# Patient Record
Sex: MALE | Race: WHITE | NOT HISPANIC OR LATINO | Employment: OTHER | RURAL
[De-identification: names, ages, dates, MRNs, and addresses within clinical notes are randomized per-mention and may not be internally consistent; named-entity substitution may affect disease eponyms.]

---

## 2017-02-03 RX ORDER — AMMONIUM LACTATE 12 G/100G
CREAM TOPICAL
Qty: 140 G | Refills: 0 | Status: SHIPPED | OUTPATIENT
Start: 2017-02-03 | End: 2017-03-26 | Stop reason: SDUPTHER

## 2017-02-23 ENCOUNTER — OFFICE VISIT (OUTPATIENT)
Dept: FAMILY MEDICINE CLINIC | Facility: CLINIC | Age: 62
End: 2017-02-23

## 2017-02-23 VITALS
OXYGEN SATURATION: 94 % | HEART RATE: 104 BPM | BODY MASS INDEX: 35.42 KG/M2 | TEMPERATURE: 97.9 F | SYSTOLIC BLOOD PRESSURE: 120 MMHG | DIASTOLIC BLOOD PRESSURE: 60 MMHG | WEIGHT: 276 LBS | HEIGHT: 74 IN

## 2017-02-23 DIAGNOSIS — E11.9 DIABETES MELLITUS WITHOUT COMPLICATION (HCC): ICD-10-CM

## 2017-02-23 DIAGNOSIS — H02.401 PTOSIS OF EYELID, RIGHT: Primary | ICD-10-CM

## 2017-02-23 DIAGNOSIS — K21.9 GASTROESOPHAGEAL REFLUX DISEASE WITHOUT ESOPHAGITIS: ICD-10-CM

## 2017-02-23 DIAGNOSIS — I69.398 GAIT DISTURBANCE, POST-STROKE: ICD-10-CM

## 2017-02-23 DIAGNOSIS — H52.203 ASTIGMATISM, BILATERAL: ICD-10-CM

## 2017-02-23 DIAGNOSIS — E66.09 NON MORBID OBESITY DUE TO EXCESS CALORIES: ICD-10-CM

## 2017-02-23 DIAGNOSIS — R44.9 SENSORY DEFICIT PRESENT: ICD-10-CM

## 2017-02-23 DIAGNOSIS — G90.2 HORNER'S SYNDROME: ICD-10-CM

## 2017-02-23 DIAGNOSIS — I72.0 ANEURYSM OF CAROTID ARTERY (HCC): ICD-10-CM

## 2017-02-23 DIAGNOSIS — I10 BENIGN ESSENTIAL HYPERTENSION: ICD-10-CM

## 2017-02-23 DIAGNOSIS — I69.319 COGNITIVE DEFICIT, POST-STROKE: ICD-10-CM

## 2017-02-23 DIAGNOSIS — H52.13 MYOPIA, BILATERAL: ICD-10-CM

## 2017-02-23 DIAGNOSIS — R26.9 GAIT DISTURBANCE, POST-STROKE: ICD-10-CM

## 2017-02-23 DIAGNOSIS — M79.2 NEUROPATHIC PAIN: ICD-10-CM

## 2017-02-23 PROCEDURE — 99214 OFFICE O/P EST MOD 30 MIN: CPT | Performed by: FAMILY MEDICINE

## 2017-02-23 NOTE — PROGRESS NOTES
Subjective   Arias Willson is a 61 y.o. obese white male smoker with HTN, Had TIA 2- , began with double vision, R facial numbness, L arm numbness, L leg weakness. 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 Pt was referred to Neuro in [place], told has aneurysm, but surgery not recommended for this area. 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 Had resolution of most motor weakness. Has residual difficulty with balance. Cognitive difficulties, Depression, DM, High cholesterol, Chronic pain, and other health problems. Pt reports returned to job, despite inability to perform much of past duties, President of company is good friend.  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 Disability became more apparent, opted to resign with severance package. Concerned at some point may not be able to afford current meds.  Doubtful will be able to find employment, takes hours to do what took 10 minutes before stroke, can only walk 100 ft before exhausted, has to watch feet to walk, falls to R if not using cane. Would like to have labs checked before Ins runs out, any possible reduction in med cost checked out'.          Diabetes   He presents for his follow-up diabetic visit. He has type 2 diabetes mellitus. No MedicAlert identification noted. His disease course has been worsening. Hypoglycemia symptoms include headaches and nervousness/anxiousness. Pertinent negatives for hypoglycemia include no confusion or dizziness. Associated symptoms include fatigue, visual change and weakness. Pertinent negatives for diabetes include no chest pain. Symptoms are stable. Diabetic complications include a CVA and peripheral neuropathy. Current diabetic treatment includes oral agent (monotherapy). He is compliant with  treatment all of the time. His weight is stable. He is following a generally healthy diet. He participates in exercise daily. An ACE inhibitor/angiotensin II receptor blocker is being taken. He does not see a podiatrist.Eye exam is current.   Hypertension   This is a chronic problem. The current episode started more than 1 year ago. The problem has been gradually improving since onset. The problem is controlled. Associated symptoms include headaches and neck pain. Pertinent negatives include no chest pain, palpitations or shortness of breath. Agents associated with hypertension include NSAIDs. Risk factors for coronary artery disease include obesity and smoking/tobacco exposure. Past treatments include ACE inhibitors. The current treatment provides moderate improvement. Hypertensive end-organ damage includes CVA.   Stroke   This is a chronic problem. The current episode started more than 1 year ago. The problem occurs daily. The problem has been gradually improving. Associated symptoms include fatigue, headaches, neck pain, numbness, a visual change and weakness. Pertinent negatives include no abdominal pain, arthralgias, chest pain, congestion, coughing, fever, nausea, rash or sore throat. The symptoms are aggravated by exertion and walking. He has tried acetaminophen and NSAIDs for the symptoms. The treatment provided no relief.   Pain   This is a chronic problem. The current episode started more than 1 year ago. The problem occurs daily. Associated symptoms include fatigue, headaches, neck pain, numbness, a visual change and weakness. Pertinent negatives include no abdominal pain, arthralgias, chest pain, congestion, coughing, fever, nausea, rash or sore throat. The symptoms are aggravated by walking and stress. He has tried NSAIDs and acetaminophen for the symptoms. The treatment provided no relief.   Neurologic Problem   The patient's primary symptoms include an altered mental status, clumsiness, focal sensory  loss, focal weakness, a loss of balance, a visual change and weakness. Primary symptoms comment: Delayed cognitive processing. . This is a chronic problem. The current episode started more than 1 year ago. The neurological problem developed suddenly. The problem has been gradually improving since onset. There was left-sided, right-sided, facial, upper extremity and lower extremity (Strokes of Basal ganglia. Horners syndrome,  sensory, motor deficits.) focality noted. Associated symptoms include fatigue, headaches and neck pain. Pertinent negatives include no abdominal pain, chest pain, confusion, dizziness, fever, nausea, palpitations or shortness of breath. Past treatments include walking and medication (Physical therapy). The treatment provided mild relief. His past medical history is significant for a CVA.   Depression   Visit Type: follow-up  Patient presents with the following symptoms: decreased concentration, depressed mood, excessive worry, feelings of hopelessness, feelings of worthlessness, irritability, memory impairment, nervousness/anxiety and restlessness.  Patient is not experiencing: confusion, palpitations, shortness of breath, suicidal ideas and suicidal planning.  Frequency of symptoms: occasionally   Severity: moderate   Sleep quality: fair  Nighttime awakenings: several           The following portions of the patient's history were reviewed and updated as appropriate: allergies, current medications, past family history, past medical history, past social history, past surgical history and problem list.    Review of Systems   Constitutional: Positive for fatigue and irritability. Negative for activity change, appetite change and fever.   HENT: Negative for congestion, ear pain and sore throat.    Eyes: Negative for pain and visual disturbance.   Respiratory: Negative for cough, chest tightness, shortness of breath and wheezing.    Cardiovascular: Negative for chest pain and palpitations.    Gastrointestinal: Negative for abdominal pain and nausea.   Endocrine: Negative for cold intolerance and heat intolerance.   Genitourinary: Negative for difficulty urinating and dysuria.   Musculoskeletal: Positive for neck pain. Negative for arthralgias and gait problem.   Skin: Negative for color change and rash.   Allergic/Immunologic: Negative for environmental allergies.   Neurological: Positive for focal weakness, weakness, numbness, headaches and loss of balance. Negative for dizziness.   Hematological: Negative for adenopathy. Does not bruise/bleed easily.   Psychiatric/Behavioral: Positive for decreased concentration and sleep disturbance. Negative for agitation, confusion and suicidal ideas. The patient is nervous/anxious.         Depressed mood       Objective   Physical Exam   Constitutional: He is oriented to person, place, and time. He appears well-developed and well-nourished. No distress.   HENT:   Head: Normocephalic and atraumatic.   Right Ear: External ear normal.   Left Ear: External ear normal.   Nose: Nose normal.   Mouth/Throat: Oropharynx is clear and moist.   Eyes: Conjunctivae and EOM are normal. Pupils are equal, round, and reactive to light. Right eye exhibits no discharge. Left eye exhibits no discharge. No scleral icterus.   Neck: Normal range of motion. Neck supple. No JVD present. No tracheal deviation present. No thyromegaly present.   Cardiovascular: Normal rate, regular rhythm, normal heart sounds and intact distal pulses.  Exam reveals no gallop and no friction rub.    No murmur heard.  Pulmonary/Chest: Effort normal and breath sounds normal. No respiratory distress. He has no wheezes. He has no rales. He exhibits no tenderness.   Abdominal: Soft. Bowel sounds are normal. He exhibits no distension and no mass. There is no tenderness. No hernia.   Musculoskeletal: He exhibits no edema, tenderness or deformity.   Walks with cane, L sided sensory deficit, R motor, drift towards R  without cane. R pupil NR, Has weakness closing R eye CN VII.   Has antalgic gait.   Lymphadenopathy:     He has no cervical adenopathy.   Neurological: He is alert and oriented to person, place, and time. He displays normal reflexes. A cranial nerve deficit is present. He exhibits normal muscle tone. Coordination normal.   Skin: Skin is warm and dry. No rash noted. He is not diaphoretic. No erythema. No pallor.   Psychiatric: His behavior is normal. Judgment and thought content normal.   Has some difficulty expressing thought at times, Pt often frustrated, tearful due to disability, post CVA.  Post stroke depression.    Nursing note and vitals reviewed.      Assessment/Plan      Arias was seen today for diabetes and hypertension.    Diagnoses and all orders for this visit:    Ptosis of eyelid, right    Astigmatism, bilateral    Myopia, bilateral    Gus's syndrome    Neuropathic pain    Diabetes mellitus without complication    Benign essential hypertension    Aneurysm of carotid artery    Gastroesophageal reflux disease without esophagitis    Non morbid obesity due to excess calories    Sensory deficit present    Cognitive deficit, post-stroke    Gait disturbance, post-stroke    Discussed chronic pain, depression, disability, associated with CVA. Discussed stroke risk, Stroke risk reduction. Discussed smoking cessation x 3 mins, diet, exercise benefits. Discussed meds, indications. Discussed coping skills, CBT, benefits of counseling. Discussed Cymbalta for pain and Depression. Discussed USPSTF recommendations.

## 2017-02-26 PROBLEM — M79.2 NEUROPATHIC PAIN: Status: ACTIVE | Noted: 2017-02-26

## 2017-02-26 PROBLEM — I63.9 CEREBROVASCULAR ACCIDENT (HCC): Status: ACTIVE | Noted: 2017-02-26

## 2017-02-26 PROBLEM — K21.9 GERD (GASTROESOPHAGEAL REFLUX DISEASE): Status: ACTIVE | Noted: 2017-02-26

## 2017-02-26 PROBLEM — R44.9 SENSORY DEFICIT PRESENT: Status: ACTIVE | Noted: 2017-02-26

## 2017-02-26 PROBLEM — I69.398 GAIT DISTURBANCE, POST-STROKE: Status: ACTIVE | Noted: 2017-02-26

## 2017-02-26 PROBLEM — I69.319 COGNITIVE DEFICIT, POST-STROKE: Status: ACTIVE | Noted: 2017-02-26

## 2017-02-26 PROBLEM — R26.9 GAIT DISTURBANCE, POST-STROKE: Status: ACTIVE | Noted: 2017-02-26

## 2017-02-26 PROBLEM — I72.0 ANEURYSM OF CAROTID ARTERY: Status: ACTIVE | Noted: 2017-02-26

## 2017-02-26 PROBLEM — G90.2 HORNER'S SYNDROME: Status: ACTIVE | Noted: 2017-02-26

## 2017-02-26 PROBLEM — E66.9 OBESITY: Status: ACTIVE | Noted: 2017-02-26

## 2017-02-27 NOTE — PATIENT INSTRUCTIONS
Diabetes Mellitus and Food  It is important for you to manage your blood sugar (glucose) level. Your blood glucose level can be greatly affected by what you eat. Eating healthier foods in the appropriate amounts throughout the day at about the same time each day will help you control your blood glucose level. It can also help slow or prevent worsening of your diabetes mellitus. Healthy eating may even help you improve the level of your blood pressure and reach or maintain a healthy weight.   General recommendations for healthful eating and cooking habits include:  · Eating meals and snacks regularly. Avoid going long periods of time without eating to lose weight.  · Eating a diet that consists mainly of plant-based foods, such as fruits, vegetables, nuts, legumes, and whole grains.  · Using low-heat cooking methods, such as baking, instead of high-heat cooking methods, such as deep frying.  Work with your dietitian to make sure you understand how to use the Nutrition Facts information on food labels.  HOW CAN FOOD AFFECT ME?  Carbohydrates  Carbohydrates affect your blood glucose level more than any other type of food. Your dietitian will help you determine how many carbohydrates to eat at each meal and teach you how to count carbohydrates. Counting carbohydrates is important to keep your blood glucose at a healthy level, especially if you are using insulin or taking certain medicines for diabetes mellitus.  Alcohol  Alcohol can cause sudden decreases in blood glucose (hypoglycemia), especially if you use insulin or take certain medicines for diabetes mellitus. Hypoglycemia can be a life-threatening condition. Symptoms of hypoglycemia (sleepiness, dizziness, and disorientation) are similar to symptoms of having too much alcohol.   If your health care provider has given you approval to drink alcohol, do so in moderation and use the following guidelines:  · Women should not have more than one drink per day, and men  should not have more than two drinks per day. One drink is equal to:    12 oz of beer.    5 oz of wine.    1½ oz of hard liquor.  · Do not drink on an empty stomach.  · Keep yourself hydrated. Have water, diet soda, or unsweetened iced tea.  · Regular soda, juice, and other mixers might contain a lot of carbohydrates and should be counted.  WHAT FOODS ARE NOT RECOMMENDED?  As you make food choices, it is important to remember that all foods are not the same. Some foods have fewer nutrients per serving than other foods, even though they might have the same number of calories or carbohydrates. It is difficult to get your body what it needs when you eat foods with fewer nutrients. Examples of foods that you should avoid that are high in calories and carbohydrates but low in nutrients include:  · Trans fats (most processed foods list trans fats on the Nutrition Facts label).  · Regular soda.  · Juice.  · Candy.  · Sweets, such as cake, pie, doughnuts, and cookies.  · Fried foods.  WHAT FOODS CAN I EAT?  Eat nutrient-rich foods, which will nourish your body and keep you healthy. The food you should eat also will depend on several factors, including:  · The calories you need.  · The medicines you take.  · Your weight.  · Your blood glucose level.  · Your blood pressure level.  · Your cholesterol level.  You should eat a variety of foods, including:  · Protein.    Lean cuts of meat.    Proteins low in saturated fats, such as fish, egg whites, and beans. Avoid processed meats.  · Fruits and vegetables.    Fruits and vegetables that may help control blood glucose levels, such as apples, mangoes, and yams.  · Dairy products.    Choose fat-free or low-fat dairy products, such as milk, yogurt, and cheese.  · Grains, bread, pasta, and rice.    Choose whole grain products, such as multigrain bread, whole oats, and brown rice. These foods may help control blood pressure.  · Fats.    Foods containing healthful fats, such as nuts,  avocado, olive oil, canola oil, and fish.  DOES EVERYONE WITH DIABETES MELLITUS HAVE THE SAME MEAL PLAN?  Because every person with diabetes mellitus is different, there is not one meal plan that works for everyone. It is very important that you meet with a dietitian who will help you create a meal plan that is just right for you.     This information is not intended to replace advice given to you by your health care provider. Make sure you discuss any questions you have with your health care provider.     Document Released: 09/14/2006 Document Revised: 01/08/2016 Document Reviewed: 11/14/2014  [email or web address] Interactive Patient Education ©2016 [employer]  Diabetes and Standards of Medical Care  Diabetes is complicated. You may find that your diabetes team includes a dietitian, nurse, diabetes educator, eye doctor, and more. To help everyone know what is going on and to help you get the care you deserve, the following schedule of care was developed to help keep you on track. Below are the tests, exams, vaccines, medicines, education, and plans you will need.  HbA1c test  This test shows how well you have controlled your glucose over the past 2-3 months. It is used to see if your diabetes management plan needs to be adjusted.   · It is performed at least 2 times a year if you are meeting treatment goals.  · It is performed 4 times a year if therapy has changed or if you are not meeting treatment goals.  Blood pressure test  · This test is performed at every routine medical visit. The goal is less than 140/90 mm Hg for most people, but 130/80 mm Hg in some cases. Ask your health care provider about your goal.  Dental exam  · Follow up with the dentist regularly.  Eye exam  · If you are diagnosed with type 1 diabetes as a child, get an exam upon reaching the age of 10 years or older and having had diabetes for 3-5 years. Yearly eye exams are recommended after that initial eye exam.  · If you are diagnosed with type 1  diabetes as an adult, get an exam within 5 years of diagnosis and then yearly.  · If you are diagnosed with type 2 diabetes, get an exam as soon as possible after the diagnosis and then yearly.  Foot care exam  · Visual foot exams are performed at every routine medical visit. The exams check for cuts, injuries, or other problems with the feet.  · You should have a complete foot exam performed every year. This exam includes an inspection of the structure and skin of your feet, a check of the pulses in your feet, and a check of the sensation in your feet.    Type 1 diabetes: The first exam is performed 5 years after diagnosis.    Type 2 diabetes: The first exam is performed at the time of diagnosis.  · Check your feet nightly for cuts, injuries, or other problems with your feet. Tell your health care provider if anything is not healing.  Kidney function test (urine microalbumin)  · This test is performed once a year.  ¨ Type 1 diabetes: The first test is performed 5 years after diagnosis.  ¨ Type 2 diabetes: The first test is performed at the time of diagnosis.  · A serum creatinine and estimated glomerular filtration rate (eGFR) test is done once a year to assess the level of chronic kidney disease (CKD), if present.  Lipid profile (cholesterol, HDL, LDL, triglycerides)  · Performed every 5 years for most people.    The goal for LDL is less than 100 mg/dL. If you are at high risk, the goal is less than 70 mg/dL.    The goal for HDL is 40 mg/dL-50 mg/dL for men and 50 mg/dL-60 mg/dL for women. An HDL cholesterol of 60 mg/dL or higher gives some protection against heart disease.    The goal for triglycerides is less than 150 mg/dL.  Immunizations  · The flu (influenza) vaccine is recommended yearly for every person 6 months of age or older who has diabetes.  · The pneumonia (pneumococcal) vaccine is recommended for every person 2 years of age or older who has diabetes. Adults 65 years of age or older may receive the  pneumonia vaccine as a series of two separate shots.  · The hepatitis B vaccine is recommended for adults shortly after they have been diagnosed with diabetes.  · The Tdap (tetanus, diphtheria, and pertussis) vaccine should be given:    According to normal childhood vaccination schedules, for children.    Every 10 years, for adults who have diabetes.  Diabetes self-management education  · Education is recommended at diagnosis and ongoing as needed.  Treatment plan  · Your treatment plan is reviewed at every medical visit.     This information is not intended to replace advice given to you by your health care provider. Make sure you discuss any questions you have with your health care provider.     Document Released: 10/15/2010 Document Revised: 01/08/2016 Document Reviewed: 05/20/2014  [employer] Interactive Patient Education ©2016 [employer]  Major Depressive Disorder  Major depressive disorder is a mood disorder. It is not the same as feeling sad when bad things happen. This disorder can last for days or weeks. This disorder can make it hard to work or do things with family or friends. It can make a person feel:  · Sad.  · Empty.  · Hopeless.  · Helpless.  · Irritable.  In addition to these feelings, people who have this disorder have at least 4 of these symptoms:  · Having trouble sleeping.  · Sleeping too much.  · A big (major) change in appetite.  · A big change in weight.  · A lack of energy.  · Feelings of guilt or worthlessness.  · Having a hard time with concentrating, remembering, or making decisions.  · Slow movements.  · Restlessness.  · Thinking or dreaming about suicide or about harming oneself.  · Suicide attempt.  HOME CARE  · Take over-the-counter and prescription medicines only as told by your doctor.  · Keep all follow-up visits as told by your doctor. This includes any therapy that your doctor recommends for you.  · Lessen your stress. Do something that you enjoy, such as biking, walking, or  reading a book.  · Exercise often.  · Eat a healthy diet.  · Do not drink alcohol.  · Do not take drugs.  · Get support from your family and friends.  GET HELP RIGHT AWAY IF:   · You start to hear voices.  · You see things that are not really there.  · You feel like people are out to get you (paranoid).  · You have serious thoughts about hurting yourself or others.  · You think about suicide.     This information is not intended to replace advice given to you by your health care provider. Make sure you discuss any questions you have with your health care provider.     Document Released: 11/28/2016 Document Reviewed: 01/13/2014  [employer] Interactive Patient Education ©2016 [employer]  Stroke Prevention  Some medical conditions and behaviors are associated with an increased chance of having a stroke. You may prevent a stroke by making healthy choices and managing medical conditions.  HOW CAN I REDUCE MY RISK OF HAVING A STROKE?   · Stay physically active. Get at least 30 minutes of activity on most or all days.  · Do not smoke. It may also be helpful to avoid exposure to secondhand smoke.  · Limit alcohol use. Moderate alcohol use is considered to be:  ¨ No more than 2 drinks per day for men.  ¨ No more than 1 drink per day for nonpregnant women.  · Eat healthy foods. This involves:  ¨ Eating 5 or more servings of fruits and vegetables a day.  ¨ Making dietary changes that address high blood pressure (hypertension), high cholesterol, diabetes, or obesity.  · Manage your cholesterol levels.  ¨ Making food choices that are high in fiber and low in saturated fat, trans fat, and cholesterol may control cholesterol levels.  ¨ Take any prescribed medicines to control cholesterol as directed by your health care provider.  · Manage your diabetes.  ¨ Controlling your carbohydrate and sugar intake is recommended to manage diabetes.  ¨ Take any prescribed medicines to control diabetes as directed by your health care  provider.  · Control your hypertension.  ¨ Making food choices that are low in salt (sodium), saturated fat, trans fat, and cholesterol is recommended to manage hypertension.  ¨ Ask your health care provider if you need treatment to lower your blood pressure. Take any prescribed medicines to control hypertension as directed by your health care provider.  ¨ If you are 18-39 years of age, have your blood pressure checked every 3-5 years. If you are 40 years of age or older, have your blood pressure checked every year.  · Maintain a healthy weight.  ¨ Reducing calorie intake and making food choices that are low in sodium, saturated fat, trans fat, and cholesterol are recommended to manage weight.  · Stop drug abuse.  · Avoid taking birth control pills.  ¨ Talk to your health care provider about the risks of taking birth control pills if you are over 35 years old, smoke, get migraines, or have ever had a blood clot.  · Get evaluated for sleep disorders (sleep apnea).  ¨ Talk to your health care provider about getting a sleep evaluation if you snore a lot or have excessive sleepiness.  · Take medicines only as directed by your health care provider.  ¨ For some people, aspirin or blood thinners (anticoagulants) are helpful in reducing the risk of forming abnormal blood clots that can lead to stroke. If you have the irregular heart rhythm of atrial fibrillation, you should be on a blood thinner unless there is a good reason you cannot take them.  ¨ Understand all your medicine instructions.  · Make sure that other conditions (such as anemia or atherosclerosis) are addressed.  SEEK IMMEDIATE MEDICAL CARE IF:   · You have sudden weakness or numbness of the face, arm, or leg, especially on one side of the body.  · Your face or eyelid droops to one side.  · You have sudden confusion.  · You have trouble speaking (aphasia) or understanding.  · You have sudden trouble seeing in one or both eyes.  · You have sudden trouble  walking.  · You have dizziness.  · You have a loss of balance or coordination.  · You have a sudden, severe headache with no known cause.  · You have new chest pain or an irregular heartbeat.  Any of these symptoms may represent a serious problem that is an emergency. Do not wait to see if the symptoms will go away. Get medical help at once. Call your local emergency services (911 in [place] Do not drive yourself to the hospital.     This information is not intended to replace advice given to you by your health care provider. Make sure you discuss any questions you have with your health care provider.     Document Released: 01/25/2006 Document Revised: 01/08/2016 Document Reviewed: 06/20/2014  [employer] Interactive Patient Education ©2016 [employer]  Rehabilitation After a Stroke, Adult  A stroke can cause many types of problems. The treatment of stroke involves three stages:  · Prevention.  · Treatment immediately following a stroke.  · Rehabilitation after a stroke.  HOW IS MY REHABILITATION PLAN DEVELOPED?  A detailed exam by your health care provider helps outline what problems were caused by the stroke. Your health care provider may consult specialists. The specialists may include doctors, occupational and physical therapists, and speech therapists. It is then possible to make a plan that best fits your needs.   Your evaluation might include the following:  · Evaluation of your ability to do daily activities that require using muscles, coordination, vision, reasoning, memory, and problem solving. Interviews with you and your health care provider will help determine what you could do and could not do before the stroke.  · Evaluation of your ability to do personal self-care tasks, such as dressing, grooming, and eating.  · Tests to see if there are sensory and motor changes due to the stroke, especially in the hands and legs.  WHAT ARE THE TYPES OF REHABILITATION?  Your health care provider may have you start  rehabilitation right away depending on the type and severity of your stroke. Rehabilitation after a stroke is focused on getting function back and preventing another stroke. Rehabilitation might include:   · Physical therapy. This can include help with walking, sitting, lying down, and balance. It may also be designed to help prevent shortening of the muscles (contractures) and swelling (edema).  · Occupational therapy. This therapy helps you to relearn skills needed for leading a normal life. These could include eating, using the restroom, dressing, and taking care of yourself. It helps to make you more independent.  · Vision therapy. This can help you to retrain, strengthen, and improve your vision after a stroke.  · Speech therapy. This can help to improve your speech and communication skills. It also teaches you and your family members to cope with problems of being unable to communicate.  · Cognitive therapy. This therapy can help with problems caused by lack of memory, attention, or concentration.  · Psychological or psychiatric therapy. This can help you cope with problems of frustration and emotional problems that may develop after a stroke.       This information is not intended to replace advice given to you by your health care provider. Make sure you discuss any questions you have with your health care provider.     Document Released: 01/06/2009 Document Revised: 05/03/2016 Document Reviewed: 05/22/2014  [employer] Interactive Patient Education ©2016 [employer]

## 2017-03-27 RX ORDER — AMMONIUM LACTATE 12 G/100G
CREAM TOPICAL
Qty: 140 G | Refills: 0 | Status: SHIPPED | OUTPATIENT
Start: 2017-03-27 | End: 2018-01-23 | Stop reason: HOSPADM

## 2017-03-29 ENCOUNTER — OFFICE VISIT (OUTPATIENT)
Dept: FAMILY MEDICINE CLINIC | Facility: CLINIC | Age: 62
End: 2017-03-29

## 2017-03-29 VITALS
TEMPERATURE: 97.3 F | DIASTOLIC BLOOD PRESSURE: 60 MMHG | SYSTOLIC BLOOD PRESSURE: 100 MMHG | OXYGEN SATURATION: 95 % | HEART RATE: 96 BPM | WEIGHT: 275.8 LBS | BODY MASS INDEX: 35.39 KG/M2 | HEIGHT: 74 IN

## 2017-03-29 DIAGNOSIS — J40 BRONCHITIS: Primary | ICD-10-CM

## 2017-03-29 PROCEDURE — 99213 OFFICE O/P EST LOW 20 MIN: CPT | Performed by: FAMILY MEDICINE

## 2017-03-29 RX ORDER — AZITHROMYCIN 250 MG/1
TABLET, FILM COATED ORAL
Qty: 6 TABLET | Refills: 3 | Status: SHIPPED | OUTPATIENT
Start: 2017-03-29 | End: 2018-01-17

## 2017-03-29 RX ORDER — ERYTHROMYCIN 5 MG/G
OINTMENT OPHTHALMIC NIGHTLY
Qty: 3.5 G | Refills: 5 | Status: SHIPPED | OUTPATIENT
Start: 2017-03-29 | End: 2018-01-23 | Stop reason: HOSPADM

## 2017-03-29 RX ORDER — GUAIFENESIN AND CODEINE PHOSPHATE 100; 10 MG/5ML; MG/5ML
5 SOLUTION ORAL 3 TIMES DAILY PRN
Qty: 473 ML | Refills: 0 | Status: SHIPPED | OUTPATIENT
Start: 2017-03-29 | End: 2018-01-17

## 2017-03-29 RX ORDER — DIAPER,BRIEF,INFANT-TODD,DISP
EACH MISCELLANEOUS 2 TIMES DAILY
Qty: 28 G | Refills: 6 | Status: SHIPPED | OUTPATIENT
Start: 2017-03-29 | End: 2018-01-23 | Stop reason: HOSPADM

## 2017-04-11 PROBLEM — J40 BRONCHITIS: Status: ACTIVE | Noted: 2017-04-11

## 2017-04-11 PROBLEM — J06.9 URI, ACUTE: Status: ACTIVE | Noted: 2017-04-11

## 2017-04-11 NOTE — PATIENT INSTRUCTIONS
Acute Bronchitis  Bronchitis is inflammation of the airways that extend from the windpipe into the lungs (bronchi). The inflammation often causes mucus to develop. This leads to a cough, which is the most common symptom of bronchitis.   In acute bronchitis, the condition usually develops suddenly and goes away over time, usually in a couple weeks. Smoking, allergies, and asthma can make bronchitis worse. Repeated episodes of bronchitis may cause further lung problems.   CAUSES  Acute bronchitis is most often caused by the same virus that causes a cold. The virus can spread from person to person (contagious) through coughing, sneezing, and touching contaminated objects.  SIGNS AND SYMPTOMS   · Cough.    · Fever.    · Coughing up mucus.    · Body aches.    · Chest congestion.    · Chills.    · Shortness of breath.    · Sore throat.    DIAGNOSIS   Acute bronchitis is usually diagnosed through a physical exam. Your health care provider will also ask you questions about your medical history. Tests, such as chest X-rays, are sometimes done to rule out other conditions.   TREATMENT   Acute bronchitis usually goes away in a couple weeks. Oftentimes, no medical treatment is necessary. Medicines are sometimes given for relief of fever or cough. Antibiotic medicines are usually not needed but may be prescribed in certain situations. In some cases, an inhaler may be recommended to help reduce shortness of breath and control the cough. A cool mist vaporizer may also be used to help thin bronchial secretions and make it easier to clear the chest.   HOME CARE INSTRUCTIONS  · Get plenty of rest.    · Drink enough fluids to keep your urine clear or pale yellow (unless you have a medical condition that requires fluid restriction). Increasing fluids may help thin your respiratory secretions (sputum) and reduce chest congestion, and it will prevent dehydration.    · Take medicines only as directed by your health care provider.  · If  you were prescribed an antibiotic medicine, finish it all even if you start to feel better.  · Avoid smoking and secondhand smoke. Exposure to cigarette smoke or irritating chemicals will make bronchitis worse. If you are a smoker, consider using nicotine gum or skin patches to help control withdrawal symptoms. Quitting smoking will help your lungs heal faster.    · Reduce the chances of another bout of acute bronchitis by washing your hands frequently, avoiding people with cold symptoms, and trying not to touch your hands to your mouth, nose, or eyes.    · Keep all follow-up visits as directed by your health care provider.    SEEK MEDICAL CARE IF:  Your symptoms do not improve after 1 week of treatment.   SEEK IMMEDIATE MEDICAL CARE IF:  · You develop an increased fever or chills.    · You have chest pain.    · You have severe shortness of breath.  · You have bloody sputum.    · You develop dehydration.  · You faint or repeatedly feel like you are going to pass out.  · You develop repeated vomiting.  · You develop a severe headache.  MAKE SURE YOU:   · Understand these instructions.  · Will watch your condition.  · Will get help right away if you are not doing well or get worse.     This information is not intended to replace advice given to you by your health care provider. Make sure you discuss any questions you have with your health care provider.     Document Released: 01/25/2006 Document Revised: 01/08/2016 Document Reviewed: 06/10/2014  [employer] Interactive Patient Education ©2016 [employer]

## 2017-04-14 ENCOUNTER — TELEPHONE (OUTPATIENT)
Dept: FAMILY MEDICINE CLINIC | Facility: CLINIC | Age: 62
End: 2017-04-14

## 2017-04-14 NOTE — TELEPHONE ENCOUNTER
Pt qualifies for the [facility] program for all of his medications.  States the two of you had talked about increasing his cymbalta, wants to know if could do that with the RX being sent to [facility]. Pt currently taking 30mg 1 every 4 days to make it last.  He is also currently waiting to see what his new insurance will be.   Please advise.

## 2017-04-18 RX ORDER — GABAPENTIN 600 MG/1
600 TABLET ORAL 3 TIMES DAILY PRN
Qty: 270 TABLET | Refills: 3 | Status: SHIPPED | OUTPATIENT
Start: 2017-04-18 | End: 2018-01-30 | Stop reason: SDUPTHER

## 2017-04-18 RX ORDER — PRAVASTATIN SODIUM 40 MG
40 TABLET ORAL DAILY
Qty: 90 TABLET | Refills: 3 | Status: SHIPPED | OUTPATIENT
Start: 2017-04-18 | End: 2018-01-17 | Stop reason: DRUGHIGH

## 2017-04-18 RX ORDER — DULOXETIN HYDROCHLORIDE 30 MG/1
30 CAPSULE, DELAYED RELEASE ORAL 2 TIMES DAILY
Qty: 180 CAPSULE | Refills: 3 | Status: SHIPPED | OUTPATIENT
Start: 2017-04-18 | End: 2018-01-30 | Stop reason: SDUPTHER

## 2017-04-18 RX ORDER — CLOPIDOGREL BISULFATE 75 MG/1
75 TABLET ORAL DAILY
Qty: 90 TABLET | Refills: 3 | Status: SHIPPED | OUTPATIENT
Start: 2017-04-18 | End: 2018-01-30 | Stop reason: SDUPTHER

## 2017-04-18 RX ORDER — LISINOPRIL AND HYDROCHLOROTHIAZIDE 25; 20 MG/1; MG/1
1 TABLET ORAL DAILY
Qty: 90 TABLET | Refills: 3 | Status: SHIPPED | OUTPATIENT
Start: 2017-04-18 | End: 2018-01-23 | Stop reason: HOSPADM

## 2017-04-18 RX ORDER — LANSOPRAZOLE 30 MG/1
30 CAPSULE, DELAYED RELEASE ORAL DAILY
Qty: 30 CAPSULE | Refills: 11 | Status: SHIPPED | OUTPATIENT
Start: 2017-04-18 | End: 2018-01-30 | Stop reason: SDUPTHER

## 2018-01-17 ENCOUNTER — APPOINTMENT (OUTPATIENT)
Dept: CT IMAGING | Facility: HOSPITAL | Age: 63
End: 2018-01-17

## 2018-01-17 ENCOUNTER — HOSPITAL ENCOUNTER (INPATIENT)
Facility: HOSPITAL | Age: 63
LOS: 6 days | Discharge: HOME | End: 2018-01-23
Attending: EMERGENCY MEDICINE | Admitting: HOSPITALIST

## 2018-01-17 ENCOUNTER — APPOINTMENT (OUTPATIENT)
Dept: GENERAL RADIOLOGY | Facility: HOSPITAL | Age: 63
End: 2018-01-17

## 2018-01-17 DIAGNOSIS — J42 CHRONIC BRONCHITIS WITH ACUTE EXACERBATION (HCC): Chronic | ICD-10-CM

## 2018-01-17 DIAGNOSIS — J20.9 CHRONIC BRONCHITIS WITH ACUTE EXACERBATION (HCC): Chronic | ICD-10-CM

## 2018-01-17 DIAGNOSIS — I50.20 SYSTOLIC CONGESTIVE HEART FAILURE, UNSPECIFIED CONGESTIVE HEART FAILURE CHRONICITY: ICD-10-CM

## 2018-01-17 DIAGNOSIS — J96.02 ACUTE RESPIRATORY FAILURE WITH HYPOXIA AND HYPERCAPNIA (HCC): Primary | ICD-10-CM

## 2018-01-17 DIAGNOSIS — J96.01 ACUTE RESPIRATORY FAILURE WITH HYPOXIA AND HYPERCAPNIA (HCC): Primary | ICD-10-CM

## 2018-01-17 DIAGNOSIS — I10 ESSENTIAL HYPERTENSION, BENIGN: ICD-10-CM

## 2018-01-17 DIAGNOSIS — J32.0 CHRONIC LEFT MAXILLARY SINUSITIS: ICD-10-CM

## 2018-01-17 DIAGNOSIS — Z74.09 IMPAIRED PHYSICAL MOBILITY: ICD-10-CM

## 2018-01-17 DIAGNOSIS — I11.9 BENIGN HYPERTENSIVE HEART DISEASE WITHOUT CONGESTIVE HEART FAILURE: ICD-10-CM

## 2018-01-17 DIAGNOSIS — G47.33 OBSTRUCTIVE APNEA: Chronic | ICD-10-CM

## 2018-01-17 DIAGNOSIS — I21.4 NSTEMI (NON-ST ELEVATED MYOCARDIAL INFARCTION) (HCC): ICD-10-CM

## 2018-01-17 DIAGNOSIS — I21.4 NSTEMI (NON-ST ELEVATION MYOCARDIAL INFARCTION) (HCC): ICD-10-CM

## 2018-01-17 PROBLEM — A41.9 SEPSIS: Status: ACTIVE | Noted: 2018-01-17

## 2018-01-17 PROBLEM — E66.812 CLASS 2 OBESITY DUE TO EXCESS CALORIES IN ADULT: Status: ACTIVE | Noted: 2017-02-26

## 2018-01-17 PROBLEM — R77.8 ELEVATED TROPONIN: Status: ACTIVE | Noted: 2018-01-17

## 2018-01-17 PROBLEM — E66.09 CLASS 2 OBESITY DUE TO EXCESS CALORIES IN ADULT: Status: ACTIVE | Noted: 2017-02-26

## 2018-01-17 PROBLEM — R79.89 ELEVATED TROPONIN: Status: ACTIVE | Noted: 2018-01-17

## 2018-01-17 PROBLEM — IMO0001 CLASS 3 OBESITY DUE TO EXCESS CALORIES IN ADULT: Status: ACTIVE | Noted: 2017-02-26

## 2018-01-17 LAB
ALBUMIN SERPL-MCNC: 3.7 G/DL (ref 3.4–4.8)
ALBUMIN/GLOB SERPL: 1.4 G/DL (ref 1.1–1.8)
ALP SERPL-CCNC: 92 U/L (ref 38–126)
ALT SERPL W P-5'-P-CCNC: 52 U/L (ref 21–72)
ANION GAP SERPL CALCULATED.3IONS-SCNC: 9 MMOL/L (ref 5–15)
APTT PPP: 27.2 SECONDS (ref 20–40.3)
ARTERIAL PATENCY WRIST A: ABNORMAL
ARTERIAL PATENCY WRIST A: ABNORMAL
AST SERPL-CCNC: 44 U/L (ref 17–59)
ATMOSPHERIC PRESS: ABNORMAL MMHG
ATMOSPHERIC PRESS: ABNORMAL MMHG
BASE EXCESS BLDA CALC-SCNC: 9 MMOL/L (ref -2.4–2.4)
BASE EXCESS BLDA CALC-SCNC: 9.8 MMOL/L (ref -2.4–2.4)
BASOPHILS # BLD AUTO: 0.03 10*3/MM3 (ref 0–0.2)
BASOPHILS NFR BLD AUTO: 0.4 % (ref 0–2)
BDY SITE: ABNORMAL
BDY SITE: ABNORMAL
BILIRUB SERPL-MCNC: 0.4 MG/DL (ref 0.2–1.3)
BILIRUB UR QL STRIP: NEGATIVE
BUN BLD-MCNC: 37 MG/DL (ref 7–21)
BUN/CREAT SERPL: 31.9 (ref 7–25)
CA-I BLD-MCNC: 4.5 MG/DL (ref 4.5–4.9)
CA-I BLD-MCNC: 4.6 MG/DL (ref 4.5–4.9)
CALCIUM SPEC-SCNC: 9.1 MG/DL (ref 8.4–10.2)
CHLORIDE SERPL-SCNC: 92 MMOL/L (ref 95–110)
CK MB SERPL-CCNC: 1.74 NG/ML (ref 0–5)
CK SERPL-CCNC: 70 U/L (ref 55–170)
CLARITY UR: CLEAR
CO2 BLDA-SCNC: 39.9 MMOL/L (ref 23–27)
CO2 BLDA-SCNC: 40.1 MMOL/L (ref 23–27)
CO2 SERPL-SCNC: 36 MMOL/L (ref 22–31)
COLOR UR: YELLOW
CREAT BLD-MCNC: 1.16 MG/DL (ref 0.7–1.3)
D-DIMER, QUANTITATIVE (MAD,POW, STR): 459 NG/ML (FEU) (ref 0–470)
D-LACTATE SERPL-SCNC: 2.1 MMOL/L (ref 0.5–2)
D-LACTATE SERPL-SCNC: 2.2 MMOL/L (ref 0.5–2)
DEPRECATED RDW RBC AUTO: 58.9 FL (ref 35.1–43.9)
EOSINOPHIL # BLD AUTO: 0.03 10*3/MM3 (ref 0–0.7)
EOSINOPHIL NFR BLD AUTO: 0.4 % (ref 0–7)
ERYTHROCYTE [DISTWIDTH] IN BLOOD BY AUTOMATED COUNT: 16 % (ref 11.5–14.5)
FLUAV AG NPH QL: NEGATIVE
FLUBV AG NPH QL IA: NEGATIVE
GFR SERPL CREATININE-BSD FRML MDRD: 64 ML/MIN/1.73 (ref 49–113)
GLOBULIN UR ELPH-MCNC: 2.7 GM/DL (ref 2.3–3.5)
GLUCOSE BLD-MCNC: 147 MG/DL (ref 60–100)
GLUCOSE BLDA-MCNC: 144 MMOL/L
GLUCOSE BLDA-MCNC: 147 MMOL/L
GLUCOSE UR STRIP-MCNC: NEGATIVE MG/DL
HCO3 BLDA-SCNC: 37.8 MMOL/L (ref 22–26)
HCO3 BLDA-SCNC: 37.8 MMOL/L (ref 22–26)
HCT VFR BLD AUTO: 47.3 % (ref 39–49)
HCT VFR BLD CALC: 41 % (ref 40–54)
HCT VFR BLD CALC: 44 % (ref 40–54)
HGB BLD-MCNC: 14.1 G/DL (ref 13.7–17.3)
HGB BLDA-MCNC: 14.1 G/DL (ref 14–18)
HGB BLDA-MCNC: 14.8 G/DL (ref 14–18)
HGB UR QL STRIP.AUTO: NEGATIVE
HOLD SPECIMEN: NORMAL
IMM GRANULOCYTES # BLD: 0.04 10*3/MM3 (ref 0–0.02)
IMM GRANULOCYTES NFR BLD: 0.5 % (ref 0–0.5)
INR PPP: 1.03 (ref 0.8–1.2)
KETONES UR QL STRIP: NEGATIVE
LEUKOCYTE ESTERASE UR QL STRIP.AUTO: NEGATIVE
LYMPHOCYTES # BLD AUTO: 1.15 10*3/MM3 (ref 0.6–4.2)
LYMPHOCYTES NFR BLD AUTO: 14.4 % (ref 10–50)
MCH RBC QN AUTO: 30 PG (ref 26.5–34)
MCHC RBC AUTO-ENTMCNC: 29.8 G/DL (ref 31.5–36.3)
MCV RBC AUTO: 100.6 FL (ref 80–98)
MODALITY: ABNORMAL
MODALITY: ABNORMAL
MONOCYTES # BLD AUTO: 0.68 10*3/MM3 (ref 0–0.9)
MONOCYTES NFR BLD AUTO: 8.5 % (ref 0–12)
NEUTROPHILS # BLD AUTO: 6.08 10*3/MM3 (ref 2–8.6)
NEUTROPHILS NFR BLD AUTO: 75.8 % (ref 37–80)
NITRITE UR QL STRIP: NEGATIVE
NT-PROBNP SERPL-MCNC: 4720 PG/ML (ref 0–900)
PCO2 BLDA: 66.2 MM HG (ref 35–45)
PCO2 BLDA: 72.4 MM HG (ref 35–45)
PH BLDA: 7.34 PH UNITS (ref 7.35–7.45)
PH BLDA: 7.38 PH UNITS (ref 7.35–7.45)
PH UR STRIP.AUTO: 6 [PH] (ref 5–9)
PLATELET # BLD AUTO: 294 10*3/MM3 (ref 150–450)
PMV BLD AUTO: 10.3 FL (ref 8–12)
PO2 BLDA: 104.9 MM HG (ref 80–105)
PO2 BLDA: 46.8 MM HG (ref 80–105)
POTASSIUM BLD-SCNC: 5.4 MMOL/L (ref 3.5–5.1)
POTASSIUM BLDA-SCNC: 4.58 MMOL/L (ref 3.6–4.9)
POTASSIUM BLDA-SCNC: 5.05 MMOL/L (ref 3.6–4.9)
PROT SERPL-MCNC: 6.4 G/DL (ref 6.3–8.6)
PROT UR QL STRIP: ABNORMAL
PROTHROMBIN TIME: 13.4 SECONDS (ref 11.1–15.3)
RBC # BLD AUTO: 4.7 10*6/MM3 (ref 4.37–5.74)
SAO2 % BLDCOA: 82.6 % (ref 94–100)
SAO2 % BLDCOA: 97.6 %
SODIUM BLD-SCNC: 137 MMOL/L (ref 137–145)
SODIUM BLDA-SCNC: 138.1 MMOL/L (ref 138–146)
SODIUM BLDA-SCNC: 138.2 MMOL/L (ref 138–146)
SP GR UR STRIP: 1.02 (ref 1–1.03)
TROPONIN I SERPL-MCNC: 0.26 NG/ML
TROPONIN I SERPL-MCNC: 0.41 NG/ML
TROPONIN I SERPL-MCNC: 0.41 NG/ML
UROBILINOGEN UR QL STRIP: ABNORMAL
WBC NRBC COR # BLD: 8.01 10*3/MM3 (ref 3.2–9.8)
WHOLE BLOOD HOLD SPECIMEN: NORMAL
WHOLE BLOOD HOLD SPECIMEN: NORMAL

## 2018-01-17 PROCEDURE — 71045 X-RAY EXAM CHEST 1 VIEW: CPT

## 2018-01-17 PROCEDURE — 96372 THER/PROPH/DIAG INJ SC/IM: CPT

## 2018-01-17 PROCEDURE — 82553 CREATINE MB FRACTION: CPT | Performed by: EMERGENCY MEDICINE

## 2018-01-17 PROCEDURE — 83880 ASSAY OF NATRIURETIC PEPTIDE: CPT | Performed by: EMERGENCY MEDICINE

## 2018-01-17 PROCEDURE — 87804 INFLUENZA ASSAY W/OPTIC: CPT | Performed by: EMERGENCY MEDICINE

## 2018-01-17 PROCEDURE — 70450 CT HEAD/BRAIN W/O DYE: CPT

## 2018-01-17 PROCEDURE — 94799 UNLISTED PULMONARY SVC/PX: CPT

## 2018-01-17 PROCEDURE — 94660 CPAP INITIATION&MGMT: CPT

## 2018-01-17 PROCEDURE — 85379 FIBRIN DEGRADATION QUANT: CPT | Performed by: EMERGENCY MEDICINE

## 2018-01-17 PROCEDURE — 85025 COMPLETE CBC W/AUTO DIFF WBC: CPT | Performed by: EMERGENCY MEDICINE

## 2018-01-17 PROCEDURE — 85610 PROTHROMBIN TIME: CPT | Performed by: EMERGENCY MEDICINE

## 2018-01-17 PROCEDURE — 25010000002 CEFTRIAXONE PER 250 MG: Performed by: FAMILY MEDICINE

## 2018-01-17 PROCEDURE — 93010 ELECTROCARDIOGRAM REPORT: CPT | Performed by: INTERNAL MEDICINE

## 2018-01-17 PROCEDURE — 25010000002 ENOXAPARIN PER 10 MG: Performed by: EMERGENCY MEDICINE

## 2018-01-17 PROCEDURE — 99285 EMERGENCY DEPT VISIT HI MDM: CPT

## 2018-01-17 PROCEDURE — 94760 N-INVAS EAR/PLS OXIMETRY 1: CPT

## 2018-01-17 PROCEDURE — 25010000002 FUROSEMIDE PER 20 MG: Performed by: EMERGENCY MEDICINE

## 2018-01-17 PROCEDURE — 25010000003 AMPICILLIN-SULBACTAM PER 1.5 G: Performed by: EMERGENCY MEDICINE

## 2018-01-17 PROCEDURE — 81003 URINALYSIS AUTO W/O SCOPE: CPT | Performed by: EMERGENCY MEDICINE

## 2018-01-17 PROCEDURE — 0 IOPAMIDOL PER 1 ML: Performed by: EMERGENCY MEDICINE

## 2018-01-17 PROCEDURE — 85730 THROMBOPLASTIN TIME PARTIAL: CPT | Performed by: EMERGENCY MEDICINE

## 2018-01-17 PROCEDURE — 63710000001 PREDNISONE PER 1 MG: Performed by: FAMILY MEDICINE

## 2018-01-17 PROCEDURE — 84484 ASSAY OF TROPONIN QUANT: CPT | Performed by: EMERGENCY MEDICINE

## 2018-01-17 PROCEDURE — 80053 COMPREHEN METABOLIC PANEL: CPT | Performed by: EMERGENCY MEDICINE

## 2018-01-17 PROCEDURE — 82803 BLOOD GASES ANY COMBINATION: CPT | Performed by: EMERGENCY MEDICINE

## 2018-01-17 PROCEDURE — 36600 WITHDRAWAL OF ARTERIAL BLOOD: CPT

## 2018-01-17 PROCEDURE — 71275 CT ANGIOGRAPHY CHEST: CPT

## 2018-01-17 PROCEDURE — 87040 BLOOD CULTURE FOR BACTERIA: CPT | Performed by: EMERGENCY MEDICINE

## 2018-01-17 PROCEDURE — 82550 ASSAY OF CK (CPK): CPT | Performed by: EMERGENCY MEDICINE

## 2018-01-17 PROCEDURE — 94640 AIRWAY INHALATION TREATMENT: CPT

## 2018-01-17 PROCEDURE — 83605 ASSAY OF LACTIC ACID: CPT | Performed by: EMERGENCY MEDICINE

## 2018-01-17 PROCEDURE — 93005 ELECTROCARDIOGRAM TRACING: CPT | Performed by: EMERGENCY MEDICINE

## 2018-01-17 RX ORDER — ASPIRIN 81 MG/1
TABLET, CHEWABLE ORAL
Status: DISCONTINUED
Start: 2018-01-17 | End: 2018-01-23 | Stop reason: HOSPADM

## 2018-01-17 RX ORDER — SODIUM CHLORIDE 0.9 % (FLUSH) 0.9 %
1-10 SYRINGE (ML) INJECTION AS NEEDED
Status: DISCONTINUED | OUTPATIENT
Start: 2018-01-17 | End: 2018-01-23 | Stop reason: HOSPADM

## 2018-01-17 RX ORDER — SODIUM CHLORIDE 0.9 % (FLUSH) 0.9 %
10 SYRINGE (ML) INJECTION AS NEEDED
Status: DISCONTINUED | OUTPATIENT
Start: 2018-01-17 | End: 2018-01-23 | Stop reason: HOSPADM

## 2018-01-17 RX ORDER — ASPIRIN 325 MG
325 TABLET ORAL DAILY
Status: DISCONTINUED | OUTPATIENT
Start: 2018-01-18 | End: 2018-01-23 | Stop reason: HOSPADM

## 2018-01-17 RX ORDER — PANTOPRAZOLE SODIUM 40 MG/1
40 TABLET, DELAYED RELEASE ORAL EVERY MORNING
Status: DISCONTINUED | OUTPATIENT
Start: 2018-01-18 | End: 2018-01-23 | Stop reason: HOSPADM

## 2018-01-17 RX ORDER — IPRATROPIUM BROMIDE AND ALBUTEROL SULFATE 2.5; .5 MG/3ML; MG/3ML
3 SOLUTION RESPIRATORY (INHALATION)
Status: DISCONTINUED | OUTPATIENT
Start: 2018-01-17 | End: 2018-01-17 | Stop reason: SDUPTHER

## 2018-01-17 RX ORDER — PREDNISONE 20 MG/1
40 TABLET ORAL
Status: DISCONTINUED | OUTPATIENT
Start: 2018-01-17 | End: 2018-01-18

## 2018-01-17 RX ORDER — DULOXETIN HYDROCHLORIDE 30 MG/1
30 CAPSULE, DELAYED RELEASE ORAL DAILY
Status: DISCONTINUED | OUTPATIENT
Start: 2018-01-18 | End: 2018-01-23 | Stop reason: HOSPADM

## 2018-01-17 RX ORDER — CLOPIDOGREL BISULFATE 75 MG/1
75 TABLET ORAL DAILY
Status: DISCONTINUED | OUTPATIENT
Start: 2018-01-17 | End: 2018-01-23 | Stop reason: HOSPADM

## 2018-01-17 RX ORDER — GABAPENTIN 300 MG/1
300 CAPSULE ORAL EVERY 8 HOURS SCHEDULED
Status: DISCONTINUED | OUTPATIENT
Start: 2018-01-17 | End: 2018-01-23 | Stop reason: HOSPADM

## 2018-01-17 RX ORDER — CHLORAL HYDRATE 500 MG
2000 CAPSULE ORAL
COMMUNITY
End: 2021-01-05 | Stop reason: SDUPTHER

## 2018-01-17 RX ORDER — SODIUM CHLORIDE 9 MG/ML
125 INJECTION, SOLUTION INTRAVENOUS CONTINUOUS
Status: DISCONTINUED | OUTPATIENT
Start: 2018-01-17 | End: 2018-01-17

## 2018-01-17 RX ORDER — ATORVASTATIN CALCIUM 10 MG/1
10 TABLET, FILM COATED ORAL DAILY
Status: DISCONTINUED | OUTPATIENT
Start: 2018-01-17 | End: 2018-01-23 | Stop reason: HOSPADM

## 2018-01-17 RX ORDER — ASPIRIN 81 MG/1
324 TABLET, CHEWABLE ORAL ONCE
Status: COMPLETED | OUTPATIENT
Start: 2018-01-17 | End: 2018-01-17

## 2018-01-17 RX ORDER — SODIUM CHLORIDE 9 MG/ML
75 INJECTION, SOLUTION INTRAVENOUS CONTINUOUS
Status: DISCONTINUED | OUTPATIENT
Start: 2018-01-17 | End: 2018-01-18

## 2018-01-17 RX ORDER — FUROSEMIDE 10 MG/ML
40 INJECTION INTRAMUSCULAR; INTRAVENOUS ONCE
Status: COMPLETED | OUTPATIENT
Start: 2018-01-17 | End: 2018-01-17

## 2018-01-17 RX ORDER — ACETAMINOPHEN 325 MG/1
650 TABLET ORAL EVERY 4 HOURS PRN
Status: DISCONTINUED | OUTPATIENT
Start: 2018-01-17 | End: 2018-01-23 | Stop reason: HOSPADM

## 2018-01-17 RX ORDER — PRAVASTATIN SODIUM 40 MG
40 TABLET ORAL NIGHTLY
COMMUNITY
End: 2018-01-30 | Stop reason: SDUPTHER

## 2018-01-17 RX ORDER — IPRATROPIUM BROMIDE AND ALBUTEROL SULFATE 2.5; .5 MG/3ML; MG/3ML
3 SOLUTION RESPIRATORY (INHALATION)
Status: DISCONTINUED | OUTPATIENT
Start: 2018-01-17 | End: 2018-01-23 | Stop reason: HOSPADM

## 2018-01-17 RX ADMIN — SODIUM CHLORIDE 125 ML/HR: 900 INJECTION, SOLUTION INTRAVENOUS at 14:25

## 2018-01-17 RX ADMIN — SODIUM CHLORIDE 3 G: 9 INJECTION, SOLUTION INTRAVENOUS at 16:56

## 2018-01-17 RX ADMIN — ASPIRIN 81 MG 324 MG: 81 TABLET ORAL at 16:37

## 2018-01-17 RX ADMIN — ATORVASTATIN CALCIUM 10 MG: 10 TABLET, FILM COATED ORAL at 22:50

## 2018-01-17 RX ADMIN — SODIUM CHLORIDE 75 ML/HR: 900 INJECTION, SOLUTION INTRAVENOUS at 16:58

## 2018-01-17 RX ADMIN — SODIUM CHLORIDE 2 G: 9 INJECTION INTRAMUSCULAR; INTRAVENOUS; SUBCUTANEOUS at 22:51

## 2018-01-17 RX ADMIN — SODIUM CHLORIDE 75 ML/HR: 900 INJECTION, SOLUTION INTRAVENOUS at 22:53

## 2018-01-17 RX ADMIN — GABAPENTIN 300 MG: 300 CAPSULE ORAL at 22:50

## 2018-01-17 RX ADMIN — IPRATROPIUM BROMIDE AND ALBUTEROL SULFATE 3 ML: 2.5; .5 SOLUTION RESPIRATORY (INHALATION) at 14:22

## 2018-01-17 RX ADMIN — IPRATROPIUM BROMIDE AND ALBUTEROL SULFATE 3 ML: 2.5; .5 SOLUTION RESPIRATORY (INHALATION) at 20:10

## 2018-01-17 RX ADMIN — DOXYCYCLINE 100 MG: 100 INJECTION, POWDER, LYOPHILIZED, FOR SOLUTION INTRAVENOUS at 22:51

## 2018-01-17 RX ADMIN — SODIUM CHLORIDE 500 ML: 9 INJECTION, SOLUTION INTRAVENOUS at 15:33

## 2018-01-17 RX ADMIN — IPRATROPIUM BROMIDE AND ALBUTEROL SULFATE 3 ML: .5; 3 SOLUTION RESPIRATORY (INHALATION) at 15:32

## 2018-01-17 RX ADMIN — FUROSEMIDE 40 MG: 10 INJECTION, SOLUTION INTRAMUSCULAR; INTRAVENOUS at 16:40

## 2018-01-17 RX ADMIN — PREDNISONE 40 MG: 20 TABLET ORAL at 22:48

## 2018-01-17 RX ADMIN — IOPAMIDOL 81 ML: 755 INJECTION, SOLUTION INTRAVENOUS at 17:34

## 2018-01-17 RX ADMIN — CLOPIDOGREL BISULFATE 75 MG: 75 TABLET ORAL at 22:49

## 2018-01-17 RX ADMIN — ENOXAPARIN SODIUM 130 MG: 150 INJECTION SUBCUTANEOUS at 18:49

## 2018-01-17 NOTE — ED NOTES
Telemetry on and coming in, verified by Telemetry tech.     Maryan Ledesma, OFELIA  01/17/18 [id number]

## 2018-01-17 NOTE — H&P
HISTORY AND PHYSICAL  NAME: Arias Willson  : 1955  MRN: [id number]    DATE OF ADMISSION: 18    DATE & TIME SEEN: 18 5:34 PM    PCP: Harpreet Bass MD    CODE STATUS: No Order    CHIEF COMPLAINT  Chief Complaint   Patient presents with   • Altered Mental Status     HPI:  Arias Willson is a 62 y.o. male who presents with ***.     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 Grade I diastolic dysfunction of the LV myocardium. No evidence of LV apical thrombus. No evidence of pericardial effusion.)   • Gus's syndrome     RIGHT EYE   • Hyperkeratosis    • Hyperlipidemia    • Mucopurulent conjunctivitis     ACUTE   • Myopia    • Neuropathic pain    • Obesity    • Onychomycosis    • Tobacco dependence syndrome        PAST SURGICAL HISTORY:  Past Surgical History:   Procedure Laterality Date   • COLONOSCOPY  2015    Colonoscopy, diagnostic (screening) 86998 (Screening for malignant neoplasm of colon)    • COLONOSCOPY  2015    Colonoscopy, diagnostic (screening) 77251 (Diverticulosis found in the sigmoid colon.Hemorrhoids found in the anus.)   • COLONOSCOPY  2009    Colonoscopy, diagnostic (screening) 88960 (Small internal and  external hemorrhoids were present, Colonoscopy otherwise normal.)       FAMILY HISTORY:  Family History   Problem Relation Age of Onset   • Glaucoma Other    • Heart disease Other    • Stroke Other    • Macular degeneration Other    • Glaucoma Father    • Macular degeneration Father    • Cataracts Father    • Macular degeneration Sister         SOCIAL HISTORY:  Social History     Social History   • Marital status:      Spouse name: N/A   • Number of children: N/A   • Years of education: N/A     Occupational History   • Not on file.     Social History Main Topics   • Smoking status: Current Every Day Smoker     Types: Cigarettes   • Smokeless tobacco: Not on file      Comment: SMOKING 3 CIGS A DAY   • Alcohol use Yes   • Drug use: No   • Sexual activity: Not on file     Other Topics Concern   • Not on file     Social History Narrative       HOME MEDICATIONS:  No current facility-administered medications on file prior to encounter.      Current Outpatient Prescriptions on File Prior to Encounter   Medication Sig Dispense Refill   • ammonium lactate (AMLACTIN) 12 % cream APPLY TO THE DRY SKIN BUILD UP 2 HOURS BEFORE BATHING 140 g 0   • [START ON 5/24/2106] aspirin 325 MG tablet Take 325 mg by mouth daily.     • betamethasone valerate (VALISONE) 0.1 % cream Apply  topically 2 (two) times a day.     • clopidogrel (PLAVIX) 75 MG tablet Take 1 tablet by mouth Daily. 90 tablet 3   • DULoxetine (CYMBALTA) 30 MG capsule Take 1 capsule by mouth 2 (Two) Times a Day. 180 capsule 3   • erythromycin (ROMYCIN) 5 MG/GM ophthalmic ointment Administer  to the right eye Every Night. 3.5 g 5   • gabapentin (NEURONTIN) 600 MG tablet Take 1 tablet by mouth 3 (Three) Times a Day As Needed (Take 600mg TID prn). 270 tablet 3   • hydrocortisone 0.5 % cream Apply  topically 2 (Two) Times a Day. 28 g 6   • lansoprazole (PREVACID) 30 MG capsule Take 1 capsule by mouth Daily. 30 capsule 11   • lisinopril-hydrochlorothiazide  (PRINZIDE,ZESTORETIC) 20-25 MG per tablet Take 1 tablet by mouth Daily. 90 tablet 3   • metFORMIN (GLUCOPHAGE) 500 MG tablet Take 1 tablet by mouth 2 (Two) Times a Day With Meals. 180 tablet 3   • naphazoline (NAPHCON) 0.1 % ophthalmic solution Apply 1 drop to eye 4 (Four) Times a Day As Needed for Irritation. 15 mL 5   • [DISCONTINUED] azithromycin (ZITHROMAX) 250 MG tablet Take 2 tablets the first day, then 1 tablet daily for 4 days. 6 tablet 3   • [DISCONTINUED] cyclobenzaprine (FLEXERIL) 5 MG tablet Take 1 tablet by mouth 3 (Three) Times a Day As Needed for muscle spasms. 90 tablet 1   • [DISCONTINUED] guaifenesin-codeine (GUAIFENESIN AC) 100-10 MG/5ML liquid Take 5 mL by mouth 3 (Three) Times a Day As Needed for Cough. 473 mL 0   • [DISCONTINUED] ketoconazole (NIZORAL) 2 % shampoo Apply  topically. Use this soap to wash body when bathing, especially area with rash     • [DISCONTINUED] pravastatin (PRAVACHOL) 40 MG tablet Take 1 tablet by mouth Daily. (Patient taking differently: Take 40 mg by mouth Every Night.) 90 tablet 3   • [DISCONTINUED] Unable to find 1 each daily. Med Name: om-3-dha-epa-fish oil-vit D3 900 mg-1,000 unit capsule 1 capsule 2 times per day Oral         ALLERGIES:  Review of patient's allergies indicates no known allergies.    REVIEW OF SYSTEMS  Review of Systems    PHYSICAL EXAM:  Temp:  [98 °F (36.7 °C)-98.3 °F (36.8 °C)] 98.3 °F (36.8 °C)  Heart Rate:  [] 91  Resp:  [20-24] 20  BP: (110-120)/(68-72) 120/68  FiO2 (%):  [40 %] 40 %  Body mass index is 38 kg/(m^2).  Physical Exam    DIAGNOSTIC DATA:   Lab Results (last 24 hours)     Procedure Component Value Units Date/Time    Blood Culture - Blood, [[id number]] Collected:  01/17/18 4770    Specimen:  Blood from Arm, Right Updated:  01/17/18 1351    CBC & Differential [[id number]] Collected:  01/17/18 1331    Specimen:  Blood Updated:  01/17/18 1496    Narrative:       The following orders were created for panel order CBC &  Differential.  Procedure                               Abnormality         Status                     ---------                               -----------         ------                     CBC Auto Differential[[id number]]        Abnormal            Final result                 Please view results for these tests on the individual orders.    CBC Auto Differential [[id number]]  (Abnormal) Collected:  01/17/18 1334    Specimen:  Blood Updated:  01/17/18 1355     WBC 8.01 10*3/mm3      RBC 4.70 10*6/mm3      Hemoglobin 14.1 g/dL      Hematocrit 47.3 %      .6 (H) fL      MCH 30.0 pg      MCHC 29.8 (L) g/dL      RDW 16.0 (H) %      RDW-SD 58.9 (H) fl      MPV 10.3 fL      Platelets 294 10*3/mm3      Neutrophil % 75.8 %      Lymphocyte % 14.4 %      Monocyte % 8.5 %      Eosinophil % 0.4 %      Basophil % 0.4 %      Immature Grans % 0.5 %      Neutrophils, Absolute 6.08 10*3/mm3      Lymphocytes, Absolute 1.15 10*3/mm3      Monocytes, Absolute 0.68 10*3/mm3      Eosinophils, Absolute 0.03 10*3/mm3      Basophils, Absolute 0.03 10*3/mm3      Immature Grans, Absolute 0.04 (H) 10*3/mm3     Lactic Acid, Plasma [[id number]]  (Abnormal) Collected:  01/17/18 1334    Specimen:  Blood Updated:  01/17/18 1411     Lactate 2.1 (C) mmol/L     Comprehensive Metabolic Panel [[id number]]  (Abnormal) Collected:  01/17/18 1334    Specimen:  Blood Updated:  01/17/18 1414     Glucose 147 (H) mg/dL      BUN 37 (H) mg/dL      Creatinine 1.16 mg/dL      Sodium 137 mmol/L      Potassium 5.4 (H) mmol/L      Chloride 92 (L) mmol/L      CO2 36.0 (H) mmol/L      Calcium 9.1 mg/dL      Total Protein 6.4 g/dL      Albumin 3.70 g/dL      ALT (SGPT) 52 U/L      AST (SGOT) 44 U/L      Alkaline Phosphatase 92 U/L      Total Bilirubin 0.4 mg/dL      eGFR Non African Amer 64 mL/min/1.73      Globulin 2.7 gm/dL      A/G Ratio 1.4 g/dL      BUN/Creatinine Ratio 31.9 (H)     Anion Gap 9.0 mmol/L     Protime-INR [[id number]]  (Normal) Collected:  01/17/18  1334    Specimen:  Blood Updated:  01/17/18 1415     Protime 13.4 Seconds      INR 1.03    Narrative:       Therapeutic range for most indications is 2.0-3.0 INR,  or 2.5-3.5 for mechanical heart valves.    CK [[id number]]  (Normal) Collected:  01/17/18 1334    Specimen:  Blood Updated:  01/17/18 1416     Creatine Kinase 70 U/L     aPTT [[id number]]  (Normal) Collected:  01/17/18 1334    Specimen:  Blood Updated:  01/17/18 1417     PTT 27.2 seconds     Narrative:       The recommended Heparin therapeutic range is 68-97 seconds.    D-dimer, Quantitative [[id number]]  (Normal) Collected:  01/17/18 1334    Specimen:  Blood Updated:  01/17/18 1418     D-Dimer, Quantitative 459 ng/mL (FEU)     Narrative:       Dimer values <500 ng/ml FEU are FDA approved as aid in diagnosis of deep venous thrombosis and pulmonary embolism.  This test should not be used in an exclusion strategy with pretest probability alone.    A recent guideline regarding diagnosis for pulmonary thomboembolism recommends an adjusted exclusion criterion of age x 10 ng/ml FEU for patients >50 years of age (Sherin Intern Med 2015; 163: 701-711).    CK-MB [[id number]]  (Normal) Collected:  01/17/18 1334    Specimen:  Blood Updated:  01/17/18 1425     CKMB 1.74 ng/mL     BNP [[id number]]  (Abnormal) Collected:  01/17/18 1334    Specimen:  Blood Updated:  01/17/18 1426     proBNP 4720.0 (H) pg/mL     Blood Gas, Arterial [[id number]]  (Abnormal) Collected:  01/17/18 1423    Specimen:  Arterial Blood Updated:  01/17/18 1432     Site --      Not performed at this site.        Jaydon's Test --      Not performed at this site.        pH, Arterial 7.375 pH units      pCO2, Arterial 66.2 (H) mm Hg      pO2, Arterial 46.8 (L) mm Hg      HCO3, Arterial 37.8 (H) mmol/L      Base Excess, Arterial 9.8 (H) mmol/L      O2 Saturation, Arterial 82.6 (L) %      Hemoglobin, Blood Gas 14.8 g/dL      Hematocrit, Blood Gas 44.0 %      CO2 Content 39.9 (H)     Sodium, Arterial 138.1 mmol/L       Potassium, Arterial 5.05 (H) mmol/L      Glucose, Arterial 144 mmol/L      Barometric Pressure for Blood Gas -- mmHg       Not performed at this site.        Modality --      Not performed at this site.        Ionized Calcium 4.60 mg/dL     [place] Draw [[id number]] Collected:  01/17/18 1334    Specimen:  Blood Updated:  01/17/18 1446    Narrative:       The following orders were created for panel order [place] Draw.  Procedure                               Abnormality         Status                     ---------                               -----------         ------                     Light Blue Top[[id number]]                                   Final result               Green Top (Gel)[[id number]]                                  Final result               Lavender Top[[id number]]                                     Final result               Gold Top - SST[[id number]]                                   Final result                 Please view results for these tests on the individual orders.    Light Blue Top [[id number]] Collected:  01/17/18 1334    Specimen:  Blood Updated:  01/17/18 1446     Extra Tube hold for add-on      Auto resulted       Green Top (Gel) [[id number]] Collected:  01/17/18 1334    Specimen:  Blood Updated:  01/17/18 1446     Extra Tube Hold for add-ons.      Auto resulted.       Lavender Top [[id number]] Collected:  01/17/18 1334    Specimen:  Blood Updated:  01/17/18 1446     Extra Tube hold for add-on      Auto resulted       Gold Top - SST [[id number]] Collected:  01/17/18 1334    Specimen:  Blood Updated:  01/17/18 1446     Extra Tube Hold for add-ons.      Auto resulted.       Influenza Antigen, Rapid - Swab, Nasopharynx [[id number]]  (Normal) Collected:  01/17/18 1535    Specimen:  Swab from Nasopharynx Updated:  01/17/18 1615     Influenza A Ag, EIA Negative     Influenza B Ag, EIA Negative    Blood Culture - Blood, [[id number]] Collected:  01/17/18 1611    Specimen:  Blood from Arm, Right  Updated:  01/17/18 1615    Urinalysis With / Culture If Indicated - Urine, Clean Catch [[id number]]  (Abnormal) Collected:  01/17/18 1556    Specimen:  Urine from Urine, Clean Catch Updated:  01/17/18 1616     Color, UA Yellow     Appearance, UA Clear     pH, UA 6.0     Specific Gravity, UA 1.024     Glucose, UA Negative     Ketones, UA Negative     Bilirubin, UA Negative     Blood, UA Negative     Protein, UA Trace (A)     Leuk Esterase, UA Negative     Nitrite, UA Negative     Urobilinogen, UA 1.0 E.U./dL    Narrative:       Urine microscopic not indicated.    Troponin [[id number]]  (Abnormal) Collected:  01/17/18 1334    Specimen:  Blood Updated:  01/17/18 1634     Troponin I 0.258 (C) ng/mL     Troponin [[id number]]  (Abnormal) Collected:  01/17/18 1611    Specimen:  Blood from Arm, Right Updated:  01/17/18 1649     Troponin I 0.407 (C) ng/mL     Blood Gas, Arterial [[id number]]  (Abnormal) Collected:  01/17/18 1638    Specimen:  Arterial Blood Updated:  01/17/18 1657     Site --      Not performed at this site.        Jaydon's Test --      Not performed at this site.        pH, Arterial 7.336 (L) pH units      pCO2, Arterial 72.4 (H) mm Hg      pO2, Arterial 104.9 mm Hg      HCO3, Arterial 37.8 (H) mmol/L      Base Excess, Arterial 9.0 (H) mmol/L      O2 Saturation, Arterial 97.6 %      Hemoglobin, Blood Gas 14.1 g/dL      Hematocrit, Blood Gas 41.0 %      CO2 Content 40.1 (H)     Sodium, Arterial 138.2 mmol/L      Potassium, Arterial 4.58 mmol/L      Glucose, Arterial 147 mmol/L      Barometric Pressure for Blood Gas -- mmHg       Not performed at this site.        Modality --      Not performed at this site.        Ionized Calcium 4.50 mg/dL     Lactic Acid, Reflex Timer [[id number]] Collected:  01/17/18 1334    Specimen:  Blood Updated:  01/17/18 1717     Extra Tube Hold for add-ons.      Auto resulted.              Imaging Results (last 24 hours)     Procedure Component Value Units Date/Time    XR Chest 1 View  [[id number]] Collected:  01/17/18 1330     Updated:  01/17/18 1354    Narrative:       Chest single view.       CLINICAL INDICATION: Shortness of breath. Simple sepsis protocol.    COMPARISON: April 4, 2013.    FINDINGS: Cardiomegaly. Slight prominence of the pulmonary  vasculature. Lungs otherwise clear.      Impression:       CONCLUSION: Cardiomegaly. Slight prominence of the pulmonary  vasculature. Otherwise unremarkable chest.    Electronically signed by:  Ruel Perry MD  1/17/2018 1:53 PM CST  Workstation: [employer]    CT Head Without Contrast [[id number]] Collected:  01/17/18 1515     Updated:  01/17/18 1538    Narrative:       Noncontrast CT examination of the brain.    INDICATION: Alteration of mental status. Decreased level of  consciousness. Evaluate for stroke    Technique: Axial 5 mm contiguous images with brain parenchymal  and bone windows    This exam was performed according to our departmental  dose-optimization program, which includes automated exposure  control, adjustment of the mA and/or kV according to patient size  and/or use of iterative reconstruction technique.    Prior relevant examination: MRI brain June 26, 2013.  Prominent cisterna magna, normal variant.  Brain parenchyma appears within normal limits. Ventricles are  within normal limits in size. No evidence of abnormal mass or  calcification is seen. No evidence of acute hemorrhage is noted.  Chronic opacification left maxillary sinus, unchanged since prior  exam.    Impression:       CONCLUSION:   1. Normal brain for age. No acute changes are present.        Electronically signed by:  Ruel Perry MD  1/17/2018 3:36 PM CST  Workstation: [employer]        I reviewed the patient's new clinical results.    ASSESSMENT AND PLAN: This is a 62 y.o. male with:    Active Hospital Problems (** Indicates Principal Problem)    Diagnosis Date Noted   • Acute respiratory failure with hypoxia and hypercapnia [J96.01, J96.02] 01/17/2018      [facility]  Problems    Diagnosis Date Noted Date Resolved   No resolved problems to display.       We will continue to monitor the patient's course and adjust our care accordingly.     DVT prophylaxis: SCDs/TEDs  Code status is No Order   ANNA # ***, reviewed and consistent with patient reported medications.    Anticipated length of stay: 1-2 days    I discussed the patients findings and my recommendations with patient, family and primary care team.     Dr. Rocky Marie is the attending on record at time of admission; he is aware of the patient's status and agrees with the above history and physical.    Signature  Sudha Mejias MD  [facility] Family Medicine Resident, PGYII        This document has been electronically signed by Sudha Mejias MD on January 17, 2018 5:34 PM

## 2018-01-17 NOTE — ED PROVIDER NOTES
"Subjective   HPI Comments: 63yo male pmh significant htn/hyperlipidemia/dm2/cva/chronic left hemisensory loss/obesity presents ED via EMS reported onset generalized weakness 1130hrs/associated with confusion, bilateral muscle tremors, difficulty speech.  Pt reportedly told wife \"help me go upstairs\"; there is no upstairs at their residence with subsequent mumbling speech.  Pt poor historian.  ROS (+) chronic nonproductive cough.  Pt notably hypoxic upon arrival.    Patient is a 62 y.o. male presenting with weakness.   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 Grade I diastolic dysfunction of the LV myocardium. No evidence of LV apical thrombus. No evidence of pericardial effusion.)   • Gus's syndrome     RIGHT EYE   • Hyperkeratosis    • Hyperlipidemia    • Mucopurulent conjunctivitis     ACUTE   • Myopia    • Neuropathic pain    • Obesity    • Onychomycosis    • Tobacco dependence syndrome        No Known " Allergies    Past Surgical History:   Procedure Laterality Date   • COLONOSCOPY  07/30/2015    Colonoscopy, diagnostic (screening) 11494 (Screening for malignant neoplasm of colon)    • COLONOSCOPY  09/09/2015    Colonoscopy, diagnostic (screening) 23924 (Diverticulosis found in the sigmoid colon.Hemorrhoids found in the anus.)   • COLONOSCOPY  05/01/2009    Colonoscopy, diagnostic (screening) 55485 (Small internal and external hemorrhoids were present, Colonoscopy otherwise normal.)       Family History   Problem Relation Age of Onset   • Glaucoma Other    • Heart disease Other    • Stroke Other    • Macular degeneration Other    • Glaucoma Father    • Macular degeneration Father    • Cataracts Father    • Macular degeneration Sister        Social History     Social History   • Marital status:      Spouse name: N/A   • Number of children: N/A   • Years of education: N/A     Social History Main Topics   • Smoking status: Current Every Day Smoker     Types: Cigarettes   • Smokeless tobacco: None      Comment: SMOKING 3 CIGS A DAY   • Alcohol use Yes   • Drug use: No   • Sexual activity: Not Asked     Other Topics Concern   • None     Social History Narrative           Objective   Physical Exam   Constitutional: He appears well-developed.   HENT:   Head: Normocephalic and atraumatic.   Right Ear: External ear normal.   Left Ear: External ear normal.   Nose: Nose normal.   Mouth/Throat: Oropharynx is clear and moist.   Eyes: EOM are normal. Pupils are equal, round, and reactive to light.   Neck: Normal range of motion. Neck supple. No JVD present. No tracheal deviation present.   Neg meningismus   Cardiovascular: Normal rate, regular rhythm, normal heart sounds and intact distal pulses.  Exam reveals no gallop and no friction rub.    No murmur heard.  Pulmonary/Chest: Effort normal. He has decreased breath sounds in the right lower field and the left lower field. He has no wheezes. He has no rhonchi. He has no  rales.   Abdominal: Soft. There is no tenderness. There is no rebound and no guarding.   Musculoskeletal: He exhibits no edema or tenderness.   Lymphadenopathy:     He has no cervical adenopathy.   Neurological: He is alert. He has normal strength. A sensory deficit is present. No cranial nerve deficit. Coordination normal. GCS eye subscore is 4. GCS verbal subscore is 5. GCS motor subscore is 6.   Chronic left hemisensory loss. No focal motor deficit noted.   Skin: Skin is warm and dry.   Nursing note and vitals reviewed.      ECG 12 Lead    Date/Time: 1/17/2018 3:17 PM  Performed by: CA MONZON  Authorized by: CA MONZON   Interpreted by physician  Rhythm: sinus rhythm  Rate: normal  BPM: 95  QRS axis: normal  Conduction: conduction normal  ST Segments: ST segments normal  T depression: III, aVF, V3, V4 and V5  Other findings: LAE  Clinical impression: abnormal ECG               ED Course  ED Course      Labs Reviewed   COMPREHENSIVE METABOLIC PANEL - Abnormal; Notable for the following:        Result Value    Glucose 147 (*)     BUN 37 (*)     Potassium 5.4 (*)     Chloride 92 (*)     CO2 36.0 (*)     BUN/Creatinine Ratio 31.9 (*)     All other components within normal limits   LACTIC ACID, PLASMA - Abnormal; Notable for the following:     Lactate 2.1 (*)     All other components within normal limits   URINALYSIS W/ CULTURE IF INDICATED - Abnormal; Notable for the following:     Protein, UA Trace (*)     All other components within normal limits    Narrative:     Urine microscopic not indicated.   CBC WITH AUTO DIFFERENTIAL - Abnormal; Notable for the following:     .6 (*)     MCHC 29.8 (*)     RDW 16.0 (*)     RDW-SD 58.9 (*)     Immature Grans, Absolute 0.04 (*)     All other components within normal limits   BLOOD GAS, ARTERIAL - Abnormal; Notable for the following:     pCO2, Arterial 66.2 (*)     pO2, Arterial 46.8 (*)     HCO3, Arterial 37.8 (*)     Base Excess, Arterial 9.8 (*)     O2  Saturation, Arterial 82.6 (*)     CO2 Content 39.9 (*)     Potassium, Arterial 5.05 (*)     All other components within normal limits   BNP (IN-HOUSE) - Abnormal; Notable for the following:     proBNP 4720.0 (*)     All other components within normal limits   TROPONIN (IN-HOUSE) - Abnormal; Notable for the following:     Troponin I 0.258 (*)     All other components within normal limits   INFLUENZA ANTIGEN, RAPID - Normal   APTT - Normal    Narrative:     The recommended Heparin therapeutic range is 68-97 seconds.   PROTIME-INR - Normal    Narrative:     Therapeutic range for most indications is 2.0-3.0 INR,  or 2.5-3.5 for mechanical heart valves.   CK - Normal   CK MB - Normal   D-DIMER, QUANTITATIVE - Normal    Narrative:     Dimer values <500 ng/ml FEU are FDA approved as aid in diagnosis of deep venous thrombosis and pulmonary embolism.  This test should not be used in an exclusion strategy with pretest probability alone.    A recent guideline regarding diagnosis for pulmonary thomboembolism recommends an adjusted exclusion criterion of age x 10 ng/ml FEU for patients >50 years of age (Sherin Intern Med 2015; 163: 701-711).   BLOOD CULTURE   BLOOD CULTURE   RAINBOW DRAW    Narrative:     The following orders were created for panel order [place] Draw.  Procedure                               Abnormality         Status                     ---------                               -----------         ------                     Light Blue Top[[id number]]                                   Final result               Green Top (Gel)[[id number]]                                  Final result               Lavender Top[[id number]]                                     Final result               Gold Top - SST[[id number]]                                   Final result                 Please view results for these tests on the individual orders.   TROPONIN (IN-HOUSE)   TROPONIN (IN-HOUSE)   LACTIC ACID REFLEX TIMER   BLOOD GAS,  ARTERIAL   POCT GLUCOSE FINGERSTICK   CBC AND DIFFERENTIAL    Narrative:     The following orders were created for panel order CBC & Differential.  Procedure                               Abnormality         Status                     ---------                               -----------         ------                     CBC Auto Differential[[id number]]        Abnormal            Final result                 Please view results for these tests on the individual orders.   LIGHT BLUE TOP   GREEN TOP   LAVENDER TOP   GOLD TOP - SST     Ct Head Without Contrast    Result Date: 1/17/2018  Narrative: Noncontrast CT examination of the brain. INDICATION: Alteration of mental status. Decreased level of consciousness. Evaluate for stroke Technique: Axial 5 mm contiguous images with brain parenchymal and bone windows This exam was performed according to our departmental dose-optimization program, which includes automated exposure control, adjustment of the mA and/or kV according to patient size and/or use of iterative reconstruction technique. Prior relevant examination: MRI brain June 26, 2013. Prominent cisterna magna, normal variant. Brain parenchyma appears within normal limits. Ventricles are within normal limits in size. No evidence of abnormal mass or calcification is seen. No evidence of acute hemorrhage is noted. Chronic opacification left maxillary sinus, unchanged since prior exam.    Impression: CONCLUSION: 1. Normal brain for age. No acute changes are present.  Electronically signed by:  Ruel Perry MD  1/17/2018 3:36 PM CST Workstation: [id number]    Xr Chest 1 View    Result Date: 1/17/2018  Narrative: Chest single view. CLINICAL INDICATION: Shortness of breath. Simple sepsis protocol. COMPARISON: April 4, 2013. FINDINGS: Cardiomegaly. Slight prominence of the pulmonary vasculature. Lungs otherwise clear.     Impression: CONCLUSION: Cardiomegaly. Slight prominence of the pulmonary vasculature. Otherwise unremarkable  chest. Electronically signed by:  Ruel Perry MD  1/17/2018 1:53 PM CST Workstation: [id number]                  [facility]    Final diagnoses:   Acute respiratory failure with hypoxia and hypercapnia   Systolic congestive heart failure, unspecified congestive heart failure chronicity   Chronic left maxillary sinusitis   NSTEMI (non-ST elevated myocardial infarction)            Dante Iverson MD  01/17/18 1629       Dante Iverson MD  01/17/18 1633

## 2018-01-18 ENCOUNTER — APPOINTMENT (OUTPATIENT)
Dept: CARDIOLOGY | Facility: HOSPITAL | Age: 63
End: 2018-01-18
Attending: FAMILY MEDICINE

## 2018-01-18 PROBLEM — G47.33 OBSTRUCTIVE APNEA: Chronic | Status: ACTIVE | Noted: 2018-01-18

## 2018-01-18 PROBLEM — I25.10 CORONARY ARTERY DISEASE: Chronic | Status: ACTIVE | Noted: 2018-01-18

## 2018-01-18 PROBLEM — J42 CHRONIC BRONCHITIS: Chronic | Status: ACTIVE | Noted: 2017-04-11

## 2018-01-18 PROBLEM — E11.9 DIABETES MELLITUS: Chronic | Status: ACTIVE | Noted: 2018-01-18

## 2018-01-18 PROBLEM — J42 CHRONIC BRONCHITIS (HCC): Status: ACTIVE | Noted: 2017-04-11

## 2018-01-18 PROBLEM — J20.9 CHRONIC BRONCHITIS WITH ACUTE EXACERBATION: Chronic | Status: ACTIVE | Noted: 2017-04-11

## 2018-01-18 PROBLEM — E66.01 MORBID OBESITY (HCC): Chronic | Status: ACTIVE | Noted: 2018-01-18

## 2018-01-18 PROBLEM — I10 HYPERTENSION: Chronic | Status: ACTIVE | Noted: 2018-01-18

## 2018-01-18 LAB
ANION GAP SERPL CALCULATED.3IONS-SCNC: 6 MMOL/L (ref 5–15)
ARTERIAL PATENCY WRIST A: ABNORMAL
ARTERIAL PATENCY WRIST A: ABNORMAL
ATMOSPHERIC PRESS: ABNORMAL MMHG
ATMOSPHERIC PRESS: ABNORMAL MMHG
B PERT DNA SPEC QL NAA+PROBE: NOT DETECTED
BASE EXCESS BLDA CALC-SCNC: 12.2 MMOL/L (ref -2.4–2.4)
BASE EXCESS BLDA CALC-SCNC: 8.3 MMOL/L (ref -2.4–2.4)
BASOPHILS # BLD AUTO: 0.01 10*3/MM3 (ref 0–0.2)
BASOPHILS NFR BLD AUTO: 0.1 % (ref 0–2)
BDY SITE: ABNORMAL
BDY SITE: ABNORMAL
BH CV ECHO MEAS - ACS: 2.4 CM
BH CV ECHO MEAS - AO MAX PG (FULL): 5.6 MMHG
BH CV ECHO MEAS - AO MAX PG: 14.4 MMHG
BH CV ECHO MEAS - AO MEAN PG (FULL): 2 MMHG
BH CV ECHO MEAS - AO MEAN PG: 8 MMHG
BH CV ECHO MEAS - AO ROOT AREA (BSA CORRECTED): 1.6
BH CV ECHO MEAS - AO ROOT AREA: 11.9 CM^2
BH CV ECHO MEAS - AO ROOT DIAM: 3.9 CM
BH CV ECHO MEAS - AO V2 MAX: 190 CM/SEC
BH CV ECHO MEAS - AO V2 MEAN: 130 CM/SEC
BH CV ECHO MEAS - AO V2 VTI: 35.8 CM
BH CV ECHO MEAS - AVA(I,A): 3.9 CM^2
BH CV ECHO MEAS - AVA(I,D): 3.9 CM^2
BH CV ECHO MEAS - AVA(V,A): 3.8 CM^2
BH CV ECHO MEAS - AVA(V,D): 3.8 CM^2
BH CV ECHO MEAS - BSA(HAYCOCK): 2.6 M^2
BH CV ECHO MEAS - BSA: 2.5 M^2
BH CV ECHO MEAS - BZI_BMI: 38 KILOGRAMS/M^2
BH CV ECHO MEAS - BZI_METRIC_HEIGHT: 185.4 CM
BH CV ECHO MEAS - BZI_METRIC_WEIGHT: 130.6 KG
BH CV ECHO MEAS - EDV(CUBED): 117.6 ML
BH CV ECHO MEAS - EDV(TEICH): 112.8 ML
BH CV ECHO MEAS - EF(CUBED): 66.6 %
BH CV ECHO MEAS - EF(TEICH): 58 %
BH CV ECHO MEAS - ESV(CUBED): 39.3 ML
BH CV ECHO MEAS - ESV(TEICH): 47.4 ML
BH CV ECHO MEAS - FS: 30.6 %
BH CV ECHO MEAS - IVS/LVPW: 1
BH CV ECHO MEAS - IVSD: 1.3 CM
BH CV ECHO MEAS - LA DIMENSION: 4.2 CM
BH CV ECHO MEAS - LA/AO: 1.1
BH CV ECHO MEAS - LV MASS(C)D: 253.7 GRAMS
BH CV ECHO MEAS - LV MASS(C)DI: 101 GRAMS/M^2
BH CV ECHO MEAS - LV MAX PG: 8.9 MMHG
BH CV ECHO MEAS - LV MEAN PG: 6 MMHG
BH CV ECHO MEAS - LV V1 MAX: 149 CM/SEC
BH CV ECHO MEAS - LV V1 MEAN: 112 CM/SEC
BH CV ECHO MEAS - LV V1 VTI: 28.3 CM
BH CV ECHO MEAS - LVIDD: 4.9 CM
BH CV ECHO MEAS - LVIDS: 3.4 CM
BH CV ECHO MEAS - LVOT AREA (M): 4.9 CM^2
BH CV ECHO MEAS - LVOT AREA: 4.9 CM^2
BH CV ECHO MEAS - LVOT DIAM: 2.5 CM
BH CV ECHO MEAS - LVPWD: 1.3 CM
BH CV ECHO MEAS - MV A MAX VEL: 83.9 CM/SEC
BH CV ECHO MEAS - MV DEC SLOPE: 1022 CM/SEC^2
BH CV ECHO MEAS - MV E MAX VEL: 103 CM/SEC
BH CV ECHO MEAS - MV E/A: 1.2
BH CV ECHO MEAS - MV MAX PG: 7.3 MMHG
BH CV ECHO MEAS - MV MEAN PG: 3 MMHG
BH CV ECHO MEAS - MV P1/2T MAX VEL: 135 CM/SEC
BH CV ECHO MEAS - MV P1/2T: 38.7 MSEC
BH CV ECHO MEAS - MV V2 MAX: 135 CM/SEC
BH CV ECHO MEAS - MV V2 MEAN: 80.6 CM/SEC
BH CV ECHO MEAS - MV V2 VTI: 29.5 CM
BH CV ECHO MEAS - MVA P1/2T LCG: 1.6 CM^2
BH CV ECHO MEAS - MVA(P1/2T): 5.7 CM^2
BH CV ECHO MEAS - MVA(VTI): 4.7 CM^2
BH CV ECHO MEAS - PA MAX PG: 7.5 MMHG
BH CV ECHO MEAS - PA V2 MAX: 137 CM/SEC
BH CV ECHO MEAS - RAP SYSTOLE: 20 MMHG
BH CV ECHO MEAS - RVDD: 3.5 CM
BH CV ECHO MEAS - RVSP: 55.3 MMHG
BH CV ECHO MEAS - SI(AO): 170.2 ML/M^2
BH CV ECHO MEAS - SI(CUBED): 31.2 ML/M^2
BH CV ECHO MEAS - SI(LVOT): 55.3 ML/M^2
BH CV ECHO MEAS - SI(TEICH): 26 ML/M^2
BH CV ECHO MEAS - SV(AO): 427.7 ML
BH CV ECHO MEAS - SV(CUBED): 78.3 ML
BH CV ECHO MEAS - SV(LVOT): 138.9 ML
BH CV ECHO MEAS - SV(TEICH): 65.4 ML
BH CV ECHO MEAS - TR MAX VEL: 297 CM/SEC
BUN BLD-MCNC: 28 MG/DL (ref 7–21)
BUN/CREAT SERPL: 28.9 (ref 7–25)
C PNEUM DNA NPH QL NAA+NON-PROBE: NOT DETECTED
CA-I BLD-MCNC: 4.6 MG/DL (ref 4.5–4.9)
CA-I BLD-MCNC: 4.7 MG/DL (ref 4.5–4.9)
CALCIUM SPEC-SCNC: 8.8 MG/DL (ref 8.4–10.2)
CHLORIDE SERPL-SCNC: 95 MMOL/L (ref 95–110)
CO2 BLDA-SCNC: 37.1 MMOL/L (ref 23–27)
CO2 BLDA-SCNC: 46.3 MMOL/L (ref 23–27)
CO2 SERPL-SCNC: 36 MMOL/L (ref 22–31)
CREAT BLD-MCNC: 0.97 MG/DL (ref 0.7–1.3)
DEPRECATED RDW RBC AUTO: 59.8 FL (ref 35.1–43.9)
EOSINOPHIL # BLD AUTO: 0.01 10*3/MM3 (ref 0–0.7)
EOSINOPHIL NFR BLD AUTO: 0.1 % (ref 0–7)
ERYTHROCYTE [DISTWIDTH] IN BLOOD BY AUTOMATED COUNT: 16 % (ref 11.5–14.5)
FLUAV H1 2009 PAND RNA NPH QL NAA+PROBE: NOT DETECTED
FLUAV H1 HA GENE NPH QL NAA+PROBE: NOT DETECTED
FLUAV H3 RNA NPH QL NAA+PROBE: NOT DETECTED
FLUAV SUBTYP SPEC NAA+PROBE: NOT DETECTED
FLUBV RNA ISLT QL NAA+PROBE: NOT DETECTED
GFR SERPL CREATININE-BSD FRML MDRD: 79 ML/MIN/1.73 (ref 49–113)
GLUCOSE BLD-MCNC: 172 MG/DL (ref 60–100)
GLUCOSE BLDA-MCNC: 173 MMOL/L
GLUCOSE BLDA-MCNC: 218 MMOL/L
GLUCOSE BLDC GLUCOMTR-MCNC: 230 MG/DL (ref 70–130)
HADV DNA SPEC NAA+PROBE: NOT DETECTED
HCO3 BLDA-SCNC: 35.3 MMOL/L (ref 22–26)
HCO3 BLDA-SCNC: 43.4 MMOL/L (ref 22–26)
HCOV 229E RNA SPEC QL NAA+PROBE: NOT DETECTED
HCOV HKU1 RNA SPEC QL NAA+PROBE: NOT DETECTED
HCOV NL63 RNA SPEC QL NAA+PROBE: NOT DETECTED
HCOV OC43 RNA SPEC QL NAA+PROBE: NOT DETECTED
HCT VFR BLD AUTO: 47.1 % (ref 39–49)
HCT VFR BLD CALC: 41 % (ref 40–54)
HCT VFR BLD CALC: 43 % (ref 40–54)
HGB BLD-MCNC: 13.9 G/DL (ref 13.7–17.3)
HGB BLDA-MCNC: 14.1 G/DL (ref 14–18)
HGB BLDA-MCNC: 14.6 G/DL (ref 14–18)
HMPV RNA NPH QL NAA+NON-PROBE: NOT DETECTED
HPIV1 RNA SPEC QL NAA+PROBE: NOT DETECTED
HPIV2 RNA SPEC QL NAA+PROBE: NOT DETECTED
HPIV3 RNA NPH QL NAA+PROBE: NOT DETECTED
HPIV4 P GENE NPH QL NAA+PROBE: NOT DETECTED
IMM GRANULOCYTES # BLD: 0.04 10*3/MM3 (ref 0–0.02)
IMM GRANULOCYTES NFR BLD: 0.5 % (ref 0–0.5)
LYMPHOCYTES # BLD AUTO: 0.77 10*3/MM3 (ref 0.6–4.2)
LYMPHOCYTES NFR BLD AUTO: 10 % (ref 10–50)
M PNEUMO IGG SER IA-ACNC: NOT DETECTED
MCH RBC QN AUTO: 30 PG (ref 26.5–34)
MCHC RBC AUTO-ENTMCNC: 29.5 G/DL (ref 31.5–36.3)
MCV RBC AUTO: 101.7 FL (ref 80–98)
MODALITY: ABNORMAL
MODALITY: ABNORMAL
MONOCYTES # BLD AUTO: 0.26 10*3/MM3 (ref 0–0.9)
MONOCYTES NFR BLD AUTO: 3.4 % (ref 0–12)
NEUTROPHILS # BLD AUTO: 6.62 10*3/MM3 (ref 2–8.6)
NEUTROPHILS NFR BLD AUTO: 85.9 % (ref 37–80)
PCO2 BLDA: 59.4 MM HG (ref 35–45)
PCO2 BLDA: 93.9 MM HG (ref 35–45)
PH BLDA: 7.28 PH UNITS (ref 7.35–7.45)
PH BLDA: 7.39 PH UNITS (ref 7.35–7.45)
PLATELET # BLD AUTO: 285 10*3/MM3 (ref 150–450)
PMV BLD AUTO: 10.7 FL (ref 8–12)
PO2 BLDA: 68.9 MM HG (ref 80–105)
PO2 BLDA: 81.6 MM HG (ref 80–105)
POTASSIUM BLD-SCNC: 5 MMOL/L (ref 3.5–5.1)
POTASSIUM BLDA-SCNC: 4.72 MMOL/L (ref 3.6–4.9)
POTASSIUM BLDA-SCNC: 4.87 MMOL/L (ref 3.6–4.9)
RBC # BLD AUTO: 4.63 10*6/MM3 (ref 4.37–5.74)
RHINOVIRUS RNA SPEC NAA+PROBE: NOT DETECTED
RSV RNA NPH QL NAA+NON-PROBE: NOT DETECTED
SAO2 % BLDCOA: 93.8 % (ref 94–100)
SAO2 % BLDCOA: 94.8 %
SODIUM BLD-SCNC: 137 MMOL/L (ref 137–145)
SODIUM BLDA-SCNC: 136.3 MMOL/L (ref 138–146)
SODIUM BLDA-SCNC: 140.6 MMOL/L (ref 138–146)
TROPONIN I SERPL-MCNC: 0.22 NG/ML
WBC NRBC COR # BLD: 7.71 10*3/MM3 (ref 3.2–9.8)

## 2018-01-18 PROCEDURE — 63710000001 INSULIN ASPART PER 5 UNITS: Performed by: INTERNAL MEDICINE

## 2018-01-18 PROCEDURE — 84484 ASSAY OF TROPONIN QUANT: CPT | Performed by: INTERNAL MEDICINE

## 2018-01-18 PROCEDURE — 94640 AIRWAY INHALATION TREATMENT: CPT

## 2018-01-18 PROCEDURE — 85025 COMPLETE CBC W/AUTO DIFF WBC: CPT | Performed by: FAMILY MEDICINE

## 2018-01-18 PROCEDURE — 25010000002 CEFTRIAXONE PER 250 MG: Performed by: FAMILY MEDICINE

## 2018-01-18 PROCEDURE — 93005 ELECTROCARDIOGRAM TRACING: CPT | Performed by: INTERNAL MEDICINE

## 2018-01-18 PROCEDURE — 82803 BLOOD GASES ANY COMBINATION: CPT | Performed by: HOSPITALIST

## 2018-01-18 PROCEDURE — 93306 TTE W/DOPPLER COMPLETE: CPT

## 2018-01-18 PROCEDURE — 87633 RESP VIRUS 12-25 TARGETS: CPT | Performed by: FAMILY MEDICINE

## 2018-01-18 PROCEDURE — 94660 CPAP INITIATION&MGMT: CPT

## 2018-01-18 PROCEDURE — 94799 UNLISTED PULMONARY SVC/PX: CPT

## 2018-01-18 PROCEDURE — 82962 GLUCOSE BLOOD TEST: CPT

## 2018-01-18 PROCEDURE — 87798 DETECT AGENT NOS DNA AMP: CPT | Performed by: FAMILY MEDICINE

## 2018-01-18 PROCEDURE — 87581 M.PNEUMON DNA AMP PROBE: CPT | Performed by: FAMILY MEDICINE

## 2018-01-18 PROCEDURE — 87486 CHLMYD PNEUM DNA AMP PROBE: CPT | Performed by: FAMILY MEDICINE

## 2018-01-18 PROCEDURE — 93010 ELECTROCARDIOGRAM REPORT: CPT | Performed by: INTERNAL MEDICINE

## 2018-01-18 PROCEDURE — 80048 BASIC METABOLIC PNL TOTAL CA: CPT | Performed by: FAMILY MEDICINE

## 2018-01-18 PROCEDURE — 36600 WITHDRAWAL OF ARTERIAL BLOOD: CPT

## 2018-01-18 PROCEDURE — 94760 N-INVAS EAR/PLS OXIMETRY 1: CPT

## 2018-01-18 PROCEDURE — 63710000001 PREDNISONE PER 1 MG: Performed by: FAMILY MEDICINE

## 2018-01-18 PROCEDURE — 25010000002 ENOXAPARIN PER 10 MG: Performed by: FAMILY MEDICINE

## 2018-01-18 PROCEDURE — 82803 BLOOD GASES ANY COMBINATION: CPT | Performed by: INTERNAL MEDICINE

## 2018-01-18 RX ORDER — ACETYLCYSTEINE 200 MG/ML
600 SOLUTION ORAL; RESPIRATORY (INHALATION) EVERY 12 HOURS SCHEDULED
Status: DISCONTINUED | OUTPATIENT
Start: 2018-01-18 | End: 2018-01-18

## 2018-01-18 RX ORDER — DEXTROSE MONOHYDRATE 25 G/50ML
25 INJECTION, SOLUTION INTRAVENOUS
Status: DISCONTINUED | OUTPATIENT
Start: 2018-01-18 | End: 2018-01-23 | Stop reason: HOSPADM

## 2018-01-18 RX ORDER — ACETYLCYSTEINE 100 MG/ML
6 SOLUTION ORAL; RESPIRATORY (INHALATION) EVERY 12 HOURS
Status: DISCONTINUED | OUTPATIENT
Start: 2018-01-18 | End: 2018-01-20

## 2018-01-18 RX ORDER — PREDNISONE 20 MG/1
20 TABLET ORAL
Status: COMPLETED | OUTPATIENT
Start: 2018-01-19 | End: 2018-01-21

## 2018-01-18 RX ORDER — NICOTINE POLACRILEX 4 MG
15 LOZENGE BUCCAL
Status: DISCONTINUED | OUTPATIENT
Start: 2018-01-18 | End: 2018-01-23 | Stop reason: HOSPADM

## 2018-01-18 RX ADMIN — PREDNISONE 40 MG: 20 TABLET ORAL at 08:47

## 2018-01-18 RX ADMIN — DOXYCYCLINE 100 MG: 100 INJECTION, POWDER, LYOPHILIZED, FOR SOLUTION INTRAVENOUS at 08:46

## 2018-01-18 RX ADMIN — DOXYCYCLINE 100 MG: 100 INJECTION, POWDER, LYOPHILIZED, FOR SOLUTION INTRAVENOUS at 22:13

## 2018-01-18 RX ADMIN — CLOPIDOGREL BISULFATE 75 MG: 75 TABLET ORAL at 08:47

## 2018-01-18 RX ADMIN — GABAPENTIN 300 MG: 300 CAPSULE ORAL at 06:49

## 2018-01-18 RX ADMIN — GABAPENTIN 300 MG: 300 CAPSULE ORAL at 13:18

## 2018-01-18 RX ADMIN — DULOXETINE HYDROCHLORIDE 30 MG: 30 CAPSULE, DELAYED RELEASE ORAL at 08:47

## 2018-01-18 RX ADMIN — INSULIN ASPART 8 UNITS: 100 INJECTION, SOLUTION INTRAVENOUS; SUBCUTANEOUS at 21:00

## 2018-01-18 RX ADMIN — ENOXAPARIN SODIUM 40 MG: 40 INJECTION SUBCUTANEOUS at 08:48

## 2018-01-18 RX ADMIN — HYDROCHLOROTHIAZIDE: 25 TABLET ORAL at 08:46

## 2018-01-18 RX ADMIN — IPRATROPIUM BROMIDE AND ALBUTEROL SULFATE 3 ML: 2.5; .5 SOLUTION RESPIRATORY (INHALATION) at 19:35

## 2018-01-18 RX ADMIN — IPRATROPIUM BROMIDE AND ALBUTEROL SULFATE 3 ML: 2.5; .5 SOLUTION RESPIRATORY (INHALATION) at 06:36

## 2018-01-18 RX ADMIN — GABAPENTIN 300 MG: 300 CAPSULE ORAL at 22:10

## 2018-01-18 RX ADMIN — SODIUM CHLORIDE 2 G: 9 INJECTION INTRAMUSCULAR; INTRAVENOUS; SUBCUTANEOUS at 22:10

## 2018-01-18 RX ADMIN — PANTOPRAZOLE SODIUM 40 MG: 40 TABLET, DELAYED RELEASE ORAL at 06:49

## 2018-01-18 RX ADMIN — INSULIN ASPART 8 UNITS: 100 INJECTION, SOLUTION INTRAVENOUS; SUBCUTANEOUS at 11:30

## 2018-01-18 RX ADMIN — IPRATROPIUM BROMIDE AND ALBUTEROL SULFATE 3 ML: 2.5; .5 SOLUTION RESPIRATORY (INHALATION) at 15:00

## 2018-01-18 RX ADMIN — INSULIN ASPART 16 UNITS: 100 INJECTION, SOLUTION INTRAVENOUS; SUBCUTANEOUS at 17:35

## 2018-01-18 RX ADMIN — ASPIRIN 325 MG: 325 TABLET, COATED ORAL at 08:47

## 2018-01-18 RX ADMIN — ATORVASTATIN CALCIUM 10 MG: 10 TABLET, FILM COATED ORAL at 08:47

## 2018-01-18 NOTE — PROGRESS NOTES
Progress Note  Sarbjit Nunes MD  Hospitalist     LOS: 1 day   Patient Care Team:  Harpreet Bass MD as PCP - General    Chief Complaint: dyspnea / confusion    Subjective     Interval History:     Patient Complaints: of dyspnea, cough, wheezing, confusion - better since admission    History taken from: patient    Medication Review:   Current Facility-Administered Medications   Medication Dose Route Frequency Provider Last Rate Last Dose   • acetaminophen (TYLENOL) tablet 650 mg  650 mg Oral Q4H PRN Jamison Medley MD       • aspirin tablet 325 mg  325 mg Oral Daily Jamison Medley MD   325 mg at 01/18/18 0847   • atorvastatin (LIPITOR) tablet 10 mg  10 mg Oral Daily Jamison Medley MD   10 mg at 01/18/18 0847   • cefTRIAXone (ROCEPHIN) in NS 2 GRAMS/20ml IV PUSH syringe  2 g Intravenous Q24H Jamison Medley MD   2 g at 01/17/18 2251    And   • doxycycline (VIBRAMYCIN) 100 mg/250 mL 0.9% NS VTB  100 mg Intravenous Q12H Jamison Medley MD   100 mg at 01/18/18 0846   • clopidogrel (PLAVIX) tablet 75 mg  75 mg Oral Daily Jamison Medley MD   75 mg at 01/18/18 0847   • dextrose (D50W) solution 25 g  25 g Intravenous Q15 Min PRN Sarbjit Nunes MD       • dextrose (GLUTOSE) oral gel 15 g  15 g Oral Q15 Min PRN Sarbjit Nunes MD       • DULoxetine (CYMBALTA) DR capsule 30 mg  30 mg Oral Daily Jamison Medley MD   30 mg at 01/18/18 0847   • enoxaparin (LOVENOX) syringe 40 mg  40 mg Subcutaneous Q24H Jamison Medley MD   40 mg at 01/18/18 0848   • gabapentin (NEURONTIN) capsule 300 mg  300 mg Oral Q8H Jamison Medley MD   300 mg at 01/18/18 1318   • glucagon (human recombinant) (GLUCAGEN DIAGNOSTIC) injection 1 mg  1 mg Subcutaneous Q15 Min PRN Sarbjit Nunes MD       • influenza vac split quad (FLUZONE QUADRIVALENT) IM suspension 0.5 mL  0.5 mL Intramuscular During Hospitalization Alirio Hager MD       • insulin aspart (novoLOG) injection 0-24 Units  0-24 Units Subcutaneous 4x Daily AC & at Bedtime Sarbjit Nunes MD   16 Units at  01/18/18 1735   • ipratropium-albuterol (DUO-NEB) nebulizer solution 3 mL  3 mL Nebulization 4x Daily - RT Dante Iverson MD   3 mL at 01/18/18 1935   • lisinopril (PRINIVIL,ZESTRIL) 20 mg, hydrochlorothiazide (HYDRODIURIL) 25 mg for ZESTORTIC 20-25   Oral Q24H Jamison Medley MD       • naphazoline-pheniramine (NAPHCON-A) 0.025-0.3 % ophthalmic solution 1 drop  1 drop Right Eye 4x Daily PRN Jamison Medley MD       • pantoprazole (PROTONIX) EC tablet 40 mg  40 mg Oral QAM Jamison Medley MD   40 mg at 01/18/18 0649   • pneumococcal polysaccharide 23-valent (PNEUMOVAX-23) vaccine 0.5 mL  0.5 mL Intramuscular During Hospitalization Alirio Hager MD       • predniSONE (DELTASONE) tablet 40 mg  40 mg Oral Daily With Breakfast Jamison Medley MD   40 mg at 01/18/18 0847   • sodium chloride 0.9 % flush 1-10 mL  1-10 mL Intravenous PRN Jamison Medley MD       • sodium chloride 0.9 % flush 10 mL  10 mL Intravenous PRN Dante Iverson MD           Review of Systems:   Review of Systems   Constitutional: Positive for fatigue. Negative for fever.   Respiratory: Positive for cough, shortness of breath and wheezing.    Cardiovascular: Positive for leg swelling. Negative for chest pain and palpitations.   Gastrointestinal: Negative for abdominal distention, abdominal pain, constipation, diarrhea, nausea and vomiting.   Genitourinary: Negative for dysuria, frequency and urgency.   Musculoskeletal: Positive for back pain and gait problem. Negative for arthralgias, joint swelling and myalgias.   Skin: Positive for pallor.   Neurological: Positive for weakness, light-headedness and headaches. Negative for seizures and syncope.   Psychiatric/Behavioral: Positive for confusion (improved). Negative for agitation and behavioral problems.   All other systems reviewed and are negative.      Objective     Vital Signs  Temp:  [97.5 °F (36.4 °C)-98.4 °F (36.9 °C)] 98.4 °F (36.9 °C)  Heart Rate:  [81-95] 87  Resp:  [20-24] 20  BP: ()/(52-82)  102/56    Physical Exam:  Physical Exam   Constitutional: He is oriented to person, place, and time.   Morbidly obese    HENT:   Head: Normocephalic and atraumatic.   Eyes: EOM are normal. Pupils are equal, round, and reactive to light. No scleral icterus.   Ptosis of his R upper eyelid   Cardiovascular: Normal rate and regular rhythm.    Pulmonary/Chest: He has wheezes. He has rales. He exhibits no tenderness.   Abdominal: Soft. Bowel sounds are normal. Distention: obese. There is no tenderness. There is rebound. There is no guarding.   Musculoskeletal: He exhibits edema (minimal bilateral ankle edema). He exhibits no tenderness.   Neurological: He is alert and oriented to person, place, and time. He has normal reflexes. No cranial nerve deficit. Coordination normal.   Skin: Skin is warm and dry.   Psychiatric: He has a normal mood and affect. His behavior is normal.   Vitals reviewed.       Results Review:    Lab Results (last 24 hours)     Procedure Component Value Units Date/Time    Troponin [[id number]]  (Abnormal) Collected:  01/17/18 1901    Specimen:  Blood Updated:  01/17/18 1941     Troponin I 0.406 (C) ng/mL     Lactic Acid, Reflex [[id number]]  (Abnormal) Collected:  01/17/18 1901    Specimen:  Blood Updated:  01/17/18 1941     Lactate 2.2 (C) mmol/L     Blood Gas, Arterial [[id number]]  (Abnormal) Collected:  01/18/18 0346    Specimen:  Arterial Blood Updated:  01/18/18 0353     Site --      Not performed at this site.        Jaydon's Test --      Not performed at this site.        pH, Arterial 7.283 (L) pH units      pCO2, Arterial 93.9 (H) mm Hg      pO2, Arterial 81.6 mm Hg      HCO3, Arterial 43.4 (H) mmol/L      Base Excess, Arterial 12.2 (H) mmol/L      O2 Saturation, Arterial 94.8 %      Hemoglobin, Blood Gas 14.6 g/dL      Hematocrit, Blood Gas 43.0 %      CO2 Content 46.3 (H)     Sodium, Arterial 140.6 mmol/L      Potassium, Arterial 4.72 mmol/L      Glucose, Arterial 218 mmol/L      Barometric  Pressure for Blood Gas -- mmHg       Not performed at this site.        Modality --      Not performed at this site.        Ionized Calcium 4.60 mg/dL     Blood Gas, Arterial [[id number]]  (Abnormal) Collected:  01/18/18 0640    Specimen:  Arterial Blood Updated:  01/18/18 0646     Site --      Not performed at this site.        Jaydon's Test --      Not performed at this site.        pH, Arterial 7.392 pH units      pCO2, Arterial 59.4 (H) mm Hg      pO2, Arterial 68.9 (L) mm Hg      HCO3, Arterial 35.3 (H) mmol/L      Base Excess, Arterial 8.3 (H) mmol/L      O2 Saturation, Arterial 93.8 (L) %      Hemoglobin, Blood Gas 14.1 g/dL      Hematocrit, Blood Gas 41.0 %      CO2 Content 37.1 (H)     Sodium, Arterial 136.3 (L) mmol/L      Potassium, Arterial 4.87 mmol/L      Glucose, Arterial 173 mmol/L      Barometric Pressure for Blood Gas -- mmHg       Not performed at this site.        Modality --      Not performed at this site.        Ionized Calcium 4.70 mg/dL     CBC & Differential [[id number]] Collected:  01/18/18 0658    Specimen:  Blood Updated:  01/18/18 0737    Narrative:       The following orders were created for panel order CBC & Differential.  Procedure                               Abnormality         Status                     ---------                               -----------         ------                     CBC Auto Differential[[id number]]        Abnormal            Final result                 Please view results for these tests on the individual orders.    CBC Auto Differential [[id number]]  (Abnormal) Collected:  01/18/18 0658    Specimen:  Blood Updated:  01/18/18 0737     WBC 7.71 10*3/mm3      RBC 4.63 10*6/mm3      Hemoglobin 13.9 g/dL      Hematocrit 47.1 %      .7 (H) fL      MCH 30.0 pg      MCHC 29.5 (L) g/dL      RDW 16.0 (H) %      RDW-SD 59.8 (H) fl      MPV 10.7 fL      Platelets 285 10*3/mm3      Neutrophil % 85.9 (H) %      Lymphocyte % 10.0 %      Monocyte % 3.4 %       Eosinophil % 0.1 %      Basophil % 0.1 %      Immature Grans % 0.5 %      Neutrophils, Absolute 6.62 10*3/mm3      Lymphocytes, Absolute 0.77 10*3/mm3      Monocytes, Absolute 0.26 10*3/mm3      Eosinophils, Absolute 0.01 10*3/mm3      Basophils, Absolute 0.01 10*3/mm3      Immature Grans, Absolute 0.04 (H) 10*3/mm3     Basic Metabolic Panel [[id number]]  (Abnormal) Collected:  01/18/18 0658    Specimen:  Blood Updated:  01/18/18 0746     Glucose 172 (H) mg/dL      BUN 28 (H) mg/dL      Creatinine 0.97 mg/dL      Sodium 137 mmol/L      Potassium 5.0 mmol/L      Chloride 95 mmol/L      CO2 36.0 (H) mmol/L      Calcium 8.8 mg/dL      eGFR Non African Amer 79 mL/min/1.73      BUN/Creatinine Ratio 28.9 (H)     Anion Gap 6.0 mmol/L     POC Glucose Once [[id number]]  (Abnormal) Collected:  01/18/18 1049    Specimen:  Blood Updated:  01/18/18 1104     Glucose 230 (H) mg/dL       RN NotifiedMeter: [id number]: [id number] LORI ALEKSANDER       Troponin [[id number]]  (Abnormal) Collected:  01/18/18 1038    Specimen:  Blood Updated:  01/18/18 1155     Troponin I 0.221 (C) ng/mL     Blood Culture - Blood, [[id number]]  (Normal) Collected:  01/17/18 1334    Specimen:  Blood from Arm, Right Updated:  01/18/18 1401     Blood Culture No growth at 24 hours    Blood Culture - Blood, [[id number]]  (Normal) Collected:  01/17/18 1611    Specimen:  Blood from Arm, Right Updated:  01/18/18 1616     Blood Culture No growth at 24 hours    Respiratory Panel, PCR - Swab, Nasopharynx [[id number]] Collected:  01/18/18 1636    Specimen:  Swab from Nasopharynx Updated:  01/18/18 1636          Imaging Results (last 24 hours)     ** No results found for the last 24 hours. **          Assessment/Plan     Principal Problem:    Acute respiratory failure with hypoxia and hypercapnia  Active Problems:    Chronic bronchitis with acute exacerbation    Hypertension    Diabetes mellitus    Obstructive apnea    Morbid obesity    Coronary artery  disease    Continue with nebulized treatments, Oxygen, BiPAP, steroids. He can be transferred off the StepDown unit. Cardiology consult appreciated.    Sarbjit Nunes MD  01/18/18  7:38 PM

## 2018-01-18 NOTE — PAYOR COMM NOTE
"Gifty Amador (62 y.o. Male)     Date of Birth Social Security Number Address Home Phone MRN    1955  [address]  [place] [address] [phone] [id number]    Orthodoxy Marital Status          Hoahaoism        Admission Date Admission Type Admitting Provider Attending Provider Department, Room/Bed    18 Emergency Alirio Hager MD Williams, Kevin L, MD [facility] STEPDOWN UNIT, 317/    Discharge Date Discharge Disposition Discharge Destination                      Attending Provider: Alirio Hager MD     Allergies:  No Known Allergies    Isolation:  None   Infection:  None   Code Status:  FULL    Ht:  185.4 cm (73\")   Wt:  131 kg (288 lb)    Admission Cmt:  None   Principal Problem:  Sepsis [A41.9]                 Active Insurance as of 2018     Primary Coverage     Payor Plan Insurance Group Employer/Plan Group    [place]      Payor Plan Address Payor Plan Phone Number Effective From Effective To      2018     Subscriber Name Subscriber Birth Date Member ID       GIFTY AMADOR 1955 PENDING                 Emergency Contacts      (Rel.) Home Phone Work Phone Mobile Phone    Mohini Carlos (Significant Other) [phone] -- [phone]               History & Physical      Jamison Medley MD at 2018  9:33 PM              HISTORY AND PHYSICAL  NAME: Gifty Amador  : 1955  MRN: [id number]    DATE OF ADMISSION: 18    DATE & TIME SEEN: 18 9:33 PM    PCP: Harpreet Bass MD    CODE STATUS: Full Code    CHIEF COMPLAINT AMS    HPI:  Gifty Amador is a 62 y.o. male who presents with alerted mental status.    Patient is a poor historian.  He remembers very little from today.  He remembers from the ambulance ride until now.  He is currently alert and oriented x3.  He is wearing a venturi mask.  He states that he has been fit and well \"for the " "most part\".  He does endorse some SOA over the last few weeks.  No association with exertion.  He does have a 3 pillow orthopnea and most likely has NEREYDA, but has no formal diagnosis. He does endorse a productive cough with green sputum, and he does smoke a ppd.  He denies any fever, chills, nausea, vomiting, or diaphoresis.  He endorses no chest pain.    CONCURRENT MEDICAL HISTORY:  Past Medical History:   Diagnosis Date   • Abnormal glucose     PRE DM   • Acute conjunctivitis     RESOLVED   • Aneurysm of carotid artery     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      • Benign essential hypertension    • Blood in feces    • Carotid artery stenosis    • Cerebrovascular accident      L sided sensory deficit      • Conjunctivitis    • Constipation     OCCASIONAL   • Contusion, eye, left    • Corneal ulcer     PROBABLE   • Diabetes mellitus    • Eczema    • Encounter for screening for malignant neoplasm of colon    • Essential hypertension    • GERD (gastroesophageal reflux disease)    • Hemorrhoids    • History of echocardiogram 04/05/2013    Echocadiogram W/ color flow 17205 (Normal LV systolic function with EF of 60-65%. Grade I diastolic dysfunction of the LV myocardium. No evidence of LV apical thrombus. No evidence of pericardial effusion.)   • Gus's syndrome     RIGHT EYE   • Hyperkeratosis    • Hyperlipidemia    • Mucopurulent conjunctivitis     ACUTE   • Myopia    • Neuropathic pain    • Obesity    • Onychomycosis    • Tobacco dependence syndrome        PAST SURGICAL HISTORY:  Past Surgical History:   Procedure Laterality Date   • COLONOSCOPY  07/30/2015    Colonoscopy, diagnostic (screening) 82078 (Screening for malignant neoplasm of colon)    • COLONOSCOPY  09/09/2015    Colonoscopy, diagnostic (screening) 84150 (Diverticulosis found in the sigmoid colon.Hemorrhoids found in the anus.)   • COLONOSCOPY  05/01/2009    Colonoscopy, diagnostic (screening) 61026 (Small internal and external " hemorrhoids were present, Colonoscopy otherwise normal.)       FAMILY HISTORY:  Family History   Problem Relation Age of Onset   • Glaucoma Other    • Heart disease Other    • Stroke Other    • Macular degeneration Other    • Glaucoma Father    • Macular degeneration Father    • Cataracts Father    • Macular degeneration Sister         SOCIAL HISTORY:  Social History     Social History   • Marital status:      Spouse name: N/A   • Number of children: N/A   • Years of education: N/A     Occupational History   • Not on file.     Social History Main Topics   • Smoking status: Current Every Day Smoker     Types: Cigarettes   • Smokeless tobacco: Not on file      Comment: SMOKING 3 CIGS A DAY   • Alcohol use Yes   • Drug use: No   • Sexual activity: Not on file     Other Topics Concern   • Not on file     Social History Narrative       HOME MEDICATIONS:  Prior to Admission medications    Medication Sig Start Date End Date Taking? Authorizing Provider   ammonium lactate (AMLACTIN) 12 % cream APPLY TO THE DRY SKIN BUILD UP 2 HOURS BEFORE BATHING 3/27/17  Yes Harpreet Bass MD   aspirin 325 MG tablet Take 325 mg by mouth daily. 5/24/06  Yes Historical Provider, MD   betamethasone valerate (VALISONE) 0.1 % cream Apply  topically 2 (two) times a day. 5/24/16  Yes Historical Provider, MD   clopidogrel (PLAVIX) 75 MG tablet Take 1 tablet by mouth Daily. 4/18/17  Yes Harpreet Bass MD   DULoxetine (CYMBALTA) 30 MG capsule Take 1 capsule by mouth 2 (Two) Times a Day. 4/18/17  Yes Harpreet Bass MD   erythromycin (ROMYCIN) 5 MG/GM ophthalmic ointment Administer  to the right eye Every Night. 3/29/17  Yes Harpreet Bass MD   gabapentin (NEURONTIN) 600 MG tablet Take 1 tablet by mouth 3 (Three) Times a Day As Needed (Take 600mg TID prn). 4/18/17  Yes Harpreet Bass MD   hydrocortisone 0.5 % cream Apply  topically 2 (Two) Times a Day. 3/29/17  Yes Harpreet Bass MD   lansoprazole (PREVACID)  30 MG capsule Take 1 capsule by mouth Daily. 4/18/17  Yes Harpreet Bass MD   lisinopril-hydrochlorothiazide (PRINZIDE,ZESTORETIC) 20-25 MG per tablet Take 1 tablet by mouth Daily. 4/18/17  Yes Harpreet Bass MD   metFORMIN (GLUCOPHAGE) 500 MG tablet Take 1 tablet by mouth 2 (Two) Times a Day With Meals. 4/18/17  Yes Harpreet Bass MD   naphazoline (NAPHCON) 0.1 % ophthalmic solution Apply 1 drop to eye 4 (Four) Times a Day As Needed for Irritation. 3/29/17  Yes Harpreet Bass MD   Omega-3 Fatty Acids (FISH OIL) 1000 MG capsule capsule Take 2,000 mg by mouth Daily With Breakfast.   Yes Historical Provider, MD   pravastatin (PRAVACHOL) 40 MG tablet Take 40 mg by mouth Every Night.   Yes Historical Provider, MD   azithromycin (ZITHROMAX) 250 MG tablet Take 2 tablets the first day, then 1 tablet daily for 4 days. 3/29/17 1/17/18  Harpreet Bass MD   cyclobenzaprine (FLEXERIL) 5 MG tablet Take 1 tablet by mouth 3 (Three) Times a Day As Needed for muscle spasms. 11/17/16 1/17/18  Harpreet Bass MD   guaifenesin-codeine (GUAIFENESIN AC) 100-10 MG/5ML liquid Take 5 mL by mouth 3 (Three) Times a Day As Needed for Cough. 3/29/17 1/17/18  Harpreet Bass MD   ketoconazole (NIZORAL) 2 % shampoo Apply  topically. Use this soap to wash body when bathing, especially area with rash 5/24/16 1/17/18  Historical Provider, MD   pravastatin (PRAVACHOL) 40 MG tablet Take 1 tablet by mouth Daily.  Patient taking differently: Take 40 mg by mouth Every Night. 4/18/17 1/17/18  Harpreet Bass MD   Unable to find 1 each daily. Med Name: om-3-dha-epa-fish oil-vit D3 900 mg-1,000 unit capsule 1 capsule 2 times per day Oral 5/24/16 1/17/18  Historical Provider, MD       ALLERGIES:  Review of patient's allergies indicates no known allergies.    REVIEW OF SYSTEMS  Review of Systems   Constitutional: Positive for fatigue. Negative for activity change, appetite change and fever.   HENT: Positive for  congestion. Negative for ear pain and sore throat.    Eyes: Negative for pain and visual disturbance.   Respiratory: Positive for cough and shortness of breath.    Cardiovascular: Negative for chest pain and palpitations.   Gastrointestinal: Negative for abdominal pain and nausea.   Endocrine: Negative for cold intolerance and heat intolerance.   Genitourinary: Negative for difficulty urinating and dysuria.   Musculoskeletal: Positive for arthralgias. Negative for gait problem.   Skin: Negative for color change and rash.   Neurological: Negative for dizziness, weakness and headaches.   Hematological: Negative for adenopathy. Does not bruise/bleed easily.   Psychiatric/Behavioral: Negative for agitation, confusion and sleep disturbance.       PHYSICAL EXAM:  Temp:  [98 °F (36.7 °C)-98.6 °F (37 °C)] 98.6 °F (37 °C)  Heart Rate:  [] 94  Resp:  [16-24] 20  BP: (110-136)/(68-85) 136/85  FiO2 (%):  [40 %] 40 %  Body mass index is 38 kg/(m^2).     Physical Exam   Constitutional: He is oriented to person, place, and time. He appears well-developed and well-nourished. No distress.   Obese abdomen   HENT:   Head: Normocephalic and atraumatic.   Right Ear: External ear normal.   Left Ear: External ear normal.   Nose: Nose normal.   Eyes: Conjunctivae are normal.   Right eyelid droop.  Chronic.   Neck: Normal range of motion. Neck supple.   Cardiovascular: Normal rate, regular rhythm, normal heart sounds and intact distal pulses.  Exam reveals no gallop and no friction rub.    No murmur heard.  Pulmonary/Chest: Effort normal. No respiratory distress. He has wheezes. He has no rales. He exhibits no tenderness.   Abdominal: Soft. Bowel sounds are normal. He exhibits no distension and no mass. There is no tenderness. There is no rebound and no guarding.   Musculoskeletal: Normal range of motion.   Neurological: He is alert and oriented to person, place, and time.   Skin: Skin is warm and dry. No rash noted. He is not  diaphoretic. No erythema. No pallor.   Psychiatric: He has a normal mood and affect. His behavior is normal.   Nursing note and vitals reviewed.      DIAGNOSTIC DATA:   Lab Results (last 24 hours)     Procedure Component Value Units Date/Time    Blood Culture - Blood, [[id number]] Collected:  01/17/18 1334    Specimen:  Blood from Arm, Right Updated:  01/17/18 1351    CBC & Differential [[id number]] Collected:  01/17/18 1334    Specimen:  Blood Updated:  01/17/18 1355    Narrative:       The following orders were created for panel order CBC & Differential.  Procedure                               Abnormality         Status                     ---------                               -----------         ------                     CBC Auto Differential[[id number]]        Abnormal            Final result                 Please view results for these tests on the individual orders.    CBC Auto Differential [[id number]]  (Abnormal) Collected:  01/17/18 1334    Specimen:  Blood Updated:  01/17/18 1355     WBC 8.01 10*3/mm3      RBC 4.70 10*6/mm3      Hemoglobin 14.1 g/dL      Hematocrit 47.3 %      .6 (H) fL      MCH 30.0 pg      MCHC 29.8 (L) g/dL      RDW 16.0 (H) %      RDW-SD 58.9 (H) fl      MPV 10.3 fL      Platelets 294 10*3/mm3      Neutrophil % 75.8 %      Lymphocyte % 14.4 %      Monocyte % 8.5 %      Eosinophil % 0.4 %      Basophil % 0.4 %      Immature Grans % 0.5 %      Neutrophils, Absolute 6.08 10*3/mm3      Lymphocytes, Absolute 1.15 10*3/mm3      Monocytes, Absolute 0.68 10*3/mm3      Eosinophils, Absolute 0.03 10*3/mm3      Basophils, Absolute 0.03 10*3/mm3      Immature Grans, Absolute 0.04 (H) 10*3/mm3     Lactic Acid, Plasma [[id number]]  (Abnormal) Collected:  01/17/18 1334    Specimen:  Blood Updated:  01/17/18 1411     Lactate 2.1 (C) mmol/L     Comprehensive Metabolic Panel [[id number]]  (Abnormal) Collected:  01/17/18 1334    Specimen:  Blood Updated:  01/17/18 1414     Glucose 147 (H) mg/dL       BUN 37 (H) mg/dL      Creatinine 1.16 mg/dL      Sodium 137 mmol/L      Potassium 5.4 (H) mmol/L      Chloride 92 (L) mmol/L      CO2 36.0 (H) mmol/L      Calcium 9.1 mg/dL      Total Protein 6.4 g/dL      Albumin 3.70 g/dL      ALT (SGPT) 52 U/L      AST (SGOT) 44 U/L      Alkaline Phosphatase 92 U/L      Total Bilirubin 0.4 mg/dL      eGFR Non African Amer 64 mL/min/1.73      Globulin 2.7 gm/dL      A/G Ratio 1.4 g/dL      BUN/Creatinine Ratio 31.9 (H)     Anion Gap 9.0 mmol/L     Protime-INR [[id number]]  (Normal) Collected:  01/17/18 1334    Specimen:  Blood Updated:  01/17/18 1415     Protime 13.4 Seconds      INR 1.03    Narrative:       Therapeutic range for most indications is 2.0-3.0 INR,  or 2.5-3.5 for mechanical heart valves.    CK [[id number]]  (Normal) Collected:  01/17/18 1334    Specimen:  Blood Updated:  01/17/18 1416     Creatine Kinase 70 U/L     aPTT [[id number]]  (Normal) Collected:  01/17/18 1334    Specimen:  Blood Updated:  01/17/18 1417     PTT 27.2 seconds     Narrative:       The recommended Heparin therapeutic range is 68-97 seconds.    D-dimer, Quantitative [[id number]]  (Normal) Collected:  01/17/18 1334    Specimen:  Blood Updated:  01/17/18 1418     D-Dimer, Quantitative 459 ng/mL (FEU)     Narrative:       Dimer values <500 ng/ml FEU are FDA approved as aid in diagnosis of deep venous thrombosis and pulmonary embolism.  This test should not be used in an exclusion strategy with pretest probability alone.    A recent guideline regarding diagnosis for pulmonary thomboembolism recommends an adjusted exclusion criterion of age x 10 ng/ml FEU for patients >50 years of age (Sherin Intern Med 2015; 163: 701-711).    CK-MB [[id number]]  (Normal) Collected:  01/17/18 1334    Specimen:  Blood Updated:  01/17/18 1425     CKMB 1.74 ng/mL     BNP [[id number]]  (Abnormal) Collected:  01/17/18 1334    Specimen:  Blood Updated:  01/17/18 1426     proBNP 4720.0 (H) pg/mL     Blood Gas, Arterial [[id number]]   (Abnormal) Collected:  01/17/18 1423    Specimen:  Arterial Blood Updated:  01/17/18 1432     Site --      Not performed at this site.        Jaydon's Test --      Not performed at this site.        pH, Arterial 7.375 pH units      pCO2, Arterial 66.2 (H) mm Hg      pO2, Arterial 46.8 (L) mm Hg      HCO3, Arterial 37.8 (H) mmol/L      Base Excess, Arterial 9.8 (H) mmol/L      O2 Saturation, Arterial 82.6 (L) %      Hemoglobin, Blood Gas 14.8 g/dL      Hematocrit, Blood Gas 44.0 %      CO2 Content 39.9 (H)     Sodium, Arterial 138.1 mmol/L      Potassium, Arterial 5.05 (H) mmol/L      Glucose, Arterial 144 mmol/L      Barometric Pressure for Blood Gas -- mmHg       Not performed at this site.        Modality --      Not performed at this site.        Ionized Calcium 4.60 mg/dL     [place] Draw [[id number]] Collected:  01/17/18 1334    Specimen:  Blood Updated:  01/17/18 1446    Narrative:       The following orders were created for panel order [place] Draw.  Procedure                               Abnormality         Status                     ---------                               -----------         ------                     Light Blue Top[[id number]]                                   Final result               Green Top (Gel)[[id number]]                                  Final result               Lavender Top[[id number]]                                     Final result               Gold Top - SST[[id number]]                                   Final result                 Please view results for these tests on the individual orders.    Light Blue Top [[id number]] Collected:  01/17/18 1334    Specimen:  Blood Updated:  01/17/18 1446     Extra Tube hold for add-on      Auto resulted       Green Top (Gel) [[id number]] Collected:  01/17/18 1334    Specimen:  Blood Updated:  01/17/18 1446     Extra Tube Hold for add-ons.      Auto resulted.       Lavender Top [[id number]] Collected:  01/17/18 1334    Specimen:  Blood Updated:   01/17/18 1446     Extra Tube hold for add-on      Auto resulted       Gold Top - SST [[id number]] Collected:  01/17/18 1334    Specimen:  Blood Updated:  01/17/18 1446     Extra Tube Hold for add-ons.      Auto resulted.       Influenza Antigen, Rapid - Swab, Nasopharynx [[id number]]  (Normal) Collected:  01/17/18 1535    Specimen:  Swab from Nasopharynx Updated:  01/17/18 1615     Influenza A Ag, EIA Negative     Influenza B Ag, EIA Negative    Blood Culture - Blood, [[id number]] Collected:  01/17/18 1611    Specimen:  Blood from Arm, Right Updated:  01/17/18 1615    Urinalysis With / Culture If Indicated - Urine, Clean Catch [[id number]]  (Abnormal) Collected:  01/17/18 1556    Specimen:  Urine from Urine, Clean Catch Updated:  01/17/18 1616     Color, UA Yellow     Appearance, UA Clear     pH, UA 6.0     Specific Gravity, UA 1.024     Glucose, UA Negative     Ketones, UA Negative     Bilirubin, UA Negative     Blood, UA Negative     Protein, UA Trace (A)     Leuk Esterase, UA Negative     Nitrite, UA Negative     Urobilinogen, UA 1.0 E.U./dL    Narrative:       Urine microscopic not indicated.    Troponin [[id number]]  (Abnormal) Collected:  01/17/18 1334    Specimen:  Blood Updated:  01/17/18 1634     Troponin I 0.258 (C) ng/mL     Troponin [[id number]]  (Abnormal) Collected:  01/17/18 1611    Specimen:  Blood from Arm, Right Updated:  01/17/18 1649     Troponin I 0.407 (C) ng/mL     Blood Gas, Arterial [[id number]]  (Abnormal) Collected:  01/17/18 1638    Specimen:  Arterial Blood Updated:  01/17/18 1657     Site --      Not performed at this site.        Jaydon's Test --      Not performed at this site.        pH, Arterial 7.336 (L) pH units      pCO2, Arterial 72.4 (H) mm Hg      pO2, Arterial 104.9 mm Hg      HCO3, Arterial 37.8 (H) mmol/L      Base Excess, Arterial 9.0 (H) mmol/L      O2 Saturation, Arterial 97.6 %      Hemoglobin, Blood Gas 14.1 g/dL      Hematocrit, Blood Gas 41.0 %      CO2 Content 40.1 (H)      Sodium, Arterial 138.2 mmol/L      Potassium, Arterial 4.58 mmol/L      Glucose, Arterial 147 mmol/L      Barometric Pressure for Blood Gas -- mmHg       Not performed at this site.        Modality --      Not performed at this site.        Ionized Calcium 4.50 mg/dL     Lactic Acid, Reflex Timer [[id number]] Collected:  01/17/18 1334    Specimen:  Blood Updated:  01/17/18 1717     Extra Tube Hold for add-ons.      Auto resulted.       Troponin [[id number]]  (Abnormal) Collected:  01/17/18 1901    Specimen:  Blood Updated:  01/17/18 1941     Troponin I 0.406 (C) ng/mL     Lactic Acid, Reflex [[id number]]  (Abnormal) Collected:  01/17/18 1901    Specimen:  Blood Updated:  01/17/18 1941     Lactate 2.2 (C) mmol/L            Imaging Results (last 24 hours)     Procedure Component Value Units Date/Time    XR Chest 1 View [[id number]] Collected:  01/17/18 1330     Updated:  01/17/18 1354    Narrative:       Chest single view.       CLINICAL INDICATION: Shortness of breath. Simple sepsis protocol.    COMPARISON: April 4, 2013.    FINDINGS: Cardiomegaly. Slight prominence of the pulmonary  vasculature. Lungs otherwise clear.      Impression:       CONCLUSION: Cardiomegaly. Slight prominence of the pulmonary  vasculature. Otherwise unremarkable chest.    Electronically signed by:  Ruel Perry MD  1/17/2018 1:53 PM CST  Workstation: [id number]    CT Head Without Contrast [[id number]] Collected:  01/17/18 1515     Updated:  01/17/18 1538    Narrative:       Noncontrast CT examination of the brain.    INDICATION: Alteration of mental status. Decreased level of  consciousness. Evaluate for stroke    Technique: Axial 5 mm contiguous images with brain parenchymal  and bone windows    This exam was performed according to our departmental  dose-optimization program, which includes automated exposure  control, adjustment of the mA and/or kV according to patient size  and/or use of iterative reconstruction technique.    Prior relevant  examination: MRI brain June 26, 2013.  Prominent cisterna magna, normal variant.  Brain parenchyma appears within normal limits. Ventricles are  within normal limits in size. No evidence of abnormal mass or  calcification is seen. No evidence of acute hemorrhage is noted.  Chronic opacification left maxillary sinus, unchanged since prior  exam.    Impression:       CONCLUSION:   1. Normal brain for age. No acute changes are present.        Electronically signed by:  Ruel Perry MD  1/17/2018 3:36 PM CST  Workstation: [id number]    CT Angiogram Chest With Contrast [[id number]] Collected:  01/17/18 1727     Updated:  01/17/18 1757    Narrative:       [facility] [place]    Patient Name: GIFTY AMADOR    ORDERING: CA MONZON    ATTENDING: VANESSA SAUL     REFERRING: CA MONZON    -----------------------    EXAM DESCRIPTION: CT ANGIOGRAM CHEST W CONTRAST    CLINICAL HISTORY:  soa, J96.01 Acute respiratory failure with  hypoxia J96.02 Acute respiratory failure with hypercapnia I50.20  Unspecified systolic (congestive) heart failure J32.0 Chronic  maxillary sinusitis I21.4 Non-ST elevation (NSTEMI) myocardial  infarction       COMPARISON: Chest x-ray 1/17/2018    TECHNIQUE:   Axial images were obtained and multiplanar reconstructions were  made.    This exam was performed according to our departmental dose  optimization program, which includes automated exposure control,  adjustment of the mA and/or KV according to patient size and/or  use of iterative reconstruction technique.  Dose Length Product 560.40  CONTRAST:   81 cc intravenous Isovue 370    FINDINGS:  Diagnostic quality: Adequate .  Pulmonary embolism: No CT evidence of pulmonary embolism.  Pulmonary arteries:  Normal in caliber.  Lung parenchyma: There is dependent atelectasis bilaterally there  is an approximate 1.5 cm rounded opacity within the superior  segment of the left lower lobe seen posteriorly abutting the  posterior peripheral margin  (series 4 image 78, series 6 image  129). It is uncertain if this represents a rounded area of  atelectasis, pneumonitis, or a noncalcified nodule.  Pleural effusion: No pleural effusion or pneumothorax..  Central airways: Patent.  Adenopathy: No suspicious mediastinal, hilar, or axillary lymph  nodes based on size or morphologic criteria.  Heart and great vessels: There is cardiac enlargement with a very  thin rim of pericardial fluid. No thoracic aortic aneurysm.  Upper abdomen: No acute finding.    Bones: No acute findings.    Additional findings: None.      Impression:       No CT evidence of pulmonary embolism.    There is dependent atelectasis without dense parenchymal  consolidation or pleural effusion.    There is a 1.5 cm nodular opacity superior segment of the left  lower lobe that favors a rounded area of atelectasis or  pneumonitis. However, recommend follow-up CT in three months to  confirm this suspicion and exclude the unlikely possibility this  is a noncalcified pulmonary nodule.    Electronically signed by:  Gio Rivers MD  1/17/2018 5:56 PM  CST Workstation: [id number]          I reviewed the patient's new clinical results.    ASSESSMENT AND PLAN: This is a 62 y.o. 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  "      1. Sepsis. Suspect lung source.  Pan culture pending. Rocephin/Azithromycin.  2. Acute hypercapnic respiratory failure.  Most likely a component of NEREYDA and COPD.  Patient likely has undiagnosed sleep apnea.  BiPAP tonight.  Outpatient sleep study.  3. Elevated trop.  No chest pain.  EKG reviewed.  Dr. Ferrell, cardiology to see.  4. DM.  Hold metformin.  SSI  5. HTN. Lisinopril/HCTZ  6. Obesity. Body mass index is 38 kg/(m^2).  7. COPD. Oxygen. Nebs. Steroids.  8. CAD. ASA/Plavix  9. HLD. Statin  10. History of CVA. PT/OT. Monitor.    Will monitor patient's hospital course and adjust treatment as hospital course dictates.    DVT prophylaxis: Lovenox  Code status is Full Code         I discussed the patients findings and my recommendations with patient, family, nursing staff and consulting provider.           This document has been electronically signed by Jamison Medley MD on January 17, 2018 9:33 PM             Electronically signed by Jamison Medley MD at 1/17/2018  9:44 PM           Emergency Department Notes      Yazmin Topete at 1/17/2018  1:21 PM          Code I and lab called at 1321     Yazmin Topete  01/17/18 1321       Electronically signed by Yazmin Topete at 1/17/2018  1:21 PM      Dante Iverson MD at 1/17/2018  3:13 PM      Procedure Orders:    1. ECG 12 Lead [[id number]] ordered by Dante Iverson MD at 01/17/18 1338                Subjective   HPI Comments: 61yo male pmh significant htn/hyperlipidemia/dm2/cva/chronic left hemisensory loss/obesity presents ED via EMS reported onset generalized weakness 1130hrs/associated with confusion, bilateral muscle tremors, difficulty speech.  Pt reportedly told wife \"help me go upstairs\"; there is no upstairs at their residence with subsequent mumbling speech.  Pt poor historian.  ROS (+) chronic nonproductive cough.  Pt notably hypoxic upon arrival.    Patient is a 62 y.o. male presenting with weakness.   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 Grade I diastolic dysfunction of the LV myocardium. No evidence of LV apical thrombus. No evidence of pericardial effusion.)   • Gus's syndrome     RIGHT EYE   • Hyperkeratosis    • Hyperlipidemia    • Mucopurulent conjunctivitis     ACUTE   • Myopia    • Neuropathic pain    • Obesity    • Onychomycosis    • Tobacco dependence syndrome        No Known Allergies    Past Surgical History:   Procedure Laterality Date   • COLONOSCOPY  07/30/2015    Colonoscopy, diagnostic (screening) 66413 (Screening for malignant neoplasm of colon)    • COLONOSCOPY  09/09/2015    Colonoscopy, diagnostic (screening) 92678 (Diverticulosis found in the sigmoid colon.Hemorrhoids found in the anus.)   • COLONOSCOPY  05/01/2009    Colonoscopy, diagnostic (screening) 99967 (Small internal and external hemorrhoids were present, Colonoscopy otherwise normal.)       Family History   Problem Relation Age of Onset   • Glaucoma Other    • Heart disease Other     • Stroke Other    • Macular degeneration Other    • Glaucoma Father    • Macular degeneration Father    • Cataracts Father    • Macular degeneration Sister        Social History     Social History   • Marital status:      Spouse name: N/A   • Number of children: N/A   • Years of education: N/A     Social History Main Topics   • Smoking status: Current Every Day Smoker     Types: Cigarettes   • Smokeless tobacco: None      Comment: SMOKING 3 CIGS A DAY   • Alcohol use Yes   • Drug use: No   • Sexual activity: Not Asked     Other Topics Concern   • None     Social History Narrative           Objective   Physical Exam   Constitutional: He appears well-developed.   HENT:   Head: Normocephalic and atraumatic.   Right Ear: External ear normal.   Left Ear: External ear normal.   Nose: Nose normal.   Mouth/Throat: Oropharynx is clear and moist.   Eyes: EOM are normal. Pupils are equal, round, and reactive to light.   Neck: Normal range of motion. Neck supple. No JVD present. No tracheal deviation present.   Neg meningismus   Cardiovascular: Normal rate, regular rhythm, normal heart sounds and intact distal pulses.  Exam reveals no gallop and no friction rub.    No murmur heard.  Pulmonary/Chest: Effort normal. He has decreased breath sounds in the right lower field and the left lower field. He has no wheezes. He has no rhonchi. He has no rales.   Abdominal: Soft. There is no tenderness. There is no rebound and no guarding.   Musculoskeletal: He exhibits no edema or tenderness.   Lymphadenopathy:     He has no cervical adenopathy.   Neurological: He is alert. He has normal strength. A sensory deficit is present. No cranial nerve deficit. Coordination normal. GCS eye subscore is 4. GCS verbal subscore is 5. GCS motor subscore is 6.   Chronic left hemisensory loss. No focal motor deficit noted.   Skin: Skin is warm and dry.   Nursing note and vitals reviewed.      ECG 12 Lead    Date/Time: 1/17/2018 3:17  PM  Performed by: CA MONZON  Authorized by: CA MONZON   Interpreted by physician  Rhythm: sinus rhythm  Rate: normal  BPM: 95  QRS axis: normal  Conduction: conduction normal  ST Segments: ST segments normal  T depression: III, aVF, V3, V4 and V5  Other findings: LAE  Clinical impression: abnormal ECG              ED Course  ED Course      Labs Reviewed   COMPREHENSIVE METABOLIC PANEL - Abnormal; Notable for the following:        Result Value    Glucose 147 (*)     BUN 37 (*)     Potassium 5.4 (*)     Chloride 92 (*)     CO2 36.0 (*)     BUN/Creatinine Ratio 31.9 (*)     All other components within normal limits   LACTIC ACID, PLASMA - Abnormal; Notable for the following:     Lactate 2.1 (*)     All other components within normal limits   URINALYSIS W/ CULTURE IF INDICATED - Abnormal; Notable for the following:     Protein, UA Trace (*)     All other components within normal limits    Narrative:     Urine microscopic not indicated.   CBC WITH AUTO DIFFERENTIAL - Abnormal; Notable for the following:     .6 (*)     MCHC 29.8 (*)     RDW 16.0 (*)     RDW-SD 58.9 (*)     Immature Grans, Absolute 0.04 (*)     All other components within normal limits   BLOOD GAS, ARTERIAL - Abnormal; Notable for the following:     pCO2, Arterial 66.2 (*)     pO2, Arterial 46.8 (*)     HCO3, Arterial 37.8 (*)     Base Excess, Arterial 9.8 (*)     O2 Saturation, Arterial 82.6 (*)     CO2 Content 39.9 (*)     Potassium, Arterial 5.05 (*)     All other components within normal limits   BNP (IN-HOUSE) - Abnormal; Notable for the following:     proBNP 4720.0 (*)     All other components within normal limits   TROPONIN (IN-HOUSE) - Abnormal; Notable for the following:     Troponin I 0.258 (*)     All other components within normal limits   INFLUENZA ANTIGEN, RAPID - Normal   APTT - Normal    Narrative:     The recommended Heparin therapeutic range is 68-97 seconds.   PROTIME-INR - Normal    Narrative:     Therapeutic range for  most indications is 2.0-3.0 INR,  or 2.5-3.5 for mechanical heart valves.   CK - Normal   CK MB - Normal   D-DIMER, QUANTITATIVE - Normal    Narrative:     Dimer values <500 ng/ml FEU are FDA approved as aid in diagnosis of deep venous thrombosis and pulmonary embolism.  This test should not be used in an exclusion strategy with pretest probability alone.    A recent guideline regarding diagnosis for pulmonary thomboembolism recommends an adjusted exclusion criterion of age x 10 ng/ml FEU for patients >50 years of age ([employer]herin Intern Med 2015; 163: 701-711).   BLOOD CULTURE   BLOOD CULTURE   RAINBOW DRAW    Narrative:     The following orders were created for panel order [facility] Draw.  Procedure                               Abnormality         Status                     ---------                               -----------         ------                     Light Blue Top[[id number]]                                   Final result               Green Top (Gel)[[id number]]                                  Final result               Lavender Top[[id number]]                                     Final result               Gold Top - SST[[id number]]                                   Final result                 Please view results for these tests on the individual orders.   TROPONIN (IN-HOUSE)   TROPONIN (IN-HOUSE)   LACTIC ACID REFLEX TIMER   BLOOD GAS, ARTERIAL   POCT GLUCOSE FINGERSTICK   CBC AND DIFFERENTIAL    Narrative:     The following orders were created for panel order CBC & Differential.  Procedure                               Abnormality         Status                     ---------                               -----------         ------                     CBC Auto Differential[[id number]]        Abnormal            Final result                 Please view results for these tests on the individual orders.   LIGHT BLUE TOP   GREEN TOP   LAVENDER TOP   GOLD TOP - SST     Ct Head Without Contrast    Result Date:  1/17/2018  Narrative: Noncontrast CT examination of the brain. INDICATION: Alteration of mental status. Decreased level of consciousness. Evaluate for stroke Technique: Axial 5 mm contiguous images with brain parenchymal and bone windows This exam was performed according to our departmental dose-optimization program, which includes automated exposure control, adjustment of the mA and/or kV according to patient size and/or use of iterative reconstruction technique. Prior relevant examination: MRI brain June 26, 2013. Prominent cisterna magna, normal variant. Brain parenchyma appears within normal limits. Ventricles are within normal limits in size. No evidence of abnormal mass or calcification is seen. No evidence of acute hemorrhage is noted. Chronic opacification left maxillary sinus, unchanged since prior exam.    Impression: CONCLUSION: 1. Normal brain for age. No acute changes are present.  Electronically signed by:  Ruel Perry MD  1/17/2018 3:36 PM CST Workstation: [email or web address] Chest 1 View    Result Date: 1/17/2018  Narrative: Chest single view. CLINICAL INDICATION: Shortness of breath. Simple sepsis protocol. COMPARISON: April 4, 2013. FINDINGS: Cardiomegaly. Slight prominence of the pulmonary vasculature. Lungs otherwise clear.     Impression: CONCLUSION: Cardiomegaly. Slight prominence of the pulmonary vasculature. Otherwise unremarkable chest. Electronically signed by:  Ruel Perry MD  1/17/2018 1:53 PM CST Workstation: [employer]                  [facility]    Final diagnoses:   Acute respiratory failure with hypoxia and hypercapnia   Systolic congestive heart failure, unspecified congestive heart failure chronicity   Chronic left maxillary sinusitis   NSTEMI (non-ST elevated myocardial infarction)            Dante Iverson MD  01/17/18 1629       Dante Iverson MD  01/17/18 1638       Electronically signed by Dante Iverson MD at 1/17/2018  4:38 PM      Maryan Ledesma RN at 1/17/2018  3:20 PM          Lab  notified of need for blood draw     Maryan Ledesma RN  01/17/18 1547       Electronically signed by Maryan Ledesma RN at 1/17/2018  3:47 PM      Maryan Ledesma RN at 1/17/2018  3:59 PM          MD stated he was not ordering antibiotics at this time.     Maryan Ledesma RN  01/17/18 1559       Electronically signed by Maryan Ledesma RN at 1/17/2018  3:59 PM      Layne Waters at 1/17/2018  5:04 PM           317 assigned @ 1655   Tele Box assigned @ 1656     Layne Waters  01/17/18 1705       Electronically signed by Layne Waters at 1/17/2018  5:05 PM      Sena Suarez RN at 1/17/2018  5:22 PM          Pt refuses to wear bipap.      Sena Suarez RN  01/17/18 1722       Electronically signed by Sena Suarez RN at 1/17/2018  5:22 PM      Maryan Ledesma RN at 1/17/2018  5:51 PM          Telemetry on and coming in, verified by Telemetry tech.     Maryan Ledesma RN  01/17/18 1751       Electronically signed by Maryan Ledesma RN at 1/17/2018  5:51 PM      Maryan Ledesma RN at 1/17/2018  6:32 PM          Patient accidentally pulled out IV. Will restart.     Maryan Ledesma RN  01/17/18 1832       Electronically signed by Maryan Ledesma RN at 1/17/2018  6:32 PM      Maryan Ledesma RN at 1/17/2018  6:57 PM          Patient requests that all sticks be done on his left side.     Maryan Ledesma RN  01/17/18 1007       Electronically signed by Maryan Ledesma RN at 1/17/2018  6:57 PM        Hospital Medications (active)       Dose Frequency Start End    acetaminophen (TYLENOL) tablet 650 mg 650 mg Every 4 Hours PRN 1/17/2018     Sig - Route: Take 2 tablets by mouth Every 4 (Four) Hours As Needed for Mild Pain . - Oral    ampicillin-sulbactam (UNASYN) 3 g in sodium chloride 0.9 % 100 mL IVPB 3 g Once 1/17/2018 1/17/2018    Sig - Route: Infuse 3 g into a venous catheter 1 (One) Time. - Intravenous    aspirin chewable tablet 324 mg 324 mg Once  "1/17/2018 1/17/2018    Sig - Route: Chew 4 tablets 1 (One) Time. - Oral    aspirin tablet 325 mg 325 mg Daily 1/18/2018     Sig - Route: Take 1 tablet by mouth Daily. - Oral    atorvastatin (LIPITOR) tablet 10 mg 10 mg Daily 1/17/2018     Sig - Route: Take 1 tablet by mouth Daily. - Oral    cefTRIAXone (ROCEPHIN) in NS 2 GRAMS/20ml IV PUSH syringe 2 g Every 24 Hours 1/17/2018 1/24/2018    Sig - Route: Infuse 20 mL into a venous catheter Daily. - Intravenous    Linked Group 1:  \"And\" Linked Group Details        clopidogrel (PLAVIX) tablet 75 mg 75 mg Daily 1/17/2018     Sig - Route: Take 1 tablet by mouth Daily. - Oral    doxycycline (VIBRAMYCIN) 100 mg/250 mL 0.9% NS  mg Every 12 Hours 1/17/2018 1/24/2018    Sig - Route: Infuse 250 mL into a venous catheter Every 12 (Twelve) Hours. - Intravenous    Linked Group 1:  \"And\" Linked Group Details        DULoxetine (CYMBALTA) DR capsule 30 mg 30 mg Daily 1/18/2018     Sig - Route: Take 1 capsule by mouth Daily. - Oral    enoxaparin (LOVENOX) injection 130 mg 1 mg/kg × 131 kg Once 1/17/2018 1/17/2018    Sig - Route: Inject 0.87 mL under the skin 1 (One) Time. - Subcutaneous    enoxaparin (LOVENOX) syringe 40 mg 40 mg Every 24 Hours 1/18/2018     Sig - Route: Inject 0.4 mL under the skin Daily. - Subcutaneous    furosemide (LASIX) injection 40 mg 40 mg Once 1/17/2018 1/17/2018    Sig - Route: Infuse 4 mL into a venous catheter 1 (One) Time. - Intravenous    gabapentin (NEURONTIN) capsule 300 mg 300 mg Every 8 Hours Scheduled 1/17/2018     Sig - Route: Take 1 capsule by mouth Every 8 (Eight) Hours. - Oral    influenza vac split quad (FLUZONE QUADRIVALENT) IM suspension 0.5 mL 0.5 mL During Hospitalization 1/17/2018     Sig - Route: Inject 0.5 mL into the shoulder, thigh, or buttocks During Hospitalization for Immunization. - Intramuscular    Cosign for Ordering: Accepted by Alirio Hager MD on 1/17/2018  8:52 PM    iopamidol (ISOVUE-370) 76 % injection 100 mL 100 " mL Once in Imaging 1/17/2018 1/17/2018    Sig - Route: Infuse 100 mL into a venous catheter Once. - Intravenous    ipratropium-albuterol (DUO-NEB) nebulizer solution 3 mL 3 mL 4 Times Daily - RT 1/17/2018     Sig - Route: Take 3 mL by nebulization 4 (Four) Times a Day. - Nebulization    lisinopril (PRINIVIL,ZESTRIL) 20 mg, hydrochlorothiazide (HYDRODIURIL) 25 mg for ZESTORTIC 20-25  Every 24 Hours Scheduled 1/18/2018     Sig - Route: Take  by mouth Daily. - Oral    naphazoline-pheniramine (NAPHCON-A) 0.025-0.3 % ophthalmic solution 1 drop 1 drop 4 Times Daily PRN 1/17/2018     Sig - Route: Administer 1 drop to the right eye 4 (Four) Times a Day As Needed for Irritation. - Right Eye    pantoprazole (PROTONIX) EC tablet 40 mg 40 mg Every Morning 1/18/2018     Sig - Route: Take 1 tablet by mouth Every Morning. - Oral    pneumococcal polysaccharide 23-valent (PNEUMOVAX-23) vaccine 0.5 mL 0.5 mL During Hospitalization 1/17/2018     Sig - Route: Inject 0.5 mL into the shoulder, thigh, or buttocks During Hospitalization for Immunization. - Intramuscular    Cosign for Ordering: Accepted by Alirio Hager MD on 1/17/2018  8:52 PM    predniSONE (DELTASONE) tablet 40 mg 40 mg Daily With Breakfast 1/17/2018 1/22/2018    Sig - Route: Take 2 tablets by mouth Daily With Breakfast. - Oral    sodium chloride 0.9 % bolus 500 mL 500 mL Once 1/17/2018 1/17/2018    Sig - Route: Infuse 500 mL into a venous catheter 1 (One) Time. - Intravenous    sodium chloride 0.9 % flush 1-10 mL 1-10 mL As Needed 1/17/2018     Sig - Route: Infuse 1-10 mL into a venous catheter As Needed for Line Care. - Intravenous    sodium chloride 0.9 % flush 10 mL 10 mL As Needed 1/17/2018     Sig - Route: Infuse 10 mL into a venous catheter As Needed for Line Care. - Intravenous    Cosign for Ordering: Accepted by Dante Iverson MD on 1/17/2018  1:37 PM    sodium chloride 0.9 % infusion 75 mL/hr Continuous 1/17/2018     Sig - Route: Infuse 75 mL/hr into a  venous catheter Continuous. - Intravenous    enoxaparin (LOVENOX) syringe 40 mg (Discontinued) 40 mg Every 24 Hours 1/18/2018 1/17/2018    Sig - Route: Inject 0.4 mL under the skin Daily. - Subcutaneous    ipratropium-albuterol (DUO-NEB) nebulizer solution 3 mL (Discontinued) 3 mL 4 Times Daily - RT 1/17/2018 1/17/2018    Sig - Route: Take 3 mL by nebulization 4 (Four) Times a Day. - Nebulization    Reason for Discontinue: Duplicate order    sodium chloride 0.9 % infusion (Discontinued) 125 mL/hr Continuous 1/17/2018 1/17/2018    Sig - Route: Infuse 125 mL/hr into a venous catheter Continuous. - Intravenous          Orders (last 24 hrs)     Start     Ordered    01/18/18 0912  Transfer Patient  Once      01/18/18 0911    01/18/18 0900  aspirin tablet 325 mg  Daily      01/17/18 1954 01/18/18 0900  DULoxetine (CYMBALTA) DR capsule 30 mg  Daily      01/17/18 1954 01/18/18 0900  lisinopril (PRINIVIL,ZESTRIL) 20 mg, hydrochlorothiazide (HYDRODIURIL) 25 mg for ZESTORTIC 20-25  Every 24 Hours Scheduled      01/17/18 1954 01/18/18 0900  enoxaparin (LOVENOX) syringe 40 mg  Every 24 Hours      01/17/18 2124 01/18/18 0700  pantoprazole (PROTONIX) EC tablet 40 mg  Every Morning      01/17/18 1954 01/18/18 0622  Blood Gas, Arterial  Once      01/18/18 0621    01/18/18 0600  Basic Metabolic Panel  Daily      01/17/18 1954 01/18/18 0600  CBC & Differential  Daily      01/17/18 1954 01/18/18 0600  CBC Auto Differential  PROCEDURE ONCE      01/18/18 0001    01/18/18 0334  Blood Gas, Arterial  Once      01/18/18 0334    01/18/18 0000  enoxaparin (LOVENOX) syringe 40 mg  Every 24 Hours,   Status:  Discontinued      01/17/18 1954 01/17/18 2215  predniSONE (DELTASONE) tablet 40 mg  Daily With Breakfast      01/17/18 2142 01/17/18 2200  gabapentin (NEURONTIN) capsule 300 mg  Every 8 Hours Scheduled      01/17/18 1954 01/17/18 2200  Incentive Spirometry  Every 4 Hours While Awake      01/17/18 1954     01/17/18 2200  cefTRIAXone (ROCEPHIN) in NS 2 GRAMS/20ml IV PUSH syringe  Every 24 Hours      01/17/18 2123 01/17/18 2200  doxycycline (VIBRAMYCIN) 100 mg/250 mL 0.9% NS VTB  Every 12 Hours      01/17/18 2123 01/17/18 2144  Respiratory Culture - Sputum, Cough  Once      01/17/18 2143 01/17/18 2144  Respiratory Panel, PCR - Swab, Nasopharynx  Once      01/17/18 2143 01/17/18 2133  PT Consult: Eval & Treat  Once      01/17/18 2132 01/17/18 2133  OT Consult: Eval & Treat AMS  Once      01/17/18 2132 01/17/18 2126  Inpatient Consult to Cardiology  Once     Specialty:  Cardiology  Provider:  Arpan Ferrell MD    01/17/18 2126 01/17/18 2124  Adult Transthoracic Echo Complete W/ Cont if Necessary Per Protocol  Once      01/17/18 2123 01/17/18 2030  clopidogrel (PLAVIX) tablet 75 mg  Daily      01/17/18 1954 01/17/18 2030  atorvastatin (LIPITOR) tablet 10 mg  Daily      01/17/18 1954 01/17/18 2000  Vital Signs  Every 4 Hours      01/17/18 1954 01/17/18 2000  Strict Intake and Output  Every Hour      01/17/18 1954 01/17/18 1955  Remove & Replace Compression Stockings (TEDS) Daily  Daily      01/17/18 1954 01/17/18 1955  NIPPV CPAP OR BIPAP  Until Discontinued      01/17/18 1954 01/17/18 1954  acetaminophen (TYLENOL) tablet 650 mg  Every 4 Hours PRN      01/17/18 1954 01/17/18 1954  Weigh Patient  Once      01/17/18 1954 01/17/18 1954  Oxygen Therapy-  Continuous      01/17/18 1954 01/17/18 1954  Insert Peripheral IV  Once      01/17/18 1954 01/17/18 1954  Saline Lock & Maintain IV Access  Continuous      01/17/18 1954 01/17/18 1954  VTE Risk Assessment - Moderate Risk  Once      01/17/18 1954 01/17/18 1954  Place Compression Stockings (TEDs)  Once      01/17/18 1954 01/17/18 1954  Diet Regular; Cardiac, Consistent Carbohydrate  Diet Effective Now      01/17/18 1954 01/17/18 1953  sodium chloride 0.9 % flush 1-10 mL  As Needed      01/17/18 1954 01/17/18  1953  naphazoline-pheniramine (NAPHCON-A) 0.025-0.3 % ophthalmic solution 1 drop  4 Times Daily PRN      01/17/18 1954 01/17/18 1951  Full Code  Continuous      01/17/18 1950    01/17/18 1937  influenza vac split quad (FLUZONE QUADRIVALENT) IM suspension 0.5 mL  During Hospitalization      01/17/18 1937 01/17/18 1937  pneumococcal polysaccharide 23-valent (PNEUMOVAX-23) vaccine 0.5 mL  During Hospitalization      01/17/18 1937    01/17/18 1842  Tele Bed Request  Once      01/17/18 1841    01/17/18 1842  Hospitalist (on-call MD unless specified)  Once     Specialty:  Hospitalist  Provider:  (Not yet assigned)    01/17/18 1841    01/17/18 1841  Inpatient Admission  Once      01/17/18 1841    01/17/18 1824  enoxaparin (LOVENOX) injection 130 mg  Once      01/17/18 1822    01/17/18 1734  iopamidol (ISOVUE-370) 76 % injection 100 mL  Once in Imaging      01/17/18 1732    01/17/18 1718  Lactic Acid, Reflex  STAT      01/17/18 1717    01/17/18 1659  sodium chloride 0.9 % infusion  Continuous      01/17/18 1657    01/17/18 1653  Inpatient Admission  Once      01/17/18 1653    01/17/18 1637  aspirin chewable tablet 324 mg  Once      01/17/18 1635    01/17/18 1636  CT Angiogram Chest With Contrast  1 Time Imaging      01/17/18 1635    01/17/18 1634  Blood Gas, Arterial  Once      01/17/18 1633    01/17/18 1630  ipratropium-albuterol (DUO-NEB) nebulizer solution 3 mL  4 Times Daily - RT      01/17/18 1357    01/17/18 1630  ipratropium-albuterol (DUO-NEB) nebulizer solution 3 mL  4 Times Daily - RT,   Status:  Discontinued      01/17/18 1511    01/17/18 1629  ampicillin-sulbactam (UNASYN) 3 g in sodium chloride 0.9 % 100 mL IVPB  Once      01/17/18 1627    01/17/18 1629  Inpatient Admission  Once      01/17/18 1629    01/17/18 1629  Tele Bed Request  Once      01/17/18 1629    01/17/18 1629  Family Practice - Resident (on-call MD unless specified)  Once,   Status:  Canceled     Specialty:  Family Medicine  Provider:  (Not  yet assigned)    01/17/18 1629    01/17/18 1612  furosemide (LASIX) injection 40 mg  Once      01/17/18 1610    01/17/18 1523  Blood Gas, Arterial  STAT,   Status:  Canceled      01/17/18 1523    01/17/18 1514  sodium chloride 0.9 % bolus 500 mL  Once      01/17/18 1512    01/17/18 1514  Bladder Catheterization  Once      01/17/18 1513    01/17/18 1512  . BIPAP; IPAP: 12; EPAP: 6; Titrate for SPO2: 92%  Until Discontinued      01/17/18 1511    01/17/18 1512  XR Chest 2 View  1 Time Imaging,   Status:  Canceled      01/17/18 1511    01/17/18 1412  Lactic Acid, Reflex Timer  Once      01/17/18 1411    01/17/18 1359  sodium chloride 0.9 % infusion  Continuous,   Status:  Discontinued      01/17/18 1357    01/17/18 1358  BNP  Once      01/17/18 1357    01/17/18 1358  D-dimer, Quantitative  Once      01/17/18 1357    01/17/18 1358  CT Head Without Contrast  1 Time Imaging      01/17/18 1357    01/17/18 1358  POC Glucose Once  Once      01/17/18 1357    01/17/18 1357  aPTT  Once      01/17/18 1357    01/17/18 1357  Protime-INR  Once      01/17/18 1357    01/17/18 1357  Troponin  Now Then Every 6 Hours      01/17/18 1357    01/17/18 1357  CK  STAT      01/17/18 1357    01/17/18 1357  CK-MB  STAT      01/17/18 1357    01/17/18 1340  Blood Gas, Arterial  STAT      01/17/18 1339    01/17/18 1338  Cardiac Monitoring  Once      01/17/18 1338    01/17/18 1338  ECG 12 Lead  STAT      01/17/18 1338    01/17/18 1320  Undress and Gown  Once      01/17/18 1320    01/17/18 1320  Continuous Pulse Oximetry  Per Hospital Policy      01/17/18 1320    01/17/18 1320  Vital Signs  Every 30 Minutes      01/17/18 1320    01/17/18 1320  Insert peripheral IV  Once      01/17/18 1320    01/17/18 1320  CBC & Differential  Once      01/17/18 1320    01/17/18 1320  Comprehensive Metabolic Panel  Once      01/17/18 1320    01/17/18 1320  Lactic Acid, Plasma  Once      01/17/18 1320    01/17/18 1320  Eldena Draw  Once      01/17/18 1320    01/17/18  1320  Blood Culture - Blood,  Once      01/17/18 1320    01/17/18 1320  Blood Culture - Blood,  Once      01/17/18 1320    01/17/18 1320  NPO Diet  Diet Effective Now,   Status:  Canceled      01/17/18 1320    01/17/18 1320  XR Chest 1 View  1 Time Imaging      01/17/18 1320    01/17/18 1320  Urinalysis With / Culture If Indicated - Urine, Clean Catch  Once      01/17/18 1320    01/17/18 1320  Influenza Antigen, Rapid - Swab, Nasopharynx  Once      01/17/18 1320    01/17/18 1320  CBC Auto Differential  PROCEDURE ONCE      01/17/18 1320    01/17/18 1320  Light Blue Top  PROCEDURE ONCE      01/17/18 1320    01/17/18 1320  Green Top (Gel)  PROCEDURE ONCE      01/17/18 1320    01/17/18 1320  Lavender Top  PROCEDURE ONCE      01/17/18 1320    01/17/18 1320  Gold Top - SST  PROCEDURE ONCE      01/17/18 1320    01/17/18 1319  sodium chloride 0.9 % flush 10 mL  As Needed      01/17/18 1320    Unscheduled  Oxygen Therapy- Nasal Cannula; 2 LPM; Titrate for SPO2: equal to or greater than, 92%  As Needed      01/17/18 1320    --  SCANNED EKG      01/17/18 0000    --  pravastatin (PRAVACHOL) 40 MG tablet  Nightly      01/17/18 1659    --  Omega-3 Fatty Acids (FISH OIL) 1000 MG capsule capsule  Daily With Breakfast      01/17/18 1659          Physician Progress Notes (last 24 hours) (Notes from 1/17/2018 10:03 AM through 1/18/2018 10:03 AM)     No notes of this type exist for this encounter.        Consult Notes (last 24 hours) (Notes from 1/17/2018 10:03 AM through 1/18/2018 10:03 AM)     No notes of this type exist for this encounter.      wellcare pending  Daphney Sahu RN  [facility]   [phone]  Fax [phone]

## 2018-01-18 NOTE — PLAN OF CARE
Problem: Patient Care Overview (Adult)  Goal: Plan of Care Review  Outcome: Ongoing (interventions implemented as appropriate)   01/18/18 1428   Coping/Psychosocial Response Interventions   Plan Of Care Reviewed With patient   Patient Care Overview   Progress no change   Outcome Evaluation   Outcome Summary/Follow up Plan pt arrived to floor midday. Tolerated transfer well. Vital signs stable. No c/o at this time.       Problem: Respiratory Insufficiency (Adult)  Goal: Acid/Base Balance  Outcome: Ongoing (interventions implemented as appropriate)   01/18/18 1428   Respiratory Insufficiency (Adult)   Acid/Base Balance making progress toward outcome     Goal: Effective Ventilation  Outcome: Ongoing (interventions implemented as appropriate)   01/18/18 1428   Respiratory Insufficiency (Adult)   Effective Ventilation making progress toward outcome       Problem: Sepsis (Adult)  Goal: Signs and Symptoms of Listed Potential Problems Will be Absent or Manageable (Sepsis)  Outcome: Ongoing (interventions implemented as appropriate)   01/18/18 1428   Sepsis   Problems Assessed (Sepsis) all   Problems Present (Sepsis) glycemic control, impaired

## 2018-01-18 NOTE — CONSULTS
Cardiology Consultation Note.        Patient Name: Arias Willson  Age/Sex: 62 y.o. male  : 1955  MRN: [id number]    Date of consultation: 2018  Consulting Physician: Arpan Ferrell MD  Primary care Physician: Harpreet Bass MD  Requesting Physician:   Jamison Medley MD   Reason for consultation:  Altered mental status positive troponin with risk factor for atherosclerotic coronary artery disease  Subjective:       Chief Complaint: Altered mental status    History of Present Illness:  Arias Willson is a 62 y.o. male     Body mass index is 38 kg/(m^2).  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    Patient had presented to the hospital with altered mental status.  Patient underwent a resting electrocardiogram on admission which reveals sinus rhythm with anterolateral ST-T wave changes.  Patient had mild elevation in the troponin.  Patient is a poor historian.  Patient on specific questioning denied any symptoms of chest pain, patient on further questioning denies any symptoms of palpitation.  Patient does complain of back pain shortness of breath headache.  Patient is a poor historian.  Unable to assess the review of system.      Past Medical History:  Past Medical History:   Diagnosis Date   • Abnormal glucose     PRE DM   • Acute conjunctivitis     RESOLVED   • Aneurysm of carotid artery     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      • Benign essential hypertension    • Blood in feces    • Carotid artery stenosis     • Cerebrovascular accident      L sided sensory deficit      • Conjunctivitis    • Constipation     OCCASIONAL   • Contusion, eye, left    • Corneal ulcer     PROBABLE   • Diabetes mellitus    • Eczema    • Encounter for screening for malignant neoplasm of colon    • Essential hypertension    • GERD (gastroesophageal reflux disease)    • Hemorrhoids    • History of echocardiogram 04/05/2013    Echocadiogram W/ color flow 84576 (Normal LV systolic function with EF of 60-65%. Grade I diastolic dysfunction of the LV myocardium. No evidence of LV apical thrombus. No evidence of pericardial effusion.)   • Gus's syndrome     RIGHT EYE   • Hyperkeratosis    • Hyperlipidemia    • Mucopurulent conjunctivitis     ACUTE   • Myopia    • Neuropathic pain    • Obesity    • Onychomycosis    • Tobacco dependence syndrome        Past Surgical History:  Past Surgical History:   Procedure Laterality Date   • COLONOSCOPY  07/30/2015    Colonoscopy, diagnostic (screening) 13842 (Screening for malignant neoplasm of colon)    • COLONOSCOPY  09/09/2015    Colonoscopy, diagnostic (screening) 74190 (Diverticulosis found in the sigmoid colon.Hemorrhoids found in the anus.)   • COLONOSCOPY  05/01/2009    Colonoscopy, diagnostic (screening) 02388 (Small internal and external hemorrhoids were present, Colonoscopy otherwise normal.)       Family History:  Family History   Problem Relation Age of Onset   • Glaucoma Other    • Heart disease Other    • Stroke Other    • Macular degeneration Other    • Glaucoma Father    • Macular degeneration Father    • Cataracts Father    • Macular degeneration Sister        Social History:  Social History     Social History   • Marital status:      Spouse name: N/A   • Number of children: N/A   • Years of education: N/A     Occupational History   • Not on file.     Social History Main Topics   • Smoking status: Current Every Day Smoker     Types: Cigarettes   • Smokeless tobacco: Not on file       Comment: SMOKING 3 CIGS A DAY   • Alcohol use Yes   • Drug use: No   • Sexual activity: Not on file     Other Topics Concern   • Not on file     Social History Narrative        Cardiac Risk Factors: Male, Hypertension, Diabetes, Hyperlipidemia and Obesity      Allergies:  No Known Allergies    Medication::  Prescriptions Prior to Admission   Medication Sig Dispense Refill Last Dose   • ammonium lactate (AMLACTIN) 12 % cream APPLY TO THE DRY SKIN BUILD UP 2 HOURS BEFORE BATHING 140 g 0 Past Week at Unknown time   • [START ON 5/24/2106] aspirin 325 MG tablet Take 325 mg by mouth daily.   1/17/2018 at 0900   • betamethasone valerate (VALISONE) 0.1 % cream Apply  topically 2 (two) times a day.   1/17/2018 at Unknown time   • clopidogrel (PLAVIX) 75 MG tablet Take 1 tablet by mouth Daily. 90 tablet 3 1/17/2018 at 0900   • DULoxetine (CYMBALTA) 30 MG capsule Take 1 capsule by mouth 2 (Two) Times a Day. 180 capsule 3 1/17/2018 at 0900   • erythromycin (ROMYCIN) 5 MG/GM ophthalmic ointment Administer  to the right eye Every Night. 3.5 g 5 Past Week at Unknown time   • gabapentin (NEURONTIN) 600 MG tablet Take 1 tablet by mouth 3 (Three) Times a Day As Needed (Take 600mg TID prn). 270 tablet 3 1/17/2018 at 1200   • hydrocortisone 0.5 % cream Apply  topically 2 (Two) Times a Day. 28 g 6 Past Week at Unknown time   • lansoprazole (PREVACID) 30 MG capsule Take 1 capsule by mouth Daily. 30 capsule 11 1/17/2018 at 0900   • lisinopril-hydrochlorothiazide (PRINZIDE,ZESTORETIC) 20-25 MG per tablet Take 1 tablet by mouth Daily. 90 tablet 3 1/17/2018 at 0900   • metFORMIN (GLUCOPHAGE) 500 MG tablet Take 1 tablet by mouth 2 (Two) Times a Day With Meals. 180 tablet 3 1/17/2018 at 0900   • naphazoline (NAPHCON) 0.1 % ophthalmic solution Apply 1 drop to eye 4 (Four) Times a Day As Needed for Irritation. 15 mL 5 1/17/2018 at 0900   • Omega-3 Fatty Acids (FISH OIL) 1000 MG capsule capsule Take 2,000 mg by mouth Daily With Breakfast.      •  pravastatin (PRAVACHOL) 40 MG tablet Take 40 mg by mouth Every Night.   1/16/2018 at 2100           Review of Systems:       Constitutional:  Denies recent weight loss, weight gain, fever or chills, no change in exercise tolerance     HENT:  Denies any hearing loss, epistaxis, hoarseness, or difficulty speaking.     Eyes: Wears eyeglasses or contact lenses     Respiratory:  Denies dyspnea with exertion,no cough, wheezing, or hemoptysis.     Cardiovascular: Negative for palpitations, chest pain, orthopnea, PND, peripheral edema, syncope, or claudication.     Gastrointestinal:  Denies change in bowel habits, dyspepsia, ulcer disease, hematochezia, or melena.  No nausea, no vomiting, no hematemesis, no diarrhea or constipation, no melena      Endocrine: Negative for cold intolerance, heat intolerance, polydipsia, polyphagia and polyuria. Denies any history of weight change, heat/cold intolerance, polydipsia, polyuria     Genitourinary: Negative for hematuria.      Musculoskeletal: Denies any history of arthritic symptoms or back problems .  No joint pain, joint stiffness, joint swelling, muscle pain, muscle weakness and neck pain    Skin:  Denies any change in hair or nails, rashes, or skin lesions.     Allergic/Immunologic: Negative.  Negative for environmental allergies, food allergies and immunocompromised state.     Neurological:  Denies any history of recurrent headaches, strokes, TIA, or seizure disorder.     Hematological: Denies excessive bleeding, easy bruising, fatigue, lymphadenopathy and petechiae or any bleeding disorders, or lymphadenopathy.     Psychiatric/Behavioral: Denies any history of depression, substance abuse, or change in cognitive function. Denies any psychomotor reaction or tangential thought.  No depression, homicidal ideations and suicidal ideations    Endocrine: No frequent urination and nocturia, temperature intolerance, weight gain, unintended and weight loss,  unintended            Objective:     Objective:  Vitals:    01/18/18 0900   BP:    Pulse:    Resp:    Temp:    SpO2: 91%     .    Body mass index is 38 kg/(m^2).           Physical Exam:   General Appearance:    Alert, oriented, cooperative, in no acute distress   Head:    Normocephalic, atraumatic, without obvious abnormality   Eyes:           MARCELO  Lids and lashes normal, conjunctivae and sclerae normal, no icterus, no pallor   Ears:    Ears appear intact with no abnormalities noted   Throat:   Mucous membranes pink and moist   Neck:   Supple, trachea midline, no carotid bruit, no organomegaly or JVD   Lungs:     Clear to auscultation and percussion, respirations regular, even and Unlabored. No wheezes, rales, rhonchi    Heart:    Regular rhythm and normal rate, normal S1 and S2, no            murmur, no gallop, no rub, no click   Abdomen:     Soft, non-tender, non-distended, no guarding, no rebound tenderness, Normal bowel sounds in all four quadrants, no masses, liver and spleen nonpalpable,    Genitalia:    Deferred   Extremities:   Moves all extremities well, no edema, no cyanosis, no              Redness, no clubbing   Pulses:   Pulses palpable and equal bilaterally   Skin:   Moist and warm. No bleeding, bruising or rash   Neurologic/Psychiatric:   Alert and oriented to person, place, and time.  Motor, power and tone in upper and lower extremities are grossly intact. No focal neurological deficits. Normal cognitive function. No psychomotor reaction or tangential thought. No depression, homicidal ideations and suicidal ideations           Lab Review:       Results from last 7 days  Lab Units 01/18/18  0658  01/17/18  1334   SODIUM mmol/L 137  --  137   SODIUM, ARTERIAL   --   < >  --    POTASSIUM mmol/L 5.0  --  5.4*   CHLORIDE mmol/L 95  --  92*   CO2 mmol/L 36.0*  --  36.0*   BUN mg/dL 28*  --  37*   CREATININE mg/dL 0.97  --  1.16   CALCIUM mg/dL 8.8  --  9.1   BILIRUBIN mg/dL  --   --  0.4   ALK PHOS U/L   --   --  92   ALT (SGPT) U/L  --   --  52   AST (SGOT) U/L  --   --  44   GLUCOSE mg/dL 172*  --  147*   GLUCOSE, ARTERIAL   --   < >  --    < > = values in this interval not displayed.    Results from last 7 days  Lab Units 01/17/18  1901 01/17/18  1611 01/17/18  1334   CK TOTAL U/L  --   --  70   TROPONIN I ng/mL 0.406* 0.407* 0.258*           Results from last 7 days  Lab Units 01/18/18  0658   WBC 10*3/mm3 7.71   HEMOGLOBIN g/dL 13.9   HEMATOCRIT % 47.1   PLATELETS 10*3/mm3 285       Results from last 7 days  Lab Units 01/17/18  1334   INR  1.03   APTT seconds 27.2                   Invalid input(s):  T4,  FREET4    EKG:   ECG/EMG Results (last 24 hours)     Procedure Component Value Units Date/Time    SCANNED EKG [[id number]] Resulted:  01/17/18      Updated:  01/17/18 1421    ECG 12 Lead [[id number]] Collected:  01/17/18 1357     Updated:  01/17/18 1638          Imaging:  Imaging Results (last 24 hours)     Procedure Component Value Units Date/Time    XR Chest 1 View [[id number]] Collected:  01/17/18 1330     Updated:  01/17/18 1354    Narrative:       Chest single view.       CLINICAL INDICATION: Shortness of breath. Simple sepsis protocol.    COMPARISON: April 4, 2013.    FINDINGS: Cardiomegaly. Slight prominence of the pulmonary  vasculature. Lungs otherwise clear.      Impression:       CONCLUSION: Cardiomegaly. Slight prominence of the pulmonary  vasculature. Otherwise unremarkable chest.    Electronically signed by:  Ruel Perry MD  1/17/2018 1:53 PM CST  Workstation: [id number]    CT Head Without Contrast [[id number]] Collected:  01/17/18 1515     Updated:  01/17/18 1538    Narrative:       Noncontrast CT examination of the brain.    INDICATION: Alteration of mental status. Decreased level of  consciousness. Evaluate for stroke    Technique: Axial 5 mm contiguous images with brain parenchymal  and bone windows    This exam was performed according to our departmental  dose-optimization program, which includes  automated exposure  control, adjustment of the mA and/or kV according to patient size  and/or use of iterative reconstruction technique.    Prior relevant examination: MRI brain June 26, 2013.  Prominent cisterna magna, normal variant.  Brain parenchyma appears within normal limits. Ventricles are  within normal limits in size. No evidence of abnormal mass or  calcification is seen. No evidence of acute hemorrhage is noted.  Chronic opacification left maxillary sinus, unchanged since prior  exam.    Impression:       CONCLUSION:   1. Normal brain for age. No acute changes are present.        Electronically signed by:  Ruel Perry MD  1/17/2018 3:36 PM CST  Workstation: [id number]    CT Angiogram Chest With Contrast [[id number]] Collected:  01/17/18 1727     Updated:  01/17/18 1757    Narrative:       [facility] [place]    Patient Name: GIFTY AMADOR    ORDERING: CA MONZON    ATTENDING: VANESSA SAUL     REFERRING: CA MONZON    -----------------------    EXAM DESCRIPTION: CT ANGIOGRAM CHEST W CONTRAST    CLINICAL HISTORY:  soa, J96.01 Acute respiratory failure with  hypoxia J96.02 Acute respiratory failure with hypercapnia I50.20  Unspecified systolic (congestive) heart failure J32.0 Chronic  maxillary sinusitis I21.4 Non-ST elevation (NSTEMI) myocardial  infarction       COMPARISON: Chest x-ray 1/17/2018    TECHNIQUE:   Axial images were obtained and multiplanar reconstructions were  made.    This exam was performed according to our departmental dose  optimization program, which includes automated exposure control,  adjustment of the mA and/or KV according to patient size and/or  use of iterative reconstruction technique.  Dose Length Product 560.40  CONTRAST:   81 cc intravenous Isovue 370    FINDINGS:  Diagnostic quality: Adequate .  Pulmonary embolism: No CT evidence of pulmonary embolism.  Pulmonary arteries:  Normal in caliber.  Lung parenchyma: There is dependent atelectasis bilaterally there  is  an approximate 1.5 cm rounded opacity within the superior  segment of the left lower lobe seen posteriorly abutting the  posterior peripheral margin (series 4 image 78, series 6 image  129). It is uncertain if this represents a rounded area of  atelectasis, pneumonitis, or a noncalcified nodule.  Pleural effusion: No pleural effusion or pneumothorax..  Central airways: Patent.  Adenopathy: No suspicious mediastinal, hilar, or axillary lymph  nodes based on size or morphologic criteria.  Heart and great vessels: There is cardiac enlargement with a very  thin rim of pericardial fluid. No thoracic aortic aneurysm.  Upper abdomen: No acute finding.    Bones: No acute findings.    Additional findings: None.      Impression:       No CT evidence of pulmonary embolism.    There is dependent atelectasis without dense parenchymal  consolidation or pleural effusion.    There is a 1.5 cm nodular opacity superior segment of the left  lower lobe that favors a rounded area of atelectasis or  pneumonitis. However, recommend follow-up CT in three months to  confirm this suspicion and exclude the unlikely possibility this  is a noncalcified pulmonary nodule.    Electronically signed by:  Gio Rivers MD  1/17/2018 5:56 PM  CST Workstation: [id number]          I personally viewed and interpreted the patient's EKG/Telemetry data.    Assessment:   1.  Indeterminate troponin with abnormal resting electrocardiogram suggestive of ischemia.  2.  Hypotension.  3.  History of arterial hypertension.  4.  Hyperlipidemia.  5.  Diabetes.  6.  Obesity with obstructive sleep apnea.  7.  Hypercapnic altered mental status.   8.  Prerenal azotemia          Plan:   1.  Abnormal resting echocardiogram suggestive of ischemia with positive troponin with risk factors for coronary artery disease.  Patient appears to be confused.  Patient has been informed about the electrocardiographic changes and positive troponin with risk factors for coronary artery  disease.  Patient also has history of peripheral vascular disease.  Patient has been recommended a coronary angiogram.  Patient would receive gentle hydration and would repeat the renal function.  If the renal function is normal would discuss with the patient the need for coronary angiogram to rule out underlying coronary artery disease.  Risk-benefit treatment option for the coronary angiogram were discussed with the patient.  Patient Mallampati score #2 patient ASA classification was #2.  2.  Hypotension with history of arterial hypertension.  Patient lisinopril would be held and if the patient blood pressure started rising would consider adding carvedilol 3.125 mg twice a day for heart rate and blood pressure control.  Patient is currently on lisinopril with HCTZ which would be stopped.  3.  Hyperlipidemia.  Patient has been counseled on low-fat low-cholesterol diet and to continue with the present dose of the Lipitor.  4.  Diabetes insulin requiring.  Patient would be administered Mucomyst.  Patient would be continued on the present dose of the insulin.  Sliding scale  5.  Obesity.  Patient has been counseled on weight reduction lifestyle modification and dietary restriction.  6.  Altered mental status.  Patient had hypercapnic respiratory distress leading to altered mental status with elevated PCO2.  Patient also has history of carotid and tenderness aneurysm on the right side with a right-sided Gus's syndrome.  7.  Prerenal azotemia.  Patient lisinopril would be stopped and continued on gentle hydration.  Would repeat the renal function.    Thank you for the consultation      Time: time spent in face-to-face evaluation of greater than 55  minutes and interacting and formulating examining and discussing the plan with the patient with 50% of greater time spent in face-to-face interaction.    Arpan Ferrell MD  01/18/18  10:06 AM  Dictated utilizing Dragon dictation.

## 2018-01-18 NOTE — SIGNIFICANT NOTE
01/18/18 0957   Rehab Treatment   Discipline occupational therapist   Rehab Evaluation   Evaluation Not Performed other (see comments)  (Pt waiting cardio consult due to elevated troponis. Pt off floor for Echo. OT will await cardio recommendations before evaluations. OT will check back at a later time. )

## 2018-01-18 NOTE — PROGRESS NOTES
Discharge Planning Assessment  [facility]     Patient Name: Arias Willson  MRN: [id number]  Today's Date: 1/18/2018    Admit Date: 1/17/2018          Discharge Needs Assessment       01/18/18 1605    Living Environment    Lives With significant other    Living Arrangements house    Home Accessibility no concerns    Type of Financial/Environmental Concern no insurance    Transportation Available car;family or friend will provide    Living Environment    Quality Of Family Relationships supportive    Able to Return to Prior Living Arrangements yes    Living Arrangement Comments lives with s.o.  has  been signed up for [facility]    Discharge Needs Assessment    Concerns To Be Addressed denies needs/concerns at this time    Readmission Within The Last 30 Days no previous admission in last 30 days    Equipment Currently Used at Home walker, rolling;cane, quad;wheelchair   scooter    Equipment Needed After Discharge oxygen    Discharge Disposition home healthcare service    Discharge Planning Comments lives with so at home,  has some equipment such as wheelahcir,  walker,  sccoter, quad cane   will prob need home o2,  choices given  would like o2 from [employer] if needed.    has transportation and will use [facility].  discussed cpap and how to qualify            Discharge Plan     None        Discharge Placement     No information found        Expected Discharge Date and Time     Expected Discharge Date Expected Discharge Time    Jan 20, 2018               Demographic Summary       01/18/18 1604    Referral Information    Admission Type inpatient    Arrived From home or self-care    Referral Source high risk screening    Reason For Consult discharge planning    Record Reviewed patient profile    Referral Information Comments confirmed with pt and so    Primary Care Physician Information    Name fralish            Functional Status     None            Psychosocial     None            Abuse/Neglect     None             Legal     None            Substance Abuse     None            Patient Forms     None          Etta John

## 2018-01-18 NOTE — SIGNIFICANT NOTE
01/18/18 0947   Rehab Treatment   Discipline physical therapist   Rehab Evaluation   Evaluation Not Performed other (see comments)  (Pt is pending cardio consult 2* elevated troponins. Will check back once cardio makes recommendations)   Recommendation   PT - Next Appointment 01/19/18

## 2018-01-18 NOTE — PLAN OF CARE
Problem: Patient Care Overview (Adult)  Goal: Plan of Care Review  Outcome: Ongoing (interventions implemented as appropriate)    Goal: Adult Individualization and Mutuality  Outcome: Ongoing (interventions implemented as appropriate)    Goal: Discharge Needs Assessment  Outcome: Ongoing (interventions implemented as appropriate)      Problem: Respiratory Insufficiency (Adult)  Goal: Identify Related Risk Factors and Signs and Symptoms  Outcome: Ongoing (interventions implemented as appropriate)    Goal: Acid/Base Balance  Outcome: Ongoing (interventions implemented as appropriate)    Goal: Effective Ventilation  Outcome: Ongoing (interventions implemented as appropriate)

## 2018-01-18 NOTE — H&P
"    HISTORY AND PHYSICAL  NAME: Arias Willson  : 1955  MRN: [id number]    DATE OF ADMISSION: 18    DATE & TIME SEEN: 18 9:33 PM    PCP: Harpreet Bass MD    CODE STATUS: Full Code    CHIEF COMPLAINT AMS    HPI:  Arias Willson is a 62 y.o. male who presents with alerted mental status.    Patient is a poor historian.  He remembers very little from today.  He remembers from the ambulance ride until now.  He is currently alert and oriented x3.  He is wearing a venturi mask.  He states that he has been fit and well \"for the most part\".  He does endorse some SOA over the last few weeks.  No association with exertion.  He does have a 3 pillow orthopnea and most likely has NEREYDA, but has no formal diagnosis. He does endorse a productive cough with green sputum, and he does smoke a ppd.  He denies any fever, chills, nausea, vomiting, or diaphoresis.  He endorses no chest pain.    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 Grade I diastolic dysfunction of the LV myocardium. No evidence of LV apical thrombus. No evidence of pericardial effusion.)   • Gus's syndrome     RIGHT EYE   • Hyperkeratosis    • Hyperlipidemia    • Mucopurulent " conjunctivitis     ACUTE   • Myopia    • Neuropathic pain    • Obesity    • Onychomycosis    • Tobacco dependence syndrome        PAST SURGICAL HISTORY:  Past Surgical History:   Procedure Laterality Date   • COLONOSCOPY  07/30/2015    Colonoscopy, diagnostic (screening) 33788 (Screening for malignant neoplasm of colon)    • COLONOSCOPY  09/09/2015    Colonoscopy, diagnostic (screening) 63343 (Diverticulosis found in the sigmoid colon.Hemorrhoids found in the anus.)   • COLONOSCOPY  05/01/2009    Colonoscopy, diagnostic (screening) 47285 (Small internal and external hemorrhoids were present, Colonoscopy otherwise normal.)       FAMILY HISTORY:  Family History   Problem Relation Age of Onset   • Glaucoma Other    • Heart disease Other    • Stroke Other    • Macular degeneration Other    • Glaucoma Father    • Macular degeneration Father    • Cataracts Father    • Macular degeneration Sister         SOCIAL HISTORY:  Social History     Social History   • Marital status:      Spouse name: N/A   • Number of children: N/A   • Years of education: N/A     Occupational History   • Not on file.     Social History Main Topics   • Smoking status: Current Every Day Smoker     Types: Cigarettes   • Smokeless tobacco: Not on file      Comment: SMOKING 3 CIGS A DAY   • Alcohol use Yes   • Drug use: No   • Sexual activity: Not on file     Other Topics Concern   • Not on file     Social History Narrative       HOME MEDICATIONS:  Prior to Admission medications    Medication Sig Start Date End Date Taking? Authorizing Provider   ammonium lactate (AMLACTIN) 12 % cream APPLY TO THE DRY SKIN BUILD UP 2 HOURS BEFORE BATHING 3/27/17  Yes Harpreet Bass MD   aspirin 325 MG tablet Take 325 mg by mouth daily. 5/24/06  Yes Historical Provider, MD   betamethasone valerate (VALISONE) 0.1 % cream Apply  topically 2 (two) times a day. 5/24/16  Yes Historical Provider, MD   clopidogrel (PLAVIX) 75 MG tablet Take 1 tablet by mouth  Daily. 4/18/17  Yes Harpreet Bass MD   DULoxetine (CYMBALTA) 30 MG capsule Take 1 capsule by mouth 2 (Two) Times a Day. 4/18/17  Yes Harpreet Bass MD   erythromycin (ROMYCIN) 5 MG/GM ophthalmic ointment Administer  to the right eye Every Night. 3/29/17  Yes Harpreet Bass MD   gabapentin (NEURONTIN) 600 MG tablet Take 1 tablet by mouth 3 (Three) Times a Day As Needed (Take 600mg TID prn). 4/18/17  Yes Harpreet Bass MD   hydrocortisone 0.5 % cream Apply  topically 2 (Two) Times a Day. 3/29/17  Yes Harpreet Bass MD   lansoprazole (PREVACID) 30 MG capsule Take 1 capsule by mouth Daily. 4/18/17  Yes Harpreet Bass MD   lisinopril-hydrochlorothiazide (PRINZIDE,ZESTORETIC) 20-25 MG per tablet Take 1 tablet by mouth Daily. 4/18/17  Yes Harpreet Bass MD   metFORMIN (GLUCOPHAGE) 500 MG tablet Take 1 tablet by mouth 2 (Two) Times a Day With Meals. 4/18/17  Yes Harpreet Bass MD   naphazoline (NAPHCON) 0.1 % ophthalmic solution Apply 1 drop to eye 4 (Four) Times a Day As Needed for Irritation. 3/29/17  Yes Harpreet Bass MD   Omega-3 Fatty Acids (FISH OIL) 1000 MG capsule capsule Take 2,000 mg by mouth Daily With Breakfast.   Yes Historical Provider, MD   pravastatin (PRAVACHOL) 40 MG tablet Take 40 mg by mouth Every Night.   Yes Historical Provider, MD   azithromycin (ZITHROMAX) 250 MG tablet Take 2 tablets the first day, then 1 tablet daily for 4 days. 3/29/17 1/17/18  Harpreet Bass MD   cyclobenzaprine (FLEXERIL) 5 MG tablet Take 1 tablet by mouth 3 (Three) Times a Day As Needed for muscle spasms. 11/17/16 1/17/18  Harpreet Bass MD   guaifenesin-codeine (GUAIFENESIN AC) 100-10 MG/5ML liquid Take 5 mL by mouth 3 (Three) Times a Day As Needed for Cough. 3/29/17 1/17/18  Harpreet Bass MD   ketoconazole (NIZORAL) 2 % shampoo Apply  topically. Use this soap to wash body when bathing, especially area with rash 5/24/16 1/17/18  Historical Provider, MD    pravastatin (PRAVACHOL) 40 MG tablet Take 1 tablet by mouth Daily.  Patient taking differently: Take 40 mg by mouth Every Night. 4/18/17 1/17/18  Harpreet Bass MD   Unable to find 1 each daily. Med Name: om-3-dha-epa-fish oil-vit D3 900 mg-1,000 unit capsule 1 capsule 2 times per day Oral 5/24/16 1/17/18  Historical Provider, MD       ALLERGIES:  Review of patient's allergies indicates no known allergies.    REVIEW OF SYSTEMS  Review of Systems   Constitutional: Positive for fatigue. Negative for activity change, appetite change and fever.   HENT: Positive for congestion. Negative for ear pain and sore throat.    Eyes: Negative for pain and visual disturbance.   Respiratory: Positive for cough and shortness of breath.    Cardiovascular: Negative for chest pain and palpitations.   Gastrointestinal: Negative for abdominal pain and nausea.   Endocrine: Negative for cold intolerance and heat intolerance.   Genitourinary: Negative for difficulty urinating and dysuria.   Musculoskeletal: Positive for arthralgias. Negative for gait problem.   Skin: Negative for color change and rash.   Neurological: Negative for dizziness, weakness and headaches.   Hematological: Negative for adenopathy. Does not bruise/bleed easily.   Psychiatric/Behavioral: Negative for agitation, confusion and sleep disturbance.       PHYSICAL EXAM:  Temp:  [98 °F (36.7 °C)-98.6 °F (37 °C)] 98.6 °F (37 °C)  Heart Rate:  [] 94  Resp:  [16-24] 20  BP: (110-136)/(68-85) 136/85  FiO2 (%):  [40 %] 40 %  Body mass index is 38 kg/(m^2).     Physical Exam   Constitutional: He is oriented to person, place, and time. He appears well-developed and well-nourished. No distress.   Obese abdomen   HENT:   Head: Normocephalic and atraumatic.   Right Ear: External ear normal.   Left Ear: External ear normal.   Nose: Nose normal.   Eyes: Conjunctivae are normal.   Right eyelid droop.  Chronic.   Neck: Normal range of motion. Neck supple.   Cardiovascular:  Normal rate, regular rhythm, normal heart sounds and intact distal pulses.  Exam reveals no gallop and no friction rub.    No murmur heard.  Pulmonary/Chest: Effort normal. No respiratory distress. He has wheezes. He has no rales. He exhibits no tenderness.   Abdominal: Soft. Bowel sounds are normal. He exhibits no distension and no mass. There is no tenderness. There is no rebound and no guarding.   Musculoskeletal: Normal range of motion.   Neurological: He is alert and oriented to person, place, and time.   Skin: Skin is warm and dry. No rash noted. He is not diaphoretic. No erythema. No pallor.   Psychiatric: He has a normal mood and affect. His behavior is normal.   Nursing note and vitals reviewed.      DIAGNOSTIC DATA:   Lab Results (last 24 hours)     Procedure Component Value Units Date/Time    Blood Culture - Blood, [[id number]] Collected:  01/17/18 1334    Specimen:  Blood from Arm, Right Updated:  01/17/18 1351    CBC & Differential [[id number]] Collected:  01/17/18 1334    Specimen:  Blood Updated:  01/17/18 1355    Narrative:       The following orders were created for panel order CBC & Differential.  Procedure                               Abnormality         Status                     ---------                               -----------         ------                     CBC Auto Differential[[id number]]        Abnormal            Final result                 Please view results for these tests on the individual orders.    CBC Auto Differential [[id number]]  (Abnormal) Collected:  01/17/18 1334    Specimen:  Blood Updated:  01/17/18 1355     WBC 8.01 10*3/mm3      RBC 4.70 10*6/mm3      Hemoglobin 14.1 g/dL      Hematocrit 47.3 %      .6 (H) fL      MCH 30.0 pg      MCHC 29.8 (L) g/dL      RDW 16.0 (H) %      RDW-SD 58.9 (H) fl      MPV 10.3 fL      Platelets 294 10*3/mm3      Neutrophil % 75.8 %      Lymphocyte % 14.4 %      Monocyte % 8.5 %      Eosinophil % 0.4 %      Basophil % 0.4 %       Immature Grans % 0.5 %      Neutrophils, Absolute 6.08 10*3/mm3      Lymphocytes, Absolute 1.15 10*3/mm3      Monocytes, Absolute 0.68 10*3/mm3      Eosinophils, Absolute 0.03 10*3/mm3      Basophils, Absolute 0.03 10*3/mm3      Immature Grans, Absolute 0.04 (H) 10*3/mm3     Lactic Acid, Plasma [[id number]]  (Abnormal) Collected:  01/17/18 1334    Specimen:  Blood Updated:  01/17/18 1411     Lactate 2.1 (C) mmol/L     Comprehensive Metabolic Panel [[id number]]  (Abnormal) Collected:  01/17/18 1334    Specimen:  Blood Updated:  01/17/18 1414     Glucose 147 (H) mg/dL      BUN 37 (H) mg/dL      Creatinine 1.16 mg/dL      Sodium 137 mmol/L      Potassium 5.4 (H) mmol/L      Chloride 92 (L) mmol/L      CO2 36.0 (H) mmol/L      Calcium 9.1 mg/dL      Total Protein 6.4 g/dL      Albumin 3.70 g/dL      ALT (SGPT) 52 U/L      AST (SGOT) 44 U/L      Alkaline Phosphatase 92 U/L      Total Bilirubin 0.4 mg/dL      eGFR Non African Amer 64 mL/min/1.73      Globulin 2.7 gm/dL      A/G Ratio 1.4 g/dL      BUN/Creatinine Ratio 31.9 (H)     Anion Gap 9.0 mmol/L     Protime-INR [[id number]]  (Normal) Collected:  01/17/18 1334    Specimen:  Blood Updated:  01/17/18 1415     Protime 13.4 Seconds      INR 1.03    Narrative:       Therapeutic range for most indications is 2.0-3.0 INR,  or 2.5-3.5 for mechanical heart valves.    CK [[id number]]  (Normal) Collected:  01/17/18 1334    Specimen:  Blood Updated:  01/17/18 1416     Creatine Kinase 70 U/L     aPTT [[id number]]  (Normal) Collected:  01/17/18 1334    Specimen:  Blood Updated:  01/17/18 1417     PTT 27.2 seconds     Narrative:       The recommended Heparin therapeutic range is 68-97 seconds.    D-dimer, Quantitative [[id number]]  (Normal) Collected:  01/17/18 1334    Specimen:  Blood Updated:  01/17/18 1418     D-Dimer, Quantitative 459 ng/mL (FEU)     Narrative:       Dimer values <500 ng/ml FEU are FDA approved as aid in diagnosis of deep venous thrombosis and pulmonary embolism.   This test should not be used in an exclusion strategy with pretest probability alone.    A recent guideline regarding diagnosis for pulmonary thomboembolism recommends an adjusted exclusion criterion of age x 10 ng/ml FEU for patients >50 years of age (Sherin Intern Med 2015; 163: 701-711).    CK-MB [[id number]]  (Normal) Collected:  01/17/18 1334    Specimen:  Blood Updated:  01/17/18 1425     CKMB 1.74 ng/mL     BNP [[id number]]  (Abnormal) Collected:  01/17/18 1334    Specimen:  Blood Updated:  01/17/18 1426     proBNP 4720.0 (H) pg/mL     Blood Gas, Arterial [[id number]]  (Abnormal) Collected:  01/17/18 1423    Specimen:  Arterial Blood Updated:  01/17/18 1432     Site --      Not performed at this site.        Jaydon's Test --      Not performed at this site.        pH, Arterial 7.375 pH units      pCO2, Arterial 66.2 (H) mm Hg      pO2, Arterial 46.8 (L) mm Hg      HCO3, Arterial 37.8 (H) mmol/L      Base Excess, Arterial 9.8 (H) mmol/L      O2 Saturation, Arterial 82.6 (L) %      Hemoglobin, Blood Gas 14.8 g/dL      Hematocrit, Blood Gas 44.0 %      CO2 Content 39.9 (H)     Sodium, Arterial 138.1 mmol/L      Potassium, Arterial 5.05 (H) mmol/L      Glucose, Arterial 144 mmol/L      Barometric Pressure for Blood Gas -- mmHg       Not performed at this site.        Modality --      Not performed at this site.        Ionized Calcium 4.60 mg/dL     [place] Draw [[id number]] Collected:  01/17/18 1334    Specimen:  Blood Updated:  01/17/18 1446    Narrative:       The following orders were created for panel order [place] Draw.  Procedure                               Abnormality         Status                     ---------                               -----------         ------                     Light Blue Top[[id number]]                                   Final result               Green Top (Gel)[[id number]]                                  Final result               Lavender Top[[id number]]                                      Final result               Gold Top - SST[[id number]]                                   Final result                 Please view results for these tests on the individual orders.    Light Blue Top [[id number]] Collected:  01/17/18 1334    Specimen:  Blood Updated:  01/17/18 1446     Extra Tube hold for add-on      Auto resulted       Green Top (Gel) [[id number]] Collected:  01/17/18 1334    Specimen:  Blood Updated:  01/17/18 1446     Extra Tube Hold for add-ons.      Auto resulted.       Lavender Top [[id number]] Collected:  01/17/18 1334    Specimen:  Blood Updated:  01/17/18 1446     Extra Tube hold for add-on      Auto resulted       Gold Top - SST [[id number]] Collected:  01/17/18 1334    Specimen:  Blood Updated:  01/17/18 1446     Extra Tube Hold for add-ons.      Auto resulted.       Influenza Antigen, Rapid - Swab, Nasopharynx [[id number]]  (Normal) Collected:  01/17/18 1535    Specimen:  Swab from Nasopharynx Updated:  01/17/18 1615     Influenza A Ag, EIA Negative     Influenza B Ag, EIA Negative    Blood Culture - Blood, [[id number]] Collected:  01/17/18 1611    Specimen:  Blood from Arm, Right Updated:  01/17/18 1615    Urinalysis With / Culture If Indicated - Urine, Clean Catch [[id number]]  (Abnormal) Collected:  01/17/18 1556    Specimen:  Urine from Urine, Clean Catch Updated:  01/17/18 1616     Color, UA Yellow     Appearance, UA Clear     pH, UA 6.0     Specific Gravity, UA 1.024     Glucose, UA Negative     Ketones, UA Negative     Bilirubin, UA Negative     Blood, UA Negative     Protein, UA Trace (A)     Leuk Esterase, UA Negative     Nitrite, UA Negative     Urobilinogen, UA 1.0 E.U./dL    Narrative:       Urine microscopic not indicated.    Troponin [[id number]]  (Abnormal) Collected:  01/17/18 1334    Specimen:  Blood Updated:  01/17/18 1634     Troponin I 0.258 (C) ng/mL     Troponin [[id number]]  (Abnormal) Collected:  01/17/18 1611    Specimen:  Blood from Arm, Right Updated:  01/17/18 1649      Troponin I 0.407 (C) ng/mL     Blood Gas, Arterial [[id number]]  (Abnormal) Collected:  01/17/18 1638    Specimen:  Arterial Blood Updated:  01/17/18 1657     Site --      Not performed at this site.        Jaydon's Test --      Not performed at this site.        pH, Arterial 7.336 (L) pH units      pCO2, Arterial 72.4 (H) mm Hg      pO2, Arterial 104.9 mm Hg      HCO3, Arterial 37.8 (H) mmol/L      Base Excess, Arterial 9.0 (H) mmol/L      O2 Saturation, Arterial 97.6 %      Hemoglobin, Blood Gas 14.1 g/dL      Hematocrit, Blood Gas 41.0 %      CO2 Content 40.1 (H)     Sodium, Arterial 138.2 mmol/L      Potassium, Arterial 4.58 mmol/L      Glucose, Arterial 147 mmol/L      Barometric Pressure for Blood Gas -- mmHg       Not performed at this site.        Modality --      Not performed at this site.        Ionized Calcium 4.50 mg/dL     Lactic Acid, Reflex Timer [[id number]] Collected:  01/17/18 1334    Specimen:  Blood Updated:  01/17/18 1717     Extra Tube Hold for add-ons.      Auto resulted.       Troponin [[id number]]  (Abnormal) Collected:  01/17/18 1901    Specimen:  Blood Updated:  01/17/18 1941     Troponin I 0.406 (C) ng/mL     Lactic Acid, Reflex [[id number]]  (Abnormal) Collected:  01/17/18 1901    Specimen:  Blood Updated:  01/17/18 1941     Lactate 2.2 (C) mmol/L            Imaging Results (last 24 hours)     Procedure Component Value Units Date/Time    XR Chest 1 View [[id number]] Collected:  01/17/18 1330     Updated:  01/17/18 1354    Narrative:       Chest single view.       CLINICAL INDICATION: Shortness of breath. Simple sepsis protocol.    COMPARISON: April 4, 2013.    FINDINGS: Cardiomegaly. Slight prominence of the pulmonary  vasculature. Lungs otherwise clear.      Impression:       CONCLUSION: Cardiomegaly. Slight prominence of the pulmonary  vasculature. Otherwise unremarkable chest.    Electronically signed by:  Ruel Perry MD  1/17/2018 1:53 PM CST  Workstation: [id number]    CT Head Without  Contrast [[id number]] Collected:  01/17/18 1515     Updated:  01/17/18 1538    Narrative:       Noncontrast CT examination of the brain.    INDICATION: Alteration of mental status. Decreased level of  consciousness. Evaluate for stroke    Technique: Axial 5 mm contiguous images with brain parenchymal  and bone windows    This exam was performed according to our departmental  dose-optimization program, which includes automated exposure  control, adjustment of the mA and/or kV according to patient size  and/or use of iterative reconstruction technique.    Prior relevant examination: MRI brain June 26, 2013.  Prominent cisterna magna, normal variant.  Brain parenchyma appears within normal limits. Ventricles are  within normal limits in size. No evidence of abnormal mass or  calcification is seen. No evidence of acute hemorrhage is noted.  Chronic opacification left maxillary sinus, unchanged since prior  exam.    Impression:       CONCLUSION:   1. Normal brain for age. No acute changes are present.        Electronically signed by:  Ruel Perry MD  1/17/2018 3:36 PM CST  Workstation: [id number]    CT Angiogram Chest With Contrast [[id number]] Collected:  01/17/18 1727     Updated:  01/17/18 1757    Narrative:       [facility] [place]    Patient Name: GIFTY AMADOR    ORDERING: CA MONZON    ATTENDING: VANESSA SAUL     REFERRING: CA MONZON    -----------------------    EXAM DESCRIPTION: CT ANGIOGRAM CHEST W CONTRAST    CLINICAL HISTORY:  soa, J96.01 Acute respiratory failure with  hypoxia J96.02 Acute respiratory failure with hypercapnia I50.20  Unspecified systolic (congestive) heart failure J32.0 Chronic  maxillary sinusitis I21.4 Non-ST elevation (NSTEMI) myocardial  infarction       COMPARISON: Chest x-ray 1/17/2018    TECHNIQUE:   Axial images were obtained and multiplanar reconstructions were  made.    This exam was performed according to our departmental dose  optimization program, which includes  automated exposure control,  adjustment of the mA and/or KV according to patient size and/or  use of iterative reconstruction technique.  Dose Length Product 560.40  CONTRAST:   81 cc intravenous Isovue 370    FINDINGS:  Diagnostic quality: Adequate .  Pulmonary embolism: No CT evidence of pulmonary embolism.  Pulmonary arteries:  Normal in caliber.  Lung parenchyma: There is dependent atelectasis bilaterally there  is an approximate 1.5 cm rounded opacity within the superior  segment of the left lower lobe seen posteriorly abutting the  posterior peripheral margin (series 4 image 78, series 6 image  129). It is uncertain if this represents a rounded area of  atelectasis, pneumonitis, or a noncalcified nodule.  Pleural effusion: No pleural effusion or pneumothorax..  Central airways: Patent.  Adenopathy: No suspicious mediastinal, hilar, or axillary lymph  nodes based on size or morphologic criteria.  Heart and great vessels: There is cardiac enlargement with a very  thin rim of pericardial fluid. No thoracic aortic aneurysm.  Upper abdomen: No acute finding.    Bones: No acute findings.    Additional findings: None.      Impression:       No CT evidence of pulmonary embolism.    There is dependent atelectasis without dense parenchymal  consolidation or pleural effusion.    There is a 1.5 cm nodular opacity superior segment of the left  lower lobe that favors a rounded area of atelectasis or  pneumonitis. However, recommend follow-up CT in three months to  confirm this suspicion and exclude the unlikely possibility this  is a noncalcified pulmonary nodule.    Electronically signed by:  Gio Rivers MD  1/17/2018 5:56 PM  CST Workstation: [id number]          I reviewed the patient's new clinical results.    ASSESSMENT AND PLAN: This is a 62 y.o. 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       1. Sepsis. Suspect lung source.  Pan culture pending. Rocephin/Azithromycin.  2. Acute hypercapnic respiratory failure.  Most likely a component of NEREYDA and COPD.  Patient likely has undiagnosed sleep apnea.  BiPAP tonight.  Outpatient sleep study.  3. Elevated trop.  No chest pain.  EKG reviewed.  Dr. Ferrell, cardiology to see.  4. DM.  Hold metformin.  SSI  5. HTN. Lisinopril/HCTZ  6. Obesity. Body mass index is 38 kg/(m^2).  7. COPD. Oxygen. Nebs. Steroids.  8. CAD. ASA/Plavix  9. HLD. Statin  10. History of CVA. PT/OT. Monitor.    Will monitor patient's hospital course and adjust treatment as hospital course dictates.    DVT prophylaxis: Lovenox  Code status is Full Code         I discussed the patients findings and my recommendations with patient, family, nursing staff and consulting provider.           This document has been electronically signed by Jamison Medley MD on January 17, 2018 9:33 PM

## 2018-01-19 LAB
ANION GAP SERPL CALCULATED.3IONS-SCNC: 9 MMOL/L (ref 5–15)
BASOPHILS # BLD AUTO: 0.01 10*3/MM3 (ref 0–0.2)
BASOPHILS NFR BLD AUTO: 0.1 % (ref 0–2)
BUN BLD-MCNC: 41 MG/DL (ref 7–21)
BUN/CREAT SERPL: 30.8 (ref 7–25)
CALCIUM SPEC-SCNC: 8.8 MG/DL (ref 8.4–10.2)
CHLORIDE SERPL-SCNC: 96 MMOL/L (ref 95–110)
CO2 SERPL-SCNC: 32 MMOL/L (ref 22–31)
CREAT BLD-MCNC: 1.33 MG/DL (ref 0.7–1.3)
DEPRECATED RDW RBC AUTO: 62 FL (ref 35.1–43.9)
EOSINOPHIL # BLD AUTO: 0.02 10*3/MM3 (ref 0–0.7)
EOSINOPHIL NFR BLD AUTO: 0.3 % (ref 0–7)
ERYTHROCYTE [DISTWIDTH] IN BLOOD BY AUTOMATED COUNT: 16.3 % (ref 11.5–14.5)
GFR SERPL CREATININE-BSD FRML MDRD: 55 ML/MIN/1.73 (ref 49–113)
GLUCOSE BLD-MCNC: 192 MG/DL (ref 60–100)
GLUCOSE BLDC GLUCOMTR-MCNC: 155 MG/DL (ref 70–130)
GLUCOSE BLDC GLUCOMTR-MCNC: 195 MG/DL (ref 70–130)
GLUCOSE BLDC GLUCOMTR-MCNC: 228 MG/DL (ref 70–130)
GLUCOSE BLDC GLUCOMTR-MCNC: 246 MG/DL (ref 70–130)
GLUCOSE BLDC GLUCOMTR-MCNC: 272 MG/DL (ref 70–130)
GLUCOSE BLDC GLUCOMTR-MCNC: 300 MG/DL (ref 70–130)
HCT VFR BLD AUTO: 46.2 % (ref 39–49)
HGB BLD-MCNC: 13.7 G/DL (ref 13.7–17.3)
IMM GRANULOCYTES # BLD: 0.01 10*3/MM3 (ref 0–0.02)
IMM GRANULOCYTES NFR BLD: 0.1 % (ref 0–0.5)
LYMPHOCYTES # BLD AUTO: 1.23 10*3/MM3 (ref 0.6–4.2)
LYMPHOCYTES NFR BLD AUTO: 15.7 % (ref 10–50)
MCH RBC QN AUTO: 30.6 PG (ref 26.5–34)
MCHC RBC AUTO-ENTMCNC: 29.7 G/DL (ref 31.5–36.3)
MCV RBC AUTO: 103.1 FL (ref 80–98)
MONOCYTES # BLD AUTO: 0.84 10*3/MM3 (ref 0–0.9)
MONOCYTES NFR BLD AUTO: 10.8 % (ref 0–12)
NEUTROPHILS # BLD AUTO: 5.7 10*3/MM3 (ref 2–8.6)
NEUTROPHILS NFR BLD AUTO: 73 % (ref 37–80)
NRBC BLD MANUAL-RTO: 0.3 /100 WBC (ref 0–0)
PLATELET # BLD AUTO: 314 10*3/MM3 (ref 150–450)
PMV BLD AUTO: 10 FL (ref 8–12)
POTASSIUM BLD-SCNC: 4.8 MMOL/L (ref 3.5–5.1)
RBC # BLD AUTO: 4.48 10*6/MM3 (ref 4.37–5.74)
SODIUM BLD-SCNC: 137 MMOL/L (ref 137–145)
WBC NRBC COR # BLD: 7.81 10*3/MM3 (ref 3.2–9.8)

## 2018-01-19 PROCEDURE — G8980 MOBILITY D/C STATUS: HCPCS

## 2018-01-19 PROCEDURE — 94760 N-INVAS EAR/PLS OXIMETRY 1: CPT

## 2018-01-19 PROCEDURE — 97530 THERAPEUTIC ACTIVITIES: CPT

## 2018-01-19 PROCEDURE — G8978 MOBILITY CURRENT STATUS: HCPCS

## 2018-01-19 PROCEDURE — G8989 SELF CARE D/C STATUS: HCPCS

## 2018-01-19 PROCEDURE — 63710000001 INSULIN ASPART PER 5 UNITS: Performed by: INTERNAL MEDICINE

## 2018-01-19 PROCEDURE — 94799 UNLISTED PULMONARY SVC/PX: CPT

## 2018-01-19 PROCEDURE — 25010000002 CEFTRIAXONE PER 250 MG: Performed by: FAMILY MEDICINE

## 2018-01-19 PROCEDURE — 80048 BASIC METABOLIC PNL TOTAL CA: CPT | Performed by: FAMILY MEDICINE

## 2018-01-19 PROCEDURE — 97162 PT EVAL MOD COMPLEX 30 MIN: CPT

## 2018-01-19 PROCEDURE — G8987 SELF CARE CURRENT STATUS: HCPCS

## 2018-01-19 PROCEDURE — 63710000001 PREDNISONE PER 1 MG: Performed by: INTERNAL MEDICINE

## 2018-01-19 PROCEDURE — 25010000002 ENOXAPARIN PER 10 MG: Performed by: FAMILY MEDICINE

## 2018-01-19 PROCEDURE — 85025 COMPLETE CBC W/AUTO DIFF WBC: CPT | Performed by: FAMILY MEDICINE

## 2018-01-19 PROCEDURE — 97165 OT EVAL LOW COMPLEX 30 MIN: CPT

## 2018-01-19 PROCEDURE — G8988 SELF CARE GOAL STATUS: HCPCS

## 2018-01-19 PROCEDURE — G8979 MOBILITY GOAL STATUS: HCPCS

## 2018-01-19 PROCEDURE — 82962 GLUCOSE BLOOD TEST: CPT

## 2018-01-19 PROCEDURE — 97116 GAIT TRAINING THERAPY: CPT

## 2018-01-19 RX ORDER — BUDESONIDE AND FORMOTEROL FUMARATE DIHYDRATE 160; 4.5 UG/1; UG/1
2 AEROSOL RESPIRATORY (INHALATION)
Status: DISCONTINUED | OUTPATIENT
Start: 2018-01-19 | End: 2018-01-23 | Stop reason: HOSPADM

## 2018-01-19 RX ORDER — SODIUM CHLORIDE 9 MG/ML
100 INJECTION, SOLUTION INTRAVENOUS CONTINUOUS
Status: DISCONTINUED | OUTPATIENT
Start: 2018-01-19 | End: 2018-01-20

## 2018-01-19 RX ADMIN — DOXYCYCLINE 100 MG: 100 INJECTION, POWDER, LYOPHILIZED, FOR SOLUTION INTRAVENOUS at 11:39

## 2018-01-19 RX ADMIN — GABAPENTIN 300 MG: 300 CAPSULE ORAL at 13:14

## 2018-01-19 RX ADMIN — IPRATROPIUM BROMIDE AND ALBUTEROL SULFATE 3 ML: 2.5; .5 SOLUTION RESPIRATORY (INHALATION) at 10:40

## 2018-01-19 RX ADMIN — ACETYLCYSTEINE 6 ML: 100 SOLUTION ORAL; RESPIRATORY (INHALATION) at 00:00

## 2018-01-19 RX ADMIN — IPRATROPIUM BROMIDE AND ALBUTEROL SULFATE 3 ML: 2.5; .5 SOLUTION RESPIRATORY (INHALATION) at 14:16

## 2018-01-19 RX ADMIN — INSULIN ASPART 4 UNITS: 100 INJECTION, SOLUTION INTRAVENOUS; SUBCUTANEOUS at 11:39

## 2018-01-19 RX ADMIN — ACETYLCYSTEINE 6 ML: 100 SOLUTION ORAL; RESPIRATORY (INHALATION) at 22:54

## 2018-01-19 RX ADMIN — GABAPENTIN 300 MG: 300 CAPSULE ORAL at 06:20

## 2018-01-19 RX ADMIN — ATORVASTATIN CALCIUM 10 MG: 10 TABLET, FILM COATED ORAL at 08:47

## 2018-01-19 RX ADMIN — INSULIN ASPART 8 UNITS: 100 INJECTION, SOLUTION INTRAVENOUS; SUBCUTANEOUS at 21:23

## 2018-01-19 RX ADMIN — CLOPIDOGREL BISULFATE 75 MG: 75 TABLET ORAL at 08:47

## 2018-01-19 RX ADMIN — DOXYCYCLINE 100 MG: 100 INJECTION, POWDER, LYOPHILIZED, FOR SOLUTION INTRAVENOUS at 22:53

## 2018-01-19 RX ADMIN — PREDNISONE 20 MG: 20 TABLET ORAL at 08:46

## 2018-01-19 RX ADMIN — INSULIN ASPART 4 UNITS: 100 INJECTION, SOLUTION INTRAVENOUS; SUBCUTANEOUS at 08:45

## 2018-01-19 RX ADMIN — PANTOPRAZOLE SODIUM 40 MG: 40 TABLET, DELAYED RELEASE ORAL at 06:20

## 2018-01-19 RX ADMIN — ENOXAPARIN SODIUM 40 MG: 40 INJECTION SUBCUTANEOUS at 08:47

## 2018-01-19 RX ADMIN — ACETYLCYSTEINE 6 ML: 100 SOLUTION ORAL; RESPIRATORY (INHALATION) at 11:40

## 2018-01-19 RX ADMIN — GABAPENTIN 300 MG: 300 CAPSULE ORAL at 21:25

## 2018-01-19 RX ADMIN — IPRATROPIUM BROMIDE AND ALBUTEROL SULFATE 3 ML: 2.5; .5 SOLUTION RESPIRATORY (INHALATION) at 20:22

## 2018-01-19 RX ADMIN — ASPIRIN 325 MG: 325 TABLET, COATED ORAL at 08:47

## 2018-01-19 RX ADMIN — SODIUM CHLORIDE 100 ML/HR: 9 INJECTION, SOLUTION INTRAVENOUS at 13:14

## 2018-01-19 RX ADMIN — SODIUM CHLORIDE 100 ML/HR: 9 INJECTION, SOLUTION INTRAVENOUS at 23:03

## 2018-01-19 RX ADMIN — DULOXETINE HYDROCHLORIDE 30 MG: 30 CAPSULE, DELAYED RELEASE ORAL at 08:47

## 2018-01-19 RX ADMIN — SODIUM CHLORIDE 2 G: 9 INJECTION INTRAMUSCULAR; INTRAVENOUS; SUBCUTANEOUS at 21:27

## 2018-01-19 RX ADMIN — INSULIN ASPART 12 UNITS: 100 INJECTION, SOLUTION INTRAVENOUS; SUBCUTANEOUS at 17:46

## 2018-01-19 NOTE — PLAN OF CARE
Problem: Patient Care Overview (Adult)  Goal: Plan of Care Review  Outcome: Outcome(s) achieved Date Met: 01/19/18 01/19/18 0900   Coping/Psychosocial Response Interventions   Plan Of Care Reviewed With patient   Outcome Evaluation   Outcome Summary/Follow up Plan Initial PT evaluation complete. Patient is alert and cooperative but resistent to wearing supplemental O2. Patient demonstrates (I) with bed mobility, transfers and gait, ambulates 200'x1 without an AD. Patient deviates L and R, has fast nicolas, no LOB. SpO2 76% on RA with gait, returns to 96% in 2 min at rest with O2; encouraged patient to utilize O2. Tinetti assessed: 24/28 or low fall risk. No further PT indicated at this time. Please discharge skilled PT.

## 2018-01-19 NOTE — PROGRESS NOTES
[facility] Medicine Services  INPATIENT PROGRESS NOTE     LOS: 2 days   Patient Care Team:  Harpreet Bass MD as PCP - General    Chief Complaint:  Acute respiratory failure with hypoxia and hypercapnia      Subjective     Interval History:   Patient is seen for follow-up today.  He was admitted 2 days ago for acute hypoxic and hypercapnic respiratory failure as well as non-STEMI.  He is doing better, remains on high flow supplemental oxygen and does have of air and desaturation with minimal activity.  He voices no new complaints.      Patient Complaints: As above    History taken from: Patient    Review of Systems:    Review of Systems   Constitutional: Positive for fatigue. Negative for activity change, appetite change, chills, diaphoresis and fever.   HENT: Negative for trouble swallowing and voice change.    Eyes: Negative for photophobia and visual disturbance.   Respiratory: Positive for cough and shortness of breath. Negative for choking, chest tightness, wheezing and stridor.    Cardiovascular: Negative for chest pain, palpitations and leg swelling.   Gastrointestinal: Negative for abdominal distention, abdominal pain, blood in stool, constipation, diarrhea, nausea and vomiting.   Endocrine: Negative for cold intolerance, heat intolerance, polydipsia, polyphagia and polyuria.   Genitourinary: Negative for decreased urine volume, difficulty urinating, dysuria, enuresis, flank pain, frequency, hematuria and urgency.   Musculoskeletal: Negative for arthralgias, gait problem, myalgias, neck pain and neck stiffness.   Skin: Negative for pallor, rash and wound.   Neurological: Negative for dizziness, tremors, seizures, syncope, facial asymmetry, speech difficulty, weakness, light-headedness, numbness and headaches.   Hematological: Does not bruise/bleed easily.   Psychiatric/Behavioral: Negative for agitation, behavioral problems and confusion.         Objective      Vital Signs  Temp:  [97.3 °F (36.3 °C)-98.4 °F (36.9 °C)] 97.4 °F (36.3 °C)  Heart Rate:  [72-88] 87  Resp:  [16-26] 18  BP: ()/(50-64) 109/52    Physical Exam:   Physical Exam   Constitutional: He is oriented to person, place, and time. He appears well-developed and well-nourished. No distress.   Patient is morbidly obese.   HENT:   Head: Normocephalic and atraumatic.   Eyes: EOM are normal. Pupils are equal, round, and reactive to light. No scleral icterus.   Neck: Normal range of motion. Neck supple. No JVD present. No thyromegaly present.   Cardiovascular: Normal rate, regular rhythm and normal heart sounds.  Exam reveals no gallop and no friction rub.    No murmur heard.  Pulmonary/Chest: Effort normal. He has decreased breath sounds in the right lower field and the left lower field. He has no wheezes. He has rhonchi. He has no rales. He exhibits no tenderness.   Abdominal: Soft. Bowel sounds are normal. He exhibits no distension and no mass. There is no tenderness. There is no rebound and no guarding.   Musculoskeletal: He exhibits no edema, tenderness or deformity.   Neurological: He is alert and oriented to person, place, and time. No cranial nerve deficit. He exhibits normal muscle tone. Coordination normal.   Skin: Skin is warm and dry. No rash noted. He is not diaphoretic. No erythema. No pallor.   Psychiatric: He has a normal mood and affect. His behavior is normal. Judgment and thought content normal.   Nursing note and vitals reviewed.         Results Review:       Results from last 7 days  Lab Units 01/19/18  0825 01/18/18  0658 01/18/18  0640 01/18/18  0346 01/17/18  1638 01/17/18  1423 01/17/18  1334   SODIUM mmol/L 137 137  --   --   --   --  137   SODIUM, ARTERIAL mmol/L  --   --  136.3* 140.6 138.2 138.1  --    POTASSIUM mmol/L 4.8 5.0  --   --   --   --  5.4*   CHLORIDE mmol/L 96 95  --   --   --   --  92*   CO2 mmol/L 32.0* 36.0*  --   --   --   --  36.0*   BUN mg/dL 41* 28*  --   --    --   --  37*   CREATININE mg/dL 1.33* 0.97  --   --   --   --  1.16   GLUCOSE mg/dL 192* 172*  --   --   --   --  147*   GLUCOSE, ARTERIAL mmol/L  --   --  173 218 147 144  --    CALCIUM mg/dL 8.8 8.8  --   --   --   --  9.1   BILIRUBIN mg/dL  --   --   --   --   --   --  0.4   ALK PHOS U/L  --   --   --   --   --   --  92   ALT (SGPT) U/L  --   --   --   --   --   --  52   AST (SGOT) U/L  --   --   --   --   --   --  44               Results from last 7 days  Lab Units 01/19/18  0825 01/18/18  0658 01/17/18  1334   WBC 10*3/mm3 7.81 7.71 8.01   HEMOGLOBIN g/dL 13.7 13.9 14.1   HEMATOCRIT % 46.2 47.1 47.3   PLATELETS 10*3/mm3 314 285 294       Lab Results   Component Value Date    CKTOTAL 70 01/17/2018    CKMB 1.74 01/17/2018    TROPONINI 0.221 (C) 01/18/2018       pH, Arterial   Date Value Ref Range Status   01/18/2018 7.392 7.350 - 7.450 pH units Final     CO2   Date Value Ref Range Status   01/19/2018 32.0 (H) 22.0 - 31.0 mmol/L Final         Results from last 7 days  Lab Units 01/17/18  1334   INR  1.03        Imaging Results (last 7 days)     Procedure Component Value Units Date/Time    XR Chest 1 View [[id number]] Collected:  01/17/18 1330     Updated:  01/17/18 1354    Narrative:       Chest single view.       CLINICAL INDICATION: Shortness of breath. Simple sepsis protocol.    COMPARISON: April 4, 2013.    FINDINGS: Cardiomegaly. Slight prominence of the pulmonary  vasculature. Lungs otherwise clear.      Impression:       CONCLUSION: Cardiomegaly. Slight prominence of the pulmonary  vasculature. Otherwise unremarkable chest.    Electronically signed by:  Ruel Perry MD  1/17/2018 1:53 PM CST  Workstation: [id number]    CT Head Without Contrast [[id number]] Collected:  01/17/18 1515     Updated:  01/17/18 1538    Narrative:       Noncontrast CT examination of the brain.    INDICATION: Alteration of mental status. Decreased level of  consciousness. Evaluate for stroke    Technique: Axial 5 mm contiguous images  with brain parenchymal  and bone windows    This exam was performed according to our departmental  dose-optimization program, which includes automated exposure  control, adjustment of the mA and/or kV according to patient size  and/or use of iterative reconstruction technique.    Prior relevant examination: MRI brain June 26, 2013.  Prominent cisterna magna, normal variant.  Brain parenchyma appears within normal limits. Ventricles are  within normal limits in size. No evidence of abnormal mass or  calcification is seen. No evidence of acute hemorrhage is noted.  Chronic opacification left maxillary sinus, unchanged since prior  exam.    Impression:       CONCLUSION:   1. Normal brain for age. No acute changes are present.        Electronically signed by:  Ruel Perry MD  1/17/2018 3:36 PM CST  Workstation: [id number]    CT Angiogram Chest With Contrast [[id number]] Collected:  01/17/18 1727     Updated:  01/17/18 1757    Narrative:       [facility] [place]    Patient Name: GIFTY AMADOR    ORDERING: CA MONZON    ATTENDING: VANESSA SAUL     REFERRING: CA MONZON    -----------------------    EXAM DESCRIPTION: CT ANGIOGRAM CHEST W CONTRAST    CLINICAL HISTORY:  soa, J96.01 Acute respiratory failure with  hypoxia J96.02 Acute respiratory failure with hypercapnia I50.20  Unspecified systolic (congestive) heart failure J32.0 Chronic  maxillary sinusitis I21.4 Non-ST elevation (NSTEMI) myocardial  infarction       COMPARISON: Chest x-ray 1/17/2018    TECHNIQUE:   Axial images were obtained and multiplanar reconstructions were  made.    This exam was performed according to our departmental dose  optimization program, which includes automated exposure control,  adjustment of the mA and/or KV according to patient size and/or  use of iterative reconstruction technique.  Dose Length Product 560.40  CONTRAST:   81 cc intravenous Isovue 370    FINDINGS:  Diagnostic quality: Adequate .  Pulmonary embolism: No CT  evidence of pulmonary embolism.  Pulmonary arteries:  Normal in caliber.  Lung parenchyma: There is dependent atelectasis bilaterally there  is an approximate 1.5 cm rounded opacity within the superior  segment of the left lower lobe seen posteriorly abutting the  posterior peripheral margin (series 4 image 78, series 6 image  129). It is uncertain if this represents a rounded area of  atelectasis, pneumonitis, or a noncalcified nodule.  Pleural effusion: No pleural effusion or pneumothorax..  Central airways: Patent.  Adenopathy: No suspicious mediastinal, hilar, or axillary lymph  nodes based on size or morphologic criteria.  Heart and great vessels: There is cardiac enlargement with a very  thin rim of pericardial fluid. No thoracic aortic aneurysm.  Upper abdomen: No acute finding.    Bones: No acute findings.    Additional findings: None.      Impression:       No CT evidence of pulmonary embolism.    There is dependent atelectasis without dense parenchymal  consolidation or pleural effusion.    There is a 1.5 cm nodular opacity superior segment of the left  lower lobe that favors a rounded area of atelectasis or  pneumonitis. However, recommend follow-up CT in three months to  confirm this suspicion and exclude the unlikely possibility this  is a noncalcified pulmonary nodule.    Electronically signed by:  Gio Rivers MD  1/17/2018 5:56 PM  CST Workstation: [id number]           Blood Culture   Date Value Ref Range Status   01/17/2018 No growth at 24 hours  Preliminary                             Medication Review:   Current Facility-Administered Medications   Medication Dose Route Frequency Provider Last Rate Last Dose   • acetaminophen (TYLENOL) tablet 650 mg  650 mg Oral Q4H PRN Jamison Medley MD       • acetylcysteine (MUCOMYST) 10 % solution 6 mL  6 mL Oral Q12H Arpan Ferrell MD   6 mL at 01/19/18 1140   • aspirin tablet 325 mg  325 mg Oral Daily Jamison Medley MD   325 mg at 01/19/18 0847   • atorvastatin  (LIPITOR) tablet 10 mg  10 mg Oral Daily Jamison Medley MD   10 mg at 01/19/18 0847   • cefTRIAXone (ROCEPHIN) in NS 2 GRAMS/20ml IV PUSH syringe  2 g Intravenous Q24H Jamison Medley MD   2 g at 01/18/18 2210    And   • doxycycline (VIBRAMYCIN) 100 mg/250 mL 0.9% NS VTB  100 mg Intravenous Q12H Jamison Medley MD   100 mg at 01/19/18 1139   • clopidogrel (PLAVIX) tablet 75 mg  75 mg Oral Daily Jamison Medley MD   75 mg at 01/19/18 0847   • dextrose (D50W) solution 25 g  25 g Intravenous Q15 Min PRN Sarbjit Nunes MD       • dextrose (GLUTOSE) oral gel 15 g  15 g Oral Q15 Min PRN Sarbjit Nunes MD       • DULoxetine (CYMBALTA) DR capsule 30 mg  30 mg Oral Daily Jamison Medley MD   30 mg at 01/19/18 0847   • enoxaparin (LOVENOX) syringe 40 mg  40 mg Subcutaneous Q24H Jamison Medley MD   40 mg at 01/19/18 0847   • gabapentin (NEURONTIN) capsule 300 mg  300 mg Oral Q8H Jamison Medley MD   300 mg at 01/19/18 0620   • glucagon (human recombinant) (GLUCAGEN DIAGNOSTIC) injection 1 mg  1 mg Subcutaneous Q15 Min PRN Sarbjit Nunes MD       • influenza vac split quad (FLUZONE QUADRIVALENT) IM suspension 0.5 mL  0.5 mL Intramuscular During Hospitalization Alirio Hager MD       • insulin aspart (novoLOG) injection 0-24 Units  0-24 Units Subcutaneous 4x Daily AC & at Bedtime Sarbjit Nunes MD   4 Units at 01/19/18 1139   • ipratropium-albuterol (DUO-NEB) nebulizer solution 3 mL  3 mL Nebulization 4x Daily - RT Dante Iverson MD   3 mL at 01/19/18 1040   • naphazoline-pheniramine (NAPHCON-A) 0.025-0.3 % ophthalmic solution 1 drop  1 drop Right Eye 4x Daily PRN Jamison Medley MD       • pantoprazole (PROTONIX) EC tablet 40 mg  40 mg Oral QAM Jamison Medley MD   40 mg at 01/19/18 0620   • pneumococcal polysaccharide 23-valent (PNEUMOVAX-23) vaccine 0.5 mL  0.5 mL Intramuscular During Hospitalization Alirio Hager MD       • predniSONE (DELTASONE) tablet 20 mg  20 mg Oral Daily With Breakfast Sarbjit Nunes MD   20 mg at 01/19/18  0846   • sodium chloride 0.9 % flush 1-10 mL  1-10 mL Intravenous PRN Jamison Medley MD       • sodium chloride 0.9 % flush 10 mL  10 mL Intravenous PRN Dante Iverson MD             Assessment/Plan     Principal Problem:    Acute respiratory failure with hypoxia and hypercapnia  Active Problems:    Chronic bronchitis with acute exacerbation    Hypertension    Diabetes mellitus    Morbid obesity    Coronary artery disease    Obstructive apnea    1.  Acute hypoxic/ hypercapnic respiratory failure: Improving.  Continue supplemental oxygen, bronchodilators, empiric antimicrobial therapy and steroids.  Patient may need chronic supplemental oxygen if attempts at weaning are unsuccessful.  2.  History of nicotine dependence: Begin nicotine patch.  Patient has been advised on the need to quit smoking.  3.  Non-STEMI: Continue current guidelines directed medical therapy.  Result of echocardiogram has been reviewed and the cardiologist is following.  Patient is tentatively scheduled for cardiac catheterization.  4.  Diabetes mellitus: Continue anti-glycemic, Accu-Chek and sliding scale insulin.  5.  Acute kidney injury: Likely contrast induced.  Continue IV hydration with routine monitoring of renal function.  6.  Possible obstructive sleep apnea: Patient will need sleep study as outpatient.  7.  Continue GI and DVT prophylaxis.               Marcos Brice MD  01/19/18      EMR Dragon/Transcription disclaimer:   Much of this encounter note is an electronic transcription/translation of spoken language to printed text. The electronic translation of spoken language may permit erroneous, or at times, nonsensical words or phrases to be inadvertently transcribed; Although I have reviewed the note for such errors, some may still exist.

## 2018-01-19 NOTE — PLAN OF CARE
Problem: Patient Care Overview (Adult)  Goal: Plan of Care Review  Outcome: Ongoing (interventions implemented as appropriate)   01/19/18 0828   Coping/Psychosocial Response Interventions   Plan Of Care Reviewed With patient   Patient Care Overview   Progress progress toward functional goals as expected   Outcome Evaluation   Outcome Summary/Follow up Plan OT eval completed. Pt. states he has no need for skilled OT he has had previous CVA and his house is built to fit his needs. Pt. was ind with ADLs and mobility this am. All GM/FM coordination intact. OT will sign off. 1/19 9630

## 2018-01-19 NOTE — THERAPY DISCHARGE NOTE
Acute Care - Physical Therapy Initial Eval/Discharge  [facility]     Patient Name: Arias Willson  : 1955  MRN: [id number]  Today's Date: 2018   Onset of Illness/Injury or Date of Surgery Date: 18  Date of Referral to PT: 18  Referring Physician: MOIRA Medley MD.      Admit Date: 2018    Visit Dx:    ICD-10-CM ICD-9-CM   1. Acute respiratory failure with hypoxia and hypercapnia J96.01 518.81    J96.02    2. Systolic congestive heart failure, unspecified congestive heart failure chronicity I50.20 428.20     428.0   3. Chronic left maxillary sinusitis J32.0 473.0   4. NSTEMI (non-ST elevated myocardial infarction) I21.4 410.70   5. Essential hypertension, benign I10 401.1   6. Benign hypertensive heart disease without congestive heart failure I11.9 402.10   7. NSTEMI (non-ST elevation myocardial infarction) I21.4 410.70   8. Impaired physical mobility Z74.09 781.99     Patient Active Problem List   Diagnosis   • Ptosis of eyelid   • Astigmatism   • Myopia   • Gus's syndrome   • Neuropathic pain   • GERD (gastroesophageal reflux disease)   • Aneurysm of carotid artery   • Cerebrovascular accident   • Sensory deficit present   • Gait disturbance, post-stroke   • Cognitive deficit, post-stroke   • Chronic bronchitis with acute exacerbation   • Acute respiratory failure with hypoxia and hypercapnia   • Sepsis   • Hypertension   • Diabetes mellitus   • Morbid obesity   • Coronary artery disease   • Obstructive apnea     Past Medical History:   Diagnosis Date   • Abnormal glucose     PRE DM   • Acute conjunctivitis     RESOLVED   • Aneurysm of carotid artery     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 Grade I diastolic dysfunction of the LV myocardium. No evidence of LV apical thrombus. No evidence of pericardial effusion.)   • Gus's syndrome     RIGHT EYE   • Hyperkeratosis    • Hyperlipidemia    • Mucopurulent conjunctivitis     ACUTE   • Myopia    • Neuropathic pain    • Obesity    • Onychomycosis    • Tobacco dependence syndrome      Past Surgical History:   Procedure Laterality Date   • COLONOSCOPY  07/30/2015    Colonoscopy, diagnostic (screening) 25124 (Screening for malignant neoplasm of colon)    • COLONOSCOPY  09/09/2015    Colonoscopy, diagnostic (screening) 49863 (Diverticulosis found in the sigmoid colon.Hemorrhoids found in the anus.)   • COLONOSCOPY  05/01/2009    Colonoscopy, diagnostic (screening) 60172 (Small internal and external hemorrhoids were present, Colonoscopy otherwise normal.)          PT ASSESSMENT (last 72 hours)      PT Evaluation       01/19/18 0900 01/19/18 0800    Rehab Evaluation    Document Type evaluation  -CZ evaluation  -NN    Subjective Information agree to therapy  -CZ agree to therapy;complains of;weakness;fatigue;numbness   numb head to toe L side, Numbness RUE  -NN    Patient Effort, Rehab Treatment good  -CZ     Symptoms Noted During/After Treatment significant change in vital signs  -CZ     Symptoms Noted Comment SpO2 76% on RA after gait, returns to 96% in 2 min (patient refused O2 during gait).  -CZ     General Information    Patient Profile Review yes  -CZ yes  -NN    Onset of Illness/Injury or Date of Surgery Date 01/17/18  -CZ 01/17/18  -NN    Referring Physician MOIRA Medley MD.  -CZ Dr. Nunes  -NN    General Observations Supine in bed, venturi mask,  telemetry, alert, cooperative, no apparent distress.  -CZ fowlers, O2, IV, alert  -NN    Pertinent History Of Current Problem To ED with AMS: sepsis, NEREYDA and COPD.  -CZ Admit with AMS, Electrocardigram revealed sinus rhythm with anterolateral ST-T wave changes, mild increase in troponin (suggestive of ischemia)   -NN    Precautions/Limitations oxygen therapy device and L/min  -CZ fall precautions;oxygen therapy device and L/min  -NN    Prior Level of Function independent:;all household mobility;community mobility  -CZ independent:;all household mobility;community mobility;ADL's;home management;cooking;cleaning  -NN    Equipment Currently Used at Home cane, straight;wheelchair, motorized  -CZ walker, rolling;cane, quad;wheelchair   scooter  -NN    Plans/Goals Discussed With patient  -CZ patient;agreed upon  -NN    Risks Reviewed  patient:;LOB;nausea/vomiting;dizziness;increased discomfort;change in vital signs;lines disloged;increased drainage  -NN    Benefits Reviewed  patient:;improve function;increase independence;increase strength;increase balance;decrease pain;decrease risk of DVT;increase knowledge;improve skin integrity  -NN    Barriers to Rehab medically complex  -CZ medically complex  -NN    Living Environment    Lives With spouse  -CZ significant other  -NN    Living Arrangements house  -CZ house  -NN    Home Accessibility stairs to enter home  -CZ stairs to enter home;bed and bath on same level   walk in shower  -NN    Number of Stairs to Enter Home 3  -CZ 3  -NN    Stair Railings at Home  outside, present at both sides  -NN    Living Environment Comment Entrance from garage has no steps.  -CZ     Clinical Impression    Date of Referral to PT 01/17/18  -CZ     PT Diagnosis impaired physical mobility  -CZ     Functional Level At Time Of Evaluation (I) with bed mobility, transfers and gait.   -CZ     Criteria for Skilled Therapeutic Interventions Met no;no problems identified which require skilled  intervention;current level of function same as previous level of function  -CZ     Impairments Found (describe specific impairments) ventilation and respiration/gas exchange  -CZ     Vital Signs    Pre Systolic BP Rehab 108  -  -NN    Pre Treatment Diastolic BP 62  -CZ 61  -NN    Pretreatment Heart Rate (beats/min) 53  -CZ 77  -NN    Intratreatment Heart Rate (beats/min) 100  -CZ     Posttreatment Heart Rate (beats/min) 70  -CZ     Pre SpO2 (%) 99  -CZ     O2 Delivery Pre Treatment room air  -CZ     Intra SpO2 (%) 76  -CZ     O2 Delivery Intra Treatment room air  -CZ     Post SpO2 (%) 96  -CZ     O2 Delivery Post Treatment supplemental O2  -CZ     Pre Patient Position Supine  -CZ Sitting  -NN    Intra Patient Position Sitting  -CZ     Post Patient Position Sitting  -CZ     Pain Assessment    Pain Assessment No/denies pain  -CZ No/denies pain  -NN    Vision Assessment/Intervention    Visual Impairment WFL with corrective lenses  -CZ WFL with corrective lenses  -NN    Cognitive Assessment/Intervention    Current Cognitive/Communication Assessment functional  -CZ functional  -NN    Orientation Status oriented x 4  -CZ oriented x 4  -NN    Follows Commands/Answers Questions 100% of the time  -% of the time;able to follow multi-step instructions  -NN    Personal Safety WNL/WFL  -CZ WNL/WFL  -NN    Personal Safety Interventions gait belt;nonskid shoes/slippers when out of bed  -CZ gait belt;fall prevention program maintained;muscle strengthening facilitated;nonskid shoes/slippers when out of bed;supervised activity  -NN    ROM (Range of Motion)    General ROM no range of motion deficits identified  -CZ no range of motion deficits identified  -NN    General ROM Detail BLEs WFL  -CZ     MMT (Manual Muscle Testing)    General MMT Assessment no strength deficits identified  -CZ no strength deficits identified  -NN    General MMT Assessment Detail BLEs: 5/5  -CZ     Bed Mobility, Assessment/Treatment    Bed  Mobility, Scoot/Bridge, [place]  independent  -NN    Bed Mob, Supine to Sit, [place] independent  -CZ independent  -NN    Bed Mob, Sit to Supine, [place] independent  -CZ independent  -NN    Transfer Assessment/Treatment    Transfers, Sit-Stand [place] independent  -CZ independent  -NN    Transfers, Stand-Sit [place] independent  -CZ independent  -NN    Gait Assessment/Treatment    Gait, [place] Level independent  -CZ     Gait, Assistive Device --   No AD.  -CZ     Gait, Distance (Feet) 150  -CZ     Gait, Comment Deviates L and R, fast nicolas, no LOB.   -CZ     Motor Skills/Interventions    Additional Documentation  Balance Skills Training (Group);Fine Motor Coordination Training (Group);Gross Motor Coordination Training (Group)  -NN    Balance Skills Training    Sitting-Level of Assistance  Independent  -NN    Sitting-Balance Support  Right upper extremity supported;Left upper extremity supported;Feet supported  -NN    Standing-Level of Assistance  Independent  -NN    Static Standing Balance Support  No upper extremity supported  -NN    Gait Balance-Level of Assistance  Independent  -NN    Gait Balance Support  No upper extremity supported  -NN    Fine Motor Coordination Training    Opposition  Right:;Left:;intact  -NN    Gross Motor Coordination Training    Gross Motor Skill, Impairments Detail  All GMC intact  -NN    Sensory Assessment/Intervention    Sensory Impairment  --   Entire L side numbness, RUE numbness  -NN    Light Touch LLE;RLE  -CZ     LLE Light Touch mild impairment  -CZ     RLE Light Touch WNL  -CZ     Positioning and Restraints    Pre-Treatment Position in bed  -CZ in bed  -NN    Post Treatment Position bed  -CZ bed  -NN    In Bed side lying left;call light within reach  -CZ sitting EOB;call light within reach;encouraged to call for assist  -NN      01/18/18 1605 01/18/18 0957    Rehab Evaluation    Evaluation Not Performed  other (see comments)   Pt waiting cardio  consult due to elevated troponis. Pt off floor for Echo. OT will await cardio recommendations before evaluations. OT will check back at a later time.   -    General Information    Equipment Currently Used at Home walker, rolling;cane, quad;wheelchair   scooter  -TC     Living Environment    Lives With significant other  -TC     Living Arrangements house  -TC     Home Accessibility no concerns  -TC     Type of Financial/Environmental Concern no insurance  -TC     Transportation Available car;family or friend will provide  -TC       01/18/18 0947 01/17/18 1933    Rehab Evaluation    Evaluation Not Performed other (see comments)   Pt is pending cardio consult 2* elevated troponins. Will check back once cardio makes recommendations  -MN     General Information    Equipment Currently Used at Home  cane, straight;cane, quad;wheelchair, motorized  -BG    Living Environment    Lives With  significant other  -BG    Living Arrangements  house  -BG    Home Accessibility  stairs to enter home;grab bars present (bathtub)  -BG    Number of Stairs to Enter Home  1  -BG    Stair Railings at Home  none  -BG    Type of Financial/Environmental Concern  none  -BG    Transportation Available  family or friend will provide  -BG      User Key  (r) = Recorded By, (t) = Taken By, (c) = Cosigned By    Initials Name Provider Type    TC Etta John      Lainey Francis, OTR/L Occupational Therapist    LARS Russell, PT Physical Therapist    BG Mike Mas, RN Registered Nurse    LAURY Varghese, OTR/L Occupational Therapist    SARAH Huggins, PT Physical Therapist              PT Recommendation and Plan  Anticipated Discharge Disposition: home  PT Frequency: evaluation only  Plan of Care Review  Plan Of Care Reviewed With: patient  Outcome Summary/Follow up Plan: Initial PT evaluation complete.  Patient is alert and cooperative but resistent to wearing supplemental O2.  Patient demonstrates (I) with bed  "mobility, transfers and gait, ambulates 200'x1 without an AD.  Patient deviates L and R, has fast nicolas, no LOB. SpO2 76% on RA with gait, returns to 96% in 2 min at rest with O2; encouraged patient to utilize O2.  Tinetti assessed: 24/28 or low fall risk.  No further PT indicated at this time. Please discharge skilled PT.               Outcome Measures       01/19/18 0900 01/19/18 0800       How much help from another person do you currently need...    Turning from your back to your side while in flat bed without using bedrails? 4  -CZ      Moving from lying on back to sitting on the side of a flat bed without bedrails? 4  -CZ      Moving to and from a bed to a chair (including a wheelchair)? 4  -CZ      Standing up from a chair using your arms (e.g., wheelchair, bedside chair)? 4  -CZ      Climbing 3-5 steps with a railing? 4  -CZ      To walk in hospital room? 4  -CZ      AM-PAC 6 Clicks Score 24  -CZ      How much help from another is currently needed...    Putting on and taking off regular lower body clothing?  4  -NN     Bathing (including washing, rinsing, and drying)  3  -NN     Toileting (which includes using toilet bed pan or urinal)  4  -NN     Putting on and taking off regular upper body clothing  4  -NN     Taking care of personal grooming (such as brushing teeth)  4  -NN     Eating meals  4  -NN     Score  23  -NN     Tinetti Assessment    Tinetti Assessment yes  -CZ      Sitting Balance 1  -CZ      Arises 2  -CZ      Attempts to Rise 2  -CZ      Immediate Standing Balance (first 5 sec) 2  -CZ      Standing Balance 1  -CZ      Sternal Nudge (feet close together) 2  -CZ      Eyes Closed (feet close together) 1  -CZ      Turning 360 Degrees- Steps 1  -CZ      Turning 360 Degrees- Steadiness 1  -CZ      Sitting Down 2  -CZ      Tinetti Balance Score 15  -CZ      Gait Initiation (immediate after told \"go\") 1  -CZ      Step Length- Right Swing 1  -CZ      Step Length- Left Swing 1  -CZ      Foot " Clearance- Right Foot 1  -CZ      Foot Clearance- Left Foot 1  -CZ      Step Symmetry 1  -CZ      Step Continuity 1  -CZ      Path (excursion) 1  -CZ      Trunk 1  -CZ      Base of Support 0  -CZ      Gait Score 9  -CZ      Tinetti Total Score 24  -CZ      Tinetti Assistive Device --   no AD  -CZ      Functional Assessment    Outcome Measure Options  AM-PAC 6 Clicks Daily Activity (OT)  -NN       User Key  (r) = Recorded By, (t) = Taken By, (c) = Cosigned By    Initials Name Provider Type    NN Jennie Varghese, OTR/L Occupational Therapist    CZ Octaviano Huggins, PT Physical Therapist           Time Calculation:         PT Charges       01/19/18 0953          Time Calculation    Start Time 0900  -CZ      Stop Time 0938  -CZ      Time Calculation (min) 38 min  -CZ      PT Received On 01/19/18  -CZ      PT Goal Re-Cert Due Date 01/19/18  -CZ      Time Calculation- PT    Total Timed Code Minutes- PT 24 minute(s)  -CZ        User Key  (r) = Recorded By, (t) = Taken By, (c) = Cosigned By    Initials Name Provider Type    CZ Octaviano Huggins, PT Physical Therapist          Therapy Charges for Today     Code Description Service Date Service Provider Modifiers Qty    97750579187 HC PT MOBILITY CURRENT 1/19/2018 Octaviano Huggins, PT GP, CI 1    13502287510 HC PT MOBILITY PROJECTED 1/19/2018 Octaviano Huggins, PT GP, CI 1    10988369680 HC PT MOBILITY DISCHARGE 1/19/2018 Octaviano Huggins PT GP, CI 1    89300538232 HC GAIT TRAINING EA 15 MIN 1/19/2018 Octaviano Huggins, PT GP 1    20177751243 HC PT THERAPEUTIC ACT EA 15 MIN 1/19/2018 Octaviano Huggins, PT GP 1    03596909441 HC PT EVAL MOD COMPLEXITY 1 1/19/2018 Octaviano Huggins, PT GP 1          PT G-Codes  PT Professional Judgement Used?: Yes  Outcome Measure Options: Tinetti  Score: 24  Functional Limitation: Mobility: Walking and moving around  Mobility: Walking and Moving Around Current Status (): At least 1 percent but less than 20 percent impaired, limited or  restricted  Mobility: Walking and Moving Around Goal Status (): At least 1 percent but less than 20 percent impaired, limited or restricted  Mobility: Walking and Moving Around Discharge Status (): At least 1 percent but less than 20 percent impaired, limited or restricted    PT Discharge Summary  Anticipated Discharge Disposition: home  Reason for Discharge: Independent, At baseline function  Discharge Destination:  (Discharge from PT, not from facility.)    Octaviano Huggins, PT  1/19/2018

## 2018-01-19 NOTE — THERAPY DISCHARGE NOTE
Acute Care - Occupational Therapy Initial Eval/Discharge  [facility]     Patient Name: Arias Willson  : 1955  MRN: [id number]  Today's Date: 2018  Onset of Illness/Injury or Date of Surgery Date: 18  Date of Referral to OT: 18  Referring Physician: Dr. Nunes      Admit Date: 2018       ICD-10-CM ICD-9-CM   1. Acute respiratory failure with hypoxia and hypercapnia J96.01 518.81    J96.02    2. Systolic congestive heart failure, unspecified congestive heart failure chronicity I50.20 428.20     428.0   3. Chronic left maxillary sinusitis J32.0 473.0   4. NSTEMI (non-ST elevated myocardial infarction) I21.4 410.70   5. Essential hypertension, benign I10 401.1   6. Benign hypertensive heart disease without congestive heart failure I11.9 402.10   7. NSTEMI (non-ST elevation myocardial infarction) I21.4 410.70     Patient Active Problem List   Diagnosis   • Ptosis of eyelid   • Astigmatism   • Myopia   • Gus's syndrome   • Neuropathic pain   • GERD (gastroesophageal reflux disease)   • Aneurysm of carotid artery   • Cerebrovascular accident   • Sensory deficit present   • Gait disturbance, post-stroke   • Cognitive deficit, post-stroke   • Chronic bronchitis with acute exacerbation   • Acute respiratory failure with hypoxia and hypercapnia   • Sepsis   • Hypertension   • Diabetes mellitus   • Morbid obesity   • Coronary artery disease   • Obstructive apnea     Past Medical History:   Diagnosis Date   • Abnormal glucose     PRE DM   • Acute conjunctivitis     RESOLVED   • Aneurysm of carotid artery     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 Grade I diastolic dysfunction of the LV myocardium. No evidence of LV apical thrombus. No evidence of pericardial effusion.)   • Gus's syndrome     RIGHT EYE   • Hyperkeratosis    • Hyperlipidemia    • Mucopurulent conjunctivitis     ACUTE   • Myopia    • Neuropathic pain    • Obesity    • Onychomycosis    • Tobacco dependence syndrome      Past Surgical History:   Procedure Laterality Date   • COLONOSCOPY  07/30/2015    Colonoscopy, diagnostic (screening) 21025 (Screening for malignant neoplasm of colon)    • COLONOSCOPY  09/09/2015    Colonoscopy, diagnostic (screening) 53466 (Diverticulosis found in the sigmoid colon.Hemorrhoids found in the anus.)   • COLONOSCOPY  05/01/2009    Colonoscopy, diagnostic (screening) 64557 (Small internal and external hemorrhoids were present, Colonoscopy otherwise normal.)          OT ASSESSMENT FLOWSHEET (last 72 hours)      OT Evaluation       01/19/18 0830 01/19/18 0800 01/18/18 1605 01/18/18 0957 01/18/18 0947    Rehab Evaluation    Document Type  evaluation  -NN       Subjective Information  agree to therapy;complains of;weakness;fatigue;numbness   numb head to toe L side, Numbness RUE  -NN       Evaluation Not Performed    other (see comments)   Pt waiting cardio consult due to elevated troponis. Pt off floor for Echo. OT will await cardio recommendations before evaluations. OT will check back at a later time.   - other (see comments)   Pt is pending cardio consult 2* elevated troponins. Will check back once cardio makes recommendations  -MN    General Information    Patient Profile Review  yes  -NN       Onset of Illness/Injury or Date of Surgery Date  01/17/18  -NN       Referring Physician  Dr. Nunes  -NN       General  Observations  fowlers, O2, IV, alert  -NN       Pertinent History Of Current Problem  Admit with AMS, Electrocardigram revealed sinus rhythm with anterolateral ST-T wave changes, mild increase in troponin (suggestive of ischemia)   -NN       Precautions/Limitations  fall precautions;oxygen therapy device and L/min  -NN       Prior Level of Function  independent:;all household mobility;community mobility;ADL's;home management;cooking;cleaning  -NN       Equipment Currently Used at Home  walker, rolling;cane, quad;wheelchair   scooter  -NN walker, rolling;cane, quad;wheelchair   scooter  -TC      Plans/Goals Discussed With  patient;agreed upon  -NN       Risks Reviewed  patient:;LOB;nausea/vomiting;dizziness;increased discomfort;change in vital signs;lines disloged;increased drainage  -NN       Benefits Reviewed  patient:;improve function;increase independence;increase strength;increase balance;decrease pain;decrease risk of DVT;increase knowledge;improve skin integrity  -NN       Barriers to Rehab  medically complex  -NN       Living Environment    Lives With  significant other  -NN significant other  -TC      Living Arrangements  house  -NN house  -TC      Home Accessibility  stairs to enter home;bed and bath on same level   walk in shower  -NN no concerns  -TC      Number of Stairs to Enter Home  3  -NN       Stair Railings at Home  outside, present at both sides  -NN       Type of Financial/Environmental Concern   no insurance  -TC      Transportation Available   car;family or friend will provide  -TC      Clinical Impression    Date of Referral to OT  01/17/18  -NN       OT Diagnosis  decreased adl  -NN       Prognosis  good  -NN       Impairments Found (describe specific impairments)  aerobic capacity/endurance;sensory Integrity  -NN       Patient/Family Goals Statement  go home  -NN       Criteria for Skilled Therapeutic Interventions Met  yes;treatment indicated  -NN       Therapy Frequency  evaluation only   -NN       Anticipated Discharge Disposition home with assist  -NN home with assist  -NN       Vital Signs    Pre Systolic BP Rehab  101  -NN       Pre Treatment Diastolic BP  61  -NN       Pretreatment Heart Rate (beats/min)  77  -NN       Pre Patient Position  Sitting  -NN       Pain Assessment    Pain Assessment  No/denies pain  -NN       Vision Assessment/Intervention    Visual Impairment  WFL with corrective lenses  -NN       Cognitive Assessment/Intervention    Current Cognitive/Communication Assessment  functional  -NN       Orientation Status  oriented x 4  -NN       Follows Commands/Answers Questions  100% of the time;able to follow multi-step instructions  -NN       Personal Safety  WNL/WFL  -NN       Personal Safety Interventions  gait belt;fall prevention program maintained;muscle strengthening facilitated;nonskid shoes/slippers when out of bed;supervised activity  -NN       ROM (Range of Motion)    General ROM  no range of motion deficits identified  -NN       MMT (Manual Muscle Testing)    General MMT Assessment  no strength deficits identified  -NN       Bed Mobility, Assessment/Treatment    Bed Mobility, Scoot/Bridge, [place]  independent  -NN       Bed Mob, Supine to Sit, [place]  independent  -NN       Bed Mob, Sit to Supine, [place]  independent  -NN       Transfer Assessment/Treatment    Transfers, Sit-Stand [place]  independent  -NN       Transfers, Stand-Sit [place]  independent  -NN       Functional Mobility    Functional Mobility- Ind. Level  independent  -NN       Functional Mobility-Distance (Feet)  --   IN ROOM IN [place]  -NN       Functional Mobility- Comment  Pt. ind with functional mobility but distance is limited d/t generalized weakness and fatigue but pt. states this is typical for him  -NN       Lower Body Dressing Assessment/Training    LB Dressing Assess/Train, Clothing Type  donning:;doffing:;slipper socks  -NN       LB Dressing Assess/Train, Position   sitting  -NN       LB Dressing Assess/Train, [place]  independent  -NN       Motor Skills/Interventions    Additional Documentation  Balance Skills Training (Group);Fine Motor Coordination Training (Group);Gross Motor Coordination Training (Group)  -NN       Balance Skills Training    Sitting-Level of Assistance  Independent  -NN       Sitting-Balance Support  Right upper extremity supported;Left upper extremity supported;Feet supported  -NN       Standing-Level of Assistance  Independent  -NN       Static Standing Balance Support  No upper extremity supported  -NN       Gait Balance-Level of Assistance  Independent  -NN       Gait Balance Support  No upper extremity supported  -NN       Gross Motor Coordination Training    Gross Motor Skill, Impairments Detail  All GMC intact  -NN       Fine Motor Coordination Training    Opposition  Right:;Left:;intact  -NN       Sensory Assessment/Intervention    Sensory Impairment  --   Entire L side numbness, RUE numbness  -NN       Positioning and Restraints    Pre-Treatment Position  in bed  -NN       Post Treatment Position  bed  -NN       In Bed  sitting EOB;call light within reach;encouraged to call for assist  -NN         01/17/18 7569                General Information    Equipment Currently Used at Home cane, straight;cane, quad;wheelchair, motorized  -BG        Living Environment    Lives With significant other  -BG        Living Arrangements house  -BG        Home Accessibility stairs to enter home;grab bars present (bathtub)  -BG        Number of Stairs to Enter Home 1  -BG        Stair Railings at Home none  -BG        Type of Financial/Environmental Concern none  -BG        Transportation Available family or friend will provide  -BG        Functional Level Prior    Ambulation 1-->assistive equipment  -BG        Transferring 1-->assistive equipment  -BG        Toileting 1-->assistive equipment  -BG        Bathing 0-->independent  -BG        Dressing  0-->independent  -BG        Eating 0-->independent  -BG        Communication 0-->understands/communicates without difficulty  -BG        Swallowing 0-->swallows foods/liquids without difficulty  -BG        Prior Functional Level Comment Assistive equipment  -BG          User Key  (r) = Recorded By, (t) = Taken By, (c) = Cosigned By    Initials Name Effective Dates    TC Etta KARINA oJhn 10/17/16 -      Lainey Francis, OTR/L 10/17/16 -     MN Rin Russell, PT 10/17/16 -     BG Mike Mas, RN 10/17/16 -     NN Jennie Varghese, OTR/L 11/08/17 -           Occupational Therapy Education     Title: PT OT SLP Therapies (Resolved)     Topic: Occupational Therapy (Resolved)     Point: ADL training (Resolved)    Description: Instruct learner(s) on proper safety adaptation and remediation techniques during self care or transfers.   Instruct in proper use of assistive devices.    Learning Progress Summary    Learner Readiness Method Response Comment Documented by Status   Patient Acceptance E VU,NR Pt. educated on role of OT, safe t/f, benefit of activity, home safety,d/c d/t being at baseline NN 01/19/18 0828 Done               Point: Home exercise program (Resolved)    Description: Instruct learner(s) on appropriate technique for monitoring, assisting and/or progressing therapeutic exercises/activities.    Learning Progress Summary    Learner Readiness Method Response Comment Documented by Status   Patient Acceptance E VU,NR Pt. educated on role of OT, safe t/f, benefit of activity, home safety,d/c d/t being at baseline NN 01/19/18 0828 Done               Point: Body mechanics (Resolved)    Description: Instruct learner(s) on proper positioning and spine alignment during self-care, functional mobility activities and/or exercises.    Learning Progress Summary    Learner Readiness Method Response Comment Documented by Status   Patient Acceptance E VU,NR Pt. educated on role of OT, safe t/f, benefit of activity, home  safety,d/c d/t being at baseline NN 01/19/18 0828 Done                      User Key     Initials Effective Dates Name Provider Type Discipline     11/08/17 -  Jennie Varghese OTR/L Occupational Therapist OT                OT Recommendation and Plan  Anticipated Discharge Disposition: home with assist  Therapy Frequency: evaluation only  Plan of Care Review  Plan Of Care Reviewed With: patient  Progress: progress toward functional goals as expected  Outcome Summary/Follow up Plan: OT eval completed. Pt. states he has no need for skilled OT he has had previous CVA and his house is built to fit his needs. Pt. was ind with ADLs and mobility this am. All GM/FM coordination intact. OT will sign off. 1/19 0830               Outcome Measures       01/19/18 0800          How much help from another is currently needed...    Putting on and taking off regular lower body clothing? 4  -NN      Bathing (including washing, rinsing, and drying) 3  -NN      Toileting (which includes using toilet bed pan or urinal) 4  -NN      Putting on and taking off regular upper body clothing 4  -NN      Taking care of personal grooming (such as brushing teeth) 4  -NN      Eating meals 4  -NN      Score 23  -NN      Functional Assessment    Outcome Measure Options AM-PAC 6 Clicks Daily Activity (OT)  -NN        User Key  (r) = Recorded By, (t) = Taken By, (c) = Cosigned By    Initials Name Provider Type    NN Jennie Varghese OTR/L Occupational Therapist          Time Calculation:         Time Calculation- OT       01/19/18 0829          Time Calculation- OT    OT Start Time 0800  -NN      OT Stop Time 0823  -NN      OT Time Calculation (min) 23 min  -NN      Total Timed Code Minutes- OT 10 minute(s)  -NN      OT Received On 01/19/18  -NN        User Key  (r) = Recorded By, (t) = Taken By, (c) = Cosigned By    Initials Name Provider Type    LAURY Varghese OTR/L Occupational Therapist          Therapy Charges for Today     Code  Description Service Date Service Provider Modifiers Qty    67500439610 HC OT EVAL LOW COMPLEXITY 1 1/19/2018 Jennie Varghese OTR/L GO 1    39639449531 HC OT THERAPEUTIC ACT EA 15 MIN 1/19/2018 Jennie Varghese OTR/L GO 1    35008565718 HC OT SELFCARE CURRENT 1/19/2018 Jennie Varghese OTR/L GO, CI 1    47225182275 HC OT SELFCARE PROJECTED 1/19/2018 Jennie Varghese OTR/L GO, CI 1    32500415493 HC OT SELFCARE DISCHARGE 1/19/2018 Jennie Varghese OTR/L GO, CI 1          OT G-codes  OT Functional Scales Options: AM-PAC 6 Clicks Daily Activity (OT)  Functional Limitation: Self care  Self Care Current Status (): At least 1 percent but less than 20 percent impaired, limited or restricted  Self Care Goal Status (): At least 1 percent but less than 20 percent impaired, limited or restricted  Self Care Discharge Status (): At least 1 percent but less than 20 percent impaired, limited or restricted    OT Discharge Summary  Anticipated Discharge Disposition: home with assist  Reason for Discharge: At baseline function, Patient/Caregiver request  Outcomes Achieved: Refer to plan of care for updates on goals achieved  Discharge Destination: Home with assist    Jennie Varghese OTGUS/L  1/19/2018

## 2018-01-19 NOTE — PLAN OF CARE
Problem: Patient Care Overview (Adult)  Goal: Plan of Care Review  Outcome: Ongoing (interventions implemented as appropriate)   01/19/18 0356   Coping/Psychosocial Response Interventions   Plan Of Care Reviewed With patient   Patient Care Overview   Progress no change   Outcome Evaluation   Outcome Summary/Follow up Plan pt has rested well throughout the shift; no complaints at this time; vital signs stable; will continue to monitor

## 2018-01-19 NOTE — PAYOR COMM NOTE
"Gifty Amador (62 y.o. Male)     Date of Birth Social Security Number Address Home Phone MRN    1955  [id number]  [facility] [phone] [id number]    Cheondoism Marital Status          Yazidi        Admission Date Admission Type Admitting Provider Attending Provider Department, Room/Bed    18 Emergency Alirio Hager MD Williams, Kevin L, MD [address], 427/1    Discharge Date Discharge Disposition Discharge Destination                      Attending Provider: Alirio Hager MD     Allergies:  No Known Allergies    Isolation:  None   Infection:  None   Code Status:  FULL    Ht:  185.4 cm (72.99\")   Wt:  131 kg (288 lb 9.6 oz)    Admission Cmt:  None   Principal Problem:  Acute respiratory failure with hypoxia and hypercapnia [J96.01,J96.02]                 Active Insurance as of 2018     Primary Coverage     Payor Plan Insurance Group Employer/Plan Group    [employer] [place] [employer] [place]ICA[place]      Payor Plan Address Payor Plan Phone Number Effective From Effective To    [address] [address] [phone] 2018     [place] [address]       Subscriber Name Subscriber Birth Date Member ID       GIFTY AMADOR 1955 [id number]                 Emergency Contacts      (Rel.) Home Phone Work Phone Mobile Phone    TerranceMohini (Significant Other) [phone] -- [phone]               History & Physical      Jamison Medley MD at 2018  9:33 PM              HISTORY AND PHYSICAL  NAME: Gifty Amador  : 1955  MRN: [id number]    DATE OF ADMISSION: 18    DATE & TIME SEEN: 18 9:33 PM    PCP: Harpreet Bass MD    CODE STATUS: Full Code    CHIEF COMPLAINT AMS    HPI:  Gifty Amador is a 62 y.o. male who presents with alerted mental status.    Patient is a poor historian.  He remembers very little from today.  He remembers from the ambulance ride until now.  He is currently alert and " "oriented x3.  He is wearing a venturi mask.  He states that he has been fit and well \"for the most part\".  He does endorse some SOA over the last few weeks.  No association with exertion.  He does have a 3 pillow orthopnea and most likely has NEREYDA, but has no formal diagnosis. He does endorse a productive cough with green sputum, and he does smoke a ppd.  He denies any fever, chills, nausea, vomiting, or diaphoresis.  He endorses no chest pain.    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 Grade I diastolic dysfunction of the LV myocardium. No evidence of LV apical thrombus. No evidence of pericardial effusion.)   • Gus's syndrome     RIGHT EYE   • Hyperkeratosis    • Hyperlipidemia    • Mucopurulent conjunctivitis     ACUTE   • Myopia    • Neuropathic pain    • Obesity    • Onychomycosis    • Tobacco dependence syndrome        PAST SURGICAL HISTORY:  Past Surgical History:   Procedure Laterality Date   • COLONOSCOPY  07/30/2015    Colonoscopy, diagnostic (screening) 25189 (Screening for malignant neoplasm of colon)    • COLONOSCOPY  09/09/2015    Colonoscopy, diagnostic (screening) 88535 (Diverticulosis found in the sigmoid colon.Hemorrhoids found in the anus.)   • " COLONOSCOPY  05/01/2009    Colonoscopy, diagnostic (screening) 88518 (Small internal and external hemorrhoids were present, Colonoscopy otherwise normal.)       FAMILY HISTORY:  Family History   Problem Relation Age of Onset   • Glaucoma Other    • Heart disease Other    • Stroke Other    • Macular degeneration Other    • Glaucoma Father    • Macular degeneration Father    • Cataracts Father    • Macular degeneration Sister         SOCIAL HISTORY:  Social History     Social History   • Marital status:      Spouse name: N/A   • Number of children: N/A   • Years of education: N/A     Occupational History   • Not on file.     Social History Main Topics   • Smoking status: Current Every Day Smoker     Types: Cigarettes   • Smokeless tobacco: Not on file      Comment: SMOKING 3 CIGS A DAY   • Alcohol use Yes   • Drug use: No   • Sexual activity: Not on file     Other Topics Concern   • Not on file     Social History Narrative       HOME MEDICATIONS:  Prior to Admission medications    Medication Sig Start Date End Date Taking? Authorizing Provider   ammonium lactate (AMLACTIN) 12 % cream APPLY TO THE DRY SKIN BUILD UP 2 HOURS BEFORE BATHING 3/27/17  Yes Harpreet Bass MD   aspirin 325 MG tablet Take 325 mg by mouth daily. 5/24/06  Yes Historical Provider, MD   betamethasone valerate (VALISONE) 0.1 % cream Apply  topically 2 (two) times a day. 5/24/16  Yes Historical Provider, MD   clopidogrel (PLAVIX) 75 MG tablet Take 1 tablet by mouth Daily. 4/18/17  Yes Harpreet Bass MD   DULoxetine (CYMBALTA) 30 MG capsule Take 1 capsule by mouth 2 (Two) Times a Day. 4/18/17  Yes Harpreet Bass MD   erythromycin (ROMYCIN) 5 MG/GM ophthalmic ointment Administer  to the right eye Every Night. 3/29/17  Yes Harpreet Bass MD   gabapentin (NEURONTIN) 600 MG tablet Take 1 tablet by mouth 3 (Three) Times a Day As Needed (Take 600mg TID prn). 4/18/17  Yes Harpreet Bass MD   hydrocortisone 0.5 % cream  Apply  topically 2 (Two) Times a Day. 3/29/17  Yes Harpreet Bass MD   lansoprazole (PREVACID) 30 MG capsule Take 1 capsule by mouth Daily. 4/18/17  Yes Harpreet Bass MD   lisinopril-hydrochlorothiazide (PRINZIDE,ZESTORETIC) 20-25 MG per tablet Take 1 tablet by mouth Daily. 4/18/17  Yes Harpreet Bass MD   metFORMIN (GLUCOPHAGE) 500 MG tablet Take 1 tablet by mouth 2 (Two) Times a Day With Meals. 4/18/17  Yes Harpreet Bass MD   naphazoline (NAPHCON) 0.1 % ophthalmic solution Apply 1 drop to eye 4 (Four) Times a Day As Needed for Irritation. 3/29/17  Yes Harpreet Bass MD   Omega-3 Fatty Acids (FISH OIL) 1000 MG capsule capsule Take 2,000 mg by mouth Daily With Breakfast.   Yes Historical Provider, MD   pravastatin (PRAVACHOL) 40 MG tablet Take 40 mg by mouth Every Night.   Yes Historical Provider, MD   azithromycin (ZITHROMAX) 250 MG tablet Take 2 tablets the first day, then 1 tablet daily for 4 days. 3/29/17 1/17/18  Harpreet Bass MD   cyclobenzaprine (FLEXERIL) 5 MG tablet Take 1 tablet by mouth 3 (Three) Times a Day As Needed for muscle spasms. 11/17/16 1/17/18  Harpreet Bass MD   guaifenesin-codeine (GUAIFENESIN AC) 100-10 MG/5ML liquid Take 5 mL by mouth 3 (Three) Times a Day As Needed for Cough. 3/29/17 1/17/18  Harpreet Bass MD   ketoconazole (NIZORAL) 2 % shampoo Apply  topically. Use this soap to wash body when bathing, especially area with rash 5/24/16 1/17/18  Historical Provider, MD   pravastatin (PRAVACHOL) 40 MG tablet Take 1 tablet by mouth Daily.  Patient taking differently: Take 40 mg by mouth Every Night. 4/18/17 1/17/18  Harpreet Bass MD   Unable to find 1 each daily. Med Name: om-3-dha-epa-fish oil-vit D3 900 mg-1,000 unit capsule 1 capsule 2 times per day Oral 5/24/16 1/17/18  Historical Provider, MD       ALLERGIES:  Review of patient's allergies indicates no known allergies.    REVIEW OF SYSTEMS  Review of Systems   Constitutional:  Positive for fatigue. Negative for activity change, appetite change and fever.   HENT: Positive for congestion. Negative for ear pain and sore throat.    Eyes: Negative for pain and visual disturbance.   Respiratory: Positive for cough and shortness of breath.    Cardiovascular: Negative for chest pain and palpitations.   Gastrointestinal: Negative for abdominal pain and nausea.   Endocrine: Negative for cold intolerance and heat intolerance.   Genitourinary: Negative for difficulty urinating and dysuria.   Musculoskeletal: Positive for arthralgias. Negative for gait problem.   Skin: Negative for color change and rash.   Neurological: Negative for dizziness, weakness and headaches.   Hematological: Negative for adenopathy. Does not bruise/bleed easily.   Psychiatric/Behavioral: Negative for agitation, confusion and sleep disturbance.       PHYSICAL EXAM:  Temp:  [98 °F (36.7 °C)-98.6 °F (37 °C)] 98.6 °F (37 °C)  Heart Rate:  [] 94  Resp:  [16-24] 20  BP: (110-136)/(68-85) 136/85  FiO2 (%):  [40 %] 40 %  Body mass index is 38 kg/(m^2).     Physical Exam   Constitutional: He is oriented to person, place, and time. He appears well-developed and well-nourished. No distress.   Obese abdomen   HENT:   Head: Normocephalic and atraumatic.   Right Ear: External ear normal.   Left Ear: External ear normal.   Nose: Nose normal.   Eyes: Conjunctivae are normal.   Right eyelid droop.  Chronic.   Neck: Normal range of motion. Neck supple.   Cardiovascular: Normal rate, regular rhythm, normal heart sounds and intact distal pulses.  Exam reveals no gallop and no friction rub.    No murmur heard.  Pulmonary/Chest: Effort normal. No respiratory distress. He has wheezes. He has no rales. He exhibits no tenderness.   Abdominal: Soft. Bowel sounds are normal. He exhibits no distension and no mass. There is no tenderness. There is no rebound and no guarding.   Musculoskeletal: Normal range of motion.   Neurological: He is alert and  oriented to person, place, and time.   Skin: Skin is warm and dry. No rash noted. He is not diaphoretic. No erythema. No pallor.   Psychiatric: He has a normal mood and affect. His behavior is normal.   Nursing note and vitals reviewed.      DIAGNOSTIC DATA:   Lab Results (last 24 hours)     Procedure Component Value Units Date/Time    Blood Culture - Blood, [[id number]] Collected:  01/17/18 1334    Specimen:  Blood from Arm, Right Updated:  01/17/18 1351    CBC & Differential [[id number]] Collected:  01/17/18 1334    Specimen:  Blood Updated:  01/17/18 1355    Narrative:       The following orders were created for panel order CBC & Differential.  Procedure                               Abnormality         Status                     ---------                               -----------         ------                     CBC Auto Differential[[id number]]        Abnormal            Final result                 Please view results for these tests on the individual orders.    CBC Auto Differential [[id number]]  (Abnormal) Collected:  01/17/18 1334    Specimen:  Blood Updated:  01/17/18 1355     WBC 8.01 10*3/mm3      RBC 4.70 10*6/mm3      Hemoglobin 14.1 g/dL      Hematocrit 47.3 %      .6 (H) fL      MCH 30.0 pg      MCHC 29.8 (L) g/dL      RDW 16.0 (H) %      RDW-SD 58.9 (H) fl      MPV 10.3 fL      Platelets 294 10*3/mm3      Neutrophil % 75.8 %      Lymphocyte % 14.4 %      Monocyte % 8.5 %      Eosinophil % 0.4 %      Basophil % 0.4 %      Immature Grans % 0.5 %      Neutrophils, Absolute 6.08 10*3/mm3      Lymphocytes, Absolute 1.15 10*3/mm3      Monocytes, Absolute 0.68 10*3/mm3      Eosinophils, Absolute 0.03 10*3/mm3      Basophils, Absolute 0.03 10*3/mm3      Immature Grans, Absolute 0.04 (H) 10*3/mm3     Lactic Acid, Plasma [[id number]]  (Abnormal) Collected:  01/17/18 1334    Specimen:  Blood Updated:  01/17/18 1411     Lactate 2.1 (C) mmol/L     Comprehensive Metabolic Panel [[id number]]  (Abnormal)  Collected:  01/17/18 1334    Specimen:  Blood Updated:  01/17/18 1414     Glucose 147 (H) mg/dL      BUN 37 (H) mg/dL      Creatinine 1.16 mg/dL      Sodium 137 mmol/L      Potassium 5.4 (H) mmol/L      Chloride 92 (L) mmol/L      CO2 36.0 (H) mmol/L      Calcium 9.1 mg/dL      Total Protein 6.4 g/dL      Albumin 3.70 g/dL      ALT (SGPT) 52 U/L      AST (SGOT) 44 U/L      Alkaline Phosphatase 92 U/L      Total Bilirubin 0.4 mg/dL      eGFR Non African Amer 64 mL/min/1.73      Globulin 2.7 gm/dL      A/G Ratio 1.4 g/dL      BUN/Creatinine Ratio 31.9 (H)     Anion Gap 9.0 mmol/L     Protime-INR [[id number]]  (Normal) Collected:  01/17/18 1334    Specimen:  Blood Updated:  01/17/18 1415     Protime 13.4 Seconds      INR 1.03    Narrative:       Therapeutic range for most indications is 2.0-3.0 INR,  or 2.5-3.5 for mechanical heart valves.    CK [[id number]]  (Normal) Collected:  01/17/18 1334    Specimen:  Blood Updated:  01/17/18 1416     Creatine Kinase 70 U/L     aPTT [[id number]]  (Normal) Collected:  01/17/18 1334    Specimen:  Blood Updated:  01/17/18 1417     PTT 27.2 seconds     Narrative:       The recommended Heparin therapeutic range is 68-97 seconds.    D-dimer, Quantitative [[id number]]  (Normal) Collected:  01/17/18 1334    Specimen:  Blood Updated:  01/17/18 1418     D-Dimer, Quantitative 459 ng/mL (FEU)     Narrative:       Dimer values <500 ng/ml FEU are FDA approved as aid in diagnosis of deep venous thrombosis and pulmonary embolism.  This test should not be used in an exclusion strategy with pretest probability alone.    A recent guideline regarding diagnosis for pulmonary thomboembolism recommends an adjusted exclusion criterion of age x 10 ng/ml FEU for patients >50 years of age (Sherin Intern Med 2015; 163: 701-711).    CK-MB [[id number]]  (Normal) Collected:  01/17/18 1334    Specimen:  Blood Updated:  01/17/18 1425     CKMB 1.74 ng/mL     BNP [[id number]]  (Abnormal) Collected:  01/17/18 1334     Specimen:  Blood Updated:  01/17/18 1426     proBNP 4720.0 (H) pg/mL     Blood Gas, Arterial [[id number]]  (Abnormal) Collected:  01/17/18 1423    Specimen:  Arterial Blood Updated:  01/17/18 1432     Site --      Not performed at this site.        Jaydon's Test --      Not performed at this site.        pH, Arterial 7.375 pH units      pCO2, Arterial 66.2 (H) mm Hg      pO2, Arterial 46.8 (L) mm Hg      HCO3, Arterial 37.8 (H) mmol/L      Base Excess, Arterial 9.8 (H) mmol/L      O2 Saturation, Arterial 82.6 (L) %      Hemoglobin, Blood Gas 14.8 g/dL      Hematocrit, Blood Gas 44.0 %      CO2 Content 39.9 (H)     Sodium, Arterial 138.1 mmol/L      Potassium, Arterial 5.05 (H) mmol/L      Glucose, Arterial 144 mmol/L      Barometric Pressure for Blood Gas -- mmHg       Not performed at this site.        Modality --      Not performed at this site.        Ionized Calcium 4.60 mg/dL     [place] Draw [[id number]] Collected:  01/17/18 1334    Specimen:  Blood Updated:  01/17/18 1446    Narrative:       The following orders were created for panel order [facility] [place] Draw.  Procedure                               Abnormality         Status                     ---------                               -----------         ------                     Light Blue Top[[id number]]                                   Final result               Green Top (Gel)[[id number]]                                  Final result               Lavender Top[[id number]]                                     Final result               Gold Top - SST[[id number]]                                   Final result                 Please view results for these tests on the individual orders.    Light Blue Top [[id number]] Collected:  01/17/18 1334    Specimen:  Blood Updated:  01/17/18 1446     Extra Tube hold for add-on      Auto resulted       Green Top (Gel) [[id number]] Collected:  01/17/18 1334    Specimen:  Blood Updated:  01/17/18 1446     Extra Tube Hold for add-ons.       Auto resulted.       Lavender Top [[id number]] Collected:  01/17/18 1334    Specimen:  Blood Updated:  01/17/18 1446     Extra Tube hold for add-on      Auto resulted       Gold Top - SST [[id number]] Collected:  01/17/18 1334    Specimen:  Blood Updated:  01/17/18 1446     Extra Tube Hold for add-ons.      Auto resulted.       Influenza Antigen, Rapid - Swab, Nasopharynx [[id number]]  (Normal) Collected:  01/17/18 1535    Specimen:  Swab from Nasopharynx Updated:  01/17/18 1615     Influenza A Ag, EIA Negative     Influenza B Ag, EIA Negative    Blood Culture - Blood, [[id number]] Collected:  01/17/18 1611    Specimen:  Blood from Arm, Right Updated:  01/17/18 1615    Urinalysis With / Culture If Indicated - Urine, Clean Catch [[id number]]  (Abnormal) Collected:  01/17/18 1556    Specimen:  Urine from Urine, Clean Catch Updated:  01/17/18 1616     Color, UA Yellow     Appearance, UA Clear     pH, UA 6.0     Specific Gravity, UA 1.024     Glucose, UA Negative     Ketones, UA Negative     Bilirubin, UA Negative     Blood, UA Negative     Protein, UA Trace (A)     Leuk Esterase, UA Negative     Nitrite, UA Negative     Urobilinogen, UA 1.0 E.U./dL    Narrative:       Urine microscopic not indicated.    Troponin [[id number]]  (Abnormal) Collected:  01/17/18 1334    Specimen:  Blood Updated:  01/17/18 1634     Troponin I 0.258 (C) ng/mL     Troponin [[id number]]  (Abnormal) Collected:  01/17/18 1611    Specimen:  Blood from Arm, Right Updated:  01/17/18 1649     Troponin I 0.407 (C) ng/mL     Blood Gas, Arterial [[id number]]  (Abnormal) Collected:  01/17/18 1638    Specimen:  Arterial Blood Updated:  01/17/18 1657     Site --      Not performed at this site.        Jaydon's Test --      Not performed at this site.        pH, Arterial 7.336 (L) pH units      pCO2, Arterial 72.4 (H) mm Hg      pO2, Arterial 104.9 mm Hg      HCO3, Arterial 37.8 (H) mmol/L      Base Excess, Arterial 9.0 (H) mmol/L      O2 Saturation, Arterial  97.6 %      Hemoglobin, Blood Gas 14.1 g/dL      Hematocrit, Blood Gas 41.0 %      CO2 Content 40.1 (H)     Sodium, Arterial 138.2 mmol/L      Potassium, Arterial 4.58 mmol/L      Glucose, Arterial 147 mmol/L      Barometric Pressure for Blood Gas -- mmHg       Not performed at this site.        Modality --      Not performed at this site.        Ionized Calcium 4.50 mg/dL     Lactic Acid, Reflex Timer [[id number]] Collected:  01/17/18 1334    Specimen:  Blood Updated:  01/17/18 1717     Extra Tube Hold for add-ons.      Auto resulted.       Troponin [[id number]]  (Abnormal) Collected:  01/17/18 1901    Specimen:  Blood Updated:  01/17/18 1941     Troponin I 0.406 (C) ng/mL     Lactic Acid, Reflex [[id number]]  (Abnormal) Collected:  01/17/18 1901    Specimen:  Blood Updated:  01/17/18 1941     Lactate 2.2 (C) mmol/L            Imaging Results (last 24 hours)     Procedure Component Value Units Date/Time    XR Chest 1 View [[id number]] Collected:  01/17/18 1330     Updated:  01/17/18 1354    Narrative:       Chest single view.       CLINICAL INDICATION: Shortness of breath. Simple sepsis protocol.    COMPARISON: April 4, 2013.    FINDINGS: Cardiomegaly. Slight prominence of the pulmonary  vasculature. Lungs otherwise clear.      Impression:       CONCLUSION: Cardiomegaly. Slight prominence of the pulmonary  vasculature. Otherwise unremarkable chest.    Electronically signed by:  Ruel Perry MD  1/17/2018 1:53 PM CST  Workstation: [id number]    CT Head Without Contrast [[id number]] Collected:  01/17/18 1515     Updated:  01/17/18 1538    Narrative:       Noncontrast CT examination of the brain.    INDICATION: Alteration of mental status. Decreased level of  consciousness. Evaluate for stroke    Technique: Axial 5 mm contiguous images with brain parenchymal  and bone windows    This exam was performed according to our departmental  dose-optimization program, which includes automated exposure  control, adjustment of the mA  and/or kV according to patient size  and/or use of iterative reconstruction technique.    Prior relevant examination: MRI brain June 26, 2013.  Prominent cisterna magna, normal variant.  Brain parenchyma appears within normal limits. Ventricles are  within normal limits in size. No evidence of abnormal mass or  calcification is seen. No evidence of acute hemorrhage is noted.  Chronic opacification left maxillary sinus, unchanged since prior  exam.    Impression:       CONCLUSION:   1. Normal brain for age. No acute changes are present.        Electronically signed by:  Ruel Perry MD  1/17/2018 3:36 PM CST  Workstation: [id number]    CT Angiogram Chest With Contrast [[id number]] Collected:  01/17/18 1727     Updated:  01/17/18 1757    Narrative:       [facility] [place]    Patient Name: GIFTY AMADOR    ORDERING: CA MONZON    ATTENDING: VANESSA SAUL     REFERRING: CA MONZON    -----------------------    EXAM DESCRIPTION: CT ANGIOGRAM CHEST W CONTRAST    CLINICAL HISTORY:  soa, J96.01 Acute respiratory failure with  hypoxia J96.02 Acute respiratory failure with hypercapnia I50.20  Unspecified systolic (congestive) heart failure J32.0 Chronic  maxillary sinusitis I21.4 Non-ST elevation (NSTEMI) myocardial  infarction       COMPARISON: Chest x-ray 1/17/2018    TECHNIQUE:   Axial images were obtained and multiplanar reconstructions were  made.    This exam was performed according to our departmental dose  optimization program, which includes automated exposure control,  adjustment of the mA and/or KV according to patient size and/or  use of iterative reconstruction technique.  Dose Length Product 560.40  CONTRAST:   81 cc intravenous Isovue 370    FINDINGS:  Diagnostic quality: Adequate .  Pulmonary embolism: No CT evidence of pulmonary embolism.  Pulmonary arteries:  Normal in caliber.  Lung parenchyma: There is dependent atelectasis bilaterally there  is an approximate 1.5 cm rounded opacity within the  superior  segment of the left lower lobe seen posteriorly abutting the  posterior peripheral margin (series 4 image 78, series 6 image  129). It is uncertain if this represents a rounded area of  atelectasis, pneumonitis, or a noncalcified nodule.  Pleural effusion: No pleural effusion or pneumothorax..  Central airways: Patent.  Adenopathy: No suspicious mediastinal, hilar, or axillary lymph  nodes based on size or morphologic criteria.  Heart and great vessels: There is cardiac enlargement with a very  thin rim of pericardial fluid. No thoracic aortic aneurysm.  Upper abdomen: No acute finding.    Bones: No acute findings.    Additional findings: None.      Impression:       No CT evidence of pulmonary embolism.    There is dependent atelectasis without dense parenchymal  consolidation or pleural effusion.    There is a 1.5 cm nodular opacity superior segment of the left  lower lobe that favors a rounded area of atelectasis or  pneumonitis. However, recommend follow-up CT in three months to  confirm this suspicion and exclude the unlikely possibility this  is a noncalcified pulmonary nodule.    Electronically signed by:  Gio Rivers MD  1/17/2018 5:56 PM  CST Workstation: [id number]          I reviewed the patient's new clinical results.    ASSESSMENT AND PLAN: This is a 62 y.o. 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       1. Sepsis. Suspect lung source.  Pan culture pending. Rocephin/Azithromycin.  2. Acute hypercapnic respiratory failure.  Most likely a component of NEREYDA and COPD.  Patient likely has undiagnosed sleep apnea.  BiPAP tonight.  Outpatient sleep study.  3. Elevated trop.  No chest pain.  EKG reviewed.  Dr. Ferrell, cardiology to see.  4. DM.  Hold metformin.  SSI  5. HTN. Lisinopril/HCTZ  6. Obesity. Body mass index is 38 kg/(m^2).  7. COPD. Oxygen. Nebs. Steroids.  8. CAD. ASA/Plavix  9. HLD. Statin  10. History of CVA. PT/OT. Monitor.    Will monitor patient's hospital course and adjust treatment as hospital course dictates.    DVT prophylaxis: Lovenox  Code status is Full Code         I discussed the patients findings and my recommendations with patient, family, nursing staff and consulting provider.           This document has been electronically signed by Jamison Medley MD on January 17, 2018 9:33 PM             Electronically signed by Jamison Medley MD at 1/17/2018  9:44 PM           Emergency Department Notes      Yazmin Topete at 1/17/2018  1:21 PM          Code I and lab called at 1321     Yazmin Topete  01/17/18 1321       Electronically signed by Yazmin Topete at 1/17/2018  1:21 PM      Dante Iverson MD at 1/17/2018  3:13 PM      Procedure Orders:    1. ECG 12 Lead [[id number]] ordered by Dante Iverson MD at 01/17/18 1338                Subjective   HPI Comments: 61yo male pmh significant htn/hyperlipidemia/dm2/cva/chronic left hemisensory loss/obesity presents ED via EMS reported onset generalized weakness 1130hrs/associated with confusion, bilateral muscle tremors, difficulty speech.  Pt reportedly told wife \"help me go upstairs\"; there is no upstairs at their residence with subsequent mumbling speech.  Pt poor historian.  ROS (+) chronic nonproductive cough.  Pt notably hypoxic upon " arrival.    Patient is a 62 y.o. male presenting with weakness.   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 Grade I diastolic dysfunction of the LV myocardium. No evidence of LV apical thrombus. No evidence of pericardial effusion.)   • Gus's syndrome     RIGHT EYE   • Hyperkeratosis    • Hyperlipidemia    • Mucopurulent conjunctivitis     ACUTE   • Myopia    • Neuropathic pain    • Obesity    • Onychomycosis    • Tobacco dependence syndrome        No Known Allergies    Past Surgical History:   Procedure Laterality Date   • COLONOSCOPY  07/30/2015    Colonoscopy, diagnostic (screening) 40668 (Screening for malignant neoplasm of colon)    • COLONOSCOPY  09/09/2015    Colonoscopy, diagnostic (screening) 12406 (Diverticulosis found in the sigmoid colon.Hemorrhoids found in the anus.)   • COLONOSCOPY  05/01/2009    Colonoscopy, diagnostic (screening) 41670 (Small internal and external hemorrhoids were present, Colonoscopy  otherwise normal.)       Family History   Problem Relation Age of Onset   • Glaucoma Other    • Heart disease Other    • Stroke Other    • Macular degeneration Other    • Glaucoma Father    • Macular degeneration Father    • Cataracts Father    • Macular degeneration Sister        Social History     Social History   • Marital status:      Spouse name: N/A   • Number of children: N/A   • Years of education: N/A     Social History Main Topics   • Smoking status: Current Every Day Smoker     Types: Cigarettes   • Smokeless tobacco: None      Comment: SMOKING 3 CIGS A DAY   • Alcohol use Yes   • Drug use: No   • Sexual activity: Not Asked     Other Topics Concern   • None     Social History Narrative           Objective   Physical Exam   Constitutional: He appears well-developed.   HENT:   Head: Normocephalic and atraumatic.   Right Ear: External ear normal.   Left Ear: External ear normal.   Nose: Nose normal.   Mouth/Throat: Oropharynx is clear and moist.   Eyes: EOM are normal. Pupils are equal, round, and reactive to light.   Neck: Normal range of motion. Neck supple. No JVD present. No tracheal deviation present.   Neg meningismus   Cardiovascular: Normal rate, regular rhythm, normal heart sounds and intact distal pulses.  Exam reveals no gallop and no friction rub.    No murmur heard.  Pulmonary/Chest: Effort normal. He has decreased breath sounds in the right lower field and the left lower field. He has no wheezes. He has no rhonchi. He has no rales.   Abdominal: Soft. There is no tenderness. There is no rebound and no guarding.   Musculoskeletal: He exhibits no edema or tenderness.   Lymphadenopathy:     He has no cervical adenopathy.   Neurological: He is alert. He has normal strength. A sensory deficit is present. No cranial nerve deficit. Coordination normal. GCS eye subscore is 4. GCS verbal subscore is 5. GCS motor subscore is 6.   Chronic left hemisensory loss. No focal motor deficit noted.   Skin:  Skin is warm and dry.   Nursing note and vitals reviewed.      ECG 12 Lead    Date/Time: 1/17/2018 3:17 PM  Performed by: CA MONZON  Authorized by: CA MONZON   Interpreted by physician  Rhythm: sinus rhythm  Rate: normal  BPM: 95  QRS axis: normal  Conduction: conduction normal  ST Segments: ST segments normal  T depression: III, aVF, V3, V4 and V5  Other findings: LAE  Clinical impression: abnormal ECG              ED Course  ED Course      Labs Reviewed   COMPREHENSIVE METABOLIC PANEL - Abnormal; Notable for the following:        Result Value    Glucose 147 (*)     BUN 37 (*)     Potassium 5.4 (*)     Chloride 92 (*)     CO2 36.0 (*)     BUN/Creatinine Ratio 31.9 (*)     All other components within normal limits   LACTIC ACID, PLASMA - Abnormal; Notable for the following:     Lactate 2.1 (*)     All other components within normal limits   URINALYSIS W/ CULTURE IF INDICATED - Abnormal; Notable for the following:     Protein, UA Trace (*)     All other components within normal limits    Narrative:     Urine microscopic not indicated.   CBC WITH AUTO DIFFERENTIAL - Abnormal; Notable for the following:     .6 (*)     MCHC 29.8 (*)     RDW 16.0 (*)     RDW-SD 58.9 (*)     Immature Grans, Absolute 0.04 (*)     All other components within normal limits   BLOOD GAS, ARTERIAL - Abnormal; Notable for the following:     pCO2, Arterial 66.2 (*)     pO2, Arterial 46.8 (*)     HCO3, Arterial 37.8 (*)     Base Excess, Arterial 9.8 (*)     O2 Saturation, Arterial 82.6 (*)     CO2 Content 39.9 (*)     Potassium, Arterial 5.05 (*)     All other components within normal limits   BNP (IN-HOUSE) - Abnormal; Notable for the following:     proBNP 4720.0 (*)     All other components within normal limits   TROPONIN (IN-HOUSE) - Abnormal; Notable for the following:     Troponin I 0.258 (*)     All other components within normal limits   INFLUENZA ANTIGEN, RAPID - Normal   APTT - Normal    Narrative:     The recommended  Heparin therapeutic range is 68-97 seconds.   PROTIME-INR - Normal    Narrative:     Therapeutic range for most indications is 2.0-3.0 INR,  or 2.5-3.5 for mechanical heart valves.   CK - Normal   CK MB - Normal   D-DIMER, QUANTITATIVE - Normal    Narrative:     Dimer values <500 ng/ml FEU are FDA approved as aid in diagnosis of deep venous thrombosis and pulmonary embolism.  This test should not be used in an exclusion strategy with pretest probability alone.    A recent guideline regarding diagnosis for pulmonary thomboembolism recommends an adjusted exclusion criterion of age x 10 ng/ml FEU for patients >50 years of age (Sherin Intern Med 2015; 163: 701-711).   BLOOD CULTURE   BLOOD CULTURE   RAINBOW DRAW    Narrative:     The following orders were created for panel order [place] Draw.  Procedure                               Abnormality         Status                     ---------                               -----------         ------                     Light Blue Top[[id number]]                                   Final result               Green Top (Gel)[[id number]]                                  Final result               Lavender Top[[id number]]                                     Final result               Gold Top - SST[[id number]]                                   Final result                 Please view results for these tests on the individual orders.   TROPONIN (IN-HOUSE)   TROPONIN (IN-HOUSE)   LACTIC ACID REFLEX TIMER   BLOOD GAS, ARTERIAL   POCT GLUCOSE FINGERSTICK   CBC AND DIFFERENTIAL    Narrative:     The following orders were created for panel order CBC & Differential.  Procedure                               Abnormality         Status                     ---------                               -----------         ------                     CBC Auto Differential[[id number]]        Abnormal            Final result                 Please view results for these tests on the individual orders.   LIGHT  BLUE TOP   GREEN TOP   LAVENDER TOP   GOLD TOP - SST     Ct Head Without Contrast    Result Date: 1/17/2018  Narrative: Noncontrast CT examination of the brain. INDICATION: Alteration of mental status. Decreased level of consciousness. Evaluate for stroke Technique: Axial 5 mm contiguous images with brain parenchymal and bone windows This exam was performed according to our departmental dose-optimization program, which includes automated exposure control, adjustment of the mA and/or kV according to patient size and/or use of iterative reconstruction technique. Prior relevant examination: MRI brain June 26, 2013. Prominent cisterna magna, normal variant. Brain parenchyma appears within normal limits. Ventricles are within normal limits in size. No evidence of abnormal mass or calcification is seen. No evidence of acute hemorrhage is noted. Chronic opacification left maxillary sinus, unchanged since prior exam.    Impression: CONCLUSION: 1. Normal brain for age. No acute changes are present.  Electronically signed by:  Ruel Perry MD  1/17/2018 3:36 PM CST Workstation: "[employer]    Xr Chest 1 View    Result Date: 1/17/2018  Narrative: Chest single view. CLINICAL INDICATION: Shortness of breath. Simple sepsis protocol. COMPARISON: April 4, 2013. FINDINGS: Cardiomegaly. Slight prominence of the pulmonary vasculature. Lungs otherwise clear.     Impression: CONCLUSION: Cardiomegaly. Slight prominence of the pulmonary vasculature. Otherwise unremarkable chest. Electronically signed by:  Ruel Perry MD  1/17/2018 1:53 PM CST Workstation: "[employer]                  [facility]    Final diagnoses:   Acute respiratory failure with hypoxia and hypercapnia   Systolic congestive heart failure, unspecified congestive heart failure chronicity   Chronic left maxillary sinusitis   NSTEMI (non-ST elevated myocardial infarction)            Dante Iverson MD  01/17/18 1629       Dante Iverson MD  01/17/18 1638       Electronically signed by Dante CRYSTAL  MD Kishor at 1/17/2018  4:38 PM      Maryan Ledesma RN at 1/17/2018  3:20 PM          Lab notified of need for blood draw     Maryan Ledesma RN  01/17/18 1547       Electronically signed by Maryan Ledesma RN at 1/17/2018  3:47 PM      Maryan Ledesma RN at 1/17/2018  3:59 PM          MD stated he was not ordering antibiotics at this time.     Maryan Ledesma RN  01/17/18 1559       Electronically signed by Maryan Ledesma RN at 1/17/2018  3:59 PM      Layne Waters at 1/17/2018  5:04 PM          Rm 317 assigned @ 1655   Tele Box assigned @ 1656     Layne Waters  01/17/18 1705       Electronically signed by Layne Waters at 1/17/2018  5:05 PM      Sena Suarez RN at 1/17/2018  5:22 PM          Pt refuses to wear bipap.      Sena Suarez RN  01/17/18 1722       Electronically signed by Sena Suarez RN at 1/17/2018  5:22 PM      Maryan Ledesma RN at 1/17/2018  5:51 PM          Telemetry on and coming in, verified by Telemetry tech.     Maryan Ledesma RN  01/17/18 1751       Electronically signed by Maryan Ledesma RN at 1/17/2018  5:51 PM      Maryan Ledesma RN at 1/17/2018  6:32 PM          Patient accidentally pulled out IV. Will restart.     Maryan Ledesma RN  01/17/18 1832       Electronically signed by Maryan Ledesma RN at 1/17/2018  6:32 PM      Maryan Ledesma RN at 1/17/2018  6:57 PM          Patient requests that all sticks be done on his left side.     Maryan Ledesma RN  01/17/18 2059       Electronically signed by Maryan Ledesma RN at 1/17/2018  6:57 PM        Hospital Medications (active)       Dose Frequency Start End    acetaminophen (TYLENOL) tablet 650 mg 650 mg Every 4 Hours PRN 1/17/2018     Sig - Route: Take 2 tablets by mouth Every 4 (Four) Hours As Needed for Mild Pain . - Oral    acetylcysteine (MUCOMYST) 10 % solution 6 mL 6 mL Every 12 Hours 1/18/2018     Sig - Route: Take 6 mL by mouth Every 12 (Twelve)  "Hours. - Oral    aspirin tablet 325 mg 325 mg Daily 1/18/2018     Sig - Route: Take 1 tablet by mouth Daily. - Oral    atorvastatin (LIPITOR) tablet 10 mg 10 mg Daily 1/17/2018     Sig - Route: Take 1 tablet by mouth Daily. - Oral    budesonide-formoterol (SYMBICORT) 160-4.5 MCG/ACT inhaler 2 puff 2 puff 2 Times Daily - RT 1/19/2018     Sig - Route: Inhale 2 puffs 2 (Two) Times a Day. - Inhalation    cefTRIAXone (ROCEPHIN) in NS 2 GRAMS/20ml IV PUSH syringe 2 g Every 24 Hours 1/17/2018 1/24/2018    Sig - Route: Infuse 20 mL into a venous catheter Daily. - Intravenous    Linked Group 1:  \"And\" Linked Group Details        clopidogrel (PLAVIX) tablet 75 mg 75 mg Daily 1/17/2018     Sig - Route: Take 1 tablet by mouth Daily. - Oral    dextrose (D50W) solution 25 g 25 g Every 15 Minutes PRN 1/18/2018     Sig - Route: Infuse 50 mL into a venous catheter Every 15 (Fifteen) Minutes As Needed for Low Blood Sugar (Blood Sugar Less Than 70, Patient Has IV Access - Unresponsive, NPO or Unable To Safely Swallow). - Intravenous    dextrose (GLUTOSE) oral gel 15 g 15 g Every 15 Minutes PRN 1/18/2018     Sig - Route: Take 15 g by mouth Every 15 (Fifteen) Minutes As Needed for Low Blood Sugar (Blood Sugar Less Than 70, Patient Alert, Is Not NPO & Can Safely Swallow). - Oral    doxycycline (VIBRAMYCIN) 100 mg/250 mL 0.9% NS  mg Every 12 Hours 1/17/2018 1/24/2018    Sig - Route: Infuse 250 mL into a venous catheter Every 12 (Twelve) Hours. - Intravenous    Linked Group 1:  \"And\" Linked Group Details        DULoxetine (CYMBALTA) DR capsule 30 mg 30 mg Daily 1/18/2018     Sig - Route: Take 1 capsule by mouth Daily. - Oral    enoxaparin (LOVENOX) syringe 40 mg 40 mg Every 24 Hours 1/18/2018     Sig - Route: Inject 0.4 mL under the skin Daily. - Subcutaneous    gabapentin (NEURONTIN) capsule 300 mg 300 mg Every 8 Hours Scheduled 1/17/2018     Sig - Route: Take 1 capsule by mouth Every 8 (Eight) Hours. - Oral    glucagon (human " recombinant) (GLUCAGEN DIAGNOSTIC) injection 1 mg 1 mg Every 15 Minutes PRN 1/18/2018     Sig - Route: Inject 1 mg under the skin Every 15 (Fifteen) Minutes As Needed (Blood Glucose Less Than 70 - Patient Without IV Access - Unresponsive, NPO or Unable To Safely Swallow). - Subcutaneous    influenza vac split quad (FLUZONE QUADRIVALENT) IM suspension 0.5 mL 0.5 mL During Hospitalization 1/17/2018     Sig - Route: Inject 0.5 mL into the shoulder, thigh, or buttocks During Hospitalization for Immunization. - Intramuscular    Cosign for Ordering: Accepted by Alirio Hager MD on 1/17/2018  8:52 PM    insulin aspart (novoLOG) injection 0-24 Units 0-24 Units 4 Times Daily Before Meals & Nightly 1/18/2018     Sig - Route: Inject 0-24 Units under the skin 4 (Four) Times a Day Before Meals & at Bedtime. - Subcutaneous    ipratropium-albuterol (DUO-NEB) nebulizer solution 3 mL 3 mL 4 Times Daily - RT 1/17/2018     Sig - Route: Take 3 mL by nebulization 4 (Four) Times a Day. - Nebulization    naphazoline-pheniramine (NAPHCON-A) 0.025-0.3 % ophthalmic solution 1 drop 1 drop 4 Times Daily PRN 1/17/2018     Sig - Route: Administer 1 drop to the right eye 4 (Four) Times a Day As Needed for Irritation. - Right Eye    pantoprazole (PROTONIX) EC tablet 40 mg 40 mg Every Morning 1/18/2018     Sig - Route: Take 1 tablet by mouth Every Morning. - Oral    pneumococcal polysaccharide 23-valent (PNEUMOVAX-23) vaccine 0.5 mL 0.5 mL During Hospitalization 1/17/2018     Sig - Route: Inject 0.5 mL into the shoulder, thigh, or buttocks During Hospitalization for Immunization. - Intramuscular    Cosign for Ordering: Accepted by Alirio Hager MD on 1/17/2018  8:52 PM    predniSONE (DELTASONE) tablet 20 mg 20 mg Daily With Breakfast 1/19/2018 1/22/2018    Sig - Route: Take 1 tablet by mouth Daily With Breakfast. - Oral    sodium chloride 0.9 % flush 1-10 mL 1-10 mL As Needed 1/17/2018     Sig - Route: Infuse 1-10 mL into a venous catheter  As Needed for Line Care. - Intravenous    sodium chloride 0.9 % flush 10 mL 10 mL As Needed 1/17/2018     Sig - Route: Infuse 10 mL into a venous catheter As Needed for Line Care. - Intravenous    Cosign for Ordering: Accepted by Dante Iverson MD on 1/17/2018  1:37 PM    sodium chloride 0.9 % infusion 100 mL/hr Continuous 1/19/2018     Sig - Route: Infuse 100 mL/hr into a venous catheter Continuous. - Intravenous    acetylcysteine (MUCOMYST) 20 % solution 600 mg (Discontinued) 600 mg Every 12 Hours Scheduled 1/18/2018 1/18/2018    Sig - Route: Take 3 mL by mouth Every 12 (Twelve) Hours. - Oral    lisinopril (PRINIVIL,ZESTRIL) 20 mg, hydrochlorothiazide (HYDRODIURIL) 25 mg for ZESTORTIC 20-25 (Discontinued)  Every 24 Hours Scheduled 1/18/2018 1/18/2018    Sig - Route: Take  by mouth Daily. - Oral    predniSONE (DELTASONE) tablet 40 mg (Discontinued) 40 mg Daily With Breakfast 1/17/2018 1/18/2018    Sig - Route: Take 2 tablets by mouth Daily With Breakfast. - Oral    sodium chloride 0.9 % infusion (Discontinued) 75 mL/hr Continuous 1/17/2018 1/18/2018    Sig - Route: Infuse 75 mL/hr into a venous catheter Continuous. - Intravenous             Physician Progress Notes (most recent note)      Marcos Brice MD at 1/19/2018 12:26 PM  Version 1 of 1                [facility] Medicine Services  INPATIENT PROGRESS NOTE     LOS: 2 days   Patient Care Team:  Harpreet Bass MD as PCP - General    Chief Complaint:  Acute respiratory failure with hypoxia and hypercapnia      Subjective     Interval History:   Patient is seen for follow-up today.  He was admitted 2 days ago for acute hypoxic and hypercapnic respiratory failure as well as non-STEMI.  He is doing better, remains on high flow supplemental oxygen and does have of air and desaturation with minimal activity.  He voices no new complaints.      Patient Complaints: As above    History taken from: Patient    Review of  Systems:    Review of Systems   Constitutional: Positive for fatigue. Negative for activity change, appetite change, chills, diaphoresis and fever.   HENT: Negative for trouble swallowing and voice change.    Eyes: Negative for photophobia and visual disturbance.   Respiratory: Positive for cough and shortness of breath. Negative for choking, chest tightness, wheezing and stridor.    Cardiovascular: Negative for chest pain, palpitations and leg swelling.   Gastrointestinal: Negative for abdominal distention, abdominal pain, blood in stool, constipation, diarrhea, nausea and vomiting.   Endocrine: Negative for cold intolerance, heat intolerance, polydipsia, polyphagia and polyuria.   Genitourinary: Negative for decreased urine volume, difficulty urinating, dysuria, enuresis, flank pain, frequency, hematuria and urgency.   Musculoskeletal: Negative for arthralgias, gait problem, myalgias, neck pain and neck stiffness.   Skin: Negative for pallor, rash and wound.   Neurological: Negative for dizziness, tremors, seizures, syncope, facial asymmetry, speech difficulty, weakness, light-headedness, numbness and headaches.   Hematological: Does not bruise/bleed easily.   Psychiatric/Behavioral: Negative for agitation, behavioral problems and confusion.         Objective     Vital Signs  Temp:  [97.3 °F (36.3 °C)-98.4 °F (36.9 °C)] 97.4 °F (36.3 °C)  Heart Rate:  [72-88] 87  Resp:  [16-26] 18  BP: ()/(50-64) 109/52    Physical Exam:   Physical Exam   Constitutional: He is oriented to person, place, and time. He appears well-developed and well-nourished. No distress.   Patient is morbidly obese.   HENT:   Head: Normocephalic and atraumatic.   Eyes: EOM are normal. Pupils are equal, round, and reactive to light. No scleral icterus.   Neck: Normal range of motion. Neck supple. No JVD present. No thyromegaly present.   Cardiovascular: Normal rate, regular rhythm and normal heart sounds.  Exam reveals no gallop and no  friction rub.    No murmur heard.  Pulmonary/Chest: Effort normal. He has decreased breath sounds in the right lower field and the left lower field. He has no wheezes. He has rhonchi. He has no rales. He exhibits no tenderness.   Abdominal: Soft. Bowel sounds are normal. He exhibits no distension and no mass. There is no tenderness. There is no rebound and no guarding.   Musculoskeletal: He exhibits no edema, tenderness or deformity.   Neurological: He is alert and oriented to person, place, and time. No cranial nerve deficit. He exhibits normal muscle tone. Coordination normal.   Skin: Skin is warm and dry. No rash noted. He is not diaphoretic. No erythema. No pallor.   Psychiatric: He has a normal mood and affect. His behavior is normal. Judgment and thought content normal.   Nursing note and vitals reviewed.         Results Review:       Results from last 7 days  Lab Units 01/19/18  0825 01/18/18  0658 01/18/18  0640 01/18/18  0346 01/17/18  1638 01/17/18  1423 01/17/18  1334   SODIUM mmol/L 137 137  --   --   --   --  137   SODIUM, ARTERIAL mmol/L  --   --  136.3* 140.6 138.2 138.1  --    POTASSIUM mmol/L 4.8 5.0  --   --   --   --  5.4*   CHLORIDE mmol/L 96 95  --   --   --   --  92*   CO2 mmol/L 32.0* 36.0*  --   --   --   --  36.0*   BUN mg/dL 41* 28*  --   --   --   --  37*   CREATININE mg/dL 1.33* 0.97  --   --   --   --  1.16   GLUCOSE mg/dL 192* 172*  --   --   --   --  147*   GLUCOSE, ARTERIAL mmol/L  --   --  173 218 147 144  --    CALCIUM mg/dL 8.8 8.8  --   --   --   --  9.1   BILIRUBIN mg/dL  --   --   --   --   --   --  0.4   ALK PHOS U/L  --   --   --   --   --   --  92   ALT (SGPT) U/L  --   --   --   --   --   --  52   AST (SGOT) U/L  --   --   --   --   --   --  44               Results from last 7 days  Lab Units 01/19/18  0825 01/18/18  0658 01/17/18  1334   WBC 10*3/mm3 7.81 7.71 8.01   HEMOGLOBIN g/dL 13.7 13.9 14.1   HEMATOCRIT % 46.2 47.1 47.3   PLATELETS 10*3/mm3 314 085 399       Lab  Results   Component Value Date    CKTOTAL 70 01/17/2018    CKMB 1.74 01/17/2018    TROPONINI 0.221 (C) 01/18/2018       pH, Arterial   Date Value Ref Range Status   01/18/2018 7.392 7.350 - 7.450 pH units Final     CO2   Date Value Ref Range Status   01/19/2018 32.0 (H) 22.0 - 31.0 mmol/L Final         Results from last 7 days  Lab Units 01/17/18  1334   INR  1.03        Imaging Results (last 7 days)     Procedure Component Value Units Date/Time    XR Chest 1 View [[id number]] Collected:  01/17/18 1330     Updated:  01/17/18 1354    Narrative:       Chest single view.       CLINICAL INDICATION: Shortness of breath. Simple sepsis protocol.    COMPARISON: April 4, 2013.    FINDINGS: Cardiomegaly. Slight prominence of the pulmonary  vasculature. Lungs otherwise clear.      Impression:       CONCLUSION: Cardiomegaly. Slight prominence of the pulmonary  vasculature. Otherwise unremarkable chest.    Electronically signed by:  Ruel Perry MD  1/17/2018 1:53 PM CST  Workstation: [id number]    CT Head Without Contrast [[id number]] Collected:  01/17/18 1515     Updated:  01/17/18 1538    Narrative:       Noncontrast CT examination of the brain.    INDICATION: Alteration of mental status. Decreased level of  consciousness. Evaluate for stroke    Technique: Axial 5 mm contiguous images with brain parenchymal  and bone windows    This exam was performed according to our departmental  dose-optimization program, which includes automated exposure  control, adjustment of the mA and/or kV according to patient size  and/or use of iterative reconstruction technique.    Prior relevant examination: MRI brain June 26, 2013.  Prominent cisterna magna, normal variant.  Brain parenchyma appears within normal limits. Ventricles are  within normal limits in size. No evidence of abnormal mass or  calcification is seen. No evidence of acute hemorrhage is noted.  Chronic opacification left maxillary sinus, unchanged since prior  exam.    Impression:        CONCLUSION:   1. Normal brain for age. No acute changes are present.        Electronically signed by:  Ruel Perry MD  1/17/2018 3:36 PM CST  Workstation: [id number]    CT Angiogram Chest With Contrast [[id number]] Collected:  01/17/18 1727     Updated:  01/17/18 1757    Narrative:       [facility] [place]    Patient Name: GIFTY AMADOR    ORDERING: CA MONZON    ATTENDING: VANESSA SAUL     REFERRING: CA MONZON    -----------------------    EXAM DESCRIPTION: CT ANGIOGRAM CHEST W CONTRAST    CLINICAL HISTORY:  soa, J96.01 Acute respiratory failure with  hypoxia J96.02 Acute respiratory failure with hypercapnia I50.20  Unspecified systolic (congestive) heart failure J32.0 Chronic  maxillary sinusitis I21.4 Non-ST elevation (NSTEMI) myocardial  infarction       COMPARISON: Chest x-ray 1/17/2018    TECHNIQUE:   Axial images were obtained and multiplanar reconstructions were  made.    This exam was performed according to our departmental dose  optimization program, which includes automated exposure control,  adjustment of the mA and/or KV according to patient size and/or  use of iterative reconstruction technique.  Dose Length Product 560.40  CONTRAST:   81 cc intravenous Isovue 370    FINDINGS:  Diagnostic quality: Adequate .  Pulmonary embolism: No CT evidence of pulmonary embolism.  Pulmonary arteries:  Normal in caliber.  Lung parenchyma: There is dependent atelectasis bilaterally there  is an approximate 1.5 cm rounded opacity within the superior  segment of the left lower lobe seen posteriorly abutting the  posterior peripheral margin (series 4 image 78, series 6 image  129). It is uncertain if this represents a rounded area of  atelectasis, pneumonitis, or a noncalcified nodule.  Pleural effusion: No pleural effusion or pneumothorax..  Central airways: Patent.  Adenopathy: No suspicious mediastinal, hilar, or axillary lymph  nodes based on size or morphologic criteria.  Heart and great vessels:  There is cardiac enlargement with a very  thin rim of pericardial fluid. No thoracic aortic aneurysm.  Upper abdomen: No acute finding.    Bones: No acute findings.    Additional findings: None.      Impression:       No CT evidence of pulmonary embolism.    There is dependent atelectasis without dense parenchymal  consolidation or pleural effusion.    There is a 1.5 cm nodular opacity superior segment of the left  lower lobe that favors a rounded area of atelectasis or  pneumonitis. However, recommend follow-up CT in three months to  confirm this suspicion and exclude the unlikely possibility this  is a noncalcified pulmonary nodule.    Electronically signed by:  Gio Rivers MD  1/17/2018 5:56 PM  CST Workstation: [id number]           Blood Culture   Date Value Ref Range Status   01/17/2018 No growth at 24 hours  Preliminary                             Medication Review:   Current Facility-Administered Medications   Medication Dose Route Frequency Provider Last Rate Last Dose   • acetaminophen (TYLENOL) tablet 650 mg  650 mg Oral Q4H PRN Jamison Medley MD       • acetylcysteine (MUCOMYST) 10 % solution 6 mL  6 mL Oral Q12H Arpan Ferrell MD   6 mL at 01/19/18 1140   • aspirin tablet 325 mg  325 mg Oral Daily Jamison Medley MD   325 mg at 01/19/18 0847   • atorvastatin (LIPITOR) tablet 10 mg  10 mg Oral Daily Jamison Medley MD   10 mg at 01/19/18 0847   • cefTRIAXone (ROCEPHIN) in NS 2 GRAMS/20ml IV PUSH syringe  2 g Intravenous Q24H Jamison Medley MD   2 g at 01/18/18 2210    And   • doxycycline (VIBRAMYCIN) 100 mg/250 mL 0.9% NS VTB  100 mg Intravenous Q12H Jamison Medley MD   100 mg at 01/19/18 1139   • clopidogrel (PLAVIX) tablet 75 mg  75 mg Oral Daily Jamison Medley MD   75 mg at 01/19/18 0847   • dextrose (D50W) solution 25 g  25 g Intravenous Q15 Min PRN Sarbjit Nunes MD       • dextrose (GLUTOSE) oral gel 15 g  15 g Oral Q15 Min PRN Sarbjit Nunes MD       • DULoxetine (CYMBALTA) DR capsule 30 mg  30 mg Oral  Daily Jamison Medley MD   30 mg at 01/19/18 0847   • enoxaparin (LOVENOX) syringe 40 mg  40 mg Subcutaneous Q24H Jamison Medley MD   40 mg at 01/19/18 0847   • gabapentin (NEURONTIN) capsule 300 mg  300 mg Oral Q8H Jamison Medley MD   300 mg at 01/19/18 0620   • glucagon (human recombinant) (GLUCAGEN DIAGNOSTIC) injection 1 mg  1 mg Subcutaneous Q15 Min PRN Sarbjit Nunes MD       • influenza vac split quad (FLUZONE QUADRIVALENT) IM suspension 0.5 mL  0.5 mL Intramuscular During Hospitalization Alirio Hager MD       • insulin aspart (novoLOG) injection 0-24 Units  0-24 Units Subcutaneous 4x Daily AC & at Bedtime Sarbjit Nunes MD   4 Units at 01/19/18 1139   • ipratropium-albuterol (DUO-NEB) nebulizer solution 3 mL  3 mL Nebulization 4x Daily - RT Dante Iverson MD   3 mL at 01/19/18 1040   • naphazoline-pheniramine (NAPHCON-A) 0.025-0.3 % ophthalmic solution 1 drop  1 drop Right Eye 4x Daily PRN Jamison Medley MD       • pantoprazole (PROTONIX) EC tablet 40 mg  40 mg Oral QAM Jamison Medley MD   40 mg at 01/19/18 0620   • pneumococcal polysaccharide 23-valent (PNEUMOVAX-23) vaccine 0.5 mL  0.5 mL Intramuscular During Hospitalization Alirio Hager MD       • predniSONE (DELTASONE) tablet 20 mg  20 mg Oral Daily With Breakfast Sarbjit Nunes MD   20 mg at 01/19/18 0846   • sodium chloride 0.9 % flush 1-10 mL  1-10 mL Intravenous PRN Jamison Medley MD       • sodium chloride 0.9 % flush 10 mL  10 mL Intravenous PRN Dante Iverson MD             Assessment/Plan     Principal Problem:    Acute respiratory failure with hypoxia and hypercapnia  Active Problems:    Chronic bronchitis with acute exacerbation    Hypertension    Diabetes mellitus    Morbid obesity    Coronary artery disease    Obstructive apnea    1.  Acute hypoxic/ hypercapnic respiratory failure: Improving.  Continue supplemental oxygen, bronchodilators, empiric antimicrobial therapy and steroids.  Patient may need chronic supplemental oxygen if attempts  at weaning are unsuccessful.  2.  History of nicotine dependence: Begin nicotine patch.  Patient has been advised on the need to quit smoking.  3.  Non-STEMI: Continue current guidelines directed medical therapy.  Result of echocardiogram has been reviewed and the cardiologist is following.  Patient is tentatively scheduled for cardiac catheterization.  4.  Diabetes mellitus: Continue anti-glycemic, Accu-Chek and sliding scale insulin.  5.  Acute kidney injury: Likely contrast induced.  Continue IV hydration with routine monitoring of renal function.  6.  Possible obstructive sleep apnea: Patient will need sleep study as outpatient.  7.  Continue GI and DVT prophylaxis.               Marcos Brice MD  18      EMR Dragon/Transcription disclaimer:   Much of this encounter note is an electronic transcription/translation of spoken language to printed text. The electronic translation of spoken language may permit erroneous, or at times, nonsensical words or phrases to be inadvertently transcribed; Although I have reviewed the note for such errors, some may still exist.                 Electronically signed by Marcos Brice MD at 2018 12:38 PM           Consult Notes (most recent note)      Arpan Ferrell MD at 2018 10:06 AM  Version 1 of 1     Consult Orders:    1. Inpatient Consult to Cardiology [[id number]] ordered by Jamison Medley MD at 18                Cardiology Consultation Note.        Patient Name: Arias Willson  Age/Sex: 62 y.o. male  : 1955  MRN: [id number]    Date of consultation: 2018  Consulting Physician: Arpan Ferrell MD  Primary care Physician: Harpreet Bass MD  Requesting Physician:   Jamison Medley MD   Reason for consultation:  Altered mental status positive troponin with risk factor for atherosclerotic coronary artery disease  Subjective:       Chief Complaint: Altered mental status    History of Present Illness:  Arias Willson is a  62 y.o. male     Body mass index is 38 kg/(m^2).  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    Patient had presented to the hospital with altered mental status.  Patient underwent a resting electrocardiogram on admission which reveals sinus rhythm with anterolateral ST-T wave changes.  Patient had mild elevation in the troponin.  Patient is a poor historian.  Patient on specific questioning denied any symptoms of chest pain, patient on further questioning denies any symptoms of palpitation.  Patient does complain of back pain shortness of breath headache.  Patient is a poor historian.  Unable to assess the review of system.      Past Medical History:  Past Medical History:   Diagnosis Date   • Abnormal glucose     PRE DM   • Acute conjunctivitis     RESOLVED   • Aneurysm of carotid artery     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      • Benign essential hypertension    • Blood in feces    • Carotid artery stenosis    • Cerebrovascular accident      L sided sensory deficit      • Conjunctivitis    • Constipation     OCCASIONAL   • Contusion, eye, left    • Corneal ulcer     PROBABLE   • Diabetes mellitus    • Eczema    • Encounter for screening for malignant neoplasm of colon    • Essential hypertension    • GERD (gastroesophageal reflux disease)    • Hemorrhoids    • History of echocardiogram 04/05/2013    Echocadiogram W/ color flow 91389 (Normal LV systolic function with EF of 60-65%. Grade I diastolic dysfunction of the LV myocardium. No  evidence of LV apical thrombus. No evidence of pericardial effusion.)   • Gus's syndrome     RIGHT EYE   • Hyperkeratosis    • Hyperlipidemia    • Mucopurulent conjunctivitis     ACUTE   • Myopia    • Neuropathic pain    • Obesity    • Onychomycosis    • Tobacco dependence syndrome        Past Surgical History:  Past Surgical History:   Procedure Laterality Date   • COLONOSCOPY  07/30/2015    Colonoscopy, diagnostic (screening) 99951 (Screening for malignant neoplasm of colon)    • COLONOSCOPY  09/09/2015    Colonoscopy, diagnostic (screening) 20348 (Diverticulosis found in the sigmoid colon.Hemorrhoids found in the anus.)   • COLONOSCOPY  05/01/2009    Colonoscopy, diagnostic (screening) 21658 (Small internal and external hemorrhoids were present, Colonoscopy otherwise normal.)       Family History:  Family History   Problem Relation Age of Onset   • Glaucoma Other    • Heart disease Other    • Stroke Other    • Macular degeneration Other    • Glaucoma Father    • Macular degeneration Father    • Cataracts Father    • Macular degeneration Sister        Social History:  Social History     Social History   • Marital status:      Spouse name: N/A   • Number of children: N/A   • Years of education: N/A     Occupational History   • Not on file.     Social History Main Topics   • Smoking status: Current Every Day Smoker     Types: Cigarettes   • Smokeless tobacco: Not on file      Comment: SMOKING 3 CIGS A DAY   • Alcohol use Yes   • Drug use: No   • Sexual activity: Not on file     Other Topics Concern   • Not on file     Social History Narrative        Cardiac Risk Factors: Male, Hypertension, Diabetes, Hyperlipidemia and Obesity      Allergies:  No Known Allergies    Medication::  Prescriptions Prior to Admission   Medication Sig Dispense Refill Last Dose   • ammonium lactate (AMLACTIN) 12 % cream APPLY TO THE DRY SKIN BUILD UP 2 HOURS BEFORE BATHING 140 g 0 Past Week at Unknown time   • [START ON  5/24/2106] aspirin 325 MG tablet Take 325 mg by mouth daily.   1/17/2018 at 0900   • betamethasone valerate (VALISONE) 0.1 % cream Apply  topically 2 (two) times a day.   1/17/2018 at Unknown time   • clopidogrel (PLAVIX) 75 MG tablet Take 1 tablet by mouth Daily. 90 tablet 3 1/17/2018 at 0900   • DULoxetine (CYMBALTA) 30 MG capsule Take 1 capsule by mouth 2 (Two) Times a Day. 180 capsule 3 1/17/2018 at 0900   • erythromycin (ROMYCIN) 5 MG/GM ophthalmic ointment Administer  to the right eye Every Night. 3.5 g 5 Past Week at Unknown time   • gabapentin (NEURONTIN) 600 MG tablet Take 1 tablet by mouth 3 (Three) Times a Day As Needed (Take 600mg TID prn). 270 tablet 3 1/17/2018 at 1200   • hydrocortisone 0.5 % cream Apply  topically 2 (Two) Times a Day. 28 g 6 Past Week at Unknown time   • lansoprazole (PREVACID) 30 MG capsule Take 1 capsule by mouth Daily. 30 capsule 11 1/17/2018 at 0900   • lisinopril-hydrochlorothiazide (PRINZIDE,ZESTORETIC) 20-25 MG per tablet Take 1 tablet by mouth Daily. 90 tablet 3 1/17/2018 at 0900   • metFORMIN (GLUCOPHAGE) 500 MG tablet Take 1 tablet by mouth 2 (Two) Times a Day With Meals. 180 tablet 3 1/17/2018 at 0900   • naphazoline (NAPHCON) 0.1 % ophthalmic solution Apply 1 drop to eye 4 (Four) Times a Day As Needed for Irritation. 15 mL 5 1/17/2018 at 0900   • Omega-3 Fatty Acids (FISH OIL) 1000 MG capsule capsule Take 2,000 mg by mouth Daily With Breakfast.      • pravastatin (PRAVACHOL) 40 MG tablet Take 40 mg by mouth Every Night.   1/16/2018 at 2100           Review of Systems:       Constitutional:  Denies recent weight loss, weight gain, fever or chills, no change in exercise tolerance     HENT:  Denies any hearing loss, epistaxis, hoarseness, or difficulty speaking.     Eyes: Wears eyeglasses or contact lenses     Respiratory:  Denies dyspnea with exertion,no cough, wheezing, or hemoptysis.     Cardiovascular: Negative for palpitations, chest pain, orthopnea, PND, peripheral  edema, syncope, or claudication.     Gastrointestinal:  Denies change in bowel habits, dyspepsia, ulcer disease, hematochezia, or melena.  No nausea, no vomiting, no hematemesis, no diarrhea or constipation, no melena      Endocrine: Negative for cold intolerance, heat intolerance, polydipsia, polyphagia and polyuria. Denies any history of weight change, heat/cold intolerance, polydipsia, polyuria     Genitourinary: Negative for hematuria.      Musculoskeletal: Denies any history of arthritic symptoms or back problems .  No joint pain, joint stiffness, joint swelling, muscle pain, muscle weakness and neck pain    Skin:  Denies any change in hair or nails, rashes, or skin lesions.     Allergic/Immunologic: Negative.  Negative for environmental allergies, food allergies and immunocompromised state.     Neurological:  Denies any history of recurrent headaches, strokes, TIA, or seizure disorder.     Hematological: Denies excessive bleeding, easy bruising, fatigue, lymphadenopathy and petechiae or any bleeding disorders, or lymphadenopathy.     Psychiatric/Behavioral: Denies any history of depression, substance abuse, or change in cognitive function. Denies any psychomotor reaction or tangential thought.  No depression, homicidal ideations and suicidal ideations    Endocrine: No frequent urination and nocturia, temperature intolerance, weight gain, unintended and weight loss, unintended            Objective:     Objective:  Vitals:    01/18/18 0900   BP:    Pulse:    Resp:    Temp:    SpO2: 91%     .    Body mass index is 38 kg/(m^2).           Physical Exam:   General Appearance:    Alert, oriented, cooperative, in no acute distress   Head:    Normocephalic, atraumatic, without obvious abnormality   Eyes:           MARCELO  Lids and lashes normal, conjunctivae and sclerae normal, no icterus, no pallor   Ears:    Ears appear intact with no abnormalities noted   Throat:   Mucous membranes pink and moist   Neck:   Supple,  trachea midline, no carotid bruit, no organomegaly or JVD   Lungs:     Clear to auscultation and percussion, respirations regular, even and Unlabored. No wheezes, rales, rhonchi    Heart:    Regular rhythm and normal rate, normal S1 and S2, no            murmur, no gallop, no rub, no click   Abdomen:     Soft, non-tender, non-distended, no guarding, no rebound tenderness, Normal bowel sounds in all four quadrants, no masses, liver and spleen nonpalpable,    Genitalia:    Deferred   Extremities:   Moves all extremities well, no edema, no cyanosis, no              Redness, no clubbing   Pulses:   Pulses palpable and equal bilaterally   Skin:   Moist and warm. No bleeding, bruising or rash   Neurologic/Psychiatric:   Alert and oriented to person, place, and time.  Motor, power and tone in upper and lower extremities are grossly intact. No focal neurological deficits. Normal cognitive function. No psychomotor reaction or tangential thought. No depression, homicidal ideations and suicidal ideations           Lab Review:       Results from last 7 days  Lab Units 01/18/18  0658  01/17/18  1334   SODIUM mmol/L 137  --  137   SODIUM, ARTERIAL   --   < >  --    POTASSIUM mmol/L 5.0  --  5.4*   CHLORIDE mmol/L 95  --  92*   CO2 mmol/L 36.0*  --  36.0*   BUN mg/dL 28*  --  37*   CREATININE mg/dL 0.97  --  1.16   CALCIUM mg/dL 8.8  --  9.1   BILIRUBIN mg/dL  --   --  0.4   ALK PHOS U/L  --   --  92   ALT (SGPT) U/L  --   --  52   AST (SGOT) U/L  --   --  44   GLUCOSE mg/dL 172*  --  147*   GLUCOSE, ARTERIAL   --   < >  --    < > = values in this interval not displayed.    Results from last 7 days  Lab Units 01/17/18  1901 01/17/18  1611 01/17/18  1334   CK TOTAL U/L  --   --  70   TROPONIN I ng/mL 0.406* 0.407* 0.258*           Results from last 7 days  Lab Units 01/18/18  0658   WBC 10*3/mm3 7.71   HEMOGLOBIN g/dL 13.9   HEMATOCRIT % 47.1   PLATELETS 10*3/mm3 285       Results from last 7 days  Lab Units 01/17/18  1334   INR   1.03   APTT seconds 27.2                   Invalid input(s):  T4,  FREET4    EKG:   ECG/EMG Results (last 24 hours)     Procedure Component Value Units Date/Time    SCANNED EKG [[id number]] Resulted:  01/17/18      Updated:  01/17/18 1421    ECG 12 Lead [[id number]] Collected:  01/17/18 1357     Updated:  01/17/18 1638          Imaging:  Imaging Results (last 24 hours)     Procedure Component Value Units Date/Time    XR Chest 1 View [[id number]] Collected:  01/17/18 1330     Updated:  01/17/18 1354    Narrative:       Chest single view.       CLINICAL INDICATION: Shortness of breath. Simple sepsis protocol.    COMPARISON: April 4, 2013.    FINDINGS: Cardiomegaly. Slight prominence of the pulmonary  vasculature. Lungs otherwise clear.      Impression:       CONCLUSION: Cardiomegaly. Slight prominence of the pulmonary  vasculature. Otherwise unremarkable chest.    Electronically signed by:  Ruel Perry MD  1/17/2018 1:53 PM CST  Workstation: [id number]    CT Head Without Contrast [[id number]] Collected:  01/17/18 1515     Updated:  01/17/18 1538    Narrative:       Noncontrast CT examination of the brain.    INDICATION: Alteration of mental status. Decreased level of  consciousness. Evaluate for stroke    Technique: Axial 5 mm contiguous images with brain parenchymal  and bone windows    This exam was performed according to our departmental  dose-optimization program, which includes automated exposure  control, adjustment of the mA and/or kV according to patient size  and/or use of iterative reconstruction technique.    Prior relevant examination: MRI brain June 26, 2013.  Prominent cisterna magna, normal variant.  Brain parenchyma appears within normal limits. Ventricles are  within normal limits in size. No evidence of abnormal mass or  calcification is seen. No evidence of acute hemorrhage is noted.  Chronic opacification left maxillary sinus, unchanged since prior  exam.    Impression:       CONCLUSION:   1. Normal brain  for age. No acute changes are present.        Electronically signed by:  Ruel Perry MD  1/17/2018 3:36 PM CST  Workstation: [id number]    CT Angiogram Chest With Contrast [[id number]] Collected:  01/17/18 1727     Updated:  01/17/18 1757    Narrative:       [facility] [place]    Patient Name: GIFTY AMADOR    ORDERING: CA MONZON    ATTENDING: VANESSA SAUL     REFERRING: CA MONZON    -----------------------    EXAM DESCRIPTION: CT ANGIOGRAM CHEST W CONTRAST    CLINICAL HISTORY:  soa, J96.01 Acute respiratory failure with  hypoxia J96.02 Acute respiratory failure with hypercapnia I50.20  Unspecified systolic (congestive) heart failure J32.0 Chronic  maxillary sinusitis I21.4 Non-ST elevation (NSTEMI) myocardial  infarction       COMPARISON: Chest x-ray 1/17/2018    TECHNIQUE:   Axial images were obtained and multiplanar reconstructions were  made.    This exam was performed according to our departmental dose  optimization program, which includes automated exposure control,  adjustment of the mA and/or KV according to patient size and/or  use of iterative reconstruction technique.  Dose Length Product 560.40  CONTRAST:   81 cc intravenous Isovue 370    FINDINGS:  Diagnostic quality: Adequate .  Pulmonary embolism: No CT evidence of pulmonary embolism.  Pulmonary arteries:  Normal in caliber.  Lung parenchyma: There is dependent atelectasis bilaterally there  is an approximate 1.5 cm rounded opacity within the superior  segment of the left lower lobe seen posteriorly abutting the  posterior peripheral margin (series 4 image 78, series 6 image  129). It is uncertain if this represents a rounded area of  atelectasis, pneumonitis, or a noncalcified nodule.  Pleural effusion: No pleural effusion or pneumothorax..  Central airways: Patent.  Adenopathy: No suspicious mediastinal, hilar, or axillary lymph  nodes based on size or morphologic criteria.  Heart and great vessels: There is cardiac enlargement with a  very  thin rim of pericardial fluid. No thoracic aortic aneurysm.  Upper abdomen: No acute finding.    Bones: No acute findings.    Additional findings: None.      Impression:       No CT evidence of pulmonary embolism.    There is dependent atelectasis without dense parenchymal  consolidation or pleural effusion.    There is a 1.5 cm nodular opacity superior segment of the left  lower lobe that favors a rounded area of atelectasis or  pneumonitis. However, recommend follow-up CT in three months to  confirm this suspicion and exclude the unlikely possibility this  is a noncalcified pulmonary nodule.    Electronically signed by:  Gio Rivers MD  1/17/2018 5:56 PM  CST Workstation: [id number]          I personally viewed and interpreted the patient's EKG/Telemetry data.    Assessment:   1.  Indeterminate troponin with abnormal resting electrocardiogram suggestive of ischemia.  2.  Hypotension.  3.  History of arterial hypertension.  4.  Hyperlipidemia.  5.  Diabetes.  6.  Obesity with obstructive sleep apnea.  7.  Hypercapnic altered mental status.   8.  Prerenal azotemia          Plan:   1.  Abnormal resting echocardiogram suggestive of ischemia with positive troponin with risk factors for coronary artery disease.  Patient appears to be confused.  Patient has been informed about the electrocardiographic changes and positive troponin with risk factors for coronary artery disease.  Patient also has history of peripheral vascular disease.  Patient has been recommended a coronary angiogram.  Patient would receive gentle hydration and would repeat the renal function.  If the renal function is normal would discuss with the patient the need for coronary angiogram to rule out underlying coronary artery disease.  Risk-benefit treatment option for the coronary angiogram were discussed with the patient.  Patient Mallampati score #2 patient ASA classification was #2.  2.  Hypotension with history of arterial hypertension.   Patient lisinopril would be held and if the patient blood pressure started rising would consider adding carvedilol 3.125 mg twice a day for heart rate and blood pressure control.  Patient is currently on lisinopril with HCTZ which would be stopped.  3.  Hyperlipidemia.  Patient has been counseled on low-fat low-cholesterol diet and to continue with the present dose of the Lipitor.  4.  Diabetes insulin requiring.  Patient would be administered Mucomyst.  Patient would be continued on the present dose of the insulin.  Sliding scale  5.  Obesity.  Patient has been counseled on weight reduction lifestyle modification and dietary restriction.  6.  Altered mental status.  Patient had hypercapnic respiratory distress leading to altered mental status with elevated PCO2.  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  7.  Prerenal azotemia.  Patient lisinopril would be stopped and continued on gentle hydration.  Would repeat the renal function.    Thank you for the consultation      Time: time spent in face-to-face evaluation of greater than 55  minutes and interacting and formulating examining and discussing the plan with the patient with 50% of greater time spent in face-to-face interaction.    Arpan Ferrell MD  01/18/18  10:06 AM  Dictated utilizing Dragon dictation.        Electronically signed by Arpan Ferrell MD at 1/18/2018 10:19 PM          Initial request for inpatient admission  Citlali Boo RN,   [phone] phone  [phone] fax

## 2018-01-20 LAB
ANION GAP SERPL CALCULATED.3IONS-SCNC: 8 MMOL/L (ref 5–15)
BASOPHILS # BLD AUTO: 0.01 10*3/MM3 (ref 0–0.2)
BASOPHILS NFR BLD AUTO: 0.1 % (ref 0–2)
BUN BLD-MCNC: 27 MG/DL (ref 7–21)
BUN/CREAT SERPL: 27 (ref 7–25)
CALCIUM SPEC-SCNC: 8.2 MG/DL (ref 8.4–10.2)
CHLORIDE SERPL-SCNC: 97 MMOL/L (ref 95–110)
CO2 SERPL-SCNC: 35 MMOL/L (ref 22–31)
CREAT BLD-MCNC: 1 MG/DL (ref 0.7–1.3)
DEPRECATED RDW RBC AUTO: 59.4 FL (ref 35.1–43.9)
EOSINOPHIL # BLD AUTO: 0.07 10*3/MM3 (ref 0–0.7)
EOSINOPHIL NFR BLD AUTO: 1 % (ref 0–7)
ERYTHROCYTE [DISTWIDTH] IN BLOOD BY AUTOMATED COUNT: 15.8 % (ref 11.5–14.5)
GFR SERPL CREATININE-BSD FRML MDRD: 76 ML/MIN/1.73 (ref 60–113)
GLUCOSE BLD-MCNC: 143 MG/DL (ref 60–100)
GLUCOSE BLDC GLUCOMTR-MCNC: 167 MG/DL (ref 70–130)
GLUCOSE BLDC GLUCOMTR-MCNC: 177 MG/DL (ref 70–130)
GLUCOSE BLDC GLUCOMTR-MCNC: 209 MG/DL (ref 70–130)
GLUCOSE BLDC GLUCOMTR-MCNC: 283 MG/DL (ref 70–130)
HCT VFR BLD AUTO: 45 % (ref 39–49)
HGB BLD-MCNC: 12.8 G/DL (ref 13.7–17.3)
IMM GRANULOCYTES # BLD: 0.03 10*3/MM3 (ref 0–0.02)
IMM GRANULOCYTES NFR BLD: 0.4 % (ref 0–0.5)
LYMPHOCYTES # BLD AUTO: 1.46 10*3/MM3 (ref 0.6–4.2)
LYMPHOCYTES NFR BLD AUTO: 20.5 % (ref 10–50)
MCH RBC QN AUTO: 29.1 PG (ref 26.5–34)
MCHC RBC AUTO-ENTMCNC: 28.4 G/DL (ref 31.5–36.3)
MCV RBC AUTO: 102.3 FL (ref 80–98)
MONOCYTES # BLD AUTO: 0.63 10*3/MM3 (ref 0–0.9)
MONOCYTES NFR BLD AUTO: 8.8 % (ref 0–12)
NEUTROPHILS # BLD AUTO: 4.92 10*3/MM3 (ref 2–8.6)
NEUTROPHILS NFR BLD AUTO: 69.2 % (ref 37–80)
PLATELET # BLD AUTO: 245 10*3/MM3 (ref 150–450)
PMV BLD AUTO: 10.5 FL (ref 8–12)
POTASSIUM BLD-SCNC: 4.7 MMOL/L (ref 3.5–5.1)
RBC # BLD AUTO: 4.4 10*6/MM3 (ref 4.37–5.74)
SODIUM BLD-SCNC: 140 MMOL/L (ref 137–145)
WBC NRBC COR # BLD: 7.12 10*3/MM3 (ref 3.2–9.8)

## 2018-01-20 PROCEDURE — 80048 BASIC METABOLIC PNL TOTAL CA: CPT | Performed by: FAMILY MEDICINE

## 2018-01-20 PROCEDURE — 63710000001 PREDNISONE PER 1 MG: Performed by: INTERNAL MEDICINE

## 2018-01-20 PROCEDURE — 63710000001 INSULIN ASPART PER 5 UNITS: Performed by: INTERNAL MEDICINE

## 2018-01-20 PROCEDURE — 94799 UNLISTED PULMONARY SVC/PX: CPT

## 2018-01-20 PROCEDURE — 94760 N-INVAS EAR/PLS OXIMETRY 1: CPT

## 2018-01-20 PROCEDURE — 94660 CPAP INITIATION&MGMT: CPT

## 2018-01-20 PROCEDURE — 93010 ELECTROCARDIOGRAM REPORT: CPT | Performed by: INTERNAL MEDICINE

## 2018-01-20 PROCEDURE — 25010000002 CEFTRIAXONE PER 250 MG: Performed by: FAMILY MEDICINE

## 2018-01-20 PROCEDURE — 93005 ELECTROCARDIOGRAM TRACING: CPT | Performed by: INTERNAL MEDICINE

## 2018-01-20 PROCEDURE — 82962 GLUCOSE BLOOD TEST: CPT

## 2018-01-20 PROCEDURE — 85025 COMPLETE CBC W/AUTO DIFF WBC: CPT | Performed by: FAMILY MEDICINE

## 2018-01-20 PROCEDURE — 25010000002 ENOXAPARIN PER 10 MG: Performed by: FAMILY MEDICINE

## 2018-01-20 RX ORDER — LOSARTAN POTASSIUM 25 MG/1
12.5 TABLET ORAL
Status: DISCONTINUED | OUTPATIENT
Start: 2018-01-21 | End: 2018-01-23

## 2018-01-20 RX ORDER — ACETYLCYSTEINE 100 MG/ML
600 SOLUTION ORAL; RESPIRATORY (INHALATION) EVERY 12 HOURS SCHEDULED
Status: DISCONTINUED | OUTPATIENT
Start: 2018-01-20 | End: 2018-01-22

## 2018-01-20 RX ORDER — NICOTINE 21 MG/24HR
1 PATCH, TRANSDERMAL 24 HOURS TRANSDERMAL EVERY 24 HOURS
Status: DISCONTINUED | OUTPATIENT
Start: 2018-01-20 | End: 2018-01-23 | Stop reason: HOSPADM

## 2018-01-20 RX ADMIN — DOXYCYCLINE 100 MG: 100 INJECTION, POWDER, LYOPHILIZED, FOR SOLUTION INTRAVENOUS at 09:53

## 2018-01-20 RX ADMIN — INSULIN ASPART 4 UNITS: 100 INJECTION, SOLUTION INTRAVENOUS; SUBCUTANEOUS at 08:36

## 2018-01-20 RX ADMIN — ACETYLCYSTEINE 600 MG: 100 SOLUTION ORAL; RESPIRATORY (INHALATION) at 22:46

## 2018-01-20 RX ADMIN — SODIUM CHLORIDE 100 ML/HR: 9 INJECTION, SOLUTION INTRAVENOUS at 08:37

## 2018-01-20 RX ADMIN — DOXYCYCLINE 100 MG: 100 INJECTION, POWDER, LYOPHILIZED, FOR SOLUTION INTRAVENOUS at 22:44

## 2018-01-20 RX ADMIN — INSULIN ASPART 12 UNITS: 100 INJECTION, SOLUTION INTRAVENOUS; SUBCUTANEOUS at 20:55

## 2018-01-20 RX ADMIN — INSULIN ASPART 4 UNITS: 100 INJECTION, SOLUTION INTRAVENOUS; SUBCUTANEOUS at 10:49

## 2018-01-20 RX ADMIN — INSULIN ASPART 8 UNITS: 100 INJECTION, SOLUTION INTRAVENOUS; SUBCUTANEOUS at 17:05

## 2018-01-20 RX ADMIN — GABAPENTIN 300 MG: 300 CAPSULE ORAL at 13:28

## 2018-01-20 RX ADMIN — IPRATROPIUM BROMIDE AND ALBUTEROL SULFATE 3 ML: 2.5; .5 SOLUTION RESPIRATORY (INHALATION) at 07:16

## 2018-01-20 RX ADMIN — IPRATROPIUM BROMIDE AND ALBUTEROL SULFATE 3 ML: 2.5; .5 SOLUTION RESPIRATORY (INHALATION) at 15:50

## 2018-01-20 RX ADMIN — ACETYLCYSTEINE 6 ML: 100 SOLUTION ORAL; RESPIRATORY (INHALATION) at 10:49

## 2018-01-20 RX ADMIN — DULOXETINE HYDROCHLORIDE 30 MG: 30 CAPSULE, DELAYED RELEASE ORAL at 08:36

## 2018-01-20 RX ADMIN — GABAPENTIN 300 MG: 300 CAPSULE ORAL at 06:33

## 2018-01-20 RX ADMIN — IPRATROPIUM BROMIDE AND ALBUTEROL SULFATE 3 ML: 2.5; .5 SOLUTION RESPIRATORY (INHALATION) at 19:42

## 2018-01-20 RX ADMIN — PANTOPRAZOLE SODIUM 40 MG: 40 TABLET, DELAYED RELEASE ORAL at 06:33

## 2018-01-20 RX ADMIN — CLOPIDOGREL BISULFATE 75 MG: 75 TABLET ORAL at 08:36

## 2018-01-20 RX ADMIN — SODIUM CHLORIDE 2 G: 9 INJECTION INTRAMUSCULAR; INTRAVENOUS; SUBCUTANEOUS at 21:17

## 2018-01-20 RX ADMIN — NICOTINE 1 PATCH: 21 PATCH TRANSDERMAL at 15:43

## 2018-01-20 RX ADMIN — ASPIRIN 325 MG: 325 TABLET, COATED ORAL at 08:36

## 2018-01-20 RX ADMIN — NAPHAZOLINE HYDROCHLORIDE AND PHENIRAMINE MALEATE 1 DROP: .25; 3 SOLUTION/ DROPS OPHTHALMIC at 13:28

## 2018-01-20 RX ADMIN — ENOXAPARIN SODIUM 40 MG: 40 INJECTION SUBCUTANEOUS at 08:36

## 2018-01-20 RX ADMIN — GABAPENTIN 300 MG: 300 CAPSULE ORAL at 21:17

## 2018-01-20 RX ADMIN — BUDESONIDE AND FORMOTEROL FUMARATE DIHYDRATE 2 PUFF: 160; 4.5 AEROSOL RESPIRATORY (INHALATION) at 19:44

## 2018-01-20 RX ADMIN — ATORVASTATIN CALCIUM 10 MG: 10 TABLET, FILM COATED ORAL at 08:36

## 2018-01-20 RX ADMIN — METOPROLOL TARTRATE 25 MG: 25 TABLET ORAL at 22:44

## 2018-01-20 RX ADMIN — PREDNISONE 20 MG: 20 TABLET ORAL at 08:36

## 2018-01-20 NOTE — PLAN OF CARE
Problem: Patient Care Overview (Adult)  Goal: Adult Individualization and Mutuality  Outcome: Ongoing (interventions implemented as appropriate)    Goal: Discharge Needs Assessment  Outcome: Ongoing (interventions implemented as appropriate)      Problem: Respiratory Insufficiency (Adult)  Goal: Identify Related Risk Factors and Signs and Symptoms  Outcome: Ongoing (interventions implemented as appropriate)

## 2018-01-20 NOTE — PROGRESS NOTES
[facility] Medicine Services  INPATIENT PROGRESS NOTE     LOS: 3 days   Patient Care Team:  Harpreet Bass MD as PCP - General    Chief Complaint:  Acute respiratory failure with hypoxia and hypercapnia      Subjective     Interval History:   Patient is seen for follow-up today.  He is doing better, less short of air, less deconditioned and his oxygen requirement is down to 3 L nasal prong and maintaining saturation of 93-95%.  He remains on nightly BiPAP and  voices no new complaints.      Patient Complaints: As above    History taken from: Patient    Review of Systems:    Review of Systems   Constitutional: Negative for activity change, appetite change, chills, diaphoresis, fatigue and fever.   HENT: Negative for trouble swallowing and voice change.    Eyes: Negative for photophobia and visual disturbance.   Respiratory: Positive for cough and shortness of breath. Negative for choking, chest tightness, wheezing and stridor.    Cardiovascular: Negative for chest pain, palpitations and leg swelling.   Gastrointestinal: Negative for abdominal distention, abdominal pain, blood in stool, constipation, diarrhea, nausea and vomiting.   Endocrine: Negative for cold intolerance, heat intolerance, polydipsia, polyphagia and polyuria.   Genitourinary: Negative for decreased urine volume, difficulty urinating, dysuria, enuresis, flank pain, frequency, hematuria and urgency.   Musculoskeletal: Negative for arthralgias, gait problem, myalgias, neck pain and neck stiffness.   Skin: Negative for pallor, rash and wound.   Neurological: Negative for dizziness, tremors, seizures, syncope, facial asymmetry, speech difficulty, weakness, light-headedness, numbness and headaches.   Hematological: Does not bruise/bleed easily.   Psychiatric/Behavioral: Negative for agitation, behavioral problems and confusion.         Objective     Vital Signs  Temp:  [97 °F (36.1 °C)-98.4 °F (36.9 °C)]  97.1 °F (36.2 °C)  Heart Rate:  [] 87  Resp:  [16-25] 18  BP: (106-132)/(57-79) 125/79    Physical Exam:   Physical Exam   Constitutional: He is oriented to person, place, and time. He appears well-developed and well-nourished. No distress.   Patient is morbidly obese.   HENT:   Head: Normocephalic and atraumatic.   Eyes: EOM are normal. Pupils are equal, round, and reactive to light. No scleral icterus.   Neck: Normal range of motion. Neck supple. No JVD present. No thyromegaly present.   Cardiovascular: Normal rate, regular rhythm and normal heart sounds.  Exam reveals no gallop and no friction rub.    No murmur heard.  Pulmonary/Chest: Effort normal. He has decreased breath sounds. He has no wheezes. He has rhonchi. He has no rales. He exhibits no tenderness.   He has distant breath sounds in the bases.   Abdominal: Soft. Bowel sounds are normal. He exhibits no distension and no mass. There is no tenderness. There is no rebound and no guarding.   Musculoskeletal: He exhibits no edema, tenderness or deformity.   Neurological: He is alert and oriented to person, place, and time. No cranial nerve deficit. He exhibits normal muscle tone. Coordination normal.   Skin: Skin is warm and dry. No rash noted. He is not diaphoretic. No erythema. No pallor.   Psychiatric: He has a normal mood and affect. His behavior is normal. Judgment and thought content normal.   Nursing note and vitals reviewed.         Results Review:         Results from last 7 days  Lab Units 01/20/18  0628 01/19/18  0825 01/18/18  0658 01/18/18  0640 01/18/18  0346 01/17/18  1638 01/17/18  1423 01/17/18  1334   SODIUM mmol/L 140 137 137  --   --   --   --  137   SODIUM, ARTERIAL mmol/L  --   --   --  136.3* 140.6 138.2 138.1  --    POTASSIUM mmol/L 4.7 4.8 5.0  --   --   --   --  5.4*   CHLORIDE mmol/L 97 96 95  --   --   --   --  92*   CO2 mmol/L 35.0* 32.0* 36.0*  --   --   --   --  36.0*   BUN mg/dL 27* 41* 28*  --   --   --   --  37*    CREATININE mg/dL 1.00 1.33* 0.97  --   --   --   --  1.16   GLUCOSE mg/dL 143* 192* 172*  --   --   --   --  147*   GLUCOSE, ARTERIAL mmol/L  --   --   --  173 218 147 144  --    CALCIUM mg/dL 8.2* 8.8 8.8  --   --   --   --  9.1   BILIRUBIN mg/dL  --   --   --   --   --   --   --  0.4   ALK PHOS U/L  --   --   --   --   --   --   --  92   ALT (SGPT) U/L  --   --   --   --   --   --   --  52   AST (SGOT) U/L  --   --   --   --   --   --   --  44               Results from last 7 days  Lab Units 01/20/18  0628 01/19/18  0825 01/18/18  0658 01/17/18  1334   WBC 10*3/mm3 7.12 7.81 7.71 8.01   HEMOGLOBIN g/dL 12.8* 13.7 13.9 14.1   HEMATOCRIT % 45.0 46.2 47.1 47.3   PLATELETS 10*3/mm3 245 314 285 294       Lab Results   Component Value Date    CKTOTAL 70 01/17/2018    CKMB 1.74 01/17/2018    TROPONINI 0.221 (C) 01/18/2018       pH, Arterial   Date Value Ref Range Status   01/18/2018 7.392 7.350 - 7.450 pH units Final     CO2   Date Value Ref Range Status   01/20/2018 35.0 (H) 22.0 - 31.0 mmol/L Final         Results from last 7 days  Lab Units 01/17/18  1334   INR  1.03        Imaging Results (last 7 days)     Procedure Component Value Units Date/Time    XR Chest 1 View [[id number]] Collected:  01/17/18 1330     Updated:  01/17/18 1354    Narrative:       Chest single view.       CLINICAL INDICATION: Shortness of breath. Simple sepsis protocol.    COMPARISON: April 4, 2013.    FINDINGS: Cardiomegaly. Slight prominence of the pulmonary  vasculature. Lungs otherwise clear.      Impression:       CONCLUSION: Cardiomegaly. Slight prominence of the pulmonary  vasculature. Otherwise unremarkable chest.    Electronically signed by:  Ruel Perry MD  1/17/2018 1:53 PM CST  Workstation: [id number]    CT Head Without Contrast [[id number]] Collected:  01/17/18 1515     Updated:  01/17/18 1538    Narrative:       Noncontrast CT examination of the brain.    INDICATION: Alteration of mental status. Decreased level of  consciousness.  Evaluate for stroke    Technique: Axial 5 mm contiguous images with brain parenchymal  and bone windows    This exam was performed according to our departmental  dose-optimization program, which includes automated exposure  control, adjustment of the mA and/or kV according to patient size  and/or use of iterative reconstruction technique.    Prior relevant examination: MRI brain June 26, 2013.  Prominent cisterna magna, normal variant.  Brain parenchyma appears within normal limits. Ventricles are  within normal limits in size. No evidence of abnormal mass or  calcification is seen. No evidence of acute hemorrhage is noted.  Chronic opacification left maxillary sinus, unchanged since prior  exam.    Impression:       CONCLUSION:   1. Normal brain for age. No acute changes are present.        Electronically signed by:  Ruel Perry MD  1/17/2018 3:36 PM CST  Workstation: [id number]    CT Angiogram Chest With Contrast [[id number]] Collected:  01/17/18 1727     Updated:  01/17/18 1757    Narrative:       [facility] [place]    Patient Name: GIFTY AMADOR    ORDERING: CA MONZON    ATTENDING: VANESSA SAUL     REFERRING: CA MONZON    -----------------------    EXAM DESCRIPTION: CT ANGIOGRAM CHEST W CONTRAST    CLINICAL HISTORY:  soa, J96.01 Acute respiratory failure with  hypoxia J96.02 Acute respiratory failure with hypercapnia I50.20  Unspecified systolic (congestive) heart failure J32.0 Chronic  maxillary sinusitis I21.4 Non-ST elevation (NSTEMI) myocardial  infarction       COMPARISON: Chest x-ray 1/17/2018    TECHNIQUE:   Axial images were obtained and multiplanar reconstructions were  made.    This exam was performed according to our departmental dose  optimization program, which includes automated exposure control,  adjustment of the mA and/or KV according to patient size and/or  use of iterative reconstruction technique.  Dose Length Product 560.40  CONTRAST:   81 cc intravenous Isovue  370    FINDINGS:  Diagnostic quality: Adequate .  Pulmonary embolism: No CT evidence of pulmonary embolism.  Pulmonary arteries:  Normal in caliber.  Lung parenchyma: There is dependent atelectasis bilaterally there  is an approximate 1.5 cm rounded opacity within the superior  segment of the left lower lobe seen posteriorly abutting the  posterior peripheral margin (series 4 image 78, series 6 image  129). It is uncertain if this represents a rounded area of  atelectasis, pneumonitis, or a noncalcified nodule.  Pleural effusion: No pleural effusion or pneumothorax..  Central airways: Patent.  Adenopathy: No suspicious mediastinal, hilar, or axillary lymph  nodes based on size or morphologic criteria.  Heart and great vessels: There is cardiac enlargement with a very  thin rim of pericardial fluid. No thoracic aortic aneurysm.  Upper abdomen: No acute finding.    Bones: No acute findings.    Additional findings: None.      Impression:       No CT evidence of pulmonary embolism.    There is dependent atelectasis without dense parenchymal  consolidation or pleural effusion.    There is a 1.5 cm nodular opacity superior segment of the left  lower lobe that favors a rounded area of atelectasis or  pneumonitis. However, recommend follow-up CT in three months to  confirm this suspicion and exclude the unlikely possibility this  is a noncalcified pulmonary nodule.    Electronically signed by:  Gio Rivers MD  1/17/2018 5:56 PM  CST Workstation: [id number]           Blood Culture   Date Value Ref Range Status   01/17/2018 No growth at 24 hours  Preliminary                             Medication Review:   Current Facility-Administered Medications   Medication Dose Route Frequency Provider Last Rate Last Dose   • acetaminophen (TYLENOL) tablet 650 mg  650 mg Oral Q4H PRN Jamison Medley MD       • acetylcysteine (MUCOMYST) 10 % solution 6 mL  6 mL Oral Q12H Arpan Ferrell MD   6 mL at 01/20/18 1049   • aspirin tablet 325 mg   325 mg Oral Daily Jamison Medley MD   325 mg at 01/20/18 0836   • atorvastatin (LIPITOR) tablet 10 mg  10 mg Oral Daily Jamison Medley MD   10 mg at 01/20/18 0836   • budesonide-formoterol (SYMBICORT) 160-4.5 MCG/ACT inhaler 2 puff  2 puff Inhalation BID - RT Marcos Brice MD       • cefTRIAXone (ROCEPHIN) in NS 2 GRAMS/20ml IV PUSH syringe  2 g Intravenous Q24H Jamison Medley MD   2 g at 01/19/18 2127    And   • doxycycline (VIBRAMYCIN) 100 mg/250 mL 0.9% NS VTB  100 mg Intravenous Q12H Jamison Mdeley MD   100 mg at 01/20/18 0953   • clopidogrel (PLAVIX) tablet 75 mg  75 mg Oral Daily Jamison Medley MD   75 mg at 01/20/18 0836   • dextrose (D50W) solution 25 g  25 g Intravenous Q15 Min PRN Sarbjit Nunes MD       • dextrose (GLUTOSE) oral gel 15 g  15 g Oral Q15 Min PRN Sarbjit Nunes MD       • DULoxetine (CYMBALTA) DR capsule 30 mg  30 mg Oral Daily Jamison Medley MD   30 mg at 01/20/18 0836   • enoxaparin (LOVENOX) syringe 40 mg  40 mg Subcutaneous Q24H Jamison Medley MD   40 mg at 01/20/18 0836   • gabapentin (NEURONTIN) capsule 300 mg  300 mg Oral Q8H Jamison Medley MD   300 mg at 01/20/18 0633   • glucagon (human recombinant) (GLUCAGEN DIAGNOSTIC) injection 1 mg  1 mg Subcutaneous Q15 Min PRN Sarbjit Nunes MD       • influenza vac split quad (FLUZONE QUADRIVALENT) IM suspension 0.5 mL  0.5 mL Intramuscular During Hospitalization Alirio Hager MD       • insulin aspart (novoLOG) injection 0-24 Units  0-24 Units Subcutaneous 4x Daily AC & at Bedtime Sarbjit Nunes MD   4 Units at 01/20/18 1049   • ipratropium-albuterol (DUO-NEB) nebulizer solution 3 mL  3 mL Nebulization 4x Daily - RT Dante Iverson MD   3 mL at 01/20/18 0716   • naphazoline-pheniramine (NAPHCON-A) 0.025-0.3 % ophthalmic solution 1 drop  1 drop Right Eye 4x Daily PRN Jamison Medley MD       • pantoprazole (PROTONIX) EC tablet 40 mg  40 mg Oral QAM Jamison Medley MD   40 mg at 01/20/18 0633   • pneumococcal polysaccharide 23-valent  (PNEUMOVAX-23) vaccine 0.5 mL  0.5 mL Intramuscular During Hospitalization Alirio Hager MD       • predniSONE (DELTASONE) tablet 20 mg  20 mg Oral Daily With Breakfast Sarbjit Nunes MD   20 mg at 01/20/18 0836   • sodium chloride 0.9 % flush 1-10 mL  1-10 mL Intravenous PRN Jamison Medley MD       • sodium chloride 0.9 % flush 10 mL  10 mL Intravenous PRN Dante Iverson MD             Assessment/Plan     Principal Problem:    Acute respiratory failure with hypoxia and hypercapnia  Active Problems:    Chronic bronchitis with acute exacerbation    Hypertension    Diabetes mellitus    Morbid obesity    Coronary artery disease    Obstructive apnea    1.  Acute hypoxic/ hypercapnic respiratory failure: Improving.  Continue supplemental oxygen, bronchodilators, empiric antimicrobial therapy and steroids.  Continue oxygen weaning but he may need chronic supplemental oxygen if attempts at weaning are unsuccessful.  2.  History of nicotine dependence: Continue nicotine patch.  Patient has been advised on the need to quit smoking.  3.  Non-STEMI: Continue current guidelines directed medical therapy.  Result of echocardiogram has been reviewed and the cardiologist is following.  Patient is tentatively scheduled for cardiac catheterization.  4.  Diabetes mellitus: Continue anti-glycemic, Accu-Chek and sliding scale insulin.  5.  Acute kidney injury: Likely contrast induced.  Resolved.   6.  Possible obstructive sleep apnea: Patient will need sleep study as outpatient.  7.  Continue GI and DVT prophylaxis.               Marcos Brice MD  01/20/18      EMR Dragon/Transcription disclaimer:   Much of this encounter note is an electronic transcription/translation of spoken language to printed text. The electronic translation of spoken language may permit erroneous, or at times, nonsensical words or phrases to be inadvertently transcribed; Although I have reviewed the note for such errors, some may still exist.

## 2018-01-21 ENCOUNTER — APPOINTMENT (OUTPATIENT)
Dept: CT IMAGING | Facility: HOSPITAL | Age: 63
End: 2018-01-21

## 2018-01-21 LAB
ANION GAP SERPL CALCULATED.3IONS-SCNC: 7 MMOL/L (ref 5–15)
BASOPHILS # BLD AUTO: 0.01 10*3/MM3 (ref 0–0.2)
BASOPHILS NFR BLD AUTO: 0.1 % (ref 0–2)
BUN BLD-MCNC: 22 MG/DL (ref 7–21)
BUN/CREAT SERPL: 22.7 (ref 7–25)
CALCIUM SPEC-SCNC: 8.5 MG/DL (ref 8.4–10.2)
CHLORIDE SERPL-SCNC: 97 MMOL/L (ref 95–110)
CO2 SERPL-SCNC: 35 MMOL/L (ref 22–31)
CREAT BLD-MCNC: 0.97 MG/DL (ref 0.7–1.3)
DEPRECATED RDW RBC AUTO: 59.4 FL (ref 35.1–43.9)
EOSINOPHIL # BLD AUTO: 0.1 10*3/MM3 (ref 0–0.7)
EOSINOPHIL NFR BLD AUTO: 1.5 % (ref 0–7)
ERYTHROCYTE [DISTWIDTH] IN BLOOD BY AUTOMATED COUNT: 15.7 % (ref 11.5–14.5)
GFR SERPL CREATININE-BSD FRML MDRD: 79 ML/MIN/1.73 (ref 49–113)
GLUCOSE BLD-MCNC: 148 MG/DL (ref 60–100)
GLUCOSE BLDC GLUCOMTR-MCNC: 122 MG/DL (ref 70–130)
GLUCOSE BLDC GLUCOMTR-MCNC: 191 MG/DL (ref 70–130)
GLUCOSE BLDC GLUCOMTR-MCNC: 250 MG/DL (ref 70–130)
GLUCOSE BLDC GLUCOMTR-MCNC: 264 MG/DL (ref 70–130)
HCT VFR BLD AUTO: 44.2 % (ref 39–49)
HGB BLD-MCNC: 13 G/DL (ref 13.7–17.3)
IMM GRANULOCYTES # BLD: 0.02 10*3/MM3 (ref 0–0.02)
IMM GRANULOCYTES NFR BLD: 0.3 % (ref 0–0.5)
LYMPHOCYTES # BLD AUTO: 1.02 10*3/MM3 (ref 0.6–4.2)
LYMPHOCYTES NFR BLD AUTO: 15.3 % (ref 10–50)
MCH RBC QN AUTO: 30.4 PG (ref 26.5–34)
MCHC RBC AUTO-ENTMCNC: 29.4 G/DL (ref 31.5–36.3)
MCV RBC AUTO: 103.3 FL (ref 80–98)
MONOCYTES # BLD AUTO: 0.61 10*3/MM3 (ref 0–0.9)
MONOCYTES NFR BLD AUTO: 9.1 % (ref 0–12)
NEUTROPHILS # BLD AUTO: 4.92 10*3/MM3 (ref 2–8.6)
NEUTROPHILS NFR BLD AUTO: 73.7 % (ref 37–80)
PLATELET # BLD AUTO: 279 10*3/MM3 (ref 150–450)
PMV BLD AUTO: 9.6 FL (ref 8–12)
POTASSIUM BLD-SCNC: 4.4 MMOL/L (ref 3.5–5.1)
RBC # BLD AUTO: 4.28 10*6/MM3 (ref 4.37–5.74)
SODIUM BLD-SCNC: 139 MMOL/L (ref 137–145)
WBC NRBC COR # BLD: 6.68 10*3/MM3 (ref 3.2–9.8)

## 2018-01-21 PROCEDURE — 94760 N-INVAS EAR/PLS OXIMETRY 1: CPT

## 2018-01-21 PROCEDURE — 93005 ELECTROCARDIOGRAM TRACING: CPT | Performed by: INTERNAL MEDICINE

## 2018-01-21 PROCEDURE — 85025 COMPLETE CBC W/AUTO DIFF WBC: CPT | Performed by: FAMILY MEDICINE

## 2018-01-21 PROCEDURE — 63710000001 INSULIN ASPART PER 5 UNITS: Performed by: INTERNAL MEDICINE

## 2018-01-21 PROCEDURE — 75574 CT ANGIO HRT W/3D IMAGE: CPT

## 2018-01-21 PROCEDURE — 94660 CPAP INITIATION&MGMT: CPT

## 2018-01-21 PROCEDURE — 80048 BASIC METABOLIC PNL TOTAL CA: CPT | Performed by: FAMILY MEDICINE

## 2018-01-21 PROCEDURE — 93010 ELECTROCARDIOGRAM REPORT: CPT | Performed by: INTERNAL MEDICINE

## 2018-01-21 PROCEDURE — 94799 UNLISTED PULMONARY SVC/PX: CPT

## 2018-01-21 PROCEDURE — 63710000001 PREDNISONE PER 1 MG: Performed by: INTERNAL MEDICINE

## 2018-01-21 PROCEDURE — 25010000002 ENOXAPARIN PER 10 MG: Performed by: FAMILY MEDICINE

## 2018-01-21 PROCEDURE — 25010000002 CEFTRIAXONE PER 250 MG: Performed by: FAMILY MEDICINE

## 2018-01-21 PROCEDURE — 82962 GLUCOSE BLOOD TEST: CPT

## 2018-01-21 PROCEDURE — 0 IOPAMIDOL PER 1 ML: Performed by: HOSPITALIST

## 2018-01-21 RX ORDER — METOPROLOL TARTRATE 5 MG/5ML
INJECTION INTRAVENOUS
Status: COMPLETED | OUTPATIENT
Start: 2018-01-21 | End: 2018-01-21

## 2018-01-21 RX ADMIN — PANTOPRAZOLE SODIUM 40 MG: 40 TABLET, DELAYED RELEASE ORAL at 06:04

## 2018-01-21 RX ADMIN — IOPAMIDOL 90 ML: 755 INJECTION, SOLUTION INTRAVENOUS at 12:30

## 2018-01-21 RX ADMIN — IPRATROPIUM BROMIDE AND ALBUTEROL SULFATE 3 ML: 2.5; .5 SOLUTION RESPIRATORY (INHALATION) at 19:54

## 2018-01-21 RX ADMIN — INSULIN ASPART 4 UNITS: 100 INJECTION, SOLUTION INTRAVENOUS; SUBCUTANEOUS at 09:18

## 2018-01-21 RX ADMIN — METOPROLOL TARTRATE 25 MG: 25 TABLET ORAL at 09:18

## 2018-01-21 RX ADMIN — METOPROLOL TARTRATE 5 MG: 5 INJECTION INTRAVENOUS at 11:05

## 2018-01-21 RX ADMIN — Medication 12.5 MG: at 09:18

## 2018-01-21 RX ADMIN — GABAPENTIN 300 MG: 300 CAPSULE ORAL at 06:05

## 2018-01-21 RX ADMIN — BUDESONIDE AND FORMOTEROL FUMARATE DIHYDRATE 2 PUFF: 160; 4.5 AEROSOL RESPIRATORY (INHALATION) at 19:54

## 2018-01-21 RX ADMIN — ATORVASTATIN CALCIUM 10 MG: 10 TABLET, FILM COATED ORAL at 09:17

## 2018-01-21 RX ADMIN — CLOPIDOGREL BISULFATE 75 MG: 75 TABLET ORAL at 09:18

## 2018-01-21 RX ADMIN — ASPIRIN 325 MG: 325 TABLET, COATED ORAL at 09:21

## 2018-01-21 RX ADMIN — PREDNISONE 20 MG: 20 TABLET ORAL at 09:18

## 2018-01-21 RX ADMIN — METOPROLOL TARTRATE 25 MG: 25 TABLET ORAL at 20:32

## 2018-01-21 RX ADMIN — GABAPENTIN 300 MG: 300 CAPSULE ORAL at 21:56

## 2018-01-21 RX ADMIN — DULOXETINE HYDROCHLORIDE 30 MG: 30 CAPSULE, DELAYED RELEASE ORAL at 09:17

## 2018-01-21 RX ADMIN — NICOTINE 1 PATCH: 21 PATCH TRANSDERMAL at 12:56

## 2018-01-21 RX ADMIN — ACETYLCYSTEINE 600 MG: 100 SOLUTION ORAL; RESPIRATORY (INHALATION) at 20:31

## 2018-01-21 RX ADMIN — GABAPENTIN 300 MG: 300 CAPSULE ORAL at 13:12

## 2018-01-21 RX ADMIN — METOPROLOL TARTRATE 5 MG: 5 INJECTION INTRAVENOUS at 11:10

## 2018-01-21 RX ADMIN — DOXYCYCLINE 100 MG: 100 INJECTION, POWDER, LYOPHILIZED, FOR SOLUTION INTRAVENOUS at 23:37

## 2018-01-21 RX ADMIN — DOXYCYCLINE 100 MG: 100 INJECTION, POWDER, LYOPHILIZED, FOR SOLUTION INTRAVENOUS at 11:34

## 2018-01-21 RX ADMIN — INSULIN ASPART 12 UNITS: 100 INJECTION, SOLUTION INTRAVENOUS; SUBCUTANEOUS at 17:40

## 2018-01-21 RX ADMIN — IPRATROPIUM BROMIDE AND ALBUTEROL SULFATE 3 ML: 2.5; .5 SOLUTION RESPIRATORY (INHALATION) at 11:43

## 2018-01-21 RX ADMIN — INSULIN ASPART 12 UNITS: 100 INJECTION, SOLUTION INTRAVENOUS; SUBCUTANEOUS at 20:34

## 2018-01-21 RX ADMIN — SODIUM CHLORIDE 2 G: 9 INJECTION INTRAMUSCULAR; INTRAVENOUS; SUBCUTANEOUS at 21:55

## 2018-01-21 RX ADMIN — ENOXAPARIN SODIUM 40 MG: 40 INJECTION SUBCUTANEOUS at 09:17

## 2018-01-21 RX ADMIN — ACETYLCYSTEINE 600 MG: 100 SOLUTION ORAL; RESPIRATORY (INHALATION) at 09:17

## 2018-01-21 RX ADMIN — METOPROLOL TARTRATE 5 MG: 5 INJECTION INTRAVENOUS at 11:00

## 2018-01-21 NOTE — PROGRESS NOTES
Cardiology Progress Note:     LOS: 3 days   Patient Care Team:  Harpreet Bass MD as PCP - General      Subjective:    Chart reviewed , patient seen and examined. Patient denies any chest pain, shortness of breath palpitation.  Patient on admission had altered mental status with multiple risk factors for coronary artery disease.  Patient has no previous history of documented coronary artery disease but does have history of peripheral vascular disease with cerebrovascular accident history of arterial hypertension, hypertensive heart disease, diabetes, hyperlipidemia, obesity,.  Patient had positive troponin and electrocardiographic changes suggestive of ischemia.  Patient had chronic kidney disease.  Patient electrocardiographic changes were suggestive of ischemia and patient was initially recommended to consider a coronary angiogram.  As the patient is concerned about cerebrovascular accident and currently not having any symptoms of substernal chest pain with abnormal resting electrocardiogram and indeterminate troponin patient was recommended to undergo a CTA of the coronaries to further evaluate the coronary anatomy.  Risk-benefit treatment option for the CTA of the coronaries were discussed with the patient and the family and an informed consent was obtained.              Objective:     Objective:  Vitals:    01/20/18 2016   BP: 147/77   Pulse: 86   Resp: 20   Temp: 97.9 °F (36.6 °C)   SpO2: 96%       Intake/Output Summary (Last 24 hours) at 01/20/18 2031  Last data filed at 01/20/18 1700   Gross per 24 hour   Intake          1938.33 ml   Output                0 ml   Net          1938.33 ml             Physical Exam:   General Appearance:    Alert, oriented, cooperative, in no acute distress   Head:    Normocephalic, atraumatic, without obvious abnormality   Eyes:           MARCELO  Lids and lashes normal, conjunctivae and sclerae normal, no icterus, no pallor   Ears:    Ears appear intact with no  abnormalities noted   Throat:   Mucous membranes pink and moist   Neck:   Supple, trachea midline, no carotid bruit, no organomegaly or JVD   Lungs:     Clear to auscultation and percussion, respirations regular, even and Unlabored. No wheezes, rales, rhonchi    Heart:    Regular rhythm and normal rate, normal S1 and S2, no            murmur, no gallop, no rub, no click   Abdomen:     Soft, non-tender, non-distended, no guarding, no rebound tenderness, Normal bowel sounds in all four quadrant, no masses, liver and spleen nonpalpable,    Genitalia:    Deferred   Extremities:   Moves all extremities well, no edema, no cyanosis, no              Redness, no clubbing   Pulses:   Pulses palpable and equal bilaterally   Skin:   Moist and warm. No bleeding, bruising or rash   Neurologic/Psychiatric:   Alert and oriented to person, place, and time.  Motor, power and tone in upper and lower extremity is grossly intact.  No focal neurological deficits. Normal cognitive function. No psychomotor reaction or tangential thought. No depression, homicidal ideations and suicidal ideations            Results Review:    Lab Results (last 24 hours)     Procedure Component Value Units Date/Time    POC Glucose Once [[id number]]  (Abnormal) Collected:  01/19/18 1951    Specimen:  Blood Updated:  01/19/18 2100     Glucose 246 (H) mg/dL       RN NotifiedMeter: [id number]: [id number] ANSHUL JACOBS       CBC & Differential [[id number]] Collected:  01/20/18 0628    Specimen:  Blood Updated:  01/20/18 0712    Narrative:       The following orders were created for panel order CBC & Differential.  Procedure                               Abnormality         Status                     ---------                               -----------         ------                     CBC Auto Differential[[id number]]        Abnormal            Final result                 Please view results for these tests on the individual orders.    CBC Auto  Differential [[id number]]  (Abnormal) Collected:  01/20/18 0628    Specimen:  Blood Updated:  01/20/18 0712     WBC 7.12 10*3/mm3      RBC 4.40 10*6/mm3      Hemoglobin 12.8 (L) g/dL      Hematocrit 45.0 %      .3 (H) fL      MCH 29.1 pg      MCHC 28.4 (L) g/dL      RDW 15.8 (H) %      RDW-SD 59.4 (H) fl      MPV 10.5 fL      Platelets 245 10*3/mm3      Neutrophil % 69.2 %      Lymphocyte % 20.5 %      Monocyte % 8.8 %      Eosinophil % 1.0 %      Basophil % 0.1 %      Immature Grans % 0.4 %      Neutrophils, Absolute 4.92 10*3/mm3      Lymphocytes, Absolute 1.46 10*3/mm3      Monocytes, Absolute 0.63 10*3/mm3      Eosinophils, Absolute 0.07 10*3/mm3      Basophils, Absolute 0.01 10*3/mm3      Immature Grans, Absolute 0.03 (H) 10*3/mm3     Basic Metabolic Panel [[id number]]  (Abnormal) Collected:  01/20/18 0628    Specimen:  Blood Updated:  01/20/18 0718     Glucose 143 (H) mg/dL      BUN 27 (H) mg/dL      Creatinine 1.00 mg/dL      Sodium 140 mmol/L      Potassium 4.7 mmol/L      Chloride 97 mmol/L      CO2 35.0 (H) mmol/L      Calcium 8.2 (L) mg/dL      eGFR Non African Amer 76 mL/min/1.73      BUN/Creatinine Ratio 27.0 (H)     Anion Gap 8.0 mmol/L     POC Glucose Once [[id number]]  (Abnormal) Collected:  01/20/18 0750    Specimen:  Blood Updated:  01/20/18 0822     Glucose 167 (H) mg/dL       RN NotifiedMeter: [id number]: [id number] REYNOLD DRIVER       POC Glucose Once [[id number]]  (Abnormal) Collected:  01/20/18 1035    Specimen:  Blood Updated:  01/20/18 1051     Glucose 177 (H) mg/dL       RN NotifiedMeter: [id number]: [id number] REYNOLD DRIVER       Blood Culture - Blood, [[id number]]  (Normal) Collected:  01/17/18 1334    Specimen:  Blood from Arm, Right Updated:  01/20/18 1401     Blood Culture No growth at 3 days    Blood Culture - Blood, [[id number]]  (Normal) Collected:  01/17/18 1611    Specimen:  Blood from Arm, Right Updated:  01/20/18 1616     Blood Culture No growth at 3 days     POC Glucose Once [[id number]]  (Abnormal) Collected:  01/20/18 1633    Specimen:  Blood Updated:  01/20/18 1648     Glucose 209 (H) mg/dL       RN NotifiedMeter: [id number]: [id number] REYNOLD DRIVER       POC Glucose Once [[id number]]  (Abnormal) Collected:  01/20/18 1947    Specimen:  Blood Updated:  01/20/18 2024     Glucose 283 (H) mg/dL       RN NotifiedMeter: [id number]: [id number] ANSHUL JACOBS              Medication Review:   Current Facility-Administered Medications   Medication Dose Route Frequency Provider Last Rate Last Dose   • acetaminophen (TYLENOL) tablet 650 mg  650 mg Oral Q4H PRN Jamison Medley MD       • acetylcysteine (MUCOMYST) 10 % solution 6 mL  6 mL Oral Q12H Arpan Ferrell MD   6 mL at 01/20/18 1049   • aspirin tablet 325 mg  325 mg Oral Daily Jamison Medley MD   325 mg at 01/20/18 0836   • atorvastatin (LIPITOR) tablet 10 mg  10 mg Oral Daily Jamiosn Medley MD   10 mg at 01/20/18 0836   • budesonide-formoterol (SYMBICORT) 160-4.5 MCG/ACT inhaler 2 puff  2 puff Inhalation BID - RT Marcos Brice MD   2 puff at 01/20/18 1944   • cefTRIAXone (ROCEPHIN) in NS 2 GRAMS/20ml IV PUSH syringe  2 g Intravenous Q24H Jamison Medley MD   2 g at 01/19/18 2127    And   • doxycycline (VIBRAMYCIN) 100 mg/250 mL 0.9% NS VTB  100 mg Intravenous Q12H Jamison Medley MD   100 mg at 01/20/18 0953   • clopidogrel (PLAVIX) tablet 75 mg  75 mg Oral Daily Jamison Medley MD   75 mg at 01/20/18 0836   • dextrose (D50W) solution 25 g  25 g Intravenous Q15 Min PRN Sarbjit Nunes MD       • dextrose (GLUTOSE) oral gel 15 g  15 g Oral Q15 Min PRN Sarbjit Nunes MD       • DULoxetine (CYMBALTA) DR capsule 30 mg  30 mg Oral Daily Jamison Medley MD   30 mg at 01/20/18 0836   • enoxaparin (LOVENOX) syringe 40 mg  40 mg Subcutaneous Q24H Jamison Medley MD   40 mg at 01/20/18 0836   • gabapentin (NEURONTIN) capsule 300 mg  300 mg Oral Q8H Jamison Medley MD   300 mg at 01/20/18 1328   • glucagon (human  recombinant) (GLUCAGEN DIAGNOSTIC) injection 1 mg  1 mg Subcutaneous Q15 Min PRN Sarbjit Nunes MD       • influenza vac split quad (FLUZONE QUADRIVALENT) IM suspension 0.5 mL  0.5 mL Intramuscular During Hospitalization Alirio Hager MD       • insulin aspart (novoLOG) injection 0-24 Units  0-24 Units Subcutaneous 4x Daily AC & at Bedtime Sarbjit Nunes MD   8 Units at 01/20/18 1705   • ipratropium-albuterol (DUO-NEB) nebulizer solution 3 mL  3 mL Nebulization 4x Daily - RT Dante Iverson MD   3 mL at 01/20/18 1942   • naphazoline-pheniramine (NAPHCON-A) 0.025-0.3 % ophthalmic solution 1 drop  1 drop Right Eye 4x Daily PRN Jamison Medley MD   1 drop at 01/20/18 1328   • nicotine (NICODERM CQ) 21 MG/24HR patch 1 patch  1 patch Transdermal Q24H Marcos Brice MD   1 patch at 01/20/18 1543   • pantoprazole (PROTONIX) EC tablet 40 mg  40 mg Oral QAM Jamison Medley MD   40 mg at 01/20/18 0633   • pneumococcal polysaccharide 23-valent (PNEUMOVAX-23) vaccine 0.5 mL  0.5 mL Intramuscular During Hospitalization Alirio Hager MD       • predniSONE (DELTASONE) tablet 20 mg  20 mg Oral Daily With Breakfast Sarbjit Nunes MD   20 mg at 01/20/18 0836   • sodium chloride 0.9 % flush 1-10 mL  1-10 mL Intravenous PRN Jamison Medley MD       • sodium chloride 0.9 % flush 10 mL  10 mL Intravenous PRN Dante Iverson MD           Assessment and Plan:    Principal Problem:    Acute respiratory failure with hypoxia and hypercapnia  Active Problems:    Chronic bronchitis with acute exacerbation    Hypertension    Diabetes mellitus    Morbid obesity    Coronary artery disease    Obstructive apnea  1.  Indeterminate troponin with abnormal resting electrocardiogram suggestive of anterolateral wall ischemia.  Patient has not complained of having any symptoms of chest pain.  Patient at the present time has been recommended to undergo a CTA of the coronaries to further evaluate the coronaries.  Patient would receive gentle hydration  along with Mucomyst and would administer metoprolol 25 mg every 12 hours.  Patient does not have any allergy to IVP dye.  2.  Labile arterial blood pressure patient would be started on metoprolol and would benefit from angiotensin receptor blocker in view of the patient history of diabetes.  Patient would be started on losartan 12.5 mg daily.  3.  Hyperlipidemia.  Patient has been counseled on low-fat low-cholesterol diet. An would be continued on the present dose of the Lipitor.  4.  Obesity.  Patient had been counseled on weight reduction lifestyle modification and dietary restriction.  5.  History of cerebrovascular accident.  Patient did not have any residual deficit and has been followed and is on antiplatelet therapy aspirin and Plavix.  6.  Altered mental status which was most likely secondary to hypercapnia has resolved.    The above plan of management were discussed with the patient and the family at length.            rApan Ferrell MD  01/20/18  8:31 PM      Time: Time spent on face-to-face interaction 20 minutes    Dictated utilizing Dragon dictation.

## 2018-01-21 NOTE — PLAN OF CARE
Problem: Patient Care Overview (Adult)  Goal: Plan of Care Review  Outcome: Ongoing (interventions implemented as appropriate)   01/21/18 0240   Coping/Psychosocial Response Interventions   Plan Of Care Reviewed With patient   Patient Care Overview   Progress no change   Outcome Evaluation   Outcome Summary/Follow up Plan Pt rested through the night, no complaints, will continue to monitor pt     Goal: Adult Individualization and Mutuality  Outcome: Ongoing (interventions implemented as appropriate)    Goal: Discharge Needs Assessment  Outcome: Ongoing (interventions implemented as appropriate)      Problem: Respiratory Insufficiency (Adult)  Goal: Identify Related Risk Factors and Signs and Symptoms  Outcome: Ongoing (interventions implemented as appropriate)

## 2018-01-21 NOTE — PROGRESS NOTES
Cardiology Progress Note:     LOS: 4 days   Patient Care Team:  Harpreet Bass MD as PCP - General      Subjective:     Chart reviewed , patient seen and examined. Patient denies any chest pain, shortness of breath palpitation.  Patient underwent a CTA of the coronaries which did not reveal of any evidence of any obstructive coronary artery disease.  Patient CTA revealed  left lower lobe nodule measuring 1.3 x 1.1 x 1.8 cm. Differential possibilities include  lung neoplasm, atelectasis, pneumonitis.  Patient was informed of the finding.  Patient has not complained of having any symptoms of chest pain.              Objective:     Objective:  Vitals:    01/21/18 1535   BP: 130/62   Pulse: 83   Resp: 18   Temp: 97.2 °F (36.2 °C)   SpO2: 92%       Intake/Output Summary (Last 24 hours) at 01/21/18 1632  Last data filed at 01/21/18 1300   Gross per 24 hour   Intake             1200 ml   Output                0 ml   Net             1200 ml             Physical Exam:   General Appearance:    Alert, oriented, cooperative, in no acute distress   Head:    Normocephalic, atraumatic, without obvious abnormality   Eyes:           MARCELO  Lids and lashes normal, conjunctivae and sclerae normal, no icterus, no pallor   Ears:    Ears appear intact with no abnormalities noted   Throat:   Mucous membranes pink and moist   Neck:   Supple, trachea midline, no carotid bruit, no organomegaly or JVD   Lungs:     Clear to auscultation and percussion, respirations regular, even and Unlabored. No wheezes, rales, rhonchi    Heart:    Regular rhythm and normal rate, normal S1 and S2, no            murmur, no gallop, no rub, no click   Abdomen:     Soft, non-tender, non-distended, no guarding, no rebound tenderness, Normal bowel sounds in all four quadrant, no masses, liver and spleen nonpalpable,    Genitalia:    Deferred   Extremities:   Moves all extremities well, no edema, no cyanosis, no              Redness, no clubbing   Pulses:    Pulses palpable and equal bilaterally   Skin:   Moist and warm. No bleeding, bruising or rash   Neurologic/Psychiatric:   Alert and oriented to person, place, and time.  Motor, power and tone in upper and lower extremity is grossly intact.  No focal neurological deficits. Normal cognitive function. No psychomotor reaction or tangential thought. No depression, homicidal ideations and suicidal ideations            Results Review:    Lab Results (last 24 hours)     Procedure Component Value Units Date/Time    POC Glucose Once [[id number]]  (Abnormal) Collected:  01/20/18 1633    Specimen:  Blood Updated:  01/20/18 1648     Glucose 209 (H) mg/dL       RN NotifiedMeter: [id number]: [id number] REYNOLD DRIVER       POC Glucose Once [[id number]]  (Abnormal) Collected:  01/20/18 1947    Specimen:  Blood Updated:  01/20/18 2024     Glucose 283 (H) mg/dL       RN NotifiedMeter: [id number]: [id number] ANSHUL JACOBS       CBC & Differential [[id number]] Collected:  01/21/18 0556    Specimen:  Blood Updated:  01/21/18 0617    Narrative:       The following orders were created for panel order CBC & Differential.  Procedure                               Abnormality         Status                     ---------                               -----------         ------                     CBC Auto Differential[[id number]]        Abnormal            Final result                 Please view results for these tests on the individual orders.    CBC Auto Differential [[id number]]  (Abnormal) Collected:  01/21/18 0556    Specimen:  Blood Updated:  01/21/18 0617     WBC 6.68 10*3/mm3      RBC 4.28 (L) 10*6/mm3      Hemoglobin 13.0 (L) g/dL      Hematocrit 44.2 %      .3 (H) fL      MCH 30.4 pg      MCHC 29.4 (L) g/dL      RDW 15.7 (H) %      RDW-SD 59.4 (H) fl      MPV 9.6 fL      Platelets 279 10*3/mm3      Neutrophil % 73.7 %      Lymphocyte % 15.3 %      Monocyte % 9.1 %      Eosinophil % 1.5 %      Basophil % 0.1 %       Immature Grans % 0.3 %      Neutrophils, Absolute 4.92 10*3/mm3      Lymphocytes, Absolute 1.02 10*3/mm3      Monocytes, Absolute 0.61 10*3/mm3      Eosinophils, Absolute 0.10 10*3/mm3      Basophils, Absolute 0.01 10*3/mm3      Immature Grans, Absolute 0.02 10*3/mm3     Basic Metabolic Panel [[id number]]  (Abnormal) Collected:  01/21/18 0556    Specimen:  Blood Updated:  01/21/18 0637     Glucose 148 (H) mg/dL      BUN 22 (H) mg/dL      Creatinine 0.97 mg/dL      Sodium 139 mmol/L      Potassium 4.4 mmol/L      Chloride 97 mmol/L      CO2 35.0 (H) mmol/L      Calcium 8.5 mg/dL      eGFR Non African Amer 79 mL/min/1.73      BUN/Creatinine Ratio 22.7     Anion Gap 7.0 mmol/L     POC Glucose Once [[id number]]  (Abnormal) Collected:  01/21/18 0730    Specimen:  Blood Updated:  01/21/18 0751     Glucose 191 (H) mg/dL       RN NotifiedMeter: [id number]: [id number] REYNOLD DRIVER       POC Glucose Once [[id number]]  (Normal) Collected:  01/21/18 1136    Specimen:  Blood Updated:  01/21/18 1206     Glucose 122 mg/dL       RN NotifiedMeter: [id number]: [id number] REYNOLD DRIVER       Blood Culture - Blood, [[id number]]  (Normal) Collected:  01/17/18 1334    Specimen:  Blood from Arm, Right Updated:  01/21/18 1401     Blood Culture No growth at 4 days    Blood Culture - Blood, [[id number]]  (Normal) Collected:  01/17/18 1611    Specimen:  Blood from Arm, Right Updated:  01/21/18 1616     Blood Culture No growth at 4 days           Medication Review:   Current Facility-Administered Medications   Medication Dose Route Frequency Provider Last Rate Last Dose   • acetaminophen (TYLENOL) tablet 650 mg  650 mg Oral Q4H PRN Jamison Medley MD       • acetylcysteine (MUCOMYST) 10 % solution 600 mg  600 mg Oral Q12H Arpan Ferrell MD   600 mg at 01/21/18 0917   • aspirin tablet 325 mg  325 mg Oral Daily Jamison Medley MD   325 mg at 01/21/18 0921   • atorvastatin (LIPITOR) tablet 10 mg  10 mg Oral Daily Jamison Medley MD    10 mg at 01/21/18 0917   • budesonide-formoterol (SYMBICORT) 160-4.5 MCG/ACT inhaler 2 puff  2 puff Inhalation BID - RT Marcos Brice MD   2 puff at 01/20/18 1944   • cefTRIAXone (ROCEPHIN) in NS 2 GRAMS/20ml IV PUSH syringe  2 g Intravenous Q24H Jamison Medley MD   2 g at 01/20/18 2117    And   • doxycycline (VIBRAMYCIN) 100 mg/250 mL 0.9% NS VTB  100 mg Intravenous Q12H Jamison Medley MD   100 mg at 01/21/18 1134   • clopidogrel (PLAVIX) tablet 75 mg  75 mg Oral Daily Jamison Medley MD   75 mg at 01/21/18 0918   • dextrose (D50W) solution 25 g  25 g Intravenous Q15 Min PRN Sarbjit Nunes MD       • dextrose (GLUTOSE) oral gel 15 g  15 g Oral Q15 Min PRN Sarbjit Nunes MD       • DULoxetine (CYMBALTA) DR capsule 30 mg  30 mg Oral Daily Jamison Medley MD   30 mg at 01/21/18 0917   • enoxaparin (LOVENOX) syringe 40 mg  40 mg Subcutaneous Q24H Jamison Medley MD   40 mg at 01/21/18 0917   • gabapentin (NEURONTIN) capsule 300 mg  300 mg Oral Q8H Jamison Medley MD   300 mg at 01/21/18 1312   • glucagon (human recombinant) (GLUCAGEN DIAGNOSTIC) injection 1 mg  1 mg Subcutaneous Q15 Min PRN Sarbjit Nunes MD       • influenza vac split quad (FLUZONE QUADRIVALENT) IM suspension 0.5 mL  0.5 mL Intramuscular During Hospitalization Alirio Hager MD       • insulin aspart (novoLOG) injection 0-24 Units  0-24 Units Subcutaneous 4x Daily AC & at Bedtime Sarbjit Nunes MD   4 Units at 01/21/18 0918   • ipratropium-albuterol (DUO-NEB) nebulizer solution 3 mL  3 mL Nebulization 4x Daily - RT Dante Iverson MD   3 mL at 01/21/18 1143   • losartan (COZAAR) half tablet 12.5 mg  12.5 mg Oral Q24H Arpan Ferrell MD   12.5 mg at 01/21/18 0918   • metoprolol tartrate (LOPRESSOR) tablet 25 mg  25 mg Oral Q12H Arpan Ferrell MD   25 mg at 01/21/18 0918   • naphazoline-pheniramine (NAPHCON-A) 0.025-0.3 % ophthalmic solution 1 drop  1 drop Right Eye 4x Daily PRN Jamison Medley MD   1 drop at 01/20/18 1328   • nicotine (NICODERM CQ) 21  MG/24HR patch 1 patch  1 patch Transdermal Q24H Marcos Brice MD   1 patch at 01/21/18 1256   • pantoprazole (PROTONIX) EC tablet 40 mg  40 mg Oral QAM Jamison Medley MD   40 mg at 01/21/18 0604   • pneumococcal polysaccharide 23-valent (PNEUMOVAX-23) vaccine 0.5 mL  0.5 mL Intramuscular During Hospitalization Alirio Hager MD       • sodium chloride 0.9 % flush 1-10 mL  1-10 mL Intravenous PRN Jamison Medley MD       • sodium chloride 0.9 % flush 10 mL  10 mL Intravenous PRN Dante Iverson MD           Assessment and Plan:    Principal Problem:    Acute respiratory failure with hypoxia and hypercapnia  Active Problems:    Chronic bronchitis with acute exacerbation    Hypertension    Diabetes mellitus    Morbid obesity    Coronary artery disease    Obstructive apnea  1.  Abnormal resting electrocardiogram with indeterminate troponin.  Patient has multiple risk factor for coronary artery disease but the CTA of the coronaries did not reveal off any evidence of any obstructive epicardial coronary artery disease with a calcium score of 30.  Patient at the present time is not having any symptoms of substernal chest pain suggestive of angina.  Patient would not be subjected to any invasive evaluation from the cardiac standpoint.  2.  Arterial hypertension.  Patient blood pressure is currently well controlled and patient would be continued on the present dose of the antihypertensive medication.  Patient is to continue with the present dose of the losartan and the metoprolol.  Patient has been counseled to decrease his salt intake.  3.  Hyperlipidemia.  Patient is currently on Lipitor 10 mg at bedtime.  Patient is to undergo a lipid profile including liver function tests evaluation.  4.  Risk factor modification.  Patient has been counseled to quit smoking pharmacological agents to facilitate was offered to the patient.  5.  Pulmonary nodule noted on the CTA.  Patient would need follow-up with the primary care  physician in 3 months and sooner should the patient have any evidence of weight loss and hemoptysis or worsening shortness of breath.  Patient has been informed of the finding on the CTA.  6.  Obesity.  Patient has been recommended to undergo a sleep study and to be compliant with the CPAP machine and weight reduction.    The above plan of management were discussed with the patient.            Arpan Ferrell MD  01/21/18  4:32 PM      Time: Face-to-face interaction 20 minutes      Dictated utilizing Dragon dictation.

## 2018-01-21 NOTE — PROGRESS NOTES
[facility] Medicine Services  INPATIENT PROGRESS NOTE     LOS: 4 days   Patient Care Team:  Harpreet Bass MD as PCP - General    Chief Complaint:  Acute respiratory failure with hypoxia and hypercapnia      Subjective     Interval History:   Patient is seen for follow-up today.  He continues to improve, less short of air, less deconditioned and his oxygen requirement is down to 3 L nasal prong and maintaining saturation of 94-96%.  He remains on nightly BiPAP and  voices no new complaints.      Patient Complaints: As above    History taken from: Patient    Review of Systems:    Review of Systems   Constitutional: Negative for activity change, appetite change, chills, diaphoresis, fatigue and fever.   HENT: Negative for trouble swallowing and voice change.    Eyes: Negative for photophobia and visual disturbance.   Respiratory: Positive for shortness of breath. Negative for cough, choking, chest tightness, wheezing and stridor.    Cardiovascular: Negative for chest pain, palpitations and leg swelling.   Gastrointestinal: Negative for abdominal distention, abdominal pain, blood in stool, constipation, diarrhea, nausea and vomiting.   Endocrine: Negative for cold intolerance, heat intolerance, polydipsia, polyphagia and polyuria.   Genitourinary: Negative for decreased urine volume, difficulty urinating, dysuria, enuresis, flank pain, frequency, hematuria and urgency.   Musculoskeletal: Negative for arthralgias, gait problem, myalgias, neck pain and neck stiffness.   Skin: Negative for pallor, rash and wound.   Neurological: Negative for dizziness, tremors, seizures, syncope, facial asymmetry, speech difficulty, weakness, light-headedness, numbness and headaches.   Hematological: Does not bruise/bleed easily.   Psychiatric/Behavioral: Negative for agitation, behavioral problems and confusion.         Objective     Vital Signs  Temp:  [97.6 °F (36.4 °C)-98.7 °F (37.1  °C)] 98.7 °F (37.1 °C)  Heart Rate:  [70-93] 73  Resp:  [18-24] 18  BP: (122-157)/(68-87) 138/74    Physical Exam:   Physical Exam   Constitutional: He is oriented to person, place, and time. He appears well-developed and well-nourished. No distress.   Patient is morbidly obese.   HENT:   Head: Normocephalic and atraumatic.   Eyes: EOM are normal. Pupils are equal, round, and reactive to light. No scleral icterus.   Neck: Normal range of motion. Neck supple. No JVD present. No thyromegaly present.   Cardiovascular: Normal rate, regular rhythm and normal heart sounds.  Exam reveals no gallop and no friction rub.    No murmur heard.  Pulmonary/Chest: Effort normal. He has decreased breath sounds. He has no wheezes. He has rhonchi. He has no rales. He exhibits no tenderness.   He has distant breath sounds at the bases.   Abdominal: Soft. Bowel sounds are normal. He exhibits no distension and no mass. There is no tenderness. There is no rebound and no guarding.   Musculoskeletal: He exhibits no edema, tenderness or deformity.   Neurological: He is alert and oriented to person, place, and time. No cranial nerve deficit. He exhibits normal muscle tone. Coordination normal.   Skin: Skin is warm and dry. No rash noted. He is not diaphoretic. No erythema. No pallor.   Psychiatric: He has a normal mood and affect. His behavior is normal. Judgment and thought content normal.   Nursing note and vitals reviewed.         Results Review:         Results from last 7 days  Lab Units 01/21/18  0556 01/20/18  0628 01/19/18  0825 01/18/18  0658 01/18/18  0640 01/18/18  0346 01/17/18  1638  01/17/18  1334   SODIUM mmol/L 139 140 137 137  --   --   --   --  137   SODIUM, ARTERIAL mmol/L  --   --   --   --  136.3* 140.6 138.2  < >  --    POTASSIUM mmol/L 4.4 4.7 4.8 5.0  --   --   --   --  5.4*   CHLORIDE mmol/L 97 97 96 95  --   --   --   --  92*   CO2 mmol/L 35.0* 35.0* 32.0* 36.0*  --   --   --   --  36.0*   BUN mg/dL 22* 27* 41* 28*   --   --   --   --  37*   CREATININE mg/dL 0.97 1.00 1.33* 0.97  --   --   --   --  1.16   GLUCOSE mg/dL 148* 143* 192* 172*  --   --   --   --  147*   GLUCOSE, ARTERIAL mmol/L  --   --   --   --  173 218 147  < >  --    CALCIUM mg/dL 8.5 8.2* 8.8 8.8  --   --   --   --  9.1   BILIRUBIN mg/dL  --   --   --   --   --   --   --   --  0.4   ALK PHOS U/L  --   --   --   --   --   --   --   --  92   ALT (SGPT) U/L  --   --   --   --   --   --   --   --  52   AST (SGOT) U/L  --   --   --   --   --   --   --   --  44   < > = values in this interval not displayed.            Results from last 7 days  Lab Units 01/21/18  0556 01/20/18  0628 01/19/18  0825 01/18/18  0658 01/17/18  1334   WBC 10*3/mm3 6.68 7.12 7.81 7.71 8.01   HEMOGLOBIN g/dL 13.0* 12.8* 13.7 13.9 14.1   HEMATOCRIT % 44.2 45.0 46.2 47.1 47.3   PLATELETS 10*3/mm3 279 245 314 285 294       Lab Results   Component Value Date    CKTOTAL 70 01/17/2018    CKMB 1.74 01/17/2018    TROPONINI 0.221 (C) 01/18/2018       CO2   Date Value Ref Range Status   01/21/2018 35.0 (H) 22.0 - 31.0 mmol/L Final         Results from last 7 days  Lab Units 01/17/18  1334   INR  1.03        Imaging Results (last 7 days)     Procedure Component Value Units Date/Time    XR Chest 1 View [[id number]] Collected:  01/17/18 1330     Updated:  01/17/18 1354    Narrative:       Chest single view.       CLINICAL INDICATION: Shortness of breath. Simple sepsis protocol.    COMPARISON: April 4, 2013.    FINDINGS: Cardiomegaly. Slight prominence of the pulmonary  vasculature. Lungs otherwise clear.      Impression:       CONCLUSION: Cardiomegaly. Slight prominence of the pulmonary  vasculature. Otherwise unremarkable chest.    Electronically signed by:  Ruel Perry MD  1/17/2018 1:53 PM CST  Workstation: [id number]    CT Head Without Contrast [[id number]] Collected:  01/17/18 1515     Updated:  01/17/18 1538    Narrative:       Noncontrast CT examination of the brain.    INDICATION: Alteration of mental  status. Decreased level of  consciousness. Evaluate for stroke    Technique: Axial 5 mm contiguous images with brain parenchymal  and bone windows    This exam was performed according to our departmental  dose-optimization program, which includes automated exposure  control, adjustment of the mA and/or kV according to patient size  and/or use of iterative reconstruction technique.    Prior relevant examination: MRI brain June 26, 2013.  Prominent cisterna magna, normal variant.  Brain parenchyma appears within normal limits. Ventricles are  within normal limits in size. No evidence of abnormal mass or  calcification is seen. No evidence of acute hemorrhage is noted.  Chronic opacification left maxillary sinus, unchanged since prior  exam.    Impression:       CONCLUSION:   1. Normal brain for age. No acute changes are present.        Electronically signed by:  Ruel Perry MD  1/17/2018 3:36 PM CST  Workstation: [id number]    CT Angiogram Chest With Contrast [[id number]] Collected:  01/17/18 1727     Updated:  01/17/18 1757    Narrative:       [facility] [place]    Patient Name: GIFTY AMADOR    ORDERING: CA MONZON    ATTENDING: VANESSA SAUL     REFERRING: CA MONZON    -----------------------    EXAM DESCRIPTION: CT ANGIOGRAM CHEST W CONTRAST    CLINICAL HISTORY:  soa, J96.01 Acute respiratory failure with  hypoxia J96.02 Acute respiratory failure with hypercapnia I50.20  Unspecified systolic (congestive) heart failure J32.0 Chronic  maxillary sinusitis I21.4 Non-ST elevation (NSTEMI) myocardial  infarction       COMPARISON: Chest x-ray 1/17/2018    TECHNIQUE:   Axial images were obtained and multiplanar reconstructions were  made.    This exam was performed according to our departmental dose  optimization program, which includes automated exposure control,  adjustment of the mA and/or KV according to patient size and/or  use of iterative reconstruction technique.  Dose Length Product 560.40  CONTRAST:    81 cc intravenous Isovue 370    FINDINGS:  Diagnostic quality: Adequate .  Pulmonary embolism: No CT evidence of pulmonary embolism.  Pulmonary arteries:  Normal in caliber.  Lung parenchyma: There is dependent atelectasis bilaterally there  is an approximate 1.5 cm rounded opacity within the superior  segment of the left lower lobe seen posteriorly abutting the  posterior peripheral margin (series 4 image 78, series 6 image  129). It is uncertain if this represents a rounded area of  atelectasis, pneumonitis, or a noncalcified nodule.  Pleural effusion: No pleural effusion or pneumothorax..  Central airways: Patent.  Adenopathy: No suspicious mediastinal, hilar, or axillary lymph  nodes based on size or morphologic criteria.  Heart and great vessels: There is cardiac enlargement with a very  thin rim of pericardial fluid. No thoracic aortic aneurysm.  Upper abdomen: No acute finding.    Bones: No acute findings.    Additional findings: None.      Impression:       No CT evidence of pulmonary embolism.    There is dependent atelectasis without dense parenchymal  consolidation or pleural effusion.    There is a 1.5 cm nodular opacity superior segment of the left  lower lobe that favors a rounded area of atelectasis or  pneumonitis. However, recommend follow-up CT in three months to  confirm this suspicion and exclude the unlikely possibility this  is a noncalcified pulmonary nodule.    Electronically signed by:  Gio Rivers MD  1/17/2018 5:56 PM  CST Workstation: [id number]           Blood Culture   Date Value Ref Range Status   01/17/2018 No growth at 24 hours  Preliminary                             Medication Review:   Current Facility-Administered Medications   Medication Dose Route Frequency Provider Last Rate Last Dose   • acetaminophen (TYLENOL) tablet 650 mg  650 mg Oral Q4H PRN Jamison Medley MD       • acetylcysteine (MUCOMYST) 10 % solution 600 mg  600 mg Oral Q12H Arpan Ferrell MD   600 mg at 01/21/18  0917   • aspirin tablet 325 mg  325 mg Oral Daily Jamison Medley MD   325 mg at 01/21/18 0921   • atorvastatin (LIPITOR) tablet 10 mg  10 mg Oral Daily Jamison Medley MD   10 mg at 01/21/18 0917   • budesonide-formoterol (SYMBICORT) 160-4.5 MCG/ACT inhaler 2 puff  2 puff Inhalation BID - RT Marcos Brice MD   2 puff at 01/20/18 1944   • cefTRIAXone (ROCEPHIN) in NS 2 GRAMS/20ml IV PUSH syringe  2 g Intravenous Q24H Jamison Medley MD   2 g at 01/20/18 2117    And   • doxycycline (VIBRAMYCIN) 100 mg/250 mL 0.9% NS VTB  100 mg Intravenous Q12H Jamison Medley MD   100 mg at 01/21/18 1134   • clopidogrel (PLAVIX) tablet 75 mg  75 mg Oral Daily Jamison Medley MD   75 mg at 01/21/18 0918   • dextrose (D50W) solution 25 g  25 g Intravenous Q15 Min PRN Sarbjit Nunes MD       • dextrose (GLUTOSE) oral gel 15 g  15 g Oral Q15 Min PRN Sarbjit Nunes MD       • DULoxetine (CYMBALTA) DR capsule 30 mg  30 mg Oral Daily Jamison Medley MD   30 mg at 01/21/18 0917   • enoxaparin (LOVENOX) syringe 40 mg  40 mg Subcutaneous Q24H Jamison Medley MD   40 mg at 01/21/18 0917   • gabapentin (NEURONTIN) capsule 300 mg  300 mg Oral Q8H Jamison Medley MD   300 mg at 01/21/18 0605   • glucagon (human recombinant) (GLUCAGEN DIAGNOSTIC) injection 1 mg  1 mg Subcutaneous Q15 Min PRN Sarbjit Nunes MD       • influenza vac split quad (FLUZONE QUADRIVALENT) IM suspension 0.5 mL  0.5 mL Intramuscular During Hospitalization Alirio Hager MD       • insulin aspart (novoLOG) injection 0-24 Units  0-24 Units Subcutaneous 4x Daily AC & at Bedtime Sarbjit Nunes MD   4 Units at 01/21/18 0918   • [COMPLETED] iopamidol (ISOVUE-370) 76 % injection 100 mL  100 mL Intravenous Once in imaging Alirio Hager MD   90 mL at 01/21/18 1230   • ipratropium-albuterol (DUO-NEB) nebulizer solution 3 mL  3 mL Nebulization 4x Daily - RT Dante Iverson MD   3 mL at 01/21/18 1143   • losartan (COZAAR) half tablet 12.5 mg  12.5 mg Oral Q24H Arpan Ferrell MD   12.5 mg  at 01/21/18 0918   • metoprolol tartrate (LOPRESSOR) tablet 25 mg  25 mg Oral Q12H Arpan Ferrell MD   25 mg at 01/21/18 0918   • naphazoline-pheniramine (NAPHCON-A) 0.025-0.3 % ophthalmic solution 1 drop  1 drop Right Eye 4x Daily PRN Jamison Medley MD   1 drop at 01/20/18 1328   • nicotine (NICODERM CQ) 21 MG/24HR patch 1 patch  1 patch Transdermal Q24H Marcos Brice MD   1 patch at 01/20/18 1543   • pantoprazole (PROTONIX) EC tablet 40 mg  40 mg Oral QAM Jamison Medley MD   40 mg at 01/21/18 0604   • pneumococcal polysaccharide 23-valent (PNEUMOVAX-23) vaccine 0.5 mL  0.5 mL Intramuscular During Hospitalization Alirio Hager MD       • sodium chloride 0.9 % flush 1-10 mL  1-10 mL Intravenous PRN Jamison Medley MD       • sodium chloride 0.9 % flush 10 mL  10 mL Intravenous PRN Dante Iverson MD             Assessment/Plan     Principal Problem:    Acute respiratory failure with hypoxia and hypercapnia  Active Problems:    Chronic bronchitis with acute exacerbation    Hypertension    Diabetes mellitus    Morbid obesity    Coronary artery disease    Obstructive apnea    1.  Acute hypoxic/ hypercapnic respiratory failure: Improving.  Continue supplemental oxygen, bronchodilators, empiric antimicrobial therapy and steroids.  Continue oxygen weaning. He may need chronic supplemental oxygen if attempts at weaning are unsuccessful.  2.  History of nicotine dependence: Continue nicotine patch.  Patient has been advised on the need to quit smoking.  3.  Non-STEMI: Continue current guidelines directed medical therapy.  Result of echocardiogram has been reviewed and the cardiologist is following.  Patient is  scheduled for CT coronary angio today.  4.  Diabetes mellitus: Continue anti-glycemic, Accu-Chek and sliding scale insulin.  5.  Acute kidney injury: Likely contrast induced.  Resolved.   6.  Possible obstructive sleep apnea: Patient will need sleep study as outpatient.  7.  Continue GI and DVT prophylaxis.   8.   Discharge planning is ongoing.              Marcos Brice MD  01/21/18      EMR Dragon/Transcription disclaimer:   Much of this encounter note is an electronic transcription/translation of spoken language to printed text. The electronic translation of spoken language may permit erroneous, or at times, nonsensical words or phrases to be inadvertently transcribed; Although I have reviewed the note for such errors, some may still exist.

## 2018-01-22 LAB
ANION GAP SERPL CALCULATED.3IONS-SCNC: 7 MMOL/L (ref 5–15)
BACTERIA SPEC AEROBE CULT: NORMAL
BACTERIA SPEC AEROBE CULT: NORMAL
BASOPHILS # BLD AUTO: 0.01 10*3/MM3 (ref 0–0.2)
BASOPHILS NFR BLD AUTO: 0.1 % (ref 0–2)
BUN BLD-MCNC: 22 MG/DL (ref 7–21)
BUN/CREAT SERPL: 27.2 (ref 7–25)
CALCIUM SPEC-SCNC: 8.2 MG/DL (ref 8.4–10.2)
CHLORIDE SERPL-SCNC: 97 MMOL/L (ref 95–110)
CO2 SERPL-SCNC: 37 MMOL/L (ref 22–31)
CREAT BLD-MCNC: 0.81 MG/DL (ref 0.7–1.3)
DEPRECATED RDW RBC AUTO: 58.2 FL (ref 35.1–43.9)
EOSINOPHIL # BLD AUTO: 0.14 10*3/MM3 (ref 0–0.7)
EOSINOPHIL NFR BLD AUTO: 1.8 % (ref 0–7)
ERYTHROCYTE [DISTWIDTH] IN BLOOD BY AUTOMATED COUNT: 15.5 % (ref 11.5–14.5)
GFR SERPL CREATININE-BSD FRML MDRD: 97 ML/MIN/1.73 (ref 60–113)
GLUCOSE BLD-MCNC: 151 MG/DL (ref 60–100)
GLUCOSE BLDC GLUCOMTR-MCNC: 174 MG/DL (ref 70–130)
GLUCOSE BLDC GLUCOMTR-MCNC: 178 MG/DL (ref 70–130)
GLUCOSE BLDC GLUCOMTR-MCNC: 197 MG/DL (ref 70–130)
HCT VFR BLD AUTO: 45.5 % (ref 39–49)
HGB BLD-MCNC: 13.1 G/DL (ref 13.7–17.3)
IMM GRANULOCYTES # BLD: 0.04 10*3/MM3 (ref 0–0.02)
IMM GRANULOCYTES NFR BLD: 0.5 % (ref 0–0.5)
LYMPHOCYTES # BLD AUTO: 1.15 10*3/MM3 (ref 0.6–4.2)
LYMPHOCYTES NFR BLD AUTO: 15.1 % (ref 10–50)
MCH RBC QN AUTO: 29.4 PG (ref 26.5–34)
MCHC RBC AUTO-ENTMCNC: 28.8 G/DL (ref 31.5–36.3)
MCV RBC AUTO: 102.2 FL (ref 80–98)
MONOCYTES # BLD AUTO: 0.55 10*3/MM3 (ref 0–0.9)
MONOCYTES NFR BLD AUTO: 7.2 % (ref 0–12)
NEUTROPHILS # BLD AUTO: 5.75 10*3/MM3 (ref 2–8.6)
NEUTROPHILS NFR BLD AUTO: 75.3 % (ref 37–80)
PLATELET # BLD AUTO: 271 10*3/MM3 (ref 150–450)
PMV BLD AUTO: 10.3 FL (ref 8–12)
POTASSIUM BLD-SCNC: 4.6 MMOL/L (ref 3.5–5.1)
RBC # BLD AUTO: 4.45 10*6/MM3 (ref 4.37–5.74)
SODIUM BLD-SCNC: 141 MMOL/L (ref 137–145)
WBC NRBC COR # BLD: 7.64 10*3/MM3 (ref 3.2–9.8)

## 2018-01-22 PROCEDURE — 94799 UNLISTED PULMONARY SVC/PX: CPT

## 2018-01-22 PROCEDURE — 25010000002 CEFTRIAXONE PER 250 MG: Performed by: FAMILY MEDICINE

## 2018-01-22 PROCEDURE — 85025 COMPLETE CBC W/AUTO DIFF WBC: CPT | Performed by: FAMILY MEDICINE

## 2018-01-22 PROCEDURE — 94660 CPAP INITIATION&MGMT: CPT

## 2018-01-22 PROCEDURE — 82962 GLUCOSE BLOOD TEST: CPT

## 2018-01-22 PROCEDURE — 63710000001 INSULIN ASPART PER 5 UNITS: Performed by: INTERNAL MEDICINE

## 2018-01-22 PROCEDURE — 94760 N-INVAS EAR/PLS OXIMETRY 1: CPT

## 2018-01-22 PROCEDURE — 25010000002 ENOXAPARIN PER 10 MG: Performed by: FAMILY MEDICINE

## 2018-01-22 PROCEDURE — 80048 BASIC METABOLIC PNL TOTAL CA: CPT | Performed by: FAMILY MEDICINE

## 2018-01-22 RX ORDER — SPIRONOLACTONE 25 MG/1
25 TABLET ORAL DAILY
Status: DISCONTINUED | OUTPATIENT
Start: 2018-01-22 | End: 2018-01-23 | Stop reason: HOSPADM

## 2018-01-22 RX ADMIN — INSULIN ASPART 4 UNITS: 100 INJECTION, SOLUTION INTRAVENOUS; SUBCUTANEOUS at 17:24

## 2018-01-22 RX ADMIN — IPRATROPIUM BROMIDE AND ALBUTEROL SULFATE 3 ML: 2.5; .5 SOLUTION RESPIRATORY (INHALATION) at 10:16

## 2018-01-22 RX ADMIN — DULOXETINE HYDROCHLORIDE 30 MG: 30 CAPSULE, DELAYED RELEASE ORAL at 08:13

## 2018-01-22 RX ADMIN — NICOTINE 1 PATCH: 21 PATCH TRANSDERMAL at 13:26

## 2018-01-22 RX ADMIN — METOPROLOL TARTRATE 25 MG: 25 TABLET ORAL at 08:14

## 2018-01-22 RX ADMIN — INSULIN ASPART 4 UNITS: 100 INJECTION, SOLUTION INTRAVENOUS; SUBCUTANEOUS at 08:14

## 2018-01-22 RX ADMIN — GABAPENTIN 300 MG: 300 CAPSULE ORAL at 06:00

## 2018-01-22 RX ADMIN — BUDESONIDE AND FORMOTEROL FUMARATE DIHYDRATE 2 PUFF: 160; 4.5 AEROSOL RESPIRATORY (INHALATION) at 10:17

## 2018-01-22 RX ADMIN — IPRATROPIUM BROMIDE AND ALBUTEROL SULFATE 3 ML: 2.5; .5 SOLUTION RESPIRATORY (INHALATION) at 20:31

## 2018-01-22 RX ADMIN — INSULIN ASPART 4 UNITS: 100 INJECTION, SOLUTION INTRAVENOUS; SUBCUTANEOUS at 11:35

## 2018-01-22 RX ADMIN — DOXYCYCLINE 100 MG: 100 INJECTION, POWDER, LYOPHILIZED, FOR SOLUTION INTRAVENOUS at 11:35

## 2018-01-22 RX ADMIN — DOXYCYCLINE 100 MG: 100 INJECTION, POWDER, LYOPHILIZED, FOR SOLUTION INTRAVENOUS at 21:26

## 2018-01-22 RX ADMIN — ASPIRIN 325 MG: 325 TABLET, COATED ORAL at 08:14

## 2018-01-22 RX ADMIN — BUDESONIDE AND FORMOTEROL FUMARATE DIHYDRATE 2 PUFF: 160; 4.5 AEROSOL RESPIRATORY (INHALATION) at 20:31

## 2018-01-22 RX ADMIN — METOPROLOL TARTRATE 25 MG: 25 TABLET ORAL at 21:26

## 2018-01-22 RX ADMIN — ATORVASTATIN CALCIUM 10 MG: 10 TABLET, FILM COATED ORAL at 08:13

## 2018-01-22 RX ADMIN — CLOPIDOGREL BISULFATE 75 MG: 75 TABLET ORAL at 08:14

## 2018-01-22 RX ADMIN — IPRATROPIUM BROMIDE AND ALBUTEROL SULFATE 3 ML: 2.5; .5 SOLUTION RESPIRATORY (INHALATION) at 14:46

## 2018-01-22 RX ADMIN — GABAPENTIN 300 MG: 300 CAPSULE ORAL at 21:26

## 2018-01-22 RX ADMIN — GABAPENTIN 300 MG: 300 CAPSULE ORAL at 13:26

## 2018-01-22 RX ADMIN — ENOXAPARIN SODIUM 40 MG: 40 INJECTION SUBCUTANEOUS at 08:14

## 2018-01-22 RX ADMIN — INSULIN ASPART 8 UNITS: 100 INJECTION, SOLUTION INTRAVENOUS; SUBCUTANEOUS at 21:35

## 2018-01-22 RX ADMIN — SODIUM CHLORIDE 2 G: 9 INJECTION INTRAMUSCULAR; INTRAVENOUS; SUBCUTANEOUS at 21:26

## 2018-01-22 RX ADMIN — PANTOPRAZOLE SODIUM 40 MG: 40 TABLET, DELAYED RELEASE ORAL at 06:00

## 2018-01-22 RX ADMIN — SPIRONOLACTONE 25 MG: 25 TABLET ORAL at 21:26

## 2018-01-22 RX ADMIN — ACETYLCYSTEINE 600 MG: 100 SOLUTION ORAL; RESPIRATORY (INHALATION) at 09:29

## 2018-01-22 RX ADMIN — Medication 12.5 MG: at 08:14

## 2018-01-22 NOTE — PLAN OF CARE
Problem: Patient Care Overview (Adult)  Goal: Plan of Care Review  Outcome: Ongoing (interventions implemented as appropriate)   01/22/18 1502   Coping/Psychosocial Response Interventions   Plan Of Care Reviewed With patient   Patient Care Overview   Progress no change   Outcome Evaluation   Outcome Summary/Follow up Plan Pt reports no pain at this time. Pt oxygen 2L NC; will continue to monitor.     Goal: Adult Individualization and Mutuality  Outcome: Ongoing (interventions implemented as appropriate)      Problem: Respiratory Insufficiency (Adult)  Goal: Identify Related Risk Factors and Signs and Symptoms  Outcome: Outcome(s) achieved Date Met: 01/22/18    Goal: Acid/Base Balance  Outcome: Ongoing (interventions implemented as appropriate)      Problem: Sepsis (Adult)  Goal: Signs and Symptoms of Listed Potential Problems Will be Absent or Manageable (Sepsis)  Outcome: Ongoing (interventions implemented as appropriate)

## 2018-01-22 NOTE — PLAN OF CARE
Problem: Patient Care Overview (Adult)  Goal: Plan of Care Review  Outcome: Ongoing (interventions implemented as appropriate)   01/22/18 0436   Coping/Psychosocial Response Interventions   Plan Of Care Reviewed With patient   Patient Care Overview   Progress no change   Outcome Evaluation   Outcome Summary/Follow up Plan Pt slept through the night, VS stable, will continue to monitor pt     Goal: Adult Individualization and Mutuality  Outcome: Ongoing (interventions implemented as appropriate)    Goal: Discharge Needs Assessment  Outcome: Ongoing (interventions implemented as appropriate)      Problem: Respiratory Insufficiency (Adult)  Goal: Identify Related Risk Factors and Signs and Symptoms  Outcome: Ongoing (interventions implemented as appropriate)

## 2018-01-22 NOTE — PAYOR COMM NOTE
"Gfity Amador (62 y.o. Male)     Date of Birth Social Security Number Address Home Phone MRN    1955  [id number]  [facility] [address] [phone] [id number]    Mandaeism Marital Status          Yazdanism        Admission Date Admission Type Admitting Provider Attending Provider Department, Room/Bed    1/17/18 Emergency Alirio Hager MD Williams, Kevin L, MD [address], 427/1    Discharge Date Discharge Disposition Discharge Destination                      Attending Provider: Alirio Hager MD     Allergies:  No Known Allergies    Isolation:  None   Infection:  None   Code Status:  FULL    Ht:  185.4 cm (72.99\")   Wt:  135 kg (296 lb 12.8 oz)    Admission Cmt:  None   Principal Problem:  Acute respiratory failure with hypoxia and hypercapnia [J96.01,J96.02]                 Active Insurance as of 1/17/2018     Primary Coverage     Payor Plan Insurance Group Employer/Plan Group    [employer] [place] [employer] [place]ICA[place]      Payor Plan Address Payor Plan Phone Number Effective From Effective To    [address] [address] [phone] 1/17/2018     [place] [address]       Subscriber Name Subscriber Birth Date Member ID       GIFTY AMADOR 1955 [id number]                 Emergency Contacts      (Rel.) Home Phone Work Phone Mobile Phone    Mohini Carlos (Significant Other) [phone] -- [phone]            Insurance Information                [facility] MEDICAID Phone: [phone]    Subscriber: Anamika Gifty Epifanio Subscriber#: [id number]    Group#:  Precert#:           Vital Signs (last 72 hrs)       01/19 0700  -  01/20 0659 01/20 0700  -  01/21 0659 01/21 0700  -  01/22 0659 01/22 0700  -  01/22 1312   Most Recent    Temp (°F) 97 -  98.4    97.1 -  98    97.2 -  98.7      98.4     98.4 (36.9)    Heart Rate 80 -  101    71 -  93    70 -  89    83 -  90     83    Resp 16 -  25    18 -  24    18 -  20    16 -  20     16    " "/57 -  132/62    125/79 -  156/85    122/68 -  157/87      137/79     137/79    SpO2 (%) 90 -  96    90 -  99    91 -  97    90 -  91     90          Hospital Medications (active)       Dose Frequency Start End    acetaminophen (TYLENOL) tablet 650 mg 650 mg Every 4 Hours PRN 1/17/2018     Sig - Route: Take 2 tablets by mouth Every 4 (Four) Hours As Needed for Mild Pain . - Oral    acetylcysteine (MUCOMYST) 10 % solution 600 mg 600 mg Every 12 Hours Scheduled 1/20/2018     Sig - Route: Take 600 mg by mouth Every 12 (Twelve) Hours. - Oral    aspirin tablet 325 mg 325 mg Daily 1/18/2018     Sig - Route: Take 1 tablet by mouth Daily. - Oral    atorvastatin (LIPITOR) tablet 10 mg 10 mg Daily 1/17/2018     Sig - Route: Take 1 tablet by mouth Daily. - Oral    budesonide-formoterol (SYMBICORT) 160-4.5 MCG/ACT inhaler 2 puff 2 puff 2 Times Daily - RT 1/19/2018     Sig - Route: Inhale 2 puffs 2 (Two) Times a Day. - Inhalation    cefTRIAXone (ROCEPHIN) in NS 2 GRAMS/20ml IV PUSH syringe 2 g Every 24 Hours 1/17/2018 1/24/2018    Sig - Route: Infuse 20 mL into a venous catheter Daily. - Intravenous    Linked Group 1:  \"And\" Linked Group Details        clopidogrel (PLAVIX) tablet 75 mg 75 mg Daily 1/17/2018     Sig - Route: Take 1 tablet by mouth Daily. - Oral    dextrose (D50W) solution 25 g 25 g Every 15 Minutes PRN 1/18/2018     Sig - Route: Infuse 50 mL into a venous catheter Every 15 (Fifteen) Minutes As Needed for Low Blood Sugar (Blood Sugar Less Than 70, Patient Has IV Access - Unresponsive, NPO or Unable To Safely Swallow). - Intravenous    dextrose (GLUTOSE) oral gel 15 g 15 g Every 15 Minutes PRN 1/18/2018     Sig - Route: Take 15 g by mouth Every 15 (Fifteen) Minutes As Needed for Low Blood Sugar (Blood Sugar Less Than 70, Patient Alert, Is Not NPO & Can Safely Swallow). - Oral    doxycycline (VIBRAMYCIN) 100 mg/250 mL 0.9% NS  mg Every 12 Hours 1/17/2018 1/24/2018    Sig - Route: Infuse 250 mL into a " "venous catheter Every 12 (Twelve) Hours. - Intravenous    Linked Group 1:  \"And\" Linked Group Details        DULoxetine (CYMBALTA) DR capsule 30 mg 30 mg Daily 1/18/2018     Sig - Route: Take 1 capsule by mouth Daily. - Oral    enoxaparin (LOVENOX) syringe 40 mg 40 mg Every 24 Hours 1/18/2018     Sig - Route: Inject 0.4 mL under the skin Daily. - Subcutaneous    gabapentin (NEURONTIN) capsule 300 mg 300 mg Every 8 Hours Scheduled 1/17/2018     Sig - Route: Take 1 capsule by mouth Every 8 (Eight) Hours. - Oral    glucagon (human recombinant) (GLUCAGEN DIAGNOSTIC) injection 1 mg 1 mg Every 15 Minutes PRN 1/18/2018     Sig - Route: Inject 1 mg under the skin Every 15 (Fifteen) Minutes As Needed (Blood Glucose Less Than 70 - Patient Without IV Access - Unresponsive, NPO or Unable To Safely Swallow). - Subcutaneous    influenza vac split quad (FLUZONE QUADRIVALENT) IM suspension 0.5 mL 0.5 mL During Hospitalization 1/17/2018     Sig - Route: Inject 0.5 mL into the shoulder, thigh, or buttocks During Hospitalization for Immunization. - Intramuscular    Cosign for Ordering: Accepted by Alirio Hager MD on 1/17/2018  8:52 PM    insulin aspart (novoLOG) injection 0-24 Units 0-24 Units 4 Times Daily Before Meals & Nightly 1/18/2018     Sig - Route: Inject 0-24 Units under the skin 4 (Four) Times a Day Before Meals & at Bedtime. - Subcutaneous    ipratropium-albuterol (DUO-NEB) nebulizer solution 3 mL 3 mL 4 Times Daily - RT 1/17/2018     Sig - Route: Take 3 mL by nebulization 4 (Four) Times a Day. - Nebulization    losartan (COZAAR) half tablet 12.5 mg 12.5 mg Every 24 Hours Scheduled 1/21/2018     Sig - Route: Take 1 half tablet by mouth Daily. - Oral    metoprolol tartrate (LOPRESSOR) tablet 25 mg 25 mg Every 12 Hours Scheduled 1/20/2018     Sig - Route: Take 1 tablet by mouth Every 12 (Twelve) Hours. - Oral    naphazoline-pheniramine (NAPHCON-A) 0.025-0.3 % ophthalmic solution 1 drop 1 drop 4 Times Daily PRN 1/17/2018  "    Sig - Route: Administer 1 drop to the right eye 4 (Four) Times a Day As Needed for Irritation. - Right Eye    nicotine (NICODERM CQ) 21 MG/24HR patch 1 patch 1 patch Every 24 Hours 1/20/2018     Sig - Route: Place 1 patch on the skin Daily. - Transdermal    pantoprazole (PROTONIX) EC tablet 40 mg 40 mg Every Morning 1/18/2018     Sig - Route: Take 1 tablet by mouth Every Morning. - Oral    pneumococcal polysaccharide 23-valent (PNEUMOVAX-23) vaccine 0.5 mL 0.5 mL During Hospitalization 1/17/2018     Sig - Route: Inject 0.5 mL into the shoulder, thigh, or buttocks During Hospitalization for Immunization. - Intramuscular    Cosign for Ordering: Accepted by Alirio Hager MD on 1/17/2018  8:52 PM    sodium chloride 0.9 % flush 1-10 mL 1-10 mL As Needed 1/17/2018     Sig - Route: Infuse 1-10 mL into a venous catheter As Needed for Line Care. - Intravenous    sodium chloride 0.9 % flush 10 mL 10 mL As Needed 1/17/2018     Sig - Route: Infuse 10 mL into a venous catheter As Needed for Line Care. - Intravenous    Cosign for Ordering: Accepted by Dante Iverson MD on 1/17/2018  1:37 PM             Physician Progress Notes (last 72 hours) (Notes from 1/19/2018  1:12 PM through 1/22/2018  1:12 PM)      Marcos Brice MD at 1/20/2018 12:30 PM  Version 1 of 1                [facility] Medicine Services  INPATIENT PROGRESS NOTE     LOS: 3 days   Patient Care Team:  Harpreet Bass MD as PCP - General    Chief Complaint:  Acute respiratory failure with hypoxia and hypercapnia      Subjective     Interval History:   Patient is seen for follow-up today.  He is doing better, less short of air, less deconditioned and his oxygen requirement is down to 3 L nasal prong and maintaining saturation of 93-95%.  He remains on nightly BiPAP and  voices no new complaints.      Patient Complaints: As above    History taken from: Patient    Review of Systems:    Review of Systems    Constitutional: Negative for activity change, appetite change, chills, diaphoresis, fatigue and fever.   HENT: Negative for trouble swallowing and voice change.    Eyes: Negative for photophobia and visual disturbance.   Respiratory: Positive for cough and shortness of breath. Negative for choking, chest tightness, wheezing and stridor.    Cardiovascular: Negative for chest pain, palpitations and leg swelling.   Gastrointestinal: Negative for abdominal distention, abdominal pain, blood in stool, constipation, diarrhea, nausea and vomiting.   Endocrine: Negative for cold intolerance, heat intolerance, polydipsia, polyphagia and polyuria.   Genitourinary: Negative for decreased urine volume, difficulty urinating, dysuria, enuresis, flank pain, frequency, hematuria and urgency.   Musculoskeletal: Negative for arthralgias, gait problem, myalgias, neck pain and neck stiffness.   Skin: Negative for pallor, rash and wound.   Neurological: Negative for dizziness, tremors, seizures, syncope, facial asymmetry, speech difficulty, weakness, light-headedness, numbness and headaches.   Hematological: Does not bruise/bleed easily.   Psychiatric/Behavioral: Negative for agitation, behavioral problems and confusion.         Objective     Vital Signs  Temp:  [97 °F (36.1 °C)-98.4 °F (36.9 °C)] 97.1 °F (36.2 °C)  Heart Rate:  [] 87  Resp:  [16-25] 18  BP: (106-132)/(57-79) 125/79    Physical Exam:   Physical Exam   Constitutional: He is oriented to person, place, and time. He appears well-developed and well-nourished. No distress.   Patient is morbidly obese.   HENT:   Head: Normocephalic and atraumatic.   Eyes: EOM are normal. Pupils are equal, round, and reactive to light. No scleral icterus.   Neck: Normal range of motion. Neck supple. No JVD present. No thyromegaly present.   Cardiovascular: Normal rate, regular rhythm and normal heart sounds.  Exam reveals no gallop and no friction rub.    No murmur  heard.  Pulmonary/Chest: Effort normal. He has decreased breath sounds. He has no wheezes. He has rhonchi. He has no rales. He exhibits no tenderness.   He has distant breath sounds in the bases.   Abdominal: Soft. Bowel sounds are normal. He exhibits no distension and no mass. There is no tenderness. There is no rebound and no guarding.   Musculoskeletal: He exhibits no edema, tenderness or deformity.   Neurological: He is alert and oriented to person, place, and time. No cranial nerve deficit. He exhibits normal muscle tone. Coordination normal.   Skin: Skin is warm and dry. No rash noted. He is not diaphoretic. No erythema. No pallor.   Psychiatric: He has a normal mood and affect. His behavior is normal. Judgment and thought content normal.   Nursing note and vitals reviewed.         Results Review:         Results from last 7 days  Lab Units 01/20/18  0628 01/19/18  0825 01/18/18  0658 01/18/18  0640 01/18/18  0346 01/17/18  1638 01/17/18  1423 01/17/18  1334   SODIUM mmol/L 140 137 137  --   --   --   --  137   SODIUM, ARTERIAL mmol/L  --   --   --  136.3* 140.6 138.2 138.1  --    POTASSIUM mmol/L 4.7 4.8 5.0  --   --   --   --  5.4*   CHLORIDE mmol/L 97 96 95  --   --   --   --  92*   CO2 mmol/L 35.0* 32.0* 36.0*  --   --   --   --  36.0*   BUN mg/dL 27* 41* 28*  --   --   --   --  37*   CREATININE mg/dL 1.00 1.33* 0.97  --   --   --   --  1.16   GLUCOSE mg/dL 143* 192* 172*  --   --   --   --  147*   GLUCOSE, ARTERIAL mmol/L  --   --   --  173 218 147 144  --    CALCIUM mg/dL 8.2* 8.8 8.8  --   --   --   --  9.1   BILIRUBIN mg/dL  --   --   --   --   --   --   --  0.4   ALK PHOS U/L  --   --   --   --   --   --   --  92   ALT (SGPT) U/L  --   --   --   --   --   --   --  52   AST (SGOT) U/L  --   --   --   --   --   --   --  44               Results from last 7 days  Lab Units 01/20/18  0628 01/19/18  0825 01/18/18  0658 01/17/18  1334   WBC 10*3/mm3 7.12 7.81 7.71 8.01   HEMOGLOBIN g/dL 12.8* 13.7 13.9 14.1    HEMATOCRIT % 45.0 46.2 47.1 47.3   PLATELETS 10*3/mm3 245 314 285 294       Lab Results   Component Value Date    CKTOTAL 70 01/17/2018    CKMB 1.74 01/17/2018    TROPONINI 0.221 (C) 01/18/2018       pH, Arterial   Date Value Ref Range Status   01/18/2018 7.392 7.350 - 7.450 pH units Final     CO2   Date Value Ref Range Status   01/20/2018 35.0 (H) 22.0 - 31.0 mmol/L Final         Results from last 7 days  Lab Units 01/17/18  1334   INR  1.03        Imaging Results (last 7 days)     Procedure Component Value Units Date/Time    XR Chest 1 View [[id number]] Collected:  01/17/18 1330     Updated:  01/17/18 1354    Narrative:       Chest single view.       CLINICAL INDICATION: Shortness of breath. Simple sepsis protocol.    COMPARISON: April 4, 2013.    FINDINGS: Cardiomegaly. Slight prominence of the pulmonary  vasculature. Lungs otherwise clear.      Impression:       CONCLUSION: Cardiomegaly. Slight prominence of the pulmonary  vasculature. Otherwise unremarkable chest.    Electronically signed by:  Ruel Perry MD  1/17/2018 1:53 PM CST  Workstation: [email or web address]    CT Head Without Contrast [[id number]] Collected:  01/17/18 1515     Updated:  01/17/18 1538    Narrative:       Noncontrast CT examination of the brain.    INDICATION: Alteration of mental status. Decreased level of  consciousness. Evaluate for stroke    Technique: Axial 5 mm contiguous images with brain parenchymal  and bone windows    This exam was performed according to our departmental  dose-optimization program, which includes automated exposure  control, adjustment of the mA and/or kV according to patient size  and/or use of iterative reconstruction technique.    Prior relevant examination: MRI brain June 26, 2013.  Prominent cisterna magna, normal variant.  Brain parenchyma appears within normal limits. Ventricles are  within normal limits in size. No evidence of abnormal mass or  calcification is seen. No evidence of acute hemorrhage is noted.  Chronic  opacification left maxillary sinus, unchanged since prior  exam.    Impression:       CONCLUSION:   1. Normal brain for age. No acute changes are present.        Electronically signed by:  Ruel Perry MD  1/17/2018 3:36 PM CST  Workstation: [id number]    CT Angiogram Chest With Contrast [[id number]] Collected:  01/17/18 1727     Updated:  01/17/18 1757    Narrative:       [facility] [place]    Patient Name: GIFTY AMADOR    ORDERING: CA MONZON    ATTENDING: VANESSA SAUL     REFERRING: CA MONZON    -----------------------    EXAM DESCRIPTION: CT ANGIOGRAM CHEST W CONTRAST    CLINICAL HISTORY:  soa, J96.01 Acute respiratory failure with  hypoxia J96.02 Acute respiratory failure with hypercapnia I50.20  Unspecified systolic (congestive) heart failure J32.0 Chronic  maxillary sinusitis I21.4 Non-ST elevation (NSTEMI) myocardial  infarction       COMPARISON: Chest x-ray 1/17/2018    TECHNIQUE:   Axial images were obtained and multiplanar reconstructions were  made.    This exam was performed according to our departmental dose  optimization program, which includes automated exposure control,  adjustment of the mA and/or KV according to patient size and/or  use of iterative reconstruction technique.  Dose Length Product 560.40  CONTRAST:   81 cc intravenous Isovue 370    FINDINGS:  Diagnostic quality: Adequate .  Pulmonary embolism: No CT evidence of pulmonary embolism.  Pulmonary arteries:  Normal in caliber.  Lung parenchyma: There is dependent atelectasis bilaterally there  is an approximate 1.5 cm rounded opacity within the superior  segment of the left lower lobe seen posteriorly abutting the  posterior peripheral margin (series 4 image 78, series 6 image  129). It is uncertain if this represents a rounded area of  atelectasis, pneumonitis, or a noncalcified nodule.  Pleural effusion: No pleural effusion or pneumothorax..  Central airways: Patent.  Adenopathy: No suspicious mediastinal, hilar, or  axillary lymph  nodes based on size or morphologic criteria.  Heart and great vessels: There is cardiac enlargement with a very  thin rim of pericardial fluid. No thoracic aortic aneurysm.  Upper abdomen: No acute finding.    Bones: No acute findings.    Additional findings: None.      Impression:       No CT evidence of pulmonary embolism.    There is dependent atelectasis without dense parenchymal  consolidation or pleural effusion.    There is a 1.5 cm nodular opacity superior segment of the left  lower lobe that favors a rounded area of atelectasis or  pneumonitis. However, recommend follow-up CT in three months to  confirm this suspicion and exclude the unlikely possibility this  is a noncalcified pulmonary nodule.    Electronically signed by:  Gio Rivers MD  1/17/2018 5:56 PM  CST Workstation: [id number]           Blood Culture   Date Value Ref Range Status   01/17/2018 No growth at 24 hours  Preliminary                             Medication Review:   Current Facility-Administered Medications   Medication Dose Route Frequency Provider Last Rate Last Dose   • acetaminophen (TYLENOL) tablet 650 mg  650 mg Oral Q4H PRN Jamison Medley MD       • acetylcysteine (MUCOMYST) 10 % solution 6 mL  6 mL Oral Q12H Arpan Ferrell MD   6 mL at 01/20/18 1049   • aspirin tablet 325 mg  325 mg Oral Daily Jamison Medley MD   325 mg at 01/20/18 0836   • atorvastatin (LIPITOR) tablet 10 mg  10 mg Oral Daily Jamison Medley MD   10 mg at 01/20/18 0836   • budesonide-formoterol (SYMBICORT) 160-4.5 MCG/ACT inhaler 2 puff  2 puff Inhalation BID - RT Marcos Brice MD       • cefTRIAXone (ROCEPHIN) in NS 2 GRAMS/20ml IV PUSH syringe  2 g Intravenous Q24H Jamison Medley MD   2 g at 01/19/18 2127    And   • doxycycline (VIBRAMYCIN) 100 mg/250 mL 0.9% NS VTB  100 mg Intravenous Q12H Jamison Medley MD   100 mg at 01/20/18 0953   • clopidogrel (PLAVIX) tablet 75 mg  75 mg Oral Daily Jamison Medley MD   75 mg at 01/20/18 0836   • dextrose  (D50W) solution 25 g  25 g Intravenous Q15 Min PRN Sarbjit Nunes MD       • dextrose (GLUTOSE) oral gel 15 g  15 g Oral Q15 Min PRN Sarbjit Nunse MD       • DULoxetine (CYMBALTA) DR capsule 30 mg  30 mg Oral Daily Jamison Medley MD   30 mg at 01/20/18 0836   • enoxaparin (LOVENOX) syringe 40 mg  40 mg Subcutaneous Q24H Jamison Medley MD   40 mg at 01/20/18 0836   • gabapentin (NEURONTIN) capsule 300 mg  300 mg Oral Q8H Jamison Medley MD   300 mg at 01/20/18 0633   • glucagon (human recombinant) (GLUCAGEN DIAGNOSTIC) injection 1 mg  1 mg Subcutaneous Q15 Min PRN Sarbjit Nunes MD       • influenza vac split quad (FLUZONE QUADRIVALENT) IM suspension 0.5 mL  0.5 mL Intramuscular During Hospitalization Alirio Hager MD       • insulin aspart (novoLOG) injection 0-24 Units  0-24 Units Subcutaneous 4x Daily AC & at Bedtime Sarbjit Nunes MD   4 Units at 01/20/18 1049   • ipratropium-albuterol (DUO-NEB) nebulizer solution 3 mL  3 mL Nebulization 4x Daily - RT Dante Iverson MD   3 mL at 01/20/18 0716   • naphazoline-pheniramine (NAPHCON-A) 0.025-0.3 % ophthalmic solution 1 drop  1 drop Right Eye 4x Daily PRN Jamison Medley MD       • pantoprazole (PROTONIX) EC tablet 40 mg  40 mg Oral QAM Jamison Medley MD   40 mg at 01/20/18 0633   • pneumococcal polysaccharide 23-valent (PNEUMOVAX-23) vaccine 0.5 mL  0.5 mL Intramuscular During Hospitalization Alirio Hager MD       • predniSONE (DELTASONE) tablet 20 mg  20 mg Oral Daily With Breakfast Sarbjit Nunes MD   20 mg at 01/20/18 0836   • sodium chloride 0.9 % flush 1-10 mL  1-10 mL Intravenous PRN Jamison Medley MD       • sodium chloride 0.9 % flush 10 mL  10 mL Intravenous PRN Dante Iverson MD             Assessment/Plan     Principal Problem:    Acute respiratory failure with hypoxia and hypercapnia  Active Problems:    Chronic bronchitis with acute exacerbation    Hypertension    Diabetes mellitus    Morbid obesity    Coronary artery disease    Obstructive apnea    1.   Acute hypoxic/ hypercapnic respiratory failure: Improving.  Continue supplemental oxygen, bronchodilators, empiric antimicrobial therapy and steroids.  Continue oxygen weaning but he may need chronic supplemental oxygen if attempts at weaning are unsuccessful.  2.  History of nicotine dependence: Continue nicotine patch.  Patient has been advised on the need to quit smoking.  3.  Non-STEMI: Continue current guidelines directed medical therapy.  Result of echocardiogram has been reviewed and the cardiologist is following.  Patient is tentatively scheduled for cardiac catheterization.  4.  Diabetes mellitus: Continue anti-glycemic, Accu-Chek and sliding scale insulin.  5.  Acute kidney injury: Likely contrast induced.  Resolved.   6.  Possible obstructive sleep apnea: Patient will need sleep study as outpatient.  7.  Continue GI and DVT prophylaxis.               Marcos Brice MD  01/20/18      EMR Dragon/Transcription disclaimer:   Much of this encounter note is an electronic transcription/translation of spoken language to printed text. The electronic translation of spoken language may permit erroneous, or at times, nonsensical words or phrases to be inadvertently transcribed; Although I have reviewed the note for such errors, some may still exist.                 Electronically signed by Marcos Brice MD at 1/20/2018 12:36 PM      Arpan Ferrell MD at 1/20/2018  8:31 PM  Version 1 of 1         Cardiology Progress Note:     LOS: 3 days   Patient Care Team:  Harpreet Bass MD as PCP - General      Subjective:    Chart reviewed , patient seen and examined. Patient denies any chest pain, shortness of breath palpitation.  Patient on admission had altered mental status with multiple risk factors for coronary artery disease.  Patient has no previous history of documented coronary artery disease but does have history of peripheral vascular disease with cerebrovascular accident history of arterial  hypertension, hypertensive heart disease, diabetes, hyperlipidemia, obesity,.  Patient had positive troponin and electrocardiographic changes suggestive of ischemia.  Patient had chronic kidney disease.  Patient electrocardiographic changes were suggestive of ischemia and patient was initially recommended to consider a coronary angiogram.  As the patient is concerned about cerebrovascular accident and currently not having any symptoms of substernal chest pain with abnormal resting electrocardiogram and indeterminate troponin patient was recommended to undergo a CTA of the coronaries to further evaluate the coronary anatomy.  Risk-benefit treatment option for the CTA of the coronaries were discussed with the patient and the family and an informed consent was obtained.              Objective:     Objective:  Vitals:    01/20/18 2016   BP: 147/77   Pulse: 86   Resp: 20   Temp: 97.9 °F (36.6 °C)   SpO2: 96%       Intake/Output Summary (Last 24 hours) at 01/20/18 2031  Last data filed at 01/20/18 1700   Gross per 24 hour   Intake          1938.33 ml   Output                0 ml   Net          1938.33 ml             Physical Exam:   General Appearance:    Alert, oriented, cooperative, in no acute distress   Head:    Normocephalic, atraumatic, without obvious abnormality   Eyes:           MARCELO  Lids and lashes normal, conjunctivae and sclerae normal, no icterus, no pallor   Ears:    Ears appear intact with no abnormalities noted   Throat:   Mucous membranes pink and moist   Neck:   Supple, trachea midline, no carotid bruit, no organomegaly or JVD   Lungs:     Clear to auscultation and percussion, respirations regular, even and Unlabored. No wheezes, rales, rhonchi    Heart:    Regular rhythm and normal rate, normal S1 and S2, no            murmur, no gallop, no rub, no click   Abdomen:     Soft, non-tender, non-distended, no guarding, no rebound tenderness, Normal bowel sounds in all four quadrant, no masses, liver and  spleen nonpalpable,    Genitalia:    Deferred   Extremities:   Moves all extremities well, no edema, no cyanosis, no              Redness, no clubbing   Pulses:   Pulses palpable and equal bilaterally   Skin:   Moist and warm. No bleeding, bruising or rash   Neurologic/Psychiatric:   Alert and oriented to person, place, and time.  Motor, power and tone in upper and lower extremity is grossly intact.  No focal neurological deficits. Normal cognitive function. No psychomotor reaction or tangential thought. No depression, homicidal ideations and suicidal ideations            Results Review:    Lab Results (last 24 hours)     Procedure Component Value Units Date/Time    POC Glucose Once [[id number]]  (Abnormal) Collected:  01/19/18 1951    Specimen:  Blood Updated:  01/19/18 2100     Glucose 246 (H) mg/dL       RN NotifiedMeter: [id number]: [id number] ANSHUL ARLENE       CBC & Differential [[id number]] Collected:  01/20/18 0628    Specimen:  Blood Updated:  01/20/18 0712    Narrative:       The following orders were created for panel order CBC & Differential.  Procedure                               Abnormality         Status                     ---------                               -----------         ------                     CBC Auto Differential[[id number]]        Abnormal            Final result                 Please view results for these tests on the individual orders.    CBC Auto Differential [[id number]]  (Abnormal) Collected:  01/20/18 0628    Specimen:  Blood Updated:  01/20/18 0712     WBC 7.12 10*3/mm3      RBC 4.40 10*6/mm3      Hemoglobin 12.8 (L) g/dL      Hematocrit 45.0 %      .3 (H) fL      MCH 29.1 pg      MCHC 28.4 (L) g/dL      RDW 15.8 (H) %      RDW-SD 59.4 (H) fl      MPV 10.5 fL      Platelets 245 10*3/mm3      Neutrophil % 69.2 %      Lymphocyte % 20.5 %      Monocyte % 8.8 %      Eosinophil % 1.0 %      Basophil % 0.1 %      Immature Grans % 0.4 %      Neutrophils, Absolute  4.92 10*3/mm3      Lymphocytes, Absolute 1.46 10*3/mm3      Monocytes, Absolute 0.63 10*3/mm3      Eosinophils, Absolute 0.07 10*3/mm3      Basophils, Absolute 0.01 10*3/mm3      Immature Grans, Absolute 0.03 (H) 10*3/mm3     Basic Metabolic Panel [[id number]]  (Abnormal) Collected:  01/20/18 0628    Specimen:  Blood Updated:  01/20/18 0718     Glucose 143 (H) mg/dL      BUN 27 (H) mg/dL      Creatinine 1.00 mg/dL      Sodium 140 mmol/L      Potassium 4.7 mmol/L      Chloride 97 mmol/L      CO2 35.0 (H) mmol/L      Calcium 8.2 (L) mg/dL      eGFR Non African Amer 76 mL/min/1.73      BUN/Creatinine Ratio 27.0 (H)     Anion Gap 8.0 mmol/L     POC Glucose Once [[id number]]  (Abnormal) Collected:  01/20/18 0750    Specimen:  Blood Updated:  01/20/18 0822     Glucose 167 (H) mg/dL       RN NotifiedMeter: [id number]: [id number] REYNOLD NEISHA       POC Glucose Once [[id number]]  (Abnormal) Collected:  01/20/18 1035    Specimen:  Blood Updated:  01/20/18 1051     Glucose 177 (H) mg/dL       RN NotifiedMeter: [id number]: [id number] REYNOLD NEISHA       Blood Culture - Blood, [[id number]]  (Normal) Collected:  01/17/18 1334    Specimen:  Blood from Arm, Right Updated:  01/20/18 1401     Blood Culture No growth at 3 days    Blood Culture - Blood, [[id number]]  (Normal) Collected:  01/17/18 1611    Specimen:  Blood from Arm, Right Updated:  01/20/18 1616     Blood Culture No growth at 3 days    POC Glucose Once [[id number]]  (Abnormal) Collected:  01/20/18 1633    Specimen:  Blood Updated:  01/20/18 1648     Glucose 209 (H) mg/dL       RN NotifiedMeter: [id number]: [id number] REYNOLD NEISHA       POC Glucose Once [[id number]]  (Abnormal) Collected:  01/20/18 1947    Specimen:  Blood Updated:  01/20/18 2024     Glucose 283 (H) mg/dL       RN NotifiedMeter: [id number]: [id number] ANSHUL JACOBS              Medication Review:   Current Facility-Administered Medications   Medication Dose Route  Frequency Provider Last Rate Last Dose   • acetaminophen (TYLENOL) tablet 650 mg  650 mg Oral Q4H PRN Jamison Medley MD       • acetylcysteine (MUCOMYST) 10 % solution 6 mL  6 mL Oral Q12H Arpan Ferrell MD   6 mL at 01/20/18 1049   • aspirin tablet 325 mg  325 mg Oral Daily Jamison Medley MD   325 mg at 01/20/18 0836   • atorvastatin (LIPITOR) tablet 10 mg  10 mg Oral Daily Jamison Medley MD   10 mg at 01/20/18 0836   • budesonide-formoterol (SYMBICORT) 160-4.5 MCG/ACT inhaler 2 puff  2 puff Inhalation BID - RT Marcos Brice MD   2 puff at 01/20/18 1944   • cefTRIAXone (ROCEPHIN) in NS 2 GRAMS/20ml IV PUSH syringe  2 g Intravenous Q24H Jamison Medley MD   2 g at 01/19/18 2127    And   • doxycycline (VIBRAMYCIN) 100 mg/250 mL 0.9% NS VTB  100 mg Intravenous Q12H Jamison Medley MD   100 mg at 01/20/18 0953   • clopidogrel (PLAVIX) tablet 75 mg  75 mg Oral Daily Jamison Medley MD   75 mg at 01/20/18 0836   • dextrose (D50W) solution 25 g  25 g Intravenous Q15 Min PRN Sarbjit Nunes MD       • dextrose (GLUTOSE) oral gel 15 g  15 g Oral Q15 Min PRN Sarbjit Nunes MD       • DULoxetine (CYMBALTA) DR capsule 30 mg  30 mg Oral Daily Jamison Medley MD   30 mg at 01/20/18 0836   • enoxaparin (LOVENOX) syringe 40 mg  40 mg Subcutaneous Q24H Jamison Medley MD   40 mg at 01/20/18 0836   • gabapentin (NEURONTIN) capsule 300 mg  300 mg Oral Q8H Jamison Medley MD   300 mg at 01/20/18 1328   • glucagon (human recombinant) (GLUCAGEN DIAGNOSTIC) injection 1 mg  1 mg Subcutaneous Q15 Min PRN Sarbjit Nunes MD       • influenza vac split quad (FLUZONE QUADRIVALENT) IM suspension 0.5 mL  0.5 mL Intramuscular During Hospitalization Alirio Hager MD       • insulin aspart (novoLOG) injection 0-24 Units  0-24 Units Subcutaneous 4x Daily AC & at Bedtime Sarbjit Nunes MD   8 Units at 01/20/18 1705   • ipratropium-albuterol (DUO-NEB) nebulizer solution 3 mL  3 mL Nebulization 4x Daily - RT Dante Iverson MD   3 mL at 01/20/18 1942   •  naphazoline-pheniramine (NAPHCON-A) 0.025-0.3 % ophthalmic solution 1 drop  1 drop Right Eye 4x Daily PRN Jamison Medley MD   1 drop at 01/20/18 1328   • nicotine (NICODERM CQ) 21 MG/24HR patch 1 patch  1 patch Transdermal Q24H Marcos Brice MD   1 patch at 01/20/18 1543   • pantoprazole (PROTONIX) EC tablet 40 mg  40 mg Oral QAM Jamison Medley MD   40 mg at 01/20/18 0633   • pneumococcal polysaccharide 23-valent (PNEUMOVAX-23) vaccine 0.5 mL  0.5 mL Intramuscular During Hospitalization Alirio Hager MD       • predniSONE (DELTASONE) tablet 20 mg  20 mg Oral Daily With Breakfast Sarbjit Nunes MD   20 mg at 01/20/18 0836   • sodium chloride 0.9 % flush 1-10 mL  1-10 mL Intravenous PRN Jamison Medley MD       • sodium chloride 0.9 % flush 10 mL  10 mL Intravenous PRN Dante Iverson MD           Assessment and Plan:    Principal Problem:    Acute respiratory failure with hypoxia and hypercapnia  Active Problems:    Chronic bronchitis with acute exacerbation    Hypertension    Diabetes mellitus    Morbid obesity    Coronary artery disease    Obstructive apnea  1.  Indeterminate troponin with abnormal resting electrocardiogram suggestive of anterolateral wall ischemia.  Patient has not complained of having any symptoms of chest pain.  Patient at the present time has been recommended to undergo a CTA of the coronaries to further evaluate the coronaries.  Patient would receive gentle hydration along with Mucomyst and would administer metoprolol 25 mg every 12 hours.  Patient does not have any allergy to IVP dye.  2.  Labile arterial blood pressure patient would be started on metoprolol and would benefit from angiotensin receptor blocker in view of the patient history of diabetes.  Patient would be started on losartan 12.5 mg daily.  3.  Hyperlipidemia.  Patient has been counseled on low-fat low-cholesterol diet. An would be continued on the present dose of the Lipitor.  4.  Obesity.  Patient had been counseled on  weight reduction lifestyle modification and dietary restriction.  5.  History of cerebrovascular accident.  Patient did not have any residual deficit and has been followed and is on antiplatelet therapy aspirin and Plavix.  6.  Altered mental status which was most likely secondary to hypercapnia has resolved.    The above plan of management were discussed with the patient and the family at length.            Arpan Ferrell MD  01/20/18  8:31 PM      Time: Time spent on face-to-face interaction 20 minutes    Dictated utilizing Dragon dictation.        Electronically signed by Arpan Ferrell MD at 1/20/2018  9:45 PM      Marcos Brice MD at 1/21/2018 11:50 AM  Version 1 of 1                [facility]  INPATIENT PROGRESS NOTE     LOS: 4 days   Patient Care Team:  Harpreet Bass MD as PCP - General    Chief Complaint:  Acute respiratory failure with hypoxia and hypercapnia      Subjective     Interval History:   Patient is seen for follow-up today.  He continues to improve, less short of air, less deconditioned and his oxygen requirement is down to 3 L nasal prong and maintaining saturation of 94-96%.  He remains on nightly BiPAP and  voices no new complaints.      Patient Complaints: As above    History taken from: Patient    Review of Systems:    Review of Systems   Constitutional: Negative for activity change, appetite change, chills, diaphoresis, fatigue and fever.   HENT: Negative for trouble swallowing and voice change.    Eyes: Negative for photophobia and visual disturbance.   Respiratory: Positive for shortness of breath. Negative for cough, choking, chest tightness, wheezing and stridor.    Cardiovascular: Negative for chest pain, palpitations and leg swelling.   Gastrointestinal: Negative for abdominal distention, abdominal pain, blood in stool, constipation, diarrhea, nausea and vomiting.   Endocrine: Negative for cold intolerance, heat  intolerance, polydipsia, polyphagia and polyuria.   Genitourinary: Negative for decreased urine volume, difficulty urinating, dysuria, enuresis, flank pain, frequency, hematuria and urgency.   Musculoskeletal: Negative for arthralgias, gait problem, myalgias, neck pain and neck stiffness.   Skin: Negative for pallor, rash and wound.   Neurological: Negative for dizziness, tremors, seizures, syncope, facial asymmetry, speech difficulty, weakness, light-headedness, numbness and headaches.   Hematological: Does not bruise/bleed easily.   Psychiatric/Behavioral: Negative for agitation, behavioral problems and confusion.         Objective     Vital Signs  Temp:  [97.6 °F (36.4 °C)-98.7 °F (37.1 °C)] 98.7 °F (37.1 °C)  Heart Rate:  [70-93] 73  Resp:  [18-24] 18  BP: (122-157)/(68-87) 138/74    Physical Exam:   Physical Exam   Constitutional: He is oriented to person, place, and time. He appears well-developed and well-nourished. No distress.   Patient is morbidly obese.   HENT:   Head: Normocephalic and atraumatic.   Eyes: EOM are normal. Pupils are equal, round, and reactive to light. No scleral icterus.   Neck: Normal range of motion. Neck supple. No JVD present. No thyromegaly present.   Cardiovascular: Normal rate, regular rhythm and normal heart sounds.  Exam reveals no gallop and no friction rub.    No murmur heard.  Pulmonary/Chest: Effort normal. He has decreased breath sounds. He has no wheezes. He has rhonchi. He has no rales. He exhibits no tenderness.   He has distant breath sounds at the bases.   Abdominal: Soft. Bowel sounds are normal. He exhibits no distension and no mass. There is no tenderness. There is no rebound and no guarding.   Musculoskeletal: He exhibits no edema, tenderness or deformity.   Neurological: He is alert and oriented to person, place, and time. No cranial nerve deficit. He exhibits normal muscle tone. Coordination normal.   Skin: Skin is warm and dry. No rash noted. He is not  diaphoretic. No erythema. No pallor.   Psychiatric: He has a normal mood and affect. His behavior is normal. Judgment and thought content normal.   Nursing note and vitals reviewed.         Results Review:         Results from last 7 days  Lab Units 01/21/18  0556 01/20/18  0628 01/19/18  0825 01/18/18  0658 01/18/18  0640 01/18/18  0346 01/17/18  1638  01/17/18  1334   SODIUM mmol/L 139 140 137 137  --   --   --   --  137   SODIUM, ARTERIAL mmol/L  --   --   --   --  136.3* 140.6 138.2  < >  --    POTASSIUM mmol/L 4.4 4.7 4.8 5.0  --   --   --   --  5.4*   CHLORIDE mmol/L 97 97 96 95  --   --   --   --  92*   CO2 mmol/L 35.0* 35.0* 32.0* 36.0*  --   --   --   --  36.0*   BUN mg/dL 22* 27* 41* 28*  --   --   --   --  37*   CREATININE mg/dL 0.97 1.00 1.33* 0.97  --   --   --   --  1.16   GLUCOSE mg/dL 148* 143* 192* 172*  --   --   --   --  147*   GLUCOSE, ARTERIAL mmol/L  --   --   --   --  173 218 147  < >  --    CALCIUM mg/dL 8.5 8.2* 8.8 8.8  --   --   --   --  9.1   BILIRUBIN mg/dL  --   --   --   --   --   --   --   --  0.4   ALK PHOS U/L  --   --   --   --   --   --   --   --  92   ALT (SGPT) U/L  --   --   --   --   --   --   --   --  52   AST (SGOT) U/L  --   --   --   --   --   --   --   --  44   < > = values in this interval not displayed.            Results from last 7 days  Lab Units 01/21/18  0556 01/20/18  0628 01/19/18  0825 01/18/18  0658 01/17/18  1334   WBC 10*3/mm3 6.68 7.12 7.81 7.71 8.01   HEMOGLOBIN g/dL 13.0* 12.8* 13.7 13.9 14.1   HEMATOCRIT % 44.2 45.0 46.2 47.1 47.3   PLATELETS 10*3/mm3 279 245 314 285 294       Lab Results   Component Value Date    CKTOTAL 70 01/17/2018    CKMB 1.74 01/17/2018    TROPONINI 0.221 (C) 01/18/2018       CO2   Date Value Ref Range Status   01/21/2018 35.0 (H) 22.0 - 31.0 mmol/L Final         Results from last 7 days  Lab Units 01/17/18  1334   INR  1.03        Imaging Results (last 7 days)     Procedure Component Value Units Date/Time    XR Chest 1 View  [[id number]] Collected:  01/17/18 1330     Updated:  01/17/18 1354    Narrative:       Chest single view.       CLINICAL INDICATION: Shortness of breath. Simple sepsis protocol.    COMPARISON: April 4, 2013.    FINDINGS: Cardiomegaly. Slight prominence of the pulmonary  vasculature. Lungs otherwise clear.      Impression:       CONCLUSION: Cardiomegaly. Slight prominence of the pulmonary  vasculature. Otherwise unremarkable chest.    Electronically signed by:  Ruel Perry MD  1/17/2018 1:53 PM CST  Workstation: [employer]    CT Head Without Contrast [[id number]] Collected:  01/17/18 1515     Updated:  01/17/18 1538    Narrative:       Noncontrast CT examination of the brain.    INDICATION: Alteration of mental status. Decreased level of  consciousness. Evaluate for stroke    Technique: Axial 5 mm contiguous images with brain parenchymal  and bone windows    This exam was performed according to our departmental  dose-optimization program, which includes automated exposure  control, adjustment of the mA and/or kV according to patient size  and/or use of iterative reconstruction technique.    Prior relevant examination: MRI brain June 26, 2013.  Prominent cisterna magna, normal variant.  Brain parenchyma appears within normal limits. Ventricles are  within normal limits in size. No evidence of abnormal mass or  calcification is seen. No evidence of acute hemorrhage is noted.  Chronic opacification left maxillary sinus, unchanged since prior  exam.    Impression:       CONCLUSION:   1. Normal brain for age. No acute changes are present.        Electronically signed by:  Ruel Perry MD  1/17/2018 3:36 PM CST  Workstation: [employer]    CT Angiogram Chest With Contrast [[id number]] Collected:  01/17/18 1727     Updated:  01/17/18 1757    Narrative:       [facility] [place]    Patient Name: GIFTY AMADOR    ORDERING: CA MONZON    ATTENDING: VANESSA SAUL     REFERRING: CA MONZON    -----------------------    EXAM  DESCRIPTION: CT ANGIOGRAM CHEST W CONTRAST    CLINICAL HISTORY:  myesha, J96.01 Acute respiratory failure with  hypoxia J96.02 Acute respiratory failure with hypercapnia I50.20  Unspecified systolic (congestive) heart failure J32.0 Chronic  maxillary sinusitis I21.4 Non-ST elevation (NSTEMI) myocardial  infarction       COMPARISON: Chest x-ray 1/17/2018    TECHNIQUE:   Axial images were obtained and multiplanar reconstructions were  made.    This exam was performed according to our departmental dose  optimization program, which includes automated exposure control,  adjustment of the mA and/or KV according to patient size and/or  use of iterative reconstruction technique.  Dose Length Product 560.40  CONTRAST:   81 cc intravenous Isovue 370    FINDINGS:  Diagnostic quality: Adequate .  Pulmonary embolism: No CT evidence of pulmonary embolism.  Pulmonary arteries:  Normal in caliber.  Lung parenchyma: There is dependent atelectasis bilaterally there  is an approximate 1.5 cm rounded opacity within the superior  segment of the left lower lobe seen posteriorly abutting the  posterior peripheral margin (series 4 image 78, series 6 image  129). It is uncertain if this represents a rounded area of  atelectasis, pneumonitis, or a noncalcified nodule.  Pleural effusion: No pleural effusion or pneumothorax..  Central airways: Patent.  Adenopathy: No suspicious mediastinal, hilar, or axillary lymph  nodes based on size or morphologic criteria.  Heart and great vessels: There is cardiac enlargement with a very  thin rim of pericardial fluid. No thoracic aortic aneurysm.  Upper abdomen: No acute finding.    Bones: No acute findings.    Additional findings: None.      Impression:       No CT evidence of pulmonary embolism.    There is dependent atelectasis without dense parenchymal  consolidation or pleural effusion.    There is a 1.5 cm nodular opacity superior segment of the left  lower lobe that favors a rounded area of  atelectasis or  pneumonitis. However, recommend follow-up CT in three months to  confirm this suspicion and exclude the unlikely possibility this  is a noncalcified pulmonary nodule.    Electronically signed by:  Gio Rivers MD  1/17/2018 5:56 PM  CST Workstation: [id number]           Blood Culture   Date Value Ref Range Status   01/17/2018 No growth at 24 hours  Preliminary                             Medication Review:   Current Facility-Administered Medications   Medication Dose Route Frequency Provider Last Rate Last Dose   • acetaminophen (TYLENOL) tablet 650 mg  650 mg Oral Q4H PRN Jamison Medley MD       • acetylcysteine (MUCOMYST) 10 % solution 600 mg  600 mg Oral Q12H Arpan Ferrell MD   600 mg at 01/21/18 0917   • aspirin tablet 325 mg  325 mg Oral Daily Jamison Medley MD   325 mg at 01/21/18 0921   • atorvastatin (LIPITOR) tablet 10 mg  10 mg Oral Daily Jamison Medley MD   10 mg at 01/21/18 0917   • budesonide-formoterol (SYMBICORT) 160-4.5 MCG/ACT inhaler 2 puff  2 puff Inhalation BID - RT Marcos Brice MD   2 puff at 01/20/18 1944   • cefTRIAXone (ROCEPHIN) in NS 2 GRAMS/20ml IV PUSH syringe  2 g Intravenous Q24H Jamison Medley MD   2 g at 01/20/18 2117    And   • doxycycline (VIBRAMYCIN) 100 mg/250 mL 0.9% NS VTB  100 mg Intravenous Q12H Jamison Medley MD   100 mg at 01/21/18 1134   • clopidogrel (PLAVIX) tablet 75 mg  75 mg Oral Daily Jamison Medley MD   75 mg at 01/21/18 0918   • dextrose (D50W) solution 25 g  25 g Intravenous Q15 Min PRN Sarbjit Nunes MD       • dextrose (GLUTOSE) oral gel 15 g  15 g Oral Q15 Min PRN Sarbjit Nunes MD       • DULoxetine (CYMBALTA) DR capsule 30 mg  30 mg Oral Daily Jamison Medley MD   30 mg at 01/21/18 0917   • enoxaparin (LOVENOX) syringe 40 mg  40 mg Subcutaneous Q24H Jamison Medley MD   40 mg at 01/21/18 0917   • gabapentin (NEURONTIN) capsule 300 mg  300 mg Oral Q8H Jamison Medley MD   300 mg at 01/21/18 0605   • glucagon (human recombinant) (GLUCAGEN DIAGNOSTIC)  injection 1 mg  1 mg Subcutaneous Q15 Min PRN Sarbjit Nunes MD       • influenza vac split quad (FLUZONE QUADRIVALENT) IM suspension 0.5 mL  0.5 mL Intramuscular During Hospitalization Alirio Hager MD       • insulin aspart (novoLOG) injection 0-24 Units  0-24 Units Subcutaneous 4x Daily AC & at Bedtime Sarbjit Nunes MD   4 Units at 01/21/18 0918   • [COMPLETED] iopamidol (ISOVUE-370) 76 % injection 100 mL  100 mL Intravenous Once in imaging Alirio Hager MD   90 mL at 01/21/18 1230   • ipratropium-albuterol (DUO-NEB) nebulizer solution 3 mL  3 mL Nebulization 4x Daily - RT Dante Iverson MD   3 mL at 01/21/18 1143   • losartan (COZAAR) half tablet 12.5 mg  12.5 mg Oral Q24H Arpan Ferrell MD   12.5 mg at 01/21/18 0918   • metoprolol tartrate (LOPRESSOR) tablet 25 mg  25 mg Oral Q12H Arpan Ferrell MD   25 mg at 01/21/18 0918   • naphazoline-pheniramine (NAPHCON-A) 0.025-0.3 % ophthalmic solution 1 drop  1 drop Right Eye 4x Daily PRN Jamison Medley MD   1 drop at 01/20/18 1328   • nicotine (NICODERM CQ) 21 MG/24HR patch 1 patch  1 patch Transdermal Q24H Marcos Brice MD   1 patch at 01/20/18 1543   • pantoprazole (PROTONIX) EC tablet 40 mg  40 mg Oral QABONILLA Medley MD   40 mg at 01/21/18 0604   • pneumococcal polysaccharide 23-valent (PNEUMOVAX-23) vaccine 0.5 mL  0.5 mL Intramuscular During Hospitalization Alirio Hager MD       • sodium chloride 0.9 % flush 1-10 mL  1-10 mL Intravenous PRN Jamison Medley MD       • sodium chloride 0.9 % flush 10 mL  10 mL Intravenous PRN Dante Iverson MD             Assessment/Plan     Principal Problem:    Acute respiratory failure with hypoxia and hypercapnia  Active Problems:    Chronic bronchitis with acute exacerbation    Hypertension    Diabetes mellitus    Morbid obesity    Coronary artery disease    Obstructive apnea    1.  Acute hypoxic/ hypercapnic respiratory failure: Improving.  Continue supplemental oxygen, bronchodilators, empiric  antimicrobial therapy and steroids.  Continue oxygen weaning. He may need chronic supplemental oxygen if attempts at weaning are unsuccessful.  2.  History of nicotine dependence: Continue nicotine patch.  Patient has been advised on the need to quit smoking.  3.  Non-STEMI: Continue current guidelines directed medical therapy.  Result of echocardiogram has been reviewed and the cardiologist is following.  Patient is  scheduled for CT coronary angio today.  4.  Diabetes mellitus: Continue anti-glycemic, Accu-Chek and sliding scale insulin.  5.  Acute kidney injury: Likely contrast induced.  Resolved.   6.  Possible obstructive sleep apnea: Patient will need sleep study as outpatient.  7.  Continue GI and DVT prophylaxis.   8.  Discharge planning is ongoing.              Marcos Brice MD  01/21/18      EMR Dragon/Transcription disclaimer:   Much of this encounter note is an electronic transcription/translation of spoken language to printed text. The electronic translation of spoken language may permit erroneous, or at times, nonsensical words or phrases to be inadvertently transcribed; Although I have reviewed the note for such errors, some may still exist.                 Electronically signed by Marcos Brice MD at 1/21/2018 11:54 AM      Arpan Ferrell MD at 1/21/2018  4:32 PM  Version 1 of 1         Cardiology Progress Note:     LOS: 4 days   Patient Care Team:  Harpreet Bass MD as PCP - General      Subjective:     Chart reviewed , patient seen and examined. Patient denies any chest pain, shortness of breath palpitation.  Patient underwent a CTA of the coronaries which did not reveal of any evidence of any obstructive coronary artery disease.  Patient CTA revealed  left lower lobe nodule measuring 1.3 x 1.1 x 1.8 cm. Differential possibilities include  lung neoplasm, atelectasis, pneumonitis.  Patient was informed of the finding.  Patient has not complained of having any symptoms of chest  pain.              Objective:     Objective:  Vitals:    01/21/18 1535   BP: 130/62   Pulse: 83   Resp: 18   Temp: 97.2 °F (36.2 °C)   SpO2: 92%       Intake/Output Summary (Last 24 hours) at 01/21/18 1632  Last data filed at 01/21/18 1300   Gross per 24 hour   Intake             1200 ml   Output                0 ml   Net             1200 ml             Physical Exam:   General Appearance:    Alert, oriented, cooperative, in no acute distress   Head:    Normocephalic, atraumatic, without obvious abnormality   Eyes:           MARCELO  Lids and lashes normal, conjunctivae and sclerae normal, no icterus, no pallor   Ears:    Ears appear intact with no abnormalities noted   Throat:   Mucous membranes pink and moist   Neck:   Supple, trachea midline, no carotid bruit, no organomegaly or JVD   Lungs:     Clear to auscultation and percussion, respirations regular, even and Unlabored. No wheezes, rales, rhonchi    Heart:    Regular rhythm and normal rate, normal S1 and S2, no            murmur, no gallop, no rub, no click   Abdomen:     Soft, non-tender, non-distended, no guarding, no rebound tenderness, Normal bowel sounds in all four quadrant, no masses, liver and spleen nonpalpable,    Genitalia:    Deferred   Extremities:   Moves all extremities well, no edema, no cyanosis, no              Redness, no clubbing   Pulses:   Pulses palpable and equal bilaterally   Skin:   Moist and warm. No bleeding, bruising or rash   Neurologic/Psychiatric:   Alert and oriented to person, place, and time.  Motor, power and tone in upper and lower extremity is grossly intact.  No focal neurological deficits. Normal cognitive function. No psychomotor reaction or tangential thought. No depression, homicidal ideations and suicidal ideations            Results Review:    Lab Results (last 24 hours)     Procedure Component Value Units Date/Time    POC Glucose Once [[id number]]  (Abnormal) Collected:  01/20/18 1633    Specimen:  Blood Updated:   01/20/18 1648     Glucose 209 (H) mg/dL       RN NotifiedMeter: [id number]: [id number] REYNOLD DRIVER       POC Glucose Once [[id number]]  (Abnormal) Collected:  01/20/18 1947    Specimen:  Blood Updated:  01/20/18 2024     Glucose 283 (H) mg/dL       RN NotifiedMeter: [id number]: [id number] ANSHUL JACOBS       CBC & Differential [[id number]] Collected:  01/21/18 0556    Specimen:  Blood Updated:  01/21/18 0617    Narrative:       The following orders were created for panel order CBC & Differential.  Procedure                               Abnormality         Status                     ---------                               -----------         ------                     CBC Auto Differential[[id number]]        Abnormal            Final result                 Please view results for these tests on the individual orders.    CBC Auto Differential [[id number]]  (Abnormal) Collected:  01/21/18 0556    Specimen:  Blood Updated:  01/21/18 0617     WBC 6.68 10*3/mm3      RBC 4.28 (L) 10*6/mm3      Hemoglobin 13.0 (L) g/dL      Hematocrit 44.2 %      .3 (H) fL      MCH 30.4 pg      MCHC 29.4 (L) g/dL      RDW 15.7 (H) %      RDW-SD 59.4 (H) fl      MPV 9.6 fL      Platelets 279 10*3/mm3      Neutrophil % 73.7 %      Lymphocyte % 15.3 %      Monocyte % 9.1 %      Eosinophil % 1.5 %      Basophil % 0.1 %      Immature Grans % 0.3 %      Neutrophils, Absolute 4.92 10*3/mm3      Lymphocytes, Absolute 1.02 10*3/mm3      Monocytes, Absolute 0.61 10*3/mm3      Eosinophils, Absolute 0.10 10*3/mm3      Basophils, Absolute 0.01 10*3/mm3      Immature Grans, Absolute 0.02 10*3/mm3     Basic Metabolic Panel [[id number]]  (Abnormal) Collected:  01/21/18 0556    Specimen:  Blood Updated:  01/21/18 0637     Glucose 148 (H) mg/dL      BUN 22 (H) mg/dL      Creatinine 0.97 mg/dL      Sodium 139 mmol/L      Potassium 4.4 mmol/L      Chloride 97 mmol/L      CO2 35.0 (H) mmol/L      Calcium 8.5 mg/dL      eGFR Non African  Amer 79 mL/min/1.73      BUN/Creatinine Ratio 22.7     Anion Gap 7.0 mmol/L     POC Glucose Once [[id number]]  (Abnormal) Collected:  01/21/18 0730    Specimen:  Blood Updated:  01/21/18 0751     Glucose 191 (H) mg/dL       RN NotifiedMeter: [id number]: [id number] REYNOLD DRIVER       POC Glucose Once [[id number]]  (Normal) Collected:  01/21/18 1136    Specimen:  Blood Updated:  01/21/18 1206     Glucose 122 mg/dL       RN NotifiedMeter: [id number]: [id number] REYNOLD DRIVER       Blood Culture - Blood, [[id number]]  (Normal) Collected:  01/17/18 1334    Specimen:  Blood from Arm, Right Updated:  01/21/18 1401     Blood Culture No growth at 4 days    Blood Culture - Blood, [[id number]]  (Normal) Collected:  01/17/18 1611    Specimen:  Blood from Arm, Right Updated:  01/21/18 1616     Blood Culture No growth at 4 days           Medication Review:   Current Facility-Administered Medications   Medication Dose Route Frequency Provider Last Rate Last Dose   • acetaminophen (TYLENOL) tablet 650 mg  650 mg Oral Q4H PRN Jamison Medley MD       • acetylcysteine (MUCOMYST) 10 % solution 600 mg  600 mg Oral Q12H Arpan Ferrell MD   600 mg at 01/21/18 0917   • aspirin tablet 325 mg  325 mg Oral Daily Jamison Medley MD   325 mg at 01/21/18 0921   • atorvastatin (LIPITOR) tablet 10 mg  10 mg Oral Daily Jamison Medley MD   10 mg at 01/21/18 0917   • budesonide-formoterol (SYMBICORT) 160-4.5 MCG/ACT inhaler 2 puff  2 puff Inhalation BID - RT Marcos Brice MD   2 puff at 01/20/18 1944   • cefTRIAXone (ROCEPHIN) in NS 2 GRAMS/20ml IV PUSH syringe  2 g Intravenous Q24H Jamison Medley MD   2 g at 01/20/18 2117    And   • doxycycline (VIBRAMYCIN) 100 mg/250 mL 0.9% NS VTB  100 mg Intravenous Q12H Jamison Medley MD   100 mg at 01/21/18 1134   • clopidogrel (PLAVIX) tablet 75 mg  75 mg Oral Daily Jamison Medley MD   75 mg at 01/21/18 0918   • dextrose (D50W) solution 25 g  25 g Intravenous Q15 Min PRN Sarbjit Nunes MD       •  dextrose (GLUTOSE) oral gel 15 g  15 g Oral Q15 Min PRN Sarbjit Nunes MD       • DULoxetine (CYMBALTA) DR capsule 30 mg  30 mg Oral Daily Jamison Medley MD   30 mg at 01/21/18 0917   • enoxaparin (LOVENOX) syringe 40 mg  40 mg Subcutaneous Q24H Jamison Medley MD   40 mg at 01/21/18 0917   • gabapentin (NEURONTIN) capsule 300 mg  300 mg Oral Q8H Jamison Medley MD   300 mg at 01/21/18 1312   • glucagon (human recombinant) (GLUCAGEN DIAGNOSTIC) injection 1 mg  1 mg Subcutaneous Q15 Min PRN Sarbjit Nunes MD       • influenza vac split quad (FLUZONE QUADRIVALENT) IM suspension 0.5 mL  0.5 mL Intramuscular During Hospitalization Alirio Hager MD       • insulin aspart (novoLOG) injection 0-24 Units  0-24 Units Subcutaneous 4x Daily AC & at Bedtime Sarbjit Nunes MD   4 Units at 01/21/18 0918   • ipratropium-albuterol (DUO-NEB) nebulizer solution 3 mL  3 mL Nebulization 4x Daily - RT Dante Iverson MD   3 mL at 01/21/18 1143   • losartan (COZAAR) half tablet 12.5 mg  12.5 mg Oral Q24H Arpan Ferrell MD   12.5 mg at 01/21/18 0918   • metoprolol tartrate (LOPRESSOR) tablet 25 mg  25 mg Oral Q12H Arpan Ferrell MD   25 mg at 01/21/18 0918   • naphazoline-pheniramine (NAPHCON-A) 0.025-0.3 % ophthalmic solution 1 drop  1 drop Right Eye 4x Daily PRN Jamison Medley MD   1 drop at 01/20/18 1328   • nicotine (NICODERM CQ) 21 MG/24HR patch 1 patch  1 patch Transdermal Q24H Marcos Brice MD   1 patch at 01/21/18 1256   • pantoprazole (PROTONIX) EC tablet 40 mg  40 mg Oral QAM Jamison Medley MD   40 mg at 01/21/18 0604   • pneumococcal polysaccharide 23-valent (PNEUMOVAX-23) vaccine 0.5 mL  0.5 mL Intramuscular During Hospitalization Alirio Hager MD       • sodium chloride 0.9 % flush 1-10 mL  1-10 mL Intravenous PRN Jamison Medley MD       • sodium chloride 0.9 % flush 10 mL  10 mL Intravenous PRN Dante Iverson MD           Assessment and Plan:    Principal Problem:    Acute respiratory failure with hypoxia and  hypercapnia  Active Problems:    Chronic bronchitis with acute exacerbation    Hypertension    Diabetes mellitus    Morbid obesity    Coronary artery disease    Obstructive apnea  1.  Abnormal resting electrocardiogram with indeterminate troponin.  Patient has multiple risk factor for coronary artery disease but the CTA of the coronaries did not reveal off any evidence of any obstructive epicardial coronary artery disease with a calcium score of 30.  Patient at the present time is not having any symptoms of substernal chest pain suggestive of angina.  Patient would not be subjected to any invasive evaluation from the cardiac standpoint.  2.  Arterial hypertension.  Patient blood pressure is currently well controlled and patient would be continued on the present dose of the antihypertensive medication.  Patient is to continue with the present dose of the losartan and the metoprolol.  Patient has been counseled to decrease his salt intake.  3.  Hyperlipidemia.  Patient is currently on Lipitor 10 mg at bedtime.  Patient is to undergo a lipid profile including liver function tests evaluation.  4.  Risk factor modification.  Patient has been counseled to quit smoking pharmacological agents to facilitate was offered to the patient.  5.  Pulmonary nodule noted on the CTA.  Patient would need follow-up with the primary care physician in 3 months and sooner should the patient have any evidence of weight loss and hemoptysis or worsening shortness of breath.  Patient has been informed of the finding on the CTA.  6.  Obesity.  Patient has been recommended to undergo a sleep study and to be compliant with the CPAP machine and weight reduction.    The above plan of management were discussed with the patient.            Arpan Ferrell MD  01/21/18  4:32 PM      Time: Face-to-face interaction 20 minutes      Dictated utilizing Dragon dictation.        Electronically signed by Arpan Ferrell MD at 1/21/2018  4:38 PM      Marcos RAND  MD Yuniel at 1/22/2018 12:40 PM  Version 1 of 1                [facility] Medicine Services  INPATIENT PROGRESS NOTE     LOS: 5 days   Patient Care Team:  Harpreet Bass MD as PCP - General    Chief Complaint:  Acute respiratory failure with hypoxia and hypercapnia      Subjective     Interval History:   Patient is seen for follow-up today.  He continues to improve, less short of air, less deconditioned and his oxygen requirement is down to 2 L nasal prong and maintaining saturation of 94-96%.  He was off nightly BiPAP last pm and voices no new complaints.      Patient Complaints: As above    History taken from: Patient    Review of Systems:    Review of Systems   Constitutional: Negative for activity change, appetite change, chills, diaphoresis, fatigue and fever.   HENT: Negative for trouble swallowing and voice change.    Eyes: Negative for photophobia and visual disturbance.   Respiratory: Positive for shortness of breath. Negative for cough, choking, chest tightness, wheezing and stridor.    Cardiovascular: Negative for chest pain, palpitations and leg swelling.   Gastrointestinal: Negative for abdominal distention, abdominal pain, blood in stool, constipation, diarrhea, nausea and vomiting.   Endocrine: Negative for cold intolerance, heat intolerance, polydipsia, polyphagia and polyuria.   Genitourinary: Negative for decreased urine volume, difficulty urinating, dysuria, enuresis, flank pain, frequency, hematuria and urgency.   Musculoskeletal: Negative for arthralgias, gait problem, myalgias, neck pain and neck stiffness.   Skin: Negative for pallor, rash and wound.   Neurological: Negative for dizziness, tremors, seizures, syncope, facial asymmetry, speech difficulty, weakness, light-headedness, numbness and headaches.   Hematological: Does not bruise/bleed easily.   Psychiatric/Behavioral: Negative for agitation, behavioral problems and confusion.          Objective     Vital Signs  Temp:  [97.2 °F (36.2 °C)-98.4 °F (36.9 °C)] 98.4 °F (36.9 °C)  Heart Rate:  [80-90] 83  Resp:  [16-20] 16  BP: (130-137)/(62-79) 137/79    Physical Exam:   Physical Exam   Constitutional: He is oriented to person, place, and time. He appears well-developed and well-nourished. No distress.   Patient is morbidly obese.   HENT:   Head: Normocephalic and atraumatic.   Eyes: EOM are normal. Pupils are equal, round, and reactive to light. No scleral icterus.   Neck: Normal range of motion. Neck supple. No JVD present. No thyromegaly present.   Cardiovascular: Normal rate, regular rhythm and normal heart sounds.  Exam reveals no gallop and no friction rub.    No murmur heard.  Pulmonary/Chest: Effort normal. He has decreased breath sounds. He has no wheezes. He has rhonchi. He has no rales. He exhibits no tenderness.   He has distant breath sounds at the bases.   Abdominal: Soft. Bowel sounds are normal. He exhibits no distension and no mass. There is no tenderness. There is no rebound and no guarding.   Musculoskeletal: He exhibits no edema, tenderness or deformity.   Neurological: He is alert and oriented to person, place, and time. No cranial nerve deficit. He exhibits normal muscle tone. Coordination normal.   Skin: Skin is warm and dry. No rash noted. He is not diaphoretic. No erythema. No pallor.   Psychiatric: He has a normal mood and affect. His behavior is normal. Judgment and thought content normal.   Nursing note and vitals reviewed.         Results Review:         Results from last 7 days  Lab Units 01/22/18  0600 01/21/18  0556 01/20/18  0628 01/19/18  0825 01/18/18  0658 01/18/18  0640 01/18/18  0346  01/17/18  1334   SODIUM mmol/L 141 139 140 137 137  --   --   --  137   SODIUM, ARTERIAL mmol/L  --   --   --   --   --  136.3* 140.6  < >  --    POTASSIUM mmol/L 4.6 4.4 4.7 4.8 5.0  --   --   --  5.4*   CHLORIDE mmol/L 97 97 97 96 95  --   --   --  92*   CO2 mmol/L 37.0*  35.0* 35.0* 32.0* 36.0*  --   --   --  36.0*   BUN mg/dL 22* 22* 27* 41* 28*  --   --   --  37*   CREATININE mg/dL 0.81 0.97 1.00 1.33* 0.97  --   --   --  1.16   GLUCOSE mg/dL 151* 148* 143* 192* 172*  --   --   --  147*   GLUCOSE, ARTERIAL mmol/L  --   --   --   --   --  173 218  < >  --    CALCIUM mg/dL 8.2* 8.5 8.2* 8.8 8.8  --   --   --  9.1   BILIRUBIN mg/dL  --   --   --   --   --   --   --   --  0.4   ALK PHOS U/L  --   --   --   --   --   --   --   --  92   ALT (SGPT) U/L  --   --   --   --   --   --   --   --  52   AST (SGOT) U/L  --   --   --   --   --   --   --   --  44   < > = values in this interval not displayed.            Results from last 7 days  Lab Units 01/22/18  0600 01/21/18  0556 01/20/18  0628 01/19/18  0825 01/18/18  0658 01/17/18  1334   WBC 10*3/mm3 7.64 6.68 7.12 7.81 7.71 8.01   HEMOGLOBIN g/dL 13.1* 13.0* 12.8* 13.7 13.9 14.1   HEMATOCRIT % 45.5 44.2 45.0 46.2 47.1 47.3   PLATELETS 10*3/mm3 271 279 245 314 285 294       Lab Results   Component Value Date    CKTOTAL 70 01/17/2018    CKMB 1.74 01/17/2018    TROPONINI 0.221 (C) 01/18/2018       CO2   Date Value Ref Range Status   01/22/2018 37.0 (H) 22.0 - 31.0 mmol/L Final         Results from last 7 days  Lab Units 01/17/18  1334   INR  1.03        Imaging Results (last 7 days)     Procedure Component Value Units Date/Time    XR Chest 1 View [[id number]] Collected:  01/17/18 1330     Updated:  01/17/18 1354    Narrative:       Chest single view.       CLINICAL INDICATION: Shortness of breath. Simple sepsis protocol.    COMPARISON: April 4, 2013.    FINDINGS: Cardiomegaly. Slight prominence of the pulmonary  vasculature. Lungs otherwise clear.      Impression:       CONCLUSION: Cardiomegaly. Slight prominence of the pulmonary  vasculature. Otherwise unremarkable chest.    Electronically signed by:  Ruel Perry MD  1/17/2018 1:53 PM CST  Workstation: [id number]    CT Head Without Contrast [[id number]] Collected:  01/17/18 1516     Updated:   01/17/18 1538    Narrative:       Noncontrast CT examination of the brain.    INDICATION: Alteration of mental status. Decreased level of  consciousness. Evaluate for stroke    Technique: Axial 5 mm contiguous images with brain parenchymal  and bone windows    This exam was performed according to our departmental  dose-optimization program, which includes automated exposure  control, adjustment of the mA and/or kV according to patient size  and/or use of iterative reconstruction technique.    Prior relevant examination: MRI brain June 26, 2013.  Prominent cisterna magna, normal variant.  Brain parenchyma appears within normal limits. Ventricles are  within normal limits in size. No evidence of abnormal mass or  calcification is seen. No evidence of acute hemorrhage is noted.  Chronic opacification left maxillary sinus, unchanged since prior  exam.    Impression:       CONCLUSION:   1. Normal brain for age. No acute changes are present.        Electronically signed by:  Ruel Perry MD  1/17/2018 3:36 PM CST  Workstation: [id number]    CT Angiogram Chest With Contrast [[id number]] Collected:  01/17/18 1727     Updated:  01/17/18 1757    Narrative:       [facility] [place]    Patient Name: GIFTY AMADOR    ORDERING: CA MONZON    ATTENDING: VANESSA SAUL     REFERRING: CA MONZON    -----------------------    EXAM DESCRIPTION: CT ANGIOGRAM CHEST W CONTRAST    CLINICAL HISTORY:  soa, J96.01 Acute respiratory failure with  hypoxia J96.02 Acute respiratory failure with hypercapnia I50.20  Unspecified systolic (congestive) heart failure J32.0 Chronic  maxillary sinusitis I21.4 Non-ST elevation (NSTEMI) myocardial  infarction       COMPARISON: Chest x-ray 1/17/2018    TECHNIQUE:   Axial images were obtained and multiplanar reconstructions were  made.    This exam was performed according to our departmental dose  optimization program, which includes automated exposure control,  adjustment of the mA and/or KV  according to patient size and/or  use of iterative reconstruction technique.  Dose Length Product 560.40  CONTRAST:   81 cc intravenous Isovue 370    FINDINGS:  Diagnostic quality: Adequate .  Pulmonary embolism: No CT evidence of pulmonary embolism.  Pulmonary arteries:  Normal in caliber.  Lung parenchyma: There is dependent atelectasis bilaterally there  is an approximate 1.5 cm rounded opacity within the superior  segment of the left lower lobe seen posteriorly abutting the  posterior peripheral margin (series 4 image 78, series 6 image  129). It is uncertain if this represents a rounded area of  atelectasis, pneumonitis, or a noncalcified nodule.  Pleural effusion: No pleural effusion or pneumothorax..  Central airways: Patent.  Adenopathy: No suspicious mediastinal, hilar, or axillary lymph  nodes based on size or morphologic criteria.  Heart and great vessels: There is cardiac enlargement with a very  thin rim of pericardial fluid. No thoracic aortic aneurysm.  Upper abdomen: No acute finding.    Bones: No acute findings.    Additional findings: None.      Impression:       No CT evidence of pulmonary embolism.    There is dependent atelectasis without dense parenchymal  consolidation or pleural effusion.    There is a 1.5 cm nodular opacity superior segment of the left  lower lobe that favors a rounded area of atelectasis or  pneumonitis. However, recommend follow-up CT in three months to  confirm this suspicion and exclude the unlikely possibility this  is a noncalcified pulmonary nodule.    Electronically signed by:  Gio Rivers MD  1/17/2018 5:56 PM  CST Workstation: [id number]           Blood Culture   Date Value Ref Range Status   01/17/2018 No growth at 24 hours  Preliminary                             Medication Review:   Current Facility-Administered Medications   Medication Dose Route Frequency Provider Last Rate Last Dose   • acetaminophen (TYLENOL) tablet 650 mg  650 mg Oral Q4H PRN Jamison Medley  MD       • acetylcysteine (MUCOMYST) 10 % solution 600 mg  600 mg Oral Q12H Arpan Ferrell MD   600 mg at 01/22/18 0929   • aspirin tablet 325 mg  325 mg Oral Daily Jamison Medley MD   325 mg at 01/22/18 0814   • atorvastatin (LIPITOR) tablet 10 mg  10 mg Oral Daily Jamison Medley MD   10 mg at 01/22/18 0813   • budesonide-formoterol (SYMBICORT) 160-4.5 MCG/ACT inhaler 2 puff  2 puff Inhalation BID - RT Marcos Brice MD   2 puff at 01/22/18 1017   • cefTRIAXone (ROCEPHIN) in NS 2 GRAMS/20ml IV PUSH syringe  2 g Intravenous Q24H Jamison Medley MD   2 g at 01/21/18 2155    And   • doxycycline (VIBRAMYCIN) 100 mg/250 mL 0.9% NS VTB  100 mg Intravenous Q12H Jamison Medley MD   100 mg at 01/22/18 1135   • clopidogrel (PLAVIX) tablet 75 mg  75 mg Oral Daily aJmison Medley MD   75 mg at 01/22/18 0814   • dextrose (D50W) solution 25 g  25 g Intravenous Q15 Min PRN Sarbjit Nunes MD       • dextrose (GLUTOSE) oral gel 15 g  15 g Oral Q15 Min PRN Sarbjit Nunes MD       • DULoxetine (CYMBALTA) DR capsule 30 mg  30 mg Oral Daily Jamison Medley MD   30 mg at 01/22/18 0813   • enoxaparin (LOVENOX) syringe 40 mg  40 mg Subcutaneous Q24H Jamison Medley MD   40 mg at 01/22/18 0814   • gabapentin (NEURONTIN) capsule 300 mg  300 mg Oral Q8H Jamison Medley MD   300 mg at 01/22/18 0600   • glucagon (human recombinant) (GLUCAGEN DIAGNOSTIC) injection 1 mg  1 mg Subcutaneous Q15 Min PRN Sarbjit Nunes MD       • influenza vac split quad (FLUZONE QUADRIVALENT) IM suspension 0.5 mL  0.5 mL Intramuscular During Hospitalization Alirio Hager MD       • insulin aspart (novoLOG) injection 0-24 Units  0-24 Units Subcutaneous 4x Daily AC & at Bedtime Sarbjit Nunes MD   4 Units at 01/22/18 1135   • ipratropium-albuterol (DUO-NEB) nebulizer solution 3 mL  3 mL Nebulization 4x Daily - RT Dante Iverson MD   3 mL at 01/22/18 1016   • losartan (COZAAR) half tablet 12.5 mg  12.5 mg Oral Q24H Arpan Ferrell MD   12.5 mg at 01/22/18 0814   •  metoprolol tartrate (LOPRESSOR) tablet 25 mg  25 mg Oral Q12H Arpan Ferrell MD   25 mg at 01/22/18 0814   • naphazoline-pheniramine (NAPHCON-A) 0.025-0.3 % ophthalmic solution 1 drop  1 drop Right Eye 4x Daily PRN Jamison Medley MD   1 drop at 01/20/18 1328   • nicotine (NICODERM CQ) 21 MG/24HR patch 1 patch  1 patch Transdermal Q24H Marcos Brice MD   1 patch at 01/21/18 1256   • pantoprazole (PROTONIX) EC tablet 40 mg  40 mg Oral QAM Jamison Medley MD   40 mg at 01/22/18 0600   • pneumococcal polysaccharide 23-valent (PNEUMOVAX-23) vaccine 0.5 mL  0.5 mL Intramuscular During Hospitalization Alirio Hager MD       • sodium chloride 0.9 % flush 1-10 mL  1-10 mL Intravenous PRN Jamison Medley MD       • sodium chloride 0.9 % flush 10 mL  10 mL Intravenous PRN Dante Iverson MD             Assessment/Plan     Principal Problem:    Acute respiratory failure with hypoxia and hypercapnia  Active Problems:    Chronic bronchitis with acute exacerbation    Hypertension    Diabetes mellitus    Morbid obesity    Coronary artery disease    Obstructive apnea    1.  Acute hypoxic/ hypercapnic respiratory failure: Much Improved.  Continue supplemental oxygen, bronchodilators, empiric antimicrobial therapy and steroids.  Continue oxygen weaning. He may need chronic supplemental oxygen if attempts at weaning are unsuccessful.  2.  History of nicotine dependence: Continue nicotine patch.  Patient has been advised on the need to quit smoking.  3.  Non-STEMI: Continue current guidelines directed medical therapy.  Result of echocardiogram has been reviewed and the cardiologist is following. CT coronary angio did not show any atherosclerosis.    4.  Diabetes mellitus: Continue anti-glycemic, Accu-Chek and sliding scale insulin.  5.  Acute kidney injury: Resolved.    6.  Left lower lobe nodule: Patient will be scheduled for follow-up CT scan of the chest in 3 months.  7.  Possible obstructive sleep apnea: Patient will need sleep  study as outpatient.  8.  Continue GI and DVT prophylaxis.   9.  Discharge planning is ongoing and patient will likely be discharged in 1-2 days.              Marcos Brice MD  01/22/18      EMR Dragon/Transcription disclaimer:   Much of this encounter note is an electronic transcription/translation of spoken language to printed text. The electronic translation of spoken language may permit erroneous, or at times, nonsensical words or phrases to be inadvertently transcribed; Although I have reviewed the note for such errors, some may still exist.                 Electronically signed by Marcos Brice MD at 1/22/2018 12:44 PM       add info

## 2018-01-22 NOTE — PROGRESS NOTES
[facility]  INPATIENT PROGRESS NOTE     LOS: 5 days   Patient Care Team:  Harpreet Bass MD as PCP - General    Chief Complaint:  Acute respiratory failure with hypoxia and hypercapnia      Subjective     Interval History:   Patient is seen for follow-up today.  He continues to improve, less short of air, less deconditioned and his oxygen requirement is down to 2 L nasal prong and maintaining saturation of 94-96%.  He was off nightly BiPAP last pm and voices no new complaints.      Patient Complaints: As above    History taken from: Patient    Review of Systems:    Review of Systems   Constitutional: Negative for activity change, appetite change, chills, diaphoresis, fatigue and fever.   HENT: Negative for trouble swallowing and voice change.    Eyes: Negative for photophobia and visual disturbance.   Respiratory: Positive for shortness of breath. Negative for cough, choking, chest tightness, wheezing and stridor.    Cardiovascular: Negative for chest pain, palpitations and leg swelling.   Gastrointestinal: Negative for abdominal distention, abdominal pain, blood in stool, constipation, diarrhea, nausea and vomiting.   Endocrine: Negative for cold intolerance, heat intolerance, polydipsia, polyphagia and polyuria.   Genitourinary: Negative for decreased urine volume, difficulty urinating, dysuria, enuresis, flank pain, frequency, hematuria and urgency.   Musculoskeletal: Negative for arthralgias, gait problem, myalgias, neck pain and neck stiffness.   Skin: Negative for pallor, rash and wound.   Neurological: Negative for dizziness, tremors, seizures, syncope, facial asymmetry, speech difficulty, weakness, light-headedness, numbness and headaches.   Hematological: Does not bruise/bleed easily.   Psychiatric/Behavioral: Negative for agitation, behavioral problems and confusion.         Objective     Vital Signs  Temp:  [97.2 °F (36.2 °C)-98.4 °F  (36.9 °C)] 98.4 °F (36.9 °C)  Heart Rate:  [80-90] 83  Resp:  [16-20] 16  BP: (130-137)/(62-79) 137/79    Physical Exam:   Physical Exam   Constitutional: He is oriented to person, place, and time. He appears well-developed and well-nourished. No distress.   Patient is morbidly obese.   HENT:   Head: Normocephalic and atraumatic.   Eyes: EOM are normal. Pupils are equal, round, and reactive to light. No scleral icterus.   Neck: Normal range of motion. Neck supple. No JVD present. No thyromegaly present.   Cardiovascular: Normal rate, regular rhythm and normal heart sounds.  Exam reveals no gallop and no friction rub.    No murmur heard.  Pulmonary/Chest: Effort normal. He has decreased breath sounds. He has no wheezes. He has rhonchi. He has no rales. He exhibits no tenderness.   He has distant breath sounds at the bases.   Abdominal: Soft. Bowel sounds are normal. He exhibits no distension and no mass. There is no tenderness. There is no rebound and no guarding.   Musculoskeletal: He exhibits no edema, tenderness or deformity.   Neurological: He is alert and oriented to person, place, and time. No cranial nerve deficit. He exhibits normal muscle tone. Coordination normal.   Skin: Skin is warm and dry. No rash noted. He is not diaphoretic. No erythema. No pallor.   Psychiatric: He has a normal mood and affect. His behavior is normal. Judgment and thought content normal.   Nursing note and vitals reviewed.         Results Review:         Results from last 7 days  Lab Units 01/22/18  0600 01/21/18  0556 01/20/18  0628 01/19/18  0825 01/18/18  0658 01/18/18  0640 01/18/18  0346  01/17/18  1334   SODIUM mmol/L 141 139 140 137 137  --   --   --  137   SODIUM, ARTERIAL mmol/L  --   --   --   --   --  136.3* 140.6  < >  --    POTASSIUM mmol/L 4.6 4.4 4.7 4.8 5.0  --   --   --  5.4*   CHLORIDE mmol/L 97 97 97 96 95  --   --   --  92*   CO2 mmol/L 37.0* 35.0* 35.0* 32.0* 36.0*  --   --   --  36.0*   BUN mg/dL 22* 22* 27* 41*  28*  --   --   --  37*   CREATININE mg/dL 0.81 0.97 1.00 1.33* 0.97  --   --   --  1.16   GLUCOSE mg/dL 151* 148* 143* 192* 172*  --   --   --  147*   GLUCOSE, ARTERIAL mmol/L  --   --   --   --   --  173 218  < >  --    CALCIUM mg/dL 8.2* 8.5 8.2* 8.8 8.8  --   --   --  9.1   BILIRUBIN mg/dL  --   --   --   --   --   --   --   --  0.4   ALK PHOS U/L  --   --   --   --   --   --   --   --  92   ALT (SGPT) U/L  --   --   --   --   --   --   --   --  52   AST (SGOT) U/L  --   --   --   --   --   --   --   --  44   < > = values in this interval not displayed.            Results from last 7 days  Lab Units 01/22/18  0600 01/21/18  0556 01/20/18  0628 01/19/18  0825 01/18/18  0658 01/17/18  1334   WBC 10*3/mm3 7.64 6.68 7.12 7.81 7.71 8.01   HEMOGLOBIN g/dL 13.1* 13.0* 12.8* 13.7 13.9 14.1   HEMATOCRIT % 45.5 44.2 45.0 46.2 47.1 47.3   PLATELETS 10*3/mm3 271 279 245 314 285 294       Lab Results   Component Value Date    CKTOTAL 70 01/17/2018    CKMB 1.74 01/17/2018    TROPONINI 0.221 (C) 01/18/2018       CO2   Date Value Ref Range Status   01/22/2018 37.0 (H) 22.0 - 31.0 mmol/L Final         Results from last 7 days  Lab Units 01/17/18  1334   INR  1.03        Imaging Results (last 7 days)     Procedure Component Value Units Date/Time    XR Chest 1 View [[id number]] Collected:  01/17/18 1330     Updated:  01/17/18 1354    Narrative:       Chest single view.       CLINICAL INDICATION: Shortness of breath. Simple sepsis protocol.    COMPARISON: April 4, 2013.    FINDINGS: Cardiomegaly. Slight prominence of the pulmonary  vasculature. Lungs otherwise clear.      Impression:       CONCLUSION: Cardiomegaly. Slight prominence of the pulmonary  vasculature. Otherwise unremarkable chest.    Electronically signed by:  Ruel Perry MD  1/17/2018 1:53 PM CST  Workstation: [id number]    CT Head Without Contrast [[id number]] Collected:  01/17/18 1515     Updated:  01/17/18 1538    Narrative:       Noncontrast CT examination of the  brain.    INDICATION: Alteration of mental status. Decreased level of  consciousness. Evaluate for stroke    Technique: Axial 5 mm contiguous images with brain parenchymal  and bone windows    This exam was performed according to our departmental  dose-optimization program, which includes automated exposure  control, adjustment of the mA and/or kV according to patient size  and/or use of iterative reconstruction technique.    Prior relevant examination: MRI brain June 26, 2013.  Prominent cisterna magna, normal variant.  Brain parenchyma appears within normal limits. Ventricles are  within normal limits in size. No evidence of abnormal mass or  calcification is seen. No evidence of acute hemorrhage is noted.  Chronic opacification left maxillary sinus, unchanged since prior  exam.    Impression:       CONCLUSION:   1. Normal brain for age. No acute changes are present.        Electronically signed by:  Ruel Perry MD  1/17/2018 3:36 PM CST  Workstation: [id number]    CT Angiogram Chest With Contrast [[id number]] Collected:  01/17/18 1727     Updated:  01/17/18 1757    Narrative:       [facility] [place]    Patient Name: GIFTY AMADOR    ORDERING: CA MONZON    ATTENDING: VANESSA SAUL     REFERRING: CA MONZON    -----------------------    EXAM DESCRIPTION: CT ANGIOGRAM CHEST W CONTRAST    CLINICAL HISTORY:  soa, J96.01 Acute respiratory failure with  hypoxia J96.02 Acute respiratory failure with hypercapnia I50.20  Unspecified systolic (congestive) heart failure J32.0 Chronic  maxillary sinusitis I21.4 Non-ST elevation (NSTEMI) myocardial  infarction       COMPARISON: Chest x-ray 1/17/2018    TECHNIQUE:   Axial images were obtained and multiplanar reconstructions were  made.    This exam was performed according to our departmental dose  optimization program, which includes automated exposure control,  adjustment of the mA and/or KV according to patient size and/or  use of iterative reconstruction  technique.  Dose Length Product 560.40  CONTRAST:   81 cc intravenous Isovue 370    FINDINGS:  Diagnostic quality: Adequate .  Pulmonary embolism: No CT evidence of pulmonary embolism.  Pulmonary arteries:  Normal in caliber.  Lung parenchyma: There is dependent atelectasis bilaterally there  is an approximate 1.5 cm rounded opacity within the superior  segment of the left lower lobe seen posteriorly abutting the  posterior peripheral margin (series 4 image 78, series 6 image  129). It is uncertain if this represents a rounded area of  atelectasis, pneumonitis, or a noncalcified nodule.  Pleural effusion: No pleural effusion or pneumothorax..  Central airways: Patent.  Adenopathy: No suspicious mediastinal, hilar, or axillary lymph  nodes based on size or morphologic criteria.  Heart and great vessels: There is cardiac enlargement with a very  thin rim of pericardial fluid. No thoracic aortic aneurysm.  Upper abdomen: No acute finding.    Bones: No acute findings.    Additional findings: None.      Impression:       No CT evidence of pulmonary embolism.    There is dependent atelectasis without dense parenchymal  consolidation or pleural effusion.    There is a 1.5 cm nodular opacity superior segment of the left  lower lobe that favors a rounded area of atelectasis or  pneumonitis. However, recommend follow-up CT in three months to  confirm this suspicion and exclude the unlikely possibility this  is a noncalcified pulmonary nodule.    Electronically signed by:  Gio Rivers MD  1/17/2018 5:56 PM  CST Workstation: [id number]           Blood Culture   Date Value Ref Range Status   01/17/2018 No growth at 24 hours  Preliminary                             Medication Review:   Current Facility-Administered Medications   Medication Dose Route Frequency Provider Last Rate Last Dose   • acetaminophen (TYLENOL) tablet 650 mg  650 mg Oral Q4H PRN Jamison Medley MD       • acetylcysteine (MUCOMYST) 10 % solution 600 mg  600 mg  Oral Q12H Arpan Ferrell MD   600 mg at 01/22/18 0929   • aspirin tablet 325 mg  325 mg Oral Daily Jamison Medley MD   325 mg at 01/22/18 0814   • atorvastatin (LIPITOR) tablet 10 mg  10 mg Oral Daily Jamison Medley MD   10 mg at 01/22/18 0813   • budesonide-formoterol (SYMBICORT) 160-4.5 MCG/ACT inhaler 2 puff  2 puff Inhalation BID - RT Marcos Brice MD   2 puff at 01/22/18 1017   • cefTRIAXone (ROCEPHIN) in NS 2 GRAMS/20ml IV PUSH syringe  2 g Intravenous Q24H Jamison Medley MD   2 g at 01/21/18 2155    And   • doxycycline (VIBRAMYCIN) 100 mg/250 mL 0.9% NS VTB  100 mg Intravenous Q12H Jamison Medley MD   100 mg at 01/22/18 1135   • clopidogrel (PLAVIX) tablet 75 mg  75 mg Oral Daily Jamison Medley MD   75 mg at 01/22/18 0814   • dextrose (D50W) solution 25 g  25 g Intravenous Q15 Min PRN Sarbjit Nunes MD       • dextrose (GLUTOSE) oral gel 15 g  15 g Oral Q15 Min PRN Sarbjit Nunes MD       • DULoxetine (CYMBALTA) DR capsule 30 mg  30 mg Oral Daily Jamison Medley MD   30 mg at 01/22/18 0813   • enoxaparin (LOVENOX) syringe 40 mg  40 mg Subcutaneous Q24H Jamison Medley MD   40 mg at 01/22/18 0814   • gabapentin (NEURONTIN) capsule 300 mg  300 mg Oral Q8H Jamison Medley MD   300 mg at 01/22/18 0600   • glucagon (human recombinant) (GLUCAGEN DIAGNOSTIC) injection 1 mg  1 mg Subcutaneous Q15 Min PRN Sarbjit Nunes MD       • influenza vac split quad (FLUZONE QUADRIVALENT) IM suspension 0.5 mL  0.5 mL Intramuscular During Hospitalization Alirio Hager MD       • insulin aspart (novoLOG) injection 0-24 Units  0-24 Units Subcutaneous 4x Daily AC & at Bedtime Sarbjit Nunes MD   4 Units at 01/22/18 1135   • ipratropium-albuterol (DUO-NEB) nebulizer solution 3 mL  3 mL Nebulization 4x Daily - RT Dante Iverson MD   3 mL at 01/22/18 1016   • losartan (COZAAR) half tablet 12.5 mg  12.5 mg Oral Q24H Arpan Ferrell MD   12.5 mg at 01/22/18 0814   • metoprolol tartrate (LOPRESSOR) tablet 25 mg  25 mg Oral Q12H Arpan  MD Mariaelena   25 mg at 01/22/18 0814   • naphazoline-pheniramine (NAPHCON-A) 0.025-0.3 % ophthalmic solution 1 drop  1 drop Right Eye 4x Daily PRN Jamison Medley MD   1 drop at 01/20/18 1328   • nicotine (NICODERM CQ) 21 MG/24HR patch 1 patch  1 patch Transdermal Q24H Marcos Brice MD   1 patch at 01/21/18 1256   • pantoprazole (PROTONIX) EC tablet 40 mg  40 mg Oral QAM Jamison Medley MD   40 mg at 01/22/18 0600   • pneumococcal polysaccharide 23-valent (PNEUMOVAX-23) vaccine 0.5 mL  0.5 mL Intramuscular During Hospitalization Alirio Hager MD       • sodium chloride 0.9 % flush 1-10 mL  1-10 mL Intravenous PRN Jamison Medley MD       • sodium chloride 0.9 % flush 10 mL  10 mL Intravenous PRN Dante Iverson MD             Assessment/Plan     Principal Problem:    Acute respiratory failure with hypoxia and hypercapnia  Active Problems:    Chronic bronchitis with acute exacerbation    Hypertension    Diabetes mellitus    Morbid obesity    Coronary artery disease    Obstructive apnea    1.  Acute hypoxic/ hypercapnic respiratory failure: Much Improved.  Continue supplemental oxygen, bronchodilators, empiric antimicrobial therapy and steroids.  Continue oxygen weaning. He may need chronic supplemental oxygen if attempts at weaning are unsuccessful.  2.  History of nicotine dependence: Continue nicotine patch.  Patient has been advised on the need to quit smoking.  3.  Non-STEMI: Continue current guidelines directed medical therapy.  Result of echocardiogram has been reviewed and the cardiologist is following. CT coronary angio did not show any atherosclerosis.    4.  Diabetes mellitus: Continue anti-glycemic, Accu-Chek and sliding scale insulin.  5.  Acute kidney injury: Resolved.    6.  Left lower lobe nodule: Patient will be scheduled for follow-up CT scan of the chest in 3 months.  7.  Possible obstructive sleep apnea: Patient will need sleep study as outpatient.  8.  Continue GI and DVT prophylaxis.   9.   Discharge planning is ongoing and patient will likely be discharged in 1-2 days.              Marcos Brice MD  01/22/18      EMR Dragon/Transcription disclaimer:   Much of this encounter note is an electronic transcription/translation of spoken language to printed text. The electronic translation of spoken language may permit erroneous, or at times, nonsensical words or phrases to be inadvertently transcribed; Although I have reviewed the note for such errors, some may still exist.

## 2018-01-23 VITALS
TEMPERATURE: 97.2 F | HEART RATE: 90 BPM | OXYGEN SATURATION: 98 % | SYSTOLIC BLOOD PRESSURE: 148 MMHG | HEIGHT: 73 IN | BODY MASS INDEX: 39.34 KG/M2 | RESPIRATION RATE: 18 BRPM | DIASTOLIC BLOOD PRESSURE: 84 MMHG | WEIGHT: 296.8 LBS

## 2018-01-23 LAB
ANION GAP SERPL CALCULATED.3IONS-SCNC: 6 MMOL/L (ref 5–15)
BASOPHILS # BLD AUTO: 0.02 10*3/MM3 (ref 0–0.2)
BASOPHILS NFR BLD AUTO: 0.2 % (ref 0–2)
BUN BLD-MCNC: 20 MG/DL (ref 7–21)
BUN/CREAT SERPL: 24.4 (ref 7–25)
CALCIUM SPEC-SCNC: 8.7 MG/DL (ref 8.4–10.2)
CHLORIDE SERPL-SCNC: 94 MMOL/L (ref 95–110)
CO2 SERPL-SCNC: 38 MMOL/L (ref 22–31)
CREAT BLD-MCNC: 0.82 MG/DL (ref 0.7–1.3)
DEPRECATED RDW RBC AUTO: 58.1 FL (ref 35.1–43.9)
EOSINOPHIL # BLD AUTO: 0.2 10*3/MM3 (ref 0–0.7)
EOSINOPHIL NFR BLD AUTO: 2.5 % (ref 0–7)
ERYTHROCYTE [DISTWIDTH] IN BLOOD BY AUTOMATED COUNT: 15.5 % (ref 11.5–14.5)
GFR SERPL CREATININE-BSD FRML MDRD: 96 ML/MIN/1.73 (ref 49–113)
GLUCOSE BLD-MCNC: 137 MG/DL (ref 60–100)
GLUCOSE BLDC GLUCOMTR-MCNC: 141 MG/DL (ref 70–130)
GLUCOSE BLDC GLUCOMTR-MCNC: 170 MG/DL (ref 70–130)
GLUCOSE BLDC GLUCOMTR-MCNC: 211 MG/DL (ref 70–130)
HCT VFR BLD AUTO: 46.5 % (ref 39–49)
HGB BLD-MCNC: 13.3 G/DL (ref 13.7–17.3)
IMM GRANULOCYTES # BLD: 0.04 10*3/MM3 (ref 0–0.02)
IMM GRANULOCYTES NFR BLD: 0.5 % (ref 0–0.5)
LYMPHOCYTES # BLD AUTO: 1.02 10*3/MM3 (ref 0.6–4.2)
LYMPHOCYTES NFR BLD AUTO: 12.7 % (ref 10–50)
MCH RBC QN AUTO: 29.2 PG (ref 26.5–34)
MCHC RBC AUTO-ENTMCNC: 28.6 G/DL (ref 31.5–36.3)
MCV RBC AUTO: 102 FL (ref 80–98)
MONOCYTES # BLD AUTO: 0.58 10*3/MM3 (ref 0–0.9)
MONOCYTES NFR BLD AUTO: 7.2 % (ref 0–12)
NEUTROPHILS # BLD AUTO: 6.2 10*3/MM3 (ref 2–8.6)
NEUTROPHILS NFR BLD AUTO: 76.9 % (ref 37–80)
PLATELET # BLD AUTO: 274 10*3/MM3 (ref 150–450)
PMV BLD AUTO: 10.2 FL (ref 8–12)
POTASSIUM BLD-SCNC: 5.1 MMOL/L (ref 3.5–5.1)
RBC # BLD AUTO: 4.56 10*6/MM3 (ref 4.37–5.74)
SODIUM BLD-SCNC: 138 MMOL/L (ref 137–145)
WBC NRBC COR # BLD: 8.06 10*3/MM3 (ref 3.2–9.8)

## 2018-01-23 PROCEDURE — 25010000002 ENOXAPARIN PER 10 MG: Performed by: FAMILY MEDICINE

## 2018-01-23 PROCEDURE — 94660 CPAP INITIATION&MGMT: CPT

## 2018-01-23 PROCEDURE — 63710000001 INSULIN ASPART PER 5 UNITS: Performed by: INTERNAL MEDICINE

## 2018-01-23 PROCEDURE — 94799 UNLISTED PULMONARY SVC/PX: CPT

## 2018-01-23 PROCEDURE — 85025 COMPLETE CBC W/AUTO DIFF WBC: CPT | Performed by: FAMILY MEDICINE

## 2018-01-23 PROCEDURE — 80048 BASIC METABOLIC PNL TOTAL CA: CPT | Performed by: FAMILY MEDICINE

## 2018-01-23 PROCEDURE — 82962 GLUCOSE BLOOD TEST: CPT

## 2018-01-23 RX ORDER — ALBUTEROL SULFATE 90 UG/1
2 AEROSOL, METERED RESPIRATORY (INHALATION) EVERY 4 HOURS PRN
Qty: 3 INHALER | Refills: 1 | Status: SHIPPED | OUTPATIENT
Start: 2018-01-23 | End: 2019-02-01

## 2018-01-23 RX ORDER — BUDESONIDE AND FORMOTEROL FUMARATE DIHYDRATE 160; 4.5 UG/1; UG/1
2 AEROSOL RESPIRATORY (INHALATION)
Qty: 3 INHALER | Refills: 3 | Status: SHIPPED | OUTPATIENT
Start: 2018-01-23 | End: 2018-03-05 | Stop reason: SDUPTHER

## 2018-01-23 RX ORDER — SPIRONOLACTONE 25 MG/1
25 TABLET ORAL DAILY
Qty: 30 TABLET | Refills: 1 | Status: SHIPPED | OUTPATIENT
Start: 2018-01-24 | End: 2018-01-30 | Stop reason: SDUPTHER

## 2018-01-23 RX ORDER — LOSARTAN POTASSIUM 25 MG/1
25 TABLET ORAL 2 TIMES DAILY
Qty: 60 TABLET | Refills: 1 | Status: SHIPPED | OUTPATIENT
Start: 2018-01-23 | End: 2018-01-30 | Stop reason: DRUGHIGH

## 2018-01-23 RX ORDER — LOSARTAN POTASSIUM 25 MG/1
25 TABLET ORAL 2 TIMES DAILY
Status: DISCONTINUED | OUTPATIENT
Start: 2018-01-23 | End: 2018-01-23 | Stop reason: HOSPADM

## 2018-01-23 RX ADMIN — ATORVASTATIN CALCIUM 10 MG: 10 TABLET, FILM COATED ORAL at 08:33

## 2018-01-23 RX ADMIN — INSULIN ASPART 4 UNITS: 100 INJECTION, SOLUTION INTRAVENOUS; SUBCUTANEOUS at 12:19

## 2018-01-23 RX ADMIN — Medication 12.5 MG: at 08:33

## 2018-01-23 RX ADMIN — NICOTINE 1 PATCH: 21 PATCH TRANSDERMAL at 08:44

## 2018-01-23 RX ADMIN — GABAPENTIN 300 MG: 300 CAPSULE ORAL at 14:42

## 2018-01-23 RX ADMIN — ENOXAPARIN SODIUM 40 MG: 40 INJECTION SUBCUTANEOUS at 08:33

## 2018-01-23 RX ADMIN — DULOXETINE HYDROCHLORIDE 30 MG: 30 CAPSULE, DELAYED RELEASE ORAL at 08:33

## 2018-01-23 RX ADMIN — METOPROLOL TARTRATE 25 MG: 25 TABLET ORAL at 08:33

## 2018-01-23 RX ADMIN — PANTOPRAZOLE SODIUM 40 MG: 40 TABLET, DELAYED RELEASE ORAL at 06:15

## 2018-01-23 RX ADMIN — SPIRONOLACTONE 25 MG: 25 TABLET ORAL at 08:33

## 2018-01-23 RX ADMIN — IPRATROPIUM BROMIDE 0.5 MG: 0.5 SOLUTION RESPIRATORY (INHALATION) at 11:23

## 2018-01-23 RX ADMIN — GABAPENTIN 300 MG: 300 CAPSULE ORAL at 06:15

## 2018-01-23 RX ADMIN — ASPIRIN 325 MG: 325 TABLET, COATED ORAL at 08:33

## 2018-01-23 RX ADMIN — IPRATROPIUM BROMIDE AND ALBUTEROL SULFATE 3 ML: 2.5; .5 SOLUTION RESPIRATORY (INHALATION) at 09:09

## 2018-01-23 RX ADMIN — CLOPIDOGREL BISULFATE 75 MG: 75 TABLET ORAL at 08:33

## 2018-01-23 RX ADMIN — BUDESONIDE AND FORMOTEROL FUMARATE DIHYDRATE 2 PUFF: 160; 4.5 AEROSOL RESPIRATORY (INHALATION) at 09:12

## 2018-01-23 RX ADMIN — ALBUTEROL SULFATE 2.5 MG: 2.5 SOLUTION RESPIRATORY (INHALATION) at 11:23

## 2018-01-23 NOTE — PLAN OF CARE
Problem: Patient Care Overview (Adult)  Goal: Plan of Care Review  Outcome: Ongoing (interventions implemented as appropriate)   01/23/18 0401   Coping/Psychosocial Response Interventions   Plan Of Care Reviewed With patient   Patient Care Overview   Progress improving   Outcome Evaluation   Outcome Summary/Follow up Plan pt reports feeling less SOA and states he is ready to go home;resting comfortably at this time; vital signs stable; will continue to monitor     Goal: Adult Individualization and Mutuality  Outcome: Ongoing (interventions implemented as appropriate)    Goal: Discharge Needs Assessment  Outcome: Ongoing (interventions implemented as appropriate)      Problem: Respiratory Insufficiency (Adult)  Goal: Acid/Base Balance  Outcome: Ongoing (interventions implemented as appropriate)    Goal: Effective Ventilation  Outcome: Ongoing (interventions implemented as appropriate)      Problem: Sepsis (Adult)  Goal: Signs and Symptoms of Listed Potential Problems Will be Absent or Manageable (Sepsis)  Outcome: Ongoing (interventions implemented as appropriate)

## 2018-01-23 NOTE — PLAN OF CARE
Problem: Patient Care Overview (Adult)  Goal: Plan of Care Review  Outcome: Ongoing (interventions implemented as appropriate)   01/23/18 1340   Coping/Psychosocial Response Interventions   Plan Of Care Reviewed With patient   Patient Care Overview   Progress improving   Outcome Evaluation   Outcome Summary/Follow up Plan Patient stated feeling better and ready to go home with oxygen. Education provided on oxygen therapy. Family at bedside. No needs or complaints verbilzed.

## 2018-01-23 NOTE — DISCHARGE PLACEMENT REQUEST
"Gifty Amador (62 y.o. Male)     Date of Birth Social Security Number Address Home Phone MRN    1955  [address]  [facility] [address] [phone] [id number]    Worship Marital Status          Baptism        Admission Date Admission Type Admitting Provider Attending Provider Department, Room/Bed    18 Emergency Alirio Hager MD Williams, Kevin L, MD [address], [facility]/1    Discharge Date Discharge Disposition Discharge Destination         Home or Self Care             Attending Provider: Alirio Hager MD     Allergies:  No Known Allergies    Isolation:  None   Infection:  None   Code Status:  FULL    Ht:  185.4 cm (72.99\")   Wt:  135 kg (296 lb 12.8 oz)    Admission Cmt:  None   Principal Problem:  Acute respiratory failure with hypoxia and hypercapnia [J96.01,J96.02]                 Active Insurance as of 2018     Primary Coverage     Payor Plan Insurance Group Employer/Plan Group    [employer] [place] [employer]ICA[employer]      Payor Plan Address Payor Plan Phone Number Effective From Effective To    [address] [address] [phone] 2018     [place] [address]       Subscriber Name Subscriber Birth Date Member ID       GIFTY AMADOR 1955 [id number]                 Emergency Contacts      (Rel.) Home Phone Work Phone Mobile Phone    Mohini Carlos (Significant Other) [phone] -- [phone]            [address] [address]  Phone:  [phone]  Fax:   Date Ordered: 2018         Patient:  Gifty Amador MRN:  [id number]   [address] [place] [address] :  1955  SSN:    Phone: [phone] Sex:  M     Weight: 135 kg (296 lb 12.8 oz)         Ht Readings from Last 1 Encounters:   18 185.4 cm (72.99\")         Oxygen Therapy                   (Order ID: [id number])    Diagnosis:  Chronic bronchitis with acute " exacerbation (J20.9,J42 [ICD-10-CM] 466.0,491.9 [ICD-9-CM])   Quantity:  1     Delivery Modality: Nasal Cannula  Liters Per Minute: 3  Duration: Continuous  Equipment:  Oxygen Concentrator &  &  Portable Gaseous Oxygen System & Portable Oxygen Contents Gaseous &  Conserving Regulator  Length of Need (99 Months = Lifetime): 99 Months = Lifetime            Authorizing Provider:Marcos Brice MD  Authorizing Provider's NPI: [id number]  Order Entered By: Marcos Brice MD 1/23/2018  1:30 PM     Electronically signed by: Marcos Brice MD 1/23/2018  1:30 PM          01/23/18 1101   Oxygen Therapy   SpO2 (!) 76 %   O2 Device room air  (at rest)

## 2018-01-23 NOTE — DISCHARGE SUMMARY
[facility] Medicine Services  DISCHARGE SUMMARY       Date of Admission: 1/17/2018  Date of Discharge:  1/23/2018  Primary Care Physician: Harpreet Bass MD    Presenting Problem/History of Present Illness:  NSTEMI (non-ST elevated myocardial infarction) [I21.4]  Chronic left maxillary sinusitis [J32.0]  Acute respiratory failure with hypoxia and hypercapnia [J96.01, J96.02]  Systolic congestive heart failure, unspecified congestive heart failure chronicity [I50.20]   62-year-old male with an history of hypertension, nicotine dependence, COPD, diabetes mellitus, coronary artery disease, obstructive sleep apnea, morbid obesity who was seen at emergency department secondary to one week history of progressively worsening shortness of air, cough productive of greenish sputum and orthopnea complicated by altered level of consciousness.    Final Discharge Diagnoses:  Hospital Problem List     * (Principal)Acute respiratory failure with hypoxia and hypercapnia    Chronic bronchitis with acute exacerbation (Chronic)    Hypertension (Chronic)    Diabetes mellitus (Chronic)    Morbid obesity (Chronic)    Coronary artery disease (Chronic)    Obstructive apnea (Chronic)          Consults:   Consults     Date and Time Order Name Status Description    1/17/2018 2126 Inpatient Consult to Cardiology Completed     1/17/2018 1841 Hospitalist (on-call MD unless specified)            Procedures Performed: None                Pertinent Test Results:   Lab Results (last 7 days)     Procedure Component Value Units Date/Time    CBC & Differential [[id number]] Collected:  01/17/18 1334    Specimen:  Blood Updated:  01/17/18 1355    Narrative:       The following orders were created for panel order CBC & Differential.  Procedure                               Abnormality         Status                     ---------                               -----------         ------                     CBC  Auto Differential[[id number]]        Abnormal            Final result                 Please view results for these tests on the individual orders.    CBC Auto Differential [[id number]]  (Abnormal) Collected:  01/17/18 1334    Specimen:  Blood Updated:  01/17/18 1355     WBC 8.01 10*3/mm3      RBC 4.70 10*6/mm3      Hemoglobin 14.1 g/dL      Hematocrit 47.3 %      .6 (H) fL      MCH 30.0 pg      MCHC 29.8 (L) g/dL      RDW 16.0 (H) %      RDW-SD 58.9 (H) fl      MPV 10.3 fL      Platelets 294 10*3/mm3      Neutrophil % 75.8 %      Lymphocyte % 14.4 %      Monocyte % 8.5 %      Eosinophil % 0.4 %      Basophil % 0.4 %      Immature Grans % 0.5 %      Neutrophils, Absolute 6.08 10*3/mm3      Lymphocytes, Absolute 1.15 10*3/mm3      Monocytes, Absolute 0.68 10*3/mm3      Eosinophils, Absolute 0.03 10*3/mm3      Basophils, Absolute 0.03 10*3/mm3      Immature Grans, Absolute 0.04 (H) 10*3/mm3     Lactic Acid, Plasma [[id number]]  (Abnormal) Collected:  01/17/18 1334    Specimen:  Blood Updated:  01/17/18 1411     Lactate 2.1 (C) mmol/L     Comprehensive Metabolic Panel [[id number]]  (Abnormal) Collected:  01/17/18 1334    Specimen:  Blood Updated:  01/17/18 1414     Glucose 147 (H) mg/dL      BUN 37 (H) mg/dL      Creatinine 1.16 mg/dL      Sodium 137 mmol/L      Potassium 5.4 (H) mmol/L      Chloride 92 (L) mmol/L      CO2 36.0 (H) mmol/L      Calcium 9.1 mg/dL      Total Protein 6.4 g/dL      Albumin 3.70 g/dL      ALT (SGPT) 52 U/L      AST (SGOT) 44 U/L      Alkaline Phosphatase 92 U/L      Total Bilirubin 0.4 mg/dL      eGFR Non African Amer 64 mL/min/1.73      Globulin 2.7 gm/dL      A/G Ratio 1.4 g/dL      BUN/Creatinine Ratio 31.9 (H)     Anion Gap 9.0 mmol/L     Protime-INR [[id number]]  (Normal) Collected:  01/17/18 1334    Specimen:  Blood Updated:  01/17/18 1415     Protime 13.4 Seconds      INR 1.03    Narrative:       Therapeutic range for most indications is 2.0-3.0 INR,  or 2.5-3.5 for mechanical heart  valves.    CK [[id number]]  (Normal) Collected:  01/17/18 1334    Specimen:  Blood Updated:  01/17/18 1416     Creatine Kinase 70 U/L     aPTT [[id number]]  (Normal) Collected:  01/17/18 1334    Specimen:  Blood Updated:  01/17/18 1417     PTT 27.2 seconds     Narrative:       The recommended Heparin therapeutic range is 68-97 seconds.    D-dimer, Quantitative [[id number]]  (Normal) Collected:  01/17/18 1334    Specimen:  Blood Updated:  01/17/18 1418     D-Dimer, Quantitative 459 ng/mL (FEU)     Narrative:       Dimer values <500 ng/ml FEU are FDA approved as aid in diagnosis of deep venous thrombosis and pulmonary embolism.  This test should not be used in an exclusion strategy with pretest probability alone.    A recent guideline regarding diagnosis for pulmonary thomboembolism recommends an adjusted exclusion criterion of age x 10 ng/ml FEU for patients >50 years of age (Sherin Intern Med 2015; 163: 701-711).    CK-MB [[id number]]  (Normal) Collected:  01/17/18 1334    Specimen:  Blood Updated:  01/17/18 1425     CKMB 1.74 ng/mL     BNP [[id number]]  (Abnormal) Collected:  01/17/18 1334    Specimen:  Blood Updated:  01/17/18 1426     proBNP 4720.0 (H) pg/mL     Blood Gas, Arterial [[id number]]  (Abnormal) Collected:  01/17/18 1423    Specimen:  Arterial Blood Updated:  01/17/18 1432     Site --      Not performed at this site.        Jaydon's Test --      Not performed at this site.        pH, Arterial 7.375 pH units      pCO2, Arterial 66.2 (H) mm Hg      pO2, Arterial 46.8 (L) mm Hg      HCO3, Arterial 37.8 (H) mmol/L      Base Excess, Arterial 9.8 (H) mmol/L      O2 Saturation, Arterial 82.6 (L) %      Hemoglobin, Blood Gas 14.8 g/dL      Hematocrit, Blood Gas 44.0 %      CO2 Content 39.9 (H)     Sodium, Arterial 138.1 mmol/L      Potassium, Arterial 5.05 (H) mmol/L      Glucose, Arterial 144 mmol/L      Barometric Pressure for Blood Gas -- mmHg       Not performed at this site.        Modality --      Not performed at  this site.        Ionized Calcium 4.60 mg/dL     [employer] Draw [[id number]] Collected:  01/17/18 1334    Specimen:  Blood Updated:  01/17/18 1446    Narrative:       The following orders were created for panel order [employer] Draw.  Procedure                               Abnormality         Status                     ---------                               -----------         ------                     Light Blue Top[[id number]]                                   Final result               Green Top (Gel)[[id number]]                                  Final result               Lavender Top[[id number]]                                     Final result               Gold Top - SST[[id number]]                                   Final result                 Please view results for these tests on the individual orders.    Light Blue Top [[id number]] Collected:  01/17/18 1334    Specimen:  Blood Updated:  01/17/18 1446     Extra Tube hold for add-on      Auto resulted       Green Top (Gel) [[id number]] Collected:  01/17/18 1334    Specimen:  Blood Updated:  01/17/18 1446     Extra Tube Hold for add-ons.      Auto resulted.       Lavender Top [[id number]] Collected:  01/17/18 1334    Specimen:  Blood Updated:  01/17/18 1446     Extra Tube hold for add-on      Auto resulted       Gold Top - SST [[id number]] Collected:  01/17/18 1334    Specimen:  Blood Updated:  01/17/18 1446     Extra Tube Hold for add-ons.      Auto resulted.       Influenza Antigen, Rapid - Swab, Nasopharynx [[id number]]  (Normal) Collected:  01/17/18 1535    Specimen:  Swab from Nasopharynx Updated:  01/17/18 1615     Influenza A Ag, EIA Negative     Influenza B Ag, EIA Negative    Urinalysis With / Culture If Indicated - Urine, Clean Catch [[id number]]  (Abnormal) Collected:  01/17/18 1556    Specimen:  Urine from Urine, Clean Catch Updated:  01/17/18 1616     Color, UA Yellow     Appearance, UA Clear     pH, UA 6.0     Specific Gravity, UA 1.024     Glucose, UA  Negative     Ketones, UA Negative     Bilirubin, UA Negative     Blood, UA Negative     Protein, UA Trace (A)     Leuk Esterase, UA Negative     Nitrite, UA Negative     Urobilinogen, UA 1.0 E.U./dL    Narrative:       Urine microscopic not indicated.    Troponin [[id number]]  (Abnormal) Collected:  01/17/18 1334    Specimen:  Blood Updated:  01/17/18 1634     Troponin I 0.258 (C) ng/mL     Troponin [[id number]]  (Abnormal) Collected:  01/17/18 1611    Specimen:  Blood from Arm, Right Updated:  01/17/18 1649     Troponin I 0.407 (C) ng/mL     Blood Gas, Arterial [[id number]]  (Abnormal) Collected:  01/17/18 1638    Specimen:  Arterial Blood Updated:  01/17/18 1657     Site --      Not performed at this site.        Jaydon's Test --      Not performed at this site.        pH, Arterial 7.336 (L) pH units      pCO2, Arterial 72.4 (H) mm Hg      pO2, Arterial 104.9 mm Hg      HCO3, Arterial 37.8 (H) mmol/L      Base Excess, Arterial 9.0 (H) mmol/L      O2 Saturation, Arterial 97.6 %      Hemoglobin, Blood Gas 14.1 g/dL      Hematocrit, Blood Gas 41.0 %      CO2 Content 40.1 (H)     Sodium, Arterial 138.2 mmol/L      Potassium, Arterial 4.58 mmol/L      Glucose, Arterial 147 mmol/L      Barometric Pressure for Blood Gas -- mmHg       Not performed at this site.        Modality --      Not performed at this site.        Ionized Calcium 4.50 mg/dL     Lactic Acid, Reflex Timer [[id number]] Collected:  01/17/18 1334    Specimen:  Blood Updated:  01/17/18 1717     Extra Tube Hold for add-ons.      Auto resulted.       Troponin [[id number]]  (Abnormal) Collected:  01/17/18 1901    Specimen:  Blood Updated:  01/17/18 1941     Troponin I 0.406 (C) ng/mL     Lactic Acid, Reflex [[id number]]  (Abnormal) Collected:  01/17/18 1901    Specimen:  Blood Updated:  01/17/18 1941     Lactate 2.2 (C) mmol/L     Blood Gas, Arterial [[id number]]  (Abnormal) Collected:  01/18/18 0346    Specimen:  Arterial Blood Updated:  01/18/18 0353     Site --       Not performed at this site.        Jaydon's Test --      Not performed at this site.        pH, Arterial 7.283 (L) pH units      pCO2, Arterial 93.9 (H) mm Hg      pO2, Arterial 81.6 mm Hg      HCO3, Arterial 43.4 (H) mmol/L      Base Excess, Arterial 12.2 (H) mmol/L      O2 Saturation, Arterial 94.8 %      Hemoglobin, Blood Gas 14.6 g/dL      Hematocrit, Blood Gas 43.0 %      CO2 Content 46.3 (H)     Sodium, Arterial 140.6 mmol/L      Potassium, Arterial 4.72 mmol/L      Glucose, Arterial 218 mmol/L      Barometric Pressure for Blood Gas -- mmHg       Not performed at this site.        Modality --      Not performed at this site.        Ionized Calcium 4.60 mg/dL     Blood Gas, Arterial [[id number]]  (Abnormal) Collected:  01/18/18 0640    Specimen:  Arterial Blood Updated:  01/18/18 0646     Site --      Not performed at this site.        Jaydon's Test --      Not performed at this site.        pH, Arterial 7.392 pH units      pCO2, Arterial 59.4 (H) mm Hg      pO2, Arterial 68.9 (L) mm Hg      HCO3, Arterial 35.3 (H) mmol/L      Base Excess, Arterial 8.3 (H) mmol/L      O2 Saturation, Arterial 93.8 (L) %      Hemoglobin, Blood Gas 14.1 g/dL      Hematocrit, Blood Gas 41.0 %      CO2 Content 37.1 (H)     Sodium, Arterial 136.3 (L) mmol/L      Potassium, Arterial 4.87 mmol/L      Glucose, Arterial 173 mmol/L      Barometric Pressure for Blood Gas -- mmHg       Not performed at this site.        Modality --      Not performed at this site.        Ionized Calcium 4.70 mg/dL     CBC & Differential [[id number]] Collected:  01/18/18 0658    Specimen:  Blood Updated:  01/18/18 0737    Narrative:       The following orders were created for panel order CBC & Differential.  Procedure                               Abnormality         Status                     ---------                               -----------         ------                     CBC Auto Differential[[id number]]        Abnormal            Final result                  Please view results for these tests on the individual orders.    CBC Auto Differential [[id number]]  (Abnormal) Collected:  01/18/18 0658    Specimen:  Blood Updated:  01/18/18 0737     WBC 7.71 10*3/mm3      RBC 4.63 10*6/mm3      Hemoglobin 13.9 g/dL      Hematocrit 47.1 %      .7 (H) fL      MCH 30.0 pg      MCHC 29.5 (L) g/dL      RDW 16.0 (H) %      RDW-SD 59.8 (H) fl      MPV 10.7 fL      Platelets 285 10*3/mm3      Neutrophil % 85.9 (H) %      Lymphocyte % 10.0 %      Monocyte % 3.4 %      Eosinophil % 0.1 %      Basophil % 0.1 %      Immature Grans % 0.5 %      Neutrophils, Absolute 6.62 10*3/mm3      Lymphocytes, Absolute 0.77 10*3/mm3      Monocytes, Absolute 0.26 10*3/mm3      Eosinophils, Absolute 0.01 10*3/mm3      Basophils, Absolute 0.01 10*3/mm3      Immature Grans, Absolute 0.04 (H) 10*3/mm3     Basic Metabolic Panel [[id number]]  (Abnormal) Collected:  01/18/18 0658    Specimen:  Blood Updated:  01/18/18 0746     Glucose 172 (H) mg/dL      BUN 28 (H) mg/dL      Creatinine 0.97 mg/dL      Sodium 137 mmol/L      Potassium 5.0 mmol/L      Chloride 95 mmol/L      CO2 36.0 (H) mmol/L      Calcium 8.8 mg/dL      eGFR Non African Amer 79 mL/min/1.73      BUN/Creatinine Ratio 28.9 (H)     Anion Gap 6.0 mmol/L     POC Glucose Once [[id number]]  (Abnormal) Collected:  01/18/18 1049    Specimen:  Blood Updated:  01/18/18 1104     Glucose 230 (H) mg/dL       RN NotifiedMeter: [id number]: [id number] LORI ALEKSANDER       Troponin [[id number]]  (Abnormal) Collected:  01/18/18 1038    Specimen:  Blood Updated:  01/18/18 1155     Troponin I 0.221 (C) ng/mL     Respiratory Panel, PCR - Swab, Nasopharynx [[id number]]  (Normal) Collected:  01/18/18 1636    Specimen:  Swab from Nasopharynx Updated:  01/18/18 2010     ADENOVIRUS, PCR Not Detected     Coronavirus 229E Not Detected     Coronavirus HKU1 Not Detected     Coronavirus NL63 Not Detected     Coronavirus OC43 Not Detected     Human  Metapneumovirus Not Detected     Human Rhinovirus/Enterovirus Not Detected     Influenza B PCR Not Detected     Parainfluenza Virus 1 Not Detected     Parainfluenza Virus 2 Not Detected     Parainfluenza Virus 3 Not Detected     Parainfluenza Virus 4 Not Detected     Bordetella pertussis pcr Not Detected     Influenza A H1N1 2009 PCR Not Detected     Chlamydophila pneumoniae PCR Not Detected     Mycoplasma pneumo by PCR Not Detected     Influenza A PCR Not Detected     Influenza A H3 Not Detected     Influenza A H1 Not Detected     RSV, PCR Not Detected    POC Glucose Once [[id number]]  (Abnormal) Collected:  01/18/18 1722    Specimen:  Blood Updated:  01/19/18 0537     Glucose 300 (H) mg/dL       RN NotifiedMeter: [id number]: [id number] ESCOTO SUSAN       POC Glucose Once [[id number]]  (Abnormal) Collected:  01/1957    Specimen:  Blood Updated:  01/19/18 0537     Glucose 228 (H) mg/dL       RN NotifiedMeter: [id number]: [id number] RODRIGUE MAHONEY       CBC Auto Differential [[id number]]  (Abnormal) Collected:  01/19/18 0825    Specimen:  Blood Updated:  01/19/18 0837     WBC 7.81 10*3/mm3      RBC 4.48 10*6/mm3      Hemoglobin 13.7 g/dL      Hematocrit 46.2 %      .1 (H) fL      MCH 30.6 pg      MCHC 29.7 (L) g/dL      RDW 16.3 (H) %      RDW-SD 62.0 (H) fl      MPV 10.0 fL      Platelets 314 10*3/mm3      Neutrophil % 73.0 %      Lymphocyte % 15.7 %      Monocyte % 10.8 %      Eosinophil % 0.3 %      Basophil % 0.1 %      Immature Grans % 0.1 %      Neutrophils, Absolute 5.70 10*3/mm3      Lymphocytes, Absolute 1.23 10*3/mm3      Monocytes, Absolute 0.84 10*3/mm3      Eosinophils, Absolute 0.02 10*3/mm3      Basophils, Absolute 0.01 10*3/mm3      Immature Grans, Absolute 0.01 10*3/mm3      nRBC 0.3 (H) /100 WBC     CBC & Differential [[id number]] Collected:  01/19/18 0825    Specimen:  Blood Updated:  01/19/18 0837    Narrative:       The following orders were created for panel order CBC &  Differential.  Procedure                               Abnormality         Status                     ---------                               -----------         ------                     CBC Auto Differential[[id number]]        Abnormal            Final result                 Please view results for these tests on the individual orders.    POC Glucose Once [[id number]]  (Abnormal) Collected:  01/19/18 0735    Specimen:  Blood Updated:  01/19/18 0852     Glucose 195 (H) mg/dL       RN NotifiedMeter: [id number]: [id number] ANEL TRE       Basic Metabolic Panel [[id number]]  (Abnormal) Collected:  01/19/18 0825    Specimen:  Blood Updated:  01/19/18 0856     Glucose 192 (H) mg/dL      BUN 41 (H) mg/dL      Creatinine 1.33 (H) mg/dL      Sodium 137 mmol/L      Potassium 4.8 mmol/L      Chloride 96 mmol/L      CO2 32.0 (H) mmol/L      Calcium 8.8 mg/dL      eGFR Non African Amer 55 mL/min/1.73      BUN/Creatinine Ratio 30.8 (H)     Anion Gap 9.0 mmol/L     POC Glucose Once [[id number]]  (Abnormal) Collected:  01/19/18 1057    Specimen:  Blood Updated:  01/19/18 1130     Glucose 155 (H) mg/dL       RN NotifiedMeter: [id number]: [id number] COLLINS ALIJUAN DAVID       POC Glucose Once [[id number]]  (Abnormal) Collected:  01/19/18 1653    Specimen:  Blood Updated:  01/19/18 1706     Glucose 272 (H) mg/dL       RN NotifiedMeter: [id number]: [id number] [facility]       POC Glucose Once [[id number]]  (Abnormal) Collected:  01/19/18 1951    Specimen:  Blood Updated:  01/19/18 2100     Glucose 246 (H) mg/dL       RN NotifiedMeter: [id number]: [id number] ANSHUL JACOBS       CBC & Differential [[id number]] Collected:  01/20/18 0628    Specimen:  Blood Updated:  01/20/18 0712    Narrative:       The following orders were created for panel order CBC & Differential.  Procedure                               Abnormality         Status                     ---------                                -----------         ------                     CBC Auto Differential[[id number]]        Abnormal            Final result                 Please view results for these tests on the individual orders.    CBC Auto Differential [[id number]]  (Abnormal) Collected:  01/20/18 0628    Specimen:  Blood Updated:  01/20/18 0712     WBC 7.12 10*3/mm3      RBC 4.40 10*6/mm3      Hemoglobin 12.8 (L) g/dL      Hematocrit 45.0 %      .3 (H) fL      MCH 29.1 pg      MCHC 28.4 (L) g/dL      RDW 15.8 (H) %      RDW-SD 59.4 (H) fl      MPV 10.5 fL      Platelets 245 10*3/mm3      Neutrophil % 69.2 %      Lymphocyte % 20.5 %      Monocyte % 8.8 %      Eosinophil % 1.0 %      Basophil % 0.1 %      Immature Grans % 0.4 %      Neutrophils, Absolute 4.92 10*3/mm3      Lymphocytes, Absolute 1.46 10*3/mm3      Monocytes, Absolute 0.63 10*3/mm3      Eosinophils, Absolute 0.07 10*3/mm3      Basophils, Absolute 0.01 10*3/mm3      Immature Grans, Absolute 0.03 (H) 10*3/mm3     Basic Metabolic Panel [[id number]]  (Abnormal) Collected:  01/20/18 0628    Specimen:  Blood Updated:  01/20/18 0718     Glucose 143 (H) mg/dL      BUN 27 (H) mg/dL      Creatinine 1.00 mg/dL      Sodium 140 mmol/L      Potassium 4.7 mmol/L      Chloride 97 mmol/L      CO2 35.0 (H) mmol/L      Calcium 8.2 (L) mg/dL      eGFR Non African Amer 76 mL/min/1.73      BUN/Creatinine Ratio 27.0 (H)     Anion Gap 8.0 mmol/L     POC Glucose Once [[id number]]  (Abnormal) Collected:  01/20/18 0750    Specimen:  Blood Updated:  01/20/18 0822     Glucose 167 (H) mg/dL       RN NotifiedMeter: [id number]: [id number] REYNOLD DRIVER       POC Glucose Once [[id number]]  (Abnormal) Collected:  01/20/18 1035    Specimen:  Blood Updated:  01/20/18 1051     Glucose 177 (H) mg/dL       RN NotifiedMeter: [id number]: [id number] REYNOLD DRIVER       POC Glucose Once [[id number]]  (Abnormal) Collected:  01/20/18 1633    Specimen:  Blood Updated:  01/20/18 1648     Glucose 209 (H)  mg/dL       RN NotifiedMeter: [id number]: [id number] REYNOLD DRIVER       POC Glucose Once [[id number]]  (Abnormal) Collected:  01/20/18 1947    Specimen:  Blood Updated:  01/20/18 2024     Glucose 283 (H) mg/dL       RN NotifiedMeter: [id number]: [id number] ANSHUL JACOBS       CBC & Differential [[id number]] Collected:  01/21/18 0556    Specimen:  Blood Updated:  01/21/18 0617    Narrative:       The following orders were created for panel order CBC & Differential.  Procedure                               Abnormality         Status                     ---------                               -----------         ------                     CBC Auto Differential[[id number]]        Abnormal            Final result                 Please view results for these tests on the individual orders.    CBC Auto Differential [[id number]]  (Abnormal) Collected:  01/21/18 0556    Specimen:  Blood Updated:  01/21/18 0617     WBC 6.68 10*3/mm3      RBC 4.28 (L) 10*6/mm3      Hemoglobin 13.0 (L) g/dL      Hematocrit 44.2 %      .3 (H) fL      MCH 30.4 pg      MCHC 29.4 (L) g/dL      RDW 15.7 (H) %      RDW-SD 59.4 (H) fl      MPV 9.6 fL      Platelets 279 10*3/mm3      Neutrophil % 73.7 %      Lymphocyte % 15.3 %      Monocyte % 9.1 %      Eosinophil % 1.5 %      Basophil % 0.1 %      Immature Grans % 0.3 %      Neutrophils, Absolute 4.92 10*3/mm3      Lymphocytes, Absolute 1.02 10*3/mm3      Monocytes, Absolute 0.61 10*3/mm3      Eosinophils, Absolute 0.10 10*3/mm3      Basophils, Absolute 0.01 10*3/mm3      Immature Grans, Absolute 0.02 10*3/mm3     Basic Metabolic Panel [[id number]]  (Abnormal) Collected:  01/21/18 0556    Specimen:  Blood Updated:  01/21/18 0637     Glucose 148 (H) mg/dL      BUN 22 (H) mg/dL      Creatinine 0.97 mg/dL      Sodium 139 mmol/L      Potassium 4.4 mmol/L      Chloride 97 mmol/L      CO2 35.0 (H) mmol/L      Calcium 8.5 mg/dL      eGFR Non African Amer 79 mL/min/1.73       BUN/Creatinine Ratio 22.7     Anion Gap 7.0 mmol/L     POC Glucose Once [[id number]]  (Abnormal) Collected:  01/21/18 0730    Specimen:  Blood Updated:  01/21/18 0751     Glucose 191 (H) mg/dL       RN NotifiedMeter: [id number]: [id number] REYNOLD DRIVER       POC Glucose Once [[id number]]  (Normal) Collected:  01/21/18 1136    Specimen:  Blood Updated:  01/21/18 1206     Glucose 122 mg/dL       RN NotifiedMeter: [id number]: [id number] REYNOLD NEISHA       POC Glucose Once [[id number]]  (Abnormal) Collected:  01/21/18 1648    Specimen:  Blood Updated:  01/21/18 1701     Glucose 250 (H) mg/dL       RN NotifiedMeter: [id number]: [id number] REYNOLD NEISHA       POC Glucose Once [[id number]]  (Abnormal) Collected:  01/21/18 2033    Specimen:  Blood Updated:  01/21/18 2112     Glucose 264 (H) mg/dL       RN NotifiedMeter: [id number]: [id number] ANSHUL JACOBS       CBC & Differential [[id number]] Collected:  01/22/18 0600    Specimen:  Blood Updated:  01/22/18 0618    Narrative:       The following orders were created for panel order CBC & Differential.  Procedure                               Abnormality         Status                     ---------                               -----------         ------                     CBC Auto Differential[[id number]]        Abnormal            Final result                 Please view results for these tests on the individual orders.    CBC Auto Differential [[id number]]  (Abnormal) Collected:  01/22/18 0600    Specimen:  Blood Updated:  01/22/18 0618     WBC 7.64 10*3/mm3      RBC 4.45 10*6/mm3      Hemoglobin 13.1 (L) g/dL      Hematocrit 45.5 %      .2 (H) fL      MCH 29.4 pg      MCHC 28.8 (L) g/dL      RDW 15.5 (H) %      RDW-SD 58.2 (H) fl      MPV 10.3 fL      Platelets 271 10*3/mm3      Neutrophil % 75.3 %      Lymphocyte % 15.1 %      Monocyte % 7.2 %      Eosinophil % 1.8 %      Basophil % 0.1 %      Immature Grans % 0.5 %       Neutrophils, Absolute 5.75 10*3/mm3      Lymphocytes, Absolute 1.15 10*3/mm3      Monocytes, Absolute 0.55 10*3/mm3      Eosinophils, Absolute 0.14 10*3/mm3      Basophils, Absolute 0.01 10*3/mm3      Immature Grans, Absolute 0.04 (H) 10*3/mm3     Basic Metabolic Panel [[id number]]  (Abnormal) Collected:  01/22/18 0600    Specimen:  Blood Updated:  01/22/18 0631     Glucose 151 (H) mg/dL      BUN 22 (H) mg/dL      Creatinine 0.81 mg/dL      Sodium 141 mmol/L      Potassium 4.6 mmol/L      Chloride 97 mmol/L      CO2 37.0 (H) mmol/L      Calcium 8.2 (L) mg/dL      eGFR Non African Amer 97 mL/min/1.73      BUN/Creatinine Ratio 27.2 (H)     Anion Gap 7.0 mmol/L     POC Glucose Once [[id number]]  (Abnormal) Collected:  01/22/18 0735    Specimen:  Blood Updated:  01/22/18 0854     Glucose 178 (H) mg/dL       RN NotifiedMeter: [id number]: [id number] ANEL TRE       POC Glucose Once [[id number]]  (Abnormal) Collected:  01/22/18 1035    Specimen:  Blood Updated:  01/22/18 1047     Glucose 197 (H) mg/dL       RN NotifiedMeter: [id number]: [id number] ANEL TRE       Blood Culture - Blood, [[id number]]  (Normal) Collected:  01/17/18 1334    Specimen:  Blood from Arm, Right Updated:  01/22/18 1401     Blood Culture No growth at 5 days    Blood Culture - Blood, [[id number]]  (Normal) Collected:  01/17/18 1611    Specimen:  Blood from Arm, Right Updated:  01/22/18 1616     Blood Culture No growth at 5 days    POC Glucose Once [[id number]]  (Abnormal) Collected:  01/22/18 1645    Specimen:  Blood Updated:  01/22/18 1706     Glucose 174 (H) mg/dL       RN NotifiedMeter: [id number]: [id number] ANEL TRE       POC Glucose Once [[id number]]  (Abnormal) Collected:  01/22/18 2132    Specimen:  Blood Updated:  01/23/18 0154     Glucose 211 (H) mg/dL       RN NotifiedMeter: [id number]: [id number] JESSE DE LEON       CBC & Differential [[id number]] Collected:  01/23/18 0554    Specimen:  Blood  Updated:  01/23/18 0657    Narrative:       The following orders were created for panel order CBC & Differential.  Procedure                               Abnormality         Status                     ---------                               -----------         ------                     CBC Auto Differential[[id number]]        Abnormal            Final result                 Please view results for these tests on the individual orders.    CBC Auto Differential [[id number]]  (Abnormal) Collected:  01/23/18 0554    Specimen:  Blood Updated:  01/23/18 0657     WBC 8.06 10*3/mm3      RBC 4.56 10*6/mm3      Hemoglobin 13.3 (L) g/dL      Hematocrit 46.5 %      .0 (H) fL      MCH 29.2 pg      MCHC 28.6 (L) g/dL      RDW 15.5 (H) %      RDW-SD 58.1 (H) fl      MPV 10.2 fL      Platelets 274 10*3/mm3      Neutrophil % 76.9 %      Lymphocyte % 12.7 %      Monocyte % 7.2 %      Eosinophil % 2.5 %      Basophil % 0.2 %      Immature Grans % 0.5 %      Neutrophils, Absolute 6.20 10*3/mm3      Lymphocytes, Absolute 1.02 10*3/mm3      Monocytes, Absolute 0.58 10*3/mm3      Eosinophils, Absolute 0.20 10*3/mm3      Basophils, Absolute 0.02 10*3/mm3      Immature Grans, Absolute 0.04 (H) 10*3/mm3     Basic Metabolic Panel [[id number]]  (Abnormal) Collected:  01/23/18 0554    Specimen:  Blood Updated:  01/23/18 0706     Glucose 137 (H) mg/dL      BUN 20 mg/dL      Creatinine 0.82 mg/dL      Sodium 138 mmol/L      Potassium 5.1 mmol/L      Chloride 94 (L) mmol/L      CO2 38.0 (H) mmol/L      Calcium 8.7 mg/dL      eGFR Non African Amer 96 mL/min/1.73      BUN/Creatinine Ratio 24.4     Anion Gap 6.0 mmol/L     POC Glucose Once [[id number]]  (Abnormal) Collected:  01/23/18 0722    Specimen:  Blood Updated:  01/23/18 0754     Glucose 141 (H) mg/dL       RN NotifiedMeter: [id number]: [id number] SANTOS ROSENTHAL       POC Glucose Once [[id number]]  (Abnormal) Collected:  01/23/18 1039    Specimen:  Blood Updated:  01/23/18  1314     Glucose 170 (H) mg/dL       RN NotifiedMeter: [id number]: [id number] SANTOS ROSENTHAL           Imaging Results (last 7 days)     Procedure Component Value Units Date/Time    XR Chest 1 View [[id number]] Collected:  01/17/18 1330     Updated:  01/17/18 1354    Narrative:       Chest single view.       CLINICAL INDICATION: Shortness of breath. Simple sepsis protocol.    COMPARISON: April 4, 2013.    FINDINGS: Cardiomegaly. Slight prominence of the pulmonary  vasculature. Lungs otherwise clear.      Impression:       CONCLUSION: Cardiomegaly. Slight prominence of the pulmonary  vasculature. Otherwise unremarkable chest.    Electronically signed by:  Ruel Perry MD  1/17/2018 1:53 PM CST  Workstation: [employer]    CT Head Without Contrast [[id number]] Collected:  01/17/18 1515     Updated:  01/17/18 1538    Narrative:       Noncontrast CT examination of the brain.    INDICATION: Alteration of mental status. Decreased level of  consciousness. Evaluate for stroke    Technique: Axial 5 mm contiguous images with brain parenchymal  and bone windows    This exam was performed according to our departmental  dose-optimization program, which includes automated exposure  control, adjustment of the mA and/or kV according to patient size  and/or use of iterative reconstruction technique.    Prior relevant examination: MRI brain June 26, 2013.  Prominent cisterna magna, normal variant.  Brain parenchyma appears within normal limits. Ventricles are  within normal limits in size. No evidence of abnormal mass or  calcification is seen. No evidence of acute hemorrhage is noted.  Chronic opacification left maxillary sinus, unchanged since prior  exam.    Impression:       CONCLUSION:   1. Normal brain for age. No acute changes are present.        Electronically signed by:  Ruel Perry MD  1/17/2018 3:36 PM CST  Workstation: [id number]    CT Angiogram Chest With Contrast [[id number]] Collected:  01/17/18 1727     Updated:   01/17/18 6603    Narrative:       [facility] [place]    Patient Name: GIFTY AMADOR    ORDERING: CA MONZON    ATTENDING: VANESSA SAUL     REFERRING: CA MONZON    -----------------------    EXAM DESCRIPTION: CT ANGIOGRAM CHEST W CONTRAST    CLINICAL HISTORY:  soa, J96.01 Acute respiratory failure with  hypoxia J96.02 Acute respiratory failure with hypercapnia I50.20  Unspecified systolic (congestive) heart failure J32.0 Chronic  maxillary sinusitis I21.4 Non-ST elevation (NSTEMI) myocardial  infarction       COMPARISON: Chest x-ray 1/17/2018    TECHNIQUE:   Axial images were obtained and multiplanar reconstructions were  made.    This exam was performed according to our departmental dose  optimization program, which includes automated exposure control,  adjustment of the mA and/or KV according to patient size and/or  use of iterative reconstruction technique.  Dose Length Product 560.40  CONTRAST:   81 cc intravenous Isovue 370    FINDINGS:  Diagnostic quality: Adequate .  Pulmonary embolism: No CT evidence of pulmonary embolism.  Pulmonary arteries:  Normal in caliber.  Lung parenchyma: There is dependent atelectasis bilaterally there  is an approximate 1.5 cm rounded opacity within the superior  segment of the left lower lobe seen posteriorly abutting the  posterior peripheral margin (series 4 image 78, series 6 image  129). It is uncertain if this represents a rounded area of  atelectasis, pneumonitis, or a noncalcified nodule.  Pleural effusion: No pleural effusion or pneumothorax..  Central airways: Patent.  Adenopathy: No suspicious mediastinal, hilar, or axillary lymph  nodes based on size or morphologic criteria.  Heart and great vessels: There is cardiac enlargement with a very  thin rim of pericardial fluid. No thoracic aortic aneurysm.  Upper abdomen: No acute finding.    Bones: No acute findings.    Additional findings: None.      Impression:       No CT evidence of pulmonary  embolism.    There is dependent atelectasis without dense parenchymal  consolidation or pleural effusion.    There is a 1.5 cm nodular opacity superior segment of the left  lower lobe that favors a rounded area of atelectasis or  pneumonitis. However, recommend follow-up CT in three months to  confirm this suspicion and exclude the unlikely possibility this  is a noncalcified pulmonary nodule.    Electronically signed by:  Gio Rivers MD  1/17/2018 5:56 PM  CST Workstation: [id number]    CT Angiogram Coronary [[id number]] Collected:  01/21/18 1118     Updated:  01/21/18 1326    Narrative:       PROCEDURE: CT HEART CORONARY ANGIOGRAM WITH IV CONTRAST    CLINICAL HISTORY: CAD risk, high, asymptomatic, J96.01 Acute  respiratory failure with hypoxia J96.02 Acute respiratory failure  with hypercapnia I50.20 Unspecified systolic (congestive) heart  failure J32.0 Chronic maxillary sinusitis I21.4 Non-ST elevation  (NSTEMI) myocardial infarction I10 Essential (primary)  hypertension I11.9 Hypertensive heart disease without heart  failure I21.4 Non-ST elevation (NSTEMI) myocard    COMPARISON: Chest CT dated 1/17/2018    TECHNIQUE:  Serial axial CT images were obtained through the heart at 3 mm  thickness without contrast for calcium scoring.   Subsequently, following the intravenous administration of 81 ml  of Isovue-370, serial axial CT images were obtained through the  heart at 0.6 mm thickness utilizing retrospective  gating.   Images were obtained after premedication with a total of 15 mg IV  metoprolol   Post processing was performed by the radiologist at the [employer]  workstation.   3D images including vessel probing technique were also obtained.   Full field of view reconstructed images were used for evaluation  of the extracardiac tissues.  This exam was performed using radiation doses that are as low as  reasonably achievable (ALARA).  This exam was performed according to our departmental dose  optimization program,  which includes automated exposure control,  adjustment of the mA and/or KV according to patient size and/or  use of iterative reconstruction technique.  Dose length product: 1384.5    FINDINGS:  CALCIUM PLAQUE BURDEN:    REGION                                         CALCIUM SCORE  ([place])  Left Main                                                      0   Right Coronary Artery                                 0   Left Anterior Descending                           25   Circumflex                                                   13     Total calcium score is 38    Implication: Definite, at least mild atherosclerotic plaque  Risk of coronary artery disease: Mild or minimal coronary  narrowings likely    CTA OF THE CORONARY ARTERIES: There is adequate visualization of  the left main, LAD, diagonals, circumflex, and RCA. There is a  type II LAD. The patient is right dominant.    Left Main: No calcified or soft itssue density plaque and no  stenosis.  LAD: No calcified or soft tissue density plaque and no stenosis.  Circumflex: No calcified or soft tissue density plaque and no  stenosis.  RCA: No calcified or soft tisue density plaque and no stenosis.    The aortic valve is tricuspid. There is no myocardial bridging.  On short axis views, the myocardium is homogeneous in thickness.    EXTRACARDIAC SOFT TISSUES:  There is a nodule in the segment of the left lower lobe measuring  1.3 x 1.1 x 1.8 cm.    CT FUNCTIONAL ANALYSIS:  Ejection Fraction     59 %  Diastolic Volume     157 ml  Systolic Volume      65 ml  Stroke Volume        92 ml  Cardiac Output        6.0 L/minute      Impression:       CONCLUSION:   1.  No suspicious coronary artery stenosis.  2.  There is a nodule in the segment of the left lower lobe  measuring 1.3 x 1.1 x 1.8 cm. Differential possibilities include  lung neoplasm, atelectasis, pneumonitis.   3.  PET-CT scan, follow-up CT at 3 months, and/or consultation  for biopsy recommended.   "    Electronically signed by:  Victorino Simms MD  1/21/2018 1:25 PM CST  Workstation: [id number]            Chief Complaint on Day of Discharge: None    Hospital Course:  The patient was admitted and managed as follows:    1.  Acute hypoxic/ hypercapnic respiratory failure: Patient was admitted to CCU and commenced on noninvasive respiratory therapy, steroids, bronchodilators and antimicrobial therapy.  He did improve, was transitioned to high flow supplemental oxygen and was weaned down to 2-3 L nasal prongs maintaining saturation of 92-94%.  Patient has to be recommended home oxygen due to desaturation in the upper 70s to low 80s with activity.   2.  History of nicotine dependence: Patient was placed on nicotine patch and advised on the need to quit smoking.  3. Possible Non-STEMI with elevated troponin: He was seen by the cardiologist and had CT coronary angiogram that did not show any atherosclerosis.  Echocardiogram showed mildly dilated right ventricular cavity, dilated left atrial cavity and mild mitral valve regurgitation.     4.  Diabetes mellitus: He was continued on anti-glycemic and started on Accu-Chek and sliding scale insulin.  Blood sugar control remains optimal.  5.  Acute kidney injury: This managed with IV hydration and the creatinine came back to normal before discharge.      6.  Left lower lobe nodule: Patient will be scheduled for follow-up CT scan of the chest in 3 months.  7.  Possible obstructive sleep apnea: Patient will need sleep study as outpatient.  Consult sleep medicine as outpatient.  8. He also benefited from PT and OT secondary to deconditioning.     Condition on Discharge:  Improved    Physical Exam on Discharge:  /84  Pulse (P) 90  Temp 97.2 °F (36.2 °C)  Resp (P) 18  Ht 185.4 cm (72.99\")  Wt 135 kg (296 lb 12.8 oz)  SpO2 98%  BMI 39.17 kg/m2  Physical Exam   Constitutional: He is oriented to person, place, and time. He appears well-developed and well-nourished. No " distress.   Patient is obese   HENT:   Head: Normocephalic and atraumatic.   Eyes: EOM are normal. Pupils are equal, round, and reactive to light. No scleral icterus.   Neck: Normal range of motion. Neck supple. No JVD present. No thyromegaly present.   Cardiovascular: Normal rate, regular rhythm and normal heart sounds.  Exam reveals no gallop and no friction rub.    No murmur heard.  Pulmonary/Chest: Effort normal. He has decreased breath sounds. He has no wheezes. He has no rales. He exhibits no tenderness.   He has distant breath sounds at the bases.   Abdominal: Soft. Bowel sounds are normal. He exhibits no distension and no mass. There is no tenderness. There is no rebound and no guarding.   Musculoskeletal: He exhibits no edema, tenderness or deformity.   Neurological: He is alert and oriented to person, place, and time. No cranial nerve deficit. He exhibits normal muscle tone. Coordination normal.   Skin: Skin is warm and dry. No rash noted. He is not diaphoretic. No erythema. No pallor.   Psychiatric: He has a normal mood and affect. His behavior is normal. Judgment and thought content normal.   Nursing note and vitals reviewed.        Discharge Disposition:  Home or Self Care    Discharge Medications:   Arias Willson   Home Medication Instructions TONY:[id number]    Printed on:01/23/18 1330   Medication Information                      albuterol (PROVENTIL HFA;VENTOLIN HFA) 108 (90 Base) MCG/ACT inhaler  Inhale 2 puffs Every 4 (Four) Hours As Needed for Wheezing.             aspirin 325 MG tablet  Take 325 mg by mouth daily.             budesonide-formoterol (SYMBICORT) 160-4.5 MCG/ACT inhaler  Inhale 2 puffs 2 (Two) Times a Day.             clopidogrel (PLAVIX) 75 MG tablet  Take 1 tablet by mouth Daily.             DULoxetine (CYMBALTA) 30 MG capsule  Take 1 capsule by mouth 2 (Two) Times a Day.             gabapentin (NEURONTIN) 600 MG tablet  Take 1 tablet by mouth 3 (Three) Times a Day As Needed  (Take 600mg TID prn).             lansoprazole (PREVACID) 30 MG capsule  Take 1 capsule by mouth Daily.             losartan (COZAAR) 25 MG tablet  Take 1 tablet by mouth 2 (Two) Times a Day.             metFORMIN (GLUCOPHAGE) 500 MG tablet  Take 1 tablet by mouth 2 (Two) Times a Day With Meals.             metoprolol tartrate (LOPRESSOR) 25 MG tablet  Take 1 tablet by mouth Every 12 (Twelve) Hours.             naphazoline (NAPHCON) 0.1 % ophthalmic solution  Apply 1 drop to eye 4 (Four) Times a Day As Needed for Irritation.             Omega-3 Fatty Acids (FISH OIL) 1000 MG capsule capsule  Take 2,000 mg by mouth Daily With Breakfast.             pravastatin (PRAVACHOL) 40 MG tablet  Take 40 mg by mouth Every Night.             spironolactone (ALDACTONE) 25 MG tablet  Take 1 tablet by mouth Daily.                 Discharge Diet:  cardiac and diabetic    Activity at Discharge:  as tolerated    Discharge Care Plan/Instructions: Provided    Follow-up Appointments:   Future Appointments  Date Time Provider Department Center   1/30/2018 2:30 PM Harpreet Bass MD [facility] None       Test Results Pending at Discharge:     Marcos Brice MD  01/23/18  1:30 PM    Time: 50 minutes

## 2018-01-23 NOTE — PROGRESS NOTES
Cardiology Progress Note:     LOS: 6 days   Patient Care Team:  Harpreet Bass MD as PCP - General      Subjective:     Chart reviewed , patient seen and examined. Patient denies any chest pain, shortness of breath palpitation.  Patient has a drop in oxygen saturation with ambulation and would require home O2.  Patient denies any symptoms of chest pain.            Objective:     Objective:  Vitals:    01/23/18 1102   BP:    Pulse:    Resp:    Temp:    SpO2: 98%       Intake/Output Summary (Last 24 hours) at 01/23/18 1245  Last data filed at 01/23/18 0900   Gross per 24 hour   Intake              840 ml   Output                0 ml   Net              840 ml             Physical Exam:   General Appearance:    Alert, oriented, cooperative, in no acute distress   Head:    Normocephalic, atraumatic, without obvious abnormality   Eyes:           MARCELO  Lids and lashes normal, conjunctivae and sclerae normal, no icterus, no pallor   Ears:    Ears appear intact with no abnormalities noted   Throat:   Mucous membranes pink and moist   Neck:   Supple, trachea midline, no carotid bruit, no organomegaly or JVD   Lungs:     Clear to auscultation and percussion, respirations regular, even and Unlabored. No wheezes, rales, rhonchi    Heart:    Regular rhythm and normal rate, normal S1 and S2, no            murmur, no gallop, no rub, no click   Abdomen:     Soft, non-tender, non-distended, no guarding, no rebound tenderness, Normal bowel sounds in all four quadrant, no masses, liver and spleen nonpalpable,    Genitalia:    Deferred   Extremities:   Moves all extremities well, no edema, no cyanosis, no              Redness, no clubbing   Pulses:   Pulses palpable and equal bilaterally   Skin:   Moist and warm. No bleeding, bruising or rash   Neurologic/Psychiatric:   Alert and oriented to person, place, and time.  Motor, power and tone in upper and lower extremity is grossly intact.  No focal neurological deficits. Normal  cognitive function. No psychomotor reaction or tangential thought. No depression, homicidal ideations and suicidal ideations            Results Review:    Lab Results (last 24 hours)     Procedure Component Value Units Date/Time    Blood Culture - Blood, [[id number]]  (Normal) Collected:  01/17/18 1334    Specimen:  Blood from Arm, Right Updated:  01/22/18 1401     Blood Culture No growth at 5 days    Blood Culture - Blood, [[id number]]  (Normal) Collected:  01/17/18 1611    Specimen:  Blood from Arm, Right Updated:  01/22/18 1616     Blood Culture No growth at 5 days    POC Glucose Once [[id number]]  (Abnormal) Collected:  01/22/18 1645    Specimen:  Blood Updated:  01/22/18 1706     Glucose 174 (H) mg/dL       RN NotifiedMeter: [id number]: [id number] ANEL GIBSONN       POC Glucose Once [[id number]]  (Abnormal) Collected:  01/22/18 2132    Specimen:  Blood Updated:  01/23/18 0154     Glucose 211 (H) mg/dL       RN NotifiedMeter: [id number]: [id number] JESSE DE LEON       CBC & Differential [[id number]] Collected:  01/23/18 0554    Specimen:  Blood Updated:  01/23/18 0657    Narrative:       The following orders were created for panel order CBC & Differential.  Procedure                               Abnormality         Status                     ---------                               -----------         ------                     CBC Auto Differential[[id number]]        Abnormal            Final result                 Please view results for these tests on the individual orders.    CBC Auto Differential [[id number]]  (Abnormal) Collected:  01/23/18 0554    Specimen:  Blood Updated:  01/23/18 0657     WBC 8.06 10*3/mm3      RBC 4.56 10*6/mm3      Hemoglobin 13.3 (L) g/dL      Hematocrit 46.5 %      .0 (H) fL      MCH 29.2 pg      MCHC 28.6 (L) g/dL      RDW 15.5 (H) %      RDW-SD 58.1 (H) fl      MPV 10.2 fL      Platelets 274 10*3/mm3      Neutrophil % 76.9 %      Lymphocyte % 12.7 %       Monocyte % 7.2 %      Eosinophil % 2.5 %      Basophil % 0.2 %      Immature Grans % 0.5 %      Neutrophils, Absolute 6.20 10*3/mm3      Lymphocytes, Absolute 1.02 10*3/mm3      Monocytes, Absolute 0.58 10*3/mm3      Eosinophils, Absolute 0.20 10*3/mm3      Basophils, Absolute 0.02 10*3/mm3      Immature Grans, Absolute 0.04 (H) 10*3/mm3     Basic Metabolic Panel [[id number]]  (Abnormal) Collected:  01/23/18 0554    Specimen:  Blood Updated:  01/23/18 0706     Glucose 137 (H) mg/dL      BUN 20 mg/dL      Creatinine 0.82 mg/dL      Sodium 138 mmol/L      Potassium 5.1 mmol/L      Chloride 94 (L) mmol/L      CO2 38.0 (H) mmol/L      Calcium 8.7 mg/dL      eGFR Non African Amer 96 mL/min/1.73      BUN/Creatinine Ratio 24.4     Anion Gap 6.0 mmol/L     POC Glucose Once [[id number]]  (Abnormal) Collected:  01/23/18 0722    Specimen:  Blood Updated:  01/23/18 0754     Glucose 141 (H) mg/dL       RN NotifiedMeter: [id number]: [id number] SANTOS ROSENTHAL              Medication Review:   Current Facility-Administered Medications   Medication Dose Route Frequency Provider Last Rate Last Dose   • acetaminophen (TYLENOL) tablet 650 mg  650 mg Oral Q4H PRN Jamison Medley MD       • aspirin tablet 325 mg  325 mg Oral Daily Jamison Medley MD   325 mg at 01/23/18 0833   • atorvastatin (LIPITOR) tablet 10 mg  10 mg Oral Daily Jamison Medley MD   10 mg at 01/23/18 0833   • budesonide-formoterol (SYMBICORT) 160-4.5 MCG/ACT inhaler 2 puff  2 puff Inhalation BID - RT Marcos Brice MD   2 puff at 01/23/18 0912   • cefTRIAXone (ROCEPHIN) in NS 2 GRAMS/20ml IV PUSH syringe  2 g Intravenous Q24H Jamison Medley MD   2 g at 01/22/18 2126    And   • doxycycline (VIBRAMYCIN) 100 mg/250 mL 0.9% NS VTB  100 mg Intravenous Q12H Jamison Medley MD   100 mg at 01/22/18 2126   • clopidogrel (PLAVIX) tablet 75 mg  75 mg Oral Daily Jamison Medley MD   75 mg at 01/23/18 0857   • dextrose (D50W) solution 25 g  25 g Intravenous Q15 Min PRN Sarbjit  MD Manju       • dextrose (GLUTOSE) oral gel 15 g  15 g Oral Q15 Min PRN Sarbjit Nunes MD       • DULoxetine (CYMBALTA) DR capsule 30 mg  30 mg Oral Daily Jamison Medley MD   30 mg at 01/23/18 0833   • enoxaparin (LOVENOX) syringe 40 mg  40 mg Subcutaneous Q24H Jamison Medley MD   40 mg at 01/23/18 0833   • gabapentin (NEURONTIN) capsule 300 mg  300 mg Oral Q8H Jamison Medley MD   300 mg at 01/23/18 0615   • glucagon (human recombinant) (GLUCAGEN DIAGNOSTIC) injection 1 mg  1 mg Subcutaneous Q15 Min PRN Sarbjit Nunes MD       • influenza vac split quad (FLUZONE QUADRIVALENT) IM suspension 0.5 mL  0.5 mL Intramuscular During Hospitalization Alirio Hager MD       • insulin aspart (novoLOG) injection 0-24 Units  0-24 Units Subcutaneous 4x Daily AC & at Bedtime Sarbjit Nunes MD   4 Units at 01/23/18 1219   • ipratropium-albuterol (DUO-NEB) nebulizer solution 3 mL  3 mL Nebulization 4x Daily - RT Dante Iverson MD   3 mL at 01/23/18 0909   • losartan (COZAAR) half tablet 12.5 mg  12.5 mg Oral Q24H Arpan Ferrell MD   12.5 mg at 01/23/18 0833   • metoprolol tartrate (LOPRESSOR) tablet 25 mg  25 mg Oral Q12H Arpan Ferrell MD   25 mg at 01/23/18 0833   • naphazoline-pheniramine (NAPHCON-A) 0.025-0.3 % ophthalmic solution 1 drop  1 drop Right Eye 4x Daily PRN Jamison Medley MD   1 drop at 01/20/18 1328   • nicotine (NICODERM CQ) 21 MG/24HR patch 1 patch  1 patch Transdermal Q24H Marcos Brice MD   1 patch at 01/23/18 0844   • pantoprazole (PROTONIX) EC tablet 40 mg  40 mg Oral QAM Jamison Medley MD   40 mg at 01/23/18 0615   • pneumococcal polysaccharide 23-valent (PNEUMOVAX-23) vaccine 0.5 mL  0.5 mL Intramuscular During Hospitalization Alirio Hager MD       • sodium chloride 0.9 % flush 1-10 mL  1-10 mL Intravenous PRN Jamison Medley MD       • sodium chloride 0.9 % flush 10 mL  10 mL Intravenous PRN Dante Iverson MD       • spironolactone (ALDACTONE) tablet 25 mg  25 mg Oral Daily Arpan Ferrell MD    25 mg at 01/23/18 0833       Assessment and Plan:    Principal Problem:    Acute respiratory failure with hypoxia and hypercapnia  Active Problems:    Chronic bronchitis with acute exacerbation    Hypertension    Diabetes mellitus    Morbid obesity    Coronary artery disease    Obstructive apnea  1.  Indeterminate troponin suggestive of non-Q myocardial infarction with abnormal resting electrocardiogram.  Patient's CTA of the coronaries did not reveal off any evidence of any obstructive epicardial coronary artery disease.  Patient has the present time has been recommended medical management and aggressive risk factor modification.  2.  Shortness of breath.  Patient had preserved left ventricular systolic function.  Patient would be continued on the Aldactone and the Lasix.  Patient has been counseled to decrease his salt intake.  Patient would require oxygen at home.  3.  Arterial hypertension.  Patient blood pressure currently is 140/84.  Anemia the blood pressure was 125/68.  Losartan would be increased to 25 mg twice a day.  Patient would be continued on the metoprolol 25 mg twice a day.  4.  Hyperlipidemia.  Patient has been counseled on low-fat low-cholesterol diet and to continue with the present dose of the Lipitor.  5.  Obesity.  Patient has been counseled on weight reduction lifestyle modification and dietary restriction.  Patient has also been recommended to undergo a sleep study on outpatient basis.  6.  Risk factor modification.  Patient has been counseled to quit smoking        Arpan Ferrell MD  01/23/18  12:45 PM      Time: Time spent on face-to-face 20 minutes    Dictated utilizing Dragon dictation.

## 2018-01-23 NOTE — PROGRESS NOTES
Cardiology Progress Note:     LOS: 5 days   Patient Care Team:  Harpreet Bass MD as PCP - General      Subjective:      Chart reviewed , patient seen and examined.  Patient does complain of having symptoms of shortness of breath.  Patient denied any symptoms of chest pain.  Patient CTA of the coronaries did not reveal off any evidence of any obstructive epicardial coronary artery disease.  Patient baseline resting electrocardiogram didn't reveal evidence of ST-T wave changes with indeterminate troponin on admission.  Patient is currently on 1 L nasal cannula oxygen with some symptoms of shortness of breath.        Objective:     Objective:  Vitals:    01/22/18 1500   BP: 131/69   Pulse: 82   Resp: 18   Temp: 97.4 °F (36.3 °C)   SpO2: 92%     No intake or output data in the 24 hours ending 01/22/18 1856          Physical Exam:   General Appearance:    Alert, oriented, cooperative, in no acute distress   Head:    Normocephalic, atraumatic, without obvious abnormality   Eyes:           MARCELO  Lids and lashes normal, conjunctivae and sclerae normal, no icterus, no pallor   Ears:    Ears appear intact with no abnormalities noted   Throat:   Mucous membranes pink and moist   Neck:   Supple, trachea midline, no carotid bruit, no organomegaly or JVD   Lungs:     Clear to auscultation and percussion, respirations regular, even and Unlabored. No wheezes, rales, rhonchi    Heart:    Regular rhythm and normal rate, normal S1 and S2, no            murmur, no gallop, no rub, no click   Abdomen:     Soft, non-tender, non-distended, no guarding, no rebound tenderness, Normal bowel sounds in all four quadrant, no masses, liver and spleen nonpalpable,    Genitalia:    Deferred   Extremities:   Moves all extremities well, no edema, no cyanosis, no              Redness, no clubbing   Pulses:   Pulses palpable and equal bilaterally   Skin:   Moist and warm. No bleeding, bruising or rash   Neurologic/Psychiatric:   Alert and  oriented to person, place, and time.  Motor, power and tone in upper and lower extremity is grossly intact.  No focal neurological deficits. Normal cognitive function. No psychomotor reaction or tangential thought. No depression, homicidal ideations and suicidal ideations            Results Review:    Lab Results (last 24 hours)     Procedure Component Value Units Date/Time    POC Glucose Once [[id number]]  (Abnormal) Collected:  01/21/18 2033    Specimen:  Blood Updated:  01/21/18 2112     Glucose 264 (H) mg/dL       RN NotifiedMeter: [id number]: [id number] ANSHUL ARLENE       CBC & Differential [[id number]] Collected:  01/22/18 0600    Specimen:  Blood Updated:  01/22/18 0618    Narrative:       The following orders were created for panel order CBC & Differential.  Procedure                               Abnormality         Status                     ---------                               -----------         ------                     CBC Auto Differential[[id number]]        Abnormal            Final result                 Please view results for these tests on the individual orders.    CBC Auto Differential [[id number]]  (Abnormal) Collected:  01/22/18 0600    Specimen:  Blood Updated:  01/22/18 0618     WBC 7.64 10*3/mm3      RBC 4.45 10*6/mm3      Hemoglobin 13.1 (L) g/dL      Hematocrit 45.5 %      .2 (H) fL      MCH 29.4 pg      MCHC 28.8 (L) g/dL      RDW 15.5 (H) %      RDW-SD 58.2 (H) fl      MPV 10.3 fL      Platelets 271 10*3/mm3      Neutrophil % 75.3 %      Lymphocyte % 15.1 %      Monocyte % 7.2 %      Eosinophil % 1.8 %      Basophil % 0.1 %      Immature Grans % 0.5 %      Neutrophils, Absolute 5.75 10*3/mm3      Lymphocytes, Absolute 1.15 10*3/mm3      Monocytes, Absolute 0.55 10*3/mm3      Eosinophils, Absolute 0.14 10*3/mm3      Basophils, Absolute 0.01 10*3/mm3      Immature Grans, Absolute 0.04 (H) 10*3/mm3     Basic Metabolic Panel [[id number]]  (Abnormal) Collected:  01/22/18  0600    Specimen:  Blood Updated:  01/22/18 0631     Glucose 151 (H) mg/dL      BUN 22 (H) mg/dL      Creatinine 0.81 mg/dL      Sodium 141 mmol/L      Potassium 4.6 mmol/L      Chloride 97 mmol/L      CO2 37.0 (H) mmol/L      Calcium 8.2 (L) mg/dL      eGFR Non African Amer 97 mL/min/1.73      BUN/Creatinine Ratio 27.2 (H)     Anion Gap 7.0 mmol/L     POC Glucose Once [[id number]]  (Abnormal) Collected:  01/22/18 0735    Specimen:  Blood Updated:  01/22/18 0854     Glucose 178 (H) mg/dL       RN NotifiedMeter: [id number]: [id number] ANEL TOLEDO       POC Glucose Once [[id number]]  (Abnormal) Collected:  01/22/18 1035    Specimen:  Blood Updated:  01/22/18 1047     Glucose 197 (H) mg/dL       RN NotifiedMeter: [id number]: [id number] ANEL TOLEDO       Blood Culture - Blood, [[id number]]  (Normal) Collected:  01/17/18 1334    Specimen:  Blood from Arm, Right Updated:  01/22/18 1401     Blood Culture No growth at 5 days    Blood Culture - Blood, [[id number]]  (Normal) Collected:  01/17/18 1611    Specimen:  Blood from Arm, Right Updated:  01/22/18 1616     Blood Culture No growth at 5 days    POC Glucose Once [[id number]]  (Abnormal) Collected:  01/22/18 1645    Specimen:  Blood Updated:  01/22/18 1706     Glucose 174 (H) mg/dL       RN NotifiedMeter: [id number]: [id number] ANEL TOLEDO              Medication Review:   Current Facility-Administered Medications   Medication Dose Route Frequency Provider Last Rate Last Dose   • acetaminophen (TYLENOL) tablet 650 mg  650 mg Oral Q4H PRN Jamison Medley MD       • acetylcysteine (MUCOMYST) 10 % solution 600 mg  600 mg Oral Q12H Arpan Ferrell MD   600 mg at 01/22/18 0929   • albuterol (PROVENTIL) (5 MG/ML) 0.5% nebulizer solution  - ADS Override Pull            • aspirin tablet 325 mg  325 mg Oral Daily Jamison Medley MD   325 mg at 01/22/18 0814   • atorvastatin (LIPITOR) tablet 10 mg  10 mg Oral Daily Jamison Medley MD   10 mg at  01/22/18 0813   • budesonide-formoterol (SYMBICORT) 160-4.5 MCG/ACT inhaler 2 puff  2 puff Inhalation BID - RT Marcos Brice MD   2 puff at 01/22/18 1017   • cefTRIAXone (ROCEPHIN) in NS 2 GRAMS/20ml IV PUSH syringe  2 g Intravenous Q24H Jamison Medley MD   2 g at 01/21/18 2155    And   • doxycycline (VIBRAMYCIN) 100 mg/250 mL 0.9% NS VTB  100 mg Intravenous Q12H Jamison Medley MD   100 mg at 01/22/18 1135   • clopidogrel (PLAVIX) tablet 75 mg  75 mg Oral Daily Jamison Medley MD   75 mg at 01/22/18 0814   • dextrose (D50W) solution 25 g  25 g Intravenous Q15 Min PRN Sarbjit Nunes MD       • dextrose (GLUTOSE) oral gel 15 g  15 g Oral Q15 Min PRN Sarbjit Nunes MD       • DULoxetine (CYMBALTA) DR capsule 30 mg  30 mg Oral Daily Jamison Medley MD   30 mg at 01/22/18 0813   • enoxaparin (LOVENOX) syringe 40 mg  40 mg Subcutaneous Q24H Jamison Medley MD   40 mg at 01/22/18 0814   • gabapentin (NEURONTIN) capsule 300 mg  300 mg Oral Q8H Jamison Medley MD   300 mg at 01/22/18 1326   • glucagon (human recombinant) (GLUCAGEN DIAGNOSTIC) injection 1 mg  1 mg Subcutaneous Q15 Min PRN Sarbjit Nunes MD       • influenza vac split quad (FLUZONE QUADRIVALENT) IM suspension 0.5 mL  0.5 mL Intramuscular During Hospitalization Alirio Hager MD       • insulin aspart (novoLOG) injection 0-24 Units  0-24 Units Subcutaneous 4x Daily AC & at Bedtime Sarbjit Nunes MD   4 Units at 01/22/18 1724   • ipratropium (ATROVENT) 0.02 % nebulizer solution  - ADS Override Pull            • ipratropium-albuterol (DUO-NEB) nebulizer solution 3 mL  3 mL Nebulization 4x Daily - RT Dante Iverson MD   3 mL at 01/22/18 1446   • losartan (COZAAR) half tablet 12.5 mg  12.5 mg Oral Q24H Arpan Ferrell MD   12.5 mg at 01/22/18 0814   • metoprolol tartrate (LOPRESSOR) tablet 25 mg  25 mg Oral Q12H Arpan Ferrell MD   25 mg at 01/22/18 0814   • naphazoline-pheniramine (NAPHCON-A) 0.025-0.3 % ophthalmic solution 1 drop  1 drop Right Eye 4x Daily PRN  Jamison Medley MD   1 drop at 01/20/18 1328   • nicotine (NICODERM CQ) 21 MG/24HR patch 1 patch  1 patch Transdermal Q24H Marcos Brice MD   1 patch at 01/22/18 1326   • pantoprazole (PROTONIX) EC tablet 40 mg  40 mg Oral QAM Jamison Medley MD   40 mg at 01/22/18 0600   • pneumococcal polysaccharide 23-valent (PNEUMOVAX-23) vaccine 0.5 mL  0.5 mL Intramuscular During Hospitalization Alirio Hager MD       • sodium chloride 0.9 % flush 1-10 mL  1-10 mL Intravenous PRN Jamison Medley MD       • sodium chloride 0.9 % flush 10 mL  10 mL Intravenous PRN Dante Iverson MD           Assessment and Plan:    Principal Problem:    Acute respiratory failure with hypoxia and hypercapnia  Active Problems:    Chronic bronchitis with acute exacerbation    Hypertension    Diabetes mellitus    Morbid obesity    Coronary artery disease    Obstructive apnea  1.  Indeterminate troponin with abnormal resting electrocardiogram suggestive of ischemia.  Patient does have risk factors for coronary artery disease.  Patient underwent a CTA of the coronaries which did not reveal off any evidence of any obstructive coronary artery disease.  Patient at the present time would not be subjected to any invasive evaluation.  2.  Arterial hypertension.  Patient blood pressure is currently 131/69.  Patient would be continued on the present dose of the losartan 12.5 mg daily along with the Lopressor 25 mg twice a day.  3.  Hyperlipidemia.  Patient is currently on Lipitor 10 mg at bedtime.  Patient has been counseled on low-fat low-cholesterol diet.  4.  Status post CTA of the coronaries.  Patient does have history of diabetes patient renal function is normal.  Would stop the Mucomyst.  5.  Obesity.  Patient has been counseled on weight reduction lifestyle modification and dietary restriction.  6.  History of cerebrovascular accident.  Patient is currently on aspirin and Plavix.  7.  Shortness of breath chronic obstructive lung disease with hypoxia  and hypercapnia.  Patient has been recommended to be compliant with the CPAP and to use incentive spirometry.    The above plan of management were discussed with the patient and the family.            Arpan Ferrell MD  01/22/18  6:56 PM      Time: Time spent on face-to-face interaction 20 minutes      Dictated utilizing Dragon dictation.

## 2018-01-30 ENCOUNTER — OFFICE VISIT (OUTPATIENT)
Dept: FAMILY MEDICINE CLINIC | Facility: CLINIC | Age: 63
End: 2018-01-30

## 2018-01-30 VITALS
SYSTOLIC BLOOD PRESSURE: 120 MMHG | DIASTOLIC BLOOD PRESSURE: 72 MMHG | HEART RATE: 92 BPM | WEIGHT: 288.2 LBS | TEMPERATURE: 97.9 F | OXYGEN SATURATION: 97 % | BODY MASS INDEX: 38.03 KG/M2

## 2018-01-30 DIAGNOSIS — D75.89 MACROCYTOSIS WITHOUT ANEMIA: Primary | ICD-10-CM

## 2018-01-30 DIAGNOSIS — K21.9 GASTROESOPHAGEAL REFLUX DISEASE WITHOUT ESOPHAGITIS: ICD-10-CM

## 2018-01-30 DIAGNOSIS — I69.319 COGNITIVE DEFICIT, POST-STROKE: ICD-10-CM

## 2018-01-30 DIAGNOSIS — Z23 NEED FOR VACCINATION: ICD-10-CM

## 2018-01-30 DIAGNOSIS — J96.01 ACUTE RESPIRATORY FAILURE WITH HYPOXIA AND HYPERCAPNIA (HCC): ICD-10-CM

## 2018-01-30 DIAGNOSIS — I72.0 ANEURYSM OF CAROTID ARTERY (HCC): ICD-10-CM

## 2018-01-30 DIAGNOSIS — I63.9 CEREBROVASCULAR ACCIDENT (CVA), UNSPECIFIED MECHANISM (HCC): ICD-10-CM

## 2018-01-30 DIAGNOSIS — I69.398 GAIT DISTURBANCE, POST-STROKE: ICD-10-CM

## 2018-01-30 DIAGNOSIS — I10 ESSENTIAL HYPERTENSION: Chronic | ICD-10-CM

## 2018-01-30 DIAGNOSIS — Z23 NEED FOR IMMUNIZATION AGAINST INFLUENZA: ICD-10-CM

## 2018-01-30 DIAGNOSIS — M79.2 NEUROPATHIC PAIN: ICD-10-CM

## 2018-01-30 DIAGNOSIS — G90.2 HORNER'S SYNDROME: ICD-10-CM

## 2018-01-30 DIAGNOSIS — R79.89 ELEVATED BRAIN NATRIURETIC PEPTIDE (BNP) LEVEL: ICD-10-CM

## 2018-01-30 DIAGNOSIS — A41.9 SEPSIS, DUE TO UNSPECIFIED ORGANISM: ICD-10-CM

## 2018-01-30 DIAGNOSIS — J96.02 ACUTE RESPIRATORY FAILURE WITH HYPOXIA AND HYPERCAPNIA (HCC): ICD-10-CM

## 2018-01-30 DIAGNOSIS — E11.69 TYPE 2 DIABETES MELLITUS WITH OTHER SPECIFIED COMPLICATION, WITHOUT LONG-TERM CURRENT USE OF INSULIN (HCC): Chronic | ICD-10-CM

## 2018-01-30 DIAGNOSIS — R26.9 GAIT DISTURBANCE, POST-STROKE: ICD-10-CM

## 2018-01-30 DIAGNOSIS — H02.401 PTOSIS OF RIGHT EYELID: ICD-10-CM

## 2018-01-30 DIAGNOSIS — R44.9 SENSORY DEFICIT PRESENT: ICD-10-CM

## 2018-01-30 PROCEDURE — 90732 PPSV23 VACC 2 YRS+ SUBQ/IM: CPT | Performed by: FAMILY MEDICINE

## 2018-01-30 PROCEDURE — 90686 IIV4 VACC NO PRSV 0.5 ML IM: CPT | Performed by: FAMILY MEDICINE

## 2018-01-30 PROCEDURE — 90472 IMMUNIZATION ADMIN EACH ADD: CPT | Performed by: FAMILY MEDICINE

## 2018-01-30 PROCEDURE — 99214 OFFICE O/P EST MOD 30 MIN: CPT | Performed by: FAMILY MEDICINE

## 2018-01-30 PROCEDURE — 90471 IMMUNIZATION ADMIN: CPT | Performed by: FAMILY MEDICINE

## 2018-01-30 RX ORDER — GABAPENTIN 600 MG/1
600 TABLET ORAL 3 TIMES DAILY PRN
Qty: 90 TABLET | Refills: 4 | Status: SHIPPED | OUTPATIENT
Start: 2018-01-30 | End: 2018-03-27 | Stop reason: SDUPTHER

## 2018-01-30 RX ORDER — AMMONIUM LACTATE 12 G/100G
LOTION TOPICAL AS NEEDED
Qty: 500 G | Refills: 5 | Status: SHIPPED | OUTPATIENT
Start: 2018-01-30 | End: 2018-11-14 | Stop reason: SDUPTHER

## 2018-01-30 RX ORDER — SPIRONOLACTONE 25 MG/1
25 TABLET ORAL DAILY
Qty: 90 TABLET | Refills: 3 | Status: SHIPPED | OUTPATIENT
Start: 2018-01-30 | End: 2019-02-04 | Stop reason: SDUPTHER

## 2018-01-30 RX ORDER — LANSOPRAZOLE 30 MG/1
30 CAPSULE, DELAYED RELEASE ORAL DAILY
Qty: 90 CAPSULE | Refills: 3 | Status: SHIPPED | OUTPATIENT
Start: 2018-01-30 | End: 2019-02-07 | Stop reason: SDUPTHER

## 2018-01-30 RX ORDER — CLOPIDOGREL BISULFATE 75 MG/1
75 TABLET ORAL DAILY
Qty: 90 TABLET | Refills: 3 | Status: SHIPPED | OUTPATIENT
Start: 2018-01-30 | End: 2019-02-04 | Stop reason: SDUPTHER

## 2018-01-30 RX ORDER — DULOXETIN HYDROCHLORIDE 30 MG/1
30 CAPSULE, DELAYED RELEASE ORAL 2 TIMES DAILY
Qty: 180 CAPSULE | Refills: 3 | Status: SHIPPED | OUTPATIENT
Start: 2018-01-30 | End: 2018-02-12 | Stop reason: CLARIF

## 2018-01-30 RX ORDER — LOSARTAN POTASSIUM 50 MG/1
50 TABLET ORAL 2 TIMES DAILY
COMMUNITY
Start: 2018-01-23 | End: 2018-01-30 | Stop reason: SDUPTHER

## 2018-01-30 RX ORDER — LOSARTAN POTASSIUM 50 MG/1
50 TABLET ORAL DAILY
Qty: 90 TABLET | Refills: 3 | Status: SHIPPED | OUTPATIENT
Start: 2018-01-30 | End: 2019-02-07 | Stop reason: SDUPTHER

## 2018-01-30 RX ORDER — PRAVASTATIN SODIUM 40 MG
40 TABLET ORAL NIGHTLY
Qty: 90 TABLET | Refills: 3 | Status: SHIPPED | OUTPATIENT
Start: 2018-01-30 | End: 2019-02-04 | Stop reason: SDUPTHER

## 2018-02-02 PROBLEM — Z23 NEED FOR IMMUNIZATION AGAINST INFLUENZA: Status: ACTIVE | Noted: 2018-02-02

## 2018-02-02 PROBLEM — D75.89 MACROCYTOSIS WITHOUT ANEMIA: Status: ACTIVE | Noted: 2018-02-02

## 2018-02-02 PROBLEM — R79.89 ELEVATED BRAIN NATRIURETIC PEPTIDE (BNP) LEVEL: Status: ACTIVE | Noted: 2018-02-02

## 2018-02-02 PROBLEM — Z23 NEED FOR VACCINATION: Status: ACTIVE | Noted: 2018-02-02

## 2018-02-02 NOTE — PATIENT INSTRUCTIONS
Preventive Care 40-64 Years, Male  Preventive care refers to lifestyle choices and visits with your health care provider that can promote health and wellness.  What does preventive care include?  · A yearly physical exam. This is also called an annual well check.  · Dental exams once or twice a year.  · Routine eye exams. Ask your health care provider how often you should have your eyes checked.  · Personal lifestyle choices, including:  ¨ Daily care of your teeth and gums.  ¨ Regular physical activity.  ¨ Eating a healthy diet.  ¨ Avoiding tobacco and drug use.  ¨ Limiting alcohol use.  ¨ Practicing safe sex.  ¨ Taking low-dose aspirin every day starting at age 50.  What happens during an annual well check?  The services and screenings done by your health care provider during your annual well check will depend on your age, overall health, lifestyle risk factors, and family history of disease.  Counseling   Your health care provider may ask you questions about your:  · Alcohol use.  · Tobacco use.  · Drug use.  · Emotional well-being.  · Home and relationship well-being.  · Sexual activity.  · Eating habits.  · Work and work environment.  Screening   You may have the following tests or measurements:  · Height, weight, and BMI.  · Blood pressure.  · Lipid and cholesterol levels. These may be checked every 5 years, or more frequently if you are over 50 years old.  · Skin check.  · Lung cancer screening. You may have this screening every year starting at age 55 if you have a 30-pack-year history of smoking and currently smoke or have quit within the past 15 years.  · Fecal occult blood test (FOBT) of the stool. You may have this test every year starting at age 50.  · Flexible sigmoidoscopy or colonoscopy. You may have a sigmoidoscopy every 5 years or a colonoscopy every 10 years starting at age 50.  · Prostate cancer screening. Recommendations will vary depending on your family history and other risks.  · Hepatitis C  blood test.  · Hepatitis B blood test.  · Sexually transmitted disease (STD) testing.  · Diabetes screening. This is done by checking your blood sugar (glucose) after you have not eaten for a while (fasting). You may have this done every 1-3 years.  Discuss your test results, treatment options, and if necessary, the need for more tests with your health care provider.  Vaccines   Your health care provider may recommend certain vaccines, such as:  · Influenza vaccine. This is recommended every year.  · Tetanus, diphtheria, and acellular pertussis (Tdap, Td) vaccine. You may need a Td booster every 10 years.  · Varicella vaccine. You may need this if you have not been vaccinated.  · Zoster vaccine. You may need this after age 60.  · Measles, mumps, and rubella (MMR) vaccine. You may need at least one dose of MMR if you were born in 1957 or later. You may also need a second dose.  · Pneumococcal 13-valent conjugate (PCV13) vaccine. You may need this if you have certain conditions and have not been vaccinated.  · Pneumococcal polysaccharide (PPSV23) vaccine. You may need one or two doses if you smoke cigarettes or if you have certain conditions.  · Meningococcal vaccine. You may need this if you have certain conditions.  · Hepatitis A vaccine. You may need this if you have certain conditions or if you travel or work in places where you may be exposed to hepatitis A.  · Hepatitis B vaccine. You may need this if you have certain conditions or if you travel or work in places where you may be exposed to hepatitis B.  · Haemophilus influenzae type b (Hib) vaccine. You may need this if you have certain risk factors.  Talk to your health care provider about which screenings and vaccines you need and how often you need them.  This information is not intended to replace advice given to you by your health care provider. Make sure you discuss any questions you have with your health care provider.  Document Released: 01/13/2017  Document Revised: 09/06/2017 Document Reviewed: 10/18/2016  [employer] Patient Education © 2017 [employer]

## 2018-02-12 ENCOUNTER — PRIOR AUTHORIZATION (OUTPATIENT)
Dept: FAMILY MEDICINE CLINIC | Facility: CLINIC | Age: 63
End: 2018-02-12

## 2018-02-12 RX ORDER — DULOXETIN HYDROCHLORIDE 60 MG/1
60 CAPSULE, DELAYED RELEASE ORAL DAILY
Qty: 31 CAPSULE | Refills: 5 | Status: SHIPPED | OUTPATIENT
Start: 2018-02-12 | End: 2018-07-24 | Stop reason: SDUPTHER

## 2018-02-12 NOTE — TELEPHONE ENCOUNTER
P/a denied.  [employer] will only cover 31 capsules per 31 days.  New rx sent for duloxetine 60 mg once daily.  Pt notiifed.

## 2018-02-23 ENCOUNTER — TELEPHONE (OUTPATIENT)
Dept: FAMILY MEDICINE CLINIC | Facility: CLINIC | Age: 63
End: 2018-02-23

## 2018-02-23 NOTE — TELEPHONE ENCOUNTER
Needs to have lab I ordered last visit ,to check B vitamins to see if that may be causing his problem. Can F/u 1 wk after labs, may need to see Neurologist. Will discuss at F/U. If gets worse don't avoid being seen at ED.

## 2018-02-23 NOTE — TELEPHONE ENCOUNTER
"Pt has been having tremors, possible seizures.  States was having some episodes in left hand and arm prior to recent hospitalization.  Has had \"massive\" ones more recently where involved his entire body.  Would like to know if he should be seen before his next scheduled appointment next month.    "

## 2018-02-26 ENCOUNTER — LAB (OUTPATIENT)
Dept: LAB | Facility: HOSPITAL | Age: 63
End: 2018-02-26

## 2018-02-26 DIAGNOSIS — D75.89 MACROCYTOSIS WITHOUT ANEMIA: ICD-10-CM

## 2018-02-26 LAB
FOLATE SERPL-MCNC: 6.63 NG/ML (ref 2.76–21)
VIT B12 BLD-MCNC: 291 PG/ML (ref 239–931)

## 2018-02-26 PROCEDURE — 82607 VITAMIN B-12: CPT

## 2018-02-26 PROCEDURE — 36415 COLL VENOUS BLD VENIPUNCTURE: CPT

## 2018-02-26 PROCEDURE — 82746 ASSAY OF FOLIC ACID SERUM: CPT

## 2018-02-27 ENCOUNTER — TELEPHONE (OUTPATIENT)
Dept: FAMILY MEDICINE CLINIC | Facility: CLINIC | Age: 63
End: 2018-02-27

## 2018-02-27 NOTE — TELEPHONE ENCOUNTER
----- Message from Harpreet Bass MD sent at 2/26/2018  5:43 PM CST -----  Should start sublingual B12 3475-2638 mcg daily. F/U sooner than scheduled if not improving.

## 2018-03-05 ENCOUNTER — OFFICE VISIT (OUTPATIENT)
Dept: FAMILY MEDICINE CLINIC | Facility: CLINIC | Age: 63
End: 2018-03-05

## 2018-03-05 VITALS
BODY MASS INDEX: 38.3 KG/M2 | WEIGHT: 289 LBS | HEIGHT: 73 IN | HEART RATE: 87 BPM | OXYGEN SATURATION: 93 % | TEMPERATURE: 97.7 F | SYSTOLIC BLOOD PRESSURE: 128 MMHG | DIASTOLIC BLOOD PRESSURE: 80 MMHG

## 2018-03-05 DIAGNOSIS — R26.9 GAIT DISTURBANCE, POST-STROKE: ICD-10-CM

## 2018-03-05 DIAGNOSIS — M79.2 NEUROPATHIC PAIN: ICD-10-CM

## 2018-03-05 DIAGNOSIS — R56.9 SEIZURES (HCC): ICD-10-CM
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
## 2018-03-05 DIAGNOSIS — I10 ESSENTIAL HYPERTENSION: Chronic | ICD-10-CM

## 2018-03-05 DIAGNOSIS — R25.1 TREMOR OF BOTH HANDS: Primary | ICD-10-CM

## 2018-03-05 DIAGNOSIS — K21.9 GASTROESOPHAGEAL REFLUX DISEASE WITHOUT ESOPHAGITIS: ICD-10-CM

## 2018-03-05 DIAGNOSIS — IMO0001 CLASS 2 OBESITY DUE TO EXCESS CALORIES WITH SERIOUS COMORBIDITY AND BODY MASS INDEX (BMI) OF 38.0 TO 38.9 IN ADULT: ICD-10-CM

## 2018-03-05 DIAGNOSIS — G89.29 CHRONIC PAIN OF LEFT KNEE: ICD-10-CM

## 2018-03-05 DIAGNOSIS — I72.0 ANEURYSM OF CAROTID ARTERY (HCC): ICD-10-CM

## 2018-03-05 DIAGNOSIS — I69.398 GAIT DISTURBANCE, POST-STROKE: ICD-10-CM

## 2018-03-05 DIAGNOSIS — M25.562 CHRONIC PAIN OF LEFT KNEE: ICD-10-CM

## 2018-03-05 DIAGNOSIS — R44.9 SENSORY DEFICIT PRESENT: ICD-10-CM

## 2018-03-05 DIAGNOSIS — H02.401 PTOSIS OF RIGHT EYELID: ICD-10-CM

## 2018-03-05 DIAGNOSIS — E66.01 MORBID OBESITY (HCC): Chronic | ICD-10-CM

## 2018-03-05 DIAGNOSIS — D75.89 MACROCYTOSIS WITHOUT ANEMIA: ICD-10-CM

## 2018-03-05 DIAGNOSIS — I69.319 COGNITIVE DEFICIT, POST-STROKE: ICD-10-CM

## 2018-03-05 DIAGNOSIS — G90.2 HORNER'S SYNDROME: ICD-10-CM

## 2018-03-05 DIAGNOSIS — I63.9 CEREBROVASCULAR ACCIDENT (CVA), UNSPECIFIED MECHANISM (HCC): ICD-10-CM

## 2018-03-05 PROCEDURE — 99214 OFFICE O/P EST MOD 30 MIN: CPT | Performed by: FAMILY MEDICINE

## 2018-03-05 RX ORDER — NICOTINE POLACRILEX 2 MG
1 LOZENGE BUCCAL DAILY
Qty: 30 TABLET | Refills: 6 | Status: SHIPPED | OUTPATIENT
Start: 2018-03-05

## 2018-03-05 NOTE — PROGRESS NOTES
Subjective    Arias Willson is a 62 y.o. obese white male smoker with HTN, Had TIA 2- , began with double vision, R facial numbness, L arm numbness, L leg weakness. 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 Pt was referred to Neuro in [place], told has aneurysm, but surgery not recommended for this area. 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 Had resolution of most motor weakness. Has residual difficulty with balance. Cognitive difficulties, Depression, DM, High cholesterol, Chronic pain, and other health problems.  Pt presents for exam, recent hospitalization at [facility] ( Prob Pneumonia, Hypoxia, confusion) to discuss health problems Tx and F/U plans.     'Feels like having major seizures, aleast 5 times over last month. Has not presented to ED for eval,  no incontinence, no tongue biting, several episodes witnessed by wife, with Loss of conscinous, and body jerking. No previou history of Seizures. Fell twice,  Hit left knee, which has chronic problems, needs TKA, would like referral to Ortho. Believes weight gain is problem, Son said Ins would cover Liposuction, would like to have if possible. Have not started B12 yet.  Still having tremors in hands and face that come and go'.    Diabetes   He presents for his follow-up diabetic visit. He has type 2 diabetes mellitus. No MedicAlert identification noted. His disease course has been worsening. Hypoglycemia symptoms include headaches and nervousness/anxiousness. Pertinent negatives for hypoglycemia include no confusion or dizziness. Associated symptoms include fatigue, visual change and weakness. Pertinent negatives for diabetes include no chest pain. Symptoms are stable. Diabetic complications include a CVA and peripheral neuropathy. Current diabetic  treatment includes oral agent (monotherapy). He is compliant with treatment all of the time. His weight is stable. He is following a generally healthy diet. He participates in exercise daily. An ACE inhibitor/angiotensin II receptor blocker is being taken. He does not see a podiatrist.Eye exam is current.   Hypertension   This is a chronic problem. The current episode started more than 1 year ago. The problem has been gradually improving since onset. The problem is controlled. Associated symptoms include headaches and neck pain. Pertinent negatives include no chest pain, palpitations or shortness of breath. Agents associated with hypertension include NSAIDs. Risk factors for coronary artery disease include obesity and smoking/tobacco exposure. Past treatments include ACE inhibitors. The current treatment provides moderate improvement. Hypertensive end-organ damage includes CVA.   Stroke   This is a chronic problem. The current episode started more than 1 year ago. The problem occurs daily. The problem has been gradually improving. Associated symptoms include fatigue, headaches, neck pain, numbness, a visual change and weakness. Pertinent negatives include no abdominal pain, arthralgias, chest pain, chills, congestion, coughing, fever, nausea, rash or sore throat. The symptoms are aggravated by exertion and walking. He has tried acetaminophen and NSAIDs for the symptoms. The treatment provided no relief.   Pain   This is a chronic problem. The current episode started more than 1 year ago. The problem occurs daily. Associated symptoms include fatigue, headaches, neck pain, numbness, a visual change and weakness. Pertinent negatives include no abdominal pain, arthralgias, chest pain, chills, congestion, coughing, fever, nausea, rash or sore throat. The symptoms are aggravated by walking and stress. He has tried NSAIDs and acetaminophen for the symptoms. The treatment provided no relief.   Neurologic Problem   The  patient's primary symptoms include an altered mental status, clumsiness, focal sensory loss, focal weakness, a loss of balance, a visual change and weakness. Primary symptoms comment: Delayed cognitive processing. . This is a chronic problem. The current episode started more than 1 year ago. The neurological problem developed suddenly. The problem has been gradually improving since onset. There was left-sided, right-sided, facial, upper extremity and lower extremity (Strokes of Basal ganglia. Horners syndrome,  sensory, motor deficits.) focality noted. Associated symptoms include fatigue, headaches and neck pain. Pertinent negatives include no abdominal pain, chest pain, confusion, dizziness, fever, nausea, palpitations or shortness of breath. Past treatments include walking and medication (Physical therapy). The treatment provided mild relief. His past medical history is significant for a CVA.   Depression   Visit Type: follow-up  Patient presents with the following symptoms: decreased concentration, depressed mood, excessive worry, feelings of hopelessness, feelings of worthlessness, irritability, memory impairment, nervousness/anxiety and restlessness.  Patient is not experiencing: confusion, palpitations, shortness of breath, suicidal ideas and suicidal planning.  Frequency of symptoms: occasionally   Severity: moderate   Sleep quality: fair  Nighttime awakenings: several    Cough   This is a chronic (Elevated BNP, Hypoxia, Hypercapnia, improving) problem. The current episode started more than 1 month ago. The problem has been gradually improving. The cough is productive of sputum. Associated symptoms include headaches. Pertinent negatives include no chest pain, chills, ear congestion, ear pain, fever, rash, sore throat, shortness of breath or wheezing. The symptoms are aggravated by exercise. Risk factors for lung disease include smoking/tobacco exposure. Treatments tried: Abx, Resp Tx, Home O2. The treatment  provided significant relief. There is no history of environmental allergies.        The following portions of the patient's history were reviewed and updated as appropriate: allergies, current medications, past family history, past medical history, past social history, past surgical history and problem list.    Review of Systems   Constitutional: Positive for fatigue and irritability. Negative for activity change, appetite change, chills and fever.   HENT: Negative for congestion, ear pain and sore throat.    Eyes: Negative for pain and visual disturbance.   Respiratory: Negative for cough, chest tightness, shortness of breath and wheezing.    Cardiovascular: Negative for chest pain and palpitations.   Gastrointestinal: Negative for abdominal pain and nausea.   Endocrine: Negative for cold intolerance and heat intolerance.   Genitourinary: Negative for difficulty urinating and dysuria.   Musculoskeletal: Positive for neck pain. Negative for arthralgias and gait problem.   Skin: Negative for color change and rash.   Allergic/Immunologic: Negative for environmental allergies.   Neurological: Positive for focal weakness, weakness, numbness, headaches and loss of balance. Negative for dizziness.   Hematological: Negative for adenopathy. Does not bruise/bleed easily.   Psychiatric/Behavioral: Positive for decreased concentration and sleep disturbance. Negative for agitation, confusion and suicidal ideas. The patient is nervous/anxious.         Depressed mood       Objective   Physical Exam   Constitutional: He is oriented to person, place, and time. He appears well-developed and well-nourished. No distress.   Resp status improved from last visit.   HENT:   Head: Normocephalic and atraumatic.   Right Ear: External ear normal.   Left Ear: External ear normal.   Nose: Nose normal.   Mouth/Throat: Oropharynx is clear and moist.   Eyes: Conjunctivae and EOM are normal. Pupils are equal, round, and reactive to light. Right eye  exhibits no discharge. Left eye exhibits no discharge. No scleral icterus.   Neck: Normal range of motion. Neck supple. No JVD present. No tracheal deviation present. No thyromegaly present.   Cardiovascular: Normal rate, regular rhythm, normal heart sounds and intact distal pulses.  Exam reveals no gallop and no friction rub.    No murmur heard.  Pulmonary/Chest: Effort normal and breath sounds normal. No respiratory distress. He has no wheezes. He has no rales. He exhibits no tenderness.   Abdominal: Soft. Bowel sounds are normal. He exhibits no distension and no mass. There is no tenderness. No hernia.   Musculoskeletal: He exhibits no edema, tenderness or deformity.   Requires WC, Walks short distance with cane, L sided sensory deficit, R motor, drift towards R without cane. R pupil NR, Has weakness closing R eye CN VII.   Has unsteady antalgic gait.   Lymphadenopathy:     He has no cervical adenopathy.   Neurological: He is alert and oriented to person, place, and time. He displays normal reflexes. A cranial nerve deficit is present. He exhibits normal muscle tone. Coordination normal.   Skin: Skin is warm and dry. No rash noted. He is not diaphoretic. No erythema. No pallor.   Psychiatric: His behavior is normal. Judgment and thought content normal.   Has some difficulty expressing thought at times, Pt often frustrated, tearful due to disability, post CVA.   Chronic Post stroke depression, Situational depression, lost job last year, had been kept on job,even with loss of previous capacity, until company failed.   Nursing note and vitals reviewed.      Assessment/Plan   Arias was seen today for tremors.    Diagnoses and all orders for this visit:    Tremor of both hands  -     Ambulatory Referral to Neurology    Seizures  Comments:  Refer to Neurology for evaluation.  Orders:  -     Ambulatory Referral to Neurology    Chronic pain of left knee  -     Ambulatory Referral to Orthopedic Surgery  -     Cancel: XR  Knee 1 or 2 View Left (In Office)  -     XR knee 4+ vw left    Class 2 obesity due to excess calories with serious comorbidity and body mass index (BMI) of 38.0 to 38.9 in adult    Fall, initial encounter  -     Cancel: XR Knee 1 or 2 View Left (In Office)  -     XR knee 4+ vw left    Essential hypertension    Aneurysm of carotid artery    Cerebrovascular accident (CVA), unspecified mechanism    Gastroesophageal reflux disease without esophagitis    Morbid obesity    Gus's syndrome    Neuropathic pain    Macrocytosis without anemia    Ptosis of right eyelid    Sensory deficit present    Gait disturbance, post-stroke    Cognitive deficit, post-stroke    Other orders  -     Cyanocobalamin (B-12) 5000 MCG sublingual tablet; Place 1 each under the tongue Daily.  -     budesonide-formoterol (SYMBICORT) 160-4.5 MCG/ACT inhaler; Inhale 2 puffs 2 (Two) Times a Day.    Discussed current health problems, meds, indications, tx plan, Discussed stroke prevention. Discussed referral to Neurology. Discussed B12 supplement, discussed ETH cessation, Pt reports no reg ETOH use , but occasional over use. Discussed CBT. Benefits of counseling. Discussed BMI, BEE, diet, exercise.  Discussed Fall prevention, discussed safety , No driving until cleared by Neurology. Discussed Neurontin as controlled med. Discussed Chronic L knee problem, Fall, injury, checking x-ray. Discussed referral to Orthopedics. Discussed F/U here.          This document has been electronically signed by Harpreet Bass MD

## 2018-03-06 RX ORDER — BUDESONIDE AND FORMOTEROL FUMARATE DIHYDRATE 160; 4.5 UG/1; UG/1
2 AEROSOL RESPIRATORY (INHALATION)
Qty: 10.2 G | Refills: 5 | Status: SHIPPED | OUTPATIENT
Start: 2018-03-06 | End: 2018-07-16 | Stop reason: ALTCHOICE

## 2018-03-07 ENCOUNTER — TELEPHONE (OUTPATIENT)
Dept: FAMILY MEDICINE CLINIC | Facility: CLINIC | Age: 63
End: 2018-03-07

## 2018-03-07 NOTE — TELEPHONE ENCOUNTER
----- Message from Harpreet Bass MD sent at 3/6/2018  9:33 AM CST -----  L knee x-ray doesn't show any acute changes, but chronic degenerative changes. Has referral to Ortho.

## 2018-03-07 NOTE — PATIENT INSTRUCTIONS
Epilepsy  Epilepsy is when a person keeps having seizures. A seizure is unusual activity in the brain. A seizure can change how you think or behave, and it can make it hard to be aware of what is happening.  This condition can cause problems, such as:  · Falls, accidents, and injury.  · Depression.  · Poor memory.  · Sudden unexplained death in epilepsy (SUDEP). This is rare. Its cause is not known.  Most people with epilepsy lead normal lives.  Follow these instructions at home:  Medicines     · Take medicines only as told by your doctor.  · Avoid anything that may keep your medicine from working, such as alcohol.  Activity   · Get enough rest. Lack of sleep can make seizures more likely to occur.  · Follow your doctor’s advice about driving, swimming, and doing anything else that would be dangerous if you had a seizure.  Teaching others   Teach friends and family what to do if you have a seizure. They should:  · Lay you on the ground to prevent a fall.  · Cushion your head and body.  · Loosen any tight clothing around your neck.  · Turn you on your side.  · Stay with you until you are better.  · Not hold you down.  · Not put anything in your mouth.  · Know whether or not you need emergency care.  General instructions   · Avoid anything that causes you to have seizures.  · Keep a seizure diary. Write down what you remember about each seizure, and especially what might have caused it.  · Keep all follow-up visits as told by your doctor. This is important.  Contact a doctor if:  · You have a change in your seizure pattern.  · You get an infection or start to feel sick. You may have more seizures when you are sick.  Get help right away if:  · A seizure does not stop after 5 minutes.  · You have more than one seizure in a row, and you do not have enough time between the seizures to feel better.  · A seizure makes it harder to breathe.  · A seizure is different from other seizures you have had.  · A seizure makes you  unable to speak or use a part of your body.  · You did not wake up right after a seizure.  This information is not intended to replace advice given to you by your health care provider. Make sure you discuss any questions you have with your health care provider.  Document Released: 10/15/2010 Document Revised: 07/24/2017 Document Reviewed: 06/27/2017  [employer] Interactive Patient Education © 2017 [employer]

## 2018-03-09 DIAGNOSIS — M25.562 LEFT KNEE PAIN, UNSPECIFIED CHRONICITY: Primary | ICD-10-CM

## 2018-03-12 ENCOUNTER — OFFICE VISIT (OUTPATIENT)
Dept: ORTHOPEDIC SURGERY | Facility: CLINIC | Age: 63
End: 2018-03-12

## 2018-03-12 VITALS — WEIGHT: 290 LBS | HEIGHT: 73 IN | BODY MASS INDEX: 38.43 KG/M2

## 2018-03-12 DIAGNOSIS — M25.562 LEFT KNEE PAIN, UNSPECIFIED CHRONICITY: ICD-10-CM

## 2018-03-12 DIAGNOSIS — M17.12 PRIMARY OSTEOARTHRITIS OF LEFT KNEE: Primary | ICD-10-CM

## 2018-03-12 DIAGNOSIS — M25.462 EFFUSION, LEFT KNEE: ICD-10-CM

## 2018-03-12 PROCEDURE — 20610 DRAIN/INJ JOINT/BURSA W/O US: CPT | Performed by: NURSE PRACTITIONER

## 2018-03-12 PROCEDURE — 99214 OFFICE O/P EST MOD 30 MIN: CPT | Performed by: NURSE PRACTITIONER

## 2018-03-12 RX ADMIN — LIDOCAINE HYDROCHLORIDE 2 ML: 10 INJECTION, SOLUTION INFILTRATION; PERINEURAL at 09:15

## 2018-03-12 RX ADMIN — TRIAMCINOLONE ACETONIDE 40 MG: 40 INJECTION, SUSPENSION INTRA-ARTICULAR; INTRAMUSCULAR at 09:15

## 2018-03-12 NOTE — PROGRESS NOTES
Arias Willson is a 62 y.o. male   Primary provider:  Harpreet Bass MD       Chief Complaint   Patient presents with   • Left Knee - Establish Care     Xray 3/5/18 with standing view done today.        HISTORY OF PRESENT ILLNESS:    Knee Pain    Incident onset: 02/01/18. The injury mechanism was a fall. The pain is present in the left knee. The quality of the pain is described as aching. The pain is at a severity of 6/10. The pain is moderate. The pain has been intermittent since onset. Associated symptoms include a loss of motion, numbness and tingling. He reports no foreign bodies present. The symptoms are aggravated by weight bearing. He has tried rest for the symptoms.        CONCURRENT MEDICAL HISTORY:    Past Medical History:   Diagnosis Date   • Abnormal glucose     PRE DM   • Acute conjunctivitis     RESOLVED   • Aneurysm of carotid artery     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      • Benign essential hypertension    • Blood in feces    • Carotid artery stenosis    • Cerebrovascular accident      L sided sensory deficit      • Conjunctivitis    • Constipation     OCCASIONAL   • Contusion, eye, left    • Corneal ulcer     PROBABLE   • Diabetes mellitus    • Eczema    • Encounter for screening for malignant neoplasm of colon    • Essential hypertension    • GERD (gastroesophageal reflux disease)    • Hemorrhoids    • History of echocardiogram 04/05/2013    Echocadiogram W/ color flow 94912 (Normal LV systolic function with EF of 60-65%. Grade I diastolic dysfunction of the LV myocardium. No evidence of LV apical thrombus. No evidence of pericardial effusion.)   • Gus's syndrome     RIGHT EYE   • Hyperkeratosis    • Hyperlipidemia    • Mucopurulent conjunctivitis     ACUTE   • Myopia    • Neuropathic pain    • Obesity    • Onychomycosis    • Tobacco dependence syndrome        No Known Allergies      Current Outpatient Prescriptions:   •  albuterol (PROVENTIL HFA;VENTOLIN  HFA) 108 (90 Base) MCG/ACT inhaler, Inhale 2 puffs Every 4 (Four) Hours As Needed for Wheezing., Disp: 3 inhaler, Rfl: 1  •  ammonium lactate (AMLACTIN) 12 % lotion, Apply  topically As Needed for Dry Skin., Disp: 500 g, Rfl: 5  •  [START ON 5/24/2106] aspirin 325 MG tablet, Take 325 mg by mouth daily., Disp: , Rfl:   •  budesonide-formoterol (SYMBICORT) 160-4.5 MCG/ACT inhaler, Inhale 2 puffs 2 (Two) Times a Day., Disp: 10.2 g, Rfl: 5  •  clopidogrel (PLAVIX) 75 MG tablet, Take 1 tablet by mouth Daily., Disp: 90 tablet, Rfl: 3  •  Cyanocobalamin (B-12) 5000 MCG sublingual tablet, Place 1 each under the tongue Daily., Disp: 30 tablet, Rfl: 6  •  DULoxetine (CYMBALTA) 60 MG capsule, Take 1 capsule by mouth Daily., Disp: 31 capsule, Rfl: 5  •  gabapentin (NEURONTIN) 600 MG tablet, Take 1 tablet by mouth 3 (Three) Times a Day As Needed (Take 600mg TID prn)., Disp: 90 tablet, Rfl: 4  •  lansoprazole (PREVACID) 30 MG capsule, Take 1 capsule by mouth Daily., Disp: 90 capsule, Rfl: 3  •  losartan (COZAAR) 50 MG tablet, Take 1 tablet by mouth Daily., Disp: 90 tablet, Rfl: 3  •  metFORMIN (GLUCOPHAGE) 500 MG tablet, TAKE ONE TABLET BY MOUTH TWICE DAILY, Disp: 60 tablet, Rfl: 5  •  metoprolol tartrate (LOPRESSOR) 25 MG tablet, Take 1 tablet by mouth Every 12 (Twelve) Hours., Disp: 180 tablet, Rfl: 3  •  naphazoline (NAPHCON) 0.1 % ophthalmic solution, Apply 1 drop to eye 4 (Four) Times a Day As Needed for Irritation., Disp: 15 mL, Rfl: 5  •  Omega-3 Fatty Acids (FISH OIL) 1000 MG capsule capsule, Take 2,000 mg by mouth Daily With Breakfast., Disp: , Rfl:   •  pravastatin (PRAVACHOL) 40 MG tablet, Take 1 tablet by mouth Every Night., Disp: 90 tablet, Rfl: 3  •  spironolactone (ALDACTONE) 25 MG tablet, Take 1 tablet by mouth Daily., Disp: 90 tablet, Rfl: 3    Past Surgical History:   Procedure Laterality Date   • COLONOSCOPY  07/30/2015    Colonoscopy, diagnostic (screening) 50501 (Screening for malignant neoplasm of colon)   "  • COLONOSCOPY  09/09/2015    Colonoscopy, diagnostic (screening) 37172 (Diverticulosis found in the sigmoid colon.Hemorrhoids found in the anus.)   • COLONOSCOPY  05/01/2009    Colonoscopy, diagnostic (screening) 45935 (Small internal and external hemorrhoids were present, Colonoscopy otherwise normal.)       Family History   Problem Relation Age of Onset   • Glaucoma Other    • Heart disease Other    • Stroke Other    • Macular degeneration Other    • Glaucoma Father    • Macular degeneration Father    • Cataracts Father    • Macular degeneration Sister        Social History     Social History   • Marital status:      Spouse name: N/A   • Number of children: N/A   • Years of education: N/A     Occupational History   • Not on file.     Social History Main Topics   • Smoking status: Current Every Day Smoker     Packs/day: 1.00     Years: 33.00     Types: Cigarettes   • Smokeless tobacco: Never Used      Comment: SMOKING 3 CIGS A DAY   • Alcohol use Yes   • Drug use: No   • Sexual activity: Not on file     Other Topics Concern   • Not on file     Social History Narrative   • No narrative on file        Review of Systems   Respiratory: Positive for shortness of breath.    Musculoskeletal: Positive for arthralgias, joint swelling and myalgias.   Neurological: Positive for tingling, seizures, numbness and headaches.   Psychiatric/Behavioral: The patient is nervous/anxious.    All other systems reviewed and are negative.      PHYSICAL EXAMINATION:       Ht 185.4 cm (73\")   Wt 132 kg (290 lb)   BMI 38.26 kg/m²     Physical Exam   Constitutional: He is oriented to person, place, and time. Vital signs are normal. He appears well-developed and well-nourished. He is cooperative.   HENT:   Head: Normocephalic and atraumatic.   Neck: Trachea normal and phonation normal.   Pulmonary/Chest: Effort normal. No respiratory distress.   Abdominal: Soft. Normal appearance. He exhibits no distension.   Musculoskeletal:        " Left knee: He exhibits effusion.   Neurological: He is alert and oriented to person, place, and time. GCS eye subscore is 4. GCS verbal subscore is 5. GCS motor subscore is 6.   Skin: Skin is warm, dry and intact.   Psychiatric: He has a normal mood and affect. His speech is normal and behavior is normal. Judgment and thought content normal. Cognition and memory are normal.   Vitals reviewed.      GAIT:     []  Normal  [x]  Antalgic    Assistive device: []  None  []  Walker     []  Crutches  []  Cane     [x]  Wheelchair  []  Stretcher    Right Knee Exam     Tenderness   The patient is experiencing no tenderness.           Left Knee Exam     Tenderness   The patient is experiencing no tenderness.         Range of Motion   Left knee extension: 3.   Left knee flexion: 90.     Other   Erythema: absent  Scars: present  Sensation: normal  Pulse: present  Swelling: mild  Effusion: effusion present        Large Joint Arthrocentesis  Date/Time: 3/12/2018 9:15 AM  Consent given by: patient  Site marked: site marked  Timeout: Immediately prior to procedure a time out was called to verify the correct patient, procedure, equipment, support staff and site/side marked as required   Supporting Documentation  Indications: pain   Procedure Details  Location: knee - L knee  Preparation: Patient was prepped and draped in the usual sterile fashion  Needle size: 18 G  Approach: anteromedial  Medications administered: 2 mL lidocaine 1 %; 40 mg triamcinolone acetonide 40 MG/ML  Aspirate amount: 40 mL  Aspirate: yellow and clear  Patient tolerance: patient tolerated the procedure well with no immediate complications                Xr Knee 4+ Vw Left    Result Date: 3/5/2018  Narrative: EXAM:  Radiograph(s), Osseous       REGION:   Knee  SIDE:     Left   VIEWS:   4         INDICATION:    Fall, M25.562 Pain in left knee G89.29 Other chronic pain W19.XXXA Unspecified fall, initial encounter   COMPARISON:    none          FINDINGS:       No  evidence of a fracture or bony malalignment.   There are tricompartmental degenerative changes with marked irregularity of the lateral tibial plateau, presumably a chronic etiology. .        Impression: CONCLUSION:       1. Tricompartment degenerative changes.          Note:  if pain or symptoms persist beyond reasonable expectations and follow-up imaging is anticipated,  cross sectional imaging (MRI) is suggested, as is deemed clinically appropriate.            Electronically signed by:  KEYONA Lund MD  3/5/2018 7:17 PM CST Workstation: [id number]          ASSESSMENT:    Diagnoses and all orders for this visit:    Primary osteoarthritis of left knee  -     Large Joint Arthrocentesis    Left knee pain, unspecified chronicity  -     Large Joint Arthrocentesis    Effusion, left knee  -     Large Joint Arthrocentesis          PLAN   End-stage osteoarthritis noted of the left knee with effusion.  Patient reports multiple falls over the past several months and has a history of CVA with seizure disorder since the incident.    Aspirated the knee today and recommended progression a range of motion and activity as tolerated with the assistance of a walker/cane as needed.  Follow-up in 2 weeks for recheck.  No Follow-up on file.    Donn Dennis, APRN

## 2018-03-13 RX ORDER — TRIAMCINOLONE ACETONIDE 40 MG/ML
40 INJECTION, SUSPENSION INTRA-ARTICULAR; INTRAMUSCULAR
Status: COMPLETED | OUTPATIENT
Start: 2018-03-12 | End: 2018-03-12

## 2018-03-13 RX ORDER — LIDOCAINE HYDROCHLORIDE 10 MG/ML
2 INJECTION, SOLUTION INFILTRATION; PERINEURAL
Status: COMPLETED | OUTPATIENT
Start: 2018-03-12 | End: 2018-03-12

## 2018-03-20 ENCOUNTER — HOSPITAL ENCOUNTER (EMERGENCY)
Facility: HOSPITAL | Age: 63
Discharge: HOME | End: 2018-03-20
Attending: EMERGENCY MEDICINE | Admitting: EMERGENCY MEDICINE

## 2018-03-20 VITALS
BODY MASS INDEX: 37.09 KG/M2 | TEMPERATURE: 97.6 F | HEART RATE: 81 BPM | HEIGHT: 74 IN | SYSTOLIC BLOOD PRESSURE: 151 MMHG | DIASTOLIC BLOOD PRESSURE: 67 MMHG | WEIGHT: 289 LBS | RESPIRATION RATE: 20 BRPM | OXYGEN SATURATION: 89 %

## 2018-03-20 DIAGNOSIS — R04.0 ANTERIOR EPISTAXIS: Primary | ICD-10-CM

## 2018-03-20 PROCEDURE — 99283 EMERGENCY DEPT VISIT LOW MDM: CPT

## 2018-03-20 RX ORDER — HYDROCODONE BITARTRATE AND ACETAMINOPHEN 5; 325 MG/1; MG/1
1 TABLET ORAL ONCE
Status: COMPLETED | OUTPATIENT
Start: 2018-03-20 | End: 2018-03-20

## 2018-03-20 RX ORDER — CEPHALEXIN 500 MG/1
500 CAPSULE ORAL 3 TIMES DAILY
Qty: 21 CAPSULE | Refills: 0 | Status: SHIPPED | OUTPATIENT
Start: 2018-03-20 | End: 2018-03-27

## 2018-03-20 RX ORDER — OXYCODONE HYDROCHLORIDE AND ACETAMINOPHEN 5; 325 MG/1; MG/1
1 TABLET ORAL EVERY 8 HOURS PRN
Qty: 13 TABLET | Refills: 0 | Status: SHIPPED | OUTPATIENT
Start: 2018-03-20 | End: 2018-06-26

## 2018-03-20 RX ADMIN — PHENYLEPHRINE HYDROCHLORIDE 2 SPRAY: 0.5 SPRAY NASAL at 05:36

## 2018-03-20 RX ADMIN — HYDROCODONE BITARTRATE AND ACETAMINOPHEN 1 TABLET: 5; 325 TABLET ORAL at 06:20

## 2018-03-20 NOTE — ED PROVIDER NOTES
Subjective   History of Present Illness  62-year-old male presents to the emergency department because of a nosebleed.  They have been occurring intermittently for last few days and he presents today with persistentbleeding since about 9 PM last night.  He takes aspirin and Plavix as he's had previous strokes and appears history of vascular disease.  Does not take Coumadin or other blood thinner.  Has tried pressure at home without relief.  Most of the bleeding is coming from the left Siegel although he has some from the right.  Review of Systems   Constitutional: Negative for chills, fatigue and fever.   HENT: Positive for nosebleeds. Negative for congestion, postnasal drip, sinus pressure and sore throat.    Eyes: Negative for pain.   Respiratory: Negative for cough, chest tightness, shortness of breath, wheezing and stridor.    Cardiovascular: Negative for chest pain, palpitations and leg swelling.   Gastrointestinal: Negative for abdominal pain, constipation, diarrhea, nausea and vomiting.   Genitourinary: Negative for dysuria, flank pain, frequency, hematuria, testicular pain and urgency.   Musculoskeletal: Negative for arthralgias and myalgias.   Skin: Negative for rash.   Neurological: Negative for syncope, light-headedness and headaches.   Psychiatric/Behavioral: Negative.        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 Grade I diastolic dysfunction of the LV myocardium. No evidence of LV apical thrombus. No evidence of pericardial effusion.)   • Gus's syndrome     RIGHT EYE   • Hyperkeratosis    • Hyperlipidemia    • Mucopurulent conjunctivitis     ACUTE   • Myopia    • Neuropathic pain    • Obesity    • Onychomycosis    • Tobacco dependence syndrome        No Known Allergies    Past Surgical History:   Procedure Laterality Date   • COLONOSCOPY  07/30/2015    Colonoscopy, diagnostic (screening) 68705 (Screening for malignant neoplasm of colon)    • COLONOSCOPY  09/09/2015    Colonoscopy, diagnostic (screening) 87510 (Diverticulosis found in the sigmoid colon.Hemorrhoids found in the anus.)   • COLONOSCOPY  05/01/2009    Colonoscopy, diagnostic (screening) 93793 (Small internal and external hemorrhoids were present, Colonoscopy otherwise normal.)       Family History   Problem Relation Age of Onset   • Glaucoma Other    • Heart disease Other    • Stroke Other    • Macular degeneration Other    • Glaucoma Father    • Macular degeneration Father    • Cataracts Father    • Macular degeneration Sister        Social History     Social History   • Marital status:      Social History Main Topics   • Smoking status: Current Every Day Smoker     Packs/day: 1.00     Years: 33.00     Types: Cigarettes   • Smokeless tobacco: Never Used      Comment: SMOKING 3 CIGS A DAY   • Alcohol use Yes   • Drug use: No     Other Topics Concern   • Not on file           Objective   Physical Exam   Constitutional: He is oriented to person, place, and time. He appears well-developed and well-nourished.   HENT:   Head: Normocephalic.   Mouth/Throat: Oropharynx is clear and moist.   Bleeding mostly from the left nose and nostril.  Some signs of previous bleeding in the right nostril mostly from the anterior  portion.  The bleeding appears to be coming from the anterior portion of the left nostril.   Eyes: EOM are normal. Pupils are equal, round, and reactive to light.   Neck: Normal range of motion. Neck supple.   Cardiovascular: Normal rate and regular rhythm.    Pulmonary/Chest: Effort normal.   Abdominal: Soft.   Musculoskeletal: Normal range of motion.   Neurological: He is alert and oriented to person, place, and time.   Skin: Skin is warm. Capillary refill takes less than 2 seconds.   Nursing note and vitals reviewed.      Epistaxis Management  Date/Time: 3/20/2018 6:29 AM  Performed by: EPIFANIO ROD  Authorized by: EPIFANIO ROD     Consent:     Consent obtained:  Verbal    Consent given by:  Patient    Risks discussed:  Bleeding, infection, nasal injury and pain    Alternatives discussed:  No treatment  Anesthesia (see MAR for exact dosages):     Anesthesia method:  None  Procedure details:     Treatment site:  L anterior    Treatment method:  Anterior pack    Treatment complexity:  Limited    Treatment episode: initial    Post-procedure details:     Assessment:  Bleeding stopped    Patient tolerance of procedure:  Tolerated well, no immediate complications             ED Course  ED Course                  MDM  Number of Diagnoses or Management Options  Anterior epistaxis:   Diagnosis management comments: Patient with anterior epistaxis.  Initial attempt to control bleeding with phenylephrine and pressure unsuccessful.  We then placed a anterior packing rhino with airway with cessation of bleeding.  Observed in the ED with no recurrence of the bleeding.  We will have him follow with herand throat for further management.    Bleeding stopped after packing.  We'll place on Keflex and give him some pain medications.  We'll have him follow-up with Dr. Branch on Thursday      Final diagnoses:   Anterior epistaxis            Epifanio Rod MD  03/20/18 0725

## 2018-03-22 ENCOUNTER — OFFICE VISIT (OUTPATIENT)
Dept: OTOLARYNGOLOGY | Facility: CLINIC | Age: 63
End: 2018-03-22

## 2018-03-22 VITALS
SYSTOLIC BLOOD PRESSURE: 118 MMHG | DIASTOLIC BLOOD PRESSURE: 82 MMHG | OXYGEN SATURATION: 88 % | HEART RATE: 87 BPM | WEIGHT: 290.4 LBS | BODY MASS INDEX: 37.27 KG/M2 | HEIGHT: 74 IN

## 2018-03-22 DIAGNOSIS — J34.2 NASAL SEPTAL DEFORMITY: ICD-10-CM

## 2018-03-22 DIAGNOSIS — R04.0 NASAL BLEEDING: Primary | ICD-10-CM

## 2018-03-22 PROCEDURE — 30901 CONTROL OF NOSEBLEED: CPT | Performed by: OTOLARYNGOLOGY

## 2018-03-22 PROCEDURE — 99203 OFFICE O/P NEW LOW 30 MIN: CPT | Performed by: OTOLARYNGOLOGY

## 2018-03-23 NOTE — PROGRESS NOTES
Subjective   Arias Willson is a 62 y.o. male.     History of Present Illness   Patient is chronically anticoagulated with Plavix and aspirin.  Experienced a substantial nosebleed on the left 2 days ago and was seen in the emergency department and had packing placed in the left naris.  Reports she is not had any bleeding since then.  Has a history of multiple nasal injuries with no previous nasal surgery.  Denies any fevers or chills or vision change.  Is a smoker.  Has been holding his anticoagulants for the time being.      The following portions of the patient's history were reviewed and updated as appropriate: allergies, current medications, past family history, past medical history, past social history, past surgical history and problem list.      Arias Willson reports that he has been smoking Cigarettes.  He has a 33.00 pack-year smoking history. He has never used smokeless tobacco. He reports that he drinks alcohol. He reports that he does not use drugs.  Patient is a tobacco user and has been counseled for use of tobacco products    Family History   Problem Relation Age of Onset   • Glaucoma Other    • Heart disease Other    • Stroke Other    • Macular degeneration Other    • Glaucoma Father    • Macular degeneration Father    • Cataracts Father    • Macular degeneration Sister        No Known Allergies      Current Outpatient Prescriptions:   •  albuterol (PROVENTIL HFA;VENTOLIN HFA) 108 (90 Base) MCG/ACT inhaler, Inhale 2 puffs Every 4 (Four) Hours As Needed for Wheezing., Disp: 3 inhaler, Rfl: 1  •  ammonium lactate (AMLACTIN) 12 % lotion, Apply  topically As Needed for Dry Skin., Disp: 500 g, Rfl: 5  •  [START ON 5/24/2106] aspirin 325 MG tablet, Take 325 mg by mouth daily., Disp: , Rfl:   •  budesonide-formoterol (SYMBICORT) 160-4.5 MCG/ACT inhaler, Inhale 2 puffs 2 (Two) Times a Day., Disp: 10.2 g, Rfl: 5  •  cephalexin (KEFLEX) 500 MG capsule, Take 1 capsule by mouth 3 (Three) Times a Day for 7  days., Disp: 21 capsule, Rfl: 0  •  clopidogrel (PLAVIX) 75 MG tablet, Take 1 tablet by mouth Daily., Disp: 90 tablet, Rfl: 3  •  Cyanocobalamin (B-12) 5000 MCG sublingual tablet, Place 1 each under the tongue Daily., Disp: 30 tablet, Rfl: 6  •  DULoxetine (CYMBALTA) 60 MG capsule, Take 1 capsule by mouth Daily., Disp: 31 capsule, Rfl: 5  •  gabapentin (NEURONTIN) 600 MG tablet, Take 1 tablet by mouth 3 (Three) Times a Day As Needed (Take 600mg TID prn)., Disp: 90 tablet, Rfl: 4  •  lansoprazole (PREVACID) 30 MG capsule, Take 1 capsule by mouth Daily., Disp: 90 capsule, Rfl: 3  •  losartan (COZAAR) 50 MG tablet, Take 1 tablet by mouth Daily., Disp: 90 tablet, Rfl: 3  •  metFORMIN (GLUCOPHAGE) 500 MG tablet, TAKE ONE TABLET BY MOUTH TWICE DAILY, Disp: 60 tablet, Rfl: 5  •  metoprolol tartrate (LOPRESSOR) 25 MG tablet, Take 1 tablet by mouth Every 12 (Twelve) Hours., Disp: 180 tablet, Rfl: 3  •  naphazoline (NAPHCON) 0.1 % ophthalmic solution, Apply 1 drop to eye 4 (Four) Times a Day As Needed for Irritation., Disp: 15 mL, Rfl: 5  •  Omega-3 Fatty Acids (FISH OIL) 1000 MG capsule capsule, Take 2,000 mg by mouth Daily With Breakfast., Disp: , Rfl:   •  oxyCODONE-acetaminophen (PERCOCET) 5-325 MG per tablet, Take 1 tablet by mouth Every 8 (Eight) Hours As Needed for Moderate Pain ., Disp: 13 tablet, Rfl: 0  •  pravastatin (PRAVACHOL) 40 MG tablet, Take 1 tablet by mouth Every Night., Disp: 90 tablet, Rfl: 3  •  spironolactone (ALDACTONE) 25 MG tablet, Take 1 tablet by mouth Daily., Disp: 90 tablet, Rfl: 3    Past Medical History:   Diagnosis Date   • Abnormal glucose     PRE DM   • Acute conjunctivitis     RESOLVED   • Aneurysm of carotid artery     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 Grade I diastolic dysfunction of the LV myocardium. No evidence of LV apical thrombus. No evidence of pericardial effusion.)   • Gus's syndrome     RIGHT EYE   • Hyperkeratosis    • Hyperlipidemia    • Mucopurulent conjunctivitis     ACUTE   • Myopia    • Neuropathic pain    • Obesity    • Onychomycosis    • Tobacco dependence syndrome          Review of Systems   HENT: Positive for nosebleeds and trouble swallowing.    Respiratory: Positive for shortness of breath.    Gastrointestinal: Positive for blood in stool.   Musculoskeletal:        Leg Pain   Neurological: Positive for seizures, weakness and numbness.   All other systems reviewed and are negative.          Objective   Physical Exam  General: Well-developed well-nourished male in no acute distress.  Alert and oriented ×3. Head: Normocephalic. Face: Symmetrical strength and appearance. PERRL. EOMI. Voice:Strong. Speech:Fluent  Ears: External ears no deformity, canals no discharge, tympanic membranes intact clear and mobile bilaterally.  Nose: Nares show inflated nasal tampon in the left nostril with dried blood no active bleeding.  Oral cavity: Lips and gums without lesions.  Tongue and floor of mouth without lesions.  Parotid and submandibular ducts unobstructed.  No mucosal lesions on the buccal mucosa or vestibule of the mouth.  Pharynx: No erythema exudate mass or ulcer no fresh blood  Neck: No lymphadenopathy.  No thyromegaly.  Trachea and larynx midline.  No masses in the parotid or submandibular glands.    Nasal tampon is deflated and removed.  Patient begins to immediately have some oozing but not  brisk bleeding from the left.  There is a septal deformity to the left inferiorly.  Kostas-Synephrine and Xylocaine are placed in the naris on cotton and allowed to remain for approximate 5 minutes.  Upon removal and active bleeding site is identified anteriorly and superiorly on the septum and this is cauterized with silver nitrate.  A second site on the septal spur inferiorly was also oozing and cauterized with silver nitrate.  Patient was observed an additional 10 minutes with no further bleeding noted.    Assessment/Plan   Arias was seen today for nose bleed.    Diagnoses and all orders for this visit:    Nasal bleeding    Nasal septal deformity        Plan: Packing removed and bleeding controlled with silver nitrate as described above.  Advised frequent use of nasal saline spray and use of a cool mist vaporizer at bedtime.  Avoid digital trauma to the nose.  Avoid nose blowing.  If no further bleeding may follow up with me as needed.  Advised to follow up with his family doctor/prescribing physician regarding resumption of his anticoagulants.  Patient is advised to call or return to the emergency department for recurrent nasal bleeding.

## 2018-03-26 ENCOUNTER — OFFICE VISIT (OUTPATIENT)
Dept: ORTHOPEDIC SURGERY | Facility: CLINIC | Age: 63
End: 2018-03-26

## 2018-03-26 VITALS — WEIGHT: 290.3 LBS | BODY MASS INDEX: 37.26 KG/M2 | HEIGHT: 74 IN

## 2018-03-26 DIAGNOSIS — M17.12 PRIMARY OSTEOARTHRITIS OF LEFT KNEE: ICD-10-CM

## 2018-03-26 DIAGNOSIS — M25.462 EFFUSION, LEFT KNEE: ICD-10-CM

## 2018-03-26 DIAGNOSIS — R56.9 SEIZURES (HCC): ICD-10-CM

## 2018-03-26 DIAGNOSIS — M25.562 LEFT KNEE PAIN, UNSPECIFIED CHRONICITY: Primary | ICD-10-CM

## 2018-03-26 PROCEDURE — 99214 OFFICE O/P EST MOD 30 MIN: CPT | Performed by: NURSE PRACTITIONER

## 2018-03-26 NOTE — PROGRESS NOTES
Arias Willson is a 62 y.o. male returns for     Chief Complaint   Patient presents with   • Left Knee - Follow-up       HISTORY OF PRESENT ILLNESS:     62-year-old  male in the office today for follow-up of his left knee pain.  He reports some worsening of his symptoms after recent seizure which she twisted the knee.  He continues to modify his weightbearing with canes/crutches and he continues to take his pain medication as directed for pain control.  He uses ice and heat as well however he reports minimal improvement in his symptoms.       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 Grade I diastolic dysfunction of the LV myocardium. No evidence of LV apical thrombus. No evidence of pericardial effusion.)   • Gus's syndrome     RIGHT EYE   • Hyperkeratosis    • Hyperlipidemia    • Mucopurulent conjunctivitis     ACUTE   • Myopia    • Neuropathic pain    • Obesity    • Onychomycosis    • Tobacco dependence syndrome        No Known Allergies      Current Outpatient Prescriptions:   •  albuterol (PROVENTIL HFA;VENTOLIN HFA) 108 (90 Base) MCG/ACT inhaler, Inhale 2 puffs Every 4 (Four) Hours As Needed for Wheezing., Disp: 3 inhaler, Rfl: 1  •  ammonium  lactate (AMLACTIN) 12 % lotion, Apply  topically As Needed for Dry Skin., Disp: 500 g, Rfl: 5  •  [START ON 5/24/2106] aspirin 325 MG tablet, Take 325 mg by mouth daily., Disp: , Rfl:   •  budesonide-formoterol (SYMBICORT) 160-4.5 MCG/ACT inhaler, Inhale 2 puffs 2 (Two) Times a Day., Disp: 10.2 g, Rfl: 5  •  cephalexin (KEFLEX) 500 MG capsule, Take 1 capsule by mouth 3 (Three) Times a Day for 7 days., Disp: 21 capsule, Rfl: 0  •  clopidogrel (PLAVIX) 75 MG tablet, Take 1 tablet by mouth Daily., Disp: 90 tablet, Rfl: 3  •  Cyanocobalamin (B-12) 5000 MCG sublingual tablet, Place 1 each under the tongue Daily., Disp: 30 tablet, Rfl: 6  •  DULoxetine (CYMBALTA) 60 MG capsule, Take 1 capsule by mouth Daily., Disp: 31 capsule, Rfl: 5  •  gabapentin (NEURONTIN) 600 MG tablet, Take 1 tablet by mouth 3 (Three) Times a Day As Needed (Take 600mg TID prn)., Disp: 90 tablet, Rfl: 4  •  lansoprazole (PREVACID) 30 MG capsule, Take 1 capsule by mouth Daily., Disp: 90 capsule, Rfl: 3  •  losartan (COZAAR) 50 MG tablet, Take 1 tablet by mouth Daily., Disp: 90 tablet, Rfl: 3  •  metFORMIN (GLUCOPHAGE) 500 MG tablet, TAKE ONE TABLET BY MOUTH TWICE DAILY, Disp: 60 tablet, Rfl: 5  •  metoprolol tartrate (LOPRESSOR) 25 MG tablet, Take 1 tablet by mouth Every 12 (Twelve) Hours., Disp: 180 tablet, Rfl: 3  •  naphazoline (NAPHCON) 0.1 % ophthalmic solution, Apply 1 drop to eye 4 (Four) Times a Day As Needed for Irritation., Disp: 15 mL, Rfl: 5  •  Omega-3 Fatty Acids (FISH OIL) 1000 MG capsule capsule, Take 2,000 mg by mouth Daily With Breakfast., Disp: , Rfl:   •  oxyCODONE-acetaminophen (PERCOCET) 5-325 MG per tablet, Take 1 tablet by mouth Every 8 (Eight) Hours As Needed for Moderate Pain ., Disp: 13 tablet, Rfl: 0  •  pravastatin (PRAVACHOL) 40 MG tablet, Take 1 tablet by mouth Every Night., Disp: 90 tablet, Rfl: 3  •  spironolactone (ALDACTONE) 25 MG tablet, Take 1 tablet by mouth Daily., Disp: 90 tablet, Rfl: 3    Past Surgical  "History:   Procedure Laterality Date   • COLONOSCOPY  07/30/2015    Colonoscopy, diagnostic (screening) 40348 (Screening for malignant neoplasm of colon)    • COLONOSCOPY  09/09/2015    Colonoscopy, diagnostic (screening) 92402 (Diverticulosis found in the sigmoid colon.Hemorrhoids found in the anus.)   • COLONOSCOPY  05/01/2009    Colonoscopy, diagnostic (screening) 88318 (Small internal and external hemorrhoids were present, Colonoscopy otherwise normal.)   • LEG SURGERY         ROS  No fevers or chills.  No chest pain or shortness of air.  No GI or  disturbances.    PHYSICAL EXAMINATION:       Ht 188 cm (74\")   Wt 132 kg (290 lb 4.8 oz)   BMI 37.27 kg/m²     Physical Exam   Constitutional: He is oriented to person, place, and time. Vital signs are normal. He appears well-developed and well-nourished. He is cooperative.   HENT:   Head: Normocephalic and atraumatic.   Neck: Trachea normal and phonation normal.   Pulmonary/Chest: Effort normal. No respiratory distress.   Abdominal: Soft. Normal appearance. He exhibits no distension.   Musculoskeletal:        Left knee: He exhibits effusion.   Neurological: He is alert and oriented to person, place, and time. GCS eye subscore is 4. GCS verbal subscore is 5. GCS motor subscore is 6.   Skin: Skin is warm, dry and intact.   Psychiatric: He has a normal mood and affect. His speech is normal and behavior is normal. Judgment and thought content normal. Cognition and memory are normal.   Vitals reviewed.      GAIT:     []  Normal  [x]  Antalgic    Assistive device: []  None  []  Walker     []  Crutches  []  Cane     [x]  Wheelchair  []  Stretcher    Right Knee Exam   Right knee exam is normal.    Tenderness   The patient is experiencing no tenderness.         Muscle Strength     The patient has normal right knee strength.      Left Knee Exam     Tenderness   The patient is experiencing tenderness in the medial joint line and lateral joint line.    Range of Motion   Left " knee extension: 3.   Left knee flexion: 115.     Other   Erythema: absent  Scars: present  Sensation: normal  Pulse: present  Swelling: mild  Effusion: effusion present              Xr Knee 4+ Vw Left    Result Date: 3/5/2018  Narrative: EXAM:  Radiograph(s), Osseous       REGION:   Knee  SIDE:     Left   VIEWS:   4         INDICATION:    Fall, M25.562 Pain in left knee G89.29 Other chronic pain W19.XXXA Unspecified fall, initial encounter   COMPARISON:    none          FINDINGS:       No evidence of a fracture or bony malalignment.   There are tricompartmental degenerative changes with marked irregularity of the lateral tibial plateau, presumably a chronic etiology. .        Impression: CONCLUSION:       1. Tricompartment degenerative changes.          Note:  if pain or symptoms persist beyond reasonable expectations and follow-up imaging is anticipated,  cross sectional imaging (MRI) is suggested, as is deemed clinically appropriate.            Electronically signed by:  KEYONA Lund MD  3/5/2018 7:17 PM CST Workstation: [id number]    Xr Knee Bilateral Ap Standing    Result Date: 3/14/2018  Narrative: Standing AP of both knees reveals severe tricompartmental degenerative changes with joint space narrowing of the left knee.  There is a retained metallic screw from previous trauma of the right femur however the right knee joint has minimal degenerative changes without any acute radiological abnormality noted.  There are no comparison views on file. 03/14/18 at 9:09 AM by TANESHA Haney             ASSESSMENT:    Diagnoses and all orders for this visit:    Left knee pain, unspecified chronicity  -     Miscellaneous DME    Primary osteoarthritis of left knee  -     Miscellaneous DME    Effusion, left knee  -     Miscellaneous DME    Seizures          PLAN  We discussed the continued treatment options in detail with the patient and I recommended Visco supplementation injections and a hinged knee brace to  help with stability.  Continue modified weightbearing with cane/crutches as needed and continued low impact home exercises as tolerated.  No Follow-up on file.    Donn Dennis, APRN

## 2018-03-27 ENCOUNTER — OFFICE VISIT (OUTPATIENT)
Dept: FAMILY MEDICINE CLINIC | Facility: CLINIC | Age: 63
End: 2018-03-27

## 2018-03-27 VITALS
WEIGHT: 286.2 LBS | SYSTOLIC BLOOD PRESSURE: 126 MMHG | OXYGEN SATURATION: 99 % | HEIGHT: 74 IN | TEMPERATURE: 97.5 F | BODY MASS INDEX: 36.73 KG/M2 | DIASTOLIC BLOOD PRESSURE: 72 MMHG

## 2018-03-27 DIAGNOSIS — K21.9 GASTROESOPHAGEAL REFLUX DISEASE WITHOUT ESOPHAGITIS: ICD-10-CM

## 2018-03-27 DIAGNOSIS — R56.9 SEIZURES (HCC): ICD-10-CM

## 2018-03-27 DIAGNOSIS — R26.9 GAIT DISTURBANCE, POST-STROKE: ICD-10-CM

## 2018-03-27 DIAGNOSIS — E11.69 TYPE 2 DIABETES MELLITUS WITH OTHER SPECIFIED COMPLICATION, WITHOUT LONG-TERM CURRENT USE OF INSULIN (HCC): Chronic | ICD-10-CM

## 2018-03-27 DIAGNOSIS — M25.562 CHRONIC PAIN OF LEFT KNEE: ICD-10-CM

## 2018-03-27 DIAGNOSIS — R44.9 SENSORY DEFICIT PRESENT: ICD-10-CM

## 2018-03-27 DIAGNOSIS — I10 ESSENTIAL HYPERTENSION: Primary | Chronic | ICD-10-CM

## 2018-03-27 DIAGNOSIS — I69.319 COGNITIVE DEFICIT, POST-STROKE: ICD-10-CM

## 2018-03-27 DIAGNOSIS — I63.9 CEREBROVASCULAR ACCIDENT (CVA), UNSPECIFIED MECHANISM (HCC): ICD-10-CM

## 2018-03-27 DIAGNOSIS — I72.0 ANEURYSM OF CAROTID ARTERY (HCC): ICD-10-CM

## 2018-03-27 DIAGNOSIS — M79.2 NEUROPATHIC PAIN: ICD-10-CM

## 2018-03-27 DIAGNOSIS — J96.01 ACUTE RESPIRATORY FAILURE WITH HYPOXIA AND HYPERCAPNIA (HCC): ICD-10-CM

## 2018-03-27 DIAGNOSIS — G89.29 CHRONIC PAIN OF LEFT KNEE: ICD-10-CM

## 2018-03-27 DIAGNOSIS — Z79.899 HIGH RISK MEDICATION USE: ICD-10-CM

## 2018-03-27 DIAGNOSIS — IMO0001 CLASS 2 OBESITY DUE TO EXCESS CALORIES WITH SERIOUS COMORBIDITY AND BODY MASS INDEX (BMI) OF 38.0 TO 38.9 IN ADULT: ICD-10-CM

## 2018-03-27 DIAGNOSIS — I69.398 GAIT DISTURBANCE, POST-STROKE: ICD-10-CM

## 2018-03-27 DIAGNOSIS — R25.1 TREMOR OF BOTH HANDS: ICD-10-CM

## 2018-03-27 DIAGNOSIS — G90.2 HORNER'S SYNDROME: ICD-10-CM

## 2018-03-27 DIAGNOSIS — J96.02 ACUTE RESPIRATORY FAILURE WITH HYPOXIA AND HYPERCAPNIA (HCC): ICD-10-CM

## 2018-03-27 PROCEDURE — 99214 OFFICE O/P EST MOD 30 MIN: CPT | Performed by: FAMILY MEDICINE

## 2018-03-27 RX ORDER — GABAPENTIN 600 MG/1
600 TABLET ORAL 3 TIMES DAILY PRN
Qty: 90 TABLET | Refills: 4 | Status: SHIPPED | OUTPATIENT
Start: 2018-03-27 | End: 2018-06-13 | Stop reason: SDUPTHER

## 2018-03-30 ENCOUNTER — TELEPHONE (OUTPATIENT)
Dept: ORTHOPEDIC SURGERY | Facility: CLINIC | Age: 63
End: 2018-03-30

## 2018-03-30 NOTE — TELEPHONE ENCOUNTER
----- Message from TANESHA Machado sent at 3/30/2018  2:09 PM CDT -----  Can you just let him know that and I'll see him in one month for a recheck.   Offer him PT as well if he is not already going .   Thanks Cristiano.   ----- Message -----  From: Sandra Ma MA  Sent: 3/30/2018   1:47 PM  To: TANESHA Machado        ----- Message -----  From: Taylor Lopez  Sent: 3/30/2018   1:19 PM  To: [facility] Clinical Pool    CRISTIANO    PATIENT'S SYNVISC ONE INJ WAS DENIED / PATIENT MUST COMPLETE TWO OF THE FOLLOWING FOR AT LEAST 3 CONSECUTIVE MONTHS - 1. CONSERVATIVE NONPHARMACOLOGIC THERAPY / 2. SIMPLE ANALGESICS / 3. INTRA-ARTICULAR CORTICOSTERIOD INJ THERAPY.    -TAYLOR

## 2018-04-01 NOTE — PATIENT INSTRUCTIONS
Diabetes Mellitus and Standards of Medical Care  Managing diabetes (diabetes mellitus) can be complicated. Your diabetes treatment may be managed by a team of health care providers, including:  · A diet and nutrition specialist (registered dietitian).  · A nurse.  · A certified diabetes educator (CDE).  · A diabetes specialist (endocrinologist).  · An eye doctor.  · A primary care provider.  · A dentist.  Your health care providers follow a schedule in order to help you get the best quality of care. The following schedule is a general guideline for your diabetes management plan. Your health care providers may also give you more specific instructions.  HbA1c (  hemoglobin A1c) test  This test provides information about blood sugar (glucose) control over the previous 2-3 months. It is used to check whether your diabetes management plan needs to be adjusted.  · If you are meeting your treatment goals, this test is done at least 2 times a year.  · If you are not meeting treatment goals or if your treatment goals have changed, this test is done 4 times a year.  Blood pressure test  · This test is done at every routine medical visit. For most people, the goal is less than 130/80. Ask your health care provider what your goal blood pressure should be.  Dental and eye exams  · Visit your dentist two times a year.  · If you have type 1 diabetes, get an eye exam 3-5 years after you are diagnosed, and then once a year after your first exam.  ¨ If you were diagnosed with type 1 diabetes as a child, get an eye exam when you are age 10 or older and have had diabetes for 3-5 years. After the first exam, you should get an eye exam once a year.  · If you have type 2 diabetes, have an eye exam as soon as you are diagnosed, and then once a year after your first exam.  Foot care exam  · Visual foot exams are done at every routine medical visit. The exams check for cuts, bruises, redness, blisters, sores, or other problems with the  feet.  · A complete foot exam is done by your health care provider once a year. This exam includes an inspection of the structure and skin of your feet, and a check of the pulses and sensation in your feet.  ¨ Type 1 diabetes: Get your first exam 3-5 years after diagnosis.  ¨ Type 2 diabetes: Get your first exam as soon as you are diagnosed.  · Check your feet every day for cuts, bruises, redness, blisters, or sores. If you have any of these or other problems that are not healing, contact your health care provider.  Kidney function test (  urine microalbumin)  · This test is done once a year.  ¨ Type 1 diabetes: Get your first test 5 years after diagnosis.  ¨ Type 2 diabetes: Get your first test as soon as you are diagnosed.  · If you have chronic kidney disease (CKD), get a serum creatinine and estimated glomerular filtration rate (eGFR) test once a year.  Lipid profile (cholesterol, HDL, LDL, triglycerides)  · This test should be done when you are diagnosed with diabetes, and every 5 years after the first test. If you are on medicines to lower your cholesterol, you may need to get this test done every year.  ¨ The goal for LDL is less than 100 mg/dL (5.5 mmol/L). If you are at high risk, the goal is less than 70 mg/dL (3.9 mmol/L).  ¨   ¨ The goal for triglycerides is less than 150 mg/dL (8.3 mmol/L).  Immunizations  · The yearly flu (influenza) vaccine is recommended for everyone 6 months or older who has diabetes.  · The pneumonia (pneumococcal) vaccine is recommended for everyone 2 years or older who has diabetes. If you are 65 or older, you may get the pneumonia vaccine as a series of two separate shots.  · The hepatitis B vaccine is recommended for adults shortly after they have been diagnosed with diabetes.  ·   ·   Mental and emotional health  · Screening for symptoms of eating disorders, anxiety, and depression is recommended at the time of diagnosis and afterward as needed. If your screening shows that  you have symptoms (you have a positive screening result), you may need further evaluation and be referred to a mental health care provider.  Diabetes self-management education  · Education about how to manage your diabetes is recommended at diagnosis and ongoing as needed.  Treatment plan  · Your treatment plan will be reviewed at every medical visit.  Summary  · Managing diabetes (diabetes mellitus) can be complicated. Your diabetes treatment may be managed by a team of health care providers.  · Your health care providers follow a schedule in order to help you get the best quality of care.  · Standards of care including having regular physical exams, blood tests, blood pressure monitoring, immunizations, screening tests, and education about how to manage your diabetes.  · Your health care providers may also give you more specific instructions based on your individual health.  This information is not intended to replace advice given to you by your health care provider. Make sure you discuss any questions you have with your health care provider.  Document Released: 10/15/2010 Document Revised: 09/15/2017 Document Reviewed: 09/15/2017  [employer] Interactive Patient Education © 2017 [employer]  Calorie Counting for Weight Loss  Calories are units of energy. Your body needs a certain amount of calories from food to keep you going throughout the day. When you eat more calories than your body needs, your body stores the extra calories as fat. When you eat fewer calories than your body needs, your body burns fat to get the energy it needs.  Calorie counting means keeping track of how many calories you eat and drink each day. Calorie counting can be helpful if you need to lose weight. If you make sure to eat fewer calories than your body needs, you should lose weight. Ask your health care provider what a healthy weight is for you.  For calorie counting to work, you will need to eat the right number of calories in a day in  order to lose a healthy amount of weight per week. A dietitian can help you determine how many calories you need in a day and will give you suggestions on how to reach your calorie goal.  · A healthy amount of weight to lose per week is usually 1-2 lb (0.5-0.9 kg). This usually means that your daily calorie intake should be reduced by 500-750 calories.  · Eating 1,200 - 1,500 calories per day can help most women lose weight.  · Eating 1,500 - 1,800 calories per day can help most men lose weight.  What is my plan?  My goal is to have __________ calories per day.  If I have this many calories per day, I should lose around __________ pounds per week.  What do I need to know about calorie counting?  In order to meet your daily calorie goal, you will need to:  · Find out how many calories are in each food you would like to eat. Try to do this before you eat.  · Decide how much of the food you plan to eat.  · Write down what you ate and how many calories it had. Doing this is called keeping a food log.  To successfully lose weight, it is important to balance calorie counting with a healthy lifestyle that includes regular activity. Aim for 150 minutes of moderate exercise (such as walking) or 75 minutes of vigorous exercise (such as running) each week.  Where do I find calorie information?     The number of calories in a food can be found on a Nutrition Facts label. If a food does not have a Nutrition Facts label, try to look up the calories online or ask your dietitian for help.  Remember that calories are listed per serving. If you choose to have more than one serving of a food, you will have to multiply the calories per serving by the amount of servings you plan to eat. For example, the label on a package of bread might say that a serving size is 1 slice and that there are 90 calories in a serving. If you eat 1 slice, you will have eaten 90 calories. If you eat 2 slices, you will have eaten 180 calories.  How do I keep  "a food log?  Immediately after each meal, record the following information in your food log:  · What you ate. Don't forget to include toppings, sauces, and other extras on the food.  · How much you ate. This can be measured in cups, ounces, or number of items.  · How many calories each food and drink had.  · The total number of calories in the meal.  Keep your food log near you, such as in a small notebook in your pocket, or use a mobile jayce or website. Some programs will calculate calories for you and show you how many calories you have left for the day to meet your goal.  What are some calorie counting tips?  · Use your calories on foods and drinks that will fill you up and not leave you hungry:  ¨ Some examples of foods that fill you up are nuts and nut butters, vegetables, lean proteins, and high-fiber foods like whole grains. High-fiber foods are foods with more than 5 g fiber per serving.  ¨ Drinks such as sodas, specialty coffee drinks, alcohol, and juices have a lot of calories, yet do not fill you up.  · Eat nutritious foods and avoid empty calories. Empty calories are calories you get from foods or beverages that do not have many vitamins or protein, such as candy, sweets, and soda. It is better to have a nutritious high-calorie food (such as an avocado) than a food with few nutrients (such as a bag of chips).  · Know how many calories are in the foods you eat most often. This will help you calculate calorie counts faster.  · Pay attention to calories in drinks. Low-calorie drinks include water and unsweetened drinks.  · Pay attention to nutrition labels for \"low fat\" or \"fat free\" foods. These foods sometimes have the same amount of calories or more calories than the full fat versions. They also often have added sugar, starch, or salt, to make up for flavor that was removed with the fat.  · Find a way of tracking calories that works for you. Get creative. Try different apps or programs if writing down " calories does not work for you.  What are some portion control tips?  · Know how many calories are in a serving. This will help you know how many servings of a certain food you can have.  · Use a measuring cup to measure serving sizes. You could also try weighing out portions on a kitchen scale. With time, you will be able to estimate serving sizes for some foods.  · Take some time to put servings of different foods on your favorite plates, bowls, and cups so you know what a serving looks like.  · Try not to eat straight from a bag or box. Doing this can lead to overeating. Put the amount you would like to eat in a cup or on a plate to make sure you are eating the right portion.  · Use smaller plates, glasses, and bowls to prevent overeating.  · Try not to multitask (for example, watch TV or use your computer) while eating. If it is time to eat, sit down at a table and enjoy your food. This will help you to know when you are full. It will also help you to be aware of what you are eating and how much you are eating.  What are tips for following this plan?  Reading food labels   · Check the calorie count compared to the serving size. The serving size may be smaller than what you are used to eating.  · Check the source of the calories. Make sure the food you are eating is high in vitamins and protein and low in saturated and trans fats.  Shopping   · Read nutrition labels while you shop. This will help you make healthy decisions before you decide to purchase your food.  · Make a grocery list and stick to it.  Cooking   · Try to cook your favorite foods in a healthier way. For example, try baking instead of frying.  · Use low-fat dairy products.  Meal planning   · Use more fruits and vegetables. Half of your plate should be fruits and vegetables.  · Include lean proteins like poultry and fish.  How do I count calories when eating out?  · Ask for smaller portion sizes.  · Consider sharing an entree and sides instead of  getting your own entree.  · If you get your own entree, eat only half. Ask for a box at the beginning of your meal and put the rest of your entree in it so you are not tempted to eat it.  · If calories are listed on the menu, choose the lower calorie options.  · Choose dishes that include vegetables, fruits, whole grains, low-fat dairy products, and lean protein.  · Choose items that are boiled, broiled, grilled, or steamed. Stay away from items that are buttered, battered, fried, or served with cream sauce. Items labeled “crispy” are usually fried, unless stated otherwise.  · Choose water, low-fat milk, unsweetened iced tea, or other drinks without added sugar. If you want an alcoholic beverage, choose a lower calorie option such as a glass of wine or light beer.  · Ask for dressings, sauces, and syrups on the side. These are usually high in calories, so you should limit the amount you eat.  · If you want a salad, choose a garden salad and ask for grilled meats. Avoid extra toppings like mae, cheese, or fried items. Ask for the dressing on the side, or ask for olive oil and vinegar or lemon to use as dressing.  · Estimate how many servings of a food you are given. For example, a serving of cooked rice is ½ cup or about the size of half a baseball. Knowing serving sizes will help you be aware of how much food you are eating at restaurants. The list below tells you how big or small some common portion sizes are based on everyday objects:  ¨ 1 oz--4 stacked dice.  ¨ 3 oz--1 deck of cards.  ¨ 1 tsp--1 die.  ¨ 1 Tbsp--½ a ping-pong ball.  ¨ 2 Tbsp--1 ping-pong ball.  ¨ ½ cup--½ baseball.  ¨ 1 cup--1 baseball.  Summary  · Calorie counting means keeping track of how many calories you eat and drink each day. If you eat fewer calories than your body needs, you should lose weight.  · A healthy amount of weight to lose per week is usually 1-2 lb (0.5-0.9 kg). This usually means reducing your daily calorie intake by 500-750  calories.  · The number of calories in a food can be found on a Nutrition Facts label. If a food does not have a Nutrition Facts label, try to look up the calories online or ask your dietitian for help.  · Use your calories on foods and drinks that will fill you up, and not on foods and drinks that will leave you hungry.  · Use smaller plates, glasses, and bowls to prevent overeating.  This information is not intended to replace advice given to you by your health care provider. Make sure you discuss any questions you have with your health care provider.  Document Released: 12/18/2006 Document Revised: 11/17/2017 Document Reviewed: 11/17/2017  [employer] Interactive Patient Education © 2017 [employer]  Steps to Quit Smoking  Smoking tobacco can be harmful to your health and can affect almost every organ in your body. Smoking puts you, and those around you, at risk for developing many serious chronic diseases. Quitting smoking is difficult, but it is one of the best things that you can do for your health. It is never too late to quit.  What are the benefits of quitting smoking?  When you quit smoking, you lower your risk of developing serious diseases and conditions, such as:  · Lung cancer or lung disease, such as COPD.  · Heart disease.  · Stroke.  · Heart attack.  · Infertility.  · Osteoporosis and bone fractures.  Additionally, symptoms such as coughing, wheezing, and shortness of breath may get better when you quit. You may also find that you get sick less often because your body is stronger at fighting off colds and infections. If you are pregnant, quitting smoking can help to reduce your chances of having a baby of low birth weight.  How do I get ready to quit?  When you decide to quit smoking, create a plan to make sure that you are successful. Before you quit:  · Pick a date to quit. Set a date within the next two weeks to give you time to prepare.  · Write down the reasons why you are quitting. Keep this  list in places where you will see it often, such as on your bathroom mirror or in your car or wallet.  · Identify the people, places, things, and activities that make you want to smoke (triggers) and avoid them. Make sure to take these actions:  ¨ Throw away all cigarettes at home, at work, and in your car.  ¨ Throw away smoking accessories, such as ashtrays and lighters.  ¨ Clean your car and make sure to empty the ashtray.  ¨ Clean your home, including curtains and carpets.  · Tell your family, friends, and coworkers that you are quitting. Support from your loved ones can make quitting easier.  · Talk with your health care provider about your options for quitting smoking.  · Find out what treatment options are covered by your health insurance.  What strategies can I use to quit smoking?  Talk with your healthcare provider about different strategies to quit smoking. Some strategies include:  · Quitting smoking altogether instead of gradually lessening how much you smoke over a period of time. Research shows that quitting “cold turkey” is more successful than gradually quitting.  · Attending in-person counseling to help you build problem-solving skills. You are more likely to have success in quitting if you attend several counseling sessions. Even short sessions of 10 minutes can be effective.  · Finding resources and support systems that can help you to quit smoking and remain smoke-free after you quit. These resources are most helpful when you use them often. They can include:  ¨ Online chats with a counselor.  ¨ Telephone quitlines.  ¨ Printed self-help materials.  ¨ Support groups or group counseling.  ¨ Text messaging programs.  ¨ Mobile phone applications.  · Taking medicines to help you quit smoking. (If you are pregnant or breastfeeding, talk with your health care provider first.) Some medicines contain nicotine and some do not. Both types of medicines help with cravings, but the medicines that include  nicotine help to relieve withdrawal symptoms. Your health care provider may recommend:  ¨ Nicotine patches, gum, or lozenges.  ¨ Nicotine inhalers or sprays.  ¨ Non-nicotine medicine that is taken by mouth.  Talk with your health care provider about combining strategies, such as taking medicines while you are also receiving in-person counseling. Using these two strategies together makes you more likely to succeed in quitting than if you used either strategy on its own.  If you are pregnant or breastfeeding, talk with your health care provider about finding counseling or other support strategies to quit smoking. Do not take medicine to help you quit smoking unless told to do so by your health care provider.  What things can I do to make it easier to quit?  Quitting smoking might feel overwhelming at first, but there is a lot that you can do to make it easier. Take these important actions:  · Reach out to your family and friends and ask that they support and encourage you during this time. Call telephone quitlines, reach out to support groups, or work with a counselor for support.  · Ask people who smoke to avoid smoking around you.  · Avoid places that trigger you to smoke, such as bars, parties, or smoke-break areas at work.  · Spend time around people who do not smoke.  · Lessen stress in your life, because stress can be a smoking trigger for some people. To lessen stress, try:  ¨ Exercising regularly.  ¨ Deep-breathing exercises.  ¨ Yoga.  ¨ Meditating.  ¨ Performing a body scan. This involves closing your eyes, scanning your body from head to toe, and noticing which parts of your body are particularly tense. Purposefully relax the muscles in those areas.  · Download or purchase mobile phone or tablet apps (applications) that can help you stick to your quit plan by providing reminders, tips, and encouragement. There are many free apps, such as QuitGuide from the CDC (Centers for Disease Control and Prevention).  You can find other support for quitting smoking (smoking cessation) through smokefree.gov and other websites.  How will I feel when I quit smoking?  Within the first 24 hours of quitting smoking, you may start to feel some withdrawal symptoms. These symptoms are usually most noticeable 2-3 days after quitting, but they usually do not last beyond 2-3 weeks. Changes or symptoms that you might experience include:  · Mood swings.  · Restlessness, anxiety, or irritation.  · Difficulty concentrating.  · Dizziness.  · Strong cravings for sugary foods in addition to nicotine.  · Mild weight gain.  · Constipation.  · Nausea.  · Coughing or a sore throat.  · Changes in how your medicines work in your body.  · A depressed mood.  · Difficulty sleeping (insomnia).  After the first 2-3 weeks of quitting, you may start to notice more positive results, such as:  · Improved sense of smell and taste.  · Decreased coughing and sore throat.  · Slower heart rate.  · Lower blood pressure.  · Clearer skin.  · The ability to breathe more easily.  · Fewer sick days.  Quitting smoking is very challenging for most people. Do not get discouraged if you are not successful the first time. Some people need to make many attempts to quit before they achieve long-term success. Do your best to stick to your quit plan, and talk with your health care provider if you have any questions or concerns.  This information is not intended to replace advice given to you by your health care provider. Make sure you discuss any questions you have with your health care provider.  Document Released: 12/12/2002 Document Revised: 08/15/2017 Document Reviewed: 05/03/2016  [employer] Interactive Patient Education © 2017 [employer]

## 2018-04-18 ENCOUNTER — HOSPITAL ENCOUNTER (OUTPATIENT)
Dept: MRI IMAGING | Facility: HOSPITAL | Age: 63
Discharge: HOME | End: 2018-04-18
Admitting: PSYCHIATRY & NEUROLOGY

## 2018-04-18 DIAGNOSIS — R55 SYNCOPE, UNSPECIFIED SYNCOPE TYPE: ICD-10-CM

## 2018-04-18 PROCEDURE — A9576 INJ PROHANCE MULTIPACK: HCPCS | Performed by: PSYCHIATRY & NEUROLOGY

## 2018-04-18 PROCEDURE — 70553 MRI BRAIN STEM W/O & W/DYE: CPT

## 2018-04-18 PROCEDURE — 25010000002 GADOTERIDOL PER 1 ML: Performed by: PSYCHIATRY & NEUROLOGY

## 2018-04-18 RX ADMIN — GADOTERIDOL 20 ML: 279.3 INJECTION, SOLUTION INTRAVENOUS at 16:45

## 2018-06-05 ENCOUNTER — TELEPHONE (OUTPATIENT)
Dept: FAMILY MEDICINE CLINIC | Facility: CLINIC | Age: 63
End: 2018-06-05

## 2018-06-05 RX ORDER — TRAZODONE HYDROCHLORIDE 150 MG/1
150 TABLET ORAL NIGHTLY
Qty: 30 TABLET | Refills: 1 | Status: SHIPPED | OUTPATIENT
Start: 2018-06-05 | End: 2018-06-13 | Stop reason: SINTOL

## 2018-06-05 NOTE — TELEPHONE ENCOUNTER
Pt has been trouble sleeping the last few weeks.  Would like to know if you would prescribe something to help with this.  Pt is scheduled for follow up 6/25/18.

## 2018-06-11 ENCOUNTER — TELEPHONE (OUTPATIENT)
Dept: FAMILY MEDICINE CLINIC | Facility: CLINIC | Age: 63
End: 2018-06-11

## 2018-06-11 NOTE — TELEPHONE ENCOUNTER
Can F/U sooner, should be cautious with herbal diuretics, also with new seizures, have to be cautious. May need to see Sleep medicine after Neurology finishes work up for seizures.

## 2018-06-11 NOTE — TELEPHONE ENCOUNTER
Pt was given RX for trazodone last week.  Stopped it Friday due to swelling.  Went to pharmacy and got an all natural OTC water pill.  Would like to know if could have different RX for sleep.  Does he need an RX for a water pill?  Or should he come in sooner than his scheduled appt 6/25/18 to discuss all?  Please advise.

## 2018-06-13 ENCOUNTER — OFFICE VISIT (OUTPATIENT)
Dept: FAMILY MEDICINE CLINIC | Facility: CLINIC | Age: 63
End: 2018-06-13

## 2018-06-13 VITALS
DIASTOLIC BLOOD PRESSURE: 80 MMHG | BODY MASS INDEX: 39.44 KG/M2 | TEMPERATURE: 97.7 F | HEIGHT: 74 IN | SYSTOLIC BLOOD PRESSURE: 136 MMHG | OXYGEN SATURATION: 93 % | WEIGHT: 307.3 LBS | HEART RATE: 97 BPM

## 2018-06-13 DIAGNOSIS — R25.1 TREMOR OF BOTH HANDS: ICD-10-CM

## 2018-06-13 DIAGNOSIS — R60.1 GENERALIZED EDEMA: ICD-10-CM

## 2018-06-13 DIAGNOSIS — I63.9 CEREBROVASCULAR ACCIDENT (CVA), UNSPECIFIED MECHANISM (HCC): ICD-10-CM

## 2018-06-13 DIAGNOSIS — R91.1 PULMONARY NODULE: Primary | ICD-10-CM

## 2018-06-13 DIAGNOSIS — G47.33 OBSTRUCTIVE APNEA: Chronic | ICD-10-CM

## 2018-06-13 DIAGNOSIS — I10 ESSENTIAL HYPERTENSION: Chronic | ICD-10-CM

## 2018-06-13 DIAGNOSIS — J96.12 CHRONIC RESPIRATORY FAILURE WITH HYPOXIA AND HYPERCAPNIA (HCC): ICD-10-CM

## 2018-06-13 DIAGNOSIS — R56.9 SEIZURES (HCC): ICD-10-CM

## 2018-06-13 DIAGNOSIS — D75.89 MACROCYTOSIS WITHOUT ANEMIA: ICD-10-CM

## 2018-06-13 DIAGNOSIS — I72.0 ANEURYSM OF CAROTID ARTERY (HCC): ICD-10-CM

## 2018-06-13 DIAGNOSIS — J96.11 CHRONIC RESPIRATORY FAILURE WITH HYPOXIA AND HYPERCAPNIA (HCC): ICD-10-CM

## 2018-06-13 DIAGNOSIS — G47.09 OTHER INSOMNIA: ICD-10-CM

## 2018-06-13 DIAGNOSIS — R09.02 HYPOXIA: ICD-10-CM

## 2018-06-13 PROCEDURE — 99214 OFFICE O/P EST MOD 30 MIN: CPT | Performed by: FAMILY MEDICINE

## 2018-06-13 RX ORDER — GABAPENTIN 600 MG/1
600 TABLET ORAL 3 TIMES DAILY PRN
Qty: 90 TABLET | Refills: 4 | Status: SHIPPED | OUTPATIENT
Start: 2018-06-13 | End: 2018-08-21 | Stop reason: SDUPTHER

## 2018-06-13 RX ORDER — FUROSEMIDE 40 MG/1
40 TABLET ORAL DAILY
Qty: 15 TABLET | Refills: 0 | Status: SHIPPED | OUTPATIENT
Start: 2018-06-13 | End: 2018-12-06

## 2018-06-13 RX ORDER — NEOMYCIN/POLYMYXIN B/HYDROCORT 3.5-10K-1
1 SUSPENSION, DROPS(FINAL DOSAGE FORM)(ML) OPHTHALMIC (EYE) 3 TIMES DAILY
Qty: 7.5 ML | Refills: 0 | Status: SHIPPED | OUTPATIENT
Start: 2018-06-13 | End: 2018-07-24

## 2018-06-13 NOTE — PROGRESS NOTES
Subjective    Arias Willson is a 62 y.o. obese white male smoker with HTN, Had TIA 2- , began with double vision, R facial numbness, L arm numbness, L leg weakness. 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 Pt was referred to Neurosurgery in [place], told has aneurysm, but surgery not recommended for this area. 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 Had resolution of most motor weakness. Has residual difficulty with balance. Cognitive difficulties, Depression, DM, High cholesterol, Chronic pain, Seizures, Hospital admit Jan 2018 with Resp failure, L lower lung nodule,  and other health problems.  Pt presents for exam, to discuss health problems, Tx and F/U plans.  ' Using Home Oxygen ,O2 level continues to drop. Has seen Neurology Dr. Egan for onset of new seizures. R eye has been red again. Not sleeping. Has not had repeat CT scan for lung nodule'.    Diabetes   He presents for his follow-up diabetic visit. He has type 2 diabetes mellitus. No MedicAlert identification noted. His disease course has been worsening. Hypoglycemia symptoms include headaches, nervousness/anxiousness and seizures. Pertinent negatives for hypoglycemia include no confusion or dizziness. Associated symptoms include fatigue, visual change and weakness. Pertinent negatives for diabetes include no chest pain. Symptoms are stable. Diabetic complications include a CVA and peripheral neuropathy. Current diabetic treatment includes oral agent (monotherapy). He is compliant with treatment some of the time (Restarted meds.). His weight is stable. He is following a generally healthy diet. He participates in exercise intermittently. Home blood sugar record trend: Unknown. An ACE inhibitor/angiotensin II receptor blocker is being taken  (restarted). He does not see a podiatrist.Eye exam is current.   Hypertension   This is a chronic problem. The current episode started more than 1 year ago. The problem has been gradually improving since onset. The problem is controlled. Associated symptoms include headaches, neck pain and shortness of breath. Pertinent negatives include no chest pain or palpitations. Agents associated with hypertension include NSAIDs. Risk factors for coronary artery disease include obesity and smoking/tobacco exposure. Current antihypertension treatment includes ACE inhibitors. The current treatment provides moderate improvement. Hypertensive end-organ damage includes CVA.   Stroke   This is a chronic problem. The current episode started more than 1 year ago. The problem occurs daily. The problem has been gradually improving. Associated symptoms include fatigue, headaches, neck pain, numbness, a visual change and weakness. Pertinent negatives include no abdominal pain, arthralgias, chest pain, chills, congestion, coughing, fever, nausea, rash or sore throat. The symptoms are aggravated by exertion and walking. He has tried acetaminophen and NSAIDs for the symptoms. The treatment provided no relief.   Pain   This is a chronic problem. The current episode started more than 1 year ago. The problem occurs daily. Associated symptoms include fatigue, headaches, neck pain, numbness, a visual change and weakness. Pertinent negatives include no abdominal pain, arthralgias, chest pain, chills, congestion, coughing, fever, nausea, rash or sore throat. The symptoms are aggravated by walking and stress. He has tried NSAIDs and acetaminophen for the symptoms. The treatment provided no relief.   Neurologic Problem   The patient's primary symptoms include an altered mental status, clumsiness, focal sensory loss, focal weakness, a loss of balance, a visual change and weakness. Primary symptoms comment: Delayed cognitive processing. Seizures. This  is a chronic problem. The current episode started more than 1 year ago. The neurological problem developed suddenly. The problem has been waxing and waning since onset. There was left-sided, right-sided, facial, upper extremity and lower extremity (Strokes of Basal ganglia. Horners syndrome,  sensory, motor deficits.) focality noted. Associated symptoms include fatigue, headaches, neck pain and shortness of breath. Pertinent negatives include no abdominal pain, chest pain, confusion, dizziness, fever, nausea or palpitations. Past treatments include walking and medication (Physical therapy). The treatment provided mild relief. His past medical history is significant for a CVA.   Depression   Visit Type: follow-up  Patient presents with the following symptoms: decreased concentration, depressed mood, excessive worry, feelings of hopelessness, feelings of worthlessness, irritability, memory impairment, nervousness/anxiety, restlessness and shortness of breath.  Patient is not experiencing: confusion, palpitations, suicidal ideas and suicidal planning.  Frequency of symptoms: occasionally   Severity: moderate   Sleep quality: fair  Nighttime awakenings: several    Breathing Problem   He complains of chest tightness, difficulty breathing and shortness of breath. There is no cough or wheezing. This is a recurrent problem. Episode onset: Hosp admit Jan 2018 at [facility] with resp failure . The problem occurs constantly. The problem has been waxing and waning. Associated symptoms include headaches. Pertinent negatives include no appetite change, chest pain, ear pain, fever or sore throat. His symptoms are aggravated by any activity. Relieved by: Home O2. He reports minimal improvement on treatment. His past medical history is significant for COPD and pneumonia. Past medical history comments: L lower lung nodule .        The following portions of the patient's history were reviewed and updated as appropriate: allergies, current  medications, past family history, past medical history, past social history, past surgical history and problem list.    Review of Systems   Constitutional: Positive for fatigue and irritability. Negative for activity change, appetite change, chills and fever.   HENT: Negative for congestion, ear pain and sore throat.    Eyes: Negative for pain and visual disturbance.   Respiratory: Positive for shortness of breath. Negative for cough, chest tightness and wheezing.    Cardiovascular: Negative for chest pain and palpitations.   Gastrointestinal: Negative for abdominal pain and nausea.   Endocrine: Negative for cold intolerance and heat intolerance.   Genitourinary: Negative for difficulty urinating and dysuria.   Musculoskeletal: Positive for neck pain. Negative for arthralgias and gait problem.   Skin: Negative for color change and rash.   Allergic/Immunologic: Negative for environmental allergies.   Neurological: Positive for focal weakness, seizures, weakness, numbness, headaches and loss of balance. Negative for dizziness.   Hematological: Negative for adenopathy. Does not bruise/bleed easily.   Psychiatric/Behavioral: Positive for decreased concentration and sleep disturbance. Negative for agitation, confusion and suicidal ideas. The patient is nervous/anxious.         Depressed mood       Objective   Physical Exam   Constitutional: He is oriented to person, place, and time. He appears well-developed and well-nourished. No distress.   Resp status improved from last visit, has O2 but not using in WC, reports, uses if trying to walk to prevent SOA.   HENT:   Head: Normocephalic and atraumatic.   Right Ear: External ear normal.   Left Ear: External ear normal.   Nose: Nose normal.   Mouth/Throat: Oropharynx is clear and moist.   Eyes: Conjunctivae and EOM are normal. Pupils are equal, round, and reactive to light. Right eye exhibits no discharge. Left eye exhibits no discharge. No scleral icterus.   Neck: Normal  range of motion. Neck supple. No JVD present. No tracheal deviation present. No thyromegaly present.   Cardiovascular: Normal rate, regular rhythm, normal heart sounds and intact distal pulses.  Exam reveals no gallop and no friction rub.    No murmur heard.  Pulmonary/Chest: Effort normal and breath sounds normal. No respiratory distress. He has no wheezes. He has no rales. He exhibits no tenderness.   Abdominal: Soft. Bowel sounds are normal. He exhibits no distension and no mass. There is no tenderness. No hernia.   Musculoskeletal: He exhibits no edema, tenderness or deformity.   Requires WC, Walks short distance with cane, L sided sensory deficit, R motor, drift towards R without cane. R pupil NR, Has weakness closing R eye CN VII.   Has unsteady antalgic gait.    Has crepitous with ROM of knees. WB 6   Lymphadenopathy:     He has no cervical adenopathy.   Neurological: He is alert and oriented to person, place, and time. He displays normal reflexes. A cranial nerve deficit is present. He exhibits normal muscle tone. Coordination normal.   Skin: Skin is warm and dry. No rash noted. He is not diaphoretic. No erythema. No pallor.   Psychiatric: His behavior is normal. Judgment and thought content normal.   Has some difficulty expressing thought at times, Pt often frustrated, tearful due to disability, post CVA.   Chronic Post stroke depression, Situational depression, lost job last year, had been kept on job, even though was not able to perform duties, then company failed, much of [employer] account lost, health worsening, with Pneumonia, Resp Failure, Seizures. Coping better after getting on Medicaid, starting back on meds.   Nursing note and vitals reviewed.      Assessment/Plan   Arias was seen today for edema and sleeping problem.    Diagnoses and all orders for this visit:    Pulmonary nodule  -     CT Chest With & Without Contrast; Future  -     Ambulatory Referral to Pulmonology    Seizures    Hypoxia  -      CT Chest With & Without Contrast; Future  -     Ambulatory Referral to Pulmonology    Generalized edema  -     Ambulatory Referral to Sleep Medicine    Other insomnia  -     Ambulatory Referral to Sleep Medicine    Aneurysm of carotid artery    Cerebrovascular accident (CVA), unspecified mechanism    Essential hypertension    Obstructive apnea    Chronic respiratory failure with hypoxia and hypercapnia    Macrocytosis without anemia    Tremor of both hands    Other orders  -     gabapentin (NEURONTIN) 600 MG tablet; Take 1 tablet by mouth 3 (Three) Times a Day As Needed (Take 600mg TID prn).  -     furosemide (LASIX) 40 MG tablet; Take 1 tablet by mouth Daily.  -     neomycin-polymyxin-hydrocortisone (CORTISPORIN) 3.5-91999-9 ophthalmic suspension; Administer 1 drop to the right eye 3 (Three) Times a Day.    discussed current health problems, discussed past labs, current work up for seizure, discussed recommendation for f/u on Pulmonary nodule, will refer to Pulmonology, Discussed Chronic Insomnia, hypoxia, will refer to sleep medicine. Discussed compliance with tx, F/u, discussed Chronic R eye problem, eed to F/u with Opthamology, was seeing Dr. Lang, discussed other providers, Pt will make appt. Discussed F/U here.           This document has been electronically signed by Harpreet Bass MD

## 2018-06-18 ENCOUNTER — TELEPHONE (OUTPATIENT)
Dept: FAMILY MEDICINE CLINIC | Facility: CLINIC | Age: 63
End: 2018-06-18

## 2018-06-18 RX ORDER — AZITHROMYCIN 250 MG/1
TABLET, FILM COATED ORAL
Qty: 6 TABLET | Refills: 0 | Status: SHIPPED | OUTPATIENT
Start: 2018-06-18 | End: 2018-06-26

## 2018-06-18 NOTE — TELEPHONE ENCOUNTER
Pt c/o mucus in throat.  Slept almost 24 hours, only got up 3 times to go to the bathroom, fatigue.  Muscles ache, more dizzy than usual, fatigue.  Would like to know if could have antibiotic sent to pharmacy.

## 2018-06-20 DIAGNOSIS — E11.69 TYPE 2 DIABETES MELLITUS WITH OTHER SPECIFIED COMPLICATION, WITHOUT LONG-TERM CURRENT USE OF INSULIN (HCC): Primary | ICD-10-CM

## 2018-06-22 ENCOUNTER — HOSPITAL ENCOUNTER (OUTPATIENT)
Dept: CT IMAGING | Facility: HOSPITAL | Age: 63
Discharge: HOME | End: 2018-06-22
Admitting: FAMILY MEDICINE

## 2018-06-22 ENCOUNTER — LAB (OUTPATIENT)
Dept: LAB | Facility: HOSPITAL | Age: 63
End: 2018-06-22

## 2018-06-22 DIAGNOSIS — E11.69 TYPE 2 DIABETES MELLITUS WITH OTHER SPECIFIED COMPLICATION, WITHOUT LONG-TERM CURRENT USE OF INSULIN (HCC): Chronic | ICD-10-CM

## 2018-06-22 DIAGNOSIS — R09.02 HYPOXIA: ICD-10-CM

## 2018-06-22 DIAGNOSIS — R91.1 PULMONARY NODULE: ICD-10-CM

## 2018-06-22 DIAGNOSIS — Z79.899 HIGH RISK MEDICATION USE: ICD-10-CM

## 2018-06-22 LAB
ALBUMIN SERPL-MCNC: 4 G/DL (ref 3.4–4.8)
ALBUMIN/GLOB SERPL: 1.3 G/DL (ref 1.1–1.8)
ALP SERPL-CCNC: 93 U/L (ref 38–126)
ALT SERPL W P-5'-P-CCNC: 28 U/L (ref 21–72)
AMPHET+METHAMPHET UR QL: NEGATIVE
ANION GAP SERPL CALCULATED.3IONS-SCNC: 13 MMOL/L (ref 5–15)
AST SERPL-CCNC: 23 U/L (ref 17–59)
BARBITURATES UR QL SCN: NEGATIVE
BENZODIAZ UR QL SCN: NEGATIVE
BILIRUB SERPL-MCNC: 0.6 MG/DL (ref 0.2–1.3)
BUN BLD-MCNC: 22 MG/DL (ref 7–21)
BUN/CREAT SERPL: 22.7 (ref 7–25)
CALCIUM SPEC-SCNC: 8.8 MG/DL (ref 8.4–10.2)
CANNABINOIDS SERPL QL: NEGATIVE
CHLORIDE SERPL-SCNC: 92 MMOL/L (ref 95–110)
CO2 SERPL-SCNC: 35 MMOL/L (ref 22–31)
COCAINE UR QL: NEGATIVE
CREAT BLD-MCNC: 0.97 MG/DL (ref 0.7–1.3)
GFR SERPL CREATININE-BSD FRML MDRD: 79 ML/MIN/1.73 (ref 49–113)
GLOBULIN UR ELPH-MCNC: 3 GM/DL (ref 2.3–3.5)
GLUCOSE BLD-MCNC: 180 MG/DL (ref 60–100)
HBA1C MFR BLD: 9.6 % (ref 4–5.6)
METHADONE UR QL SCN: NEGATIVE
OPIATES UR QL: NEGATIVE
OXYCODONE UR QL SCN: NEGATIVE
POTASSIUM BLD-SCNC: 4.5 MMOL/L (ref 3.5–5.1)
PROT SERPL-MCNC: 7 G/DL (ref 6.3–8.6)
SODIUM BLD-SCNC: 140 MMOL/L (ref 137–145)

## 2018-06-22 PROCEDURE — 80053 COMPREHEN METABOLIC PANEL: CPT

## 2018-06-22 PROCEDURE — 83036 HEMOGLOBIN GLYCOSYLATED A1C: CPT

## 2018-06-22 PROCEDURE — 80307 DRUG TEST PRSMV CHEM ANLYZR: CPT

## 2018-06-22 PROCEDURE — 36415 COLL VENOUS BLD VENIPUNCTURE: CPT

## 2018-06-22 PROCEDURE — 0 IOPAMIDOL PER 1 ML: Performed by: FAMILY MEDICINE

## 2018-06-22 PROCEDURE — 71260 CT THORAX DX C+: CPT

## 2018-06-22 RX ADMIN — IOPAMIDOL 90 ML: 755 INJECTION, SOLUTION INTRAVENOUS at 11:01

## 2018-06-25 ENCOUNTER — TELEPHONE (OUTPATIENT)
Dept: FAMILY MEDICINE CLINIC | Facility: CLINIC | Age: 63
End: 2018-06-25

## 2018-06-25 NOTE — TELEPHONE ENCOUNTER
----- Message from Harpreet Bass MD sent at 6/24/2018  6:35 PM CDT -----  CT of chest showed resolution of area of concern, recommend eval with Pulmonologist, with ongoing Hypoxia, seizures.

## 2018-06-26 ENCOUNTER — OFFICE VISIT (OUTPATIENT)
Dept: FAMILY MEDICINE CLINIC | Facility: CLINIC | Age: 63
End: 2018-06-26

## 2018-06-26 VITALS
TEMPERATURE: 97.6 F | OXYGEN SATURATION: 98 % | DIASTOLIC BLOOD PRESSURE: 78 MMHG | HEART RATE: 95 BPM | BODY MASS INDEX: 38.96 KG/M2 | HEIGHT: 74 IN | WEIGHT: 303.6 LBS | SYSTOLIC BLOOD PRESSURE: 122 MMHG

## 2018-06-26 DIAGNOSIS — D75.89 MACROCYTOSIS WITHOUT ANEMIA: ICD-10-CM

## 2018-06-26 DIAGNOSIS — I63.9 CEREBROVASCULAR ACCIDENT (CVA), UNSPECIFIED MECHANISM (HCC): ICD-10-CM

## 2018-06-26 DIAGNOSIS — I72.0 ANEURYSM OF CAROTID ARTERY (HCC): Primary | ICD-10-CM

## 2018-06-26 DIAGNOSIS — G47.33 OBSTRUCTIVE APNEA: Chronic | ICD-10-CM

## 2018-06-26 DIAGNOSIS — R60.9 PERIPHERAL EDEMA: ICD-10-CM

## 2018-06-26 DIAGNOSIS — E11.69 TYPE 2 DIABETES MELLITUS WITH OTHER SPECIFIED COMPLICATION, WITHOUT LONG-TERM CURRENT USE OF INSULIN (HCC): Chronic | ICD-10-CM

## 2018-06-26 DIAGNOSIS — G89.29 CHRONIC PAIN OF LEFT KNEE: ICD-10-CM

## 2018-06-26 DIAGNOSIS — R56.9 SEIZURES (HCC): ICD-10-CM

## 2018-06-26 DIAGNOSIS — I10 ESSENTIAL HYPERTENSION: Chronic | ICD-10-CM

## 2018-06-26 DIAGNOSIS — M79.2 NEUROPATHIC PAIN: ICD-10-CM

## 2018-06-26 DIAGNOSIS — M25.562 CHRONIC PAIN OF LEFT KNEE: ICD-10-CM

## 2018-06-26 DIAGNOSIS — R09.02 HYPOXIA: ICD-10-CM

## 2018-06-26 DIAGNOSIS — R91.1 PULMONARY NODULE: ICD-10-CM

## 2018-06-26 DIAGNOSIS — R25.1 TREMOR OF BOTH HANDS: ICD-10-CM

## 2018-06-26 PROCEDURE — 99214 OFFICE O/P EST MOD 30 MIN: CPT | Performed by: FAMILY MEDICINE

## 2018-06-29 PROBLEM — R60.9 PERIPHERAL EDEMA: Status: ACTIVE | Noted: 2018-06-29

## 2018-06-29 PROBLEM — R60.0 PERIPHERAL EDEMA: Status: ACTIVE | Noted: 2018-06-29

## 2018-06-29 NOTE — PROGRESS NOTES
Subjective   Arias Willson is a 62 y.o. obese white male smoker with HTN, Had TIA 2- , began with double vision, R facial numbness, L arm numbness, L leg weakness. 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 Pt was referred to Neurosurgery in [place], told has aneurysm, but surgery not recommended for this area. 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 Had resolution of most motor weakness. Has residual difficulty with balance. Cognitive difficulties, Depression, DM, High cholesterol, Chronic pain, Hospital admit Jan 2018 with Resp failure, L lower lung nodule, Hypoxia, New onset seizures after discharge  and other health problems.  Pt presents for exam, to discuss health problems, Tx and F/U plans. .    ' Seeing Dr. Egan for seizures ,having work up, had MRI mof brain thaty was OK, had F/U Chest CT to check lung nodule , have not received report. ( CT showed resolution, Pt informed, encouraged F/U with Pulmonology, with Hypoxia, new onset seizures being worked up by Neurology) Received msg that diabetes is now uncontrolled'.    Insomnia   This is a chronic problem. The current episode started more than 1 year ago. The problem occurs constantly. The problem has been waxing and waning. Associated symptoms include fatigue, headaches, neck pain, numbness and weakness. Pertinent negatives include no abdominal pain, arthralgias, chest pain, chills, congestion, coughing, fever, nausea, rash or sore throat. The symptoms are aggravated by stress. Treatments tried: OTC meds. The treatment provided no relief.   Diabetes   He presents for his follow-up diabetic visit. He has type 2 diabetes mellitus. His disease course has been fluctuating. Hypoglycemia symptoms include confusion, dizziness, headaches,  nervousness/anxiousness and seizures. Associated symptoms include fatigue and weakness. Pertinent negatives for diabetes include no chest pain. There are no hypoglycemic complications. Symptoms are worsening. Diabetic complications include a CVA. Risk factors for coronary artery disease include diabetes mellitus, male sex, tobacco exposure, stress, sedentary lifestyle, obesity, hypertension and dyslipidemia. Current diabetic treatment includes oral agent (monotherapy). He is compliant with treatment some of the time. His weight is increasing rapidly. He is following a generally unhealthy diet. When asked about meal planning, he reported none. He rarely participates in exercise. An ACE inhibitor/angiotensin II receptor blocker is being taken. He does not see a podiatrist.Eye exam is current.   Breathing Problem   He complains of difficulty breathing and shortness of breath. There is no cough or wheezing. Primary symptoms comments: Using 02 since hospitalization Jan 2018, H/O low pulse-ox, seizures.. Associated symptoms include headaches. Pertinent negatives include no appetite change, chest pain, ear pain, fever or sore throat. His symptoms are aggravated by exercise. His symptoms are alleviated by nothing. He reports moderate (O2) improvement on treatment.   Seizures    This is a new problem. The current episode started more than 1 week ago. The problem has not changed since onset.There were 2 to 3 seizures. The most recent episode lasted 30 to 120 seconds. Associated symptoms include confusion and headaches. Pertinent negatives include no visual disturbance, no sore throat, no chest pain, no cough and no nausea. Characteristics include rhythmic jerking and loss of consciousness. The episode was witnessed.        The following portions of the patient's history were reviewed and updated as appropriate: allergies, current medications, past family history, past medical history, past social history, past surgical  history and problem list.    Review of Systems   Constitutional: Positive for fatigue. Negative for activity change, appetite change, chills and fever.   HENT: Negative for congestion, ear pain and sore throat.    Eyes: Negative for pain and visual disturbance.   Respiratory: Positive for shortness of breath. Negative for cough, chest tightness and wheezing.    Cardiovascular: Negative for chest pain and palpitations.   Gastrointestinal: Negative for abdominal pain and nausea.   Endocrine: Negative for cold intolerance and heat intolerance.   Genitourinary: Negative for difficulty urinating and dysuria.   Musculoskeletal: Positive for neck pain. Negative for arthralgias and gait problem.   Skin: Negative for color change and rash.   Allergic/Immunologic: Negative for environmental allergies.   Neurological: Positive for dizziness, seizures, loss of consciousness, weakness, numbness and headaches.   Hematological: Negative for adenopathy. Does not bruise/bleed easily.   Psychiatric/Behavioral: Positive for confusion, decreased concentration and sleep disturbance. Negative for agitation and suicidal ideas. The patient is nervous/anxious and has insomnia.         Depressed mood       Objective   Physical Exam   Constitutional: He is oriented to person, place, and time. He appears well-developed and well-nourished. No distress.   Resp status improved from last visit, has O2 but not using in WC, reports, uses if trying to walk to prevent SOA.   HENT:   Head: Normocephalic and atraumatic.   Right Ear: External ear normal.   Left Ear: External ear normal.   Nose: Nose normal.   Mouth/Throat: Oropharynx is clear and moist.   Eyes: Conjunctivae and EOM are normal. Pupils are equal, round, and reactive to light. Right eye exhibits no discharge. Left eye exhibits no discharge. No scleral icterus.   Neck: Normal range of motion. Neck supple. No JVD present. No tracheal deviation present. No thyromegaly present.    Cardiovascular: Normal rate, regular rhythm, normal heart sounds and intact distal pulses.  Exam reveals no gallop and no friction rub.    No murmur heard.  Pulmonary/Chest: Effort normal and breath sounds normal. No respiratory distress. He has no wheezes. He has no rales. He exhibits no tenderness.   Abdominal: Soft. Bowel sounds are normal. He exhibits no distension and no mass. There is no tenderness. No hernia.   Musculoskeletal: He exhibits no edema, tenderness or deformity.   Requires WC, Walks short distance with cane, L sided sensory deficit, R motor, drift towards R without cane. R pupil NR, Has weakness closing R eye CN VII.   Has unsteady antalgic gait.    Has crepitous with ROM of knees. WB 6   Lymphadenopathy:     He has no cervical adenopathy.   Neurological: He is alert and oriented to person, place, and time. He displays normal reflexes. A cranial nerve deficit is present. He exhibits normal muscle tone. Coordination normal.   Skin: Skin is warm and dry. No rash noted. He is not diaphoretic. No erythema. No pallor.   Psychiatric: His behavior is normal. Judgment and thought content normal.   Has some difficulty expressing thought at times, Pt often frustrated, tearful due to disability, post CVA.   Chronic Post stroke depression, Situational depression, lost job last year, had been kept on job, even though was not able to perform duties, then company failed, much of [employer] account lost, health worsening, with Pneumonia, Resp Failure, Seizures. Coping better after getting on Medicaid, starting back on meds.   Nursing note and vitals reviewed.      Assessment/Plan   Arias was seen today for edema, insomnia and diabetes.    Diagnoses and all orders for this visit:    Aneurysm of carotid artery    Cerebrovascular accident (CVA), unspecified mechanism    Essential hypertension    Hypoxia    Obstructive apnea    Pulmonary nodule    Type 2 diabetes mellitus with other specified complication, without  long-term current use of insulin    Neuropathic pain    Seizures    Chronic pain of left knee    Macrocytosis without anemia    Tremor of both hands    Peripheral edema    Other orders  -     Empagliflozin (JARDIANCE) 10 MG tablet; Take 1 tablet by mouth Daily.    Dicussed current health problems, HgbA1c 6.6 to 9.6 , tx plan, start Jardience, will also help with weight and edema. Discussed F/U with specialist, referrals, reviewed f/U CT. Discussed Diet, exercise, safety with meds. Discussed F/u here.            This document has been electronically signed by Harpreet Bass MD

## 2018-07-05 ENCOUNTER — OFFICE VISIT (OUTPATIENT)
Dept: ORTHOPEDIC SURGERY | Facility: CLINIC | Age: 63
End: 2018-07-05

## 2018-07-05 VITALS — WEIGHT: 303 LBS | HEIGHT: 74 IN | BODY MASS INDEX: 38.89 KG/M2

## 2018-07-05 DIAGNOSIS — M25.462 EFFUSION, LEFT KNEE: ICD-10-CM

## 2018-07-05 DIAGNOSIS — M25.562 LEFT KNEE PAIN, UNSPECIFIED CHRONICITY: ICD-10-CM

## 2018-07-05 DIAGNOSIS — M17.12 PRIMARY OSTEOARTHRITIS OF LEFT KNEE: Primary | ICD-10-CM

## 2018-07-05 PROCEDURE — 99214 OFFICE O/P EST MOD 30 MIN: CPT | Performed by: NURSE PRACTITIONER

## 2018-07-05 PROCEDURE — 20610 DRAIN/INJ JOINT/BURSA W/O US: CPT | Performed by: NURSE PRACTITIONER

## 2018-07-05 RX ORDER — LIDOCAINE HYDROCHLORIDE 10 MG/ML
2 INJECTION, SOLUTION INFILTRATION; PERINEURAL
Status: COMPLETED | OUTPATIENT
Start: 2018-07-05 | End: 2018-07-05

## 2018-07-05 RX ORDER — TRIAMCINOLONE ACETONIDE 40 MG/ML
40 INJECTION, SUSPENSION INTRA-ARTICULAR; INTRAMUSCULAR
Status: COMPLETED | OUTPATIENT
Start: 2018-07-05 | End: 2018-07-05

## 2018-07-05 RX ADMIN — TRIAMCINOLONE ACETONIDE 40 MG: 40 INJECTION, SUSPENSION INTRA-ARTICULAR; INTRAMUSCULAR at 15:56

## 2018-07-05 RX ADMIN — LIDOCAINE HYDROCHLORIDE 2 ML: 10 INJECTION, SOLUTION INFILTRATION; PERINEURAL at 15:56

## 2018-07-05 NOTE — PROGRESS NOTES
Arias Willson is a 62 y.o. male returns for     Chief Complaint   Patient presents with   • Left Knee - Follow-up       HISTORY OF PRESENT ILLNESS:     62-year-old  male in the office today for follow-up left knee pain with effusion.  He is requesting a repeat aspiration and injection.     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 Grade I diastolic dysfunction of the LV myocardium. No evidence of LV apical thrombus. No evidence of pericardial effusion.)   • Gus's syndrome     RIGHT EYE   • Hyperkeratosis    • Hyperlipidemia    • Mucopurulent conjunctivitis     ACUTE   • Myopia    • Neuropathic pain    • Obesity    • Onychomycosis    • Tobacco dependence syndrome        No Known Allergies      Current Outpatient Prescriptions:   •  albuterol (PROVENTIL HFA;VENTOLIN HFA) 108 (90 Base) MCG/ACT inhaler, Inhale 2 puffs Every 4 (Four) Hours As Needed for Wheezing., Disp: 3 inhaler, Rfl: 1  •  ammonium lactate (AMLACTIN) 12 % lotion, Apply  topically As Needed for Dry Skin., Disp: 500 g, Rfl: 5  •  [START ON 5/24/2106] aspirin 325 MG tablet, Take 325 mg by mouth daily., Disp: , Rfl:   •  budesonide-formoterol (SYMBICORT) 160-4.5  MCG/ACT inhaler, Inhale 2 puffs 2 (Two) Times a Day., Disp: 10.2 g, Rfl: 5  •  clopidogrel (PLAVIX) 75 MG tablet, Take 1 tablet by mouth Daily., Disp: 90 tablet, Rfl: 3  •  Cyanocobalamin (B-12) 5000 MCG sublingual tablet, Place 1 each under the tongue Daily., Disp: 30 tablet, Rfl: 6  •  DULoxetine (CYMBALTA) 60 MG capsule, Take 1 capsule by mouth Daily., Disp: 31 capsule, Rfl: 5  •  Empagliflozin (JARDIANCE) 10 MG tablet, Take 1 tablet by mouth Daily., Disp: 30 tablet, Rfl: 5  •  furosemide (LASIX) 40 MG tablet, Take 1 tablet by mouth Daily., Disp: 15 tablet, Rfl: 0  •  gabapentin (NEURONTIN) 600 MG tablet, Take 1 tablet by mouth 3 (Three) Times a Day As Needed (Take 600mg TID prn)., Disp: 90 tablet, Rfl: 4  •  lansoprazole (PREVACID) 30 MG capsule, Take 1 capsule by mouth Daily., Disp: 90 capsule, Rfl: 3  •  losartan (COZAAR) 50 MG tablet, Take 1 tablet by mouth Daily., Disp: 90 tablet, Rfl: 3  •  metFORMIN (GLUCOPHAGE) 500 MG tablet, TAKE ONE TABLET BY MOUTH TWICE DAILY, Disp: 60 tablet, Rfl: 5  •  metoprolol tartrate (LOPRESSOR) 25 MG tablet, Take 1 tablet by mouth Every 12 (Twelve) Hours., Disp: 180 tablet, Rfl: 3  •  naphazoline (NAPHCON) 0.1 % ophthalmic solution, Apply 1 drop to eye 4 (Four) Times a Day As Needed for Irritation., Disp: 15 mL, Rfl: 5  •  neomycin-polymyxin-hydrocortisone (CORTISPORIN) 3.5-92186-7 ophthalmic suspension, Administer 1 drop to the right eye 3 (Three) Times a Day., Disp: 7.5 mL, Rfl: 0  •  Omega-3 Fatty Acids (FISH OIL) 1000 MG capsule capsule, Take 2,000 mg by mouth Daily With Breakfast., Disp: , Rfl:   •  pravastatin (PRAVACHOL) 40 MG tablet, Take 1 tablet by mouth Every Night., Disp: 90 tablet, Rfl: 3  •  spironolactone (ALDACTONE) 25 MG tablet, Take 1 tablet by mouth Daily., Disp: 90 tablet, Rfl: 3    Past Surgical History:   Procedure Laterality Date   • COLONOSCOPY  07/30/2015    Colonoscopy, diagnostic (screening) 51811 (Screening for malignant neoplasm of colon)    •  "COLONOSCOPY  09/09/2015    Colonoscopy, diagnostic (screening) 53608 (Diverticulosis found in the sigmoid colon.Hemorrhoids found in the anus.)   • COLONOSCOPY  05/01/2009    Colonoscopy, diagnostic (screening) 95487 (Small internal and external hemorrhoids were present, Colonoscopy otherwise normal.)   • LEG SURGERY         ROS  No fevers or chills.  No chest pain or shortness of air.  No GI or  disturbances.    PHYSICAL EXAMINATION:       Ht 188 cm (74\")   Wt (!) 137 kg (303 lb)   BMI 38.90 kg/m²     Physical Exam   Constitutional: He is oriented to person, place, and time. Vital signs are normal. He appears well-developed and well-nourished. He is cooperative.   HENT:   Head: Normocephalic and atraumatic.   Neck: Trachea normal and phonation normal.   Pulmonary/Chest: Effort normal. No respiratory distress.   Abdominal: Soft. Normal appearance. He exhibits no distension.   Musculoskeletal:        Left knee: He exhibits effusion.   Neurological: He is alert and oriented to person, place, and time. GCS eye subscore is 4. GCS verbal subscore is 5. GCS motor subscore is 6.   Skin: Skin is warm, dry and intact.   Psychiatric: He has a normal mood and affect. His speech is normal and behavior is normal. Judgment and thought content normal. Cognition and memory are normal.   Vitals reviewed.      GAIT:     []  Normal  []  Antalgic    Assistive device: []  None  []  Walker     []  Crutches  []  Cane     [x]  Wheelchair  []  Stretcher    Right Knee Exam   Right knee exam is normal.    Tenderness   The patient is experiencing no tenderness.         Muscle Strength     The patient has normal right knee strength.      Left Knee Exam     Tenderness   The patient is experiencing tenderness in the medial joint line and lateral joint line.    Range of Motion   Left knee extension: 3.   Left knee flexion: 115.     Other   Erythema: absent  Scars: present  Sensation: normal  Pulse: present  Swelling: mild  Effusion: effusion " present                XR Knee Bilateral AP Standing  Standing AP of both knees reveals severe tricompartmental degenerative changes with joint space narrowing of the left knee.  There is a retained metallic screw from previous trauma of the right femur however the right knee joint has minimal degenerative changes without any acute radiological abnormality noted.  There are no comparison views on file.  03/14/18 at 9:09 AM by TANESHA Haney    XR Knee 4+ View Left    No evidence of a fracture or bony malalignment.     There are tricompartmental degenerative changes with marked  irregularity of the lateral tibial plateau, presumably a chronic  etiology.  .      IMPRESSION:  CONCLUSION:           1. Tricompartment degenerative changes.              Note:  if pain or symptoms persist beyond reasonable expectations  and follow-up imaging is anticipated,  cross sectional imaging  (MRI) is suggested, as is deemed clinically appropriate.                         Electronically signed by:  KEYONA Lund MD  3/5/2018 7:17 PM  CST Workstation: [id number]      ASSESSMENT:    Diagnoses and all orders for this visit:    Primary osteoarthritis of left knee  -     Large Joint Arthrocentesis    Left knee pain, unspecified chronicity  -     Large Joint Arthrocentesis    Effusion, left knee  -     Large Joint Arthrocentesis      Large Joint Arthrocentesis  Date/Time: 7/5/2018 3:56 PM  Consent given by: patient  Site marked: site marked  Timeout: Immediately prior to procedure a time out was called to verify the correct patient, procedure, equipment, support staff and site/side marked as required   Supporting Documentation  Indications: pain   Procedure Details  Location: knee - L knee  Preparation: Patient was prepped and draped in the usual sterile fashion  Needle size: 18 G  Approach: anteromedial  Medications administered: 40 mg triamcinolone acetonide 40 MG/ML; 2 mL lidocaine 1 %  Aspirate amount: 50 mL  Aspirate: clear  and yellow  Patient tolerance: patient tolerated the procedure well with no immediate complications            PLAN  reaspirated knee today and injected, recommended continued modified weight baring and follow up as needed.   No Follow-up on file.    Donn Dennis, APRN

## 2018-07-16 ENCOUNTER — OFFICE VISIT (OUTPATIENT)
Dept: PULMONOLOGY | Facility: CLINIC | Age: 63
End: 2018-07-16

## 2018-07-16 ENCOUNTER — PROCEDURE VISIT (OUTPATIENT)
Dept: PULMONOLOGY | Facility: CLINIC | Age: 63
End: 2018-07-16

## 2018-07-16 VITALS
HEIGHT: 74 IN | HEART RATE: 88 BPM | SYSTOLIC BLOOD PRESSURE: 112 MMHG | BODY MASS INDEX: 38.69 KG/M2 | WEIGHT: 301.5 LBS | OXYGEN SATURATION: 91 % | DIASTOLIC BLOOD PRESSURE: 82 MMHG

## 2018-07-16 DIAGNOSIS — E66.9 NOCTURNAL HYPOXEMIA DUE TO OBESITY: ICD-10-CM

## 2018-07-16 DIAGNOSIS — J44.9 COPD, GROUP B, BY GOLD 2017 CLASSIFICATION (HCC): ICD-10-CM

## 2018-07-16 DIAGNOSIS — IMO0001 CLASS 2 OBESITY DUE TO EXCESS CALORIES WITH SERIOUS COMORBIDITY AND BODY MASS INDEX (BMI) OF 38.0 TO 38.9 IN ADULT: ICD-10-CM

## 2018-07-16 DIAGNOSIS — R93.89 ABNORMAL CT OF THE CHEST: Primary | ICD-10-CM

## 2018-07-16 DIAGNOSIS — G47.36 NOCTURNAL HYPOXEMIA DUE TO OBESITY: ICD-10-CM

## 2018-07-16 DIAGNOSIS — G47.33 OSA (OBSTRUCTIVE SLEEP APNEA): ICD-10-CM

## 2018-07-16 DIAGNOSIS — F17.210 CIGARETTE NICOTINE DEPENDENCE, UNCOMPLICATED: ICD-10-CM

## 2018-07-16 DIAGNOSIS — R53.81 PHYSICAL DECONDITIONING: ICD-10-CM

## 2018-07-16 DIAGNOSIS — I27.20 PULMONARY HYPERTENSION (HCC): ICD-10-CM

## 2018-07-16 PROBLEM — A41.9 SEPSIS: Status: RESOLVED | Noted: 2018-01-17 | Resolved: 2018-07-16

## 2018-07-16 PROBLEM — J96.02 ACUTE RESPIRATORY FAILURE WITH HYPOXIA AND HYPERCAPNIA: Status: RESOLVED | Noted: 2018-01-17 | Resolved: 2018-07-16

## 2018-07-16 PROBLEM — R91.1 PULMONARY NODULE: Status: RESOLVED | Noted: 2018-06-13 | Resolved: 2018-07-16

## 2018-07-16 PROBLEM — J42 CHRONIC BRONCHITIS WITH ACUTE EXACERBATION: Chronic | Status: RESOLVED | Noted: 2017-04-11 | Resolved: 2018-07-16

## 2018-07-16 PROBLEM — J20.9 CHRONIC BRONCHITIS WITH ACUTE EXACERBATION: Chronic | Status: RESOLVED | Noted: 2017-04-11 | Resolved: 2018-07-16

## 2018-07-16 PROBLEM — J96.01 ACUTE RESPIRATORY FAILURE WITH HYPOXIA AND HYPERCAPNIA: Status: RESOLVED | Noted: 2018-01-17 | Resolved: 2018-07-16

## 2018-07-16 PROCEDURE — 99204 OFFICE O/P NEW MOD 45 MIN: CPT | Performed by: INTERNAL MEDICINE

## 2018-07-16 PROCEDURE — 94729 DIFFUSING CAPACITY: CPT | Performed by: INTERNAL MEDICINE

## 2018-07-16 PROCEDURE — 94727 GAS DIL/WSHOT DETER LNG VOL: CPT | Performed by: INTERNAL MEDICINE

## 2018-07-16 PROCEDURE — 99406 BEHAV CHNG SMOKING 3-10 MIN: CPT | Performed by: INTERNAL MEDICINE

## 2018-07-16 PROCEDURE — 94060 EVALUATION OF WHEEZING: CPT | Performed by: INTERNAL MEDICINE

## 2018-07-16 NOTE — PROGRESS NOTES
Pulmonary Consultation    Harpreet Bass MD,    Thank you for asking me to see Arias Willson for   Chief Complaint   Patient presents with   • Lung Nodule     HYPOXIA REF GEORGES   .    Subjective     History of Present Illness  Arias Willson is a 62 y.o. male with a PMH significant for COPD, tobacco use, morbid obesity, NEREYDA, ASCAD, HTN, CVA, seizures, T2DM, and GERD who presents for evaluation of lung nodules. Pt states he was referred by Dr. Bass as since he had pneumonia in February he was started on O2. He reports he continues to use O2 intermittently according to his sats which can drop into the 80s. Pt reports that he wears the O2 primarily at night and a few hours a day. He was referred for a sleep study due to snoring and witnessed apneas. Pt states he won't be able to tolerate a mask if it is recommended. He reports he has been told that he had COPD in the recent past. Pt was on Symbicort but he stopped taking it as he was not sure how long he should continue. He does use albuterol ~2x/d. Pt admits to dyspnea on minimal exertion but he admits his activity is limited due to his strokes, arthritis and obesity. He has occasional cough, but he admits to some wheeze. Pt denies chest pain. He reports to gaining 90lbs over the past 5yrs which he attributes to inactivity. He smokes <0.5ppd down from 1ppd for the past 33yrs. Pt wants to quit but he states the cessation aides were not tolerated. 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 His father had lung disease and lung cancer (smoker).    Review of Systems: History obtained from chart review and the patient.  Review of Systems   Constitutional: Positive for fatigue and unexpected weight change.   HENT: Positive for congestion and postnasal drip.    Respiratory: Positive for cough, shortness of breath and wheezing.    Cardiovascular: Positive for leg swelling.   Musculoskeletal: Positive for arthralgias.   Neurological:  Positive for dizziness, seizures and weakness.   Psychiatric/Behavioral: The patient is nervous/anxious.      As described in the HPI. Otherwise, remainder of ROS (14 systems) were negative.    Patient Active Problem List   Diagnosis   • Ptosis of eyelid   • Astigmatism   • Myopia   • Gus's syndrome   • Neuropathic pain   • GERD (gastroesophageal reflux disease)   • Aneurysm of carotid artery (CMS/[facility])   • Cerebrovascular accident (CMS/[facility])   • Sensory deficit present   • Gait disturbance, post-stroke   • Cognitive deficit, post-stroke   • Hypertension   • Diabetes mellitus (CMS/[facility])   • Morbid obesity (CMS/[facility])   • Coronary artery disease   • NEREYDA (obstructive sleep apnea)   • Macrocytosis without anemia   • Need for immunization against influenza   • Need for vaccination   • Elevated brain natriuretic peptide (BNP) level   • Tremor of both hands   • Seizures (CMS/[facility])   • Fall   • Chronic pain of left knee   • Class 2 obesity due to excess calories with serious comorbidity and body mass index (BMI) of 38.0 to 38.9 in adult   • High risk medication use   • Hypoxia   • Generalized edema   • Other insomnia   • Peripheral edema   • Cigarette nicotine dependence, uncomplicated   • Nocturnal hypoxemia due to obesity   • COPD, group B, by GOLD 2017 classification (CMS/[facility])         Current Outpatient Prescriptions:   •  albuterol (PROVENTIL HFA;VENTOLIN HFA) 108 (90 Base) MCG/ACT inhaler, Inhale 2 puffs Every 4 (Four) Hours As Needed for Wheezing., Disp: 3 inhaler, Rfl: 1  •  ammonium lactate (AMLACTIN) 12 % lotion, Apply  topically As Needed for Dry Skin., Disp: 500 g, Rfl: 5  •  [START ON 5/24/2106] aspirin 325 MG tablet, Take 325 mg by mouth daily., Disp: , Rfl:   •  clopidogrel (PLAVIX) 75 MG tablet, Take 1 tablet by mouth Daily., Disp: 90 tablet, Rfl: 3  •  Cyanocobalamin (B-12) 5000 MCG sublingual tablet, Place 1 each under the tongue Daily., Disp: 30 tablet, Rfl: 6  •  DULoxetine (CYMBALTA) 60 MG capsule, Take 1  capsule by mouth Daily., Disp: 31 capsule, Rfl: 5  •  Empagliflozin (JARDIANCE) 10 MG tablet, Take 1 tablet by mouth Daily., Disp: 30 tablet, Rfl: 5  •  gabapentin (NEURONTIN) 600 MG tablet, Take 1 tablet by mouth 3 (Three) Times a Day As Needed (Take 600mg TID prn)., Disp: 90 tablet, Rfl: 4  •  lansoprazole (PREVACID) 30 MG capsule, Take 1 capsule by mouth Daily., Disp: 90 capsule, Rfl: 3  •  losartan (COZAAR) 50 MG tablet, Take 1 tablet by mouth Daily., Disp: 90 tablet, Rfl: 3  •  metFORMIN (GLUCOPHAGE) 500 MG tablet, TAKE ONE TABLET BY MOUTH TWICE DAILY, Disp: 60 tablet, Rfl: 5  •  metoprolol tartrate (LOPRESSOR) 25 MG tablet, Take 1 tablet by mouth Every 12 (Twelve) Hours., Disp: 180 tablet, Rfl: 3  •  naphazoline (NAPHCON) 0.1 % ophthalmic solution, Apply 1 drop to eye 4 (Four) Times a Day As Needed for Irritation., Disp: 15 mL, Rfl: 5  •  neomycin-polymyxin-hydrocortisone (CORTISPORIN) 3.5-03456-2 ophthalmic suspension, Administer 1 drop to the right eye 3 (Three) Times a Day., Disp: 7.5 mL, Rfl: 0  •  Omega-3 Fatty Acids (FISH OIL) 1000 MG capsule capsule, Take 2,000 mg by mouth Daily With Breakfast., Disp: , Rfl:   •  pravastatin (PRAVACHOL) 40 MG tablet, Take 1 tablet by mouth Every Night., Disp: 90 tablet, Rfl: 3  •  spironolactone (ALDACTONE) 25 MG tablet, Take 1 tablet by mouth Daily., Disp: 90 tablet, Rfl: 3  •  furosemide (LASIX) 40 MG tablet, Take 1 tablet by mouth Daily., Disp: 15 tablet, Rfl: 0  •  umeclidinium-vilanterol (ANORO ELLIPTA) 62.5-25 MCG/INH aerosol powder  inhaler, Inhale 1 puff Daily., Disp: 1 each, Rfl: 11    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 Grade I diastolic dysfunction of the LV myocardium. No evidence of LV apical thrombus. No evidence of pericardial effusion.)   • Gus's syndrome     RIGHT EYE   • Hyperkeratosis    • Hyperlipidemia    • Mucopurulent conjunctivitis     ACUTE   • Myopia    • Neuropathic pain    • Obesity    • Onychomycosis    • Tobacco dependence syndrome      Past Surgical History:   Procedure Laterality Date   • COLONOSCOPY  07/30/2015    Colonoscopy, diagnostic (screening) 65863 (Screening for malignant neoplasm of colon)    • COLONOSCOPY  09/09/2015    Colonoscopy, diagnostic (screening) 00751 (Diverticulosis found in the sigmoid colon.Hemorrhoids found in the anus.)   • COLONOSCOPY  05/01/2009    Colonoscopy, diagnostic (screening) 58967 (Small internal and external hemorrhoids were present, Colonoscopy otherwise normal.)   • LEG SURGERY       Social History     Social History   • Marital status: Single     Social History Main Topics   • Smoking status: Current Every Day Smoker     Packs/day: 0.50     Years: 33.00     Types: Cigarettes   • Smokeless tobacco: Never Used   • Alcohol use Yes   • Drug use: No     Other Topics Concern   • Not on file     Family History   Problem Relation Age of Onset   • Glaucoma Other    • Heart disease Other    • Stroke Other    • Macular degeneration Other    • Glaucoma Father    • Macular degeneration Father    • Cataracts Father    • Macular degeneration Sister           Objective     Blood pressure 112/82, pulse 88, height 188 cm (74\"), weight (!) 137 kg (301 lb 8 oz), SpO2 " 91 %.  Physical Exam   Constitutional: He is oriented to person, place, and time. Vital signs are normal. He appears well-developed and well-nourished.   Obese   HENT:   Head: Normocephalic and atraumatic.   Nose: Nose normal.   Mouth/Throat: Uvula is midline, oropharynx is clear and moist and mucous membranes are normal.   Mallampati 3   Eyes: Conjunctivae, EOM and lids are normal. Pupils are equal, round, and reactive to light.   Neck: Trachea normal and normal range of motion. No tracheal tenderness present. No thyroid mass present.   Cardiovascular: Normal rate, regular rhythm and normal heart sounds.  PMI is not displaced.  Exam reveals no gallop.    No murmur heard.  Pulmonary/Chest: Effort normal and breath sounds normal. No respiratory distress. He has no decreased breath sounds. He has no wheezes. He has no rhonchi. Chest wall is not dull to percussion. He exhibits no tenderness.   Abdominal: Soft. Normal appearance and bowel sounds are normal. There is no hepatomegaly. There is no tenderness.   Obese   Musculoskeletal:   In wheelchair, mild BLE edema     Vascular Status -  His right foot exhibits abnormal foot edema. His left foot exhibits abnormal foot edema.  Lymphadenopathy:        Head (right side): No submandibular adenopathy present.        Head (left side): No submandibular adenopathy present.     He has no cervical adenopathy.        Right: No supraclavicular adenopathy present.        Left: No supraclavicular adenopathy present.   Neurological: He is alert and oriented to person, place, and time. Gait normal.   Skin: Skin is warm and dry. No rash noted. No cyanosis. Nails show no clubbing.   Lower leg hyperemia   Psychiatric: He has a normal mood and affect. His speech is normal and behavior is normal. Judgment normal.   Nursing note and vitals reviewed.      PFTs: 7/16/18 (independently reviewed and interpreted by me)  Ratio 66  FVC 3.36/ 61%  FEV1 2.21/ 53%  TLC 5.76/ 73%  DLCO 26.21/  69%  Moderate obstruction with mild restriction.  Significant bronchodilator response.  Elevated RV/TLC consistent with air trapping.  Mildly reduced abuse and capacity.  No comparative data available.    Radiology (independently reviewed and interpreted by me): CT chest with contrast 6/22/18 showed mild centrilobular emphysema, resolution of previously described LLL nodules    CTPA 1/17/18: no PE< 1.5cm nodular opacity superior segment LLL c/w pneumonitis vs atelectasis, scattered dependent atelectasis on right, vascular congestion     Assessment/Plan     Arias was seen today for lung nodule.    Diagnoses and all orders for this visit:    Abnormal CT of the chest  -     Pulmonary Function Test    COPD, group B, by GOLD 2017 classification (CMS/[facility])  -     umeclidinium-vilanterol (ANORO ELLIPTA) 62.5-25 MCG/INH aerosol powder  inhaler; Inhale 1 puff Daily.    Nocturnal hypoxemia due to obesity    Cigarette nicotine dependence, uncomplicated    NEREYDA (obstructive sleep apnea)    Class 2 obesity due to excess calories with serious comorbidity and body mass index (BMI) of 38.0 to 38.9 in adult    Physical deconditioning    Pulmonary hypertension         Discussion/ Recommendations:   PFTs are consistent with moderate obstruction and group B COPD.  CT of the chest was reviewed and showed resolution of prior likely inflammatory nodular opacity, but mild emphysematous changes persist.  I think his dyspnea is multifactorial from underlying COPD, but also from obesity and deconditioning.  He did have an echocardiogram earlier this year which showed evidence of pulmonary hypertension, but I think this is likely secondary.  Also, his primarily nocturnal hypoxemia is most likely due to untreated NEREYDA.  Unfortunately, he does continue to smoke, but states that he is committed to complete cessation once he wraps his mind around it.    -Stop Symbicort.  -Start Anoro daily.  Sample provided and instructed on use.  -Continue albuterol  as needed only.  -No need for surveillance CT's but pt can default into LDCT screening June 2019.  -I advised Arias of the risks of continuing to use tobacco, and I provided him with tobacco cessation educational materials in the After Visit Summary.  Recommended he try nicotine gum or losses, as well as a smoking cessation aid like fake cigarettes to assist with the habit.  During this visit, I spent 5 minutes counseling the patient regarding tobacco cessation.  -Agree with sleep apnea evaluation.  Encouraged patient to keep an open mind and attempt to use CPAP when recommended.  -Continue supplemental oxygen with exertion and sleep, to maintain saturations greater than 88%.  -Encouraged efforts at weight loss through diet and exercise.  He would benefit from pulmonary rehabilitation, but his mobility is currently limited.      Patient's Body mass index is 38.71 kg/m². BMI is above normal parameters. Recommendations include: exercise counseling.         Return in about 4 weeks (around 8/13/2018) for Recheck.      Thank you for allowing me to participate in the care of Arias Willson. Please do not hesitate to contact me with any questions.         This document has been electronically signed by Krysten Buckner MD on July 16, 2018 10:18 AM      Dictated using Dragon

## 2018-07-24 ENCOUNTER — OFFICE VISIT (OUTPATIENT)
Dept: FAMILY MEDICINE CLINIC | Facility: CLINIC | Age: 63
End: 2018-07-24

## 2018-07-24 VITALS
OXYGEN SATURATION: 92 % | WEIGHT: 303.9 LBS | BODY MASS INDEX: 39 KG/M2 | TEMPERATURE: 98 F | SYSTOLIC BLOOD PRESSURE: 128 MMHG | HEART RATE: 99 BPM | DIASTOLIC BLOOD PRESSURE: 70 MMHG | HEIGHT: 74 IN

## 2018-07-24 DIAGNOSIS — I10 ESSENTIAL HYPERTENSION: Chronic | ICD-10-CM

## 2018-07-24 DIAGNOSIS — R56.9 SEIZURES (HCC): ICD-10-CM

## 2018-07-24 DIAGNOSIS — IMO0001 CLASS 2 OBESITY DUE TO EXCESS CALORIES WITH SERIOUS COMORBIDITY AND BODY MASS INDEX (BMI) OF 38.0 TO 38.9 IN ADULT: ICD-10-CM

## 2018-07-24 DIAGNOSIS — G47.33 OSA (OBSTRUCTIVE SLEEP APNEA): Chronic | ICD-10-CM

## 2018-07-24 DIAGNOSIS — Z86.73 H/O: CVA (CEREBROVASCULAR ACCIDENT): ICD-10-CM

## 2018-07-24 DIAGNOSIS — R55 SYNCOPE, UNSPECIFIED SYNCOPE TYPE: Primary | ICD-10-CM

## 2018-07-24 PROCEDURE — 99214 OFFICE O/P EST MOD 30 MIN: CPT | Performed by: FAMILY MEDICINE

## 2018-07-24 RX ORDER — DULOXETIN HYDROCHLORIDE 60 MG/1
CAPSULE, DELAYED RELEASE ORAL
Qty: 31 CAPSULE | Refills: 5 | Status: SHIPPED | OUTPATIENT
Start: 2018-07-24 | End: 2018-07-24 | Stop reason: SDUPTHER

## 2018-07-24 RX ORDER — DULOXETIN HYDROCHLORIDE 60 MG/1
60 CAPSULE, DELAYED RELEASE ORAL DAILY
Qty: 30 CAPSULE | Refills: 5 | Status: SHIPPED | OUTPATIENT
Start: 2018-07-24 | End: 2019-02-04 | Stop reason: SDUPTHER

## 2018-07-24 NOTE — PROGRESS NOTES
Subjective   Arias Willson is a 62 y.o. obese white male smoker with HTN, Had TIA 2- , began with double vision, R facial numbness, L arm numbness, L leg weakness. 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 Pt was referred to Neurosurgery in [place], told has aneurysm, but surgery not recommended for this area. 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 Had resolution of most motor weakness. Has residual difficulty with balance. Cognitive difficulties, Depression, DM, High cholesterol, Chronic pain, Hospital admit Jan 2018 with Resp failure, L lower lung nodule, Hypoxia, New onset seizures after discharge  and other health problems.  Pt presents for exam, to discuss health problems, Tx and F/U plans. .     ' Since last visit saw Pulmonology, said continue O2 prn, having sleep study in Sept.  Seeing Neurology going to have EEG at home x 72 hrs.  No seizure x 1 wk then twice in last 3 days.  Seizures start usually with standing, or moving upright. Blood sugars have been elevated since checking. B/P has been good at checks. '    Insomnia   This is a chronic problem. The current episode started more than 1 year ago. The problem occurs constantly. The problem has been waxing and waning. Associated symptoms include fatigue, headaches, neck pain, numbness and weakness. Pertinent negatives include no abdominal pain, arthralgias, chest pain, chills, congestion, coughing, fever, nausea, rash or sore throat. The symptoms are aggravated by stress. Treatments tried: OTC meds. The treatment provided no relief.   Diabetes   He presents for his follow-up diabetic visit. He has type 2 diabetes mellitus. His disease course has been fluctuating. Hypoglycemia symptoms include confusion, dizziness, headaches,  nervousness/anxiousness and seizures. Associated symptoms include fatigue and weakness. Pertinent negatives for diabetes include no chest pain. There are no hypoglycemic complications. Symptoms are worsening. Diabetic complications include a CVA. Risk factors for coronary artery disease include diabetes mellitus, male sex, tobacco exposure, stress, sedentary lifestyle, obesity, hypertension and dyslipidemia. Current diabetic treatment includes oral agent (monotherapy). He is compliant with treatment some of the time. His weight is increasing rapidly. He is following a generally unhealthy diet. When asked about meal planning, he reported none. He rarely participates in exercise. An ACE inhibitor/angiotensin II receptor blocker is being taken. He does not see a podiatrist.Eye exam is current.   Breathing Problem   He complains of difficulty breathing and shortness of breath. There is no cough or wheezing. Primary symptoms comments: Using 02 since hospitalization Jan 2018, H/O low pulse-ox, seizures.. Associated symptoms include headaches. Pertinent negatives include no appetite change, chest pain, ear pain, fever or sore throat. His symptoms are aggravated by exercise. His symptoms are alleviated by nothing. He reports moderate (O2) improvement on treatment.   Seizures    This is a new problem. Episode onset: Months. The problem has not changed since onset.There were 2 to 3 seizures. The most recent episode lasted 30 to 120 seconds. Associated symptoms include confusion and headaches. Pertinent negatives include no visual disturbance, no sore throat, no chest pain, no cough and no nausea. Characteristics include rhythmic jerking and loss of consciousness. The episode was witnessed.        The following portions of the patient's history were reviewed and updated as appropriate: allergies, current medications, past family history, past medical history, past social history, past surgical history and problem  list.    Review of Systems   Constitutional: Positive for fatigue. Negative for activity change, appetite change, chills and fever.   HENT: Negative for congestion, ear pain and sore throat.    Eyes: Negative for pain and visual disturbance.   Respiratory: Positive for shortness of breath. Negative for cough, chest tightness and wheezing.    Cardiovascular: Negative for chest pain and palpitations.   Gastrointestinal: Negative for abdominal pain and nausea.   Endocrine: Negative for cold intolerance and heat intolerance.   Genitourinary: Negative for difficulty urinating and dysuria.   Musculoskeletal: Positive for neck pain. Negative for arthralgias and gait problem.   Skin: Negative for color change and rash.   Allergic/Immunologic: Negative for environmental allergies.   Neurological: Positive for dizziness, seizures, loss of consciousness, syncope, weakness, numbness and headaches.   Hematological: Negative for adenopathy. Does not bruise/bleed easily.   Psychiatric/Behavioral: Positive for confusion, decreased concentration and sleep disturbance. Negative for agitation and suicidal ideas. The patient is nervous/anxious and has insomnia.         Depressed mood       Objective   Physical Exam   Constitutional: He is oriented to person, place, and time. He appears well-developed and well-nourished. No distress.   Resp status improved from last visit, has O2 but not using in WC, reports, uses if trying to walk to prevent SOA.   HENT:   Head: Normocephalic and atraumatic.   Right Ear: External ear normal.   Left Ear: External ear normal.   Nose: Nose normal.   Mouth/Throat: Oropharynx is clear and moist.   Eyes: Pupils are equal, round, and reactive to light. Conjunctivae and EOM are normal. Right eye exhibits no discharge. Left eye exhibits no discharge. No scleral icterus.   Neck: Normal range of motion. Neck supple. No JVD present. No tracheal deviation present. No thyromegaly present.   Cardiovascular: Normal  rate, regular rhythm, normal heart sounds and intact distal pulses.  Exam reveals no gallop and no friction rub.    No murmur heard.  Pulmonary/Chest: Effort normal and breath sounds normal. No respiratory distress. He has no wheezes. He has no rales. He exhibits no tenderness.   Abdominal: Soft. Bowel sounds are normal. He exhibits no distension and no mass. There is no tenderness. No hernia.   Musculoskeletal: He exhibits no edema, tenderness or deformity.   Requires WC, Walks short distance with cane, L sided sensory deficit, R motor, drift towards R without cane. R pupil NR, Has weakness closing R eye CN VII.   Has unsteady antalgic gait.    Has crepitous with ROM of knees. WB 6   Lymphadenopathy:     He has no cervical adenopathy.   Neurological: He is alert and oriented to person, place, and time. He displays normal reflexes. A cranial nerve deficit is present. He exhibits normal muscle tone. Coordination normal.   Skin: Skin is warm and dry. No rash noted. He is not diaphoretic. No erythema. No pallor.   Psychiatric: His behavior is normal. Judgment and thought content normal.   Chronic situational depression, post stroke, improved on meds.   Nursing note and vitals reviewed.      Assessment/Plan   Arias was seen today for insomnia, diabetes and seizures.    Diagnoses and all orders for this visit:    Syncope, unspecified syncope type  Comments:  R/O Dysautonomia, Vasovagal, refer to Cardiology.  Orders:  -     Ambulatory Referral to Cardiology    Essential hypertension    NEREYDA (obstructive sleep apnea)    Class 2 obesity due to excess calories with serious comorbidity and body mass index (BMI) of 38.0 to 38.9 in adult    Seizures (CMS/[facility])    H/O: CVA (cerebrovascular accident)    Other orders  -     DULoxetine (CYMBALTA) 60 MG capsule; Take 1 capsule by mouth Daily.  -     metFORMIN (GLUCOPHAGE) 500 MG tablet; Take 1 tablet by mouth 2 (Two) Times a Day.      Discussed current health problem list, meds,  indications, tx plan, rationale. Discussed F/U with Neurology, Pulmonology, referral to Sleep Medicine, Referral to Cardiology. Discussed F/U plan.          This document has been electronically signed by Harpreet Bass MD

## 2018-07-29 PROBLEM — Z86.73 H/O: CVA (CEREBROVASCULAR ACCIDENT): Status: ACTIVE | Noted: 2017-02-26

## 2018-08-21 ENCOUNTER — OFFICE VISIT (OUTPATIENT)
Dept: FAMILY MEDICINE CLINIC | Facility: CLINIC | Age: 63
End: 2018-08-21

## 2018-08-21 VITALS
HEART RATE: 105 BPM | SYSTOLIC BLOOD PRESSURE: 128 MMHG | OXYGEN SATURATION: 91 % | DIASTOLIC BLOOD PRESSURE: 68 MMHG | BODY MASS INDEX: 38.54 KG/M2 | WEIGHT: 300.3 LBS | HEIGHT: 74 IN | TEMPERATURE: 97.7 F

## 2018-08-21 DIAGNOSIS — R26.9 GAIT DISTURBANCE, POST-STROKE: ICD-10-CM

## 2018-08-21 DIAGNOSIS — K21.9 GASTROESOPHAGEAL REFLUX DISEASE WITHOUT ESOPHAGITIS: ICD-10-CM

## 2018-08-21 DIAGNOSIS — G47.33 OSA (OBSTRUCTIVE SLEEP APNEA): Chronic | ICD-10-CM

## 2018-08-21 DIAGNOSIS — R60.1 GENERALIZED EDEMA: ICD-10-CM

## 2018-08-21 DIAGNOSIS — I72.0 ANEURYSM OF CAROTID ARTERY (HCC): Primary | ICD-10-CM

## 2018-08-21 DIAGNOSIS — H02.401 PTOSIS OF RIGHT EYELID: ICD-10-CM
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
## 2018-08-21 DIAGNOSIS — E66.01 MORBID OBESITY (HCC): Chronic | ICD-10-CM

## 2018-08-21 DIAGNOSIS — R44.9 SENSORY DEFICIT PRESENT: ICD-10-CM

## 2018-08-21 DIAGNOSIS — E11.69 TYPE 2 DIABETES MELLITUS WITH OTHER SPECIFIED COMPLICATION, WITHOUT LONG-TERM CURRENT USE OF INSULIN (HCC): Chronic | ICD-10-CM

## 2018-08-21 DIAGNOSIS — Z86.73 H/O: CVA (CEREBROVASCULAR ACCIDENT): ICD-10-CM

## 2018-08-21 DIAGNOSIS — Z79.899 HIGH RISK MEDICATION USE: ICD-10-CM

## 2018-08-21 DIAGNOSIS — R25.1 TREMOR OF BOTH HANDS: ICD-10-CM

## 2018-08-21 DIAGNOSIS — R56.9 SEIZURES (HCC): ICD-10-CM

## 2018-08-21 DIAGNOSIS — I69.398 GAIT DISTURBANCE, POST-STROKE: ICD-10-CM

## 2018-08-21 PROCEDURE — 99214 OFFICE O/P EST MOD 30 MIN: CPT | Performed by: FAMILY MEDICINE

## 2018-08-21 RX ORDER — GABAPENTIN 600 MG/1
600 TABLET ORAL 3 TIMES DAILY PRN
Qty: 90 TABLET | Refills: 4 | Status: SHIPPED | OUTPATIENT
Start: 2018-08-21 | End: 2019-02-04 | Stop reason: SDUPTHER

## 2018-08-21 RX ORDER — BACLOFEN 10 MG/1
1 TABLET ORAL 3 TIMES DAILY
COMMUNITY
Start: 2018-08-09 | End: 2019-06-11 | Stop reason: SDUPTHER

## 2018-09-06 ENCOUNTER — OFFICE VISIT (OUTPATIENT)
Dept: PULMONOLOGY | Facility: CLINIC | Age: 63
End: 2018-09-06

## 2018-09-06 VITALS
DIASTOLIC BLOOD PRESSURE: 72 MMHG | SYSTOLIC BLOOD PRESSURE: 126 MMHG | HEIGHT: 74 IN | OXYGEN SATURATION: 93 % | BODY MASS INDEX: 38.51 KG/M2 | WEIGHT: 300.1 LBS | HEART RATE: 92 BPM

## 2018-09-06 DIAGNOSIS — F17.210 CIGARETTE NICOTINE DEPENDENCE, UNCOMPLICATED: ICD-10-CM

## 2018-09-06 DIAGNOSIS — G47.36 NOCTURNAL HYPOXEMIA DUE TO OBESITY: ICD-10-CM

## 2018-09-06 DIAGNOSIS — E66.9 NOCTURNAL HYPOXEMIA DUE TO OBESITY: ICD-10-CM

## 2018-09-06 DIAGNOSIS — G47.33 OSA (OBSTRUCTIVE SLEEP APNEA): Chronic | ICD-10-CM

## 2018-09-06 DIAGNOSIS — J44.9 COPD, GROUP B, BY GOLD 2017 CLASSIFICATION (HCC): Primary | Chronic | ICD-10-CM

## 2018-09-06 DIAGNOSIS — IMO0001 CLASS 2 OBESITY DUE TO EXCESS CALORIES WITH SERIOUS COMORBIDITY AND BODY MASS INDEX (BMI) OF 38.0 TO 38.9 IN ADULT: ICD-10-CM

## 2018-09-06 DIAGNOSIS — R53.81 PHYSICAL DECONDITIONING: ICD-10-CM

## 2018-09-06 PROCEDURE — 99406 BEHAV CHNG SMOKING 3-10 MIN: CPT | Performed by: INTERNAL MEDICINE

## 2018-09-06 PROCEDURE — 99214 OFFICE O/P EST MOD 30 MIN: CPT | Performed by: INTERNAL MEDICINE

## 2018-09-06 NOTE — PROGRESS NOTES
Pulmonary Office Follow-up    Subjective     Arias Willson is seen today at the office for   Chief Complaint   Patient presents with   • Lung Nodule     4 WK F/U         HPI  Arias Willson is a 62 y.o. male with a PMH significant for COPD, tobacco use, morbid obesity, NEREYDA, ASCAD, HTN, CVA, seizures, T2DM, and GERD who presents for follow-up of COPD. he was last seen on 7/16/18, at which time I recommended changing from Symbicort to Anoro daily and also counseled him on the importance of complete tobacco cessation.  He states he did not have a significant change in his dyspnea after switching to the Anoro, but he does admit that he was able to decrease his albuterol use.  He continues to need his albuterol 1-2 times a day, but he reports very minimal relief.  He has occasional cough, but denies wheeze, sputum, fever, or chest pain.  Patient continues to have frequent nighttime awakenings likely due to underlying sleep-disordered breathing, but he has a consultation with Dr. Williamson coming up later this month.  Unfortunately, he does continue to smoke on average 0.5ppd, but he admits there are some days he is able to not smoke at all but others he will smoke up to one pack a day.  He remains pre-contemplative regarding complete cessation.  His weight is stable.      Patient Active Problem List   Diagnosis   • Ptosis of eyelid   • Astigmatism   • Myopia   • Gus's syndrome   • Neuropathic pain   • GERD (gastroesophageal reflux disease)   • Aneurysm of carotid artery (CMS/HCC)   • H/O: CVA (cerebrovascular accident)   • Sensory deficit present   • Gait disturbance, post-stroke   • Cognitive deficit, post-stroke   • Hypertension   • Diabetes mellitus (CMS/HCC)   • Morbid obesity (CMS/HCC)   • Coronary artery disease   • NEREYDA (obstructive sleep apnea)   • Macrocytosis without anemia   • Need for immunization against influenza   • Need for vaccination   • Elevated brain natriuretic peptide (BNP) level   • Tremor  of both hands   • Seizures (CMS/[facility])   • Fall   • Chronic pain of left knee   • Class 2 obesity due to excess calories with serious comorbidity and body mass index (BMI) of 38.0 to 38.9 in adult   • High risk medication use   • Hypoxia   • Generalized edema   • Other insomnia   • Peripheral edema   • Cigarette nicotine dependence, uncomplicated   • Nocturnal hypoxemia due to obesity   • COPD, group B, by GOLD 2017 classification (CMS/[facility])   • Syncope   • Physical deconditioning       Review of Systems  Review of Systems   Constitutional: Positive for fatigue. Negative for unexpected weight change.   Respiratory: Positive for cough and shortness of breath. Negative for wheezing.    Cardiovascular: Negative for leg swelling.   Musculoskeletal: Positive for arthralgias.   Neurological: Positive for weakness.     As described in the HPI. Otherwise, remainder of ROS (14 systems) were negative.    Medications, Allergies, Social, and Family Histories reviewed as per EMR.    Objective     Vitals:    09/06/18 1440   BP: 126/72   Pulse: 92   SpO2: 93%     Physical Exam   Constitutional: He is oriented to person, place, and time. Vital signs are normal. He appears well-developed and well-nourished.   Obese   HENT:   Head: Normocephalic and atraumatic.   Nose: Nose normal.   Mouth/Throat: Uvula is midline, oropharynx is clear and moist and mucous membranes are normal.   Mallampati 3   Eyes: Pupils are equal, round, and reactive to light. Conjunctivae, EOM and lids are normal.   Neck: Trachea normal and normal range of motion. No tracheal tenderness present. No thyroid mass present.   Cardiovascular: Normal rate, regular rhythm and normal heart sounds.  PMI is not displaced.  Exam reveals no gallop.    No murmur heard.  Pulmonary/Chest: Effort normal and breath sounds normal. No respiratory distress. He has no decreased breath sounds. He has no wheezes. He has no rhonchi. He exhibits no tenderness.   Abdominal: Soft. Normal  appearance and bowel sounds are normal. There is no hepatomegaly. There is no tenderness.   Obese   Musculoskeletal:   In wheelchair, mild BLE edema     Vascular Status -  His right foot exhibits abnormal foot edema. His left foot exhibits abnormal foot edema.  Lymphadenopathy:        Head (right side): No submandibular adenopathy present.        Head (left side): No submandibular adenopathy present.     He has no cervical adenopathy.        Right: No supraclavicular adenopathy present.        Left: No supraclavicular adenopathy present.   Neurological: He is alert and oriented to person, place, and time. Gait normal.   Skin: Skin is warm and dry. No rash noted. No cyanosis. Nails show no clubbing.   Lower leg hyperemia   Psychiatric: He has a normal mood and affect. His speech is normal and behavior is normal. Judgment normal.   Nursing note and vitals reviewed.          Assessment/Plan     Arias was seen today for lung nodule.    Diagnoses and all orders for this visit:    COPD, group B, by GOLD 2017 classification (CMS/[facility])    NEREYDA (obstructive sleep apnea)    Nocturnal hypoxemia due to obesity    Class 2 obesity due to excess calories with serious comorbidity and body mass index (BMI) of 38.0 to 38.9 in adult    Cigarette nicotine dependence, uncomplicated    Physical deconditioning         Discussion/ Recommendations:   I think he is doing fairly well from the COPD standpoint, but he does continue to have dyspnea with frequent albuterol use.  I think that his current dyspnea is more related to his obesity as well as deconditioning, as he does not perceive a noticeable benefit from his albuterol use.  At this time, I'll like to continue his current bronchodilator therapy but will consider escalation to triple therapy with the addition of an ICS if he continues to have issues.  Unfortunately, he does continue to smoke and is pre-contemplative regarding complete cessation.  I do strongly believe that he has  underlying NEREYDA and will benefit from treatment of such with PAP therapy.    -Continue Anoro daily and albuterol only as needed.  -If he continues to have issues, will consider escalation to triple therapy with the addition of an ICS such as Flovent or Asmanex.  -Annual LD CT June 2019.  -Encouraged him to keep his sleep evaluation with Dr. Williamson scheduled for later this month.  -I advised Arias of the risks of continuing to use tobacco, and I provided him with tobacco cessation educational materials in the After Visit Summary.  He is pre-contemplative.  Offered support.  During this visit, I spent 3 minutes counseling the patient regarding tobacco cessation.  -Encouraged efforts at weight loss through diet and exercise as tolerated.  -Recommend he get the flu vaccine when available.    Patient's Body mass index is 38.53 kg/m². BMI is above normal parameters. Recommendations include: exercise counseling.        Return in about 3 months (around 12/6/2018) for Recheck COPD.          This document has been electronically signed by Krysten Buckner MD on September 6, 2018 2:47 PM      Dictated using Dragon

## 2018-09-12 ENCOUNTER — OFFICE VISIT (OUTPATIENT)
Dept: CARDIOLOGY | Facility: CLINIC | Age: 63
End: 2018-09-12

## 2018-09-12 VITALS
BODY MASS INDEX: 38.5 KG/M2 | OXYGEN SATURATION: 99 % | HEIGHT: 74 IN | HEART RATE: 60 BPM | DIASTOLIC BLOOD PRESSURE: 100 MMHG | SYSTOLIC BLOOD PRESSURE: 140 MMHG | WEIGHT: 300 LBS

## 2018-09-12 DIAGNOSIS — I10 ESSENTIAL HYPERTENSION: ICD-10-CM

## 2018-09-12 DIAGNOSIS — E11.9 TYPE 2 DIABETES MELLITUS WITHOUT COMPLICATION, UNSPECIFIED LONG TERM INSULIN USE STATUS: ICD-10-CM

## 2018-09-12 DIAGNOSIS — E78.00 PURE HYPERCHOLESTEROLEMIA: Primary | ICD-10-CM

## 2018-09-12 DIAGNOSIS — Z72.0 NICOTINE ABUSE: ICD-10-CM

## 2018-09-12 DIAGNOSIS — R55 SYNCOPE AND COLLAPSE: ICD-10-CM

## 2018-09-12 PROCEDURE — 99204 OFFICE O/P NEW MOD 45 MIN: CPT | Performed by: INTERNAL MEDICINE

## 2018-09-12 NOTE — PROGRESS NOTES
[facility]iology  OFFICE NOTE    Arias Willson  62 y.o. male    09/12/2018  1. Pure hypercholesterolemia    2. Essential hypertension    3. Type 2 diabetes mellitus without complication, unspecified long term insulin use status (CMS/HCC)    4. Nicotine abuse    5. Syncope and collapse        Chief complaint -syncope    History of present Illness- 62-year-old gentleman who had multiple strokes in the past including brain stem stroke currently getting around with a wheelchair and also occasionally with a walker comes with complaining of passing out episodes.  He never had any cardiac problems in the past.  He has history of diabetes, hypertension, hyperlipidemia and a smoker.  He had a echo which was mild RV enlargement possibly from a pulmonary hypertension from tobacco use and sleep apnea.  We will do a Lexiscan nuclear stress test to do protocol as his BMI is 38.5 to rule out ischemia.  And will also get a urine monitor to rule out any arrhythmias causing him to pass out.  He is being worked out by neurologist to rule out seizures as he has prior history of stroke.  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 Grade I diastolic dysfunction of the LV myocardium. No evidence of LV apical thrombus. No evidence of pericardial effusion.)   • Gus's syndrome     RIGHT EYE   • Hyperkeratosis    • Hyperlipidemia    • Mucopurulent conjunctivitis     ACUTE   • Myopia    • Neuropathic pain    • Obesity    • Onychomycosis    • Tobacco dependence syndrome          Past Surgical History:   Procedure Laterality Date   • COLONOSCOPY  07/30/2015    Colonoscopy, diagnostic (screening) 41668 (Screening for malignant neoplasm of colon)    • COLONOSCOPY  09/09/2015    Colonoscopy, diagnostic (screening) 65178 (Diverticulosis found in the sigmoid colon.Hemorrhoids found in the anus.)   • COLONOSCOPY  05/01/2009    Colonoscopy, diagnostic (screening) 45368 (Small internal and external hemorrhoids were present, Colonoscopy otherwise normal.)   • LEG SURGERY           Family History   Problem Relation Age of Onset   • Glaucoma Other    • Heart disease Other    • Stroke Other    • Macular degeneration Other    • Glaucoma Father    • Macular degeneration Father    • Cataracts Father    • Macular degeneration Sister          Social History     Social History   • Marital status: Single     Spouse name: N/A   • Number of children: N/A   • Years of education: N/A     Occupational History   • Not on file.     Social History Main Topics   • Smoking status: Current Every Day Smoker     Packs/day: 0.50     Years: 33.00     Types: Cigarettes   • Smokeless tobacco: Never Used   • Alcohol use Yes   • Drug use: No   • Sexual activity: Not on file     Other Topics Concern   • Not on file     Social History Narrative   • No narrative on file         Current Outpatient Prescriptions   Medication Sig Dispense Refill   • albuterol (PROVENTIL HFA;VENTOLIN HFA) 108 (90 Base) MCG/ACT inhaler Inhale 2 puffs Every 4 (Four) Hours As Needed for Wheezing. 3 inhaler 1   • ammonium lactate (AMLACTIN) 12 % lotion  Apply  topically As Needed for Dry Skin. 500 g 5   • [START ON 5/24/2106] aspirin 325 MG tablet Take 325 mg by mouth daily.     • baclofen (LIORESAL) 10 MG tablet Take 1 tablet by mouth 3 (Three) Times a Day.     • clopidogrel (PLAVIX) 75 MG tablet Take 1 tablet by mouth Daily. 90 tablet 3   • Cyanocobalamin (B-12) 5000 MCG sublingual tablet Place 1 each under the tongue Daily. 30 tablet 6   • DULoxetine (CYMBALTA) 60 MG capsule Take 1 capsule by mouth Daily. 30 capsule 5   • Empagliflozin 25 MG tablet Take 25 mg by mouth Daily. 30 tablet 5   • furosemide (LASIX) 40 MG tablet Take 1 tablet by mouth Daily. 15 tablet 0   • gabapentin (NEURONTIN) 600 MG tablet Take 1 tablet by mouth 3 (Three) Times a Day As Needed (Take 600mg TID prn). 90 tablet 4   • lansoprazole (PREVACID) 30 MG capsule Take 1 capsule by mouth Daily. 90 capsule 3   • losartan (COZAAR) 50 MG tablet Take 1 tablet by mouth Daily. 90 tablet 3   • metFORMIN (GLUCOPHAGE) 500 MG tablet Take 1 tablet by mouth 2 (Two) Times a Day. 60 tablet 5   • metoprolol tartrate (LOPRESSOR) 25 MG tablet Take 1 tablet by mouth Every 12 (Twelve) Hours. 180 tablet 3   • naphazoline (NAPHCON) 0.1 % ophthalmic solution Apply 1 drop to eye 4 (Four) Times a Day As Needed for Irritation. 15 mL 5   • Omega-3 Fatty Acids (FISH OIL) 1000 MG capsule capsule Take 2,000 mg by mouth Daily With Breakfast.     • pravastatin (PRAVACHOL) 40 MG tablet Take 1 tablet by mouth Every Night. 90 tablet 3   • spironolactone (ALDACTONE) 25 MG tablet Take 1 tablet by mouth Daily. 90 tablet 3   • umeclidinium-vilanterol (ANORO ELLIPTA) 62.5-25 MCG/INH aerosol powder  inhaler Inhale 1 puff Daily. 1 each 11     No current facility-administered medications for this visit.          Review of Systems     Constitution: Denies any fatigue, fever or chills    HENT: Denies any headache, hearing impairment,     Eyes: Denies any blurring of vision, or photophobia     Cardivascular - As per history of present  "illness     Respiratory system-shortness of breath, Sleep apnea     Endocrine:   history of hyperlipidemia, diabetes,                         Musculoskeletal:   history of arthritis with musculoskeletal problems    Gastrointestinal: No nausea, vomiting, or melena    Genitourinary: No dysuria or hematuria    Neurological:   No history of seizure disorder, stroke, memory problems    Psychiatric/Behavioral:        No history of depression    Hematological- no history of easy bruising or any bleeding diathesis            OBJECTIVE    /100   Pulse 60   Ht 188 cm (74.02\")   Wt 136 kg (300 lb)   SpO2 99%   BMI 38.50 kg/m²       Physical Exam     Constitutional: is oriented to person, place, and time.     Skin-warm and dry    Well developed and nourished      Head: Normocephalic and atraumatic.     Eyes: Pupils are equal    Neck: Neck supple. No bruit in the carotids    Cardiovascular: Levels in the fifth intercostal space   Regular rate, and  Rhythm,    S1 greater than S2,     Pulmonary/Chest:   Air  Entry is equal on both sides  No wheezing or crackles,      Abdominal: Soft.  No hepatosplenomegaly, bowel sounds are present    Musculoskeletal: No kyphoscoliosis, no significant thickening of the joints    Neurological: is alert and oriented to person, place, and time.    cranial nerve are intact .   Peripheral neuropathy on both legs    Extremities-1+ left foot edema, pulses are weak below the popliteal      Psychiatric: He has a normal mood and affect.                  His behavior is normal.           Procedures      Lab Results   Component Value Date    WBC 8.06 01/23/2018    HGB 13.3 (L) 01/23/2018    HCT 46.5 01/23/2018    .0 (H) 01/23/2018     01/23/2018     Lab Results   Component Value Date    GLUCOSE 180 (H) 06/22/2018    BUN 22 (H) 06/22/2018    CREATININE 0.97 06/22/2018    EGFRIFNONA 79 06/22/2018    BCR 22.7 06/22/2018    CO2 35.0 (H) 06/22/2018    CALCIUM 8.8 06/22/2018    ALBUMIN 4.00 " 06/22/2018    AST 23 06/22/2018    ALT 28 06/22/2018     No results found for: CHOL  Lab Results   Component Value Date    TRIG 537 (H) 05/18/2016    TRIG 517 (H) 09/08/2015    TRIG 485 (H) 07/06/2015     Lab Results   Component Value Date    HDL 42 (L) 05/18/2016    HDL 43 (L) 09/08/2015    HDL 39 (L) 07/06/2015     No components found for: LDLCALC  No results found for: LDL  No results found for: HDLLDLRATIO  No components found for: CHOLHDL  Lab Results   Component Value Date    HGBA1C 9.6 (H) 06/22/2018     Lab Results   Component Value Date    TSH 1.77 07/06/2015                  A/P    Syncope-history of stroke poorly controlled diabetes and smoker with high risk for CAD will do a event monitor to rule out arrhythmias.    High risk for CAD with prior history of stroke, diabetes, hypertension, hyperlipidemia and tobacco use will do a two-day protocol Lexiscan as his BMI is 38.5.    Diabetes on oral agents followed by his primary care doctor.    Hypertension on multiple medicines it is high today possibly due to anxiety his blood pressure has been good before on the medications.    Tobacco use talked about quitting smoking.    Follow-up after the Lexiscan and event monitor in 8 weeks            This document has been electronically signed by Saeid Ortega MD on September 12, 2018 3:34 PM       EMR Dragon/Transcription disclaimer:   Some of this note may be an electronic transcription/translation of spoken language to printed text. The electronic translation of spoken language may permit erroneous, or at times, nonsensical words or phrases to be inadvertently transcribed; Although I have reviewed the note for such errors, some may still exist.

## 2018-09-20 ENCOUNTER — CONSULT (OUTPATIENT)
Dept: SLEEP MEDICINE | Facility: HOSPITAL | Age: 63
End: 2018-09-20

## 2018-09-20 VITALS
HEIGHT: 74 IN | DIASTOLIC BLOOD PRESSURE: 87 MMHG | BODY MASS INDEX: 38.37 KG/M2 | OXYGEN SATURATION: 91 % | HEART RATE: 95 BPM | WEIGHT: 299 LBS | SYSTOLIC BLOOD PRESSURE: 128 MMHG

## 2018-09-20 DIAGNOSIS — G47.33 OSA (OBSTRUCTIVE SLEEP APNEA): ICD-10-CM

## 2018-09-20 DIAGNOSIS — G47.33 OBSTRUCTIVE SLEEP APNEA, ADULT: Primary | ICD-10-CM

## 2018-09-20 PROCEDURE — 99214 OFFICE O/P EST MOD 30 MIN: CPT | Performed by: INTERNAL MEDICINE

## 2018-09-20 PROCEDURE — 99406 BEHAV CHNG SMOKING 3-10 MIN: CPT | Performed by: INTERNAL MEDICINE

## 2018-09-20 NOTE — PROGRESS NOTES
New Patient Sleep Medicine Consultation    Encounter Date: 9/20/2018         Patient's PCP: Harpreet Bass MD  Referring provider: Harpreet Bass MD  Reason for consultation: snoring, loud disruptive snoring, excessive daytime sleepiness and unrefreshing sleep    Arias Willson is a 62 y.o. male who admits to snoring, unrestful sleep, gasping during sleep, excessive daytime sleepiness, frequent unexplained arousala, sleeping less than 6 hours per night, Disturbed or restless sleep, choking duing sleep, Up to the bathroom at night, abnormal or vilent dreams and difficulty staying asleep. He denies cataplexy, sleep paralysis, or hypnagogic hallucinations. His bedtime is ~ 2200. He  falls asleep after 30 minutes, and is up 5-10 times per night. He wakes up ~ 0600. He endorses 1.5-5 hours of sleep. He drinks 2 cups of coffee, 0 teas, and 0 sodas per day. He drinks 0 alcoholic beverages per week. He is a current smoker. He does not take sedatives or hypnotics. Driving is N/A.  He naps when unstimulated.    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 Grade I diastolic dysfunction of the LV myocardium. No evidence of LV apical thrombus. No  "evidence of pericardial effusion.)   • Gus's syndrome     RIGHT EYE   • Hyperkeratosis    • Hyperlipidemia    • Mucopurulent conjunctivitis     ACUTE   • Myopia    • Neuropathic pain    • Obesity    • Onychomycosis    • Tobacco dependence syndrome      Social History     Social History   • Marital status: Single     Spouse name: N/A   • Number of children: N/A   • Years of education: N/A     Occupational History   • Not on file.     Social History Main Topics   • Smoking status: Current Every Day Smoker     Packs/day: 0.50     Years: 33.00     Types: Cigarettes   • Smokeless tobacco: Never Used   • Alcohol use Yes   • Drug use: No   • Sexual activity: Not on file     Other Topics Concern   • Not on file     Social History Narrative   • No narrative on file     Family History   Problem Relation Age of Onset   • Glaucoma Other    • Heart disease Other    • Stroke Other    • Macular degeneration Other    • Glaucoma Father    • Macular degeneration Father    • Cataracts Father    • Macular degeneration Sister      3 brothers and 3 sisters  Other family history + for: heart disease  Smoking history: smoked 0.5 ppds from age 29 until present  FH of sleep disorders: heart disease    Review of Systems:  Constitutional: negative  Eyes: negative  Ears, nose, mouth, throat, and face: negative  Respiratory: negative  Cardiovascular: negative  Gastrointestinal: negative  Genitourinary:negative  Integument/breast: negative  Hematologic/lymphatic: negative  Musculoskeletal:negative  Neurological: negative  Behavioral/Psych: negative  Endocrine: negative  Allergic/Immunologic: negative Patient advised to discuss any positive ROS with PCP.      Vitals:    09/20/18 1514   BP: 128/87   Pulse: 95   SpO2: 91%     Body mass index is 38.39 kg/m². Patient's Body mass index is 38.39 kg/m². BMI is above normal parameters. Recommendations include: referral to primary care.  Neck circumference: 19\"            General: Alert. Cooperative. " Well developed. No acute distress, talks non-stop             Head:  Normocephalic. Symmetrical. Atraumatic.              Eyes: Sclera clear. No icterus. PERRLA. Normal EOM.             Ears: No deformities. Normal hearing.             Nose: No septal deviation. No drainage, right nares abnormal shape          Throat: No oral lesions. No thrush. Moist mucous membranes.    Tongue is normal    Dentition is poor with several missing teeth       Pharynx: Posterior pharyngeal pillars are narrow    Mallampati score of III (soft and hard palate and base of uvula visible)    Pharynx is normal with unrermarkable tonsils   Chest Wall:  Normal shape. Symmetric expansion with respiration. No tenderness.             Neck:  Trachea midline.           Lungs:  Clear to auscultation bilaterally. No wheezes. No rhonchi. No rales. Respirations regular, even and unlabored.            Heart:  Regular rhythm and normal rate. Normal S1 and S2. No murmur.     Abdomen:  Soft, non-tender and non-distended. Normal bowel sounds. No masses.  Extremities:  No edema.           Pulses: Pulses palpable and equal bilaterally.               Skin: Dry. Intact. No bleeding. No rash.           Neuro: Poor gait, left numbness, uses wheel chair  Psychiatric: Normal mood and affect.      Current Outpatient Prescriptions:   •  albuterol (PROVENTIL HFA;VENTOLIN HFA) 108 (90 Base) MCG/ACT inhaler, Inhale 2 puffs Every 4 (Four) Hours As Needed for Wheezing., Disp: 3 inhaler, Rfl: 1  •  ammonium lactate (AMLACTIN) 12 % lotion, Apply  topically As Needed for Dry Skin., Disp: 500 g, Rfl: 5  •  [START ON 5/24/2106] aspirin 325 MG tablet, Take 325 mg by mouth daily., Disp: , Rfl:   •  baclofen (LIORESAL) 10 MG tablet, Take 1 tablet by mouth 3 (Three) Times a Day., Disp: , Rfl:   •  clopidogrel (PLAVIX) 75 MG tablet, Take 1 tablet by mouth Daily., Disp: 90 tablet, Rfl: 3  •  Cyanocobalamin (B-12) 5000 MCG sublingual tablet, Place 1 each under the tongue Daily.,  "Disp: 30 tablet, Rfl: 6  •  DULoxetine (CYMBALTA) 60 MG capsule, Take 1 capsule by mouth Daily., Disp: 30 capsule, Rfl: 5  •  Empagliflozin 25 MG tablet, Take 25 mg by mouth Daily., Disp: 30 tablet, Rfl: 5  •  furosemide (LASIX) 40 MG tablet, Take 1 tablet by mouth Daily., Disp: 15 tablet, Rfl: 0  •  gabapentin (NEURONTIN) 600 MG tablet, Take 1 tablet by mouth 3 (Three) Times a Day As Needed (Take 600mg TID prn)., Disp: 90 tablet, Rfl: 4  •  lansoprazole (PREVACID) 30 MG capsule, Take 1 capsule by mouth Daily., Disp: 90 capsule, Rfl: 3  •  losartan (COZAAR) 50 MG tablet, Take 1 tablet by mouth Daily., Disp: 90 tablet, Rfl: 3  •  metFORMIN (GLUCOPHAGE) 500 MG tablet, Take 1 tablet by mouth 2 (Two) Times a Day., Disp: 60 tablet, Rfl: 5  •  metoprolol tartrate (LOPRESSOR) 25 MG tablet, Take 1 tablet by mouth Every 12 (Twelve) Hours., Disp: 180 tablet, Rfl: 3  •  naphazoline (NAPHCON) 0.1 % ophthalmic solution, Apply 1 drop to eye 4 (Four) Times a Day As Needed for Irritation., Disp: 15 mL, Rfl: 5  •  Omega-3 Fatty Acids (FISH OIL) 1000 MG capsule capsule, Take 2,000 mg by mouth Daily With Breakfast., Disp: , Rfl:   •  pravastatin (PRAVACHOL) 40 MG tablet, Take 1 tablet by mouth Every Night., Disp: 90 tablet, Rfl: 3  •  spironolactone (ALDACTONE) 25 MG tablet, Take 1 tablet by mouth Daily., Disp: 90 tablet, Rfl: 3  •  umeclidinium-vilanterol (ANORO ELLIPTA) 62.5-25 MCG/INH aerosol powder  inhaler, Inhale 1 puff Daily., Disp: 1 each, Rfl: 11    Contraindications to home sleep test: Moderate or severe COPD, Unable to follow simple instructions and History of cerebrovascular accident and/or TIA    ASSESSMENT:  1. Obstructive sleep apnea   1. Check split night sleep study  2. COPD  3. DM with neuropathy  1. On gabapentin  4. Prior Gus's syndrome with multiple CVAs  5. Nicotine dependence with complication- given Yarsani\"s \"Thinking of quitting smoking\" flyer and referred patient to call [phone]-NOW. Smoking and " tobacco use cessation counseling visit was 5 minutes    RTC 2 weeks after testing         This document has been electronically signed by Alirio Williamson MD on September 20, 2018         CC: Harpreet Bass MD Fralish, Billy Kevin, MD

## 2018-10-03 ENCOUNTER — HOSPITAL ENCOUNTER (OUTPATIENT)
Dept: SLEEP MEDICINE | Facility: HOSPITAL | Age: 63
Discharge: HOME | End: 2018-10-03
Admitting: INTERNAL MEDICINE

## 2018-10-03 VITALS — HEIGHT: 74 IN | WEIGHT: 299.83 LBS | BODY MASS INDEX: 38.48 KG/M2

## 2018-10-03 DIAGNOSIS — G47.33 OSA (OBSTRUCTIVE SLEEP APNEA): ICD-10-CM

## 2018-10-03 PROCEDURE — 95811 POLYSOM 6/>YRS CPAP 4/> PARM: CPT

## 2018-10-03 PROCEDURE — 95811 POLYSOM 6/>YRS CPAP 4/> PARM: CPT | Performed by: INTERNAL MEDICINE

## 2018-10-04 ENCOUNTER — OFFICE VISIT (OUTPATIENT)
Dept: ORTHOPEDIC SURGERY | Facility: CLINIC | Age: 63
End: 2018-10-04

## 2018-10-04 VITALS — HEIGHT: 74 IN | BODY MASS INDEX: 38.5 KG/M2

## 2018-10-04 DIAGNOSIS — M25.462 EFFUSION, LEFT KNEE: ICD-10-CM

## 2018-10-04 DIAGNOSIS — M25.562 LEFT KNEE PAIN, UNSPECIFIED CHRONICITY: ICD-10-CM

## 2018-10-04 DIAGNOSIS — M17.12 PRIMARY OSTEOARTHRITIS OF LEFT KNEE: Primary | ICD-10-CM

## 2018-10-04 PROCEDURE — 99213 OFFICE O/P EST LOW 20 MIN: CPT | Performed by: NURSE PRACTITIONER

## 2018-10-04 PROCEDURE — 20610 DRAIN/INJ JOINT/BURSA W/O US: CPT | Performed by: NURSE PRACTITIONER

## 2018-10-04 RX ADMIN — LIDOCAINE HYDROCHLORIDE 2 ML: 20 INJECTION, SOLUTION INFILTRATION; PERINEURAL at 16:11

## 2018-10-04 RX ADMIN — TRIAMCINOLONE ACETONIDE 40 MG: 40 INJECTION, SUSPENSION INTRA-ARTICULAR; INTRAMUSCULAR at 16:11

## 2018-10-04 NOTE — PROGRESS NOTES
"Arias Willson is a 62 y.o. male returns for     Chief Complaint   Patient presents with   • Left Knee - Follow-up       HISTORY OF PRESENT ILLNESS:     62-year-old  male who is in essence wheelchair bound from disabling stroke approximately 2-3 years ago is in the office today for follow-up exam of his left knee.  He reports that the knee pain is worse and continues to experience repeated effusions instability and limited range of motion.  He requires use of a wheelchair most of the time and can only ambulate approximately 10-20 feet without the assistance of a walker and/or someone to hold onto.  She states that the pain is sharp stabbing and has periods of instability with the knee pain.       CONCURRENT MEDICAL HISTORY:    The following portions of the patient's history were reviewed and updated as appropriate: allergies, current medications, past family history, past medical history, past social history, past surgical history and problem list.     ROS  No fevers or chills.  No chest pain or shortness of air.  No GI or  disturbances.    PHYSICAL EXAMINATION:       Ht 188 cm (74\")   BMI 38.50 kg/m²     Physical Exam   Constitutional: He is oriented to person, place, and time. Vital signs are normal. He appears well-developed and well-nourished. He is cooperative.   HENT:   Head: Normocephalic and atraumatic.   Neck: Trachea normal and phonation normal.   Pulmonary/Chest: Effort normal. No respiratory distress.   Abdominal: Soft. Normal appearance. He exhibits no distension.   Musculoskeletal:        Left knee: He exhibits effusion.   Neurological: He is alert and oriented to person, place, and time. GCS eye subscore is 4. GCS verbal subscore is 5. GCS motor subscore is 6.   Skin: Skin is warm, dry and intact.   Psychiatric: He has a normal mood and affect. His speech is normal and behavior is normal. Judgment and thought content normal. Cognition and memory are normal.   Vitals reviewed.      GAIT: "     []  Normal  []  Antalgic    Assistive device: []  None  []  Walker     []  Crutches  []  Cane     [x]  Wheelchair  []  Stretcher    Right Knee Exam   Right knee exam is normal.    Tenderness   The patient is experiencing no tenderness.         Muscle Strength     The patient has normal right knee strength.      Left Knee Exam     Tenderness   The patient is experiencing tenderness in the medial joint line and lateral joint line.    Range of Motion   Left knee extension: 3.   Left knee flexion: 115.     Other   Erythema: absent  Scars: present  Sensation: normal  Pulse: present  Swelling: mild  Effusion: effusion present              No results found.          ASSESSMENT:    Diagnoses and all orders for this visit:    Primary osteoarthritis of left knee  -     Large Joint Arthrocentesis    Left knee pain, unspecified chronicity  -     Large Joint Arthrocentesis    Effusion, left knee  -     Large Joint Arthrocentesis    Large Joint Arthrocentesis  Date/Time: 10/4/2018 4:11 PM  Consent given by: patient  Site marked: site marked  Timeout: Immediately prior to procedure a time out was called to verify the correct patient, procedure, equipment, support staff and site/side marked as required   Supporting Documentation  Indications: pain   Procedure Details  Location: knee - L knee  Preparation: Patient was prepped and draped in the usual sterile fashion  Needle size: 18 G  Approach: lateral  Medications administered: 2 mL lidocaine 2%; 40 mg triamcinolone acetonide 40 MG/ML  Aspirate amount: 60 mL  Aspirate: yellow and clear  Patient tolerance: patient tolerated the procedure well with no immediate complications              PLAN  Repeated the aspiration and reinjected the knee during the aspiration.  I recommended continued activity modification based on pain and assistance of a walker and/or someone to hold onto while he is up ambulatory.  Patient currently is totally disabled and unable to walk more than 10-20  feet without the assistance of wheelchair walker or someone to hold on to prevent falls.  He suffers from endstage osteoarthritis of both knees that its worse on the left.  He has a significant amount of weakness from a hemiplegic stroke which renders him unable to perform any manual and/or light duty job at this time.  No Follow-up on file.    Donn Dennis, APRN

## 2018-10-05 RX ORDER — LIDOCAINE HYDROCHLORIDE 20 MG/ML
2 INJECTION, SOLUTION INFILTRATION; PERINEURAL
Status: COMPLETED | OUTPATIENT
Start: 2018-10-04 | End: 2018-10-04

## 2018-10-05 RX ORDER — TRIAMCINOLONE ACETONIDE 40 MG/ML
40 INJECTION, SUSPENSION INTRA-ARTICULAR; INTRAMUSCULAR
Status: COMPLETED | OUTPATIENT
Start: 2018-10-04 | End: 2018-10-04

## 2018-10-10 ENCOUNTER — HOSPITAL ENCOUNTER (OUTPATIENT)
Dept: NUCLEAR MEDICINE | Facility: HOSPITAL | Age: 63
Discharge: HOME | End: 2018-10-10

## 2018-10-10 PROCEDURE — 0 TECHNETIUM SESTAMIBI: Performed by: INTERNAL MEDICINE

## 2018-10-10 PROCEDURE — A9500 TC99M SESTAMIBI: HCPCS | Performed by: INTERNAL MEDICINE

## 2018-10-10 RX ADMIN — TECHNETIUM TC 99M SESTAMIBI 1 DOSE: 1 INJECTION INTRAVENOUS at 07:26

## 2018-10-11 ENCOUNTER — HOSPITAL ENCOUNTER (OUTPATIENT)
Dept: NUCLEAR MEDICINE | Facility: HOSPITAL | Age: 63
Discharge: HOME | End: 2018-10-11

## 2018-10-11 ENCOUNTER — HOSPITAL ENCOUNTER (OUTPATIENT)
Dept: CARDIOLOGY | Facility: HOSPITAL | Age: 63
Discharge: HOME | End: 2018-10-11

## 2018-10-11 LAB
BH CV STRESS BP STAGE 1: NORMAL
BH CV STRESS COMMENTS STAGE 1: NORMAL
BH CV STRESS DOSE REGADENOSON STAGE 1: 0.4
BH CV STRESS DURATION MIN STAGE 1: 0
BH CV STRESS DURATION SEC STAGE 1: 10
BH CV STRESS HR STAGE 1: 68
BH CV STRESS PROTOCOL 1: NORMAL
BH CV STRESS RECOVERY BP: NORMAL MMHG
BH CV STRESS RECOVERY HR: 79 BPM
BH CV STRESS STAGE 1: 1
LV EF NUC BP: 70 %
MAXIMAL PREDICTED HEART RATE: 160 BPM
PERCENT MAX PREDICTED HR: 57.5 %
STRESS BASELINE BP: NORMAL MMHG
STRESS BASELINE HR: 68 BPM
STRESS PERCENT HR: 68 %
STRESS POST ESTIMATED WORKLOAD: 1 METS
STRESS POST PEAK BP: NORMAL MMHG
STRESS POST PEAK HR: 92 BPM
STRESS TARGET HR: 136 BPM

## 2018-10-11 PROCEDURE — 93018 CV STRESS TEST I&R ONLY: CPT | Performed by: INTERNAL MEDICINE

## 2018-10-11 PROCEDURE — 78452 HT MUSCLE IMAGE SPECT MULT: CPT

## 2018-10-11 PROCEDURE — 93017 CV STRESS TEST TRACING ONLY: CPT

## 2018-10-11 PROCEDURE — 25010000002 REGADENOSON 0.4 MG/5ML SOLUTION: Performed by: INTERNAL MEDICINE

## 2018-10-11 PROCEDURE — 93016 CV STRESS TEST SUPVJ ONLY: CPT | Performed by: INTERNAL MEDICINE

## 2018-10-11 PROCEDURE — 78452 HT MUSCLE IMAGE SPECT MULT: CPT | Performed by: INTERNAL MEDICINE

## 2018-10-11 PROCEDURE — 0 TECHNETIUM SESTAMIBI: Performed by: INTERNAL MEDICINE

## 2018-10-11 PROCEDURE — A9500 TC99M SESTAMIBI: HCPCS | Performed by: INTERNAL MEDICINE

## 2018-10-11 RX ORDER — 0.9 % SODIUM CHLORIDE 0.9 %
10 VIAL (ML) INJECTION AS NEEDED
Status: DISCONTINUED | OUTPATIENT
Start: 2018-10-11 | End: 2018-10-12 | Stop reason: HOSPADM

## 2018-10-11 RX ADMIN — SODIUM CHLORIDE, PRESERVATIVE FREE 10 ML: 5 INJECTION INTRAVENOUS at 08:33

## 2018-10-11 RX ADMIN — REGADENOSON 0.4 MG: 0.08 INJECTION, SOLUTION INTRAVENOUS at 08:33

## 2018-10-11 RX ADMIN — TECHNETIUM TC 99M SESTAMIBI 1 DOSE: 1 INJECTION INTRAVENOUS at 08:33

## 2018-10-12 ENCOUNTER — DOCUMENTATION (OUTPATIENT)
Dept: CARDIOLOGY | Facility: CLINIC | Age: 63
End: 2018-10-12

## 2018-10-18 DIAGNOSIS — G47.33 OSA (OBSTRUCTIVE SLEEP APNEA): Primary | ICD-10-CM

## 2018-10-22 ENCOUNTER — OFFICE VISIT (OUTPATIENT)
Dept: SLEEP MEDICINE | Facility: HOSPITAL | Age: 63
End: 2018-10-22

## 2018-10-22 VITALS
WEIGHT: 297.5 LBS | BODY MASS INDEX: 38.18 KG/M2 | HEIGHT: 74 IN | OXYGEN SATURATION: 88 % | HEART RATE: 99 BPM | DIASTOLIC BLOOD PRESSURE: 59 MMHG | SYSTOLIC BLOOD PRESSURE: 119 MMHG

## 2018-10-22 DIAGNOSIS — J96.11 CHRONIC RESPIRATORY FAILURE WITH HYPOXIA (HCC): ICD-10-CM

## 2018-10-22 DIAGNOSIS — G47.33 OBSTRUCTIVE SLEEP APNEA, ADULT: Primary | ICD-10-CM

## 2018-10-22 PROCEDURE — 99214 OFFICE O/P EST MOD 30 MIN: CPT | Performed by: INTERNAL MEDICINE

## 2018-10-23 DIAGNOSIS — G47.33 OSA (OBSTRUCTIVE SLEEP APNEA): Primary | ICD-10-CM

## 2018-10-23 DIAGNOSIS — J44.9 OVERLAP SYNDROME OF OBSTRUCTIVE SLEEP APNEA AND CHRONIC OBSTRUCTIVE PULMONARY DISEASE (HCC): ICD-10-CM

## 2018-10-23 DIAGNOSIS — G47.33 OVERLAP SYNDROME OF OBSTRUCTIVE SLEEP APNEA AND CHRONIC OBSTRUCTIVE PULMONARY DISEASE (HCC): ICD-10-CM

## 2018-10-23 NOTE — PROGRESS NOTES
CHIEF COMPLAINT: follow up split night polysomnography    HPI:    The patient is a 62 y.o. male.  He returns to sleep clinic to discuss the results of the recent split night polysomnography performed on 10/03/2018.  The AHI/CHANELL was 85.7, REM AHI 0. The patient had a periodic limb movement index of 6.71.  The patient did not have any significant cardiac arrhythmias.    INTERVAL MEDICAL HISTORY: has not started on therapy yet. On the night of study, he felt much better once CPAP was placed.      Bed time routinee unchanged from 09/20/2018      MEDICATIONS:   Current Outpatient Prescriptions:   •  albuterol (PROVENTIL HFA;VENTOLIN HFA) 108 (90 Base) MCG/ACT inhaler, Inhale 2 puffs Every 4 (Four) Hours As Needed for Wheezing., Disp: 3 inhaler, Rfl: 1  •  ammonium lactate (AMLACTIN) 12 % lotion, Apply  topically As Needed for Dry Skin., Disp: 500 g, Rfl: 5  •  [START ON 5/24/2106] aspirin 325 MG tablet, Take 325 mg by mouth daily., Disp: , Rfl:   •  baclofen (LIORESAL) 10 MG tablet, Take 1 tablet by mouth 3 (Three) Times a Day., Disp: , Rfl:   •  clopidogrel (PLAVIX) 75 MG tablet, Take 1 tablet by mouth Daily., Disp: 90 tablet, Rfl: 3  •  Cyanocobalamin (B-12) 5000 MCG sublingual tablet, Place 1 each under the tongue Daily., Disp: 30 tablet, Rfl: 6  •  DULoxetine (CYMBALTA) 60 MG capsule, Take 1 capsule by mouth Daily., Disp: 30 capsule, Rfl: 5  •  Empagliflozin 25 MG tablet, Take 25 mg by mouth Daily., Disp: 30 tablet, Rfl: 5  •  gabapentin (NEURONTIN) 600 MG tablet, Take 1 tablet by mouth 3 (Three) Times a Day As Needed (Take 600mg TID prn)., Disp: 90 tablet, Rfl: 4  •  lansoprazole (PREVACID) 30 MG capsule, Take 1 capsule by mouth Daily., Disp: 90 capsule, Rfl: 3  •  losartan (COZAAR) 50 MG tablet, Take 1 tablet by mouth Daily., Disp: 90 tablet, Rfl: 3  •  metFORMIN (GLUCOPHAGE) 500 MG tablet, Take 1 tablet by mouth 2 (Two) Times a Day., Disp: 60 tablet, Rfl: 5  •  metoprolol tartrate (LOPRESSOR) 25 MG tablet, Take 1  tablet by mouth Every 12 (Twelve) Hours., Disp: 180 tablet, Rfl: 3  •  naphazoline (NAPHCON) 0.1 % ophthalmic solution, Apply 1 drop to eye 4 (Four) Times a Day As Needed for Irritation., Disp: 15 mL, Rfl: 5  •  Omega-3 Fatty Acids (FISH OIL) 1000 MG capsule capsule, Take 2,000 mg by mouth Daily With Breakfast., Disp: , Rfl:   •  pravastatin (PRAVACHOL) 40 MG tablet, Take 1 tablet by mouth Every Night., Disp: 90 tablet, Rfl: 3  •  spironolactone (ALDACTONE) 25 MG tablet, Take 1 tablet by mouth Daily., Disp: 90 tablet, Rfl: 3  •  umeclidinium-vilanterol (ANORO ELLIPTA) 62.5-25 MCG/INH aerosol powder  inhaler, Inhale 1 puff Daily., Disp: 1 each, Rfl: 11  •  furosemide (LASIX) 40 MG tablet, Take 1 tablet by mouth Daily., Disp: 15 tablet, Rfl: 0    PHYSICAL EXAM  Vital Signs (last 24 hours)       10/21 0700  -  10/22 0659 10/22 0700  -  10/22 2134   Most Recent    Heart Rate     99     99    BP     119/59     119/59    SpO2 (%)     (!)88     (!) 88          Gen:  No acute distress, alert, oriented  Lungs:  CTA with normal effort   CV:  RRR, no M/R/G  GI:  soft, non-tender  Ext:  no c/c/e    Sleep Testin. split night polysomnography on 10/03/2018, AHI of 86.7     ASSESSMENT / PLAN:     1. Obstructive sleep apnea  1. Script for bipap 24/14 cm H2O with 2L O2  2. Return to clinic in -90 days with compliance check  2. Chronic hypoxic resp failure      All of the patient's questions were answered. He states understanding and agreement with my assessment and plan as above.  Total time 25 min, more than half spent in face to face counseling and coordination of care.    Patient to follow up in - days with compliance report    He agrees to return sooner on a prn basis.             This document has been electronically signed by Alirio Williamson MD on 2018           CC: Harpreet Bass MD          No ref. provider found

## 2018-10-26 ENCOUNTER — TELEPHONE (OUTPATIENT)
Dept: FAMILY MEDICINE CLINIC | Facility: CLINIC | Age: 63
End: 2018-10-26

## 2018-10-26 RX ORDER — CEPHALEXIN 500 MG/1
500 CAPSULE ORAL 3 TIMES DAILY
Qty: 21 CAPSULE | Refills: 0 | Status: SHIPPED | OUTPATIENT
Start: 2018-10-26 | End: 2018-11-14

## 2018-11-08 ENCOUNTER — TELEPHONE (OUTPATIENT)
Dept: FAMILY MEDICINE CLINIC | Facility: CLINIC | Age: 63
End: 2018-11-08

## 2018-11-08 NOTE — TELEPHONE ENCOUNTER
Pt has been using a walker since he has his stroke approximately 6 years ago.  One of the front wheels is not working properly. Pt would like to know if could get a script for a new walker sent to [facility] in [place].  Pt would like to get a seated walker if possible.  Does have appointment 11/14/18 and can do face to face at that time.

## 2018-11-08 NOTE — TELEPHONE ENCOUNTER
Most likely requires note, will do at visit. OK to call [facility] they will need Height and weight for seated rooling walker with brake.

## 2018-11-12 ENCOUNTER — DOCUMENTATION (OUTPATIENT)
Dept: CARDIOLOGY | Facility: CLINIC | Age: 63
End: 2018-11-12

## 2018-11-14 ENCOUNTER — OFFICE VISIT (OUTPATIENT)
Dept: FAMILY MEDICINE CLINIC | Facility: CLINIC | Age: 63
End: 2018-11-14

## 2018-11-14 VITALS
SYSTOLIC BLOOD PRESSURE: 132 MMHG | HEIGHT: 74 IN | HEART RATE: 80 BPM | WEIGHT: 303.1 LBS | DIASTOLIC BLOOD PRESSURE: 70 MMHG | BODY MASS INDEX: 38.9 KG/M2 | OXYGEN SATURATION: 99 % | TEMPERATURE: 97.9 F

## 2018-11-14 DIAGNOSIS — Z23 NEED FOR IMMUNIZATION AGAINST INFLUENZA: ICD-10-CM

## 2018-11-14 DIAGNOSIS — I69.319 COGNITIVE DEFICIT, POST-STROKE: ICD-10-CM

## 2018-11-14 DIAGNOSIS — I10 ESSENTIAL HYPERTENSION: Chronic | ICD-10-CM

## 2018-11-14 DIAGNOSIS — I69.398 GAIT DISTURBANCE, POST-STROKE: ICD-10-CM

## 2018-11-14 DIAGNOSIS — M79.2 NEUROPATHIC PAIN: ICD-10-CM

## 2018-11-14 DIAGNOSIS — G47.09 OTHER INSOMNIA: ICD-10-CM

## 2018-11-14 DIAGNOSIS — E66.01 MORBID OBESITY (HCC): Chronic | ICD-10-CM
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
## 2018-11-14 DIAGNOSIS — R53.81 PHYSICAL DECONDITIONING: ICD-10-CM

## 2018-11-14 DIAGNOSIS — R44.9 SENSORY DEFICIT PRESENT: ICD-10-CM

## 2018-11-14 DIAGNOSIS — R26.9 GAIT DISTURBANCE, POST-STROKE: ICD-10-CM

## 2018-11-14 DIAGNOSIS — Z86.73 H/O: CVA (CEREBROVASCULAR ACCIDENT): ICD-10-CM

## 2018-11-14 DIAGNOSIS — K21.9 GASTROESOPHAGEAL REFLUX DISEASE WITHOUT ESOPHAGITIS: ICD-10-CM

## 2018-11-14 DIAGNOSIS — R60.9 PERIPHERAL EDEMA: ICD-10-CM

## 2018-11-14 DIAGNOSIS — R55 SYNCOPE, UNSPECIFIED SYNCOPE TYPE: ICD-10-CM

## 2018-11-14 DIAGNOSIS — E11.69 TYPE 2 DIABETES MELLITUS WITH OTHER SPECIFIED COMPLICATION, WITHOUT LONG-TERM CURRENT USE OF INSULIN (HCC): Primary | Chronic | ICD-10-CM

## 2018-11-14 PROCEDURE — 90686 IIV4 VACC NO PRSV 0.5 ML IM: CPT | Performed by: FAMILY MEDICINE

## 2018-11-14 PROCEDURE — 90471 IMMUNIZATION ADMIN: CPT | Performed by: FAMILY MEDICINE

## 2018-11-14 PROCEDURE — 99214 OFFICE O/P EST MOD 30 MIN: CPT | Performed by: FAMILY MEDICINE

## 2018-11-18 RX ORDER — AMMONIUM LACTATE 12 G/100G
LOTION TOPICAL AS NEEDED
Qty: 500 G | Refills: 5 | Status: SHIPPED | OUTPATIENT
Start: 2018-11-18 | End: 2019-10-14 | Stop reason: SDUPTHER

## 2018-12-05 PROBLEM — F17.210 CIGARETTE NICOTINE DEPENDENCE, UNCOMPLICATED: Status: ACTIVE | Noted: 2018-12-05

## 2018-12-05 PROBLEM — E66.01 CLASS 2 SEVERE OBESITY DUE TO EXCESS CALORIES WITH SERIOUS COMORBIDITY AND BODY MASS INDEX (BMI) OF 38.0 TO 38.9 IN ADULT (HCC): Status: ACTIVE | Noted: 2018-03-05

## 2018-12-05 PROBLEM — E66.812 CLASS 2 SEVERE OBESITY DUE TO EXCESS CALORIES WITH SERIOUS COMORBIDITY AND BODY MASS INDEX (BMI) OF 38.0 TO 38.9 IN ADULT: Status: ACTIVE | Noted: 2018-03-05

## 2018-12-05 NOTE — PROGRESS NOTES
Pulmonary Office Follow-up    Subjective     Arias Willson is seen today at the office for   Chief Complaint   Patient presents with   • COPD     3 month follow up         HPI  Arias Willson is a 63 y.o. male with a PMH significant for COPD, tobacco use, morbid obesity, NEREYDA on BiPAP, ASCAD, HTN, CVA, seizures, T2DM, and GERD who presents for follow-up of COPD. He was last seen on 9/6/18, at which time I recommended continuing Anoro daily and counseled him on the importance of complete tobacco cessation.  He states that he feels the Anoro is helping him more as he is not requiring his albuterol as frequently.  He does admit some days he'll go without his albuterol, but some days he still has to use it 3 times a day.  He was started on BiPAP for his sleep apnea and feels like it is helping him significantly as he feels more well rested.  He has occasional cough, but is at baseline.  Patient denies wheeze, chest pain, changes in leg swelling, or weight changes.  He has not had any recent exacerbations or steroid use.  Unfortunately, he does continue to smoke around half a pack a day and he remains pre-contemplative regarding cessation.  Patient is up-to-date with the flu vaccine.      Patient Active Problem List   Diagnosis   • Ptosis of eyelid   • Astigmatism   • Myopia   • Gus's syndrome   • Neuropathic pain   • GERD (gastroesophageal reflux disease)   • Aneurysm of carotid artery (CMS/HCC)   • H/O: CVA (cerebrovascular accident)   • Sensory deficit present   • Gait disturbance, post-stroke   • Cognitive deficit, post-stroke   • Hypertension   • Diabetes mellitus (CMS/HCC)   • Morbid obesity (CMS/HCC)   • Coronary artery disease   • NEREYDA (obstructive sleep apnea)   • Macrocytosis without anemia   • Need for immunization against influenza   • Need for vaccination   • Elevated brain natriuretic peptide (BNP) level   • Tremor of both hands   • Seizures (CMS/HCC)   • Fall   • Chronic pain of left knee   •  Class 2 severe obesity due to excess calories with serious comorbidity and body mass index (BMI) of 38.0 to 38.9 in adult (CMS/[facility])   • High risk medication use   • Hypoxia   • Generalized edema   • Other insomnia   • Peripheral edema   • Nicotine abuse   • Nocturnal hypoxemia due to obesity   • COPD, group B, by GOLD 2017 classification (CMS/[facility])   • Syncope   • Physical deconditioning   • Pure hypercholesterolemia   • Cigarette nicotine dependence, uncomplicated       Review of Systems  Review of Systems   Constitutional: Negative for fatigue and unexpected weight change.   HENT: Negative for congestion.    Respiratory: Positive for cough and shortness of breath. Negative for wheezing.    Cardiovascular: Negative for leg swelling.   Musculoskeletal: Positive for arthralgias.     As described in the HPI. Otherwise, remainder of ROS (14 systems) were negative.    Medications, Allergies, Social, and Family Histories reviewed as per EMR.    Objective     Vitals:    12/06/18 1451   BP: 138/74   Pulse: 82   SpO2: 96%     Physical Exam   Constitutional: He is oriented to person, place, and time. Vital signs are normal. He appears well-developed and well-nourished.   Obese   HENT:   Head: Normocephalic and atraumatic.   Nose: Nose normal.   Mouth/Throat: Uvula is midline, oropharynx is clear and moist and mucous membranes are normal.   Mallampati 3   Eyes: Conjunctivae, EOM and lids are normal. Pupils are equal, round, and reactive to light.   Neck: Trachea normal and normal range of motion. No tracheal tenderness present. No thyroid mass present.   Cardiovascular: Normal rate, regular rhythm and normal heart sounds. PMI is not displaced. Exam reveals no gallop.   No murmur heard.  Pulmonary/Chest: Effort normal and breath sounds normal. No respiratory distress. He has no decreased breath sounds. He has no wheezes. He has no rhonchi. He exhibits no tenderness.   Abdominal: Soft. Normal appearance and bowel sounds are  normal. There is no hepatomegaly. There is no tenderness.   Obese   Musculoskeletal:   In wheelchair, mild BLE edema     Vascular Status -  His right foot exhibits abnormal foot edema. His left foot exhibits abnormal foot edema.  Lymphadenopathy:        Head (right side): No submandibular adenopathy present.        Head (left side): No submandibular adenopathy present.     He has no cervical adenopathy.        Right: No supraclavicular adenopathy present.        Left: No supraclavicular adenopathy present.   Neurological: He is alert and oriented to person, place, and time. Gait normal.   Skin: Skin is warm and dry. No rash noted. No cyanosis. Nails show no clubbing.   Lower leg hyperemia   Psychiatric: He has a normal mood and affect. His speech is normal and behavior is normal. Judgment normal.   Nursing note and vitals reviewed.          Assessment/Plan     Arias was seen today for copd.    Diagnoses and all orders for this visit:    COPD, group B, by GOLD 2017 classification (CMS/[facility])    NEREYDA (obstructive sleep apnea)    Nocturnal hypoxemia due to obesity    Class 2 severe obesity due to excess calories with serious comorbidity and body mass index (BMI) of 38.0 to 38.9 in adult (CMS/[facility])    Physical deconditioning    Cigarette nicotine dependence, uncomplicated         Discussion/ Recommendations:   While he is improved on the Anoro, I'm still concerned about his frequent albuterol use.  At this point, I recommended that we escalate to triple therapy with the addition of an ICS, however, he wishes to hold.  Patient thinks he may be overusing his albuterol and would like to try backing off of it first.  He also recognizes that his weight impacts his breathing significantly, but his exercise is limited due to weakness from his stroke.  He does continue to smoke and is pre-contemplative regarding cessation.  He was diagnosed with sleep apnea and is perceiving benefit from the addition of BiPAP.    -Continue Anoro  daily.  -Use albuterol as needed for dyspnea or wheeze.  Recommended he try stopping and resting her to using albuterol first.  -If he continues to have issues, will consider escalation to triple therapy with the addition of an ICS such as Flovent or Asmanex.  -Annual LD CT June 2019.  -Continue BiPAP with sleep.  -I advised Arias of the risks of continuing to use tobacco, and I provided him with tobacco cessation educational materials in the After Visit Summary.  He remains pre-contemplative.  Offered support.  During this visit, I spent 3 minutes counseling the patient regarding tobacco cessation.  -Encouraged efforts at weight loss through diet and exercise as tolerated.  -To date with the flu vaccine    Patient's Body mass index is 38.74 kg/m². BMI is above normal parameters. Recommendations include: exercise counseling.        Return in about 3 months (around 3/6/2019) for Recheck COPD.          This document has been electronically signed by Krysten Buckner MD on December 6, 2018 3:47 PM      Dictated using Dragon

## 2018-12-06 ENCOUNTER — OFFICE VISIT (OUTPATIENT)
Dept: PULMONOLOGY | Facility: CLINIC | Age: 63
End: 2018-12-06

## 2018-12-06 ENCOUNTER — LAB (OUTPATIENT)
Dept: LAB | Facility: HOSPITAL | Age: 63
End: 2018-12-06

## 2018-12-06 VITALS
OXYGEN SATURATION: 96 % | HEIGHT: 74 IN | WEIGHT: 301.7 LBS | SYSTOLIC BLOOD PRESSURE: 138 MMHG | DIASTOLIC BLOOD PRESSURE: 74 MMHG | HEART RATE: 82 BPM | BODY MASS INDEX: 38.72 KG/M2

## 2018-12-06 DIAGNOSIS — J44.9 COPD, GROUP B, BY GOLD 2017 CLASSIFICATION (HCC): Primary | Chronic | ICD-10-CM

## 2018-12-06 DIAGNOSIS — G47.33 OSA (OBSTRUCTIVE SLEEP APNEA): Chronic | ICD-10-CM

## 2018-12-06 DIAGNOSIS — R53.81 PHYSICAL DECONDITIONING: ICD-10-CM

## 2018-12-06 DIAGNOSIS — E66.01 CLASS 2 SEVERE OBESITY DUE TO EXCESS CALORIES WITH SERIOUS COMORBIDITY AND BODY MASS INDEX (BMI) OF 38.0 TO 38.9 IN ADULT (HCC): ICD-10-CM

## 2018-12-06 DIAGNOSIS — E66.9 NOCTURNAL HYPOXEMIA DUE TO OBESITY: ICD-10-CM

## 2018-12-06 DIAGNOSIS — G47.36 NOCTURNAL HYPOXEMIA DUE TO OBESITY: ICD-10-CM

## 2018-12-06 DIAGNOSIS — E11.69 TYPE 2 DIABETES MELLITUS WITH OTHER SPECIFIED COMPLICATION, WITHOUT LONG-TERM CURRENT USE OF INSULIN (HCC): Chronic | ICD-10-CM

## 2018-12-06 DIAGNOSIS — F17.210 CIGARETTE NICOTINE DEPENDENCE, UNCOMPLICATED: ICD-10-CM

## 2018-12-06 LAB — HBA1C MFR BLD: 9.1 % (ref 4–5.6)

## 2018-12-06 PROCEDURE — 99214 OFFICE O/P EST MOD 30 MIN: CPT | Performed by: INTERNAL MEDICINE

## 2018-12-06 PROCEDURE — 83036 HEMOGLOBIN GLYCOSYLATED A1C: CPT

## 2018-12-06 PROCEDURE — 36415 COLL VENOUS BLD VENIPUNCTURE: CPT

## 2018-12-06 PROCEDURE — 99406 BEHAV CHNG SMOKING 3-10 MIN: CPT | Performed by: INTERNAL MEDICINE

## 2018-12-07 ENCOUNTER — TELEPHONE (OUTPATIENT)
Dept: FAMILY MEDICINE CLINIC | Facility: CLINIC | Age: 63
End: 2018-12-07

## 2018-12-07 NOTE — TELEPHONE ENCOUNTER
----- Message from Harpreet Bass MD sent at 12/6/2018  9:41 PM CST -----  HgbA1c 9.6 to 9.1  Will recheck in 3 months.

## 2019-01-04 ENCOUNTER — OFFICE VISIT (OUTPATIENT)
Dept: ORTHOPEDIC SURGERY | Facility: CLINIC | Age: 64
End: 2019-01-04

## 2019-01-04 VITALS — WEIGHT: 301 LBS | HEIGHT: 74 IN | BODY MASS INDEX: 38.63 KG/M2

## 2019-01-04 DIAGNOSIS — M25.562 LEFT KNEE PAIN, UNSPECIFIED CHRONICITY: Primary | ICD-10-CM

## 2019-01-04 DIAGNOSIS — M17.12 PRIMARY OSTEOARTHRITIS OF LEFT KNEE: ICD-10-CM

## 2019-01-04 DIAGNOSIS — M25.462 EFFUSION, LEFT KNEE: ICD-10-CM

## 2019-01-04 PROCEDURE — 99214 OFFICE O/P EST MOD 30 MIN: CPT | Performed by: NURSE PRACTITIONER

## 2019-01-04 PROCEDURE — 20610 DRAIN/INJ JOINT/BURSA W/O US: CPT | Performed by: NURSE PRACTITIONER

## 2019-01-04 RX ADMIN — LIDOCAINE HYDROCHLORIDE 2 ML: 20 INJECTION, SOLUTION INFILTRATION; PERINEURAL at 15:49

## 2019-01-04 RX ADMIN — TRIAMCINOLONE ACETONIDE 40 MG: 40 INJECTION, SUSPENSION INTRA-ARTICULAR; INTRAMUSCULAR at 15:49

## 2019-01-04 NOTE — PROGRESS NOTES
"Arias Willson is a 63 y.o. male returns for     Chief Complaint   Patient presents with   • Left Knee - Follow-up, Pain       HISTORY OF PRESENT ILLNESS:     63-year-old  male in the office today for follow-up evaluation of left knee pain.  He reports that the effusion has returned and is developing worsening pain.  He's also reporting some pain in the right knee as well which hasn't particularly bothered him in the past.  He denies any new injuries     CONCURRENT MEDICAL HISTORY:    The following portions of the patient's history were reviewed and updated as appropriate: allergies, current medications, past family history, past medical history, past social history, past surgical history and problem list.     ROS  No fevers or chills.  No chest pain or shortness of air.  No GI or  disturbances.    PHYSICAL EXAMINATION:       Ht 188 cm (74\")   Wt (!) 137 kg (301 lb)   BMI 38.65 kg/m²     Physical Exam   Constitutional: He is oriented to person, place, and time. Vital signs are normal. He appears well-developed and well-nourished. He is cooperative.   HENT:   Head: Normocephalic and atraumatic.   Neck: Trachea normal and phonation normal.   Pulmonary/Chest: Effort normal. No respiratory distress.   Abdominal: Soft. Normal appearance. He exhibits no distension.   Musculoskeletal:        Left knee: He exhibits effusion.   Neurological: He is alert and oriented to person, place, and time. GCS eye subscore is 4. GCS verbal subscore is 5. GCS motor subscore is 6.   Skin: Skin is warm, dry and intact.   Psychiatric: He has a normal mood and affect. His speech is normal and behavior is normal. Judgment and thought content normal. Cognition and memory are normal.   Vitals reviewed.      GAIT:     []  Normal  []  Antalgic    Assistive device: []  None  []  Walker     []  Crutches  []  Cane     [x]  Wheelchair  []  Stretcher    Right Knee Exam   Right knee exam is normal.    Muscle Strength   The patient has " normal right knee strength.      Left Knee Exam     Tenderness   The patient is experiencing tenderness in the medial joint line and lateral joint line.    Range of Motion   Extension: abnormal   Flexion: abnormal     Other   Erythema: absent  Scars: absent  Sensation: normal  Pulse: present  Swelling: mild  Effusion: effusion present              No results found.    Large Joint Arthrocentesis: L knee  Date/Time: 1/4/2019 3:49 PM  Consent given by: patient  Site marked: site marked  Timeout: Immediately prior to procedure a time out was called to verify the correct patient, procedure, equipment, support staff and site/side marked as required   Supporting Documentation  Indications: pain   Procedure Details  Location: knee - L knee  Preparation: Patient was prepped and draped in the usual sterile fashion  Needle size: 18 G  Approach: lateral  Medications administered: 2 mL lidocaine 2%; 40 mg triamcinolone acetonide 40 MG/ML  Aspirate amount: 35 mL  Aspirate: yellow and cloudy  Patient tolerance: patient tolerated the procedure well with no immediate complications              ASSESSMENT:    Diagnoses and all orders for this visit:    Left knee pain, unspecified chronicity  -     Large Joint Arthrocentesis: L knee    Primary osteoarthritis of left knee  -     Large Joint Arthrocentesis: L knee    Effusion, left knee  -     Large Joint Arthrocentesis: L knee          PLAN  Repeated aspirations and recommended progression range of motion and activity as tolerated based on pain follow-up as needed.  Patient may need an evaluation of right knee pain future for continued use.  No Follow-up on file.    Donn Dennis, APRN

## 2019-01-07 RX ORDER — TRIAMCINOLONE ACETONIDE 40 MG/ML
40 INJECTION, SUSPENSION INTRA-ARTICULAR; INTRAMUSCULAR
Status: COMPLETED | OUTPATIENT
Start: 2019-01-04 | End: 2019-01-04

## 2019-01-07 RX ORDER — LIDOCAINE HYDROCHLORIDE 20 MG/ML
2 INJECTION, SOLUTION INFILTRATION; PERINEURAL
Status: COMPLETED | OUTPATIENT
Start: 2019-01-04 | End: 2019-01-04

## 2019-01-18 DIAGNOSIS — M25.561 RIGHT KNEE PAIN, UNSPECIFIED CHRONICITY: Primary | ICD-10-CM

## 2019-01-21 ENCOUNTER — OFFICE VISIT (OUTPATIENT)
Dept: SLEEP MEDICINE | Facility: HOSPITAL | Age: 64
End: 2019-01-21

## 2019-01-21 ENCOUNTER — OFFICE VISIT (OUTPATIENT)
Dept: ORTHOPEDIC SURGERY | Facility: CLINIC | Age: 64
End: 2019-01-21

## 2019-01-21 VITALS — HEIGHT: 74 IN | WEIGHT: 296 LBS | BODY MASS INDEX: 37.99 KG/M2

## 2019-01-21 VITALS
BODY MASS INDEX: 37.99 KG/M2 | WEIGHT: 296 LBS | DIASTOLIC BLOOD PRESSURE: 84 MMHG | OXYGEN SATURATION: 94 % | SYSTOLIC BLOOD PRESSURE: 153 MMHG | HEIGHT: 74 IN | HEART RATE: 93 BPM

## 2019-01-21 DIAGNOSIS — M23.91 INTERNAL DERANGEMENT OF RIGHT KNEE: Primary | ICD-10-CM

## 2019-01-21 DIAGNOSIS — M25.561 RIGHT KNEE PAIN, UNSPECIFIED CHRONICITY: ICD-10-CM

## 2019-01-21 DIAGNOSIS — G47.33 OBSTRUCTIVE SLEEP APNEA, ADULT: Primary | ICD-10-CM

## 2019-01-21 PROCEDURE — 99214 OFFICE O/P EST MOD 30 MIN: CPT | Performed by: NURSE PRACTITIONER

## 2019-01-21 PROCEDURE — 20610 DRAIN/INJ JOINT/BURSA W/O US: CPT | Performed by: NURSE PRACTITIONER

## 2019-01-21 PROCEDURE — 99214 OFFICE O/P EST MOD 30 MIN: CPT | Performed by: INTERNAL MEDICINE

## 2019-01-21 RX ADMIN — LIDOCAINE HYDROCHLORIDE 2 ML: 10 INJECTION, SOLUTION EPIDURAL; INFILTRATION; INTRACAUDAL; PERINEURAL at 16:22

## 2019-01-21 RX ADMIN — TRIAMCINOLONE ACETONIDE 40 MG: 40 INJECTION, SUSPENSION INTRA-ARTICULAR; INTRAMUSCULAR at 16:22

## 2019-01-21 NOTE — PROGRESS NOTES
Arias Willson is a 63 y.o. male   Primary provider:  Harpreet Bass MD       Chief Complaint   Patient presents with   • Right Knee - Knee Pain       HISTORY OF PRESENT ILLNESS: right knee pain started about 2 years ago, no injury. xrays done today.     Knee Pain    The incident occurred more than 1 week ago. There was no injury mechanism. The pain is present in the right knee. The quality of the pain is described as stabbing (grinding. ). The pain is severe. The pain has been intermittent since onset. He reports no foreign bodies present. The symptoms are aggravated by weight bearing (walking, standing. ). He has tried rest for the symptoms.        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 Grade I diastolic dysfunction of the LV myocardium. No evidence of LV apical thrombus. No evidence of pericardial effusion.)   • Gus's syndrome     RIGHT EYE   • Hyperkeratosis    • Hyperlipidemia    • Mucopurulent conjunctivitis     ACUTE   • Myopia    • Neuropathic pain    • Obesity    • Onychomycosis    • Tobacco dependence syndrome        No Known Allergies      Current Outpatient Medications:   •  albuterol (PROVENTIL HFA;VENTOLIN  HFA) 108 (90 Base) MCG/ACT inhaler, Inhale 2 puffs Every 4 (Four) Hours As Needed for Wheezing., Disp: 3 inhaler, Rfl: 1  •  ammonium lactate (AMLACTIN) 12 % lotion, Apply  topically to the appropriate area as directed As Needed for Dry Skin., Disp: 500 g, Rfl: 5  •  [START ON 5/24/2106] aspirin 325 MG tablet, Take 325 mg by mouth daily., Disp: , Rfl:   •  baclofen (LIORESAL) 10 MG tablet, Take 1 tablet by mouth 3 (Three) Times a Day., Disp: , Rfl:   •  clopidogrel (PLAVIX) 75 MG tablet, Take 1 tablet by mouth Daily., Disp: 90 tablet, Rfl: 3  •  Cyanocobalamin (B-12) 5000 MCG sublingual tablet, Place 1 each under the tongue Daily., Disp: 30 tablet, Rfl: 6  •  DULoxetine (CYMBALTA) 60 MG capsule, Take 1 capsule by mouth Daily., Disp: 30 capsule, Rfl: 5  •  Empagliflozin 25 MG tablet, Take 25 mg by mouth Daily., Disp: 30 tablet, Rfl: 5  •  gabapentin (NEURONTIN) 600 MG tablet, Take 1 tablet by mouth 3 (Three) Times a Day As Needed (Take 600mg TID prn)., Disp: 90 tablet, Rfl: 4  •  lansoprazole (PREVACID) 30 MG capsule, Take 1 capsule by mouth Daily., Disp: 90 capsule, Rfl: 3  •  losartan (COZAAR) 50 MG tablet, Take 1 tablet by mouth Daily., Disp: 90 tablet, Rfl: 3  •  metFORMIN (GLUCOPHAGE) 500 MG tablet, Take 1 tablet by mouth 2 (Two) Times a Day., Disp: 60 tablet, Rfl: 5  •  metoprolol tartrate (LOPRESSOR) 25 MG tablet, Take 1 tablet by mouth Every 12 (Twelve) Hours., Disp: 180 tablet, Rfl: 3  •  naphazoline (NAPHCON) 0.1 % ophthalmic solution, Apply 1 drop to eye 4 (Four) Times a Day As Needed for Irritation., Disp: 15 mL, Rfl: 5  •  Omega-3 Fatty Acids (FISH OIL) 1000 MG capsule capsule, Take 2,000 mg by mouth Daily With Breakfast., Disp: , Rfl:   •  pravastatin (PRAVACHOL) 40 MG tablet, Take 1 tablet by mouth Every Night., Disp: 90 tablet, Rfl: 3  •  spironolactone (ALDACTONE) 25 MG tablet, Take 1 tablet by mouth Daily., Disp: 90 tablet, Rfl: 3  •  umeclidinium-vilanterol (ANORO ELLIPTA) 62.5-25 MCG/INH aerosol  powder  inhaler, Inhale 1 puff Daily., Disp: 1 each, Rfl: 11    Past Surgical History:   Procedure Laterality Date   • COLONOSCOPY  07/30/2015    Colonoscopy, diagnostic (screening) 08557 (Screening for malignant neoplasm of colon)    • COLONOSCOPY  09/09/2015    Colonoscopy, diagnostic (screening) 58260 (Diverticulosis found in the sigmoid colon.Hemorrhoids found in the anus.)   • COLONOSCOPY  05/01/2009    Colonoscopy, diagnostic (screening) 40784 (Small internal and external hemorrhoids were present, Colonoscopy otherwise normal.)   • LEG SURGERY         Family History   Problem Relation Age of Onset   • Glaucoma Other    • Heart disease Other    • Stroke Other    • Macular degeneration Other    • Glaucoma Father    • Macular degeneration Father    • Cataracts Father    • Macular degeneration Sister        Social History     Socioeconomic History   • Marital status:      Spouse name: Not on file   • Number of children: Not on file   • Years of education: Not on file   • Highest education level: Not on file   Social Needs   • Financial resource strain: Not on file   • Food insecurity - worry: Not on file   • Food insecurity - inability: Not on file   • Transportation needs - medical: Not on file   • Transportation needs - non-medical: Not on file   Occupational History   • Not on file   Tobacco Use   • Smoking status: Current Every Day Smoker     Packs/day: 0.50     Years: 33.00     Pack years: 16.50     Types: Cigarettes   • Smokeless tobacco: Never Used   Substance and Sexual Activity   • Alcohol use: Yes   • Drug use: No   • Sexual activity: Defer   Other Topics Concern   • Not on file   Social History Narrative   • Not on file        Review of Systems   Constitutional: Positive for unexpected weight change.   Musculoskeletal: Positive for joint swelling.   Neurological: Positive for dizziness.   Psychiatric/Behavioral: Positive for sleep disturbance.   All other systems reviewed and are  "negative.      PHYSICAL EXAMINATION:       Ht 188 cm (74\")   Wt 134 kg (296 lb)   BMI 38.00 kg/m²     Physical Exam   Constitutional: He is oriented to person, place, and time. Vital signs are normal. He appears well-developed and well-nourished. He is cooperative.   HENT:   Head: Normocephalic and atraumatic.   Neck: Trachea normal and phonation normal.   Pulmonary/Chest: Effort normal. No respiratory distress.   Abdominal: Soft. Normal appearance. He exhibits no distension.   Musculoskeletal:        Right knee: He exhibits no effusion.        Left knee: He exhibits effusion.   Neurological: He is alert and oriented to person, place, and time. GCS eye subscore is 4. GCS verbal subscore is 5. GCS motor subscore is 6.   Skin: Skin is warm, dry and intact. Capillary refill takes less than 2 seconds.   Psychiatric: He has a normal mood and affect. His speech is normal and behavior is normal. Judgment and thought content normal. Cognition and memory are normal.   Vitals reviewed.      GAIT:     []  Normal  []  Antalgic    Assistive device: []  None  []  Walker     []  Crutches  []  Cane     [x]  Wheelchair  []  Stretcher    Right Knee Exam     Tenderness   The patient is experiencing tenderness in the lateral joint line and medial joint line.    Range of Motion   Extension: abnormal   Flexion: abnormal     Tests   Zoe:  Medial - positive Lateral - positive    Other   Erythema: absent  Scars: absent  Sensation: normal  Pulse: present  Swelling: mild  Effusion: no effusion present      Left Knee Exam     Tenderness   The patient is experiencing tenderness in the medial joint line and lateral joint line.    Range of Motion   Extension: abnormal   Flexion: abnormal     Other   Erythema: absent  Scars: absent  Sensation: normal  Pulse: present  Swelling: mild  Effusion: effusion present                Large Joint Arthrocentesis: R knee  Date/Time: 1/21/2019 4:22 PM  Consent given by: patient  Site marked: site " marked  Timeout: Immediately prior to procedure a time out was called to verify the correct patient, procedure, equipment, support staff and site/side marked as required   Supporting Documentation  Indications: pain   Procedure Details  Location: knee - R knee  Preparation: Patient was prepped and draped in the usual sterile fashion  Needle size: 22 G  Approach: anteromedial  Medications administered: 40 mg triamcinolone acetonide 40 MG/ML; 2 mL lidocaine PF 1% 1 %  Patient tolerance: patient tolerated the procedure well with no immediate complications          No results found.        ASSESSMENT:    Diagnoses and all orders for this visit:    Internal derangement of right knee    Right knee pain, unspecified chronicity  -     Large Joint Arthrocentesis: R knee          PLAN    Injected the knee today and recommended progression of range of motion and activity as tolerated based on pain and follow-up in 1 month for recheck.    No Follow-up on file.    Donn Dennis, APRN

## 2019-01-21 NOTE — PROGRESS NOTES
Sleep Clinic Follow Up    Date: 2019  Primary Care Physician: Harpreet Bass MD    Last office visit: 10/22/2018 (I reviewed this note)    CC: Follow up: NEREYDA    Sleep Testin. PSG on 10/03/2018, AHI of 85.7, PLMI of 67.1   2. Currently on 22/12 cm H2O with 2L O2      Assessment and Plan:    1. Obstructive sleep apnea Established, stable (1)  1. Compliant and improved with PAP therapy  2. Continue PAP as prescribed.   3. Script for PAP supplies  2. Chronic resp failure with hypoxia Established, stable (1)  3. HTN, CVA in 2013, COPDEstablished, stable (1)      Interim History (1-3/4):  Since the last visit:    1) severe NEREYDA -  Arias Willson has remained compliant with Bipap. He denies mask and machine issues, dry mouth, headaches, or pressures intolerance. He denies abnormal dreams, sleep paralysis, nasal congestion, URI sx.    PAP Data:    Time frame: 2018 - 2019   Compliance 100 %  Average use on days used: 6hrs 1 min  Percent of days with usage greater than or equal to 4 hours: 98.6%  PAP range : 22/12 cm H2O  Average 90% pressure: 22/12 cmH2O  Leak: 2 minutes  Average AHI 2.4 events/hr  Mask type: Full face mask  DME: [facility]    Bed time: 8809-9271  Sleep latency: 5-30 minutes  Number of times awakens during the night: 0-2  Wake time: 0136-0139  Estimated total sleep time at night: 6-8 hours  Caffeine intake: 60oz of coffee, 0oz of tea, and 4oz of soda  Alcohol intake: 0 drinks per week  Nap time: none   Sleepiness with Driving: N/A    Juda - 5    2) Patient denies RLS symptoms.     PMHx, FH, SH reviewed and pertinent changes are: unchanged from last office visit on 10/22/2018      REVIEW OF SYSTEMS:   Negative for chest pain, fever, cough, SOA, abdominal pain. Smoking:none     Exam ():  Vitals:    19 1443   BP: 153/84   Pulse: 93   SpO2: 94%     Body mass index is 37.99 kg/m². Patient's Body mass index is 37.99 kg/m². BMI is above normal parameters.  Recommendations include: referral to primary care.      Gen:  No acute distress, alert, oriented  Lungs:  CTA with normal effort   CV:  RRR, no M/R/G  GI:  soft, non-tender  Psych:  Appropriate affect  Ext: in chair      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      • Benign essential hypertension    • Blood in feces    • Carotid artery stenosis    • Cerebrovascular accident (CMS/HCC)      L sided sensory deficit      • Conjunctivitis    • Constipation     OCCASIONAL   • Contusion, eye, left    • Corneal ulcer     PROBABLE   • Diabetes mellitus (CMS/HCC)    • Eczema    • Encounter for screening for malignant neoplasm of colon    • Essential hypertension    • GERD (gastroesophageal reflux disease)    • Hemorrhoids    • History of echocardiogram 04/05/2013    Echocadiogram W/ color flow 82060 (Normal LV systolic function with EF of 60-65%. Grade I diastolic dysfunction of the LV myocardium. No evidence of LV apical thrombus. No evidence of pericardial effusion.)   • Gus's syndrome     RIGHT EYE   • Hyperkeratosis    • Hyperlipidemia    • Mucopurulent conjunctivitis     ACUTE   • Myopia    • Neuropathic pain    • Obesity    • Onychomycosis    • Tobacco dependence syndrome        Current Outpatient Medications:   •  ammonium lactate (AMLACTIN) 12 % lotion, Apply  topically to the appropriate area as directed As Needed for Dry Skin., Disp: 500 g, Rfl: 5  •  [START ON 5/24/2106] aspirin 325 MG tablet, Take 325 mg by mouth daily., Disp: , Rfl:   •  baclofen (LIORESAL) 10 MG tablet, Take 1 tablet by mouth 3 (Three) Times a Day., Disp: , Rfl:   •  clopidogrel (PLAVIX) 75 MG tablet, Take 1 tablet by mouth Daily., Disp: 90 tablet, Rfl: 3  •  Cyanocobalamin (B-12) 5000 MCG sublingual tablet, Place 1 each under the tongue Daily., Disp: 30 tablet, Rfl:  6  •  DULoxetine (CYMBALTA) 60 MG capsule, Take 1 capsule by mouth Daily., Disp: 30 capsule, Rfl: 5  •  Empagliflozin 25 MG tablet, Take 25 mg by mouth Daily., Disp: 30 tablet, Rfl: 5  •  gabapentin (NEURONTIN) 600 MG tablet, Take 1 tablet by mouth 3 (Three) Times a Day As Needed (Take 600mg TID prn)., Disp: 90 tablet, Rfl: 4  •  lansoprazole (PREVACID) 30 MG capsule, Take 1 capsule by mouth Daily., Disp: 90 capsule, Rfl: 3  •  losartan (COZAAR) 50 MG tablet, Take 1 tablet by mouth Daily., Disp: 90 tablet, Rfl: 3  •  metFORMIN (GLUCOPHAGE) 500 MG tablet, Take 1 tablet by mouth 2 (Two) Times a Day., Disp: 60 tablet, Rfl: 5  •  metoprolol tartrate (LOPRESSOR) 25 MG tablet, Take 1 tablet by mouth Every 12 (Twelve) Hours., Disp: 180 tablet, Rfl: 3  •  naphazoline (NAPHCON) 0.1 % ophthalmic solution, Apply 1 drop to eye 4 (Four) Times a Day As Needed for Irritation., Disp: 15 mL, Rfl: 5  •  Omega-3 Fatty Acids (FISH OIL) 1000 MG capsule capsule, Take 2,000 mg by mouth Daily With Breakfast., Disp: , Rfl:   •  pravastatin (PRAVACHOL) 40 MG tablet, Take 1 tablet by mouth Every Night., Disp: 90 tablet, Rfl: 3  •  spironolactone (ALDACTONE) 25 MG tablet, Take 1 tablet by mouth Daily., Disp: 90 tablet, Rfl: 3  •  umeclidinium-vilanterol (ANORO ELLIPTA) 62.5-25 MCG/INH aerosol powder  inhaler, Inhale 1 puff Daily., Disp: 1 each, Rfl: 11  •  albuterol (PROVENTIL HFA;VENTOLIN HFA) 108 (90 Base) MCG/ACT inhaler, Inhale 2 puffs Every 4 (Four) Hours As Needed for Wheezing., Disp: 3 inhaler, Rfl: 1    Total time 15 min, more than half spent in face to face counseling and coordination of care.    RTC in 12 months     This document has been electronically signed by Alirio Williamson MD on January 21, 2019         CC: Harpreet Bass MD          No ref. provider found

## 2019-01-22 PROBLEM — G47.33 OSA TREATED WITH BIPAP: Status: ACTIVE | Noted: 2018-01-18

## 2019-01-22 NOTE — PROGRESS NOTES
Pulmonary Office Follow-up    Subjective     Arias Willson is seen today at the office for   Chief Complaint   Patient presents with   • COPD   • Oxygen recert         HPI  Arias Willson is a 63 y.o. male with a PMH significant for COPD, tobacco use, morbid obesity, NEREYDA on BiPAP, ASCAD, HTN, CVA, seizures, T2DM, and GERD who presents for O2 recertification. He was last seen on 12/6/18, at which time he was doing better on Anoro, but did continue to have dyspnea and frequent albuterol use.  He states that he got a letter from [facility] telling him he needed to have a face-to-face visit to determine whether or not his oxygen is still necessary.  Patient reports that his COPD is stable on his current therapy and he is actually been able to not use his albuterol since his last visit with me.  Unfortunately, he does continue to smoke and is still not interested in quitting at this time.  He states that he uses his oxygen sometimes during the day as he notes his oxygen levels at rest on room air are around 90%.  Otherwise, he continues to wear his BiPAP with sleep.  He denies any recent antibiotic or steroid use.    Patient Active Problem List   Diagnosis   • Ptosis of eyelid   • Astigmatism   • Myopia   • Gus's syndrome   • Neuropathic pain   • GERD (gastroesophageal reflux disease)   • Aneurysm of carotid artery (CMS/HCC)   • H/O: CVA (cerebrovascular accident)   • Sensory deficit present   • Gait disturbance, post-stroke   • Cognitive deficit, post-stroke   • Hypertension   • Diabetes mellitus (CMS/HCC)   • Morbid obesity (CMS/HCC)   • Coronary artery disease   • NEREYDA treated with BiPAP   • Macrocytosis without anemia   • Need for immunization against influenza   • Need for vaccination   • Elevated brain natriuretic peptide (BNP) level   • Tremor of both hands   • Seizures (CMS/HCC)   • Fall   • Left knee pain   • Class 2 severe obesity due to excess calories with serious comorbidity and body mass index  (BMI) of 38.0 to 38.9 in adult (CMS/[facility])   • High risk medication use   • Hypoxia   • Generalized edema   • Other insomnia   • Peripheral edema   • Nicotine abuse   • Nocturnal hypoxemia due to obesity   • COPD, group B, by GOLD 2017 classification (CMS/[facility])   • Syncope   • Physical deconditioning   • Pure hypercholesterolemia   • Cigarette nicotine dependence, uncomplicated   • Primary osteoarthritis of left knee   • Effusion, left knee   • Right knee pain       Review of Systems  Review of Systems   Constitutional: Negative for fatigue and unexpected weight change.   HENT: Negative for congestion.    Respiratory: Positive for cough and shortness of breath. Negative for wheezing.    Cardiovascular: Negative for leg swelling.   Musculoskeletal: Positive for arthralgias.     As described in the HPI. Otherwise, remainder of ROS (14 systems) were negative.    Medications, Allergies, Social, and Family Histories reviewed as per EMR.    Objective     Vitals:    01/23/19 1023   BP: 134/82   Pulse: 82   SpO2: 92%     Physical Exam   Constitutional: He is oriented to person, place, and time. Vital signs are normal. He appears well-developed and well-nourished.   Obese   HENT:   Head: Normocephalic and atraumatic.   Nose: Nose normal.   Mouth/Throat: Uvula is midline, oropharynx is clear and moist and mucous membranes are normal.   Mallampati 3   Eyes: Conjunctivae, EOM and lids are normal. Pupils are equal, round, and reactive to light.   Neck: Trachea normal and normal range of motion. No tracheal tenderness present. No thyroid mass present.   Cardiovascular: Normal rate, regular rhythm and normal heart sounds. PMI is not displaced. Exam reveals no gallop.   No murmur heard.  Pulmonary/Chest: Effort normal and breath sounds normal. No respiratory distress. He has no decreased breath sounds. He has no wheezes. He has no rhonchi. He exhibits no tenderness.   Abdominal: Soft. Normal appearance and bowel sounds are normal.  There is no hepatomegaly. There is no tenderness.   Obese   Musculoskeletal:   Slow gait walks with a cane, mild BLE edema     Vascular Status -  His right foot exhibits abnormal foot edema. His left foot exhibits abnormal foot edema.  Lymphadenopathy:        Head (right side): No submandibular adenopathy present.        Head (left side): No submandibular adenopathy present.     He has no cervical adenopathy.        Right: No supraclavicular adenopathy present.        Left: No supraclavicular adenopathy present.   Neurological: He is alert and oriented to person, place, and time. Gait normal.   Skin: Skin is warm and dry. No rash noted. No cyanosis. Nails show no clubbing.   Lower leg hyperemia   Psychiatric: He has a normal mood and affect. His speech is normal and behavior is normal. Judgment normal.   Nursing note and vitals reviewed.          Assessment/Plan     Arias was seen today for copd and oxygen recert.    Diagnoses and all orders for this visit:    COPD, group B, by GOLD 2017 classification (CMS/[facility])    NEREYDA treated with BiPAP    Class 2 severe obesity due to excess calories with serious comorbidity and body mass index (BMI) of 38.0 to 38.9 in adult (CMS/[facility])    Cigarette nicotine dependence, uncomplicated    Physical deconditioning         Discussion/ Recommendations:   He is actually doing well on Anoro and has been able to back off his albuterol use and has not felt like it was necessary to an use it.  Unfortunately, he does continue to smoke and is not interested in quitting at this time.  Otherwise he is compliant with his BiPAP and still perceiving benefit.    -Continue Anoro daily.  -Use albuterol as needed for dyspnea or wheeze.   -If he continues to have issues, will consider escalation to triple therapy with the addition of an ICS such as Flovent or Asmanex.  -RA sat at rest was 96%. Sat dropped to 91% on RA with exertion but pt unable to tolerate further walking. He does not qualify for  exertional O2, but we will check an ONPO on Bipap to assess if he requires nocturnal O2. Results to be faxed from [facility] for my review.  -Annual LD CT June 2019 (1ppd x 34yrs, still smoking 0.5ppd).  -Continue BiPAP with sleep.  -I advised Arias of the risks of continuing to use tobacco, and I provided him with tobacco cessation educational materials in the After Visit Summary.  He remains pre-contemplative. During this visit, I spent 2 minutes counseling the patient regarding tobacco cessation.  -Encouraged efforts at weight loss through diet and exercise as tolerated.  -Up to date with the flu vaccine    Patient's Body mass index is 37.49 kg/m². BMI is above normal parameters. Recommendations include: exercise counseling.        Return in about 3 months (around 4/23/2019) for Recheck COPD.          This document has been electronically signed by Krysten Buckner MD on January 23, 2019 10:53 AM      Dictated using Dragon

## 2019-01-23 ENCOUNTER — OFFICE VISIT (OUTPATIENT)
Dept: PULMONOLOGY | Facility: CLINIC | Age: 64
End: 2019-01-23

## 2019-01-23 VITALS
DIASTOLIC BLOOD PRESSURE: 82 MMHG | HEART RATE: 82 BPM | WEIGHT: 292 LBS | OXYGEN SATURATION: 92 % | BODY MASS INDEX: 37.47 KG/M2 | SYSTOLIC BLOOD PRESSURE: 134 MMHG | HEIGHT: 74 IN

## 2019-01-23 DIAGNOSIS — F17.210 CIGARETTE NICOTINE DEPENDENCE, UNCOMPLICATED: ICD-10-CM

## 2019-01-23 DIAGNOSIS — R53.81 PHYSICAL DECONDITIONING: ICD-10-CM

## 2019-01-23 DIAGNOSIS — E66.01 CLASS 2 SEVERE OBESITY DUE TO EXCESS CALORIES WITH SERIOUS COMORBIDITY AND BODY MASS INDEX (BMI) OF 38.0 TO 38.9 IN ADULT (HCC): ICD-10-CM

## 2019-01-23 DIAGNOSIS — G47.33 OSA TREATED WITH BIPAP: ICD-10-CM

## 2019-01-23 DIAGNOSIS — J44.9 COPD, GROUP B, BY GOLD 2017 CLASSIFICATION (HCC): Primary | Chronic | ICD-10-CM

## 2019-01-23 PROCEDURE — 99214 OFFICE O/P EST MOD 30 MIN: CPT | Performed by: INTERNAL MEDICINE

## 2019-01-23 PROCEDURE — 94761 N-INVAS EAR/PLS OXIMETRY MLT: CPT | Performed by: INTERNAL MEDICINE

## 2019-01-23 RX ORDER — LIDOCAINE HYDROCHLORIDE 10 MG/ML
2 INJECTION, SOLUTION EPIDURAL; INFILTRATION; INTRACAUDAL; PERINEURAL
Status: COMPLETED | OUTPATIENT
Start: 2019-01-21 | End: 2019-01-21

## 2019-01-23 RX ORDER — TRIAMCINOLONE ACETONIDE 40 MG/ML
40 INJECTION, SUSPENSION INTRA-ARTICULAR; INTRAMUSCULAR
Status: COMPLETED | OUTPATIENT
Start: 2019-01-21 | End: 2019-01-21

## 2019-02-04 ENCOUNTER — OFFICE VISIT (OUTPATIENT)
Dept: FAMILY MEDICINE CLINIC | Facility: CLINIC | Age: 64
End: 2019-02-04

## 2019-02-04 VITALS
HEART RATE: 57 BPM | TEMPERATURE: 97.8 F | HEIGHT: 74 IN | BODY MASS INDEX: 37.43 KG/M2 | SYSTOLIC BLOOD PRESSURE: 150 MMHG | OXYGEN SATURATION: 99 % | DIASTOLIC BLOOD PRESSURE: 90 MMHG | WEIGHT: 291.7 LBS

## 2019-02-04 DIAGNOSIS — I10 ESSENTIAL HYPERTENSION: Chronic | ICD-10-CM

## 2019-02-04 DIAGNOSIS — K21.9 GASTROESOPHAGEAL REFLUX DISEASE WITHOUT ESOPHAGITIS: ICD-10-CM

## 2019-02-04 DIAGNOSIS — G90.2 HORNER'S SYNDROME: ICD-10-CM

## 2019-02-04 DIAGNOSIS — E11.69 TYPE 2 DIABETES MELLITUS WITH OTHER SPECIFIED COMPLICATION, WITHOUT LONG-TERM CURRENT USE OF INSULIN (HCC): Primary | Chronic | ICD-10-CM

## 2019-02-04 DIAGNOSIS — G47.33 OSA TREATED WITH BIPAP: Chronic | ICD-10-CM

## 2019-02-04 DIAGNOSIS — M79.2 NEUROPATHIC PAIN: ICD-10-CM

## 2019-02-04 DIAGNOSIS — I69.319 COGNITIVE DEFICIT, POST-STROKE: ICD-10-CM

## 2019-02-04 DIAGNOSIS — R55 SYNCOPE, UNSPECIFIED SYNCOPE TYPE: ICD-10-CM

## 2019-02-04 PROCEDURE — 99214 OFFICE O/P EST MOD 30 MIN: CPT | Performed by: FAMILY MEDICINE

## 2019-02-04 RX ORDER — GABAPENTIN 600 MG/1
600 TABLET ORAL 3 TIMES DAILY PRN
Qty: 90 TABLET | Refills: 4 | Status: SHIPPED | OUTPATIENT
Start: 2019-02-04 | End: 2019-06-05 | Stop reason: SDUPTHER

## 2019-02-04 RX ORDER — DULOXETIN HYDROCHLORIDE 60 MG/1
60 CAPSULE, DELAYED RELEASE ORAL DAILY
Qty: 30 CAPSULE | Refills: 5 | Status: SHIPPED | OUTPATIENT
Start: 2019-02-04 | End: 2019-08-11 | Stop reason: SDUPTHER

## 2019-02-04 RX ORDER — CLOPIDOGREL BISULFATE 75 MG/1
75 TABLET ORAL DAILY
Qty: 90 TABLET | Refills: 3 | Status: SHIPPED | OUTPATIENT
Start: 2019-02-04 | End: 2020-01-22

## 2019-02-04 RX ORDER — PRAVASTATIN SODIUM 40 MG
40 TABLET ORAL NIGHTLY
Qty: 90 TABLET | Refills: 3 | Status: SHIPPED | OUTPATIENT
Start: 2019-02-04 | End: 2020-01-22

## 2019-02-04 RX ORDER — SPIRONOLACTONE 25 MG/1
25 TABLET ORAL DAILY
Qty: 90 TABLET | Refills: 3 | Status: SHIPPED | OUTPATIENT
Start: 2019-02-04 | End: 2020-01-22

## 2019-02-04 NOTE — PROGRESS NOTES
Subjective   Arias Willson is a 63 y.o. obese white male with HTN, Had TIA 2- , began with double vision, R facial numbness, L arm numbness, L leg weakness. 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 Pt was referred to Neurosurgery in [place], told has aneurysm, but surgery not recommended for this area. 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 Had resolution of most motor weakness. Has residual difficulty with balance. Cognitive difficulties, Depression, DM, High cholesterol, Chronic pain, Hospital admit Jan 2018 with Resp failure, L lower lung nodule, Hypoxia, New onset seizures after discharge  and other health problems.  Pt presents for exam, to discuss health problems, Tx and F/U plans.      ' Not passing out any more after starting Baclofen, seeing Neurologist  Dr. Egan. Doing better since starting BiPAP Tx, sleeping better. B/P good at checks. Trying to get blood sugars under control. Breathing has improved some, not requiring O2 as much.. Knee pain still a problem, have difficulty walking very much. Uses walker, requires wheelchair for any distance. Depression is stable'.     Hypertension   This is a chronic problem. The current episode started more than 1 year ago. The problem has been waxing and waning since onset. The problem is controlled. Associated symptoms include headaches, neck pain and shortness of breath. Pertinent negatives include no chest pain or palpitations. Agents associated with hypertension include NSAIDs. Risk factors for coronary artery disease include obesity, male gender, smoking/tobacco exposure and stress (H/O CVA). Past treatments include ACE inhibitors, angiotensin blockers, beta blockers, diuretics and lifestyle changes. Current antihypertension treatment includes  angiotensin blockers and diuretics. The current treatment provides significant improvement. Compliance problems include medication cost.  Hypertensive end-organ damage includes CVA.   Diabetes   He presents for his follow-up diabetic visit. He has type 2 diabetes mellitus. No MedicAlert identification noted. His disease course has been fluctuating. Hypoglycemia symptoms include confusion, dizziness, headaches, nervousness/anxiousness and seizures. Associated symptoms include fatigue, visual change and weakness. Pertinent negatives for diabetes include no chest pain. Weight loss: neuro. There are no hypoglycemic complications. Symptoms are worsening. Diabetic complications include a CVA and peripheral neuropathy. Risk factors for coronary artery disease include diabetes mellitus, male sex, tobacco exposure, stress, sedentary lifestyle, obesity, hypertension and dyslipidemia. Current diabetic treatment includes oral agent (monotherapy). He is compliant with treatment some of the time. His weight is increasing rapidly. He is following a generally unhealthy diet. When asked about meal planning, he reported none. He rarely participates in exercise. Home blood sugar record trend: Unknown. An ACE inhibitor/angiotensin II receptor blocker is being taken. He does not see a podiatrist.Eye exam is current.   Insomnia   This is a chronic problem. The current episode started more than 1 year ago. The problem occurs constantly. The problem has been waxing and waning. Associated symptoms include fatigue, headaches, neck pain, numbness, a visual change and weakness. Pertinent negatives include no abdominal pain, arthralgias, chest pain, chills, congestion, coughing, fever, nausea, rash or sore throat. The symptoms are aggravated by stress. Treatments tried: OTC meds CPAP. The treatment provided significant relief.   Breathing Problem   He complains of difficulty breathing and shortness of breath. There is no cough or wheezing.  Primary symptoms comments: Using 02 since hospitalization Jan 2018, H/O low pulse-ox, seizures like spells.. Associated symptoms include headaches. Pertinent negatives include no appetite change, chest pain, ear pain, fever or sore throat. Weight loss: neuro. His symptoms are aggravated by exercise. His symptoms are alleviated by nothing. He reports moderate (O2) improvement on treatment.   Seizures    This is a new (Seem to be triggered by standing up.) problem. Episode onset: Months. The problem has been resolved (Resolved after baclofen). There were 2 to 3 seizures. The most recent episode lasted 30 to 120 seconds. Associated symptoms include confusion and headaches. Pertinent negatives include no visual disturbance, no sore throat, no chest pain, no cough and no nausea. Characteristics include rhythmic jerking and loss of consciousness. The episode was witnessed.   Stroke   This is a chronic problem. The current episode started more than 1 year ago. The problem occurs daily. The problem has been gradually improving. Associated symptoms include fatigue, headaches, neck pain, numbness, a visual change and weakness. Pertinent negatives include no abdominal pain, arthralgias, chest pain, chills, congestion, coughing, fever, nausea, rash or sore throat. The symptoms are aggravated by exertion and walking. He has tried acetaminophen and NSAIDs for the symptoms. The treatment provided no relief.   Pain   This is a chronic problem. The current episode started more than 1 year ago. The problem occurs daily. Associated symptoms include fatigue, headaches, neck pain, numbness, a visual change and weakness. Pertinent negatives include no abdominal pain, arthralgias, chest pain, chills, congestion, coughing, fever, nausea, rash or sore throat. The symptoms are aggravated by walking and stress. He has tried NSAIDs and acetaminophen for the symptoms. The treatment provided no relief.   Neurologic Problem   The patient's  primary symptoms include an altered mental status, clumsiness, focal sensory loss, focal weakness, a loss of balance, a visual change and weakness. Primary symptoms comment: Delayed cognitive processing. Seizures. This is a chronic problem. The current episode started more than 1 year ago. The neurological problem developed suddenly. The problem has been waxing and waning since onset. There was left-sided, right-sided, facial, upper extremity and lower extremity (Strokes of Basal ganglia. Horners syndrome,  sensory, motor deficits.) focality noted. Associated symptoms include back pain, confusion, dizziness, fatigue, headaches, neck pain and shortness of breath. Pertinent negatives include no abdominal pain, chest pain, fever, nausea or palpitations. (Requires Walker to ambulate.) Past treatments include walking and medication (Physical therapy). The treatment provided mild relief. His past medical history is significant for a CVA.   Depression   Visit Type: follow-up  Patient presents with the following symptoms: confusion, decreased concentration, depressed mood, excessive worry, feelings of hopelessness, feelings of worthlessness, insomnia, irritability, memory impairment, nervousness/anxiety, restlessness and shortness of breath.  Patient is not experiencing: palpitations, suicidal ideas and suicidal planning.  Weight loss: neuro.  Frequency of symptoms: occasionally   Severity: moderate   Sleep quality: fair  Nighttime awakenings: several         The following portions of the patient's history were reviewed and updated as appropriate: allergies, current medications, past family history, past medical history, past social history, past surgical history and problem list.    Review of Systems   Constitutional: Positive for fatigue and irritability. Negative for activity change, appetite change, chills and fever. Weight loss: neuro.   HENT: Negative for congestion, ear pain and sore throat.    Eyes: Negative for  pain and visual disturbance.   Respiratory: Positive for shortness of breath. Negative for cough, chest tightness and wheezing.    Cardiovascular: Negative for chest pain and palpitations.   Gastrointestinal: Negative for abdominal pain and nausea.   Endocrine: Negative for cold intolerance and heat intolerance.   Genitourinary: Negative for difficulty urinating and dysuria.   Musculoskeletal: Positive for back pain, gait problem, neck pain and neck stiffness. Negative for arthralgias.   Skin: Negative for color change and rash.   Allergic/Immunologic: Negative for environmental allergies.   Neurological: Positive for dizziness, focal weakness, seizures, loss of consciousness, weakness, numbness, headaches and loss of balance.   Hematological: Negative for adenopathy. Does not bruise/bleed easily.   Psychiatric/Behavioral: Positive for confusion, decreased concentration and sleep disturbance. Negative for agitation and suicidal ideas. The patient is nervous/anxious and has insomnia.         Depressed mood       Objective   Physical Exam   Constitutional: He is oriented to person, place, and time. He appears well-developed and well-nourished. No distress.   Resp status improved from last visit, has O2 but not using in WC, reports, uses O2 if trying to walk with walker to prevent SOA.   HENT:   Head: Normocephalic and atraumatic.   Right Ear: External ear normal.   Left Ear: External ear normal.   Nose: Nose normal.   Mouth/Throat: Oropharynx is clear and moist. No oropharyngeal exudate.   Eyes: Conjunctivae and EOM are normal. Pupils are equal, round, and reactive to light. Right eye exhibits no discharge. Left eye exhibits no discharge. No scleral icterus.   Neck: Normal range of motion. Neck supple. No JVD present. No tracheal deviation present. No thyromegaly present.   Cardiovascular: Normal rate, regular rhythm, normal heart sounds and intact distal pulses. Exam reveals no gallop and no friction rub.   No murmur  heard.  Pulmonary/Chest: Effort normal and breath sounds normal. No respiratory distress. He has no wheezes. He has no rales. He exhibits no tenderness.   Abdominal: Soft. Bowel sounds are normal. He exhibits no distension and no mass. There is no tenderness. No hernia.   Musculoskeletal: He exhibits no edema, tenderness or deformity.   Requires WC, Walks short distance with cane, L sided sensory deficit, R motor, drift towards R without cane. R pupil NR, Has weakness closing R eye CN VII.   Has unsteady antalgic gait.    Has crepitous with ROM of knees. WB 6   Lymphadenopathy:     He has no cervical adenopathy.   Neurological: He is alert and oriented to person, place, and time. He displays normal reflexes. A cranial nerve deficit is present. He exhibits normal muscle tone. Coordination normal.   Skin: Skin is warm and dry. No rash noted. He is not diaphoretic. No erythema. No pallor.   Psychiatric: His behavior is normal. Judgment and thought content normal.   Chronic situational depression, post stroke, improved on meds.   Nursing note and vitals reviewed.      Assessment/Plan   Arias was seen today for follow-up, hypertension and diabetes.    Diagnoses and all orders for this visit:    Type 2 diabetes mellitus with other specified complication, without long-term current use of insulin (CMS/[facility])  -     Hemoglobin A1c; Future    Essential hypertension    Syncope, unspecified syncope type  Comments:  Seeing Neurology Dr. Egan. Syncope resolved after Baclofen    NEREYDA treated with BiPAP    Gastroesophageal reflux disease without esophagitis    Cognitive deficit, post-stroke    Gus's syndrome    Neuropathic pain    Other orders  -     clopidogrel (PLAVIX) 75 MG tablet; Take 1 tablet by mouth Daily.  -     DULoxetine (CYMBALTA) 60 MG capsule; Take 1 capsule by mouth Daily.  -     Empagliflozin 25 MG tablet; Take 25 mg by mouth Daily.  -     metoprolol tartrate (LOPRESSOR) 25 MG tablet; Take 1 tablet by mouth Every  12 (Twelve) Hours.  -     metFORMIN (GLUCOPHAGE) 500 MG tablet; Take 1 tablet by mouth 2 (Two) Times a Day.  -     pravastatin (PRAVACHOL) 40 MG tablet; Take 1 tablet by mouth Every Night.  -     spironolactone (ALDACTONE) 25 MG tablet; Take 1 tablet by mouth Daily.  -     gabapentin (NEURONTIN) 600 MG tablet; Take 1 tablet by mouth 3 (Three) Times a Day As Needed (Take 600mg TID prn).      Discussed exam, health problems, meds, indications, tx plan, rationale. Discussed F/U with specialist, Discussed USPSTF, discussed F/U here.          This document has been electronically signed by Harpreet Bass MD

## 2019-02-06 ENCOUNTER — LAB (OUTPATIENT)
Dept: LAB | Facility: HOSPITAL | Age: 64
End: 2019-02-06

## 2019-02-06 DIAGNOSIS — E11.69 TYPE 2 DIABETES MELLITUS WITH OTHER SPECIFIED COMPLICATION, WITHOUT LONG-TERM CURRENT USE OF INSULIN (HCC): Chronic | ICD-10-CM

## 2019-02-06 LAB — HBA1C MFR BLD: 9.8 % (ref 4–5.6)

## 2019-02-06 PROCEDURE — 83036 HEMOGLOBIN GLYCOSYLATED A1C: CPT

## 2019-02-06 PROCEDURE — 36415 COLL VENOUS BLD VENIPUNCTURE: CPT

## 2019-02-08 ENCOUNTER — TELEPHONE (OUTPATIENT)
Dept: FAMILY MEDICINE CLINIC | Facility: CLINIC | Age: 64
End: 2019-02-08

## 2019-02-08 RX ORDER — LANSOPRAZOLE 30 MG/1
CAPSULE, DELAYED RELEASE ORAL
Qty: 30 CAPSULE | Refills: 5 | Status: SHIPPED | OUTPATIENT
Start: 2019-02-08 | End: 2019-08-11 | Stop reason: SDUPTHER

## 2019-02-08 RX ORDER — LOSARTAN POTASSIUM 50 MG/1
TABLET ORAL
Qty: 30 TABLET | Refills: 5 | Status: SHIPPED | OUTPATIENT
Start: 2019-02-08 | End: 2019-08-11 | Stop reason: SDUPTHER

## 2019-02-08 NOTE — TELEPHONE ENCOUNTER
----- Message from Harpreet Bass MD sent at 2/7/2019  8:14 PM CST -----  Blood sugars are still up HgbA1c 9.1 to 9.8, make sure to take meds, check FSBS. Will recheck in 3 months.

## 2019-02-10 NOTE — PATIENT INSTRUCTIONS
Preventive Care 40-64 Years, Male  Preventive care refers to lifestyle choices and visits with your health care provider that can promote health and wellness.  What does preventive care include?  · A yearly physical exam. This is also called an annual well check.  · Dental exams once or twice a year.  · Routine eye exams. Ask your health care provider how often you should have your eyes checked.  · Personal lifestyle choices, including:  ? Daily care of your teeth and gums.  ? Regular physical activity.  ? Eating a healthy diet.  ? Avoiding tobacco and drug use.  ? Limiting alcohol use.  ? Practicing safe sex.  ? Taking low-dose aspirin every day starting at age 50.  What happens during an annual well check?  The services and screenings done by your health care provider during your annual well check will depend on your age, overall health, lifestyle risk factors, and family history of disease.  Counseling  Your health care provider may ask you questions about your:  · Alcohol use.  · Tobacco use.  · Drug use.  · Emotional well-being.  · Home and relationship well-being.  · Sexual activity.  · Eating habits.  · Work and work environment.    Screening  You may have the following tests or measurements:  · Height, weight, and BMI.  · Blood pressure.  · Lipid and cholesterol levels. These may be checked every 5 years, or more frequently if you are over 50 years old.  · Skin check.  · Lung cancer screening. You may have this screening every year starting at age 55 if you have a 30-pack-year history of smoking and currently smoke or have quit within the past 15 years.  · Fecal occult blood test (FOBT) of the stool. You may have this test every year starting at age 50.  · Flexible sigmoidoscopy or colonoscopy. You may have a sigmoidoscopy every 5 years or a colonoscopy every 10 years starting at age 50.  · Prostate cancer screening. Recommendations will vary depending on your family history and other risks.  · Hepatitis C  blood test.  · Hepatitis B blood test.  · Sexually transmitted disease (STD) testing.  · Diabetes screening. This is done by checking your blood sugar (glucose) after you have not eaten for a while (fasting). You may have this done every 1-3 years.    Discuss your test results, treatment options, and if necessary, the need for more tests with your health care provider.  Vaccines  Your health care provider may recommend certain vaccines, such as:  · Influenza vaccine. This is recommended every year.  · Tetanus, diphtheria, and acellular pertussis (Tdap, Td) vaccine. You may need a Td booster every 10 years.  · Varicella vaccine. You may need this if you have not been vaccinated.  · Zoster vaccine. You may need this after age 60.  · Measles, mumps, and rubella (MMR) vaccine. You may need at least one dose of MMR if you were born in 1957 or later. You may also need a second dose.  · Pneumococcal 13-valent conjugate (PCV13) vaccine. You may need this if you have certain conditions and have not been vaccinated.  · Pneumococcal polysaccharide (PPSV23) vaccine. You may need one or two doses if you smoke cigarettes or if you have certain conditions.  · Meningococcal vaccine. You may need this if you have certain conditions.  · Hepatitis A vaccine. You may need this if you have certain conditions or if you travel or work in places where you may be exposed to hepatitis A.  · Hepatitis B vaccine. You may need this if you have certain conditions or if you travel or work in places where you may be exposed to hepatitis B.  · Haemophilus influenzae type b (Hib) vaccine. You may need this if you have certain risk factors.    Talk to your health care provider about which screenings and vaccines you need and how often you need them.  This information is not intended to replace advice given to you by your health care provider. Make sure you discuss any questions you have with your health care provider.  Document Released: 01/13/2017  Document Revised: 09/06/2017 Document Reviewed: 10/18/2016  CDP Interactive Patient Education © 2018 [employer]

## 2019-02-20 ENCOUNTER — DOCUMENTATION (OUTPATIENT)
Dept: PULMONOLOGY | Facility: CLINIC | Age: 64
End: 2019-02-20

## 2019-02-20 NOTE — PROGRESS NOTES
Dr. Buckner received Pulse oximetry test results from [facility].  Rx for oxygen was written and faxed to [facility].  This paperwork has been sent to be scanned into Epic.

## 2019-03-05 ENCOUNTER — TELEPHONE (OUTPATIENT)
Dept: FAMILY MEDICINE CLINIC | Facility: CLINIC | Age: 64
End: 2019-03-05

## 2019-03-06 ENCOUNTER — TELEPHONE (OUTPATIENT)
Dept: PULMONOLOGY | Facility: CLINIC | Age: 64
End: 2019-03-06

## 2019-03-06 NOTE — TELEPHONE ENCOUNTER
Left message for patient to return my call.  Mr. Willson called and stated that he can not afford his Anoro inhaler.  I explained to him that the other options in that category are not covered at all.  Per Dr. Buckner I gave patient the [email or web address]) website.  He stated that he would check into it.  I asked him to let us know if this helped him or not.  I did ask patient if he would tell me any other inhalers that he has used, he stated that he could not remember.            ----- Message from Seble Oneil sent at 3/5/2019  1:31 PM CST -----  Contact: [phone]  Pt is currently using Anoro and his new insurance does not cover it so he is wanting something else called into [facility] in [place]

## 2019-03-06 NOTE — TELEPHONE ENCOUNTER
Pt has now gotten disability and had to change his insurance. Will be using [facility] for his gabapentin now. Jardiance not covered on new plan. Advised pt to go to his pharmacy about 2 weeks or so prior to when he will need jardiance so we can do a PA if needed. Pt also was seeing neurology for seizures, prescribed baclofen. Neurologist is not in network with his new plan. Wanted to know if you would be willing to take over the baclofen. Pt does not need at this time, he has some for several months. Just wanted to know if would take over for when he is due.

## 2019-03-08 ENCOUNTER — PRIOR AUTHORIZATION (OUTPATIENT)
Dept: FAMILY MEDICINE CLINIC | Facility: CLINIC | Age: 64
End: 2019-03-08

## 2019-04-09 ENCOUNTER — TELEPHONE (OUTPATIENT)
Dept: FAMILY MEDICINE CLINIC | Facility: CLINIC | Age: 64
End: 2019-04-09

## 2019-04-09 RX ORDER — SULFAMETHOXAZOLE AND TRIMETHOPRIM 800; 160 MG/1; MG/1
1 TABLET ORAL 2 TIMES DAILY
Qty: 10 TABLET | Refills: 0 | Status: SHIPPED | OUTPATIENT
Start: 2019-04-09 | End: 2019-06-03

## 2019-04-09 NOTE — TELEPHONE ENCOUNTER
"Patient called and stated that he had a sore on the back of his leg that was almost healed and now it has an \"angry red ring about 1/2 inch wide around it\".  He is requesting an antibiotic.  "

## 2019-04-10 ENCOUNTER — TELEPHONE (OUTPATIENT)
Dept: FAMILY MEDICINE CLINIC | Facility: CLINIC | Age: 64
End: 2019-04-10

## 2019-04-11 ENCOUNTER — TELEPHONE (OUTPATIENT)
Dept: FAMILY MEDICINE CLINIC | Facility: CLINIC | Age: 64
End: 2019-04-11

## 2019-04-11 NOTE — TELEPHONE ENCOUNTER
Mr. Willson called and states that he has tried calling for 3 days and no one has returned his call. Please call at [phone]. I did transfer him to [facility] cristobal [place]los if you answered the call I apologize that I sent this message. Just wanted him to speak to someone

## 2019-04-11 NOTE — TELEPHONE ENCOUNTER
Spoke with pt and explained that we have been trying to call and have left messages.  He stated that his phone has not been working well.  I did confirm with him that an antibiotic was sent to his pharmacy.

## 2019-05-31 DIAGNOSIS — M25.562 LEFT KNEE PAIN, UNSPECIFIED CHRONICITY: Primary | ICD-10-CM

## 2019-05-31 PROBLEM — J44.9 STAGE 2 MODERATE COPD BY GOLD CLASSIFICATION (HCC): Chronic | Status: ACTIVE | Noted: 2019-05-31

## 2019-05-31 PROBLEM — J44.9 STAGE 2 MODERATE COPD BY GOLD CLASSIFICATION: Status: ACTIVE | Noted: 2019-05-31

## 2019-06-03 ENCOUNTER — OFFICE VISIT (OUTPATIENT)
Dept: ORTHOPEDIC SURGERY | Facility: CLINIC | Age: 64
End: 2019-06-03

## 2019-06-03 ENCOUNTER — OFFICE VISIT (OUTPATIENT)
Dept: PULMONOLOGY | Facility: CLINIC | Age: 64
End: 2019-06-03

## 2019-06-03 VITALS
HEIGHT: 74 IN | HEART RATE: 96 BPM | DIASTOLIC BLOOD PRESSURE: 82 MMHG | BODY MASS INDEX: 36.57 KG/M2 | SYSTOLIC BLOOD PRESSURE: 142 MMHG | OXYGEN SATURATION: 98 % | WEIGHT: 285 LBS

## 2019-06-03 VITALS — BODY MASS INDEX: 36.7 KG/M2 | HEIGHT: 74 IN | WEIGHT: 286 LBS

## 2019-06-03 DIAGNOSIS — R53.81 PHYSICAL DECONDITIONING: ICD-10-CM

## 2019-06-03 DIAGNOSIS — G47.33 OSA TREATED WITH BIPAP: Chronic | ICD-10-CM

## 2019-06-03 DIAGNOSIS — J44.9 COPD, GROUP B, BY GOLD 2017 CLASSIFICATION (HCC): Chronic | ICD-10-CM

## 2019-06-03 DIAGNOSIS — E66.01 MORBID OBESITY (HCC): ICD-10-CM

## 2019-06-03 DIAGNOSIS — E66.01 CLASS 2 SEVERE OBESITY DUE TO EXCESS CALORIES WITH SERIOUS COMORBIDITY AND BODY MASS INDEX (BMI) OF 37.0 TO 37.9 IN ADULT (HCC): ICD-10-CM

## 2019-06-03 DIAGNOSIS — J44.9 STAGE 2 MODERATE COPD BY GOLD CLASSIFICATION (HCC): Primary | Chronic | ICD-10-CM

## 2019-06-03 DIAGNOSIS — F17.210 CIGARETTE NICOTINE DEPENDENCE, UNCOMPLICATED: ICD-10-CM

## 2019-06-03 DIAGNOSIS — M17.12 PRIMARY OSTEOARTHRITIS OF LEFT KNEE: Primary | ICD-10-CM

## 2019-06-03 PROCEDURE — 20610 DRAIN/INJ JOINT/BURSA W/O US: CPT | Performed by: NURSE PRACTITIONER

## 2019-06-03 PROCEDURE — 99406 BEHAV CHNG SMOKING 3-10 MIN: CPT | Performed by: INTERNAL MEDICINE

## 2019-06-03 PROCEDURE — 99214 OFFICE O/P EST MOD 30 MIN: CPT | Performed by: INTERNAL MEDICINE

## 2019-06-03 RX ORDER — LIDOCAINE HYDROCHLORIDE 10 MG/ML
2 INJECTION, SOLUTION EPIDURAL; INFILTRATION; INTRACAUDAL; PERINEURAL
Status: COMPLETED | OUTPATIENT
Start: 2019-06-03 | End: 2019-06-03

## 2019-06-03 RX ORDER — TRIAMCINOLONE ACETONIDE 40 MG/ML
40 INJECTION, SUSPENSION INTRA-ARTICULAR; INTRAMUSCULAR
Status: COMPLETED | OUTPATIENT
Start: 2019-06-03 | End: 2019-06-03

## 2019-06-03 RX ADMIN — TRIAMCINOLONE ACETONIDE 40 MG: 40 INJECTION, SUSPENSION INTRA-ARTICULAR; INTRAMUSCULAR at 14:45

## 2019-06-03 RX ADMIN — LIDOCAINE HYDROCHLORIDE 2 ML: 10 INJECTION, SOLUTION EPIDURAL; INFILTRATION; INTRACAUDAL; PERINEURAL at 14:45

## 2019-06-03 NOTE — PROGRESS NOTES
Arias Willson is a 63 y.o. male returns for     Chief Complaint   Patient presents with   • Left Knee - Pain       HISTORY OF PRESENT ILLNESS:     63-year-old  male in the office today for evaluation of left knee pain which she is experienced in the past.  Patient is a long standing history of low left knee pain with previous multiple traumatic injuries.  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  I have aspirated this knee several times and at this time the patient wants to repeat the aspiration.  He denies any fever chills or evidence of infection.       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 Grade I diastolic dysfunction of the LV myocardium. No evidence of LV apical thrombus. No evidence of pericardial effusion.)   • Gus's syndrome     RIGHT EYE   • Hyperkeratosis    • Hyperlipidemia    • Mucopurulent conjunctivitis     ACUTE   • Myopia    • Neuropathic pain    • Obesity    • Onychomycosis    • Tobacco dependence syndrome        No Known Allergies      Current Outpatient Medications:   •  ammonium lactate (AMLACTIN) 12 % lotion, Apply   topically to the appropriate area as directed As Needed for Dry Skin., Disp: 500 g, Rfl: 5  •  [START ON 5/24/2106] aspirin 325 MG tablet, Take 325 mg by mouth daily., Disp: , Rfl:   •  baclofen (LIORESAL) 10 MG tablet, Take 1 tablet by mouth 3 (Three) Times a Day., Disp: , Rfl:   •  clopidogrel (PLAVIX) 75 MG tablet, Take 1 tablet by mouth Daily., Disp: 90 tablet, Rfl: 3  •  Cyanocobalamin (B-12) 5000 MCG sublingual tablet, Place 1 each under the tongue Daily., Disp: 30 tablet, Rfl: 6  •  DULoxetine (CYMBALTA) 60 MG capsule, Take 1 capsule by mouth Daily., Disp: 30 capsule, Rfl: 5  •  Empagliflozin 25 MG tablet, Take 25 mg by mouth Daily., Disp: 30 tablet, Rfl: 5  •  gabapentin (NEURONTIN) 600 MG tablet, Take 1 tablet by mouth 3 (Three) Times a Day As Needed (Take 600mg TID prn)., Disp: 90 tablet, Rfl: 4  •  lansoprazole (PREVACID) 30 MG capsule, TAKE ONE CAPSULE BY MOUTH ONCE DAILY, Disp: 30 capsule, Rfl: 5  •  losartan (COZAAR) 50 MG tablet, TAKE ONE TABLET BY MOUTH ONCE DAILY, Disp: 30 tablet, Rfl: 5  •  metFORMIN (GLUCOPHAGE) 500 MG tablet, Take 1 tablet by mouth 2 (Two) Times a Day., Disp: 60 tablet, Rfl: 5  •  metoprolol tartrate (LOPRESSOR) 25 MG tablet, Take 1 tablet by mouth Every 12 (Twelve) Hours., Disp: 180 tablet, Rfl: 3  •  naphazoline (NAPHCON) 0.1 % ophthalmic solution, Apply 1 drop to eye 4 (Four) Times a Day As Needed for Irritation., Disp: 15 mL, Rfl: 5  •  Omega-3 Fatty Acids (FISH OIL) 1000 MG capsule capsule, Take 2,000 mg by mouth Daily With Breakfast., Disp: , Rfl:   •  pravastatin (PRAVACHOL) 40 MG tablet, Take 1 tablet by mouth Every Night., Disp: 90 tablet, Rfl: 3  •  spironolactone (ALDACTONE) 25 MG tablet, Take 1 tablet by mouth Daily., Disp: 90 tablet, Rfl: 3  •  umeclidinium-vilanterol (ANORO ELLIPTA) 62.5-25 MCG/INH aerosol powder  inhaler, Inhale 1 puff Daily., Disp: 1 each, Rfl: 11    Past Surgical History:   Procedure Laterality Date   • COLONOSCOPY  07/30/2015    Colonoscopy,  "diagnostic (screening) 17476 (Screening for malignant neoplasm of colon)    • COLONOSCOPY  09/09/2015    Colonoscopy, diagnostic (screening) 91953 (Diverticulosis found in the sigmoid colon.Hemorrhoids found in the anus.)   • COLONOSCOPY  05/01/2009    Colonoscopy, diagnostic (screening) 53673 (Small internal and external hemorrhoids were present, Colonoscopy otherwise normal.)   • LEG SURGERY             ROS: Review of systems has been updated as of today's date.  All other systems are negative except as noted previously.    PHYSICAL EXAMINATION:       Ht 188 cm (74\")   Wt 130 kg (286 lb)   BMI 36.72 kg/m²     Physical Exam   Constitutional: He is oriented to person, place, and time. Vital signs are normal. He appears well-developed and well-nourished. He is cooperative.   HENT:   Head: Normocephalic and atraumatic.   Neck: Trachea normal and phonation normal.   Pulmonary/Chest: Effort normal. No respiratory distress.   Abdominal: Soft. Normal appearance. He exhibits no distension.   Neurological: He is alert and oriented to person, place, and time. GCS eye subscore is 4. GCS verbal subscore is 5. GCS motor subscore is 6.   Skin: Skin is warm, dry and intact. Capillary refill takes less than 2 seconds.   Psychiatric: He has a normal mood and affect. His speech is normal and behavior is normal. Judgment and thought content normal. Cognition and memory are normal.   Vitals reviewed.      GAIT:     []  Normal  []  Antalgic    Assistive device: []  None  []  Walker     []  Crutches  []  Cane     [x]  Wheelchair  []  Stretcher    Ortho Exam      No results found.          ASSESSMENT:    Diagnoses and all orders for this visit:    Primary osteoarthritis of left knee  -     Large Joint Arthrocentesis: L knee    Morbid obesity (CMS/HCC)      Large Joint Arthrocentesis: L knee  Date/Time: 6/3/2019 2:45 PM  Consent given by: patient  Site marked: site marked  Timeout: Immediately prior to procedure a time out was called to " verify the correct patient, procedure, equipment, support staff and site/side marked as required   Supporting Documentation  Indications: pain   Procedure Details  Location: knee - L knee  Preparation: Patient was prepped and draped in the usual sterile fashion  Needle size: 18 G  Approach: lateral  Medications administered: 2 mL lidocaine PF 1% 1 %; 40 mg triamcinolone acetonide 40 MG/ML  Aspirate amount: 50 mL  Aspirate: yellow and blood-tinged  Patient tolerance: patient tolerated the procedure well with no immediate complications            PLAN  Repeated the aspiration and recommending continued gentle range of motion exercises weightbearing as tolerated and follow-up as needed for exacerbation of the pain.  Patient will need standing AP and lateral views of both knees during the next visit.  No Follow-up on file.      This document has been electronically signed by TANESHA Haney on Leslie 3, 2019 3:43 PM

## 2019-06-04 DIAGNOSIS — M79.2 NEUROPATHIC PAIN: Primary | ICD-10-CM

## 2019-06-05 RX ORDER — GABAPENTIN 600 MG/1
600 TABLET ORAL 3 TIMES DAILY PRN
Qty: 90 TABLET | Refills: 2 | Status: SHIPPED | OUTPATIENT
Start: 2019-06-05 | End: 2019-06-11 | Stop reason: SDUPTHER

## 2019-06-05 NOTE — TELEPHONE ENCOUNTER
Patient would like to get a refill on his gabapentin 600 mg faxed to [facility]. He stated that this med helps his chronic neuropathic pain with a pain score of 2-3/10. Pt denies any side effects/adverse reactions. He has an appt on 6/11/19.

## 2019-06-11 ENCOUNTER — OFFICE VISIT (OUTPATIENT)
Dept: FAMILY MEDICINE CLINIC | Facility: CLINIC | Age: 64
End: 2019-06-11

## 2019-06-11 ENCOUNTER — TELEPHONE (OUTPATIENT)
Dept: FAMILY MEDICINE CLINIC | Facility: CLINIC | Age: 64
End: 2019-06-11

## 2019-06-11 VITALS
HEART RATE: 90 BPM | DIASTOLIC BLOOD PRESSURE: 84 MMHG | WEIGHT: 285.3 LBS | TEMPERATURE: 97.6 F | HEIGHT: 74 IN | BODY MASS INDEX: 36.61 KG/M2 | SYSTOLIC BLOOD PRESSURE: 132 MMHG | OXYGEN SATURATION: 96 %

## 2019-06-11 DIAGNOSIS — K21.9 GASTROESOPHAGEAL REFLUX DISEASE WITHOUT ESOPHAGITIS: ICD-10-CM

## 2019-06-11 DIAGNOSIS — M25.561 RIGHT KNEE PAIN, UNSPECIFIED CHRONICITY: ICD-10-CM

## 2019-06-11 DIAGNOSIS — I69.398 GAIT DISTURBANCE, POST-STROKE: ICD-10-CM

## 2019-06-11 DIAGNOSIS — M25.562 CHRONIC PAIN OF LEFT KNEE: ICD-10-CM

## 2019-06-11 DIAGNOSIS — E11.69 TYPE 2 DIABETES MELLITUS WITH OTHER SPECIFIED COMPLICATION, WITHOUT LONG-TERM CURRENT USE OF INSULIN (HCC): Chronic | ICD-10-CM

## 2019-06-11 DIAGNOSIS — G89.29 CHRONIC PAIN OF LEFT KNEE: ICD-10-CM

## 2019-06-11 DIAGNOSIS — M79.2 NEUROPATHIC PAIN: ICD-10-CM

## 2019-06-11 DIAGNOSIS — L02.93 CARBUNCLE: Primary | ICD-10-CM

## 2019-06-11 DIAGNOSIS — G47.33 OSA TREATED WITH BIPAP: Chronic | ICD-10-CM

## 2019-06-11 DIAGNOSIS — R26.9 GAIT DISTURBANCE, POST-STROKE: ICD-10-CM

## 2019-06-11 PROCEDURE — 99214 OFFICE O/P EST MOD 30 MIN: CPT | Performed by: FAMILY MEDICINE

## 2019-06-11 RX ORDER — GABAPENTIN 600 MG/1
600 TABLET ORAL 3 TIMES DAILY PRN
Qty: 270 TABLET | Refills: 0 | Status: SHIPPED | OUTPATIENT
Start: 2019-06-11 | End: 2021-04-08 | Stop reason: SDUPTHER

## 2019-06-11 RX ORDER — BACLOFEN 10 MG/1
10 TABLET ORAL 3 TIMES DAILY
Qty: 90 TABLET | Refills: 5 | Status: SHIPPED | OUTPATIENT
Start: 2019-06-11 | End: 2020-01-22

## 2019-06-11 NOTE — TELEPHONE ENCOUNTER
Someone from [facility] (?) called and asked for a return call regarding a prescription. [phone]

## 2019-06-11 NOTE — PROGRESS NOTES
Subjective    Arias Willson is a 63 y.o. obese white male with HTN, Had TIA 2- , began with double vision, R facial numbness, L arm numbness, L leg weakness. 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 Pt was referred to Neurosurgery in [place], told has aneurysm, but surgery not recommended for this area. 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 Had resolution of most motor weakness. Has residual difficulty with balance. Cognitive difficulties, Depression, DM, High cholesterol, Chronic pain, Hospital admit Jan 2018 with Resp failure, L lower lung nodule, Hypoxia, NEREYDA on Bi-pap Pt presents for exam, to discuss health problems, Tx and F/U plans.     ' Haven't been checking FSBS, trying to improve diet. Neurontin helps leg pain. B/P has been OK. Have not been passing out. Knees hurt. Have ruputured carbuncle on L lower leg '.      Hypertension   This is a chronic problem. The current episode started more than 1 year ago. The problem has been waxing and waning since onset. The problem is controlled. Associated symptoms include headaches, neck pain and shortness of breath. Pertinent negatives include no chest pain or palpitations. Agents associated with hypertension include NSAIDs. Risk factors for coronary artery disease include obesity, male gender, smoking/tobacco exposure and stress (H/O CVA). Past treatments include ACE inhibitors, angiotensin blockers, beta blockers, diuretics and lifestyle changes. Current antihypertension treatment includes angiotensin blockers and diuretics. The current treatment provides significant improvement. Compliance problems include medication cost.  Hypertensive end-organ damage includes CVA.   Diabetes   He presents for his follow-up diabetic visit. He has type 2 diabetes  Subjective   Used written text and sign language to interview         Abdominal Pain   Pain location:  Generalized  Pain quality: aching    Pain radiates to:  Does not radiate  Pain severity:  Moderate  Onset quality:  Gradual  Timing:  Constant  Progression:  Worsening  Context: not alcohol use, not awakening from sleep, not diet changes, not eating, not previous surgeries, not recent illness, not sick contacts, not suspicious food intake and not trauma    Relieved by:  Nothing  Worsened by:  Nothing  Ineffective treatments:  None tried  Associated symptoms: hematochezia and vomiting    Associated symptoms: no anorexia, no belching, no chest pain, no chills, no cough, no dysuria, no fever, no flatus, no hematemesis, no melena, no nausea, no shortness of breath, no sore throat and no vaginal bleeding    Risk factors: NSAID use    Risk factors: has not had multiple surgeries and not obese    Vomiting   The primary symptoms include abdominal pain, vomiting and hematochezia. Primary symptoms do not include fever, nausea, melena, hematemesis or dysuria.   The illness does not include chills or anorexia.       Review of Systems   Constitutional: Negative.  Negative for chills and fever.   HENT: Negative.  Negative for sore throat.    Eyes: Negative.    Respiratory: Negative.  Negative for cough and shortness of breath.    Cardiovascular: Negative.  Negative for chest pain.   Gastrointestinal: Positive for abdominal pain, hematochezia and vomiting. Negative for anorexia, flatus, hematemesis, melena and nausea.   Endocrine: Negative.    Genitourinary: Negative.  Negative for dysuria and vaginal bleeding.   Skin: Negative.    Neurological: Negative.    Hematological: Negative.    All other systems reviewed and are negative.      Past Medical History:   Diagnosis Date   • Anxiety    • Deaf    • Hearing impaired    • THA virus antibody positive    • Multiple sclerosis (CMS/HCC)        Allergies   Allergen Reactions   •  Shellfish-Derived Products Anaphylaxis   • Hydrocodone Nausea And Vomiting   • Aldomet [Methyldopa] Rash     Pt denies    • Augmentin [Amoxicillin-Pot Clavulanate] Rash       History reviewed. No pertinent surgical history.    Family History   Problem Relation Age of Onset   • Dementia Maternal Grandmother    • Cancer Paternal Grandmother    • Hypertension Paternal Grandmother    • Heart disease Paternal Grandfather        Social History     Socioeconomic History   • Marital status: Single     Spouse name: Not on file   • Number of children: Not on file   • Years of education: Not on file   • Highest education level: Not on file   Tobacco Use   • Smoking status: Never Smoker   • Smokeless tobacco: Never Used   Substance and Sexual Activity   • Alcohol use: No     Frequency: Never     Comment: socially   • Drug use: Defer   • Sexual activity: Yes     Partners: Female     Birth control/protection: Condom           Objective   Physical Exam   Constitutional: He is oriented to person, place, and time. He appears well-developed and well-nourished.  Non-toxic appearance.   HENT:   Head: Normocephalic and atraumatic.   Mouth/Throat: Oropharynx is clear and moist.   Eyes: Conjunctivae are normal. Pupils are equal, round, and reactive to light.   Neck: Normal range of motion. Neck supple. No hepatojugular reflux and no JVD present.   Cardiovascular: Normal rate, regular rhythm, normal heart sounds and intact distal pulses. PMI is not displaced. Exam reveals no decreased pulses.   No murmur heard.  Pulmonary/Chest: Effort normal and breath sounds normal. No accessory muscle usage. No apnea. No respiratory distress. He has no decreased breath sounds. He has no wheezes.   Abdominal: Normal appearance, normal aorta and bowel sounds are normal. He exhibits no shifting dullness, no distension, no fluid wave, no abdominal bruit, no ascites, no pulsatile midline mass and no mass. There is no tenderness. There is no guarding.  mellitus. No MedicAlert identification noted. His disease course has been fluctuating. Hypoglycemia symptoms include confusion, dizziness, headaches, nervousness/anxiousness and seizures. Associated symptoms include fatigue, visual change and weakness. Pertinent negatives for diabetes include no chest pain. Weight loss: neuro. There are no hypoglycemic complications. Symptoms are worsening. Diabetic complications include a CVA and peripheral neuropathy. Risk factors for coronary artery disease include diabetes mellitus, male sex, tobacco exposure, stress, sedentary lifestyle, obesity, hypertension and dyslipidemia. Current diabetic treatment includes oral agent (monotherapy). He is compliant with treatment some of the time. His weight is increasing rapidly. He is following a generally unhealthy diet. When asked about meal planning, he reported none. He rarely participates in exercise. Home blood sugar record trend: Unknown. An ACE inhibitor/angiotensin II receptor blocker is being taken. He does not see a podiatrist.Eye exam is current.   Insomnia   This is a chronic problem. The current episode started more than 1 year ago. The problem occurs constantly. The problem has been waxing and waning. Associated symptoms include fatigue, headaches, neck pain, numbness, a visual change and weakness. Pertinent negatives include no abdominal pain, arthralgias, chest pain, chills, congestion, coughing, fever, nausea, rash or sore throat. The symptoms are aggravated by stress. Treatments tried: OTC meds CPAP. The treatment provided significant relief.   Breathing Problem   He complains of difficulty breathing and shortness of breath. There is no cough or wheezing. Primary symptoms comments: Using 02 since hospitalization Jan 2018, H/O low pulse-ox, seizures like spells. Improved tx NEREYDA on Bipap. Associated symptoms include headaches. Pertinent negatives include no appetite change, chest pain, ear pain, fever or sore throat.    Genitourinary: Rectal exam shows guaiac negative stool.   Musculoskeletal: Normal range of motion.   Neurological: He is alert and oriented to person, place, and time. He has normal strength and normal reflexes. No cranial nerve deficit. GCS eye subscore is 4. GCS verbal subscore is 5. GCS motor subscore is 6.   Skin: Skin is warm and dry.   Psychiatric: He has a normal mood and affect. His behavior is normal.   Nursing note and vitals reviewed.      Procedures           ED Course  ED Course as of Dec 20 1246   u Dec 20, 2018   1237 Case discussed with the patient and his CT is negative no further vomiting  [TS]      ED Course User Index  [TS] Edmundo Clemons MD                  [facility]      Final diagnoses:   Cannabinoid hyperemesis syndrome (CMS/HCC)            Edmundo Clemons MD  12/20/18 1245     Weight loss: neuro. His symptoms are aggravated by exercise. His symptoms are alleviated by nothing. He reports moderate (O2) improvement on treatment.   Stroke   This is a chronic problem. The current episode started more than 1 year ago. The problem occurs daily. The problem has been gradually improving. Associated symptoms include fatigue, headaches, neck pain, numbness, a visual change and weakness. Pertinent negatives include no abdominal pain, arthralgias, chest pain, chills, congestion, coughing, fever, nausea, rash or sore throat. The symptoms are aggravated by exertion and walking. He has tried acetaminophen and NSAIDs for the symptoms. The treatment provided no relief.   Pain   This is a chronic problem. The current episode started more than 1 year ago. The problem occurs daily. Associated symptoms include fatigue, headaches, neck pain, numbness, a visual change and weakness. Pertinent negatives include no abdominal pain, arthralgias, chest pain, chills, congestion, coughing, fever, nausea, rash or sore throat. The symptoms are aggravated by walking and stress. He has tried NSAIDs and acetaminophen for the symptoms. The treatment provided no relief.   Neurologic Problem   The patient's primary symptoms include an altered mental status, clumsiness, focal sensory loss, focal weakness, a loss of balance, a visual change and weakness. Primary symptoms comment: Delayed cognitive processing. Seizures. This is a chronic problem. The current episode started more than 1 year ago. The neurological problem developed suddenly. The problem has been waxing and waning since onset. There was left-sided, right-sided, facial, upper extremity and lower extremity (Strokes of Basal ganglia. Horners syndrome,  sensory, motor deficits.) focality noted. Associated symptoms include back pain, confusion, dizziness, fatigue, headaches, neck pain and shortness of breath. Pertinent negatives include no abdominal pain, chest pain,  fever, nausea or palpitations. (Requires Walker to ambulate.) Past treatments include walking and medication (Physical therapy). The treatment provided mild relief. His past medical history is significant for a CVA.   Depression   Visit Type: follow-up  Patient presents with the following symptoms: confusion, decreased concentration, depressed mood, excessive worry, feelings of hopelessness, feelings of worthlessness, insomnia, irritability, memory impairment, nervousness/anxiety, restlessness and shortness of breath.  Patient is not experiencing: palpitations, suicidal ideas and suicidal planning.  Weight loss: neuro.  Frequency of symptoms: occasionally   Severity: moderate   Sleep quality: fair  Nighttime awakenings: several         The following portions of the patient's history were reviewed and updated as appropriate: allergies, current medications, past family history, past medical history, past social history, past surgical history and problem list.    Review of Systems   Constitutional: Positive for fatigue and irritability. Negative for activity change, appetite change, chills and fever. Weight loss: neuro.   HENT: Negative for congestion, ear pain and sore throat.    Eyes: Negative for pain and visual disturbance.   Respiratory: Positive for shortness of breath. Negative for cough, chest tightness and wheezing.    Cardiovascular: Negative for chest pain and palpitations.   Gastrointestinal: Negative for abdominal pain and nausea.   Endocrine: Negative for cold intolerance and heat intolerance.   Genitourinary: Negative for difficulty urinating and dysuria.   Musculoskeletal: Positive for back pain, gait problem, neck pain and neck stiffness. Negative for arthralgias.   Skin: Negative for color change and rash.   Allergic/Immunologic: Negative for environmental allergies.   Neurological: Positive for dizziness, focal weakness, seizures, weakness, numbness, headaches and loss of balance.   Hematological:  Negative for adenopathy. Does not bruise/bleed easily.   Psychiatric/Behavioral: Positive for confusion, decreased concentration and sleep disturbance. Negative for agitation and suicidal ideas. The patient is nervous/anxious and has insomnia.         Depressed mood       Objective   Physical Exam   Constitutional: He is oriented to person, place, and time. He appears well-developed and well-nourished. No distress.   HENT:   Head: Normocephalic and atraumatic.   Right Ear: External ear normal.   Left Ear: External ear normal.   Nose: Nose normal.   Mouth/Throat: Oropharynx is clear and moist. No oropharyngeal exudate.   Eyes: Conjunctivae and EOM are normal. Pupils are equal, round, and reactive to light. Right eye exhibits no discharge. Left eye exhibits no discharge. No scleral icterus.   Neck: Normal range of motion. Neck supple. No JVD present. No tracheal deviation present. No thyromegaly present.   Cardiovascular: Normal rate, regular rhythm, normal heart sounds and intact distal pulses. Exam reveals no gallop and no friction rub.   No murmur heard.  Pulmonary/Chest: Effort normal and breath sounds normal. No respiratory distress. He has no wheezes. He has no rales. He exhibits no tenderness.   Abdominal: Soft. Bowel sounds are normal. He exhibits no distension and no mass. There is no tenderness. No hernia.   Musculoskeletal: He exhibits no edema, tenderness or deformity.   Requires WC, Walks short distance with cane, L sided sensory deficit, R motor, drift towards R without cane. R pupil NR, Has weakness closing R eye CN VII.   Has unsteady antalgic gait.    Has crepitous with ROM of knees. WB 6   Lymphadenopathy:     He has no cervical adenopathy.   Neurological: He is alert and oriented to person, place, and time. He displays normal reflexes. A cranial nerve deficit is present. He exhibits normal muscle tone. Coordination normal.   Skin: Skin is warm and dry. No rash noted. He is not diaphoretic. No  erythema. No pallor.   Psychiatric: His behavior is normal. Judgment and thought content normal.   Chronic situational depression, post stroke, improved on meds.   Nursing note and vitals reviewed.      Assessment/Plan   Arias was seen today for hypertension, diabetes and insomnia.    Diagnoses and all orders for this visit:    Carbuncle    Neuropathic pain  -     gabapentin (NEURONTIN) 600 MG tablet; Take 1 tablet by mouth 3 (Three) Times a Day As Needed (Take 600mg TID prn) for up to 90 days.    Type 2 diabetes mellitus with other specified complication, without long-term current use of insulin (CMS/[facility])  -     Hemoglobin A1c; Future    Gait disturbance, post-stroke    Gastroesophageal reflux disease without esophagitis    Chronic pain of left knee    NEREYDA treated with BiPAP    Right knee pain, unspecified chronicity    Other orders  -     baclofen (LIORESAL) 10 MG tablet; Take 1 tablet by mouth 3 (Three) Times a Day.  -     mupirocin (BACTROBAN) 2 % ointment; Apply  topically to the appropriate area as directed 3 (Three) Times a Day.      Discussed exam, health problems, meds, indications, Tx plan. Safety with controlled meds. Discussed F/U with specialist. Discussed F/Unhere.  Patient's Body mass index is 36.63 kg/m². BMI is above normal parameters. Recommendations include: educational material, exercise counseling, nutrition counseling and referral to primary care.          This document has been electronically signed by Harpreet Bass MD

## 2019-06-21 ENCOUNTER — LAB (OUTPATIENT)
Dept: LAB | Facility: HOSPITAL | Age: 64
End: 2019-06-21

## 2019-06-21 DIAGNOSIS — E11.69 TYPE 2 DIABETES MELLITUS WITH OTHER SPECIFIED COMPLICATION, WITHOUT LONG-TERM CURRENT USE OF INSULIN (HCC): Chronic | ICD-10-CM

## 2019-06-21 LAB — HBA1C MFR BLD: 9.71 % (ref 4.8–5.6)

## 2019-06-21 PROCEDURE — 83036 HEMOGLOBIN GLYCOSYLATED A1C: CPT

## 2019-06-21 PROCEDURE — 36415 COLL VENOUS BLD VENIPUNCTURE: CPT

## 2019-06-24 ENCOUNTER — TELEPHONE (OUTPATIENT)
Dept: FAMILY MEDICINE CLINIC | Facility: CLINIC | Age: 64
End: 2019-06-24

## 2019-06-24 NOTE — TELEPHONE ENCOUNTER
Left message on pt voicemail of results and need to start daily insulin.  P/A is required for the Tresiba. Explained to pt to come and  a sample until we can get the med approved.  Patient to call if any questions or come to the office for a sample and directions on use.

## 2019-06-24 NOTE — TELEPHONE ENCOUNTER
----- Message from Harpreet Bass MD sent at 6/24/2019  8:10 AM CDT -----  Blood sugars are still uncontrolled HgbA1c 9.71 goal is 7 or less, sending daily insulin to start.. Should follow 2000 chapo/day ADA diet. Make sure taking other meds.

## 2019-06-25 NOTE — TELEPHONE ENCOUNTER
Tresiba is not covered by his insurance.  Lantus is showing on list as formulary. Ok to use the Lantus with same dosage?

## 2019-06-26 ENCOUNTER — TELEPHONE (OUTPATIENT)
Dept: FAMILY MEDICINE CLINIC | Facility: CLINIC | Age: 64
End: 2019-06-26

## 2019-06-26 NOTE — TELEPHONE ENCOUNTER
----- Message from Araseli Cano MA sent at 6/26/2019  4:26 PM CDT -----  Regarding: appointment  Pt would like to come in Friday for teaching if possible. Please call [phone]

## 2019-06-26 NOTE — TELEPHONE ENCOUNTER
Spoke with patient and he will come in this Friday at 3:00 pm for teaching on how to give insulin.

## 2019-06-28 ENCOUNTER — CLINICAL SUPPORT (OUTPATIENT)
Dept: FAMILY MEDICINE CLINIC | Facility: CLINIC | Age: 64
End: 2019-06-28

## 2019-06-28 RX ORDER — INSULIN GLARGINE 100 [IU]/ML
20 INJECTION, SOLUTION SUBCUTANEOUS DAILY
Qty: 1 EACH | Refills: 0 | COMMUNITY
Start: 2019-06-28 | End: 2019-12-05 | Stop reason: SDUPTHER

## 2019-06-28 NOTE — PROGRESS NOTES
Patient presented to the office for insulin teaching per his request.  Per Dr Bass: Patient is to start Lantus 20 units nightly, increase by 2 units every 3 days if morning fasting blood sugar is greater than 150. Goal is for fasting blood sugar to be between 100-150.  If morning fasting blood sugar drops below 100 then patient can decrease by 2 units nightly until at goal. Patient to stay with a 2000 calorie ADA diet spread out over 3-4 small meals. Patient instructed on how to set up his Lantus pen, proper storage, and how to put the pen needle on.  Locations for injection sites shown to patient and need for rotation, how to inject by cleaning area first with alcohol, and how to dispose of needle. Handouts given with all instructions and caloris/ carb counting book given. Patient verbalized understanding on all instructions. Patient to call the office if any questions. Sample of Lantus Solostar x 1 pen given to patient today, ok per v.o. Dr Bass.  Lot #[id number]  Exp: 8/31/21.

## 2019-07-03 DIAGNOSIS — E11.69 TYPE 2 DIABETES MELLITUS WITH OTHER SPECIFIED COMPLICATION, WITHOUT LONG-TERM CURRENT USE OF INSULIN (HCC): Primary | Chronic | ICD-10-CM

## 2019-07-03 RX ORDER — BLOOD-GLUCOSE METER
KIT MISCELLANEOUS 3 TIMES DAILY
Qty: 1 EACH | Refills: 0 | Status: SHIPPED | OUTPATIENT
Start: 2019-07-03

## 2019-07-15 ENCOUNTER — TELEPHONE (OUTPATIENT)
Dept: FAMILY MEDICINE CLINIC | Facility: CLINIC | Age: 64
End: 2019-07-15

## 2019-07-15 DIAGNOSIS — J44.9 COPD, GROUP B, BY GOLD 2017 CLASSIFICATION (HCC): ICD-10-CM

## 2019-07-15 RX ORDER — CEPHALEXIN 500 MG/1
500 CAPSULE ORAL 3 TIMES DAILY
Qty: 21 CAPSULE | Refills: 0 | Status: SHIPPED | OUTPATIENT
Start: 2019-07-15 | End: 2019-09-12

## 2019-07-15 NOTE — TELEPHONE ENCOUNTER
Patient states he has a sinus infection symptoms are: stuffy nose, facial swelling, no fever, cough, fatigue.  Patient would like something sent to [facility] in [place].

## 2019-07-26 ENCOUNTER — HOSPITAL ENCOUNTER (OUTPATIENT)
Dept: CT IMAGING | Facility: HOSPITAL | Age: 64
Discharge: HOME | End: 2019-07-26
Admitting: INTERNAL MEDICINE

## 2019-07-26 DIAGNOSIS — F17.210 CIGARETTE NICOTINE DEPENDENCE, UNCOMPLICATED: ICD-10-CM

## 2019-07-26 PROCEDURE — G0297 LDCT FOR LUNG CA SCREEN: HCPCS

## 2019-08-12 RX ORDER — LANSOPRAZOLE 30 MG/1
CAPSULE, DELAYED RELEASE ORAL
Qty: 30 CAPSULE | Refills: 5 | Status: SHIPPED | OUTPATIENT
Start: 2019-08-12 | End: 2020-01-22

## 2019-08-12 RX ORDER — LOSARTAN POTASSIUM 50 MG/1
TABLET ORAL
Qty: 30 TABLET | Refills: 5 | Status: SHIPPED | OUTPATIENT
Start: 2019-08-12 | End: 2020-01-22

## 2019-08-12 RX ORDER — DULOXETIN HYDROCHLORIDE 60 MG/1
CAPSULE, DELAYED RELEASE ORAL
Qty: 30 CAPSULE | Refills: 5 | Status: SHIPPED | OUTPATIENT
Start: 2019-08-12 | End: 2020-01-22

## 2019-09-12 ENCOUNTER — OFFICE VISIT (OUTPATIENT)
Dept: FAMILY MEDICINE CLINIC | Facility: CLINIC | Age: 64
End: 2019-09-12

## 2019-09-12 VITALS
HEART RATE: 86 BPM | TEMPERATURE: 98.1 F | OXYGEN SATURATION: 97 % | DIASTOLIC BLOOD PRESSURE: 86 MMHG | SYSTOLIC BLOOD PRESSURE: 124 MMHG | BODY MASS INDEX: 35.59 KG/M2 | HEIGHT: 74 IN | WEIGHT: 277.3 LBS

## 2019-09-12 DIAGNOSIS — G47.33 OSA TREATED WITH BIPAP: Chronic | ICD-10-CM

## 2019-09-12 DIAGNOSIS — I10 ESSENTIAL HYPERTENSION: Chronic | ICD-10-CM

## 2019-09-12 DIAGNOSIS — R26.9 GAIT DISTURBANCE, POST-STROKE: ICD-10-CM

## 2019-09-12 DIAGNOSIS — Z86.73 H/O: CVA (CEREBROVASCULAR ACCIDENT): ICD-10-CM

## 2019-09-12 DIAGNOSIS — I72.0 ANEURYSM OF CAROTID ARTERY (HCC): Primary | ICD-10-CM

## 2019-09-12 DIAGNOSIS — R55 SYNCOPE, UNSPECIFIED SYNCOPE TYPE: ICD-10-CM

## 2019-09-12 DIAGNOSIS — I69.398 GAIT DISTURBANCE, POST-STROKE: ICD-10-CM

## 2019-09-12 DIAGNOSIS — E66.01 CLASS 2 SEVERE OBESITY DUE TO EXCESS CALORIES WITH SERIOUS COMORBIDITY AND BODY MASS INDEX (BMI) OF 35.0 TO 35.9 IN ADULT (HCC): ICD-10-CM

## 2019-09-12 DIAGNOSIS — E11.69 TYPE 2 DIABETES MELLITUS WITH OTHER SPECIFIED COMPLICATION, WITHOUT LONG-TERM CURRENT USE OF INSULIN (HCC): Chronic | ICD-10-CM

## 2019-09-12 DIAGNOSIS — K21.9 GASTROESOPHAGEAL REFLUX DISEASE WITHOUT ESOPHAGITIS: ICD-10-CM

## 2019-09-12 PROCEDURE — 99214 OFFICE O/P EST MOD 30 MIN: CPT | Performed by: FAMILY MEDICINE

## 2019-09-12 NOTE — PROGRESS NOTES
Subjective   Arias Willson is a 63 y.o. obese white male with HTN, Had TIA 2- , began with double vision, R facial numbness, L arm numbness, L leg weakness. 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 Pt was referred to Neurosurgery in [place], told has aneurysm, but surgery not recommended for this area. 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 Had resolution of most motor weakness. Has residual difficulty with balance. Cognitive difficulties, Depression, DM, High cholesterol, Chronic pain, Hospital admit Jan 2018 with Resp failure, L lower lung nodule, Hypoxia, NEREYDA on Bi-pap Pt presents for exam, to discuss health problems, Tx and F/U plans.     ' Has been able to get Diabetic meds, watching diet, checking FSBS, which are improving. B/P has been good.  Chronic Neuropathic Pain tolerable with Neurontin'.    Hypertension   This is a chronic problem. The current episode started more than 1 year ago. The problem has been waxing and waning since onset. The problem is controlled. Associated symptoms include headaches, neck pain and shortness of breath. Pertinent negatives include no chest pain or palpitations. Agents associated with hypertension include NSAIDs. Risk factors for coronary artery disease include obesity, male gender, smoking/tobacco exposure and stress (H/O CVA). Past treatments include ACE inhibitors, angiotensin blockers, beta blockers, diuretics and lifestyle changes. Current antihypertension treatment includes angiotensin blockers and diuretics. The current treatment provides significant improvement. Compliance problems include medication cost.  Hypertensive end-organ damage includes CVA.   Diabetes   He presents for his follow-up diabetic visit. He has type 2 diabetes mellitus. No  MedicAlert identification noted. His disease course has been fluctuating. Hypoglycemia symptoms include confusion, dizziness, headaches, nervousness/anxiousness and seizures. Associated symptoms include fatigue, visual change and weakness. Pertinent negatives for diabetes include no chest pain. Weight loss: neuro. There are no hypoglycemic complications. Symptoms are worsening. Diabetic complications include a CVA and peripheral neuropathy. Risk factors for coronary artery disease include diabetes mellitus, male sex, tobacco exposure, stress, sedentary lifestyle, obesity, hypertension and dyslipidemia. Current diabetic treatment includes oral agent (monotherapy). He is compliant with treatment some of the time. His weight is increasing rapidly. He is following a generally unhealthy diet. When asked about meal planning, he reported none. He rarely participates in exercise. Home blood sugar record trend: Unknown. An ACE inhibitor/angiotensin II receptor blocker is being taken. He does not see a podiatrist.Eye exam is current.   Insomnia   This is a chronic problem. The current episode started more than 1 year ago. The problem occurs constantly. The problem has been waxing and waning. Associated symptoms include fatigue, headaches, neck pain, numbness, a visual change and weakness. Pertinent negatives include no abdominal pain, arthralgias, chest pain, chills, congestion, coughing, fever, nausea, rash or sore throat. The symptoms are aggravated by stress. Treatments tried: OTC meds CPAP. The treatment provided significant relief.   Breathing Problem   He complains of difficulty breathing and shortness of breath. There is no cough or wheezing. Primary symptoms comments: Using 02 since hospitalization Jan 2018, H/O low pulse-ox, seizures like spells. Improved tx NEREYDA on Bipap. Associated symptoms include headaches. Pertinent negatives include no appetite change, chest pain, ear pain, fever or sore throat. Weight loss:  neuro. His symptoms are aggravated by exercise. His symptoms are alleviated by nothing. He reports moderate (O2) improvement on treatment.   Stroke   This is a chronic problem. The current episode started more than 1 year ago. The problem occurs daily. The problem has been gradually improving. Associated symptoms include fatigue, headaches, neck pain, numbness, a visual change and weakness. Pertinent negatives include no abdominal pain, arthralgias, chest pain, chills, congestion, coughing, fever, nausea, rash or sore throat. The symptoms are aggravated by exertion and walking. He has tried acetaminophen and NSAIDs for the symptoms. The treatment provided no relief.   Pain   This is a chronic problem. The current episode started more than 1 year ago. The problem occurs daily. Associated symptoms include fatigue, headaches, neck pain, numbness, a visual change and weakness. Pertinent negatives include no abdominal pain, arthralgias, chest pain, chills, congestion, coughing, fever, nausea, rash or sore throat. The symptoms are aggravated by walking and stress. He has tried NSAIDs and acetaminophen for the symptoms. The treatment provided no relief.   Neurologic Problem   The patient's primary symptoms include an altered mental status, clumsiness, focal sensory loss, focal weakness, a loss of balance, a visual change and weakness. Primary symptoms comment: Delayed cognitive processing. Seizures. This is a chronic problem. The current episode started more than 1 year ago. The neurological problem developed suddenly. The problem has been waxing and waning since onset. There was left-sided, right-sided, facial, upper extremity and lower extremity (Strokes of Basal ganglia. Horners syndrome,  sensory, motor deficits.) focality noted. Associated symptoms include back pain, confusion, dizziness, fatigue, headaches, neck pain and shortness of breath. Pertinent negatives include no abdominal pain, chest pain, fever, nausea or  palpitations. (Requires Walker to ambulate.) Past treatments include walking and medication (Physical therapy). The treatment provided mild relief. His past medical history is significant for a CVA.   Depression   Visit Type: follow-up  Patient presents with the following symptoms: confusion, decreased concentration, depressed mood, excessive worry, feelings of hopelessness, feelings of worthlessness, insomnia, irritability, memory impairment, nervousness/anxiety, restlessness and shortness of breath.  Patient is not experiencing: palpitations, suicidal ideas and suicidal planning.  Weight loss: neuro.  Frequency of symptoms: occasionally   Severity: moderate   Sleep quality: fair  Nighttime awakenings: several         The following portions of the patient's history were reviewed and updated as appropriate: allergies, current medications, past family history, past medical history, past social history, past surgical history and problem list.    Review of Systems   Constitutional: Positive for fatigue and irritability. Negative for activity change, appetite change, chills and fever. Weight loss: neuro.   HENT: Negative for congestion, ear pain and sore throat.    Eyes: Negative for pain and visual disturbance.   Respiratory: Positive for shortness of breath. Negative for cough, chest tightness and wheezing.    Cardiovascular: Negative for chest pain and palpitations.   Gastrointestinal: Negative for abdominal pain and nausea.   Endocrine: Negative for cold intolerance and heat intolerance.   Genitourinary: Negative for difficulty urinating and dysuria.   Musculoskeletal: Positive for back pain, gait problem, neck pain and neck stiffness. Negative for arthralgias.   Skin: Negative for color change and rash.   Allergic/Immunologic: Negative for environmental allergies.   Neurological: Positive for dizziness, focal weakness, seizures, weakness, numbness, headaches and loss of balance.   Hematological: Negative for  adenopathy. Does not bruise/bleed easily.   Psychiatric/Behavioral: Positive for confusion, decreased concentration and sleep disturbance. Negative for agitation and suicidal ideas. The patient is nervous/anxious and has insomnia.         Depressed mood       Objective   Physical Exam   Constitutional: He is oriented to person, place, and time. He appears well-developed and well-nourished. No distress.   HENT:   Head: Normocephalic and atraumatic.   Right Ear: External ear normal.   Left Ear: External ear normal.   Nose: Nose normal.   Mouth/Throat: Oropharynx is clear and moist. No oropharyngeal exudate.   Eyes: Conjunctivae and EOM are normal. Pupils are equal, round, and reactive to light. Right eye exhibits no discharge. Left eye exhibits no discharge. No scleral icterus.   Neck: Normal range of motion. Neck supple. No JVD present. No tracheal deviation present. No thyromegaly present.   Cardiovascular: Normal rate, regular rhythm, normal heart sounds and intact distal pulses. Exam reveals no gallop and no friction rub.   No murmur heard.  Pulmonary/Chest: Effort normal and breath sounds normal. No stridor. No respiratory distress. He has no wheezes. He has no rales. He exhibits no tenderness.   Abdominal: Soft. Bowel sounds are normal. He exhibits no distension and no mass. There is no tenderness. There is no rebound and no guarding. No hernia.   Musculoskeletal: He exhibits no edema, tenderness or deformity.   Requires WC, Walks short distance with cane, L sided sensory deficit, R motor, drift towards R without cane. R pupil NR, Has weakness closing R eye CN VII.   Has unsteady antalgic gait.    Has crepitous with ROM of knees. WB 6   Lymphadenopathy:     He has no cervical adenopathy.   Neurological: He is alert and oriented to person, place, and time. He displays normal reflexes. A cranial nerve deficit is present. No sensory deficit. He exhibits normal muscle tone. Coordination normal.   Skin: Skin is warm  and dry. Capillary refill takes 2 to 3 seconds. No rash noted. He is not diaphoretic. No erythema. No pallor.   Psychiatric: He has a normal mood and affect. His behavior is normal. Judgment and thought content normal.   Chronic situational depression, post stroke, improved on meds.   Nursing note and vitals reviewed.      Assessment/Plan   Arias was seen today for hypertension, diabetes and insomnia.    Diagnoses and all orders for this visit:    Aneurysm of carotid artery (CMS/[facility])    Type 2 diabetes mellitus with other specified complication, without long-term current use of insulin (CMS/[facility])  -     Hemoglobin A1c; Future  -     Hemoglobin A1c; Future    Class 2 severe obesity due to excess calories with serious comorbidity and body mass index (BMI) of 35.0 to 35.9 in adult (CMS/[facility])    Gait disturbance, post-stroke    H/O: CVA (cerebrovascular accident)    Gastroesophageal reflux disease without esophagitis    Essential hypertension    NEREYDA treated with BiPAP    Syncope, unspecified syncope type    Other orders  -     gabapentin (NEURONTIN) 600 MG tablet; Take 1 tablet by mouth 3 (Three) Times a Day.    Discussed exam, health problems, meds, indications, tx plan, rationale. Discussed standard of care for Diabetes. Discussed safety with controlled med. Discussed F/U with specialist. Discussed F/U here.   Patient's Body mass index is 35.6 kg/m². BMI is above normal parameters. Recommendations include: educational material, exercise counseling, nutrition counseling and referral to primary care.          This document has been electronically signed by Harpreet Bass MD

## 2019-09-13 RX ORDER — GABAPENTIN 600 MG/1
600 TABLET ORAL 3 TIMES DAILY
Qty: 270 TABLET | Refills: 1 | Status: SHIPPED | OUTPATIENT
Start: 2019-09-13 | End: 2020-04-13 | Stop reason: SDUPTHER

## 2019-09-16 ENCOUNTER — OFFICE VISIT (OUTPATIENT)
Dept: PULMONOLOGY | Facility: CLINIC | Age: 64
End: 2019-09-16

## 2019-09-16 ENCOUNTER — LAB (OUTPATIENT)
Dept: LAB | Facility: HOSPITAL | Age: 64
End: 2019-09-16

## 2019-09-16 VITALS
WEIGHT: 277 LBS | BODY MASS INDEX: 35.55 KG/M2 | HEART RATE: 80 BPM | HEIGHT: 74 IN | OXYGEN SATURATION: 97 % | SYSTOLIC BLOOD PRESSURE: 102 MMHG | DIASTOLIC BLOOD PRESSURE: 64 MMHG

## 2019-09-16 DIAGNOSIS — G47.33 OSA TREATED WITH BIPAP: Chronic | ICD-10-CM

## 2019-09-16 DIAGNOSIS — F17.210 CIGARETTE NICOTINE DEPENDENCE, UNCOMPLICATED: ICD-10-CM

## 2019-09-16 DIAGNOSIS — J44.9 STAGE 2 MODERATE COPD BY GOLD CLASSIFICATION (HCC): Primary | Chronic | ICD-10-CM

## 2019-09-16 DIAGNOSIS — J44.9 COPD, GROUP B, BY GOLD 2017 CLASSIFICATION (HCC): Chronic | ICD-10-CM

## 2019-09-16 DIAGNOSIS — R91.8 ABNORMAL CT SCAN OF LUNG: ICD-10-CM

## 2019-09-16 DIAGNOSIS — E11.69 TYPE 2 DIABETES MELLITUS WITH OTHER SPECIFIED COMPLICATION, WITHOUT LONG-TERM CURRENT USE OF INSULIN (HCC): Chronic | ICD-10-CM

## 2019-09-16 DIAGNOSIS — E66.01 CLASS 2 SEVERE OBESITY DUE TO EXCESS CALORIES WITH SERIOUS COMORBIDITY AND BODY MASS INDEX (BMI) OF 35.0 TO 35.9 IN ADULT (HCC): ICD-10-CM

## 2019-09-16 DIAGNOSIS — R53.81 PHYSICAL DECONDITIONING: ICD-10-CM

## 2019-09-16 PROCEDURE — 83036 HEMOGLOBIN GLYCOSYLATED A1C: CPT

## 2019-09-16 PROCEDURE — 36415 COLL VENOUS BLD VENIPUNCTURE: CPT

## 2019-09-16 PROCEDURE — 99214 OFFICE O/P EST MOD 30 MIN: CPT | Performed by: INTERNAL MEDICINE

## 2019-09-16 RX ORDER — ALBUTEROL SULFATE 90 UG/1
2 AEROSOL, METERED RESPIRATORY (INHALATION) EVERY 4 HOURS PRN
Qty: 18 G | Refills: 11 | Status: SHIPPED | OUTPATIENT
Start: 2019-09-16 | End: 2020-10-23

## 2019-09-16 RX ORDER — EMPAGLIFLOZIN 25 MG/1
TABLET, FILM COATED ORAL
Qty: 30 TABLET | Refills: 5 | Status: SHIPPED | OUTPATIENT
Start: 2019-09-16 | End: 2020-03-09

## 2019-09-16 NOTE — PROGRESS NOTES
Pulmonary Office Follow-up    Subjective     Arias Willson is seen today at the office for   Chief Complaint   Patient presents with   • COPD         HPI  Arias Willson is a 63 y.o. male with a PMH significant for COPD, tobacco use, morbid obesity, NEREYDA on BiPAP, ASCAD, HTN, CVA, seizures, T2DM, and GERD who presents for follow-up of COPD.     He was last seen on 6/3/19, at which time he was stable on Anoro daily and I spent time counseling him again on the importance of complete tobacco cessation.  He states that his breathing is stable on Anoro and he has not needed his albuterol since his last visit with me.  He does admit to some cough recently but believes is due to some increased sinus drainage.  Patient states that increased sinus congestion and difficulty tolerating his BiPAP at night so when his sinuses drain in the morning he will try to wear his BiPAP more during the daytime.  Patient states that he is try the nasal saline continue the temperature on his heated modification, but it has not made a difference.  He denies any fevers or chills.  Patient has lost 30 pounds since his last visit.  He states that he had to be started on insulin and at that point decided to modify his diet because he does not like having to give himself injections.  Patient states that he plans on trying to lose at least another 70 pounds.  Unfortunately, he is still not able to exercise on a regular basis so his weight loss has to be through diet.  He does continue to smoke and is not interested in quitting.      Tobacco use history:  Type: cigarettes  Amount: 1 ppd  Duration: 35 years  Cessation: N/a   Willing to quit: No      Patient Active Problem List   Diagnosis   • Ptosis of eyelid   • Astigmatism   • Myopia   • Gus's syndrome   • Neuropathic pain   • GERD (gastroesophageal reflux disease)   • Aneurysm of carotid artery (CMS/HCC)   • H/O: CVA (cerebrovascular accident)   • Sensory deficit present   • Gait  disturbance, post-stroke   • Cognitive deficit, post-stroke   • Hypertension   • Diabetes mellitus (CMS/HCC)   • Morbid obesity (CMS/[facility])   • Coronary artery disease   • NEREYDA treated with BiPAP   • Macrocytosis without anemia   • Need for immunization against influenza   • Need for vaccination   • Elevated brain natriuretic peptide (BNP) level   • Tremor of both hands   • Seizures (CMS/[facility])   • Fall   • Left knee pain   • Class 2 severe obesity due to excess calories with serious comorbidity and body mass index (BMI) of 35.0 to 35.9 in adult (CMS/[facility])   • High risk medication use   • Hypoxia   • Generalized edema   • Other insomnia   • Peripheral edema   • Nicotine abuse   • Nocturnal hypoxemia due to obesity   • COPD, group B, by GOLD 2017 classification (CMS/[facility])   • Syncope   • Physical deconditioning   • Pure hypercholesterolemia   • Cigarette nicotine dependence, uncomplicated   • Primary osteoarthritis of left knee   • Effusion, left knee   • Right knee pain   • Stage 2 moderate COPD by GOLD classification (CMS/[facility])   • Carbuncle       Review of Systems  Review of Systems   Constitutional: Negative for fatigue and unexpected weight change.   HENT: Positive for congestion.    Respiratory: Positive for cough and shortness of breath. Negative for wheezing.    Cardiovascular: Negative for chest pain and leg swelling.   Gastrointestinal: Negative for abdominal pain.   Musculoskeletal: Positive for arthralgias.     As described in the HPI. Otherwise, remainder of ROS (14 systems) were negative.    Medications, Allergies, Social, and Family Histories reviewed as per EMR.    Objective     Vitals:    09/16/19 1529   BP: 102/64   Pulse: 80   SpO2: 97%     Physical Exam   Constitutional: He is oriented to person, place, and time. Vital signs are normal. He appears well-developed and well-nourished.   Obese   HENT:   Head: Normocephalic and atraumatic.   Nose: Nose normal.   Mouth/Throat: Uvula is midline, oropharynx is  clear and moist and mucous membranes are normal.   Mallampati 3   Eyes: Conjunctivae, EOM and lids are normal. Pupils are equal, round, and reactive to light.   Neck: Trachea normal and normal range of motion. No tracheal tenderness present. No thyroid mass present.   Cardiovascular: Normal rate, regular rhythm and normal heart sounds. PMI is not displaced. Exam reveals no gallop.   No murmur heard.  Pulmonary/Chest: Effort normal and breath sounds normal. No respiratory distress. He has no decreased breath sounds. He has no wheezes. He has no rhonchi. He exhibits no tenderness.   Abdominal: Soft. Normal appearance and bowel sounds are normal. There is no hepatomegaly. There is no tenderness.   Obese   Musculoskeletal:   In wheelchair, no extremity edema     Vascular Status -  His right foot exhibits no edema. His left foot exhibits no edema.  Lymphadenopathy:        Head (right side): No submandibular adenopathy present.        Head (left side): No submandibular adenopathy present.     He has no cervical adenopathy.        Right: No supraclavicular adenopathy present.        Left: No supraclavicular adenopathy present.   Neurological: He is alert and oriented to person, place, and time. Gait normal.   Skin: Skin is warm and dry. No rash noted. No cyanosis. Nails show no clubbing.   Lower leg hyperemia   Psychiatric: He has a normal mood and affect. His speech is normal and behavior is normal. Judgment normal.   Nursing note and vitals reviewed.    IMAGING: LDCT 7/26/19 (independently reviewed and interpreted by me) showed bilateral upper lobe and left lower lobe superior segment subcentimeter groundglass opacities likely inflammatory, no other nodules, LAD atherosclerotic vascular calcifications        Assessment/Plan     Arias was seen today for copd.    Diagnoses and all orders for this visit:    Stage 2 moderate COPD by GOLD classification (CMS/[facility])  -     albuterol sulfate  (90 Base) MCG/ACT inhaler;  Inhale 2 puffs Every 4 (Four) Hours As Needed for Wheezing or Shortness of Air.    COPD, group B, by GOLD 2017 classification (CMS/[facility])    NEREYDA treated with BiPAP    Class 2 severe obesity due to excess calories with serious comorbidity and body mass index (BMI) of 35.0 to 35.9 in adult (CMS/[facility])    Cigarette nicotine dependence, uncomplicated    Physical deconditioning    Abnormal CT scan of lung  -     CT Chest Without Contrast; Future         Discussion/ Recommendations:   He remains stable from a COPD standpoint.  Unfortunately he does continue to smoke and is not interested in quitting at this time.  Otherwise, he remains compliant with his BiPAP and is actively try to change his diet to achieve weight loss.  I did personally review images from his most recent CT chest which showed probable inflammatory changes in the upper lobe so I recommend a repeat CT in late October to follow these changes to resolution.    -CT chest without contrast 3 months from prior (late October 2019).  I will contact the patient with the results.  -Continue Anoro daily.  -Use albuterol as needed for dyspnea or wheeze.   -Continue supplemental oxygen along with BiPAP with sleep.  -Annual LD CT after October 2020 (1ppd x 35yrs, still smoking 0.5ppd).   -Arias Willson is a current cigarettes user.  He currently smokes 1 pack of cigarettes per day for a duration of 35 years. I have educated him on the risk of diseases from using tobacco products such as cancer, COPD and heart diease. I advised him to quit and he is not willing to quit. I spent 1 minutes counseling the patient.  -Encouraged ongoing efforts at weight loss through dietary changes.  -Up to date with the pneumococcal and flu vaccines.  Encouraged him to get flu vaccine soon.    Patient's Body mass index is 35.56 kg/m². BMI is above normal parameters. Recommendations include: exercise counseling.        Return in about 3 months (around 12/16/2019) for Recheck  COPD.          This document has been electronically signed by Krysten Buckner MD on September 16, 2019 4:01 PM      Dictated using Dragon

## 2019-09-17 LAB — HBA1C MFR BLD: 7.7 % (ref 4.8–5.6)

## 2019-09-18 ENCOUNTER — TELEPHONE (OUTPATIENT)
Dept: FAMILY MEDICINE CLINIC | Facility: CLINIC | Age: 64
End: 2019-09-18

## 2019-09-18 NOTE — TELEPHONE ENCOUNTER
----- Message from Harpreet Bass MD sent at 9/17/2019 12:51 PM CDT -----  HgbA1c 9.71 to 7.7 good job!

## 2019-09-19 ENCOUNTER — TELEPHONE (OUTPATIENT)
Dept: FAMILY MEDICINE CLINIC | Facility: CLINIC | Age: 64
End: 2019-09-19

## 2019-09-19 RX ORDER — CEPHALEXIN 500 MG/1
500 CAPSULE ORAL 3 TIMES DAILY
Qty: 21 CAPSULE | Refills: 0 | Status: SHIPPED | OUTPATIENT
Start: 2019-09-19 | End: 2019-12-05

## 2019-09-19 NOTE — TELEPHONE ENCOUNTER
Patient states that he has a sinus infection x 7 days. Symptoms are coughing up yellow mucus, stuffy nose, no fever, sinus pressure. Patient uses [facility] [place].

## 2019-10-14 RX ORDER — AMMONIUM LACTATE 12 G/100G
LOTION TOPICAL AS NEEDED
Qty: 500 G | Refills: 5 | Status: SHIPPED | OUTPATIENT
Start: 2019-10-14 | End: 2020-10-23

## 2019-10-14 RX ORDER — ERYTHROMYCIN 5 MG/G
OINTMENT OPHTHALMIC NIGHTLY
Qty: 3.5 G | Refills: 2 | Status: SHIPPED | OUTPATIENT
Start: 2019-10-14 | End: 2020-10-23

## 2019-10-24 ENCOUNTER — OFFICE VISIT (OUTPATIENT)
Dept: SLEEP MEDICINE | Facility: HOSPITAL | Age: 64
End: 2019-10-24

## 2019-10-24 VITALS
HEART RATE: 96 BPM | BODY MASS INDEX: 36.45 KG/M2 | WEIGHT: 284 LBS | OXYGEN SATURATION: 92 % | DIASTOLIC BLOOD PRESSURE: 76 MMHG | SYSTOLIC BLOOD PRESSURE: 112 MMHG | HEIGHT: 74 IN

## 2019-10-24 DIAGNOSIS — G47.33 OBSTRUCTIVE SLEEP APNEA, ADULT: Primary | ICD-10-CM

## 2019-10-24 PROCEDURE — 99213 OFFICE O/P EST LOW 20 MIN: CPT | Performed by: NURSE PRACTITIONER

## 2019-10-24 NOTE — PROGRESS NOTES
Sleep Clinic Follow Up    Date: 10/24/2019  Primary Care Physician: Harpreet Bass MD    Last office visit: 2019 (I reviewed this note)    CC: Follow up: NEREYDA on BiPAP      Interim History:  Since the last visit:    1) severe NEREYDA -  Arias Willson has remained compliant with CPAP. He denies mask and machine issues, dry mouth, headaches, or pressures intolerance. He denies abnormal dreams, sleep paralysis, nasal congestion, URI sx.    Sleep Testin. PSG on 10/03/2018, AHI of 85.7 , PLMI of 67.1  2. Currently on 22/12 cm H2O with 2L H2O    PAP Data:    Time frame: 2018-10/23/2019   Compliance 95.7 %  Average use on days used: 6 hrs 14 min  Percent of days with usage greater than or equal to 4 hours: 93.7%  PAP range : 22/12 cm H2O  Average 90% pressure: 22/12 cmH2O  Leak: 5 minutes  Average AHI 2.4 events/hr  Mask type: Full face mask  DME: [facility]    Bed time: varies   Sleep latency: 5-30 minutes  Number of times awakens during the night: 0-2  Wake time: 5445-5918, varies  Estimated total sleep time at night: 6-8 hours  Caffeine intake: 4 cups of coffee, 0 cups of tea, and 1 sodas per day  Alcohol intake: 0 drinks per week  Nap time: occasionally   Sleepiness with Driving: does not drive     [place] - 5    2) Patient denies RLS symptoms.     PMHx, FH, SH reviewed and pertinent changes are:  unchanged from last office visit on 2019      REVIEW OF SYSTEMS:   +SOB  Negative for chest pain, fever, chills, cough, N/V/D, abdominal pain.    Smokin-2 ppd    Arias Willson is a current cigarettes user.  He currently smokes 1 pack of cigarettes per day for a duration of 33 years. I have educated him on the risk of diseases from using tobacco products such as cancer, COPD and heart diease.     I advised him to quit and he is not willing to quit.    I spent 3.5 minutes counseling the patient.          Exam:  Vitals:    10/24/19 1341   BP: 112/76   Pulse: 96   SpO2: 92%            10/24/19  1341   Weight: 129 kg (284 lb)     Body mass index is 36.45 kg/m². Patient's Body mass index is 36.45 kg/m². BMI is above normal parameters. Recommendations include: referral to primary care.      Gen:                No distress, conversant, pleasant, appears stated age, alert, oriented  Eyes:               Anicteric sclera, moist conjunctiva, no lid lag                           PERRL, EOMI   Heent:             NC/AT                          Oropharynx clear                          Normal hearing  Lungs:             Normal effort, non-labored breathing                          Clear to auscultation bilaterally          CV:                  Normal S1/S2, no murmur                          No lower extremity edema  ABD:               Soft, rounded, non-distended                          Normal bowel sounds                    Psych:             Appropriate affect  Neuro:             CN 2-12 appear intact    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      • Benign essential hypertension    • Blood in feces    • Carotid artery stenosis    • Cerebrovascular accident (CMS/HCC)      L sided sensory deficit      • Conjunctivitis    • Constipation     OCCASIONAL   • Contusion, eye, left    • Corneal ulcer     PROBABLE   • Diabetes mellitus (CMS/HCC)    • Eczema    • Encounter for screening for malignant neoplasm of colon    • Essential hypertension    • GERD (gastroesophageal reflux disease)    • Hemorrhoids    • History of echocardiogram 04/05/2013    Echocadiogram W/ color flow 52038 (Normal LV systolic function with EF of 60-65%. Grade I diastolic dysfunction of the LV myocardium. No evidence of LV apical thrombus. No evidence of pericardial effusion.)   • Gus's syndrome     RIGHT EYE   • Hyperkeratosis    • Hyperlipidemia    •  Mucopurulent conjunctivitis     ACUTE   • Myopia    • Neuropathic pain    • Obesity    • Onychomycosis    • Tobacco dependence syndrome        Current Outpatient Medications:   •  albuterol sulfate  (90 Base) MCG/ACT inhaler, Inhale 2 puffs Every 4 (Four) Hours As Needed for Wheezing or Shortness of Air., Disp: 18 g, Rfl: 11  •  ammonium lactate (AMLACTIN) 12 % lotion, Apply  topically to the appropriate area as directed As Needed for Dry Skin. Apply to feet, Disp: 500 g, Rfl: 5  •  ANORO ELLIPTA 62.5-25 MCG/INH aerosol powder  inhaler, INHALE 1 PUFF INTO LUNGS ONCE DAILY, Disp: 1 each, Rfl: 11  •  [START ON 5/24/2106] aspirin 325 MG tablet, Take 325 mg by mouth daily., Disp: , Rfl:   •  baclofen (LIORESAL) 10 MG tablet, Take 1 tablet by mouth 3 (Three) Times a Day., Disp: 90 tablet, Rfl: 5  •  cephalexin (KEFLEX) 500 MG capsule, Take 1 capsule by mouth 3 (Three) Times a Day., Disp: 21 capsule, Rfl: 0  •  clopidogrel (PLAVIX) 75 MG tablet, Take 1 tablet by mouth Daily., Disp: 90 tablet, Rfl: 3  •  Cyanocobalamin (B-12) 5000 MCG sublingual tablet, Place 1 each under the tongue Daily., Disp: 30 tablet, Rfl: 6  •  DULoxetine (CYMBALTA) 60 MG capsule, TAKE 1 CAPSULE BY MOUTH ONCE DAILY, Disp: 30 capsule, Rfl: 5  •  erythromycin (ROMYCIN) 5 MG/GM ophthalmic ointment, Apply  to eye(s) as directed by provider Every Night., Disp: 3.5 g, Rfl: 2  •  gabapentin (NEURONTIN) 600 MG tablet, Take 1 tablet by mouth 3 (Three) Times a Day., Disp: 270 tablet, Rfl: 1  •  glucose blood test strip, 1 each by Other route 3 (Three) Times a Day. Use as instructed, Disp: 100 each, Rfl: 5  •  glucose monitor monitoring kit, 3 (Three) Times a Day., Disp: 1 each, Rfl: 0  •  Insulin Glargine (LANTUS SOLOSTAR) 100 UNIT/ML injection pen, Inject 20 units daily increase by 2 units every 3 days until AM blood sugar 100-150, Disp: 3 pen, Rfl: 5  •  insulin glargine (LANTUS) 100 UNIT/ML injection, Inject 20 Units under the skin into the  appropriate area as directed Daily., Disp: 1 each, Rfl: 0  •  JARDIANCE 25 MG tablet, TAKE 1 TABLET BY MOUTH ONCE DAILY, Disp: 30 tablet, Rfl: 5  •  Lancets 30G misc, 1 each 3 (Three) Times a Day., Disp: 100 each, Rfl: 5  •  lansoprazole (PREVACID) 30 MG capsule, TAKE 1 CAPSULE BY MOUTH ONCE DAILY, Disp: 30 capsule, Rfl: 5  •  losartan (COZAAR) 50 MG tablet, TAKE 1 TABLET BY MOUTH ONCE DAILY, Disp: 30 tablet, Rfl: 5  •  metFORMIN (GLUCOPHAGE) 500 MG tablet, TAKE 1 TABLET BY MOUTH TWICE DAILY, Disp: 60 tablet, Rfl: 5  •  metoprolol tartrate (LOPRESSOR) 25 MG tablet, Take 1 tablet by mouth Every 12 (Twelve) Hours., Disp: 180 tablet, Rfl: 3  •  mupirocin (BACTROBAN) 2 % ointment, Apply  topically to the appropriate area as directed 3 (Three) Times a Day., Disp: 30 g, Rfl: 2  •  Omega-3 Fatty Acids (FISH OIL) 1000 MG capsule capsule, Take 2,000 mg by mouth Daily With Breakfast., Disp: , Rfl:   •  pravastatin (PRAVACHOL) 40 MG tablet, Take 1 tablet by mouth Every Night., Disp: 90 tablet, Rfl: 3  •  spironolactone (ALDACTONE) 25 MG tablet, Take 1 tablet by mouth Daily., Disp: 90 tablet, Rfl: 3      Assessment and Plan:    1. Obstructive sleep apnea Established, stable (1 point)  1. Compliant with PAP therapy  2. Continue PAP as prescribed  3. Script for PAP supplies  4. Return to clinic in 1 year with compliance report unless change in symptoms in interim period  2. Chronic hypoxic respiratory failure - Established, stable        8 of 15 minutes spent face-to-face counseling patient extensively regarding:   PAP therapy, PAP compliance and PAP maintenance      RTC in 12 months. Patient agrees to return sooner if changes in symptoms.        This document has been electronically signed by TANESHA Murrell on October 24, 2019 2:01 PM          CC: Harpreet Bass MD          No ref. provider found

## 2019-10-28 ENCOUNTER — HOSPITAL ENCOUNTER (OUTPATIENT)
Dept: CT IMAGING | Facility: HOSPITAL | Age: 64
Discharge: HOME | End: 2019-10-28
Admitting: INTERNAL MEDICINE

## 2019-10-28 DIAGNOSIS — R91.8 ABNORMAL CT SCAN OF LUNG: ICD-10-CM

## 2019-10-28 PROCEDURE — 71250 CT THORAX DX C-: CPT

## 2019-12-05 ENCOUNTER — OFFICE VISIT (OUTPATIENT)
Dept: FAMILY MEDICINE CLINIC | Facility: CLINIC | Age: 64
End: 2019-12-05

## 2019-12-05 VITALS
TEMPERATURE: 97.7 F | DIASTOLIC BLOOD PRESSURE: 80 MMHG | WEIGHT: 288.7 LBS | SYSTOLIC BLOOD PRESSURE: 148 MMHG | HEIGHT: 74 IN | HEART RATE: 97 BPM | OXYGEN SATURATION: 96 % | BODY MASS INDEX: 37.05 KG/M2

## 2019-12-05 DIAGNOSIS — I10 ESSENTIAL HYPERTENSION: Chronic | ICD-10-CM

## 2019-12-05 DIAGNOSIS — I69.398 GAIT DISTURBANCE, POST-STROKE: ICD-10-CM

## 2019-12-05 DIAGNOSIS — J44.9 STAGE 2 MODERATE COPD BY GOLD CLASSIFICATION (HCC): Chronic | ICD-10-CM

## 2019-12-05 DIAGNOSIS — K21.9 GASTROESOPHAGEAL REFLUX DISEASE WITHOUT ESOPHAGITIS: ICD-10-CM

## 2019-12-05 DIAGNOSIS — E11.69 TYPE 2 DIABETES MELLITUS WITH OTHER SPECIFIED COMPLICATION, WITHOUT LONG-TERM CURRENT USE OF INSULIN (HCC): Chronic | ICD-10-CM

## 2019-12-05 DIAGNOSIS — R26.9 GAIT DISTURBANCE, POST-STROKE: ICD-10-CM

## 2019-12-05 DIAGNOSIS — Z23 NEED FOR IMMUNIZATION AGAINST INFLUENZA: ICD-10-CM

## 2019-12-05 DIAGNOSIS — J44.9 COPD, GROUP B, BY GOLD 2017 CLASSIFICATION (HCC): Chronic | ICD-10-CM

## 2019-12-05 DIAGNOSIS — Z86.73 H/O: CVA (CEREBROVASCULAR ACCIDENT): ICD-10-CM

## 2019-12-05 PROCEDURE — 90674 CCIIV4 VAC NO PRSV 0.5 ML IM: CPT | Performed by: FAMILY MEDICINE

## 2019-12-05 PROCEDURE — 99214 OFFICE O/P EST MOD 30 MIN: CPT | Performed by: FAMILY MEDICINE

## 2019-12-05 PROCEDURE — 90471 IMMUNIZATION ADMIN: CPT | Performed by: FAMILY MEDICINE

## 2019-12-05 NOTE — PROGRESS NOTES
Subjective      Arias Willson is a 64 y.o. obese white male with HTN, Had TIA 2- , began with double vision, R facial numbness, L arm numbness, L leg weakness. 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 Pt was referred to Neurosurgery in [place], told has aneurysm, but surgery not recommended for this area. 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 Had resolution of most motor weakness. Has residual difficulty with balance. Cognitive difficulties, Depression, DM, High cholesterol, Chronic pain, Hospital admit Jan 2018 with Resp failure, Hypoxia, NEREYDA on Bi-pap Pt presents for exam, to discuss health problems, Tx and F/U plans.      ' B/P has been OK. R eye stable uses eye lubricant. Blood sugars have been improving. Breathing has improved. Mood has improved some'.    Hypertension   This is a chronic problem. The current episode started more than 1 year ago. The problem has been waxing and waning since onset. The problem is controlled. Associated symptoms include headaches, neck pain and shortness of breath. Pertinent negatives include no chest pain or palpitations. Agents associated with hypertension include NSAIDs. Risk factors for coronary artery disease include obesity, male gender, smoking/tobacco exposure and stress (H/O CVA). Past treatments include ACE inhibitors, angiotensin blockers, beta blockers, diuretics and lifestyle changes. Current antihypertension treatment includes angiotensin blockers and diuretics. The current treatment provides significant improvement. Compliance problems include medication cost.  Hypertensive end-organ damage includes CVA.   Diabetes   He presents for his follow-up diabetic visit. He has type 2 diabetes mellitus. No MedicAlert identification noted. His disease course  has been fluctuating. Hypoglycemia symptoms include confusion, dizziness, headaches, nervousness/anxiousness and seizures. Associated symptoms include fatigue, visual change and weakness. Pertinent negatives for diabetes include no chest pain. Weight loss: neuro. There are no hypoglycemic complications. Symptoms are worsening. Diabetic complications include a CVA and peripheral neuropathy. Risk factors for coronary artery disease include diabetes mellitus, male sex, tobacco exposure, stress, sedentary lifestyle, obesity, hypertension and dyslipidemia. Current diabetic treatment includes oral agent (monotherapy). He is compliant with treatment some of the time. His weight is increasing rapidly. He is following a generally unhealthy diet. When asked about meal planning, he reported none. He rarely participates in exercise. Home blood sugar record trend: Unknown. An ACE inhibitor/angiotensin II receptor blocker is being taken. He does not see a podiatrist.Eye exam is current.   Insomnia   This is a chronic problem. The current episode started more than 1 year ago. The problem occurs constantly. The problem has been waxing and waning. Associated symptoms include fatigue, headaches, neck pain, numbness, a visual change and weakness. Pertinent negatives include no abdominal pain, arthralgias, chest pain, chills, congestion, coughing, fever, nausea, rash or sore throat. The symptoms are aggravated by stress. Treatments tried: OTC meds CPAP. The treatment provided significant relief.   Breathing Problem   He complains of difficulty breathing and shortness of breath. There is no cough or wheezing. Primary symptoms comments: Using 02 since hospitalization Jan 2018, H/O low pulse-ox, seizures like spells. Improved tx NEREYDA on Bipap. Associated symptoms include headaches. Pertinent negatives include no appetite change, chest pain, ear pain, fever or sore throat. Weight loss: neuro. His symptoms are aggravated by exercise. His  symptoms are alleviated by nothing. He reports moderate (O2) improvement on treatment.   Stroke   This is a chronic problem. The current episode started more than 1 year ago. The problem occurs daily. The problem has been gradually improving. Associated symptoms include fatigue, headaches, neck pain, numbness, a visual change and weakness. Pertinent negatives include no abdominal pain, arthralgias, chest pain, chills, congestion, coughing, fever, nausea, rash or sore throat. The symptoms are aggravated by exertion and walking. He has tried acetaminophen and NSAIDs for the symptoms. The treatment provided no relief.   Pain   This is a chronic problem. The current episode started more than 1 year ago. The problem occurs daily. Associated symptoms include fatigue, headaches, neck pain, numbness, a visual change and weakness. Pertinent negatives include no abdominal pain, arthralgias, chest pain, chills, congestion, coughing, fever, nausea, rash or sore throat. The symptoms are aggravated by walking and stress. He has tried NSAIDs and acetaminophen for the symptoms. The treatment provided no relief.   Neurologic Problem   The patient's primary symptoms include an altered mental status, clumsiness, focal sensory loss, focal weakness, a loss of balance, a visual change and weakness. Primary symptoms comment: Delayed cognitive processing. Seizures. This is a chronic problem. The current episode started more than 1 year ago. The neurological problem developed suddenly. The problem has been waxing and waning since onset. There was left-sided, right-sided, facial, upper extremity and lower extremity (Strokes of Basal ganglia. Horners syndrome,  sensory, motor deficits.) focality noted. Associated symptoms include back pain, confusion, dizziness, fatigue, headaches, neck pain and shortness of breath. Pertinent negatives include no abdominal pain, chest pain, fever, nausea or palpitations. (Requires Walker to ambulate.) Past  treatments include walking and medication (Physical therapy). The treatment provided mild relief. His past medical history is significant for a CVA.   Depression   Visit Type: follow-up  Patient presents with the following symptoms: confusion, decreased concentration, depressed mood, excessive worry, feelings of hopelessness, feelings of worthlessness, insomnia, irritability, memory impairment, nervousness/anxiety, restlessness and shortness of breath.  Patient is not experiencing: palpitations, suicidal ideas and suicidal planning.  Weight loss: neuro.  Frequency of symptoms: occasionally   Severity: moderate   Sleep quality: fair  Nighttime awakenings: several         The following portions of the patient's history were reviewed and updated as appropriate: allergies, current medications, past family history, past medical history, past social history, past surgical history and problem list.    Review of Systems   Constitutional: Positive for fatigue and irritability. Negative for activity change, appetite change, chills and fever. Weight loss: neuro.   HENT: Negative for congestion, ear pain and sore throat.    Eyes: Negative for pain and visual disturbance.   Respiratory: Positive for shortness of breath. Negative for cough, chest tightness and wheezing.    Cardiovascular: Negative for chest pain and palpitations.   Gastrointestinal: Negative for abdominal pain and nausea.   Endocrine: Negative for cold intolerance and heat intolerance.   Genitourinary: Negative for difficulty urinating and dysuria.   Musculoskeletal: Positive for back pain, gait problem, neck pain and neck stiffness. Negative for arthralgias.   Skin: Negative for color change and rash.   Allergic/Immunologic: Negative for environmental allergies.   Neurological: Positive for dizziness, focal weakness, seizures, weakness, numbness, headaches and loss of balance.   Hematological: Negative for adenopathy. Does not bruise/bleed easily.    Psychiatric/Behavioral: Positive for confusion, decreased concentration and sleep disturbance. Negative for agitation and suicidal ideas. The patient is nervous/anxious and has insomnia.         Depressed mood       Objective   Physical Exam   Constitutional: He is oriented to person, place, and time. He appears well-developed and well-nourished. No distress.   HENT:   Head: Normocephalic and atraumatic.   Right Ear: External ear normal.   Left Ear: External ear normal.   Nose: Nose normal.   Mouth/Throat: Oropharynx is clear and moist. No oropharyngeal exudate.   Eyes: Pupils are equal, round, and reactive to light. Conjunctivae and EOM are normal. Right eye exhibits no discharge. Left eye exhibits no discharge. No scleral icterus.   Neck: Normal range of motion. Neck supple. No JVD present. No tracheal deviation present. No thyromegaly present.   Cardiovascular: Normal rate, regular rhythm, normal heart sounds and intact distal pulses. Exam reveals no gallop and no friction rub.   No murmur heard.  Pulmonary/Chest: Effort normal and breath sounds normal. No stridor. No respiratory distress. He has no wheezes. He has no rales. He exhibits no tenderness.   Abdominal: Soft. Bowel sounds are normal. He exhibits no distension and no mass. There is no tenderness. There is no rebound and no guarding. No hernia.   Musculoskeletal: He exhibits no edema, tenderness or deformity.   Requires WC, Walks short distance with cane, L sided sensory deficit, R motor, drift towards R without cane. R pupil NR, Has weakness closing R eye CN VII.   Has unsteady antalgic gait.    Has crepitous with ROM of knees. WB 6   Lymphadenopathy:     He has no cervical adenopathy.   Neurological: He is alert and oriented to person, place, and time. He displays normal reflexes. A cranial nerve deficit is present. No sensory deficit. He exhibits normal muscle tone. Coordination normal.   Skin: Skin is warm and dry. Capillary refill takes 2 to 3  seconds. No rash noted. He is not diaphoretic. No erythema. No pallor.   Psychiatric: He has a normal mood and affect. His behavior is normal. Judgment and thought content normal.   Chronic situational depression, post stroke, improved on meds.   Nursing note and vitals reviewed.      Assessment/Plan   Arias was seen today for hypertension, diabetes and insomnia.    Diagnoses and all orders for this visit:    Type 2 diabetes mellitus with other specified complication, without long-term current use of insulin (CMS/[facility])  -     Hemoglobin A1c; Future  -     Urine Drug Screen - Urine, Clean Catch; Future  -     Urinalysis With Culture If Indicated -; Future  -     TSH; Future  -     Comprehensive metabolic panel; Future  -     CBC & Differential; Future    Need for immunization against influenza  -     Flucelvax Quad=>4Years (4254-4505)    COPD, group B, by GOLD 2017 classification (CMS/[facility])    Gait disturbance, post-stroke    Gastroesophageal reflux disease without esophagitis    H/O: CVA (cerebrovascular accident)    Essential hypertension    Stage 2 moderate COPD by GOLD classification (CMS/[facility])    Other orders  -     Insulin Glargine (LANTUS SOLOSTAR) 100 UNIT/ML injection pen; Inject 20 units daily increase by 2 units every 3 days until AM blood sugar 100-150    Discussed exam, health problems, meds, indications, tx plan, rationale, stroke prevention. BMI, BEE DASH diet, exercise, std of care DMII. Discussed USPSTF recommendations. Discussed F/U with specialist. Discussed F/U here.          This document has been electronically signed by Harpreet Bass MD

## 2019-12-08 PROBLEM — L85.9 HYPERKERATOSIS: Status: ACTIVE | Noted: 2019-12-08

## 2019-12-09 NOTE — PATIENT INSTRUCTIONS
Calorie Counting for Weight Loss  Calories are units of energy. Your body needs a certain amount of calories from food to keep you going throughout the day. When you eat more calories than your body needs, your body stores the extra calories as fat. When you eat fewer calories than your body needs, your body burns fat to get the energy it needs.  Calorie counting means keeping track of how many calories you eat and drink each day. Calorie counting can be helpful if you need to lose weight. If you make sure to eat fewer calories than your body needs, you should lose weight. Ask your health care provider what a healthy weight is for you.  For calorie counting to work, you will need to eat the right number of calories in a day in order to lose a healthy amount of weight per week. A dietitian can help you determine how many calories you need in a day and will give you suggestions on how to reach your calorie goal.  · A healthy amount of weight to lose per week is usually 1-2 lb (0.5-0.9 kg). This usually means that your daily calorie intake should be reduced by 500-750 calories.  · Eating 1,200 - 1,500 calories per day can help most women lose weight.  · Eating 1,500 - 1,800 calories per day can help most men lose weight.  What is my plan?  My goal is to have __________ calories per day.  If I have this many calories per day, I should lose around __________ pounds per week.  What do I need to know about calorie counting?  In order to meet your daily calorie goal, you will need to:  · Find out how many calories are in each food you would like to eat. Try to do this before you eat.  · Decide how much of the food you plan to eat.  · Write down what you ate and how many calories it had. Doing this is called keeping a food log.  To successfully lose weight, it is important to balance calorie counting with a healthy lifestyle that includes regular activity. Aim for 150 minutes of moderate exercise (such as walking) or 75  minutes of vigorous exercise (such as running) each week.  Where do I find calorie information?    The number of calories in a food can be found on a Nutrition Facts label. If a food does not have a Nutrition Facts label, try to look up the calories online or ask your dietitian for help.  Remember that calories are listed per serving. If you choose to have more than one serving of a food, you will have to multiply the calories per serving by the amount of servings you plan to eat. For example, the label on a package of bread might say that a serving size is 1 slice and that there are 90 calories in a serving. If you eat 1 slice, you will have eaten 90 calories. If you eat 2 slices, you will have eaten 180 calories.  How do I keep a food log?  Immediately after each meal, record the following information in your food log:  · What you ate. Don't forget to include toppings, sauces, and other extras on the food.  · How much you ate. This can be measured in cups, ounces, or number of items.  · How many calories each food and drink had.  · The total number of calories in the meal.  Keep your food log near you, such as in a small notebook in your pocket, or use a mobile jayce or website. Some programs will calculate calories for you and show you how many calories you have left for the day to meet your goal.  What are some calorie counting tips?    · Use your calories on foods and drinks that will fill you up and not leave you hungry:  ? Some examples of foods that fill you up are nuts and nut butters, vegetables, lean proteins, and high-fiber foods like whole grains. High-fiber foods are foods with more than 5 g fiber per serving.  ? Drinks such as sodas, specialty coffee drinks, alcohol, and juices have a lot of calories, yet do not fill you up.  · Eat nutritious foods and avoid empty calories. Empty calories are calories you get from foods or beverages that do not have many vitamins or protein, such as candy, sweets, and  "soda. It is better to have a nutritious high-calorie food (such as an avocado) than a food with few nutrients (such as a bag of chips).  · Know how many calories are in the foods you eat most often. This will help you calculate calorie counts faster.  · Pay attention to calories in drinks. Low-calorie drinks include water and unsweetened drinks.  · Pay attention to nutrition labels for \"low fat\" or \"fat free\" foods. These foods sometimes have the same amount of calories or more calories than the full fat versions. They also often have added sugar, starch, or salt, to make up for flavor that was removed with the fat.  · Find a way of tracking calories that works for you. Get creative. Try different apps or programs if writing down calories does not work for you.  What are some portion control tips?  · Know how many calories are in a serving. This will help you know how many servings of a certain food you can have.  · Use a measuring cup to measure serving sizes. You could also try weighing out portions on a kitchen scale. With time, you will be able to estimate serving sizes for some foods.  · Take some time to put servings of different foods on your favorite plates, bowls, and cups so you know what a serving looks like.  · Try not to eat straight from a bag or box. Doing this can lead to overeating. Put the amount you would like to eat in a cup or on a plate to make sure you are eating the right portion.  · Use smaller plates, glasses, and bowls to prevent overeating.  · Try not to multitask (for example, watch TV or use your computer) while eating. If it is time to eat, sit down at a table and enjoy your food. This will help you to know when you are full. It will also help you to be aware of what you are eating and how much you are eating.  What are tips for following this plan?  Reading food labels  · Check the calorie count compared to the serving size. The serving size may be smaller than what you are used to " "eating.  · Check the source of the calories. Make sure the food you are eating is high in vitamins and protein and low in saturated and trans fats.  Shopping  · Read nutrition labels while you shop. This will help you make healthy decisions before you decide to purchase your food.  · Make a grocery list and stick to it.  Cooking  · Try to cook your favorite foods in a healthier way. For example, try baking instead of frying.  · Use low-fat dairy products.  Meal planning  · Use more fruits and vegetables. Half of your plate should be fruits and vegetables.  · Include lean proteins like poultry and fish.  How do I count calories when eating out?  · Ask for smaller portion sizes.  · Consider sharing an entree and sides instead of getting your own entree.  · If you get your own entree, eat only half. Ask for a box at the beginning of your meal and put the rest of your entree in it so you are not tempted to eat it.  · If calories are listed on the menu, choose the lower calorie options.  · Choose dishes that include vegetables, fruits, whole grains, low-fat dairy products, and lean protein.  · Choose items that are boiled, broiled, grilled, or steamed. Stay away from items that are buttered, battered, fried, or served with cream sauce. Items labeled \"crispy\" are usually fried, unless stated otherwise.  · Choose water, low-fat milk, unsweetened iced tea, or other drinks without added sugar. If you want an alcoholic beverage, choose a lower calorie option such as a glass of wine or light beer.  · Ask for dressings, sauces, and syrups on the side. These are usually high in calories, so you should limit the amount you eat.  · If you want a salad, choose a garden salad and ask for grilled meats. Avoid extra toppings like mae, cheese, or fried items. Ask for the dressing on the side, or ask for olive oil and vinegar or lemon to use as dressing.  · Estimate how many servings of a food you are given. For example, a serving of " cooked rice is ½ cup or about the size of half a baseball. Knowing serving sizes will help you be aware of how much food you are eating at restaurants. The list below tells you how big or small some common portion sizes are based on everyday objects:  ? 1 oz--4 stacked dice.  ? 3 oz--1 deck of cards.  ? 1 tsp--1 die.  ? 1 Tbsp--½ a ping-pong ball.  ? 2 Tbsp--1 ping-pong ball.  ? ½ cup--½ baseball.  ? 1 cup--1 baseball.  Summary  · Calorie counting means keeping track of how many calories you eat and drink each day. If you eat fewer calories than your body needs, you should lose weight.  · A healthy amount of weight to lose per week is usually 1-2 lb (0.5-0.9 kg). This usually means reducing your daily calorie intake by 500-750 calories.  · The number of calories in a food can be found on a Nutrition Facts label. If a food does not have a Nutrition Facts label, try to look up the calories online or ask your dietitian for help.  · Use your calories on foods and drinks that will fill you up, and not on foods and drinks that will leave you hungry.  · Use smaller plates, glasses, and bowls to prevent overeating.  This information is not intended to replace advice given to you by your health care provider. Make sure you discuss any questions you have with your health care provider.  Document Released: 12/18/2006 Document Revised: 09/06/2019 Document Reviewed: 11/17/2017  [employer] Interactive Patient Education © 2019 [employer]      Stroke Prevention  Some medical conditions and lifestyle choices can lead to a higher risk for a stroke. You can help to prevent a stroke by making nutrition, lifestyle, and other changes.  What nutrition changes can be made?    · Eat healthy foods.  ? Choose foods that are high in fiber. These include:  § Fresh fruits.  § Fresh vegetables.  § Whole grains.  ? Eat at least 5 or more servings of fruits and vegetables each day. Try to fill half of your plate at each meal with fruits and  vegetables.  ? Choose lean protein foods. These include:  § Lowfat (lean) cuts of meat.  § Chicken without skin.  § Fish.  § Tofu.  § Beans.  § Nuts.  ? Eat low-fat dairy products.  ? Avoid foods that:  § Are high in salt (sodium).  § Have saturated fat.  § Have trans fat.  § Have cholesterol.  § Are processed.  § Are premade.  · Follow eating guidelines as told by your doctor. These may include:  ? Reducing how many calories you eat and drink each day.  ? Limiting how much salt you eat or drink each day to 1,500 milligrams (mg).  ? Using only healthy fats for cooking. These include:  § Olive oil.  § Canola oil.  § Sunflower oil.  ? Counting how many carbohydrates you eat and drink each day.  What lifestyle changes can be made?  · Try to stay at a healthy weight. Talk to your doctor about what a good weight is for you.  · Get at least 30 minutes of moderate physical activity at least 5 days a week. This can include:  ? Fast walking.  ? Biking.  ? Swimming.  · Do not use any products that have nicotine or tobacco. This includes cigarettes and e-cigarettes. If you need help quitting, ask your doctor. Avoid being around tobacco smoke in general.  · Limit how much alcohol you drink to no more than 1 drink a day for nonpregnant women and 2 drinks a day for men. One drink equals 12 oz of beer, 5 oz of wine, or 1½ oz of hard liquor.  · Do not use drugs.  · Avoid taking birth control pills. Talk to your doctor about the risks of taking birth control pills if:  ? You are over 35 years old.  ? You smoke.  ? You get migraines.  ? You have had a blood clot.  What other changes can be made?  · Manage your cholesterol.  ? It is important to eat a healthy diet.  ? If your cholesterol cannot be managed through your diet, you may also need to take medicines. Take medicines as told by your doctor.  · Manage your diabetes.  ? It is important to eat a healthy diet and to exercise regularly.  ? If your blood sugar cannot be managed  "through diet and exercise, you may need to take medicines. Take medicines as told by your doctor.  · Control your high blood pressure (hypertension).  ? Try to keep your blood pressure below 130/80. This can help lower your risk of stroke.  ? It is important to eat a healthy diet and to exercise regularly.  ? If your blood pressure cannot be managed through diet and exercise, you may need to take medicines. Take medicines as told by your doctor.  ? Ask your doctor if you should check your blood pressure at home.  ? Have your blood pressure checked every year. Do this even if your blood pressure is normal.  · Talk to your doctor about getting checked for a sleep disorder. Signs of this can include:  ? Snoring a lot.  ? Feeling very tired.  · Take over-the-counter and prescription medicines only as told by your doctor. These may include aspirin or blood thinners (antiplatelets or anticoagulants).  · Make sure that any other medical conditions you have are managed.  Where to find more information  · American Stroke Association: www.strokeassociation.org  · National Stroke Association: www.stroke.org  Get help right away if:  · You have any symptoms of stroke. \"BE FAST\" is an easy way to remember the main warning signs:  ? B - Balance. Signs are dizziness, sudden trouble walking, or loss of balance.  ? E - Eyes. Signs are trouble seeing or a sudden change in how you see.  ? F - Face. Signs are sudden weakness or loss of feeling of the face, or the face or eyelid drooping on one side.  ? A - Arms. Signs are weakness or loss of feeling in an arm. This happens suddenly and usually on one side of the body.  ? S - Speech. Signs are sudden trouble speaking, slurred speech, or trouble understanding what people say.  ? T - Time. Time to call emergency services. Write down what time symptoms started.  · You have other signs of stroke, such as:  ? A sudden, very bad headache with no known cause.  ? Feeling sick to your stomach " (nausea).  ? Throwing up (vomiting).  ? Jerky movements you cannot control (seizure).  These symptoms may represent a serious problem that is an emergency. Do not wait to see if the symptoms will go away. Get medical help right away. Call your local emergency services (911 in the [place] Do not drive yourself to the hospital.  Summary  · You can prevent a stroke by eating healthy, exercising, not smoking, drinking less alcohol, and treating other health problems, such as diabetes, high blood pressure, or high cholesterol.  · Do not use any products that contain nicotine or tobacco, such as cigarettes and e-cigarettes.  · Get help right away if you have any signs or symptoms of a stroke.  This information is not intended to replace advice given to you by your health care provider. Make sure you discuss any questions you have with your health care provider.  Document Released: 06/18/2013 Document Revised: 03/21/2018 Document Reviewed: 03/21/2018  [employer] Interactive Patient Education © 2019 [employer]      Diabetes Mellitus and Standards of Medical Care  Managing diabetes (diabetes mellitus) can be complicated. Your diabetes treatment may be managed by a team of health care providers, including:  · A physician who specializes in diabetes (endocrinologist).  · A nurse practitioner or physician assistant.  · Nurses.  · A diet and nutrition specialist (registered dietitian).  · A certified diabetes educator (CDE).  · An exercise specialist.  · A pharmacist.  · An eye doctor.  · A foot specialist (podiatrist).  · A dentist.  · A primary care provider.  · A mental health provider.  Your health care providers follow guidelines to help you get the best quality of care. The following schedule is a general guideline for your diabetes management plan. Your health care providers may give you more specific instructions.  Physical exams  Upon being diagnosed with diabetes mellitus, and each year after that, your health care  provider will ask about your medical and family history. He or she will also do a physical exam. Your exam may include:  · Measuring your height, weight, and body mass index (BMI).  · Checking your blood pressure. This will be done at every routine medical visit. Your target blood pressure may vary depending on your medical conditions, your age, and other factors.  · Thyroid gland exam.  · Skin exam.  · Screening for damage to your nerves (peripheral neuropathy). This may include checking the pulse in your legs and feet and checking the level of sensation in your hands and feet.  · A complete foot exam to inspect the structure and skin of your feet, including checking for cuts, bruises, redness, blisters, sores, or other problems.  · Screening for blood vessel (vascular) problems, which may include checking the pulse in your legs and feet and checking your temperature.  Blood tests  Depending on your treatment plan and your personal needs, you may have the following tests done:  · HbA1c (hemoglobin A1c). This test provides information about blood sugar (glucose) control over the previous 2-3 months. It is used to adjust your treatment plan, if needed. This test will be done:  ? At least 2 times a year, if you are meeting your treatment goals.  ? 4 times a year, if you are not meeting your treatment goals or if treatment goals have changed.  · Lipid testing, including total, LDL, and HDL cholesterol and triglyceride levels.  ? The goal for LDL is less than 100 mg/dL (5.5 mmol/L). If you are at high risk for complications, the goal is less than 70 mg/dL (3.9 mmol/L).  ? The goal for HDL is 40 mg/dL (2.2 mmol/L) or higher for men and 50 mg/dL (2.8 mmol/L) or higher for women. An HDL cholesterol of 60 mg/dL (3.3 mmol/L) or higher gives some protection against heart disease.  ? The goal for triglycerides is less than 150 mg/dL (8.3 mmol/L).  · Liver function tests.  · Kidney function tests.  · Thyroid function  tests.  Dental and eye exams  · Visit your dentist two times a year.  · If you have type 1 diabetes, your health care provider may recommend an eye exam 3-5 years after you are diagnosed, and then once a year after your first exam.  ? For children with type 1 diabetes, a health care provider may recommend an eye exam when your child is age 10 or older and has had diabetes for 3-5 years. After the first exam, your child should get an eye exam once a year.  · If you have type 2 diabetes, your health care provider may recommend an eye exam as soon as you are diagnosed, and then once a year after your first exam.  Immunizations    · The yearly flu (influenza) vaccine is recommended for everyone 6 months or older who has diabetes.  · The pneumonia (pneumococcal) vaccine is recommended for everyone 2 years or older who has diabetes. If you are 65 or older, you may get the pneumonia vaccine as a series of two separate shots.  · The hepatitis B vaccine is recommended for adults shortly after being diagnosed with diabetes.  · Adults and children with diabetes should receive all other vaccines according to age-specific recommendations from the Centers for Disease Control and Prevention (CDC).  Mental and emotional health  Screening for symptoms of eating disorders, anxiety, and depression is recommended at the time of diagnosis and afterward as needed. If your screening shows that you have symptoms (positive screening result), you may need more evaluation and you may work with a mental health care provider.  Treatment plan  Your treatment plan will be reviewed at every medical visit. You and your health care provider will discuss:  · How you are taking your medicines, including insulin.  · Any side effects you are experiencing.  · Your blood glucose target goals.  · The frequency of your blood glucose monitoring.  · Lifestyle habits, such as activity level as well as tobacco, alcohol, and substance use.  Diabetes  self-management education  Your health care provider will assess how well you are monitoring your blood glucose levels and whether you are taking your insulin correctly. He or she may refer you to:  · A certified diabetes educator to manage your diabetes throughout your life, starting at diagnosis.  · A registered dietitian who can create or review your personal nutrition plan.  · An exercise specialist who can discuss your activity level and exercise plan.  Summary  · Managing diabetes (diabetes mellitus) can be complicated. Your diabetes treatment may be managed by a team of health care providers.  · Your health care providers follow guidelines in order to help you get the best quality of care.  · Standards of care including having regular physical exams, blood tests, blood pressure monitoring, immunizations, screening tests, and education about how to manage your diabetes.  · Your health care providers may also give you more specific instructions based on your individual health.  This information is not intended to replace advice given to you by your health care provider. Make sure you discuss any questions you have with your health care provider.  Document Released: 10/15/2010 Document Revised: 09/06/2019 Document Reviewed: 09/15/2017  [employer] Interactive Patient Education © 2019 [employer]      Preventive Care 40-64 Years, Female  Preventive care refers to lifestyle choices and visits with your health care provider that can promote health and wellness.  What does preventive care include?    · A yearly physical exam. This is also called an annual well check.  · Dental exams once or twice a year.  · Routine eye exams. Ask your health care provider how often you should have your eyes checked.  · Personal lifestyle choices, including:  ? Daily care of your teeth and gums.  ? Regular physical activity.  ? Eating a healthy diet.  ? Avoiding tobacco and drug use.  ? Limiting alcohol use.  ? Practicing safe  sex.  ? Taking low-dose aspirin daily starting at age 50.  ? Taking vitamin and mineral supplements as recommended by your health care provider.  What happens during an annual well check?  The services and screenings done by your health care provider during your annual well check will depend on your age, overall health, lifestyle risk factors, and family history of disease.  Counseling  Your health care provider may ask you questions about your:  · Alcohol use.  · Tobacco use.  · Drug use.  · Emotional well-being.  · Home and relationship well-being.  · Sexual activity.  · Eating habits.  · Work and work environment.  · Method of birth control.  · Menstrual cycle.  · Pregnancy history.  Screening  You may have the following tests or measurements:  · Height, weight, and BMI.  · Blood pressure.  · Lipid and cholesterol levels. These may be checked every 5 years, or more frequently if you are over 50 years old.  · Skin check.  · Lung cancer screening. You may have this screening every year starting at age 55 if you have a 30-pack-year history of smoking and currently smoke or have quit within the past 15 years.  · Colorectal cancer screening. All adults should have this screening starting at age 50 and continuing until age 75. Your health care provider may recommend screening at age 45. You will have tests every 1-10 years, depending on your results and the type of screening test. People at increased risk should start screening at an earlier age. Screening tests may include:  ? Guaiac-based fecal occult blood testing.  ? Fecal immunochemical test (FIT).  ? Stool DNA test.  ? Virtual colonoscopy.  ? Sigmoidoscopy. During this test, a flexible tube with a tiny camera (sigmoidoscope) is used to examine your rectum and lower colon. The sigmoidoscope is inserted through your anus into your rectum and lower colon.  ? Colonoscopy. During this test, a long, thin, flexible tube with a tiny camera (colonoscope) is used to  examine your entire colon and rectum.  · Hepatitis C blood test.  · Hepatitis B blood test.  · Sexually transmitted disease (STD) testing.  · Diabetes screening. This is done by checking your blood sugar (glucose) after you have not eaten for a while (fasting). You may have this done every 1-3 years.  · Mammogram. This may be done every 1-2 years. Talk to your health care provider about when you should start having regular mammograms. This may depend on whether you have a family history of breast cancer.  · BRCA-related cancer screening. This may be done if you have a family history of breast, ovarian, tubal, or peritoneal cancers.  · Pelvic exam and Pap test. This may be done every 3 years starting at age 21. Starting at age 30, this may be done every 5 years if you have a Pap test in combination with an HPV test.  · Bone density scan. This is done to screen for osteoporosis. You may have this scan if you are at high risk for osteoporosis.  Discuss your test results, treatment options, and if necessary, the need for more tests with your health care provider.  Vaccines  Your health care provider may recommend certain vaccines, such as:  · Influenza vaccine. This is recommended every year.  · Tetanus, diphtheria, and acellular pertussis (Tdap, Td) vaccine. You may need a Td booster every 10 years.  · Varicella vaccine. You may need this if you have not been vaccinated.  · Zoster vaccine. You may need this after age 60.  · Measles, mumps, and rubella (MMR) vaccine. You may need at least one dose of MMR if you were born in 1957 or later. You may also need a second dose.  · Pneumococcal 13-valent conjugate (PCV13) vaccine. You may need this if you have certain conditions and were not previously vaccinated.  · Pneumococcal polysaccharide (PPSV23) vaccine. You may need one or two doses if you smoke cigarettes or if you have certain conditions.  · Meningococcal vaccine. You may need this if you have certain  "conditions.  · Hepatitis A vaccine. You may need this if you have certain conditions or if you travel or work in places where you may be exposed to hepatitis A.  · Hepatitis B vaccine. You may need this if you have certain conditions or if you travel or work in places where you may be exposed to hepatitis B.  · Haemophilus influenzae type b (Hib) vaccine. You may need this if you have certain conditions.  Talk to your health care provider about which screenings and vaccines you need and how often you need them.  This information is not intended to replace advice given to you by your health care provider. Make sure you discuss any questions you have with your health care provider.  Document Released: 01/13/2017 Document Revised: 02/07/2019 Document Reviewed: 10/18/2016  [employer] Interactive Patient Education © 2019 [employer]      DASH Eating Plan  DASH stands for \"Dietary Approaches to Stop Hypertension.\" The DASH eating plan is a healthy eating plan that has been shown to reduce high blood pressure (hypertension). It may also reduce your risk for type 2 diabetes, heart disease, and stroke. The DASH eating plan may also help with weight loss.  What are tips for following this plan?    General guidelines  · Avoid eating more than 2,300 mg (milligrams) of salt (sodium) a day. If you have hypertension, you may need to reduce your sodium intake to 1,500 mg a day.  · Limit alcohol intake to no more than 1 drink a day for nonpregnant women and 2 drinks a day for men. One drink equals 12 oz of beer, 5 oz of wine, or 1½ oz of hard liquor.  · Work with your health care provider to maintain a healthy body weight or to lose weight. Ask what an ideal weight is for you.  · Get at least 30 minutes of exercise that causes your heart to beat faster (aerobic exercise) most days of the week. Activities may include walking, swimming, or biking.  · Work with your health care provider or diet and nutrition specialist (dietitian) to " "adjust your eating plan to your individual calorie needs.  Reading food labels    · Check food labels for the amount of sodium per serving. Choose foods with less than 5 percent of the Daily Value of sodium. Generally, foods with less than 300 mg of sodium per serving fit into this eating plan.  · To find whole grains, look for the word \"whole\" as the first word in the ingredient list.  Shopping  · Buy products labeled as \"low-sodium\" or \"no salt added.\"  · Buy fresh foods. Avoid canned foods and premade or frozen meals.  Cooking  · Avoid adding salt when cooking. Use salt-free seasonings or herbs instead of table salt or sea salt. Check with your health care provider or pharmacist before using salt substitutes.  · Do not mendoza foods. Cook foods using healthy methods such as baking, boiling, grilling, and broiling instead.  · Cook with heart-healthy oils, such as olive, canola, soybean, or sunflower oil.  Meal planning  · Eat a balanced diet that includes:  ? 5 or more servings of fruits and vegetables each day. At each meal, try to fill half of your plate with fruits and vegetables.  ? Up to 6-8 servings of whole grains each day.  ? Less than 6 oz of lean meat, poultry, or fish each day. A 3-oz serving of meat is about the same size as a deck of cards. One egg equals 1 oz.  ? 2 servings of low-fat dairy each day.  ? A serving of nuts, seeds, or beans 5 times each week.  ? Heart-healthy fats. Healthy fats called Omega-3 fatty acids are found in foods such as flaxseeds and coldwater fish, like sardines, salmon, and mackerel.  · Limit how much you eat of the following:  ? Canned or prepackaged foods.  ? Food that is high in trans fat, such as fried foods.  ? Food that is high in saturated fat, such as fatty meat.  ? Sweets, desserts, sugary drinks, and other foods with added sugar.  ? Full-fat dairy products.  · Do not salt foods before eating.  · Try to eat at least 2 vegetarian meals each week.  · Eat more home-cooked " food and less restaurant, buffet, and fast food.  · When eating at a restaurant, ask that your food be prepared with less salt or no salt, if possible.  What foods are recommended?  The items listed may not be a complete list. Talk with your dietitian about what dietary choices are best for you.  Grains  Whole-grain or whole-wheat bread. Whole-grain or whole-wheat pasta. Brown rice. Oatmeal. Quinoa. Bulgur. Whole-grain and low-sodium cereals. Teresa bread. Low-fat, low-sodium crackers. Whole-wheat flour tortillas.  Vegetables  Fresh or frozen vegetables (raw, steamed, roasted, or grilled). Low-sodium or reduced-sodium tomato and vegetable juice. Low-sodium or reduced-sodium tomato sauce and tomato paste. Low-sodium or reduced-sodium canned vegetables.  Fruits  All fresh, dried, or frozen fruit. Canned fruit in natural juice (without added sugar).  Meat and other protein foods  Skinless chicken or turkey. Ground chicken or turkey. Pork with fat trimmed off. Fish and seafood. Egg whites. Dried beans, peas, or lentils. Unsalted nuts, nut butters, and seeds. Unsalted canned beans. Lean cuts of beef with fat trimmed off. Low-sodium, lean deli meat.  Dairy  Low-fat (1%) or fat-free (skim) milk. Fat-free, low-fat, or reduced-fat cheeses. Nonfat, low-sodium ricotta or cottage cheese. Low-fat or nonfat yogurt. Low-fat, low-sodium cheese.  Fats and oils  Soft margarine without trans fats. Vegetable oil. Low-fat, reduced-fat, or light mayonnaise and salad dressings (reduced-sodium). Canola, safflower, olive, soybean, and sunflower oils. Avocado.  Seasoning and other foods  Herbs. Spices. Seasoning mixes without salt. Unsalted popcorn and pretzels. Fat-free sweets.  What foods are not recommended?  The items listed may not be a complete list. Talk with your dietitian about what dietary choices are best for you.  Grains  Baked goods made with fat, such as croissants, muffins, or some breads. Dry pasta or rice meal  packs.  Vegetables  Creamed or fried vegetables. Vegetables in a cheese sauce. Regular canned vegetables (not low-sodium or reduced-sodium). Regular canned tomato sauce and paste (not low-sodium or reduced-sodium). Regular tomato and vegetable juice (not low-sodium or reduced-sodium). Pickles. Olives.  Fruits  Canned fruit in a light or heavy syrup. Fried fruit. Fruit in cream or butter sauce.  Meat and other protein foods  Fatty cuts of meat. Ribs. Fried meat. Vines. Sausage. Bologna and other processed lunch meats. Salami. Fatback. Hotdogs. Bratwurst. Salted nuts and seeds. Canned beans with added salt. Canned or smoked fish. Whole eggs or egg yolks. Chicken or turkey with skin.  Dairy  Whole or 2% milk, cream, and half-and-half. Whole or full-fat cream cheese. Whole-fat or sweetened yogurt. Full-fat cheese. Nondairy creamers. Whipped toppings. Processed cheese and cheese spreads.  Fats and oils  Butter. Stick margarine. Lard. Shortening. Ghee. Vines fat. Tropical oils, such as coconut, palm kernel, or palm oil.  Seasoning and other foods  Salted popcorn and pretzels. Onion salt, garlic salt, seasoned salt, table salt, and sea salt. Worcestershire sauce. Tartar sauce. Barbecue sauce. Teriyaki sauce. Soy sauce, including reduced-sodium. Steak sauce. Canned and packaged gravies. Fish sauce. Oyster sauce. Cocktail sauce. Horseradish that you find on the shelf. Ketchup. Mustard. Meat flavorings and tenderizers. Bouillon cubes. Hot sauce and Tabasco sauce. Premade or packaged marinades. Premade or packaged taco seasonings. Relishes. Regular salad dressings.  Where to find more information:  · National Heart, Lung, and Blood Outlook: www.nhlbi.nih.gov  · American Heart Association: www.heart.org  Summary  · The DASH eating plan is a healthy eating plan that has been shown to reduce high blood pressure (hypertension). It may also reduce your risk for type 2 diabetes, heart disease, and stroke.  · With the DASH eating  plan, you should limit salt (sodium) intake to 2,300 mg a day. If you have hypertension, you may need to reduce your sodium intake to 1,500 mg a day.  · When on the DASH eating plan, aim to eat more fresh fruits and vegetables, whole grains, lean proteins, low-fat dairy, and heart-healthy fats.  · Work with your health care provider or diet and nutrition specialist (dietitian) to adjust your eating plan to your individual calorie needs.  This information is not intended to replace advice given to you by your health care provider. Make sure you discuss any questions you have with your health care provider.  Document Released: 12/06/2012 Document Revised: 12/11/2017 Document Reviewed: 12/11/2017  [employer] Interactive Patient Education © 2019 [employer]

## 2019-12-30 ENCOUNTER — LAB (OUTPATIENT)
Dept: LAB | Facility: HOSPITAL | Age: 64
End: 2019-12-30

## 2019-12-30 DIAGNOSIS — E11.69 TYPE 2 DIABETES MELLITUS WITH OTHER SPECIFIED COMPLICATION, WITHOUT LONG-TERM CURRENT USE OF INSULIN (HCC): ICD-10-CM

## 2019-12-30 LAB
AMPHET+METHAMPHET UR QL: NEGATIVE
AMPHETAMINES UR QL: NEGATIVE
BARBITURATES UR QL SCN: NEGATIVE
BENZODIAZ UR QL SCN: NEGATIVE
BUPRENORPHINE SERPL-MCNC: NEGATIVE NG/ML
CANNABINOIDS SERPL QL: NEGATIVE
COCAINE UR QL: NEGATIVE
METHADONE UR QL SCN: NEGATIVE
OPIATES UR QL: NEGATIVE
OXYCODONE UR QL SCN: NEGATIVE
PCP UR QL SCN: NEGATIVE
PROPOXYPH UR QL: NEGATIVE
TRICYCLICS UR QL SCN: NEGATIVE

## 2019-12-30 PROCEDURE — 80307 DRUG TEST PRSMV CHEM ANLYZR: CPT

## 2019-12-30 PROCEDURE — 85025 COMPLETE CBC W/AUTO DIFF WBC: CPT

## 2019-12-30 PROCEDURE — 84443 ASSAY THYROID STIM HORMONE: CPT

## 2019-12-30 PROCEDURE — 80053 COMPREHEN METABOLIC PANEL: CPT

## 2019-12-30 PROCEDURE — 36415 COLL VENOUS BLD VENIPUNCTURE: CPT

## 2019-12-30 PROCEDURE — 83036 HEMOGLOBIN GLYCOSYLATED A1C: CPT

## 2019-12-30 PROCEDURE — 81001 URINALYSIS AUTO W/SCOPE: CPT

## 2019-12-31 LAB
ALBUMIN SERPL-MCNC: 4.2 G/DL (ref 3.5–5.2)
ALBUMIN/GLOB SERPL: 1.4 G/DL
ALP SERPL-CCNC: 98 U/L (ref 39–117)
ALT SERPL W P-5'-P-CCNC: 16 U/L (ref 1–41)
ANION GAP SERPL CALCULATED.3IONS-SCNC: 18.7 MMOL/L (ref 5–15)
AST SERPL-CCNC: 12 U/L (ref 1–40)
BACTERIA UR QL AUTO: NORMAL /HPF
BASOPHILS # BLD AUTO: 0.05 10*3/MM3 (ref 0–0.2)
BASOPHILS NFR BLD AUTO: 0.7 % (ref 0–1.5)
BILIRUB SERPL-MCNC: 0.3 MG/DL (ref 0.2–1.2)
BILIRUB UR QL STRIP: NEGATIVE
BUN BLD-MCNC: 14 MG/DL (ref 8–23)
BUN/CREAT SERPL: 15.9 (ref 7–25)
CALCIUM SPEC-SCNC: 9.3 MG/DL (ref 8.6–10.5)
CHLORIDE SERPL-SCNC: 99 MMOL/L (ref 98–107)
CLARITY UR: CLEAR
CO2 SERPL-SCNC: 23.3 MMOL/L (ref 22–29)
COLOR UR: YELLOW
CREAT BLD-MCNC: 0.88 MG/DL (ref 0.76–1.27)
DEPRECATED RDW RBC AUTO: 46.7 FL (ref 37–54)
EOSINOPHIL # BLD AUTO: 0.12 10*3/MM3 (ref 0–0.4)
EOSINOPHIL NFR BLD AUTO: 1.8 % (ref 0.3–6.2)
ERYTHROCYTE [DISTWIDTH] IN BLOOD BY AUTOMATED COUNT: 16.9 % (ref 12.3–15.4)
GFR SERPL CREATININE-BSD FRML MDRD: 88 ML/MIN/1.73
GLOBULIN UR ELPH-MCNC: 3.1 GM/DL
GLUCOSE BLD-MCNC: 137 MG/DL (ref 65–99)
GLUCOSE UR STRIP-MCNC: ABNORMAL MG/DL
HBA1C MFR BLD: 8.6 % (ref 4.8–5.6)
HCT VFR BLD AUTO: 43.1 % (ref 37.5–51)
HGB BLD-MCNC: 13.6 G/DL (ref 13–17.7)
HGB UR QL STRIP.AUTO: NEGATIVE
HYALINE CASTS UR QL AUTO: NORMAL /LPF
IMM GRANULOCYTES # BLD AUTO: 0.06 10*3/MM3 (ref 0–0.05)
IMM GRANULOCYTES NFR BLD AUTO: 0.9 % (ref 0–0.5)
KETONES UR QL STRIP: ABNORMAL
LEUKOCYTE ESTERASE UR QL STRIP.AUTO: NEGATIVE
LYMPHOCYTES # BLD AUTO: 2.04 10*3/MM3 (ref 0.7–3.1)
LYMPHOCYTES NFR BLD AUTO: 30.1 % (ref 19.6–45.3)
MCH RBC QN AUTO: 25 PG (ref 26.6–33)
MCHC RBC AUTO-ENTMCNC: 31.6 G/DL (ref 31.5–35.7)
MCV RBC AUTO: 79.1 FL (ref 79–97)
MONOCYTES # BLD AUTO: 0.62 10*3/MM3 (ref 0.1–0.9)
MONOCYTES NFR BLD AUTO: 9.1 % (ref 5–12)
NEUTROPHILS # BLD AUTO: 3.89 10*3/MM3 (ref 1.7–7)
NEUTROPHILS NFR BLD AUTO: 57.4 % (ref 42.7–76)
NITRITE UR QL STRIP: NEGATIVE
NRBC BLD AUTO-RTO: 0.1 /100 WBC (ref 0–0.2)
PH UR STRIP.AUTO: 6 [PH] (ref 5–8)
PLATELET # BLD AUTO: 342 10*3/MM3 (ref 140–450)
PMV BLD AUTO: 10.2 FL (ref 6–12)
POTASSIUM BLD-SCNC: 4.3 MMOL/L (ref 3.5–5.2)
PROT SERPL-MCNC: 7.3 G/DL (ref 6–8.5)
PROT UR QL STRIP: ABNORMAL
RBC # BLD AUTO: 5.45 10*6/MM3 (ref 4.14–5.8)
RBC # UR: NORMAL /HPF
REF LAB TEST METHOD: NORMAL
SODIUM BLD-SCNC: 141 MMOL/L (ref 136–145)
SP GR UR STRIP: >=1.03 (ref 1–1.03)
SQUAMOUS #/AREA URNS HPF: NORMAL /HPF
TSH SERPL DL<=0.05 MIU/L-ACNC: 2.65 UIU/ML (ref 0.27–4.2)
UROBILINOGEN UR QL STRIP: ABNORMAL
WBC NRBC COR # BLD: 6.78 10*3/MM3 (ref 3.4–10.8)
WBC UR QL AUTO: NORMAL /HPF

## 2020-01-02 ENCOUNTER — TELEPHONE (OUTPATIENT)
Dept: FAMILY MEDICINE CLINIC | Facility: CLINIC | Age: 65
End: 2020-01-02

## 2020-01-02 NOTE — TELEPHONE ENCOUNTER
----- Message from Gaurang Saldana LPN sent at 1/2/2020  8:20 AM CST -----      ----- Message -----  From: Harpreet Bass MD  Sent: 1/2/2020   7:06 AM CST  To: Gaurang Saldana LPN    Blood sugars have gone back up HgbA1c was 7.7 now 8.6  Take meds, follow diabetic diet, will recheck in 3 months.

## 2020-01-07 NOTE — PROGRESS NOTES
Subjective    Arias Willson is a 62 y.o. obese white male smoker with HTN, Had TIA 2- , began with double vision, R facial numbness, L arm numbness, L leg weakness. 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 Pt was referred to Neurosurgery in [place], told has aneurysm, but surgery not recommended for this area. 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 Had resolution of most motor weakness. Has residual difficulty with balance. Cognitive difficulties, Depression, DM, High cholesterol, Chronic pain, Hospital admit Jan 2018 with Resp failure, L lower lung nodule, Hypoxia, New onset seizures after discharge  and other health problems.  Pt presents for exam, to discuss health problems, Tx and F/U plans.     ' Saw sleep Medicine, have NEREYDA, started CPAP sleeping a lot better. Slept 7 hrs last night. Saw Neurologist Dr. Egan started Baclofen, seizure spells have stopped.   Needs walker current one broke and is 6 yrs old, requires to be mobile, can't use cane , has to balance with both hands. Currently Can't stand more than 5 mins or walk more than 20ft before legs give out. Needs new shower chair that will support weight more than 300lbs, current chair legs bent down. '     keratosis on legs.       Hypertension   This is a chronic problem. The current episode started more than 1 year ago. The problem has been waxing and waning since onset. The problem is controlled. Associated symptoms include headaches, neck pain and shortness of breath. Pertinent negatives include no chest pain or palpitations. Agents associated with hypertension include NSAIDs. Risk factors for coronary artery disease include obesity, male gender, smoking/tobacco exposure and stress (H/O CVA). Past treatments include ACE inhibitors,  angiotensin blockers, beta blockers, diuretics and lifestyle changes. Current antihypertension treatment includes angiotensin blockers and diuretics. The current treatment provides significant improvement. Compliance problems include medication cost.  Hypertensive end-organ damage includes CVA.   Diabetes   He presents for his follow-up diabetic visit. He has type 2 diabetes mellitus. No MedicAlert identification noted. His disease course has been fluctuating. Hypoglycemia symptoms include confusion, dizziness, headaches, nervousness/anxiousness and seizures. Associated symptoms include fatigue, visual change and weakness. Pertinent negatives for diabetes include no chest pain. Weight loss: neuro. There are no hypoglycemic complications. Symptoms are worsening. Diabetic complications include a CVA and peripheral neuropathy. Risk factors for coronary artery disease include diabetes mellitus, male sex, tobacco exposure, stress, sedentary lifestyle, obesity, hypertension and dyslipidemia. Current diabetic treatment includes oral agent (monotherapy). He is compliant with treatment some of the time. His weight is increasing rapidly. He is following a generally unhealthy diet. When asked about meal planning, he reported none. He rarely participates in exercise. Home blood sugar record trend: Unknown. An ACE inhibitor/angiotensin II receptor blocker is being taken. He does not see a podiatrist.Eye exam is current.   Insomnia   This is a chronic problem. The current episode started more than 1 year ago. The problem occurs constantly. The problem has been waxing and waning. Associated symptoms include fatigue, headaches, neck pain, numbness, a visual change and weakness. Pertinent negatives include no abdominal pain, arthralgias, chest pain, chills, congestion, coughing, fever, nausea, rash or sore throat. The symptoms are aggravated by stress. Treatments tried: OTC meds CPAP. The treatment provided significant relief.    Breathing Problem   He complains of difficulty breathing and shortness of breath. There is no cough or wheezing. Primary symptoms comments: Using 02 since hospitalization Jan 2018, H/O low pulse-ox, seizures like spells.. Associated symptoms include headaches. Pertinent negatives include no appetite change, chest pain, ear pain, fever or sore throat. Weight loss: neuro. His symptoms are aggravated by exercise. His symptoms are alleviated by nothing. He reports moderate (O2) improvement on treatment.   Seizures    This is a new (Seem to be triggered by standing up.) problem. Episode onset: Months. The problem has been resolved (Resolved after baclofen). There were 2 to 3 seizures. The most recent episode lasted 30 to 120 seconds. Associated symptoms include confusion and headaches. Pertinent negatives include no visual disturbance, no sore throat, no chest pain, no cough and no nausea. Characteristics include rhythmic jerking and loss of consciousness. The episode was witnessed.   Stroke   This is a chronic problem. The current episode started more than 1 year ago. The problem occurs daily. The problem has been gradually improving. Associated symptoms include fatigue, headaches, neck pain, numbness, a visual change and weakness. Pertinent negatives include no abdominal pain, arthralgias, chest pain, chills, congestion, coughing, fever, nausea, rash or sore throat. The symptoms are aggravated by exertion and walking. He has tried acetaminophen and NSAIDs for the symptoms. The treatment provided no relief.   Pain   This is a chronic problem. The current episode started more than 1 year ago. The problem occurs daily. Associated symptoms include fatigue, headaches, neck pain, numbness, a visual change and weakness. Pertinent negatives include no abdominal pain, arthralgias, chest pain, chills, congestion, coughing, fever, nausea, rash or sore throat. The symptoms are aggravated by walking and stress. 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 The treatment provided no relief.   Neurologic Problem   The patient's primary symptoms include an altered mental status, clumsiness, focal sensory loss, focal weakness, a loss of balance, a visual change and weakness. Primary symptoms comment: Delayed cognitive processing. Seizures. This is a chronic problem. The current episode started more than 1 year ago. The neurological problem developed suddenly. The problem has been waxing and waning since onset. There was left-sided, right-sided, facial, upper extremity and lower extremity (Strokes of Basal ganglia. Horners syndrome,  sensory, motor deficits.) focality noted. Associated symptoms include back pain, confusion, dizziness, fatigue, headaches, neck pain and shortness of breath. Pertinent negatives include no abdominal pain, chest pain, fever, nausea or palpitations. (Requires Walker to ambulate.) Past treatments include walking and medication (Physical therapy). The treatment provided mild relief. His past medical history is significant for a CVA.   Depression   Visit Type: follow-up  Patient presents with the following symptoms: confusion, decreased concentration, depressed mood, excessive worry, feelings of hopelessness, feelings of worthlessness, insomnia, irritability, memory impairment, nervousness/anxiety, restlessness and shortness of breath.  Patient is not experiencing: palpitations, suicidal ideas and suicidal planning.  Weight loss: neuro.  Frequency of symptoms: occasionally   Severity: moderate   Sleep quality: fair  Nighttime awakenings: several         The following portions of the patient's history were reviewed and updated as appropriate: allergies, current medications, past family history, past medical history, past social history, past surgical history and problem list.    Review of Systems   Constitutional: Positive for fatigue and irritability. Negative for activity change, appetite change, chills  and fever. Weight loss: neuro.   HENT: Negative for congestion, ear pain and sore throat.    Eyes: Negative for pain and visual disturbance.   Respiratory: Positive for shortness of breath. Negative for cough, chest tightness and wheezing.    Cardiovascular: Negative for chest pain and palpitations.   Gastrointestinal: Negative for abdominal pain and nausea.   Endocrine: Negative for cold intolerance and heat intolerance.   Genitourinary: Negative for difficulty urinating and dysuria.   Musculoskeletal: Positive for back pain, gait problem, neck pain and neck stiffness. Negative for arthralgias.   Skin: Negative for color change and rash.   Allergic/Immunologic: Negative for environmental allergies.   Neurological: Positive for dizziness, focal weakness, seizures, loss of consciousness, weakness, numbness, headaches and loss of balance.   Hematological: Negative for adenopathy. Does not bruise/bleed easily.   Psychiatric/Behavioral: Positive for confusion, decreased concentration and sleep disturbance. Negative for agitation and suicidal ideas. The patient is nervous/anxious and has insomnia.         Depressed mood       Objective   Physical Exam   Constitutional: He is oriented to person, place, and time. He appears well-developed and well-nourished. No distress.   Resp status improved from last visit, has O2 but not using in WC, reports, uses O2 if trying to walk with walker to prevent SOA.   HENT:   Head: Normocephalic and atraumatic.   Right Ear: External ear normal.   Left Ear: External ear normal.   Nose: Nose normal.   Mouth/Throat: Oropharynx is clear and moist. No oropharyngeal exudate.   Eyes: Conjunctivae and EOM are normal. Pupils are equal, round, and reactive to light. Right eye exhibits no discharge. Left eye exhibits no discharge. No scleral icterus.   Neck: Normal range of motion. Neck supple. No JVD present. No tracheal deviation present. No thyromegaly present.   Cardiovascular: Normal rate,  regular rhythm, normal heart sounds and intact distal pulses. Exam reveals no gallop and no friction rub.   No murmur heard.  Pulmonary/Chest: Effort normal and breath sounds normal. No respiratory distress. He has no wheezes. He has no rales. He exhibits no tenderness.   Abdominal: Soft. Bowel sounds are normal. He exhibits no distension and no mass. There is no tenderness. No hernia.   Musculoskeletal: He exhibits no edema, tenderness or deformity.   Requires WC, Walks short distance with cane, L sided sensory deficit, R motor, drift towards R without cane. R pupil NR, Has weakness closing R eye CN VII.   Has unsteady antalgic gait.    Has crepitous with ROM of knees. WB 6   Lymphadenopathy:     He has no cervical adenopathy.   Neurological: He is alert and oriented to person, place, and time. He displays normal reflexes. A cranial nerve deficit is present. He exhibits normal muscle tone. Coordination normal.   Skin: Skin is warm and dry. No rash noted. He is not diaphoretic. No erythema. No pallor.   Psychiatric: His behavior is normal. Judgment and thought content normal.   Chronic situational depression, post stroke, improved on meds.   Nursing note and vitals reviewed.      Assessment/Plan   Arias was seen today for hypertension, diabetes and cerebrovascular accident.    Diagnoses and all orders for this visit:    Type 2 diabetes mellitus with other specified complication, without long-term current use of insulin (CMS/[facility])  -     Hemoglobin A1c; Future    Sensory deficit present    Physical deconditioning    Neuropathic pain    Gait disturbance, post-stroke    Cognitive deficit, post-stroke    Essential hypertension    Syncope, unspecified syncope type    Gastroesophageal reflux disease without esophagitis    Morbid obesity (CMS/[facility])    Fall, subsequent encounter    H/O: CVA (cerebrovascular accident)    Other insomnia    Peripheral edema    Need for immunization against influenza  -     Fluarix/Fluzone/Afluria  Quad>6 Months    Other orders  -     ammonium lactate (AMLACTIN) 12 % lotion; Apply  topically to the appropriate area as directed As Needed for Dry Skin.  -     SCANNED - INFLUENZA      Discussed exam, Face to face visit, Pt is home bound, requires wheel chair, or Walker for short distances, need seated walker to reduce fall risk. Needs shower chair for safety. Discussed F/u with specialist. Discussed F/U here.          This document has been electronically signed by Harpreet Bass MD

## 2020-01-22 RX ORDER — LOSARTAN POTASSIUM 50 MG/1
TABLET ORAL
Qty: 30 TABLET | Refills: 0 | Status: SHIPPED | OUTPATIENT
Start: 2020-01-22 | End: 2020-03-09

## 2020-01-22 RX ORDER — CLOPIDOGREL BISULFATE 75 MG/1
TABLET ORAL
Qty: 90 TABLET | Refills: 0 | Status: SHIPPED | OUTPATIENT
Start: 2020-01-22 | End: 2020-02-07 | Stop reason: SDUPTHER

## 2020-01-22 RX ORDER — BACLOFEN 10 MG/1
TABLET ORAL
Qty: 90 TABLET | Refills: 0 | Status: SHIPPED | OUTPATIENT
Start: 2020-01-22 | End: 2020-10-01

## 2020-01-22 RX ORDER — LANSOPRAZOLE 30 MG/1
CAPSULE, DELAYED RELEASE ORAL
Qty: 30 CAPSULE | Refills: 0 | Status: SHIPPED | OUTPATIENT
Start: 2020-01-22 | End: 2020-03-11

## 2020-01-22 RX ORDER — DULOXETIN HYDROCHLORIDE 60 MG/1
CAPSULE, DELAYED RELEASE ORAL
Qty: 30 CAPSULE | Refills: 0 | Status: SHIPPED | OUTPATIENT
Start: 2020-01-22 | End: 2020-03-16

## 2020-01-22 RX ORDER — PRAVASTATIN SODIUM 40 MG
TABLET ORAL
Qty: 90 TABLET | Refills: 0 | Status: SHIPPED | OUTPATIENT
Start: 2020-01-22 | End: 2020-05-05

## 2020-01-22 RX ORDER — SPIRONOLACTONE 25 MG/1
TABLET ORAL
Qty: 90 TABLET | Refills: 0 | Status: SHIPPED | OUTPATIENT
Start: 2020-01-22 | End: 2020-05-11

## 2020-02-07 RX ORDER — CLOPIDOGREL BISULFATE 75 MG/1
75 TABLET ORAL DAILY
Qty: 90 TABLET | Refills: 1 | Status: SHIPPED | OUTPATIENT
Start: 2020-02-07 | End: 2020-08-03 | Stop reason: SDUPTHER

## 2020-02-07 NOTE — TELEPHONE ENCOUNTER
Patient called in requesting a refill on his clopidogrel (PLAVIX) 75 MG tablet medication be ordered and sent to the [facility]. He is wanting to receive a 90 day prescription since he says it's the same cost as receiving a 30 day prescription. For any questions or issues, call back number is [phone]

## 2020-03-09 RX ORDER — EMPAGLIFLOZIN 25 MG/1
TABLET, FILM COATED ORAL
Qty: 30 TABLET | Refills: 5 | Status: SHIPPED | OUTPATIENT
Start: 2020-03-09 | End: 2020-09-02

## 2020-03-09 RX ORDER — LOSARTAN POTASSIUM 50 MG/1
TABLET ORAL
Qty: 30 TABLET | Refills: 5 | Status: SHIPPED | OUTPATIENT
Start: 2020-03-09 | End: 2020-09-02

## 2020-03-11 RX ORDER — LANSOPRAZOLE 30 MG/1
CAPSULE, DELAYED RELEASE ORAL
Qty: 30 CAPSULE | Refills: 5 | Status: SHIPPED | OUTPATIENT
Start: 2020-03-11 | End: 2020-09-02

## 2020-03-16 RX ORDER — DULOXETIN HYDROCHLORIDE 60 MG/1
CAPSULE, DELAYED RELEASE ORAL
Qty: 30 CAPSULE | Refills: 2 | Status: SHIPPED | OUTPATIENT
Start: 2020-03-16 | End: 2020-05-11

## 2020-03-23 ENCOUNTER — TELEMEDICINE (OUTPATIENT)
Dept: PULMONOLOGY | Facility: CLINIC | Age: 65
End: 2020-03-23

## 2020-03-23 DIAGNOSIS — J44.9 MODERATE COPD (CHRONIC OBSTRUCTIVE PULMONARY DISEASE) (HCC): Primary | ICD-10-CM

## 2020-03-23 DIAGNOSIS — E66.01 CLASS 2 SEVERE OBESITY DUE TO EXCESS CALORIES WITH SERIOUS COMORBIDITY AND BODY MASS INDEX (BMI) OF 37.0 TO 37.9 IN ADULT (HCC): ICD-10-CM

## 2020-03-23 DIAGNOSIS — F17.210 CIGARETTE NICOTINE DEPENDENCE, UNCOMPLICATED: ICD-10-CM

## 2020-03-23 PROCEDURE — 99212 OFFICE O/P EST SF 10 MIN: CPT | Performed by: INTERNAL MEDICINE

## 2020-03-23 NOTE — PROGRESS NOTES
Telehealth Visit    Visit Consent  You have chosen to receive care through the use of telemedicine.  Telemedicine enables physicians at different locations to provide safe, effective, and convenient care through the use of technology.  As with any healthcare service, there are risk associated with the use of telemedicine, including equipment failure, poor connections, and  issues.    Do you understand the risks and benefits of telemedicine as I explained them to you? Yes  Have your questions regarding telemedicine be answered? Yes  Do you consent to the use of telemedicine in your medical care today? Yes      Chief Complaint   Patient presents with   • COPD     follow-up       HPI: Arias Willson is a 63 y.o. male with a PMH significant for COPD, tobacco use, morbid obesity, NEREYDA on BiPAP, ASCAD, HTN, CVA, seizures, T2DM, and GERD who presents for follow-up of COPD.    3/23/20: He states that he has been doing well but will no longer be able to afford his Anoro in a month.  He does not need to use his albuterol very much.  He has occasional cough that is nonproductive and he denies any fever, chills, or chest pain.  He does report he has had some issues with waking up fighting his CPAP but he believes it is due to very vivid dreams and nightmares.  This does not happen every night and he still feels like the BiPAP is helping.  His AHI has remained less than 5.  Unfortunately, he does continue to smoke and is still not ready to quit.  He denies any recent antibiotics or steroid use.    Review of Systems: History obtained from chart review and the patient.  Review of Systems   Constitutional: Negative for fever and unexpected weight change.   Respiratory: Positive for cough and shortness of breath. Negative for wheezing.    Cardiovascular: Negative for chest pain and leg swelling.   Musculoskeletal: Positive for arthralgias and back pain.     As described in the HPI. Otherwise, remainder of ROS  (14 systems) were negative.    Medications, Allergies, PMFSH reviewed per EMR    Patient Active Problem List   Diagnosis   • Ptosis of eyelid   • Astigmatism   • Myopia   • Gus's syndrome   • Neuropathic pain   • GERD (gastroesophageal reflux disease)   • Aneurysm of carotid artery (CMS/[facility])   • H/O: CVA (cerebrovascular accident)   • Sensory deficit present   • Gait disturbance, post-stroke   • Cognitive deficit, post-stroke   • Hypertension   • Diabetes mellitus (CMS/[facility])   • Morbid obesity (CMS/[facility])   • Coronary artery disease   • NEREYDA treated with BiPAP   • Macrocytosis without anemia   • Need for immunization against influenza   • Need for vaccination   • Elevated brain natriuretic peptide (BNP) level   • Tremor of both hands   • Seizures (CMS/[facility])   • Fall   • Left knee pain   • Class 2 severe obesity due to excess calories with serious comorbidity and body mass index (BMI) of 35.0 to 35.9 in adult (CMS/[facility])   • High risk medication use   • Hypoxia   • Generalized edema   • Other insomnia   • Peripheral edema   • Nicotine abuse   • Nocturnal hypoxemia due to obesity   • COPD, group B, by GOLD 2017 classification (CMS/[facility])   • Syncope   • Physical deconditioning   • Pure hypercholesterolemia   • Cigarette nicotine dependence, uncomplicated   • Primary osteoarthritis of left knee   • Effusion, left knee   • Right knee pain   • Stage 2 moderate COPD by GOLD classification (CMS/[facility])   • Carbuncle   • Hyperkeratosis         Current Outpatient Medications:   •  albuterol sulfate  (90 Base) MCG/ACT inhaler, Inhale 2 puffs Every 4 (Four) Hours As Needed for Wheezing or Shortness of Air., Disp: 18 g, Rfl: 11  •  ammonium lactate (AMLACTIN) 12 % lotion, Apply  topically to the appropriate area as directed As Needed for Dry Skin. Apply to feet, Disp: 500 g, Rfl: 5  •  ANORO ELLIPTA 62.5-25 MCG/INH aerosol powder  inhaler, INHALE 1 PUFF INTO LUNGS ONCE DAILY, Disp: 1 each, Rfl: 11  •  [START ON 5/24/2106] aspirin  325 MG tablet, Take 325 mg by mouth daily., Disp: , Rfl:   •  baclofen (LIORESAL) 10 MG tablet, TAKE 1 TABLET BY MOUTH THREE TIMES DAILY, Disp: 90 tablet, Rfl: 0  •  clopidogrel (PLAVIX) 75 MG tablet, Take 1 tablet by mouth Daily., Disp: 90 tablet, Rfl: 1  •  Cyanocobalamin (B-12) 5000 MCG sublingual tablet, Place 1 each under the tongue Daily., Disp: 30 tablet, Rfl: 6  •  DULoxetine (CYMBALTA) 60 MG capsule, Take 1 capsule by mouth once daily, Disp: 30 capsule, Rfl: 2  •  erythromycin (ROMYCIN) 5 MG/GM ophthalmic ointment, Apply  to eye(s) as directed by provider Every Night., Disp: 3.5 g, Rfl: 2  •  gabapentin (NEURONTIN) 600 MG tablet, Take 1 tablet by mouth 3 (Three) Times a Day., Disp: 270 tablet, Rfl: 1  •  glucose blood test strip, 1 each by Other route 3 (Three) Times a Day. Use as instructed, Disp: 100 each, Rfl: 5  •  glucose monitor monitoring kit, 3 (Three) Times a Day., Disp: 1 each, Rfl: 0  •  Insulin Glargine (LANTUS SOLOSTAR) 100 UNIT/ML injection pen, Inject 20 units daily increase by 2 units every 3 days until AM blood sugar 100-150, Disp: 3 pen, Rfl: 5  •  JARDIANCE 25 MG tablet, Take 1 tablet by mouth once daily, Disp: 30 tablet, Rfl: 5  •  Lancets 30G misc, 1 each 3 (Three) Times a Day., Disp: 100 each, Rfl: 5  •  lansoprazole (PREVACID) 30 MG capsule, Take 1 capsule by mouth once daily, Disp: 30 capsule, Rfl: 5  •  losartan (COZAAR) 50 MG tablet, Take 1 tablet by mouth once daily, Disp: 30 tablet, Rfl: 5  •  metFORMIN (GLUCOPHAGE) 500 MG tablet, TAKE 1 TABLET BY MOUTH TWICE DAILY, Disp: 60 tablet, Rfl: 0  •  metoprolol tartrate (LOPRESSOR) 25 MG tablet, TAKE 1 TABLET BY MOUTH EVERY 12 HOURS, Disp: 180 tablet, Rfl: 0  •  mupirocin (BACTROBAN) 2 % ointment, Apply  topically to the appropriate area as directed 3 (Three) Times a Day., Disp: 30 g, Rfl: 2  •  Omega-3 Fatty Acids (FISH OIL) 1000 MG capsule capsule, Take 2,000 mg by mouth Daily With Breakfast., Disp: , Rfl:   •  pravastatin (PRAVACHOL)  40 MG tablet, TAKE 1 TABLET BY MOUTH ONCE DAILY AT NIGHT, Disp: 90 tablet, Rfl: 0  •  spironolactone (ALDACTONE) 25 MG tablet, TAKE 1 TABLET BY MOUTH ONCE DAILY, Disp: 90 tablet, Rfl: 0    No Known Allergies    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      • Benign essential hypertension    • Blood in feces    • Carotid artery stenosis    • Cerebrovascular accident (CMS/HCC)      L sided sensory deficit      • Conjunctivitis    • Constipation     OCCASIONAL   • Contusion, eye, left    • Corneal ulcer     PROBABLE   • Diabetes mellitus (CMS/HCC)    • Eczema    • Encounter for screening for malignant neoplasm of colon    • Essential hypertension    • GERD (gastroesophageal reflux disease)    • Hemorrhoids    • History of echocardiogram 04/05/2013    Echocadiogram W/ color flow 56458 (Normal LV systolic function with EF of 60-65%. Grade I diastolic dysfunction of the LV myocardium. No evidence of LV apical thrombus. No evidence of pericardial effusion.)   • Gus's syndrome     RIGHT EYE   • Hyperkeratosis    • Hyperlipidemia    • Mucopurulent conjunctivitis     ACUTE   • Myopia    • Neuropathic pain    • Obesity    • Onychomycosis    • Tobacco dependence syndrome      Past Surgical History:   Procedure Laterality Date   • COLONOSCOPY  07/30/2015    Colonoscopy, diagnostic (screening) 93860 (Screening for malignant neoplasm of colon)    • COLONOSCOPY  09/09/2015    Colonoscopy, diagnostic (screening) 51760 (Diverticulosis found in the sigmoid colon.Hemorrhoids found in the anus.)   • COLONOSCOPY  05/01/2009    Colonoscopy, diagnostic (screening) 00440 (Small internal and external hemorrhoids were present, Colonoscopy otherwise normal.)   • LEG SURGERY       Social History     Socioeconomic History   • Marital status: Single     Spouse name: Not on  file   • Number of children: Not on file   • Years of education: Not on file   • Highest education level: Not on file   Tobacco Use   • Smoking status: Current Every Day Smoker     Packs/day: 0.50     Years: 33.00     Pack years: 16.50     Types: Cigarettes   • Smokeless tobacco: Never Used   Substance and Sexual Activity   • Alcohol use: Yes   • Drug use: No   • Sexual activity: Defer     Family History   Problem Relation Age of Onset   • Glaucoma Other    • Heart disease Other    • Stroke Other    • Macular degeneration Other    • Glaucoma Father    • Macular degeneration Father    • Cataracts Father    • Macular degeneration Sister        Exam:  Deferred    Arias was seen today for copd.    Diagnoses and all orders for this visit:    Moderate COPD (chronic obstructive pulmonary disease) (CMS/[facility])    Cigarette nicotine dependence, uncomplicated    Class 2 severe obesity due to excess calories with serious comorbidity and body mass index (BMI) of 37.0 to 37.9 in adult (CMS/[facility])         Discussion/ Recommendations:   He continues to do well and is remaining well controlled at this time.  I have given him alternatives to the Anoro that he can see if they are better covered with his insurance.    -Continue Anoro.  -Recommended he call the insurance company to ask if Stiolto or Bevespi has better coverage.  If they do, he can call the office and we will send in a prescription.  -Use albuterol as needed for dyspnea or wheeze.  -Continue BiPAP at night with oxygen bleed in  -Plan on annual LD CT in October 2020  -Arias Willson  reports that he has been smoking cigarettes. He has a 16.50 pack-year smoking history. He has never used smokeless tobacco.. I have educated him on the risk of diseases from using tobacco products such as cancer, COPD and heart diease. I advised him to quit and he is not willing to quit. I spent 1 minutes counseling the patient.       Counseled on proper hygiene measures as well as social  distancing to prevent illness during the coronavirus pandemic.  Strongly recommended self-isolation even when asymptomatic.  Counseled that these measures are recommended to slow the spread of the virus as well as prevent transmission to the vulnerable population.  Recommended that he consider rescheduling any routine follow-up appointments with physicians as long as he remains stable.  If there any concerns, I recommended calling the physician's office first to request advice.      No orders of the defined types were placed in this encounter.       Counseled to contact physicians with concerns regarding health-related issues.  If it is necessary for the patient to have an in office evaluation, the patient will be notified and arrangements made.  If patient experiences severe symptoms warranting an emergent evaluation, patient was instructed to go to the nearest ED.    This visit has been rescheduled as a phone visit to comply with patient safety concerns in accordance with CDC recommendations.    Time: Telephone Start time 2:32PM, End time 2:41 PM (total time of discussion was 8 mins, 32 secs)  No face-to-face visits in the past 7 days, no anticipated face-to-face visits within the next 24 hours    Telehealth visit necessary due to current state of emergency with the COVID- 19 pandemic. Due to the nature of telehealth visits, certain aspects of a routine, in office visit are not possible.

## 2020-04-09 ENCOUNTER — TELEPHONE (OUTPATIENT)
Dept: FAMILY MEDICINE CLINIC | Facility: CLINIC | Age: 65
End: 2020-04-09

## 2020-04-09 NOTE — TELEPHONE ENCOUNTER
Tried calling to let patient know due to COVID-19 his appointment is now a telephone visit.  Patient will need to keep due to medication he is getting.  No answer left voice message

## 2020-04-13 ENCOUNTER — OFFICE VISIT (OUTPATIENT)
Dept: FAMILY MEDICINE CLINIC | Facility: CLINIC | Age: 65
End: 2020-04-13

## 2020-04-13 VITALS — OXYGEN SATURATION: 93 % | DIASTOLIC BLOOD PRESSURE: 80 MMHG | SYSTOLIC BLOOD PRESSURE: 145 MMHG | HEART RATE: 72 BPM

## 2020-04-13 DIAGNOSIS — G47.09 OTHER INSOMNIA: ICD-10-CM

## 2020-04-13 DIAGNOSIS — M25.561 RIGHT KNEE PAIN, UNSPECIFIED CHRONICITY: ICD-10-CM

## 2020-04-13 DIAGNOSIS — F32.89 OTHER DEPRESSION: ICD-10-CM

## 2020-04-13 DIAGNOSIS — K21.9 GASTROESOPHAGEAL REFLUX DISEASE WITHOUT ESOPHAGITIS: ICD-10-CM

## 2020-04-13 DIAGNOSIS — M79.2 NEUROPATHIC PAIN: ICD-10-CM

## 2020-04-13 DIAGNOSIS — I10 ESSENTIAL HYPERTENSION: Chronic | ICD-10-CM

## 2020-04-13 DIAGNOSIS — M17.12 PRIMARY OSTEOARTHRITIS OF LEFT KNEE: ICD-10-CM

## 2020-04-13 DIAGNOSIS — R26.9 GAIT DISTURBANCE, POST-STROKE: ICD-10-CM

## 2020-04-13 DIAGNOSIS — G47.33 OSA TREATED WITH BIPAP: Chronic | ICD-10-CM

## 2020-04-13 DIAGNOSIS — G90.2 HORNER'S SYNDROME: ICD-10-CM

## 2020-04-13 DIAGNOSIS — R44.9 SENSORY DEFICIT PRESENT: ICD-10-CM

## 2020-04-13 DIAGNOSIS — I69.398 GAIT DISTURBANCE, POST-STROKE: ICD-10-CM

## 2020-04-13 DIAGNOSIS — E11.69 TYPE 2 DIABETES MELLITUS WITH OTHER SPECIFIED COMPLICATION, WITHOUT LONG-TERM CURRENT USE OF INSULIN (HCC): Chronic | ICD-10-CM

## 2020-04-13 PROCEDURE — 99213 OFFICE O/P EST LOW 20 MIN: CPT | Performed by: FAMILY MEDICINE

## 2020-04-13 RX ORDER — GABAPENTIN 600 MG/1
600 TABLET ORAL 3 TIMES DAILY
Qty: 270 TABLET | Refills: 1 | Status: SHIPPED | OUTPATIENT
Start: 2020-04-13 | End: 2020-05-29 | Stop reason: SDUPTHER

## 2020-04-13 NOTE — PROGRESS NOTES
Subjective   Arias Willson is a 64 y.o. obese white male with HTN, Had TIA 2- , began with double vision, R facial numbness, L arm numbness, L leg weakness. 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 Pt was referred to Neurosurgery in [place], told has aneurysm, but surgery not recommended for this area. 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 Had resolution of most motor weakness. Has residual difficulty with balance. Cognitive difficulties, Depression, DM, High cholesterol, Chronic pain, Hospital admit Jan 2018 with Resp failure, Hypoxia, NEREYDA on Bi-pap. Pt  calls to discuss health problems, Tx and F/U plans.  ' Neuropathic pain, helped with Neurontin, need refill'  'HPI'  COPD   He complains of shortness of breath. There is no cough or wheezing. This is a chronic problem. The current episode started more than 1 year ago. The problem occurs daily. The problem has been gradually improving. Associated symptoms include headaches. Pertinent negatives include no appetite change, chest pain, ear pain, fever or sore throat.   Hypertension   This is a chronic problem. The current episode started more than 1 year ago. The problem has been waxing and waning since onset. The problem is controlled. Associated symptoms include headaches, neck pain and shortness of breath. Pertinent negatives include no chest pain or palpitations. Agents associated with hypertension include NSAIDs. Risk factors for coronary artery disease include obesity, male gender, smoking/tobacco exposure and stress (H/O CVA). Past treatments include ACE inhibitors, angiotensin blockers, beta blockers, diuretics and lifestyle changes. Current antihypertension treatment includes angiotensin blockers and diuretics. The current treatment provides  significant improvement. Compliance problems include medication cost.  Hypertensive end-organ damage includes CVA.   Diabetes   He presents for his follow-up diabetic visit. He has type 2 diabetes mellitus. No MedicAlert identification noted. His disease course has been fluctuating. Hypoglycemia symptoms include dizziness, headaches, nervousness/anxiousness and seizures. Pertinent negatives for hypoglycemia include no confusion. Associated symptoms include fatigue, visual change and weakness. Pertinent negatives for diabetes include no chest pain. There are no hypoglycemic complications. Symptoms are worsening. Diabetic complications include a CVA and peripheral neuropathy. Risk factors for coronary artery disease include diabetes mellitus, male sex, tobacco exposure, stress, sedentary lifestyle, obesity, hypertension and dyslipidemia. Current diabetic treatment includes oral agent (monotherapy). He is compliant with treatment some of the time. His weight is increasing rapidly. He is following a generally unhealthy diet. When asked about meal planning, he reported none. He rarely participates in exercise. Home blood sugar record trend: Unknown. His overall blood glucose range is 180-200 mg/dl. An ACE inhibitor/angiotensin II receptor blocker is being taken. He does not see a podiatrist.Eye exam is current.   Insomnia   This is a chronic problem. The current episode started more than 1 year ago. The problem occurs constantly. The problem has been waxing and waning. Associated symptoms include fatigue, headaches, neck pain, numbness, a visual change and weakness. Pertinent negatives include no abdominal pain, arthralgias, chest pain, chills, congestion, coughing, fever, nausea, rash or sore throat. The symptoms are aggravated by stress. Treatments tried: OTC meds CPAP. The treatment provided significant relief.   Stroke   This is a chronic problem. The current episode started more than 1 year ago. The problem occurs  daily. The problem has been gradually improving. Associated symptoms include fatigue, headaches, neck pain, numbness, a visual change and weakness. Pertinent negatives include no abdominal pain, arthralgias, chest pain, chills, congestion, coughing, fever, nausea, rash or sore throat. The symptoms are aggravated by exertion and walking. He has tried acetaminophen and NSAIDs for the symptoms. The treatment provided no relief.   Pain   This is a chronic problem. The current episode started more than 1 year ago. The problem occurs daily. Associated symptoms include fatigue, headaches, neck pain, numbness, a visual change and weakness. Pertinent negatives include no abdominal pain, arthralgias, chest pain, chills, congestion, coughing, fever, nausea, rash or sore throat. The symptoms are aggravated by walking and stress. He has tried NSAIDs and acetaminophen (Neurontin) for the symptoms. The treatment provided moderate relief.   Neurologic Problem   The patient's primary symptoms include an altered mental status, clumsiness, focal sensory loss, focal weakness, a loss of balance, a visual change and weakness. This is a chronic problem. The current episode started more than 1 year ago. The neurological problem developed suddenly. The problem has been waxing and waning since onset. There was left-sided, right-sided, facial, upper extremity and lower extremity (Strokes of Basal ganglia. Horners syndrome,  sensory, motor deficits.) focality noted. Associated symptoms include back pain, dizziness, fatigue, headaches, neck pain and shortness of breath. Pertinent negatives include no abdominal pain, chest pain, confusion, fever, nausea or palpitations. (Requires Walker to ambulate.) Past treatments include walking and medication (Physical therapy). The treatment provided mild relief. His past medical history is significant for a CVA.   Depression   Visit Type: follow-up  Patient presents with the following symptoms: decreased  concentration, depressed mood, excessive worry, feelings of hopelessness, feelings of worthlessness, insomnia, irritability, memory impairment, nervousness/anxiety, restlessness and shortness of breath.  Patient is not experiencing: confusion, palpitations, suicidal ideas and suicidal planning.  Frequency of symptoms: occasionally   Severity: moderate   Sleep quality: fair  Nighttime awakenings: several         The following portions of the patient's history were reviewed and updated as appropriate: allergies, current medications, past family history, past medical history, past social history, past surgical history and problem list.    Review of Systems   Constitutional: Positive for fatigue and irritability. Negative for activity change, appetite change, chills and fever.   HENT: Negative for congestion, ear pain and sore throat.    Eyes: Negative for pain and visual disturbance.   Respiratory: Positive for shortness of breath. Negative for cough, chest tightness and wheezing.    Cardiovascular: Negative for chest pain and palpitations.   Gastrointestinal: Negative for abdominal pain and nausea.   Endocrine: Negative for cold intolerance and heat intolerance.   Genitourinary: Negative for difficulty urinating and dysuria.   Musculoskeletal: Positive for back pain, gait problem, neck pain and neck stiffness. Negative for arthralgias.   Skin: Negative for color change and rash.   Allergic/Immunologic: Negative for environmental allergies.   Neurological: Positive for dizziness, focal weakness, seizures, weakness, numbness, headaches and loss of balance.   Hematological: Negative for adenopathy. Does not bruise/bleed easily.   Psychiatric/Behavioral: Positive for decreased concentration and sleep disturbance. Negative for agitation, confusion and suicidal ideas. The patient is nervous/anxious and has insomnia.         Depressed mood  Irritability improving.       Objective   Physical Exam   Constitutional: He is  oriented to person, place, and time.   Pulmonary/Chest: Effort normal.   Neurological: He is alert and oriented to person, place, and time.   Psychiatric: He has a normal mood and affect. His behavior is normal. Judgment and thought content normal.   Nursing note and vitals reviewed.      Assessment/Plan   Arias was seen today for copd, hypertension and diabetes.    Diagnoses and all orders for this visit:    Sensory deficit present    Right knee pain, unspecified chronicity    Primary osteoarthritis of left knee    Other insomnia    NEREYDA treated with BiPAP    Neuropathic pain  -     gabapentin (NEURONTIN) 600 MG tablet; Take 1 tablet by mouth 3 (Three) Times a Day.    Essential hypertension    Gus's syndrome    Gastroesophageal reflux disease without esophagitis    Gait disturbance, post-stroke    Type 2 diabetes mellitus with other specified complication, without long-term current use of insulin (CMS/[facility])    Other depression    Discussed health problems, meds, indications, tx plan, rationale. Discussed safety with controlled meds. Discussed std of care for DM, diet, exercise.  Discussed precautions, to avoid exposure to Covid-19. Discussed F/u plan.   This visit has been rescheduled as a phone visit to comply with patient safety concerns in accordance with CDC recommendations. Total time of discussion was 15 minutes.        This document has been electronically signed by Harpreet Bass MD on April 13, 2020 15:35

## 2020-04-13 NOTE — PROGRESS NOTES
You have chosen to receive care through a telephone visit. Do you consent to use a telephone visit for your medical care today? Yes

## 2020-05-05 RX ORDER — PRAVASTATIN SODIUM 40 MG
TABLET ORAL
Qty: 90 TABLET | Refills: 1 | Status: SHIPPED | OUTPATIENT
Start: 2020-05-05 | End: 2020-10-08 | Stop reason: SDUPTHER

## 2020-05-11 RX ORDER — DULOXETIN HYDROCHLORIDE 60 MG/1
CAPSULE, DELAYED RELEASE ORAL
Qty: 30 CAPSULE | Refills: 5 | Status: SHIPPED | OUTPATIENT
Start: 2020-05-11 | End: 2020-11-30

## 2020-05-11 RX ORDER — SPIRONOLACTONE 25 MG/1
TABLET ORAL
Qty: 90 TABLET | Refills: 1 | Status: SHIPPED | OUTPATIENT
Start: 2020-05-11 | End: 2020-10-08 | Stop reason: SDUPTHER

## 2020-05-29 ENCOUNTER — OFFICE VISIT (OUTPATIENT)
Dept: FAMILY MEDICINE CLINIC | Facility: CLINIC | Age: 65
End: 2020-05-29

## 2020-05-29 VITALS — OXYGEN SATURATION: 92 % | HEART RATE: 76 BPM | SYSTOLIC BLOOD PRESSURE: 126 MMHG | DIASTOLIC BLOOD PRESSURE: 60 MMHG

## 2020-05-29 DIAGNOSIS — M79.2 NEUROPATHIC PAIN: ICD-10-CM

## 2020-05-29 DIAGNOSIS — Z79.4 TYPE 2 DIABETES MELLITUS WITH OTHER SPECIFIED COMPLICATION, WITH LONG-TERM CURRENT USE OF INSULIN (HCC): ICD-10-CM

## 2020-05-29 DIAGNOSIS — G90.2 HORNER'S SYNDROME: ICD-10-CM

## 2020-05-29 DIAGNOSIS — Z86.73 H/O: CVA (CEREBROVASCULAR ACCIDENT): ICD-10-CM

## 2020-05-29 DIAGNOSIS — G47.33 OSA TREATED WITH BIPAP: Chronic | ICD-10-CM

## 2020-05-29 DIAGNOSIS — E11.69 TYPE 2 DIABETES MELLITUS WITH OTHER SPECIFIED COMPLICATION, WITH LONG-TERM CURRENT USE OF INSULIN (HCC): ICD-10-CM

## 2020-05-29 DIAGNOSIS — J44.9 STAGE 2 MODERATE COPD BY GOLD CLASSIFICATION (HCC): Primary | Chronic | ICD-10-CM

## 2020-05-29 PROCEDURE — 99213 OFFICE O/P EST LOW 20 MIN: CPT | Performed by: FAMILY MEDICINE

## 2020-05-29 RX ORDER — GABAPENTIN 600 MG/1
600 TABLET ORAL 3 TIMES DAILY
Qty: 270 TABLET | Refills: 1 | Status: SHIPPED | OUTPATIENT
Start: 2020-05-29 | End: 2021-01-05 | Stop reason: SDUPTHER

## 2020-05-29 NOTE — PROGRESS NOTES
Subjective   Arias Willson is a 64 y.o. obese white male with HTN, Had TIA 2- , began with double vision, R facial numbness, L arm numbness, L leg weakness. 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 Pt was referred to Neurosurgery in [place], told has aneurysm, but surgery not recommended for this area. 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 Had resolution of most motor weakness. Has residual difficulty with balance. Cognitive difficulties, Depression, DM, High cholesterol, Chronic pain, Hospital admit Jan 2018 with Resp failure, Hypoxia, NEREYDA on Bi-pap. Pt  calls to discuss health problems, Tx and F/U plans.    You have chosen to receive care through a telephone visit. Do you consent to use a telephone visit for your medical care today? Yes    ' Taking precautions to prevent exposure to COVID-19. Trying to improve diet, after lab results of Diabetes being uncontrolled. Neuropathic pain tolerable with Neurontin. Will get repeat HgbA1c drawn'    COPD   He complains of shortness of breath. There is no cough or wheezing. This is a chronic problem. The current episode started more than 1 year ago. The problem occurs daily. The problem has been gradually improving. Associated symptoms include headaches. Pertinent negatives include no appetite change, chest pain, ear pain, fever or sore throat.   Hypertension   This is a chronic problem. The current episode started more than 1 year ago. The problem has been waxing and waning since onset. The problem is controlled. Associated symptoms include headaches, neck pain and shortness of breath. Pertinent negatives include no chest pain or palpitations. Agents associated with hypertension include NSAIDs. Risk factors for coronary artery disease include obesity, male  gender, smoking/tobacco exposure and stress (H/O CVA). Past treatments include ACE inhibitors, angiotensin blockers, beta blockers, diuretics and lifestyle changes. Current antihypertension treatment includes angiotensin blockers and diuretics. The current treatment provides significant improvement. Compliance problems include medication cost.  Hypertensive end-organ damage includes CVA.   Diabetes   He presents for his follow-up diabetic visit. He has type 2 diabetes mellitus. No MedicAlert identification noted. His disease course has been fluctuating. Hypoglycemia symptoms include dizziness, headaches, nervousness/anxiousness and seizures. Pertinent negatives for hypoglycemia include no confusion. Associated symptoms include fatigue, visual change and weakness. Pertinent negatives for diabetes include no chest pain. There are no hypoglycemic complications. Symptoms are worsening. Diabetic complications include a CVA and peripheral neuropathy. Risk factors for coronary artery disease include diabetes mellitus, male sex, tobacco exposure, stress, sedentary lifestyle, obesity, hypertension and dyslipidemia. Current diabetic treatment includes oral agent (monotherapy). He is compliant with treatment some of the time. His weight is increasing rapidly. He is following a generally unhealthy diet. When asked about meal planning, he reported none. He rarely participates in exercise. Home blood sugar record trend: Unknown. His overall blood glucose range is 180-200 mg/dl. An ACE inhibitor/angiotensin II receptor blocker is being taken. He does not see a podiatrist.Eye exam is current.   Insomnia   This is a chronic problem. The current episode started more than 1 year ago. The problem occurs constantly. The problem has been waxing and waning. Associated symptoms include fatigue, headaches, neck pain, numbness, a visual change and weakness. Pertinent negatives include no abdominal pain, arthralgias, chest pain, chills,  congestion, coughing, fever, nausea, rash or sore throat. The symptoms are aggravated by stress. Treatments tried: OTC meds CPAP. The treatment provided significant relief.   Stroke   This is a chronic problem. The current episode started more than 1 year ago. The problem occurs daily. The problem has been gradually improving. Associated symptoms include fatigue, headaches, neck pain, numbness, a visual change and weakness. Pertinent negatives include no abdominal pain, arthralgias, chest pain, chills, congestion, coughing, fever, nausea, rash or sore throat. The symptoms are aggravated by exertion and walking. He has tried acetaminophen and NSAIDs for the symptoms. The treatment provided no relief.   Pain   This is a chronic problem. The current episode started more than 1 year ago. The problem occurs daily. Associated symptoms include fatigue, headaches, neck pain, numbness, a visual change and weakness. Pertinent negatives include no abdominal pain, arthralgias, chest pain, chills, congestion, coughing, fever, nausea, rash or sore throat. The symptoms are aggravated by walking and stress. He has tried NSAIDs and acetaminophen (Neurontin) for the symptoms. The treatment provided moderate relief.   Neurologic Problem   The patient's primary symptoms include an altered mental status, clumsiness, focal sensory loss, focal weakness, a loss of balance, a visual change and weakness. This is a chronic problem. The current episode started more than 1 year ago. The neurological problem developed suddenly. The problem has been waxing and waning since onset. There was left-sided, right-sided, facial, upper extremity and lower extremity (Strokes of Basal ganglia. Horners syndrome,  sensory, motor deficits.) focality noted. Associated symptoms include back pain, dizziness, fatigue, headaches, neck pain and shortness of breath. Pertinent negatives include no abdominal pain, chest pain, confusion, fever, nausea or palpitations.  (Requires Walker to ambulate.) Past treatments include walking and medication (Physical therapy). The treatment provided mild relief. His past medical history is significant for a CVA.   Depression   Visit Type: follow-up  Patient presents with the following symptoms: decreased concentration, depressed mood, excessive worry, feelings of hopelessness, feelings of worthlessness, insomnia, irritability, memory impairment, nervousness/anxiety, restlessness and shortness of breath.  Patient is not experiencing: confusion, palpitations, suicidal ideas and suicidal planning.  Frequency of symptoms: occasionally   Severity: moderate   Sleep quality: fair  Nighttime awakenings: several         The following portions of the patient's history were reviewed and updated as appropriate: allergies, current medications, past family history, past medical history, past social history, past surgical history and problem list.    Review of Systems   Constitutional: Positive for fatigue and irritability. Negative for activity change, appetite change, chills and fever.   HENT: Negative for congestion, ear pain and sore throat.    Eyes: Negative for pain and visual disturbance.   Respiratory: Positive for shortness of breath. Negative for cough, chest tightness and wheezing.    Cardiovascular: Negative for chest pain and palpitations.   Gastrointestinal: Negative for abdominal pain and nausea.   Endocrine: Negative for cold intolerance and heat intolerance.   Genitourinary: Negative for difficulty urinating and dysuria.   Musculoskeletal: Positive for back pain, gait problem, neck pain and neck stiffness. Negative for arthralgias.   Skin: Negative for color change and rash.   Allergic/Immunologic: Negative for environmental allergies.   Neurological: Positive for dizziness, focal weakness, seizures, weakness, numbness, headaches and loss of balance.   Hematological: Negative for adenopathy. Does not bruise/bleed easily.      Psychiatric/Behavioral: Positive for decreased concentration and sleep disturbance. Negative for agitation, confusion and suicidal ideas. The patient is nervous/anxious and has insomnia.         Depressed mood  Irritability improving.       Objective   Physical Exam   Constitutional: He is oriented to person, place, and time. No distress.   Pulmonary/Chest: Effort normal.   Neurological: He is alert and oriented to person, place, and time.   Psychiatric: He has a normal mood and affect. His behavior is normal. Judgment and thought content normal.   Nursing note and vitals reviewed.      Assessment/Plan   Arias was seen today for diabetes, insomnia and foot pain.    Diagnoses and all orders for this visit:    Stage 2 moderate COPD by GOLD classification (CMS/[facility], located within [facility])    Neuropathic pain  -     gabapentin (NEURONTIN) 600 MG tablet; Take 1 tablet by mouth 3 (Three) Times a Day.    Type 2 diabetes mellitus with other specified complication, with long-term current use of insulin (CMS/[facility], located within [facility])  -     Hemoglobin A1c; Future    NEREYDA treated with BiPAP    Gus's syndrome    H/O: CVA (cerebrovascular accident)      Discussed health problems, meds, indications, tx plan, rationale. Discussed safety with controlled meds. Discussed Std of care for DM, checking Hgba1c goal 7 or less current >8. Discussed BMI, BEE diet, exercise for weight loss. Discussed smoking cessation x 3 mins. Discussed F/u plan.  Patient's There is no height or weight on file to calculate BMI. BMI is above normal parameters. Recommendations include: exercise counseling, nutrition counseling and referral to primary care.    This visit has been rescheduled as a phone visit to comply with patient safety concerns in accordance with CDC recommendations. Total time of discussion was15 minutes.          This document has been electronically signed by Harpreet Bass MD

## 2020-05-31 NOTE — PATIENT INSTRUCTIONS
Steps to Quit Smoking  Smoking tobacco is the leading cause of preventable death. It can affect almost every organ in the body. Smoking puts you and people around you at risk for many serious, long-lasting (chronic) diseases. Quitting smoking can be hard, but it is one of the best things that you can do for your health. It is never too late to quit.  How do I get ready to quit?  When you decide to quit smoking, make a plan to help you succeed. Before you quit:  · Pick a date to quit. Set a date within the next 2 weeks to give you time to prepare.  · Write down the reasons why you are quitting. Keep this list in places where you will see it often.  · Tell your family, friends, and co-workers that you are quitting. Their support is important.  · Talk with your doctor about the choices that may help you quit.  · Find out if your health insurance will pay for these treatments.  · Know the people, places, things, and activities that make you want to smoke (triggers). Avoid them.  What first steps can I take to quit smoking?  · Throw away all cigarettes at home, at work, and in your car.  · Throw away the things that you use when you smoke, such as ashtrays and lighters.  · Clean your car. Make sure to empty the ashtray.  · Clean your home, including curtains and carpets.  What can I do to help me quit smoking?  Talk with your doctor about taking medicines and seeing a counselor at the same time. You are more likely to succeed when you do both.  · If you are pregnant or breastfeeding, talk with your doctor about counseling or other ways to quit smoking. Do not take medicine to help you quit smoking unless your doctor tells you to do so.  To quit smoking:  Quit right away  · Quit smoking totally, instead of slowly cutting back on how much you smoke over a period of time.  · Go to counseling. You are more likely to quit if you go to counseling sessions regularly.  Take medicine  You may take medicines to help you quit. Some  medicines need a prescription, and some you can buy over-the-counter. Some medicines may contain a drug called nicotine to replace the nicotine in cigarettes. Medicines may:  · Help you to stop having the desire to smoke (cravings).  · Help to stop the problems that come when you stop smoking (withdrawal symptoms).  Your doctor may ask you to use:  · Nicotine patches, gum, or lozenges.  · Nicotine inhalers or sprays.  · Non-nicotine medicine that is taken by mouth.  Find resources  Find resources and other ways to help you quit smoking and remain smoke-free after you quit. These resources are most helpful when you use them often. They include:  · Online chats with a counselor.  · Phone quitlines.  · Printed self-help materials.  · Support groups or group counseling.  · Text messaging programs.  · Mobile phone apps. Use apps on your mobile phone or tablet that can help you stick to your quit plan. There are many free apps for mobile phones and tablets as well as websites. Examples include Quit Guide from the CDC and smokefree.gov    What things can I do to make it easier to quit?    · Talk to your family and friends. Ask them to support and encourage you.  · Call a phone quitline (0-076-QUITNOW), reach out to support groups, or work with a counselor.  · Ask people who smoke to not smoke around you.  · Avoid places that make you want to smoke, such as:  ? Bars.  ? Parties.  ? Smoke-break areas at work.  · Spend time with people who do not smoke.  · Lower the stress in your life. Stress can make you want to smoke. Try these things to help your stress:  ? Getting regular exercise.  ? Doing deep-breathing exercises.  ? Doing yoga.  ? Meditating.  ? Doing a body scan. To do this, close your eyes, focus on one area of your body at a time from head to toe. Notice which parts of your body are tense. Try to relax the muscles in those areas.  How will I feel when I quit smoking?  Day 1 to 3 weeks  Within the first 24 hours,  you may start to have some problems that come from quitting tobacco. These problems are very bad 2-3 days after you quit, but they do not often last for more than 2-3 weeks. You may get these symptoms:  · Mood swings.  · Feeling restless, nervous, angry, or annoyed.  · Trouble concentrating.  · Dizziness.  · Strong desire for high-sugar foods and nicotine.  · Weight gain.  · Trouble pooping (constipation).  · Feeling like you may vomit (nausea).  · Coughing or a sore throat.  · Changes in how the medicines that you take for other issues work in your body.  · Depression.  · Trouble sleeping (insomnia).  Week 3 and afterward  After the first 2-3 weeks of quitting, you may start to notice more positive results, such as:  · Better sense of smell and taste.  · Less coughing and sore throat.  · Slower heart rate.  · Lower blood pressure.  · Clearer skin.  · Better breathing.  · Fewer sick days.  Quitting smoking can be hard. Do not give up if you fail the first time. Some people need to try a few times before they succeed. Do your best to stick to your quit plan, and talk with your doctor if you have any questions or concerns.  Summary  · Smoking tobacco is the leading cause of preventable death. Quitting smoking can be hard, but it is one of the best things that you can do for your health.  · When you decide to quit smoking, make a plan to help you succeed.  · Quit smoking right away, not slowly over a period of time.  · When you start quitting, seek help from your doctor, family, or friends.  This information is not intended to replace advice given to you by your health care provider. Make sure you discuss any questions you have with your health care provider.  Document Released: 10/14/2010 Document Revised: 03/06/2020 Document Reviewed: 03/07/2020  Elsevier Patient Education © 2020 [employer]      Exercising to Lose Weight  Exercise is structured, repetitive physical activity to improve fitness and health. Getting  regular exercise is important for everyone. It is especially important if you are overweight. Being overweight increases your risk of heart disease, stroke, diabetes, high blood pressure, and several types of cancer. Reducing your calorie intake and exercising can help you lose weight.  Exercise is usually categorized as moderate or vigorous intensity. To lose weight, most people need to do a certain amount of moderate-intensity or vigorous-intensity exercise each week.  Moderate-intensity exercise    Moderate-intensity exercise is any activity that gets you moving enough to burn at least three times more energy (calories) than if you were sitting.  Examples of moderate exercise include:  · Walking a mile in 15 minutes.  · Doing light yard work.  · Biking at an easy pace.  Most people should get at least 150 minutes (2 hours and 30 minutes) a week of moderate-intensity exercise to maintain their body weight.  Vigorous-intensity exercise  Vigorous-intensity exercise is any activity that gets you moving enough to burn at least six times more calories than if you were sitting. When you exercise at this intensity, you should be working hard enough that you are not able to carry on a conversation.  Examples of vigorous exercise include:  · Running.  · Playing a team sport, such as football, basketball, and soccer.  · Jumping rope.  Most people should get at least 75 minutes (1 hour and 15 minutes) a week of vigorous-intensity exercise to maintain their body weight.  How can exercise affect me?  When you exercise enough to burn more calories than you eat, you lose weight. Exercise also reduces body fat and builds muscle. The more muscle you have, the more calories you burn. Exercise also:  · Improves mood.  · Reduces stress and tension.  · Improves your overall fitness, flexibility, and endurance.  · Increases bone strength.  The amount of exercise you need to lose weight depends on:  · Your age.  · The type of  exercise.  · Any health conditions you have.  · Your overall physical ability.  Talk to your health care provider about how much exercise you need and what types of activities are safe for you.  What actions can I take to lose weight?  Nutrition    · Make changes to your diet as told by your health care provider or diet and nutrition specialist (dietitian). This may include:  ? Eating fewer calories.  ? Eating more protein.  ? Eating less unhealthy fats.  ? Eating a diet that includes fresh fruits and vegetables, whole grains, low-fat dairy products, and lean protein.  ? Avoiding foods with added fat, salt, and sugar.  · Drink plenty of water while you exercise to prevent dehydration or heat stroke.  Activity  · Choose an activity that you enjoy and set realistic goals. Your health care provider can help you make an exercise plan that works for you.  · Exercise at a moderate or vigorous intensity most days of the week.  ? The intensity of exercise may vary from person to person. You can tell how intense a workout is for you by paying attention to your breathing and heartbeat. Most people will notice their breathing and heartbeat get faster with more intense exercise.  · Do resistance training twice each week, such as:  ? Push-ups.  ? Sit-ups.  ? Lifting weights.  ? Using resistance bands.  · Getting short amounts of exercise can be just as helpful as long structured periods of exercise. If you have trouble finding time to exercise, try to include exercise in your daily routine.  ? Get up, stretch, and walk around every 30 minutes throughout the day.  ? Go for a walk during your lunch break.  ? Park your car farther away from your destination.  ? If you take public transportation, get off one stop early and walk the rest of the way.  ? Make phone calls while standing up and walking around.  ? Take the stairs instead of elevators or escalators.  · Wear comfortable clothes and shoes with good support.  · Do not  exercise so much that you hurt yourself, feel dizzy, or get very short of breath.  Where to find more information  · [place] Department of Health and Human Services: www.hhs.gov  · Centers for Disease Control and Prevention (CDC): www.cdc.gov  Contact a health care provider:  · Before starting a new exercise program.  · If you have questions or concerns about your weight.  · If you have a medical problem that keeps you from exercising.  Get help right away if you have any of the following while exercising:  · Injury.  · Dizziness.  · Difficulty breathing or shortness of breath that does not go away when you stop exercising.  · Chest pain.  · Rapid heartbeat.  Summary  · Being overweight increases your risk of heart disease, stroke, diabetes, high blood pressure, and several types of cancer.  · Losing weight happens when you burn more calories than you eat.  · Reducing the amount of calories you eat in addition to getting regular moderate or vigorous exercise each week helps you lose weight.  This information is not intended to replace advice given to you by your health care provider. Make sure you discuss any questions you have with your health care provider.  Document Released: 01/20/2012 Document Revised: 12/31/2018 Document Reviewed: 12/31/2018  [employer] Patient Education © 2020 [employer]      Calorie Counting for Weight Loss  Calories are units of energy. Your body needs a certain amount of calories from food to keep you going throughout the day. When you eat more calories than your body needs, your body stores the extra calories as fat. When you eat fewer calories than your body needs, your body burns fat to get the energy it needs.  Calorie counting means keeping track of how many calories you eat and drink each day. Calorie counting can be helpful if you need to lose weight. If you make sure to eat fewer calories than your body needs, you should lose weight. Ask your health care provider what a healthy  weight is for you.  For calorie counting to work, you will need to eat the right number of calories in a day in order to lose a healthy amount of weight per week. A dietitian can help you determine how many calories you need in a day and will give you suggestions on how to reach your calorie goal.  · A healthy amount of weight to lose per week is usually 1-2 lb (0.5-0.9 kg). This usually means that your daily calorie intake should be reduced by 500-750 calories.  · Eating 1,200 - 1,500 calories per day can help most women lose weight.  · Eating 1,500 - 1,800 calories per day can help most men lose weight.  What is my plan?  My goal is to have __________ calories per day.  If I have this many calories per day, I should lose around __________ pounds per week.  What do I need to know about calorie counting?  In order to meet your daily calorie goal, you will need to:  · Find out how many calories are in each food you would like to eat. Try to do this before you eat.  · Decide how much of the food you plan to eat.  · Write down what you ate and how many calories it had. Doing this is called keeping a food log.  To successfully lose weight, it is important to balance calorie counting with a healthy lifestyle that includes regular activity. Aim for 150 minutes of moderate exercise (such as walking) or 75 minutes of vigorous exercise (such as running) each week.  Where do I find calorie information?    The number of calories in a food can be found on a Nutrition Facts label. If a food does not have a Nutrition Facts label, try to look up the calories online or ask your dietitian for help.  Remember that calories are listed per serving. If you choose to have more than one serving of a food, you will have to multiply the calories per serving by the amount of servings you plan to eat. For example, the label on a package of bread might say that a serving size is 1 slice and that there are 90 calories in a serving. If you eat 1  "slice, you will have eaten 90 calories. If you eat 2 slices, you will have eaten 180 calories.  How do I keep a food log?  Immediately after each meal, record the following information in your food log:  · What you ate. Don't forget to include toppings, sauces, and other extras on the food.  · How much you ate. This can be measured in cups, ounces, or number of items.  · How many calories each food and drink had.  · The total number of calories in the meal.  Keep your food log near you, such as in a small notebook in your pocket, or use a mobile jayce or website. Some programs will calculate calories for you and show you how many calories you have left for the day to meet your goal.  What are some calorie counting tips?    · Use your calories on foods and drinks that will fill you up and not leave you hungry:  ? Some examples of foods that fill you up are nuts and nut butters, vegetables, lean proteins, and high-fiber foods like whole grains. High-fiber foods are foods with more than 5 g fiber per serving.  ? Drinks such as sodas, specialty coffee drinks, alcohol, and juices have a lot of calories, yet do not fill you up.  · Eat nutritious foods and avoid empty calories. Empty calories are calories you get from foods or beverages that do not have many vitamins or protein, such as candy, sweets, and soda. It is better to have a nutritious high-calorie food (such as an avocado) than a food with few nutrients (such as a bag of chips).  · Know how many calories are in the foods you eat most often. This will help you calculate calorie counts faster.  · Pay attention to calories in drinks. Low-calorie drinks include water and unsweetened drinks.  · Pay attention to nutrition labels for \"low fat\" or \"fat free\" foods. These foods sometimes have the same amount of calories or more calories than the full fat versions. They also often have added sugar, starch, or salt, to make up for flavor that was removed with the fat.  · Find " a way of tracking calories that works for you. Get creative. Try different apps or programs if writing down calories does not work for you.  What are some portion control tips?  · Know how many calories are in a serving. This will help you know how many servings of a certain food you can have.  · Use a measuring cup to measure serving sizes. You could also try weighing out portions on a kitchen scale. With time, you will be able to estimate serving sizes for some foods.  · Take some time to put servings of different foods on your favorite plates, bowls, and cups so you know what a serving looks like.  · Try not to eat straight from a bag or box. Doing this can lead to overeating. Put the amount you would like to eat in a cup or on a plate to make sure you are eating the right portion.  · Use smaller plates, glasses, and bowls to prevent overeating.  · Try not to multitask (for example, watch TV or use your computer) while eating. If it is time to eat, sit down at a table and enjoy your food. This will help you to know when you are full. It will also help you to be aware of what you are eating and how much you are eating.  What are tips for following this plan?  Reading food labels  · Check the calorie count compared to the serving size. The serving size may be smaller than what you are used to eating.  · Check the source of the calories. Make sure the food you are eating is high in vitamins and protein and low in saturated and trans fats.  Shopping  · Read nutrition labels while you shop. This will help you make healthy decisions before you decide to purchase your food.  · Make a grocery list and stick to it.  Cooking  · Try to cook your favorite foods in a healthier way. For example, try baking instead of frying.  · Use low-fat dairy products.  Meal planning  · Use more fruits and vegetables. Half of your plate should be fruits and vegetables.  · Include lean proteins like poultry and fish.  How do I count calories  "when eating out?  · Ask for smaller portion sizes.  · Consider sharing an entree and sides instead of getting your own entree.  · If you get your own entree, eat only half. Ask for a box at the beginning of your meal and put the rest of your entree in it so you are not tempted to eat it.  · If calories are listed on the menu, choose the lower calorie options.  · Choose dishes that include vegetables, fruits, whole grains, low-fat dairy products, and lean protein.  · Choose items that are boiled, broiled, grilled, or steamed. Stay away from items that are buttered, battered, fried, or served with cream sauce. Items labeled \"crispy\" are usually fried, unless stated otherwise.  · Choose water, low-fat milk, unsweetened iced tea, or other drinks without added sugar. If you want an alcoholic beverage, choose a lower calorie option such as a glass of wine or light beer.  · Ask for dressings, sauces, and syrups on the side. These are usually high in calories, so you should limit the amount you eat.  · If you want a salad, choose a garden salad and ask for grilled meats. Avoid extra toppings like mae, cheese, or fried items. Ask for the dressing on the side, or ask for olive oil and vinegar or lemon to use as dressing.  · Estimate how many servings of a food you are given. For example, a serving of cooked rice is ½ cup or about the size of half a baseball. Knowing serving sizes will help you be aware of how much food you are eating at restaurants. The list below tells you how big or small some common portion sizes are based on everyday objects:  ? 1 oz--4 stacked dice.  ? 3 oz--1 deck of cards.  ? 1 tsp--1 die.  ? 1 Tbsp--½ a ping-pong ball.  ? 2 Tbsp--1 ping-pong ball.  ? ½ cup--½ baseball.  ? 1 cup--1 baseball.  Summary  · Calorie counting means keeping track of how many calories you eat and drink each day. If you eat fewer calories than your body needs, you should lose weight.  · A healthy amount of weight to lose per " week is usually 1-2 lb (0.5-0.9 kg). This usually means reducing your daily calorie intake by 500-750 calories.  · The number of calories in a food can be found on a Nutrition Facts label. If a food does not have a Nutrition Facts label, try to look up the calories online or ask your dietitian for help.  · Use your calories on foods and drinks that will fill you up, and not on foods and drinks that will leave you hungry.  · Use smaller plates, glasses, and bowls to prevent overeating.  This information is not intended to replace advice given to you by your health care provider. Make sure you discuss any questions you have with your health care provider.  Document Released: 12/18/2006 Document Revised: 09/06/2019 Document Reviewed: 11/17/2017  [employer] Patient Education © 2020 [employer]      Stroke Prevention  Some medical conditions and lifestyle choices can lead to a higher risk for a stroke. You can help to prevent a stroke by making nutrition, lifestyle, and other changes.  What nutrition changes can be made?    · Eat healthy foods.  ? Choose foods that are high in fiber. These include:  § Fresh fruits.  § Fresh vegetables.  § Whole grains.  ? Eat at least 5 or more servings of fruits and vegetables each day. Try to fill half of your plate at each meal with fruits and vegetables.  ? Choose lean protein foods. These include:  § Lowfat (lean) cuts of meat.  § Chicken without skin.  § Fish.  § Tofu.  § Beans.  § Nuts.  ? Eat low-fat dairy products.  ? Avoid foods that:  § Are high in salt (sodium).  § Have saturated fat.  § Have trans fat.  § Have cholesterol.  § Are processed.  § Are premade.  · Follow eating guidelines as told by your doctor. These may include:  ? Reducing how many calories you eat and drink each day.  ? Limiting how much salt you eat or drink each day to 1,500 milligrams (mg).  ? Using only healthy fats for cooking. These include:  § Olive oil.  § Canola oil.  § Sunflower oil.  ? Counting how  many carbohydrates you eat and drink each day.  What lifestyle changes can be made?  · Try to stay at a healthy weight. Talk to your doctor about what a good weight is for you.  · Get at least 30 minutes of moderate physical activity at least 5 days a week. This can include:  ? Fast walking.  ? Biking.  ? Swimming.  · Do not use any products that have nicotine or tobacco. This includes cigarettes and e-cigarettes. If you need help quitting, ask your doctor. Avoid being around tobacco smoke in general.  · Limit how much alcohol you drink to no more than 1 drink a day for nonpregnant women and 2 drinks a day for men. One drink equals 12 oz of beer, 5 oz of wine, or 1½ oz of hard liquor.  · Do not use drugs.  · Avoid taking birth control pills. Talk to your doctor about the risks of taking birth control pills if:  ? You are over 35 years old.  ? You smoke.  ? You get migraines.  ? You have had a blood clot.  What other changes can be made?  · Manage your cholesterol.  ? It is important to eat a healthy diet.  ? If your cholesterol cannot be managed through your diet, you may also need to take medicines. Take medicines as told by your doctor.  · Manage your diabetes.  ? It is important to eat a healthy diet and to exercise regularly.  ? If your blood sugar cannot be managed through diet and exercise, you may need to take medicines. Take medicines as told by your doctor.  · Control your high blood pressure (hypertension).  ? Try to keep your blood pressure below 130/80. This can help lower your risk of stroke.  ? It is important to eat a healthy diet and to exercise regularly.  ? If your blood pressure cannot be managed through diet and exercise, you may need to take medicines. Take medicines as told by your doctor.  ? Ask your doctor if you should check your blood pressure at home.  ? Have your blood pressure checked every year. Do this even if your blood pressure is normal.  · Talk to your doctor about getting checked  "for a sleep disorder. Signs of this can include:  ? Snoring a lot.  ? Feeling very tired.  · Take over-the-counter and prescription medicines only as told by your doctor. These may include aspirin or blood thinners (antiplatelets or anticoagulants).  · Make sure that any other medical conditions you have are managed.  Where to find more information  · American Stroke Association: www.strokeassociation.org  · National Stroke Association: www.stroke.org  Get help right away if:  · You have any symptoms of stroke. \"BE FAST\" is an easy way to remember the main warning signs:  ? B - Balance. Signs are dizziness, sudden trouble walking, or loss of balance.  ? E - Eyes. Signs are trouble seeing or a sudden change in how you see.  ? F - Face. Signs are sudden weakness or loss of feeling of the face, or the face or eyelid drooping on one side.  ? A - Arms. Signs are weakness or loss of feeling in an arm. This happens suddenly and usually on one side of the body.  ? S - Speech. Signs are sudden trouble speaking, slurred speech, or trouble understanding what people say.  ? T - Time. Time to call emergency services. Write down what time symptoms started.  · You have other signs of stroke, such as:  ? A sudden, very bad headache with no known cause.  ? Feeling sick to your stomach (nausea).  ? Throwing up (vomiting).  ? Jerky movements you cannot control (seizure).  These symptoms may represent a serious problem that is an emergency. Do not wait to see if the symptoms will go away. Get medical help right away. Call your local emergency services (911 in the [place] Do not drive yourself to the hospital.  Summary  · You can prevent a stroke by eating healthy, exercising, not smoking, drinking less alcohol, and treating other health problems, such as diabetes, high blood pressure, or high cholesterol.  · Do not use any products that contain nicotine or tobacco, such as cigarettes and e-cigarettes.  · Get help right away if you have " any signs or symptoms of a stroke.  This information is not intended to replace advice given to you by your health care provider. Make sure you discuss any questions you have with your health care provider.  Document Released: 06/18/2013 Document Revised: 02/13/2020 Document Reviewed: 03/21/2018  [employer] Patient Education © 2020 [employer]      Diabetes Mellitus and Standards of Medical Care  Managing diabetes (diabetes mellitus) can be complicated. Your diabetes treatment may be managed by a team of health care providers, including:  · A physician who specializes in diabetes (endocrinologist).  · A nurse practitioner or physician assistant.  · Nurses.  · A diet and nutrition specialist (registered dietitian).  · A certified diabetes educator (CDE).  · An exercise specialist.  · A pharmacist.  · An eye doctor.  · A foot specialist (podiatrist).  · A dentist.  · A primary care provider.  · A mental health provider.  Your health care providers follow guidelines to help you get the best quality of care. The following schedule is a general guideline for your diabetes management plan. Your health care providers may give you more specific instructions.  Physical exams  Upon being diagnosed with diabetes mellitus, and each year after that, your health care provider will ask about your medical and family history. He or she will also do a physical exam. Your exam may include:  · Measuring your height, weight, and body mass index (BMI).  · Checking your blood pressure. This will be done at every routine medical visit. Your target blood pressure may vary depending on your medical conditions, your age, and other factors.  · Thyroid gland exam.  · Skin exam.  · Screening for damage to your nerves (peripheral neuropathy). This may include checking the pulse in your legs and feet and checking the level of sensation in your hands and feet.  · A complete foot exam to inspect the structure and skin of your feet, including  checking for cuts, bruises, redness, blisters, sores, or other problems.  · Screening for blood vessel (vascular) problems, which may include checking the pulse in your legs and feet and checking your temperature.  Blood tests  Depending on your treatment plan and your personal needs, you may have the following tests done:  · HbA1c (hemoglobin A1c). This test provides information about blood sugar (glucose) control over the previous 2-3 months. It is used to adjust your treatment plan, if needed. This test will be done:  ? At least 2 times a year, if you are meeting your treatment goals.  ? 4 times a year, if you are not meeting your treatment goals or if treatment goals have changed.  · Lipid testing, including total, LDL, and HDL cholesterol and triglyceride levels.  ? The goal for LDL is less than 100 mg/dL (5.5 mmol/L). If you are at high risk for complications, the goal is less than 70 mg/dL (3.9 mmol/L).  ? The goal for HDL is 40 mg/dL (2.2 mmol/L) or higher for men and 50 mg/dL (2.8 mmol/L) or higher for women. An HDL cholesterol of 60 mg/dL (3.3 mmol/L) or higher gives some protection against heart disease.  ? The goal for triglycerides is less than 150 mg/dL (8.3 mmol/L).  · Liver function tests.  · Kidney function tests.  · Thyroid function tests.  Dental and eye exams  · Visit your dentist two times a year.  · If you have type 1 diabetes, your health care provider may recommend an eye exam 3-5 years after you are diagnosed, and then once a year after your first exam.  ? For children with type 1 diabetes, a health care provider may recommend an eye exam when your child is age 10 or older and has had diabetes for 3-5 years. After the first exam, your child should get an eye exam once a year.  · If you have type 2 diabetes, your health care provider may recommend an eye exam as soon as you are diagnosed, and then once a year after your first exam.  Immunizations    · The yearly flu (influenza) vaccine is  recommended for everyone 6 months or older who has diabetes.  · The pneumonia (pneumococcal) vaccine is recommended for everyone 2 years or older who has diabetes. If you are 65 or older, you may get the pneumonia vaccine as a series of two separate shots.  · The hepatitis B vaccine is recommended for adults shortly after being diagnosed with diabetes.  · Adults and children with diabetes should receive all other vaccines according to age-specific recommendations from the Centers for Disease Control and Prevention (CDC).  Mental and emotional health  Screening for symptoms of eating disorders, anxiety, and depression is recommended at the time of diagnosis and afterward as needed. If your screening shows that you have symptoms (positive screening result), you may need more evaluation and you may work with a mental health care provider.  Treatment plan  Your treatment plan will be reviewed at every medical visit. You and your health care provider will discuss:  · How you are taking your medicines, including insulin.  · Any side effects you are experiencing.  · Your blood glucose target goals.  · The frequency of your blood glucose monitoring.  · Lifestyle habits, such as activity level as well as tobacco, alcohol, and substance use.  Diabetes self-management education  Your health care provider will assess how well you are monitoring your blood glucose levels and whether you are taking your insulin correctly. He or she may refer you to:  · A certified diabetes educator to manage your diabetes throughout your life, starting at diagnosis.  · A registered dietitian who can create or review your personal nutrition plan.  · An exercise specialist who can discuss your activity level and exercise plan.  Summary  · Managing diabetes (diabetes mellitus) can be complicated. Your diabetes treatment may be managed by a team of health care providers.  · Your health care providers follow guidelines in order to help you get the  best quality of care.  · Standards of care including having regular physical exams, blood tests, blood pressure monitoring, immunizations, screening tests, and education about how to manage your diabetes.  · Your health care providers may also give you more specific instructions based on your individual health.  This information is not intended to replace advice given to you by your health care provider. Make sure you discuss any questions you have with your health care provider.  Document Released: 10/15/2010 Document Revised: 09/06/2019 Document Reviewed: 09/15/2017  [employer] Patient Education © 2020 [employer]      Preventive Care 40-64 Years Old, Female  Preventive care refers to visits with your health care provider and lifestyle choices that can promote health and wellness. This includes:  · A yearly physical exam. This may also be called an annual well check.  · Regular dental visits and eye exams.  · Immunizations.  · Screening for certain conditions.  · Healthy lifestyle choices, such as eating a healthy diet, getting regular exercise, not using drugs or products that contain nicotine and tobacco, and limiting alcohol use.  What can I expect for my preventive care visit?  Physical exam  Your health care provider will check your:  · Height and weight. This may be used to calculate body mass index (BMI), which tells if you are at a healthy weight.  · Heart rate and blood pressure.  · Skin for abnormal spots.  Counseling  Your health care provider may ask you questions about your:  · Alcohol, tobacco, and drug use.  · Emotional well-being.  · Home and relationship well-being.  · Sexual activity.  · Eating habits.  · Work and work environment.  · Method of birth control.  · Menstrual cycle.  · Pregnancy history.  What immunizations do I need?    Influenza (flu) vaccine  · This is recommended every year.  Tetanus, diphtheria, and pertussis (Tdap) vaccine  · You may need a Td booster every 10 years.  Varicella  (chickenpox) vaccine  · You may need this if you have not been vaccinated.  Zoster (shingles) vaccine  · You may need this after age 60.  Measles, mumps, and rubella (MMR) vaccine  · You may need at least one dose of MMR if you were born in 1957 or later. You may also need a second dose.  Pneumococcal conjugate (PCV13) vaccine  · You may need this if you have certain conditions and were not previously vaccinated.  Pneumococcal polysaccharide (PPSV23) vaccine  · You may need one or two doses if you smoke cigarettes or if you have certain conditions.  Meningococcal conjugate (MenACWY) vaccine  · You may need this if you have certain conditions.  Hepatitis A vaccine  · You may need this if you have certain conditions or if you travel or work in places where you may be exposed to hepatitis A.  Hepatitis B vaccine  · You may need this if you have certain conditions or if you travel or work in places where you may be exposed to hepatitis B.  Haemophilus influenzae type b (Hib) vaccine  · You may need this if you have certain conditions.  Human papillomavirus (HPV) vaccine  · If recommended by your health care provider, you may need three doses over 6 months.  You may receive vaccines as individual doses or as more than one vaccine together in one shot (combination vaccines). Talk with your health care provider about the risks and benefits of combination vaccines.  What tests do I need?  Blood tests  · Lipid and cholesterol levels. These may be checked every 5 years, or more frequently if you are over 50 years old.  · Hepatitis C test.  · Hepatitis B test.  Screening  · Lung cancer screening. You may have this screening every year starting at age 55 if you have a 30-pack-year history of smoking and currently smoke or have quit within the past 15 years.  · Colorectal cancer screening. All adults should have this screening starting at age 50 and continuing until age 75. Your health care provider may recommend screening at  age 45 if you are at increased risk. You will have tests every 1-10 years, depending on your results and the type of screening test.  · Diabetes screening. This is done by checking your blood sugar (glucose) after you have not eaten for a while (fasting). You may have this done every 1-3 years.  · Mammogram. This may be done every 1-2 years. Talk with your health care provider about when you should start having regular mammograms. This may depend on whether you have a family history of breast cancer.  · BRCA-related cancer screening. This may be done if you have a family history of breast, ovarian, tubal, or peritoneal cancers.  · Pelvic exam and Pap test. This may be done every 3 years starting at age 21. Starting at age 30, this may be done every 5 years if you have a Pap test in combination with an HPV test.  Other tests  · Sexually transmitted disease (STD) testing.  · Bone density scan. This is done to screen for osteoporosis. You may have this scan if you are at high risk for osteoporosis.  Follow these instructions at home:  Eating and drinking  · Eat a diet that includes fresh fruits and vegetables, whole grains, lean protein, and low-fat dairy.  · Take vitamin and mineral supplements as recommended by your health care provider.  · Do not drink alcohol if:  ? Your health care provider tells you not to drink.  ? You are pregnant, may be pregnant, or are planning to become pregnant.  · If you drink alcohol:  ? Limit how much you have to 0-1 drink a day.  ? Be aware of how much alcohol is in your drink. In the [place] one drink equals one 12 oz bottle of beer (355 mL), one 5 oz glass of wine (148 mL), or one 1½ oz glass of hard liquor (44 mL).  Lifestyle  · Take daily care of your teeth and gums.  · Stay active. Exercise for at least 30 minutes on 5 or more days each week.  · Do not use any products that contain nicotine or tobacco, such as cigarettes, e-cigarettes, and chewing tobacco. If you need help quitting,  "ask your health care provider.  · If you are sexually active, practice safe sex. Use a condom or other form of birth control (contraception) in order to prevent pregnancy and STIs (sexually transmitted infections).  · If told by your health care provider, take low-dose aspirin daily starting at age 50.  What's next?  · Visit your health care provider once a year for a well check visit.  · Ask your health care provider how often you should have your eyes and teeth checked.  · Stay up to date on all vaccines.  This information is not intended to replace advice given to you by your health care provider. Make sure you discuss any questions you have with your health care provider.  Document Released: 01/13/2017 Document Revised: 08/29/2019 Document Reviewed: 08/29/2019  [employer] Patient Education © 2020 [employer]      DASH Eating Plan  DASH stands for \"Dietary Approaches to Stop Hypertension.\" The DASH eating plan is a healthy eating plan that has been shown to reduce high blood pressure (hypertension). It may also reduce your risk for type 2 diabetes, heart disease, and stroke. The DASH eating plan may also help with weight loss.  What are tips for following this plan?    General guidelines  · Avoid eating more than 2,300 mg (milligrams) of salt (sodium) a day. If you have hypertension, you may need to reduce your sodium intake to 1,500 mg a day.  · Limit alcohol intake to no more than 1 drink a day for nonpregnant women and 2 drinks a day for men. One drink equals 12 oz of beer, 5 oz of wine, or 1½ oz of hard liquor.  · Work with your health care provider to maintain a healthy body weight or to lose weight. Ask what an ideal weight is for you.  · Get at least 30 minutes of exercise that causes your heart to beat faster (aerobic exercise) most days of the week. Activities may include walking, swimming, or biking.  · Work with your health care provider or diet and nutrition specialist (dietitian) to adjust your " "eating plan to your individual calorie needs.  Reading food labels    · Check food labels for the amount of sodium per serving. Choose foods with less than 5 percent of the Daily Value of sodium. Generally, foods with less than 300 mg of sodium per serving fit into this eating plan.  · To find whole grains, look for the word \"whole\" as the first word in the ingredient list.  Shopping  · Buy products labeled as \"low-sodium\" or \"no salt added.\"  · Buy fresh foods. Avoid canned foods and premade or frozen meals.  Cooking  · Avoid adding salt when cooking. Use salt-free seasonings or herbs instead of table salt or sea salt. Check with your health care provider or pharmacist before using salt substitutes.  · Do not mendoza foods. Cook foods using healthy methods such as baking, boiling, grilling, and broiling instead.  · Cook with heart-healthy oils, such as olive, canola, soybean, or sunflower oil.  Meal planning  · Eat a balanced diet that includes:  ? 5 or more servings of fruits and vegetables each day. At each meal, try to fill half of your plate with fruits and vegetables.  ? Up to 6-8 servings of whole grains each day.  ? Less than 6 oz of lean meat, poultry, or fish each day. A 3-oz serving of meat is about the same size as a deck of cards. One egg equals 1 oz.  ? 2 servings of low-fat dairy each day.  ? A serving of nuts, seeds, or beans 5 times each week.  ? Heart-healthy fats. Healthy fats called Omega-3 fatty acids are found in foods such as flaxseeds and coldwater fish, like sardines, salmon, and mackerel.  · Limit how much you eat of the following:  ? Canned or prepackaged foods.  ? Food that is high in trans fat, such as fried foods.  ? Food that is high in saturated fat, such as fatty meat.  ? Sweets, desserts, sugary drinks, and other foods with added sugar.  ? Full-fat dairy products.  · Do not salt foods before eating.  · Try to eat at least 2 vegetarian meals each week.  · Eat more home-cooked food and " less restaurant, buffet, and fast food.  · When eating at a restaurant, ask that your food be prepared with less salt or no salt, if possible.  What foods are recommended?  The items listed may not be a complete list. Talk with your dietitian about what dietary choices are best for you.  Grains  Whole-grain or whole-wheat bread. Whole-grain or whole-wheat pasta. Brown rice. Oatmeal. Quinoa. Bulgur. Whole-grain and low-sodium cereals. Teresa bread. Low-fat, low-sodium crackers. Whole-wheat flour tortillas.  Vegetables  Fresh or frozen vegetables (raw, steamed, roasted, or grilled). Low-sodium or reduced-sodium tomato and vegetable juice. Low-sodium or reduced-sodium tomato sauce and tomato paste. Low-sodium or reduced-sodium canned vegetables.  Fruits  All fresh, dried, or frozen fruit. Canned fruit in natural juice (without added sugar).  Meat and other protein foods  Skinless chicken or turkey. Ground chicken or turkey. Pork with fat trimmed off. Fish and seafood. Egg whites. Dried beans, peas, or lentils. Unsalted nuts, nut butters, and seeds. Unsalted canned beans. Lean cuts of beef with fat trimmed off. Low-sodium, lean deli meat.  Dairy  Low-fat (1%) or fat-free (skim) milk. Fat-free, low-fat, or reduced-fat cheeses. Nonfat, low-sodium ricotta or cottage cheese. Low-fat or nonfat yogurt. Low-fat, low-sodium cheese.  Fats and oils  Soft margarine without trans fats. Vegetable oil. Low-fat, reduced-fat, or light mayonnaise and salad dressings (reduced-sodium). Canola, safflower, olive, soybean, and sunflower oils. Avocado.  Seasoning and other foods  Herbs. Spices. Seasoning mixes without salt. Unsalted popcorn and pretzels. Fat-free sweets.  What foods are not recommended?  The items listed may not be a complete list. Talk with your dietitian about what dietary choices are best for you.  Grains  Baked goods made with fat, such as croissants, muffins, or some breads. Dry pasta or rice meal  packs.  Vegetables  Creamed or fried vegetables. Vegetables in a cheese sauce. Regular canned vegetables (not low-sodium or reduced-sodium). Regular canned tomato sauce and paste (not low-sodium or reduced-sodium). Regular tomato and vegetable juice (not low-sodium or reduced-sodium). Pickles. Olives.  Fruits  Canned fruit in a light or heavy syrup. Fried fruit. Fruit in cream or butter sauce.  Meat and other protein foods  Fatty cuts of meat. Ribs. Fried meat. Vines. Sausage. Bologna and other processed lunch meats. Salami. Fatback. Hotdogs. Bratwurst. Salted nuts and seeds. Canned beans with added salt. Canned or smoked fish. Whole eggs or egg yolks. Chicken or turkey with skin.  Dairy  Whole or 2% milk, cream, and half-and-half. Whole or full-fat cream cheese. Whole-fat or sweetened yogurt. Full-fat cheese. Nondairy creamers. Whipped toppings. Processed cheese and cheese spreads.  Fats and oils  Butter. Stick margarine. Lard. Shortening. Ghee. Vines fat. Tropical oils, such as coconut, palm kernel, or palm oil.  Seasoning and other foods  Salted popcorn and pretzels. Onion salt, garlic salt, seasoned salt, table salt, and sea salt. Worcestershire sauce. Tartar sauce. Barbecue sauce. Teriyaki sauce. Soy sauce, including reduced-sodium. Steak sauce. Canned and packaged gravies. Fish sauce. Oyster sauce. Cocktail sauce. Horseradish that you find on the shelf. Ketchup. Mustard. Meat flavorings and tenderizers. Bouillon cubes. Hot sauce and Tabasco sauce. Premade or packaged marinades. Premade or packaged taco seasonings. Relishes. Regular salad dressings.  Where to find more information:  · National Heart, [employer], and [employer] [place]: www.nhlbi.nih.gov  · American Heart Association: www.heart.org  Summary  · The DASH eating plan is a healthy eating plan that has been shown to reduce high blood pressure (hypertension). It may also reduce your risk for type 2 diabetes, heart disease, and stroke.  · With the DASH eating  plan, you should limit salt (sodium) intake to 2,300 mg a day. If you have hypertension, you may need to reduce your sodium intake to 1,500 mg a day.  · When on the DASH eating plan, aim to eat more fresh fruits and vegetables, whole grains, lean proteins, low-fat dairy, and heart-healthy fats.  · Work with your health care provider or diet and nutrition specialist (dietitian) to adjust your eating plan to your individual calorie needs.  This information is not intended to replace advice given to you by your health care provider. Make sure you discuss any questions you have with your health care provider.  Document Released: 12/06/2012 Document Revised: 11/30/2018 Document Reviewed: 12/11/2017  [employer] Patient Education © 2020 [employer]

## 2020-06-18 ENCOUNTER — LAB (OUTPATIENT)
Dept: LAB | Facility: HOSPITAL | Age: 65
End: 2020-06-18

## 2020-06-18 DIAGNOSIS — E11.69 TYPE 2 DIABETES MELLITUS WITH OTHER SPECIFIED COMPLICATION, WITH LONG-TERM CURRENT USE OF INSULIN (HCC): ICD-10-CM

## 2020-06-18 DIAGNOSIS — Z79.4 TYPE 2 DIABETES MELLITUS WITH OTHER SPECIFIED COMPLICATION, WITH LONG-TERM CURRENT USE OF INSULIN (HCC): ICD-10-CM

## 2020-06-18 LAB — HBA1C MFR BLD: 8.5 % (ref 4.8–5.6)

## 2020-06-18 PROCEDURE — 36415 COLL VENOUS BLD VENIPUNCTURE: CPT

## 2020-06-18 PROCEDURE — 83036 HEMOGLOBIN GLYCOSYLATED A1C: CPT

## 2020-06-19 ENCOUNTER — TELEPHONE (OUTPATIENT)
Dept: FAMILY MEDICINE CLINIC | Facility: CLINIC | Age: 65
End: 2020-06-19

## 2020-06-19 NOTE — TELEPHONE ENCOUNTER
----- Message from Harpreet Bass MD sent at 6/19/2020  8:06 AM CDT -----  HgbA1c was8.6 now 8.5 goal is 7 or less. Follow 1800 chapo/day ADA diet, take DM meds, will recheck in 3 months.

## 2020-07-09 ENCOUNTER — OFFICE VISIT (OUTPATIENT)
Dept: FAMILY MEDICINE CLINIC | Facility: CLINIC | Age: 65
End: 2020-07-09

## 2020-07-09 VITALS — OXYGEN SATURATION: 93 % | SYSTOLIC BLOOD PRESSURE: 128 MMHG | DIASTOLIC BLOOD PRESSURE: 63 MMHG | HEART RATE: 84 BPM

## 2020-07-09 DIAGNOSIS — M79.2 NEUROPATHIC PAIN: Primary | ICD-10-CM

## 2020-07-09 DIAGNOSIS — G47.33 OSA TREATED WITH BIPAP: Chronic | ICD-10-CM

## 2020-07-09 DIAGNOSIS — E66.9 NOCTURNAL HYPOXEMIA DUE TO OBESITY: ICD-10-CM

## 2020-07-09 DIAGNOSIS — I10 ESSENTIAL HYPERTENSION: Chronic | ICD-10-CM

## 2020-07-09 DIAGNOSIS — G47.36 NOCTURNAL HYPOXEMIA DUE TO OBESITY: ICD-10-CM

## 2020-07-09 DIAGNOSIS — G90.2 HORNER'S SYNDROME: ICD-10-CM

## 2020-07-09 DIAGNOSIS — K21.9 GASTROESOPHAGEAL REFLUX DISEASE WITHOUT ESOPHAGITIS: ICD-10-CM

## 2020-07-09 DIAGNOSIS — Z86.73 H/O: CVA (CEREBROVASCULAR ACCIDENT): ICD-10-CM

## 2020-07-09 PROCEDURE — 99442 PR PHYS/QHP TELEPHONE EVALUATION 11-20 MIN: CPT | Performed by: FAMILY MEDICINE

## 2020-07-12 NOTE — PROGRESS NOTES
Subjective   Arias Willson is a 64 y.o. obese white male with HTN, Had TIA 2- , began with double vision, R facial numbness, L arm numbness, L leg weakness. 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 Pt was referred to Neurosurgery in [place], told has aneurysm, but surgery not recommended for this area. 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 Had resolution of most motor weakness. Has residual difficulty with balance. Cognitive difficulties, Depression, DM, High cholesterol, Chronic pain, Hospital admit Jan 2018 with Resp failure, Hypoxia, NEREYDA on Bi-pap. Pt  calls to discuss health problems, Tx and F/U plans.     You have chosen to receive care through a telephone visit. Do you consent to use a telephone visit for your medical care today? Yes     ' Taking precautions to prevent exposure to COVID-19. Neuropathic pain tolerable with Neurontin. Have not followed Diabetic diet, aware Blood sugars not controlled will start watching diet, and taking meds'.     COPD   He complains of shortness of breath. There is no cough or wheezing. This is a chronic problem. The current episode started more than 1 year ago. The problem occurs daily. The problem has been gradually improving. Associated symptoms include headaches. Pertinent negatives include no appetite change, chest pain, ear pain, fever or sore throat.   Hypertension   This is a chronic problem. The current episode started more than 1 year ago. The problem has been waxing and waning since onset. The problem is controlled. Associated symptoms include headaches, neck pain and shortness of breath. Pertinent negatives include no chest pain or palpitations. Agents associated with hypertension include NSAIDs. Risk factors for coronary artery disease include  obesity, male gender, smoking/tobacco exposure and stress (H/O CVA). Past treatments include ACE inhibitors, angiotensin blockers, beta blockers, diuretics and lifestyle changes. Current antihypertension treatment includes angiotensin blockers and diuretics. The current treatment provides significant improvement. Compliance problems include medication cost.  Hypertensive end-organ damage includes CVA.   Diabetes   He presents for his follow-up diabetic visit. He has type 2 diabetes mellitus. No MedicAlert identification noted. His disease course has been fluctuating. Hypoglycemia symptoms include dizziness, headaches, nervousness/anxiousness and seizures. Pertinent negatives for hypoglycemia include no confusion. Associated symptoms include fatigue, visual change and weakness. Pertinent negatives for diabetes include no chest pain. There are no hypoglycemic complications. Symptoms are worsening. Diabetic complications include a CVA and peripheral neuropathy. Risk factors for coronary artery disease include diabetes mellitus, male sex, tobacco exposure, stress, sedentary lifestyle, obesity, hypertension and dyslipidemia. Current diabetic treatment includes oral agent (monotherapy). He is compliant with treatment some of the time. His weight is increasing rapidly. He is following a generally unhealthy diet. When asked about meal planning, he reported none. He rarely participates in exercise. Home blood sugar record trend: Unknown. His overall blood glucose range is 180-200 mg/dl. An ACE inhibitor/angiotensin II receptor blocker is being taken. He does not see a podiatrist.Eye exam is current.   Insomnia   This is a chronic problem. The current episode started more than 1 year ago. The problem occurs constantly. The problem has been waxing and waning. Associated symptoms include fatigue, headaches, neck pain, numbness, a visual change and weakness. Pertinent negatives include no abdominal pain, arthralgias, chest pain,  chills, congestion, coughing, fever, nausea, rash or sore throat. The symptoms are aggravated by stress. Treatments tried: OTC meds CPAP. The treatment provided significant relief.   Stroke   This is a chronic problem. The current episode started more than 1 year ago. The problem occurs daily. The problem has been gradually improving. Associated symptoms include fatigue, headaches, neck pain, numbness, a visual change and weakness. Pertinent negatives include no abdominal pain, arthralgias, chest pain, chills, congestion, coughing, fever, nausea, rash or sore throat. The symptoms are aggravated by exertion and walking. He has tried acetaminophen and NSAIDs for the symptoms. The treatment provided no relief.   Pain   This is a chronic problem. The current episode started more than 1 year ago. The problem occurs daily. Associated symptoms include fatigue, headaches, neck pain, numbness, a visual change and weakness. Pertinent negatives include no abdominal pain, arthralgias, chest pain, chills, congestion, coughing, fever, nausea, rash or sore throat. The symptoms are aggravated by walking and stress. He has tried NSAIDs and acetaminophen (Neurontin) for the symptoms. The treatment provided moderate relief.   Neurologic Problem   The patient's primary symptoms include an altered mental status, clumsiness, focal sensory loss, focal weakness, a loss of balance, a visual change and weakness. This is a chronic problem. The current episode started more than 1 year ago. The neurological problem developed suddenly. The problem has been waxing and waning since onset. There was left-sided, right-sided, facial, upper extremity and lower extremity (Strokes of Basal ganglia. Horners syndrome,  sensory, motor deficits.) focality noted. Associated symptoms include back pain, dizziness, fatigue, headaches, neck pain and shortness of breath. Pertinent negatives include no abdominal pain, chest pain, confusion, fever, nausea or  palpitations. (Requires Walker to ambulate.) Past treatments include walking and medication (Physical therapy). The treatment provided mild relief. His past medical history is significant for a CVA.   Depression   Visit Type: follow-up  Patient presents with the following symptoms: decreased concentration, depressed mood, excessive worry, feelings of hopelessness, feelings of worthlessness, insomnia, irritability, memory impairment, nervousness/anxiety, restlessness and shortness of breath.  Patient is not experiencing: confusion, palpitations, suicidal ideas and suicidal planning.  Frequency of symptoms: occasionally   Severity: moderate   Sleep quality: fair  Nighttime awakenings: several         The following portions of the patient's history were reviewed and updated as appropriate: allergies, current medications, past family history, past medical history, past social history, past surgical history and problem list.    Review of Systems   Constitutional: Positive for fatigue and irritability. Negative for activity change, appetite change, chills and fever.   HENT: Negative for congestion, ear pain and sore throat.    Eyes: Negative for pain and visual disturbance.   Respiratory: Positive for shortness of breath. Negative for cough, chest tightness and wheezing.    Cardiovascular: Negative for chest pain and palpitations.   Gastrointestinal: Negative for abdominal pain and nausea.   Endocrine: Negative for cold intolerance and heat intolerance.   Genitourinary: Negative for difficulty urinating and dysuria.   Musculoskeletal: Positive for back pain, gait problem, neck pain and neck stiffness. Negative for arthralgias.   Skin: Negative for color change and rash.   Allergic/Immunologic: Negative for environmental allergies.   Neurological: Positive for dizziness, focal weakness, seizures, weakness, numbness, headaches and loss of balance.   Hematological: Negative for adenopathy. Does not bruise/bleed easily.    Psychiatric/Behavioral: Positive for decreased concentration and sleep disturbance. Negative for agitation, confusion and suicidal ideas. The patient is nervous/anxious and has insomnia.         Depressed mood  Irritability improving.       Objective   Physical Exam   Constitutional: He is oriented to person, place, and time. No distress.   Pulmonary/Chest: Effort normal.   Neurological: He is alert and oriented to person, place, and time.   Psychiatric: He has a normal mood and affect. His behavior is normal. Judgment and thought content normal.   Nursing note and vitals reviewed.      Assessment/Plan   Arias was seen today for diabetes, insomnia, hypertension and knee pain.    Diagnoses and all orders for this visit:    Neuropathic pain    Essential hypertension  -     Hemoglobin A1c; Future    Nocturnal hypoxemia due to obesity    NEREYDA treated with BiPAP    H/O: CVA (cerebrovascular accident)    Gastroesophageal reflux disease without esophagitis    Gus's syndrome    Other orders  -     metFORMIN (GLUCOPHAGE) 500 MG tablet; Take 1 tablet by mouth 2 (Two) Times a Day.      Discussed health problems, meds, indications, reviewed labs, uncontrolled DM HgbA1c 8.6 to 8.5, std of care for DM. Discussed f/U plan.  This visit has been rescheduled as a phone visit to comply with patient safety concerns in accordance with CDC recommendations. Total time of discussion was 15 minutes.          This document has been electronically signed by Harpreet Bass MD

## 2020-07-12 NOTE — PATIENT INSTRUCTIONS
Preventive Care 40-64 Years Old, Female  Preventive care refers to visits with your health care provider and lifestyle choices that can promote health and wellness. This includes:  · A yearly physical exam. This may also be called an annual well check.  · Regular dental visits and eye exams.  · Immunizations.  · Screening for certain conditions.  · Healthy lifestyle choices, such as eating a healthy diet, getting regular exercise, not using drugs or products that contain nicotine and tobacco, and limiting alcohol use.  What can I expect for my preventive care visit?  Physical exam  Your health care provider will check your:  · Height and weight. This may be used to calculate body mass index (BMI), which tells if you are at a healthy weight.  · Heart rate and blood pressure.  · Skin for abnormal spots.  Counseling  Your health care provider may ask you questions about your:  · Alcohol, tobacco, and drug use.  · Emotional well-being.  · Home and relationship well-being.  · Sexual activity.  · Eating habits.  · Work and work environment.  · Method of birth control.  · Menstrual cycle.  · Pregnancy history.  What immunizations do I need?    Influenza (flu) vaccine  · This is recommended every year.  Tetanus, diphtheria, and pertussis (Tdap) vaccine  · You may need a Td booster every 10 years.  Varicella (chickenpox) vaccine  · You may need this if you have not been vaccinated.  Zoster (shingles) vaccine  · You may need this after age 60.  Measles, mumps, and rubella (MMR) vaccine  · You may need at least one dose of MMR if you were born in 1957 or later. You may also need a second dose.  Pneumococcal conjugate (PCV13) vaccine  · You may need this if you have certain conditions and were not previously vaccinated.  Pneumococcal polysaccharide (PPSV23) vaccine  · You may need one or two doses if you smoke cigarettes or if you have certain conditions.  Meningococcal conjugate (MenACWY) vaccine  · You may need this if you  have certain conditions.  Hepatitis A vaccine  · You may need this if you have certain conditions or if you travel or work in places where you may be exposed to hepatitis A.  Hepatitis B vaccine  · You may need this if you have certain conditions or if you travel or work in places where you may be exposed to hepatitis B.  Haemophilus influenzae type b (Hib) vaccine  · You may need this if you have certain conditions.  Human papillomavirus (HPV) vaccine  · If recommended by your health care provider, you may need three doses over 6 months.  You may receive vaccines as individual doses or as more than one vaccine together in one shot (combination vaccines). Talk with your health care provider about the risks and benefits of combination vaccines.  What tests do I need?  Blood tests  · Lipid and cholesterol levels. These may be checked every 5 years, or more frequently if you are over 50 years old.  · Hepatitis C test.  · Hepatitis B test.  Screening  · Lung cancer screening. You may have this screening every year starting at age 55 if you have a 30-pack-year history of smoking and currently smoke or have quit within the past 15 years.  · Colorectal cancer screening. All adults should have this screening starting at age 50 and continuing until age 75. Your health care provider may recommend screening at age 45 if you are at increased risk. You will have tests every 1-10 years, depending on your results and the type of screening test.  · Diabetes screening. This is done by checking your blood sugar (glucose) after you have not eaten for a while (fasting). You may have this done every 1-3 years.  · Mammogram. This may be done every 1-2 years. Talk with your health care provider about when you should start having regular mammograms. This may depend on whether you have a family history of breast cancer.  · BRCA-related cancer screening. This may be done if you have a family history of breast, ovarian, tubal, or peritoneal  cancers.  · Pelvic exam and Pap test. This may be done every 3 years starting at age 21. Starting at age 30, this may be done every 5 years if you have a Pap test in combination with an HPV test.  Other tests  · Sexually transmitted disease (STD) testing.  · Bone density scan. This is done to screen for osteoporosis. You may have this scan if you are at high risk for osteoporosis.  Follow these instructions at home:  Eating and drinking  · Eat a diet that includes fresh fruits and vegetables, whole grains, lean protein, and low-fat dairy.  · Take vitamin and mineral supplements as recommended by your health care provider.  · Do not drink alcohol if:  ? Your health care provider tells you not to drink.  ? You are pregnant, may be pregnant, or are planning to become pregnant.  · If you drink alcohol:  ? Limit how much you have to 0-1 drink a day.  ? Be aware of how much alcohol is in your drink. In the [place] one drink equals one 12 oz bottle of beer (355 mL), one 5 oz glass of wine (148 mL), or one 1½ oz glass of hard liquor (44 mL).  Lifestyle  · Take daily care of your teeth and gums.  · Stay active. Exercise for at least 30 minutes on 5 or more days each week.  · Do not use any products that contain nicotine or tobacco, such as cigarettes, e-cigarettes, and chewing tobacco. If you need help quitting, ask your health care provider.  · If you are sexually active, practice safe sex. Use a condom or other form of birth control (contraception) in order to prevent pregnancy and STIs (sexually transmitted infections).  · If told by your health care provider, take low-dose aspirin daily starting at age 50.  What's next?  · Visit your health care provider once a year for a well check visit.  · Ask your health care provider how often you should have your eyes and teeth checked.  · Stay up to date on all vaccines.  This information is not intended to replace advice given to you by your health care provider. Make sure you  discuss any questions you have with your health care provider.  Document Released: 01/13/2017 Document Revised: 08/29/2019 Document Reviewed: 08/29/2019  [employer] Patient Education © 2020 [employer]      Diabetes Mellitus and Standards of Medical Care  Managing diabetes (diabetes mellitus) can be complicated. Your diabetes treatment may be managed by a team of health care providers, including:  · A physician who specializes in diabetes (endocrinologist).  · A nurse practitioner or physician assistant.  · Nurses.  · A diet and nutrition specialist (registered dietitian).  · A certified diabetes educator (CDE).  · An exercise specialist.  · A pharmacist.  · An eye doctor.  · A foot specialist (podiatrist).  · A dentist.  · A primary care provider.  · A mental health provider.  Your health care providers follow guidelines to help you get the best quality of care. The following schedule is a general guideline for your diabetes management plan. Your health care providers may give you more specific instructions.  Physical exams  Upon being diagnosed with diabetes mellitus, and each year after that, your health care provider will ask about your medical and family history. He or she will also do a physical exam. Your exam may include:  · Measuring your height, weight, and body mass index (BMI).  · Checking your blood pressure. This will be done at every routine medical visit. Your target blood pressure may vary depending on your medical conditions, your age, and other factors.  · Thyroid gland exam.  · Skin exam.  · Screening for damage to your nerves (peripheral neuropathy). This may include checking the pulse in your legs and feet and checking the level of sensation in your hands and feet.  · A complete foot exam to inspect the structure and skin of your feet, including checking for cuts, bruises, redness, blisters, sores, or other problems.  · Screening for blood vessel (vascular) problems, which may include checking  the pulse in your legs and feet and checking your temperature.  Blood tests  Depending on your treatment plan and your personal needs, you may have the following tests done:  · HbA1c (hemoglobin A1c). This test provides information about blood sugar (glucose) control over the previous 2-3 months. It is used to adjust your treatment plan, if needed. This test will be done:  ? At least 2 times a year, if you are meeting your treatment goals.  ? 4 times a year, if you are not meeting your treatment goals or if treatment goals have changed.  · Lipid testing, including total, LDL, and HDL cholesterol and triglyceride levels.  ? The goal for LDL is less than 100 mg/dL (5.5 mmol/L). If you are at high risk for complications, the goal is less than 70 mg/dL (3.9 mmol/L).  ? The goal for HDL is 40 mg/dL (2.2 mmol/L) or higher for men and 50 mg/dL (2.8 mmol/L) or higher for women. An HDL cholesterol of 60 mg/dL (3.3 mmol/L) or higher gives some protection against heart disease.  ? The goal for triglycerides is less than 150 mg/dL (8.3 mmol/L).  · Liver function tests.  · Kidney function tests.  · Thyroid function tests.  Dental and eye exams  · Visit your dentist two times a year.  · If you have type 1 diabetes, your health care provider may recommend an eye exam 3-5 years after you are diagnosed, and then once a year after your first exam.  ? For children with type 1 diabetes, a health care provider may recommend an eye exam when your child is age 10 or older and has had diabetes for 3-5 years. After the first exam, your child should get an eye exam once a year.  · If you have type 2 diabetes, your health care provider may recommend an eye exam as soon as you are diagnosed, and then once a year after your first exam.  Immunizations    · The yearly flu (influenza) vaccine is recommended for everyone 6 months or older who has diabetes.  · The pneumonia (pneumococcal) vaccine is recommended for everyone 2 years or older who has  diabetes. If you are 65 or older, you may get the pneumonia vaccine as a series of two separate shots.  · The hepatitis B vaccine is recommended for adults shortly after being diagnosed with diabetes.  · Adults and children with diabetes should receive all other vaccines according to age-specific recommendations from the Centers for Disease Control and Prevention (CDC).  Mental and emotional health  Screening for symptoms of eating disorders, anxiety, and depression is recommended at the time of diagnosis and afterward as needed. If your screening shows that you have symptoms (positive screening result), you may need more evaluation and you may work with a mental health care provider.  Treatment plan  Your treatment plan will be reviewed at every medical visit. You and your health care provider will discuss:  · How you are taking your medicines, including insulin.  · Any side effects you are experiencing.  · Your blood glucose target goals.  · The frequency of your blood glucose monitoring.  · Lifestyle habits, such as activity level as well as tobacco, alcohol, and substance use.  Diabetes self-management education  Your health care provider will assess how well you are monitoring your blood glucose levels and whether you are taking your insulin correctly. He or she may refer you to:  · A certified diabetes educator to manage your diabetes throughout your life, starting at diagnosis.  · A registered dietitian who can create or review your personal nutrition plan.  · An exercise specialist who can discuss your activity level and exercise plan.  Summary  · Managing diabetes (diabetes mellitus) can be complicated. Your diabetes treatment may be managed by a team of health care providers.  · Your health care providers follow guidelines in order to help you get the best quality of care.  · Standards of care including having regular physical exams, blood tests, blood pressure monitoring, immunizations, screening tests,  and education about how to manage your diabetes.  · Your health care providers may also give you more specific instructions based on your individual health.  This information is not intended to replace advice given to you by your health care provider. Make sure you discuss any questions you have with your health care provider.  Document Released: 10/15/2010 Document Revised: 09/06/2019 Document Reviewed: 09/15/2017  [employer] Patient Education © 2020 [employer]

## 2020-08-03 DIAGNOSIS — J44.9 COPD, GROUP B, BY GOLD 2017 CLASSIFICATION (HCC): ICD-10-CM

## 2020-08-03 RX ORDER — CLOPIDOGREL BISULFATE 75 MG/1
75 TABLET ORAL DAILY
Qty: 90 TABLET | Refills: 1 | Status: SHIPPED | OUTPATIENT
Start: 2020-08-03 | End: 2021-01-05 | Stop reason: SDUPTHER

## 2020-08-03 NOTE — TELEPHONE ENCOUNTER
Caller: Arias Willson    Relationship: Self    Best call back number: [phone]    Medication needed:   Requested Prescriptions     Pending Prescriptions Disp Refills   • clopidogrel (PLAVIX) 75 MG tablet 90 tablet 1     Sig: Take 1 tablet by mouth Daily.       What is the patient's preferred pharmacy: [facility] - [address] - [phone]  - [phone] FX

## 2020-09-02 RX ORDER — EMPAGLIFLOZIN 25 MG/1
TABLET, FILM COATED ORAL
Qty: 30 TABLET | Refills: 5 | Status: SHIPPED | OUTPATIENT
Start: 2020-09-02 | End: 2021-01-05 | Stop reason: SDUPTHER

## 2020-09-02 RX ORDER — LOSARTAN POTASSIUM 50 MG/1
TABLET ORAL
Qty: 30 TABLET | Refills: 5 | Status: SHIPPED | OUTPATIENT
Start: 2020-09-02 | End: 2021-01-05 | Stop reason: SDUPTHER

## 2020-09-02 RX ORDER — LANSOPRAZOLE 30 MG/1
CAPSULE, DELAYED RELEASE ORAL
Qty: 30 CAPSULE | Refills: 5 | Status: SHIPPED | OUTPATIENT
Start: 2020-09-02 | End: 2021-01-28 | Stop reason: SDUPTHER

## 2020-09-04 ENCOUNTER — TELEPHONE (OUTPATIENT)
Dept: FAMILY MEDICINE CLINIC | Facility: CLINIC | Age: 65
End: 2020-09-04

## 2020-09-04 NOTE — TELEPHONE ENCOUNTER
Patient called and advised that he has a crater on big toe that has dry skin growing around it.     Patient wanted to request to be seen in office today 09/04/20 after 2p. Patient only wanted to see PCP.     Please contact patient and advise @ [phone].

## 2020-09-04 NOTE — TELEPHONE ENCOUNTER
Called and spoke with patient advised that his provider had no openings and advised to go to Urgent Care in [place].  Patient voiced understanding

## 2020-09-15 ENCOUNTER — OFFICE VISIT (OUTPATIENT)
Dept: WOUND CARE | Facility: HOSPITAL | Age: 65
End: 2020-09-15

## 2020-09-15 ENCOUNTER — OUTSIDE FACILITY SERVICE (OUTPATIENT)
Dept: PODIATRY | Facility: CLINIC | Age: 65
End: 2020-09-15

## 2020-09-15 ENCOUNTER — HOSPITAL ENCOUNTER (OUTPATIENT)
Dept: GENERAL RADIOLOGY | Facility: HOSPITAL | Age: 65
Discharge: HOME | End: 2020-09-15

## 2020-09-15 ENCOUNTER — TRANSCRIBE ORDERS (OUTPATIENT)
Dept: WOUND CARE | Facility: HOSPITAL | Age: 65
End: 2020-09-15

## 2020-09-15 DIAGNOSIS — R09.89 DECREASED PEDAL PULSES: ICD-10-CM

## 2020-09-15 DIAGNOSIS — E11.621 DIABETIC ULCER OF TOE OF RIGHT FOOT ASSOCIATED WITH TYPE 2 DIABETES MELLITUS, WITH FAT LAYER EXPOSED (HCC): Primary | ICD-10-CM

## 2020-09-15 DIAGNOSIS — L97.512 DIABETIC ULCER OF TOE OF RIGHT FOOT ASSOCIATED WITH TYPE 2 DIABETES MELLITUS, WITH FAT LAYER EXPOSED (HCC): Primary | ICD-10-CM

## 2020-09-15 DIAGNOSIS — R09.89 WEAK PULSE: Primary | ICD-10-CM

## 2020-09-15 LAB — GLUCOSE BLDC GLUCOMTR-MCNC: 197 MG/DL (ref 70–130)

## 2020-09-15 PROCEDURE — 82962 GLUCOSE BLOOD TEST: CPT | Performed by: PODIATRIST

## 2020-09-15 PROCEDURE — 73630 X-RAY EXAM OF FOOT: CPT

## 2020-09-15 PROCEDURE — G0463 HOSPITAL OUTPT CLINIC VISIT: HCPCS

## 2020-09-15 PROCEDURE — 11042 DBRDMT SUBQ TIS 1ST 20SQCM/<: CPT | Performed by: PODIATRIST

## 2020-09-22 ENCOUNTER — OFFICE VISIT (OUTPATIENT)
Dept: WOUND CARE | Facility: HOSPITAL | Age: 65
End: 2020-09-22

## 2020-09-29 ENCOUNTER — OFFICE VISIT (OUTPATIENT)
Dept: WOUND CARE | Facility: HOSPITAL | Age: 65
End: 2020-09-29

## 2020-09-29 ENCOUNTER — OUTSIDE FACILITY SERVICE (OUTPATIENT)
Dept: PODIATRY | Facility: CLINIC | Age: 65
End: 2020-09-29

## 2020-09-29 ENCOUNTER — LAB (OUTPATIENT)
Dept: LAB | Facility: HOSPITAL | Age: 65
End: 2020-09-29

## 2020-09-29 DIAGNOSIS — I10 ESSENTIAL HYPERTENSION: Chronic | ICD-10-CM

## 2020-09-29 LAB — HBA1C MFR BLD: 8.41 % (ref 4.8–5.6)

## 2020-09-29 PROCEDURE — 11042 DBRDMT SUBQ TIS 1ST 20SQCM/<: CPT | Performed by: PODIATRIST

## 2020-09-29 PROCEDURE — 36415 COLL VENOUS BLD VENIPUNCTURE: CPT

## 2020-09-29 PROCEDURE — 83036 HEMOGLOBIN GLYCOSYLATED A1C: CPT

## 2020-10-01 ENCOUNTER — TELEPHONE (OUTPATIENT)
Dept: FAMILY MEDICINE CLINIC | Facility: CLINIC | Age: 65
End: 2020-10-01

## 2020-10-01 RX ORDER — BACLOFEN 10 MG/1
TABLET ORAL
Qty: 90 TABLET | Refills: 2 | Status: SHIPPED | OUTPATIENT
Start: 2020-10-01 | End: 2021-01-05 | Stop reason: SDUPTHER

## 2020-10-01 NOTE — TELEPHONE ENCOUNTER
----- Message from Harpreet Bass MD sent at 9/30/2020 10:26 AM CDT -----  HgbA1c was 8.5 now 8.41, goal is 7 or less, make sure to watch diet, take meds. Will recheck in 3 months.

## 2020-10-06 ENCOUNTER — OFFICE VISIT (OUTPATIENT)
Dept: WOUND CARE | Facility: HOSPITAL | Age: 65
End: 2020-10-06

## 2020-10-06 ENCOUNTER — OUTSIDE FACILITY SERVICE (OUTPATIENT)
Dept: PODIATRY | Facility: CLINIC | Age: 65
End: 2020-10-06

## 2020-10-06 PROCEDURE — G0463 HOSPITAL OUTPT CLINIC VISIT: HCPCS

## 2020-10-06 PROCEDURE — 99212 OFFICE O/P EST SF 10 MIN: CPT | Performed by: PODIATRIST

## 2020-10-08 ENCOUNTER — OFFICE VISIT (OUTPATIENT)
Dept: FAMILY MEDICINE CLINIC | Facility: CLINIC | Age: 65
End: 2020-10-08

## 2020-10-08 VITALS — SYSTOLIC BLOOD PRESSURE: 125 MMHG | DIASTOLIC BLOOD PRESSURE: 70 MMHG | HEART RATE: 87 BPM | OXYGEN SATURATION: 93 %

## 2020-10-08 DIAGNOSIS — M79.2 NEUROPATHIC PAIN: ICD-10-CM

## 2020-10-08 DIAGNOSIS — Z79.4 TYPE 2 DIABETES MELLITUS WITH DIABETIC AUTONOMIC NEUROPATHY, WITH LONG-TERM CURRENT USE OF INSULIN (HCC): ICD-10-CM

## 2020-10-08 DIAGNOSIS — Z86.73 H/O: CVA (CEREBROVASCULAR ACCIDENT): ICD-10-CM

## 2020-10-08 DIAGNOSIS — I10 ESSENTIAL HYPERTENSION: Chronic | ICD-10-CM

## 2020-10-08 DIAGNOSIS — E11.43 TYPE 2 DIABETES MELLITUS WITH DIABETIC AUTONOMIC NEUROPATHY, WITH LONG-TERM CURRENT USE OF INSULIN (HCC): ICD-10-CM

## 2020-10-08 PROCEDURE — 99442 PR PHYS/QHP TELEPHONE EVALUATION 11-20 MIN: CPT | Performed by: FAMILY MEDICINE

## 2020-10-08 RX ORDER — SPIRONOLACTONE 25 MG/1
25 TABLET ORAL DAILY
Qty: 90 TABLET | Refills: 1 | Status: SHIPPED | OUTPATIENT
Start: 2020-10-08 | End: 2021-01-05 | Stop reason: SDUPTHER

## 2020-10-08 RX ORDER — PRAVASTATIN SODIUM 40 MG
40 TABLET ORAL NIGHTLY
Qty: 90 TABLET | Refills: 1 | Status: SHIPPED | OUTPATIENT
Start: 2020-10-08 | End: 2021-01-05 | Stop reason: SDUPTHER

## 2020-10-08 NOTE — PROGRESS NOTES
Subjective   Arias Willson is a 64 y.o. obese white male with HTN, Had TIA 2- , began with double vision, R facial numbness, L arm numbness, L leg weakness. 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 Pt was referred to Neurosurgery in [place], told has aneurysm, but surgery not recommended for this area. 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 Had resolution of most motor weakness. Has residual difficulty with balance. Cognitive difficulties, Depression, DM, High cholesterol, Chronic pain, Hospital admit Jan 2018 with Resp failure, Hypoxia, NEREYDA on Bi-pap. Pt  calls to discuss health problems, Tx and F/U plans.     You have chosen to receive care through a telephone visit. Do you consent to use a telephone visit for your medical care today? Yes     ' Taking precautions to prevent exposure to COVID-19. Neuropathic pain tolerable with Neurontin. Taking meds for Diabetes, not checking blood sugars'.     COPD  He complains of shortness of breath. There is no cough or wheezing. This is a chronic problem. The current episode started more than 1 year ago. The problem occurs daily. The problem has been gradually improving. Associated symptoms include headaches. Pertinent negatives include no appetite change, chest pain, ear pain, fever or sore throat.   Hypertension  This is a chronic problem. The current episode started more than 1 year ago. The problem has been waxing and waning since onset. The problem is controlled. Associated symptoms include headaches, neck pain and shortness of breath. Pertinent negatives include no chest pain or palpitations. Agents associated with hypertension include NSAIDs. Risk factors for coronary artery disease include obesity, male gender, smoking/tobacco exposure and stress (H/O  CVA). Past treatments include ACE inhibitors, angiotensin blockers, beta blockers, diuretics and lifestyle changes. Current antihypertension treatment includes angiotensin blockers and diuretics. The current treatment provides significant improvement. Compliance problems include medication cost.  Hypertensive end-organ damage includes CVA.   Diabetes  He presents for his follow-up diabetic visit. He has type 2 diabetes mellitus. No MedicAlert identification noted. His disease course has been fluctuating. Hypoglycemia symptoms include dizziness, headaches and nervousness/anxiousness. Pertinent negatives for hypoglycemia include no confusion or seizures. Associated symptoms include fatigue, visual change and weakness. Pertinent negatives for diabetes include no chest pain. There are no hypoglycemic complications. Symptoms are worsening. Diabetic complications include a CVA and peripheral neuropathy. Risk factors for coronary artery disease include diabetes mellitus, male sex, tobacco exposure, stress, sedentary lifestyle, obesity, hypertension and dyslipidemia. Current diabetic treatment includes oral agent (monotherapy). He is compliant with treatment some of the time. His weight is increasing rapidly. He is following a generally unhealthy diet. When asked about meal planning, he reported none. He rarely participates in exercise. Home blood sugar record trend: Unknown. His overall blood glucose range is 180-200 mg/dl. An ACE inhibitor/angiotensin II receptor blocker is being taken. He does not see a podiatrist.Eye exam is current.   Insomnia  This is a chronic problem. The current episode started more than 1 year ago. The problem occurs constantly. The problem has been waxing and waning. Associated symptoms include fatigue, headaches, neck pain, numbness, a visual change and weakness. Pertinent negatives include no abdominal pain, arthralgias, chest pain, chills, congestion, coughing, fever, nausea, rash or sore  throat. The symptoms are aggravated by stress. Treatments tried: OTC meds CPAP. The treatment provided significant relief.   Stroke  This is a chronic problem. The current episode started more than 1 year ago. The problem occurs daily. The problem has been gradually improving. Associated symptoms include fatigue, headaches, neck pain, numbness, a visual change and weakness. Pertinent negatives include no abdominal pain, arthralgias, chest pain, chills, congestion, coughing, fever, nausea, rash or sore throat. The symptoms are aggravated by exertion and walking. He has tried acetaminophen and NSAIDs for the symptoms. The treatment provided no relief.   Pain  This is a chronic problem. The current episode started more than 1 year ago. The problem occurs daily. Associated symptoms include fatigue, headaches, neck pain, numbness, a visual change and weakness. Pertinent negatives include no abdominal pain, arthralgias, chest pain, chills, congestion, coughing, fever, nausea, rash or sore throat. The symptoms are aggravated by walking and stress. He has tried NSAIDs and acetaminophen (Neurontin) for the symptoms. The treatment provided moderate relief.   Neurologic Problem  The patient's primary symptoms include an altered mental status, clumsiness, focal sensory loss, focal weakness, a loss of balance, a visual change and weakness. This is a chronic problem. The current episode started more than 1 year ago. The neurological problem developed suddenly. The problem has been waxing and waning since onset. There was left-sided, right-sided, facial, upper extremity and lower extremity (Strokes of Basal ganglia. Horners syndrome,  sensory, motor deficits.) focality noted. Associated symptoms include back pain, dizziness, fatigue, headaches, neck pain and shortness of breath. Pertinent negatives include no abdominal pain, chest pain, confusion, fever, nausea or palpitations. (Requires Walker to ambulate.) Past treatments  include walking and medication (Physical therapy). The treatment provided mild relief. His past medical history is significant for a CVA.        The following portions of the patient's history were reviewed and updated as appropriate: allergies, current medications, past family history, past medical history, past social history, past surgical history and problem list.    Review of Systems   Constitutional: Positive for fatigue. Negative for activity change, appetite change, chills and fever.   HENT: Negative for congestion, ear pain and sore throat.    Eyes: Negative for pain and visual disturbance.   Respiratory: Positive for shortness of breath. Negative for cough, chest tightness and wheezing.    Cardiovascular: Negative for chest pain and palpitations.   Gastrointestinal: Negative for abdominal pain and nausea.   Endocrine: Negative for cold intolerance and heat intolerance.   Genitourinary: Negative for difficulty urinating and dysuria.   Musculoskeletal: Positive for back pain, gait problem, neck pain and neck stiffness. Negative for arthralgias.   Skin: Negative for color change and rash.   Allergic/Immunologic: Negative for environmental allergies.   Neurological: Positive for dizziness, focal weakness, weakness, numbness, headaches and loss of balance. Negative for seizures.   Hematological: Negative for adenopathy. Does not bruise/bleed easily.   Psychiatric/Behavioral: Positive for decreased concentration and sleep disturbance. Negative for agitation, confusion and suicidal ideas. The patient is nervous/anxious.         Depressed mood  Irritability improving.       Objective   Physical Exam  Vitals signs and nursing note reviewed.   Constitutional:       General: He is not in acute distress.  Pulmonary:      Effort: Pulmonary effort is normal.   Neurological:      Mental Status: He is oriented to person, place, and time.   Psychiatric:         Mood and Affect: Mood normal.         Behavior: Behavior  normal.         Thought Content: Thought content normal.         Judgment: Judgment normal.         Assessment/Plan   Arias was seen today for diabetes, hypertension and copd.    Diagnoses and all orders for this visit:    Neuropathic pain    Essential hypertension    H/O: CVA (cerebrovascular accident)    Type 2 diabetes mellitus with diabetic autonomic neuropathy, with long-term current use of insulin (CMS/[facility])    Other orders  -     spironolactone (ALDACTONE) 25 MG tablet; Take 1 tablet by mouth Daily.  -     pravastatin (PRAVACHOL) 40 MG tablet; Take 1 tablet by mouth Every Night.  -     metoprolol tartrate (LOPRESSOR) 25 MG tablet; Take 1 tablet by mouth Every 12 (Twelve) Hours.    Discussed health problems, meds indications, Tx plan, std of care for Diabetes. Discussed F/U plan.  This visit has been rescheduled as a phone visit to comply with patient safety concerns in accordance with CDC recommendations. Total time of discussion was 15 minutes.            This document has been electronically signed by Harpreet Bass MD

## 2020-10-16 NOTE — PROGRESS NOTES
Pulmonary Office Follow-up    Subjective     Arias Willson is seen today at the office for   Chief Complaint   Patient presents with   • COPD         HPI  Arias Willson is a 64 y.o. male with a PMH significant for COPD, tobacco use, morbid obesity, NEREYDA on BiPAP, ASCAD, HTN, CVA, seizures, T2DM, and GERD who presents for follow-up of COPD.     3/23/2020: Telemedicine visit due to pandemic.  He was doing well on Anoro but was having difficulty affording his co-pay so encouraged him to contact his insurance company to inquire about the more affordable alternative.  Otherwise, he was to continue on his BiPAP with sleep and I again stressed the importance of complete tobacco cessation.    10/19/2020: He states that his breathing is stable.  He continues on Anoro daily but only uses albuterol 1-2 times a month.  He states that when he uses albuterol, it usually when he wakes up at night short of breath.  Patient does get out of breath with exertion during the day but he does not feel the need to use his albuterol.  His activity is limited by his ongoing knee issues as well as weakness from his prior stroke.  Patient has some cough, but is nonproductive.  He denies any wheeze or chest pain.  He does continue to smoke around half a pack a day.  Patient is compliant with his BiPAP at night.  Fortunately, he has gained some weight back which she attributes to eating and decreased activity.      Tobacco use history:  Type: cigarettes  Amount: 1 ppd  Duration: 35 years  Cessation: N/a   Willing to quit: No      Patient Active Problem List   Diagnosis   • Ptosis of eyelid   • Astigmatism   • Myopia   • Gus's syndrome   • Neuropathic pain   • GERD (gastroesophageal reflux disease)   • Aneurysm of carotid artery (CMS/HCC)   • H/O: CVA (cerebrovascular accident)   • Sensory deficit present   • Gait disturbance, post-stroke   • Cognitive deficit, post-stroke   • Hypertension   • Diabetes mellitus (CMS/HCC)   • Morbid  obesity (CMS/[facility])   • Coronary artery disease   • NEREYDA treated with BiPAP   • Macrocytosis without anemia   • Need for immunization against influenza   • Need for vaccination   • Elevated brain natriuretic peptide (BNP) level   • Tremor of both hands   • Seizures (CMS/[facility])   • Fall   • Left knee pain   • Class 2 severe obesity due to excess calories with serious comorbidity and body mass index (BMI) of 35.0 to 35.9 in adult (CMS/[facility])   • High risk medication use   • Hypoxia   • Generalized edema   • Other insomnia   • Peripheral edema   • Nicotine abuse   • Nocturnal hypoxemia due to obesity   • COPD, group B, by GOLD 2017 classification (CMS/[facility])   • Syncope   • Physical deconditioning   • Pure hypercholesterolemia   • Cigarette nicotine dependence, uncomplicated   • Primary osteoarthritis of left knee   • Effusion, left knee   • Right knee pain   • Stage 2 moderate COPD by GOLD classification (CMS/[facility])   • Carbuncle   • Hyperkeratosis       Review of Systems  Review of Systems   Constitutional: Positive for unexpected weight change. Negative for fatigue.   HENT: Negative for congestion.    Respiratory: Positive for cough and shortness of breath. Negative for wheezing.    Cardiovascular: Negative for chest pain and leg swelling.   Gastrointestinal: Negative for abdominal pain.   Musculoskeletal: Positive for arthralgias.     As described in the HPI. Otherwise, remainder of ROS (14 systems) were negative.    Medications, Allergies, Social, and Family Histories reviewed as per EMR.    Objective     Vitals:    10/19/20 1352   BP: 112/70   Pulse: 95   SpO2: 95%     Physical Exam   Constitutional: He is oriented to person, place, and time. He appears well-developed.   Obese   HENT:   Head: Normocephalic and atraumatic.   Eyes: Pupils are equal, round, and reactive to light. Conjunctivae and lids are normal.   Neck: Trachea normal and normal range of motion. No tracheal tenderness present. No thyroid mass present.      Cardiovascular: Normal rate, regular rhythm and normal heart sounds. PMI is not displaced. Exam reveals no gallop.   No murmur heard.  Pulmonary/Chest: Effort normal and breath sounds normal. No respiratory distress. He has no decreased breath sounds. He has no wheezes. He has no rhonchi. He exhibits no tenderness.   Coarse breath sounds on the right   Abdominal: Soft. Normal appearance and bowel sounds are normal. There is no hepatomegaly. There is no abdominal tenderness.   Obese   Musculoskeletal:      Comments: In wheelchair, no extremity edema     Vascular Status -  His right foot exhibits no edema. His left foot exhibits no edema.  Lymphadenopathy:        Head (right side): No submandibular adenopathy present.        Head (left side): No submandibular adenopathy present.     He has no cervical adenopathy.        Right: No supraclavicular adenopathy present.        Left: No supraclavicular adenopathy present.   Neurological: He is alert and oriented to person, place, and time. Gait normal.   Skin: Skin is warm and dry. No rash noted. Nails show no clubbing.   Lower leg hyperemia   Psychiatric: His speech is normal and behavior is normal. Judgment normal.   Nursing note and vitals reviewed.    IMAGING: LDCT 7/26/19 (independently reviewed and interpreted by me) showed bilateral upper lobe and left lower lobe superior segment subcentimeter groundglass opacities likely inflammatory, no other nodules, LAD atherosclerotic vascular calcifications        Assessment/Plan     Diagnoses and all orders for this visit:    1. Stage 2 moderate COPD by GOLD classification (CMS/HCC) (Primary)    2. COPD, group B, by GOLD 2017 classification (CMS/HCC)    3. NEREYDA treated with BiPAP    4. Class 2 severe obesity due to excess calories with serious comorbidity and body mass index (BMI) of 35.0 to 35.9 in adult (CMS/HCC)    5. Cigarette nicotine dependence, uncomplicated    6. Physical deconditioning         Discussion/  Recommendations:   He is stable from a COPD standpoint on Anoro daily with regular use.  I have stressed importance of complete tobacco cessation and encouraged weight loss through diet and exercise.  I also recommended that he undergo lung cancer screening later this month.    -Continue Anoro daily.  -Use albuterol as needed for dyspnea or wheeze.   -Continue supplemental oxygen along with BiPAP with sleep.  -Lung cancer screening shared decision making counseling: The patient meets criteria for lung cancer screening CT (LDCT); 1ppd x 36yrs still smoking 0.5ppd with no symptoms of lung cancer.  Reviewed benefits of such screening including, early detection of potentially curable lung cancer.  Also, discussed risks of screening including, radiation exposure, test anxiety, false positives, risk associated with future procedures, and possibility of clinically significant incidental findings.  The patient does agree to undergo lung cancer screening.  -Arias Willson is a current cigarettes user.  He currently smokes 1 pack of cigarettes per day for a duration of 35 years. I have educated him on the risk of diseases from using tobacco products such as cancer, COPD and heart diease. I advised him to quit and he is not willing to quit. I spent 1 minutes counseling the patient.  -Encouraged ongoing efforts at weight loss through dietary changes.  -Up to date with the pneumococcal vaccines.  Annual influenza vaccination.    Patient's Body mass index is 37.49 kg/m². BMI is above normal parameters. Recommendations include: exercise counseling.        Return in about 3 months (around 1/19/2021) for Recheck COPD.          This document has been electronically signed by Krysten Buckner MD on October 19, 2020 14:27 CDT      Dictated using Dragon

## 2020-10-19 ENCOUNTER — OFFICE VISIT (OUTPATIENT)
Dept: PULMONOLOGY | Facility: CLINIC | Age: 65
End: 2020-10-19

## 2020-10-19 VITALS
DIASTOLIC BLOOD PRESSURE: 70 MMHG | HEART RATE: 95 BPM | WEIGHT: 292 LBS | BODY MASS INDEX: 37.47 KG/M2 | OXYGEN SATURATION: 95 % | SYSTOLIC BLOOD PRESSURE: 112 MMHG | HEIGHT: 74 IN

## 2020-10-19 DIAGNOSIS — E66.01 CLASS 2 SEVERE OBESITY DUE TO EXCESS CALORIES WITH SERIOUS COMORBIDITY AND BODY MASS INDEX (BMI) OF 35.0 TO 35.9 IN ADULT (HCC): ICD-10-CM

## 2020-10-19 DIAGNOSIS — R53.81 PHYSICAL DECONDITIONING: ICD-10-CM

## 2020-10-19 DIAGNOSIS — F17.210 CIGARETTE NICOTINE DEPENDENCE, UNCOMPLICATED: ICD-10-CM

## 2020-10-19 DIAGNOSIS — J44.9 STAGE 2 MODERATE COPD BY GOLD CLASSIFICATION (HCC): Primary | Chronic | ICD-10-CM

## 2020-10-19 DIAGNOSIS — J44.9 COPD, GROUP B, BY GOLD 2017 CLASSIFICATION (HCC): Chronic | ICD-10-CM

## 2020-10-19 DIAGNOSIS — G47.33 OSA TREATED WITH BIPAP: Chronic | ICD-10-CM

## 2020-10-19 DIAGNOSIS — Z23 ENCOUNTER FOR IMMUNIZATION: ICD-10-CM

## 2020-10-19 PROCEDURE — 99214 OFFICE O/P EST MOD 30 MIN: CPT | Performed by: INTERNAL MEDICINE

## 2020-10-19 PROCEDURE — 90471 IMMUNIZATION ADMIN: CPT | Performed by: INTERNAL MEDICINE

## 2020-10-19 PROCEDURE — 90686 IIV4 VACC NO PRSV 0.5 ML IM: CPT | Performed by: INTERNAL MEDICINE

## 2020-10-20 ENCOUNTER — OFFICE VISIT (OUTPATIENT)
Dept: WOUND CARE | Facility: HOSPITAL | Age: 65
End: 2020-10-20

## 2020-10-20 ENCOUNTER — OUTSIDE FACILITY SERVICE (OUTPATIENT)
Dept: PODIATRY | Facility: CLINIC | Age: 65
End: 2020-10-20

## 2020-10-20 PROCEDURE — 11042 DBRDMT SUBQ TIS 1ST 20SQCM/<: CPT | Performed by: PODIATRIST

## 2020-10-23 DIAGNOSIS — G47.33 OSA TREATED WITH BIPAP: Primary | ICD-10-CM

## 2020-10-23 DIAGNOSIS — J44.9 STAGE 2 MODERATE COPD BY GOLD CLASSIFICATION (HCC): Chronic | ICD-10-CM

## 2020-10-23 RX ORDER — ERYTHROMYCIN 5 MG/G
OINTMENT OPHTHALMIC
Qty: 4 G | Refills: 0 | Status: SHIPPED | OUTPATIENT
Start: 2020-10-23 | End: 2021-06-25

## 2020-10-23 RX ORDER — INSULIN GLARGINE 100 [IU]/ML
INJECTION, SOLUTION SUBCUTANEOUS
Qty: 15 ML | Refills: 2 | Status: SHIPPED | OUTPATIENT
Start: 2020-10-23 | End: 2021-01-05 | Stop reason: SDUPTHER

## 2020-10-23 RX ORDER — AMMONIUM LACTATE 12 G/100G
LOTION TOPICAL
Qty: 400 G | Refills: 0 | Status: SHIPPED | OUTPATIENT
Start: 2020-10-23 | End: 2021-01-14 | Stop reason: SDUPTHER

## 2020-10-23 RX ORDER — ALBUTEROL SULFATE 90 UG/1
AEROSOL, METERED RESPIRATORY (INHALATION)
Qty: 18 G | Refills: 5 | Status: SHIPPED | OUTPATIENT
Start: 2020-10-23 | End: 2021-01-07 | Stop reason: SDUPTHER

## 2020-10-23 NOTE — PROGRESS NOTES
Need for PAP renewal given TANESHA Hernandez's leave.      Last seen by TANESHA Hernandez in Oct 19: on BiPAP 22/12 cm, well controlled.  Unremarkable leak.     Scheduled for f/u appt Jan.     Will bridge to that visit.     Arnulfo Suarez II, MD  10/23/20 @ 11:40 AM CDT

## 2020-10-27 ENCOUNTER — DOCUMENTATION (OUTPATIENT)
Dept: SLEEP MEDICINE | Facility: HOSPITAL | Age: 65
End: 2020-10-27

## 2020-10-28 NOTE — PROGRESS NOTES
Compliance data received:      Compliance Card Data:    - Date Range: 10/24/19 -- 10/22/20     - Setting: BiPAP 22 / 12 cm     - Residual AHI: 2    --RON 2    --PB 0.1%     - Avg Tv: 1,014 mL     - Avg BR/RR 12 bpm     - Average Time in Large Leak: 3 min   - Vibratory Snore Index: 0     - % Days used: 100%   - % Days with Usage > 4 hrs: 100%     - Average usage days used: 9 h 11 m      Increase in use overall.  Plan to monitor at next f/u in Jan, consider if having any waking headache or worsening EDS.  If increasing, may need in lab titration to ensure adequate control.       Arnulfo Suarez II, MD  10/27/20 @ 7:18 PM CDT

## 2020-11-02 ENCOUNTER — HOSPITAL ENCOUNTER (OUTPATIENT)
Dept: CT IMAGING | Facility: HOSPITAL | Age: 65
Discharge: HOME | End: 2020-11-02
Admitting: INTERNAL MEDICINE

## 2020-11-02 DIAGNOSIS — F17.210 CIGARETTE NICOTINE DEPENDENCE, UNCOMPLICATED: ICD-10-CM

## 2020-11-02 PROCEDURE — G0297 LDCT FOR LUNG CA SCREEN: HCPCS

## 2020-11-03 ENCOUNTER — OUTSIDE FACILITY SERVICE (OUTPATIENT)
Dept: PODIATRY | Facility: CLINIC | Age: 65
End: 2020-11-03

## 2020-11-03 ENCOUNTER — OFFICE VISIT (OUTPATIENT)
Dept: WOUND CARE | Facility: HOSPITAL | Age: 65
End: 2020-11-03

## 2020-11-03 PROCEDURE — 11055 PARING/CUTG B9 HYPRKER LES 1: CPT

## 2020-11-03 PROCEDURE — 99212 OFFICE O/P EST SF 10 MIN: CPT | Performed by: PODIATRIST

## 2020-11-17 ENCOUNTER — OFFICE VISIT (OUTPATIENT)
Dept: GASTROENTEROLOGY | Facility: CLINIC | Age: 65
End: 2020-11-17

## 2020-11-17 VITALS
HEIGHT: 74 IN | HEART RATE: 88 BPM | WEIGHT: 296.4 LBS | SYSTOLIC BLOOD PRESSURE: 149 MMHG | BODY MASS INDEX: 38.04 KG/M2 | DIASTOLIC BLOOD PRESSURE: 81 MMHG

## 2020-11-17 DIAGNOSIS — Z12.11 ENCOUNTER FOR SCREENING FOR MALIGNANT NEOPLASM OF COLON: Primary | ICD-10-CM

## 2020-11-17 PROCEDURE — S0285 CNSLT BEFORE SCREEN COLONOSC: HCPCS | Performed by: INTERNAL MEDICINE

## 2020-11-17 RX ORDER — SODIUM, POTASSIUM,MAG SULFATES 17.5-3.13G
1 SOLUTION, RECONSTITUTED, ORAL ORAL EVERY 12 HOURS
Qty: 1 BOTTLE | Refills: 0 | Status: SHIPPED | OUTPATIENT
Start: 2020-11-17 | End: 2021-04-08

## 2020-11-17 RX ORDER — DEXTROSE AND SODIUM CHLORIDE 5; .45 G/100ML; G/100ML
30 INJECTION, SOLUTION INTRAVENOUS CONTINUOUS PRN
Status: CANCELLED | OUTPATIENT
Start: 2020-12-09

## 2020-11-17 NOTE — PATIENT INSTRUCTIONS
"BMI for Adults  What is BMI?  Body mass index (BMI) is a number that is calculated from a person's weight and height. BMI can help estimate how much of a person's weight is composed of fat. BMI does not measure body fat directly. Rather, it is an alternative to procedures that directly measure body fat, which can be difficult and expensive.  BMI can help identify people who may be at higher risk for certain medical problems.  What are BMI measurements used for?  BMI is used as a screening tool to identify possible weight problems. It helps determine whether a person is obese, overweight, a healthy weight, or underweight.  BMI is useful for:  · Identifying a weight problem that may be related to a medical condition or may increase the risk for medical problems.  · Promoting changes, such as changes in diet and exercise, to help reach a healthy weight. BMI screening can be repeated to see if these changes are working.  How is BMI calculated?  BMI involves measuring your weight in relation to your height. Both height and weight are measured, and the BMI is calculated from those numbers. This can be done either in English ([place] or metric measurements. Note that charts and online BMI calculators are available to help you find your BMI quickly and easily without having to do these calculations yourself.  To calculate your BMI in English ([place] measurements:    1. Measure your weight in pounds (lb).  2. Multiply the number of pounds by 703.  ? For example, for a person who weighs 180 lb, multiply that number by 703, which equals 126,540.  3. Measure your height in inches. Then multiply that number by itself to get a measurement called \"inches squared.\"  ? For example, for a person who is 70 inches tall, the \"inches squared\" measurement is 70 inches x 70 inches, which equals 4,900 inches squared.  4. Divide the total from step 2 (number of lb x 703) by the total from step 3 (inches squared): 126,540 ÷ 4,900 = 25.8. This is " "your BMI.  To calculate your BMI in metric measurements:  1. Measure your weight in kilograms (kg).  2. Measure your height in meters (m). Then multiply that number by itself to get a measurement called \"meters squared.\"  ? For example, for a person who is 1.75 m tall, the \"meters squared\" measurement is 1.75 m x 1.75 m, which is equal to 3.1 meters squared.  3. Divide the number of kilograms (your weight) by the meters squared number. In this example: 70 ÷ 3.1 = 22.6. This is your BMI.  What do the results mean?  BMI charts are used to identify whether you are underweight, normal weight, overweight, or obese. The following guidelines will be used:  · Underweight: BMI less than 18.5.  · Normal weight: BMI between 18.5 and 24.9.  · Overweight: BMI between 25 and 29.9.  · Obese: BMI of 30 or above.  Keep these notes in mind:  · Weight includes both fat and muscle, so someone with a muscular build, such as an athlete, may have a BMI that is higher than 24.9. In cases like these, BMI is not an accurate measure of body fat.  · To determine if excess body fat is the cause of a BMI of 25 or higher, further assessments may need to be done by a health care provider.  · BMI is usually interpreted in the same way for men and women.  Where to find more information  For more information about BMI, including tools to quickly calculate your BMI, go to these websites:  · Centers for Disease Control and Prevention: www.cdc.gov  · American Heart Association: www.heart.org  · National Heart, Lung, and Blood [place]: www.nhlbi.nih.gov  Summary  · Body mass index (BMI) is a number that is calculated from a person's weight and height.  · BMI may help estimate how much of a person's weight is composed of fat. BMI can help identify those who may be at higher risk for certain medical problems.  · BMI can be measured using English measurements or metric measurements.  · BMI charts are used to identify whether you are underweight, normal " weight, overweight, or obese.  This information is not intended to replace advice given to you by your health care provider. Make sure you discuss any questions you have with your health care provider.  Document Released: 08/29/2005 Document Revised: 09/09/2020 Document Reviewed: 07/17/2020  [employer]vier Patient Education © 2020 [employer]

## 2020-11-17 NOTE — PROGRESS NOTES
[facility]      Chief Complaint:   Chief Complaint   Patient presents with   • Colon Cancer Screening     Recall Letter       Subjective     HPI:   *Patient for a screening colonoscopy.  Patient states been over 5 years since his last colonoscopy.  Patient denies any problems with bowel movements.  Patient has had several strokes since the last colonoscopy but is doing well with this at this time.    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 Grade I diastolic dysfunction of the LV myocardium. No evidence of LV apical thrombus. No evidence of pericardial effusion.)   • Gus's syndrome     RIGHT EYE   • Hyperkeratosis    • Hyperlipidemia    • Mucopurulent conjunctivitis     ACUTE   • Myopia    • Neuropathic pain    • Obesity    • Onychomycosis    • Tobacco dependence syndrome        Past Surgical History:  Past Surgical History:   Procedure Laterality Date   • COLONOSCOPY  07/30/2015    Colonoscopy, diagnostic (screening) 89059 (Screening for malignant neoplasm of colon)    • COLONOSCOPY  09/09/2015    Colonoscopy, diagnostic (screening) 03489 (Diverticulosis found in the sigmoid  colon.Hemorrhoids found in the anus.)   • COLONOSCOPY  05/01/2009    Colonoscopy, diagnostic (screening) 21423 (Small internal and external hemorrhoids were present, Colonoscopy otherwise normal.)   • LEG SURGERY         Family History:  Family History   Problem Relation Age of Onset   • Glaucoma Other    • Heart disease Other    • Stroke Other    • Macular degeneration Other    • Glaucoma Father    • Macular degeneration Father    • Cataracts Father    • Macular degeneration Sister        Social History:   reports that he has been smoking cigarettes. He has a 16.50 pack-year smoking history. He has never used smokeless tobacco. He reports current alcohol use. He reports that he does not use drugs.    Medications:   Prior to Admission medications    Medication Sig Start Date End Date Taking? Authorizing Provider   ammonium lactate (LAC-HYDRIN) 12 % lotion APPLY  LOTION TOPICALLY TO AFFECTED AREA AS DIRECTED TO  FEET  AS  NEEDED 10/23/20  Yes Harpreet Bass MD   aspirin 325 MG tablet Take 325 mg by mouth daily. 5/24/06  Yes ProviderMehreen MD   baclofen (LIORESAL) 10 MG tablet TAKE 1 TABLET BY MOUTH THREE TIMES DAILY 10/1/20  Yes Harpreet Bass MD   clopidogrel (PLAVIX) 75 MG tablet Take 1 tablet by mouth Daily. 8/3/20  Yes Harpreet Bass MD   Cyanocobalamin (B-12) 5000 MCG sublingual tablet Place 1 each under the tongue Daily. 3/5/18  Yes Harpreet Bass MD   DULoxetine (CYMBALTA) 60 MG capsule Take 1 capsule by mouth once daily 5/11/20  Yes Harpreet Bass MD   erythromycin (ROMYCIN) 5 MG/GM ophthalmic ointment INSTILL  OINTMENT INTO EACH EYE AS DIRECTED AT NIGHT 10/23/20  Yes Harpreet Bass MD   gabapentin (NEURONTIN) 600 MG tablet Take 1 tablet by mouth 3 (Three) Times a Day. 5/29/20  Yes Harpreet Bass MD   glucose blood test strip 1 each by Other route 3 (Three) Times a Day. Use as instructed 7/3/19  Yes Harpreet Bass MD   glucose monitor monitoring  kit 3 (Three) Times a Day. 7/3/19  Yes Harpreet Bass MD   Insulin Glargine (Lantus SoloStar) 100 UNIT/ML injection pen INJECT 20 UNITS SUBCUTANEOUSLY ONCE DAILY INCREASE  BY  2  UNITS  EVERY  3  DAYS  UNTIL  MORNING  BLOOD  SUGAR  IS  100-150 10/23/20  Yes Harpreet Bass MD   JARDIANCE 25 MG tablet Take 1 tablet by mouth once daily 9/2/20  Yes Harpreet Bass MD   Lancets 30G misc 1 each 3 (Three) Times a Day. 7/3/19  Yes Harpreet Bass MD   lansoprazole (PREVACID) 30 MG capsule Take 1 capsule by mouth once daily 9/2/20  Yes Harpreet Bass MD   losartan (COZAAR) 50 MG tablet Take 1 tablet by mouth once daily 9/2/20  Yes Harpreet Bass MD   metFORMIN (GLUCOPHAGE) 500 MG tablet Take 1 tablet by mouth 2 (Two) Times a Day. 7/9/20  Yes Harpreet Bass MD   metoprolol tartrate (LOPRESSOR) 25 MG tablet Take 1 tablet by mouth Every 12 (Twelve) Hours. 10/8/20  Yes Harpreet Bass MD   mupirocin (BACTROBAN) 2 % ointment Apply  topically to the appropriate area as directed 3 (Three) Times a Day. 6/11/19  Yes Harpreet Bass MD   Omega-3 Fatty Acids (FISH OIL) 1000 MG capsule capsule Take 2,000 mg by mouth Daily With Breakfast.   Yes ProviderMehreen MD   pravastatin (PRAVACHOL) 40 MG tablet Take 1 tablet by mouth Every Night. 10/8/20  Yes Harpreet Bass MD   spironolactone (ALDACTONE) 25 MG tablet Take 1 tablet by mouth Daily. 10/8/20  Yes Harpreet Bass MD   umeclidinium-vilanterol (Anoro Ellipta) 62.5-25 MCG/INH aerosol powder  inhaler Inhale 1 puff Daily. 8/3/20  Yes Krysten Buckner MD   Ventolin  (90 Base) MCG/ACT inhaler INHALE 2 PUFFS BY MOUTH EVERY 4 HOURS AS NEEDED FOR WHEEZING FOR SHORTNESS OF BREATH 10/23/20  Yes Krysten Buckner MD   sodium-potassium-magnesium sulfates (SUPREP) 17.5-3.13-1.6 GM/177ML solution oral solution Take 1 bottle by mouth Every 12 (Twelve) Hours. 11/17/20   Arias Larsen MD       Allergies:  Patient  "has no known allergies.    ROS:    Review of Systems   Constitutional: Negative for activity change, appetite change and unexpected weight change.   HENT: Negative for congestion, sore throat and trouble swallowing.    Respiratory: Negative for cough, choking and shortness of breath.    Cardiovascular: Negative for chest pain.   Gastrointestinal: Negative for abdominal distention, abdominal pain, anal bleeding, blood in stool, constipation, diarrhea, nausea, rectal pain and vomiting.   Endocrine: Negative for heat intolerance, polydipsia and polyphagia.   Genitourinary: Negative for difficulty urinating.   Musculoskeletal: Negative for arthralgias.   Skin: Negative for color change, pallor, rash and wound.   Allergic/Immunologic: Negative for food allergies.   Neurological: Negative for dizziness, syncope, weakness and headaches.   Psychiatric/Behavioral: Negative for agitation, behavioral problems, confusion and decreased concentration.     Objective     Blood pressure 149/81, pulse 88, height 188 cm (74\"), weight 134 kg (296 lb 6.4 oz).    Physical Exam  Constitutional:       General: He is not in acute distress.     Appearance: He is well-developed. He is not diaphoretic.   HENT:      Head: Normocephalic and atraumatic.   Cardiovascular:      Rate and Rhythm: Normal rate and regular rhythm.      Heart sounds: Normal heart sounds. No murmur. No friction rub. No gallop.    Pulmonary:      Effort: No respiratory distress.      Breath sounds: Normal breath sounds. No wheezing or rales.   Chest:      Chest wall: No tenderness.   Abdominal:      General: Bowel sounds are normal. There is no distension.      Palpations: Abdomen is soft. There is no mass.      Tenderness: There is no abdominal tenderness. There is no guarding or rebound.      Hernia: No hernia is present.   Musculoskeletal: Normal range of motion.   Skin:     General: Skin is warm and dry.      Coloration: Skin is not pale.      Findings: No erythema or " rash.   Neurological:      Mental Status: He is alert and oriented to person, place, and time.   Psychiatric:         Behavior: Behavior normal.         Thought Content: Thought content normal.         Judgment: Judgment normal.          Assessment/Plan   Diagnoses and all orders for this visit:    1. Encounter for screening for malignant neoplasm of colon (Primary)  -     Case Request; Standing  -     dextrose 5 % and sodium chloride 0.45 % infusion  -     Case Request    Other orders  -     Follow Anesthesia Guidelines / Standing Orders; Future  -     Obtain Informed Consent; Future  -     Implement Anesthesia Orders Day of Procedure; Standing  -     Obtain Informed Consent; Standing  -     POC Glucose Once; Standing  -     Insert Peripheral IV; Standing  -     sodium-potassium-magnesium sulfates (SUPREP) 17.5-3.13-1.6 GM/177ML solution oral solution; Take 1 bottle by mouth Every 12 (Twelve) Hours.  Dispense: 1 bottle; Refill: 0        COLONOSCOPY (N/A)     Diagnosis Plan   1. Encounter for screening for malignant neoplasm of colon  Case Request    dextrose 5 % and sodium chloride 0.45 % infusion    Case Request       Anticipated Surgical Procedure:  Orders Placed This Encounter   Procedures   • Follow Anesthesia Guidelines / Standing Orders     Standing Status:   Future   • Obtain Informed Consent     Standing Status:   Future     Order Specific Question:   Informed Consent Given For     Answer:   colonoscopy       The risks, benefits, and alternatives of this procedure have been discussed with the patient or the responsible party- the patient understands and agrees to proceed.

## 2020-11-23 ENCOUNTER — OFFICE VISIT (OUTPATIENT)
Dept: PODIATRY | Facility: CLINIC | Age: 65
End: 2020-11-23

## 2020-11-23 VITALS — BODY MASS INDEX: 37.99 KG/M2 | WEIGHT: 296 LBS | HEART RATE: 92 BPM | HEIGHT: 74 IN | OXYGEN SATURATION: 99 %

## 2020-11-23 DIAGNOSIS — M79.675 CHRONIC TOE PAIN, BILATERAL: ICD-10-CM

## 2020-11-23 DIAGNOSIS — G89.29 CHRONIC TOE PAIN, BILATERAL: ICD-10-CM

## 2020-11-23 DIAGNOSIS — L84 CALLUS OF FOOT: ICD-10-CM

## 2020-11-23 DIAGNOSIS — B35.1 ONYCHOMYCOSIS: ICD-10-CM

## 2020-11-23 DIAGNOSIS — E11.42 TYPE 2 DIABETES MELLITUS WITH PERIPHERAL NEUROPATHY (HCC): ICD-10-CM

## 2020-11-23 DIAGNOSIS — E11.9 ENCOUNTER FOR DIABETIC FOOT EXAM (HCC): Primary | ICD-10-CM

## 2020-11-23 DIAGNOSIS — M79.674 CHRONIC TOE PAIN, BILATERAL: ICD-10-CM

## 2020-11-23 PROCEDURE — 99213 OFFICE O/P EST LOW 20 MIN: CPT | Performed by: PODIATRIST

## 2020-11-23 PROCEDURE — 11056 PARNG/CUTG B9 HYPRKR LES 2-4: CPT | Performed by: PODIATRIST

## 2020-11-23 PROCEDURE — 11721 DEBRIDE NAIL 6 OR MORE: CPT | Performed by: PODIATRIST

## 2020-11-23 NOTE — PROGRESS NOTES
Arias Willson  1955  65 y.o. male   MOIRA Bass - 10/08/2020  BS - 190 per pt    Patient presents today for diabetic foot care and wound check on his right great toe.    11/23/2020    Chief Complaint   Patient presents with   • Left Foot - diabetic foot care, Pain   • Right Foot - diabetic foot care, Pain       History of Present Illness    Arias Willson is a 65 y.o.male who presents to clinic today for diabetic foot exam and care.  Admits to numbness and tingling in both feet.  Relates to long, thickened painful toenails today.  Also complains of calluses on his left foot.  Patient was previously treated in the wound care center for right hallux ulcer which remains healed.  He is using AmLactin daily.  He denies any other complaints today.      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      • Benign essential hypertension    • Blood in feces    • Callus    • Carotid artery stenosis    • Cerebrovascular accident (CMS/HCC)      L sided sensory deficit      • Conjunctivitis    • Constipation     OCCASIONAL   • Contusion, eye, left    • Corneal ulcer     PROBABLE   • Diabetes mellitus (CMS/HCC)    • Eczema    • Emphysema lung (CMS/HCC)    • Encounter for screening for malignant neoplasm of colon    • Essential hypertension    • Foot ulcer (CMS/HCC)    • GERD (gastroesophageal reflux disease)    • Hemorrhoids    • History of echocardiogram 04/05/2013    Echocadiogram W/ color flow 76112 (Normal LV systolic function with EF of 60-65%. Grade I diastolic dysfunction of the LV myocardium. No evidence of LV apical thrombus. No evidence of pericardial effusion.)   • Gus's syndrome     RIGHT EYE   • Hyperkeratosis    • Hyperlipidemia    • Mucopurulent conjunctivitis     ACUTE   • Myopia    • Neuropathic pain    • Obesity    • Onychomycosis      • Tobacco dependence syndrome          Past Surgical History:   Procedure Laterality Date   • COLONOSCOPY  07/30/2015    Colonoscopy, diagnostic (screening) 97557 (Screening for malignant neoplasm of colon)    • COLONOSCOPY  09/09/2015    Colonoscopy, diagnostic (screening) 02172 (Diverticulosis found in the sigmoid colon.Hemorrhoids found in the anus.)   • COLONOSCOPY  05/01/2009    Colonoscopy, diagnostic (screening) 69875 (Small internal and external hemorrhoids were present, Colonoscopy otherwise normal.)   • LEG SURGERY           Family History   Problem Relation Age of Onset   • Glaucoma Other    • Heart disease Other    • Stroke Other    • Macular degeneration Other    • Diabetes Other    • Glaucoma Father    • Macular degeneration Father    • Cataracts Father    • Cancer Father    • Macular degeneration Sister    • Heart disease Mother    • Cancer Paternal Grandmother    • Heart disease Paternal Grandfather        No Known Allergies    Social History     Socioeconomic History   • Marital status: Single     Spouse name: Not on file   • Number of children: Not on file   • Years of education: Not on file   • Highest education level: Not on file   Tobacco Use   • Smoking status: Current Every Day Smoker     Packs/day: 0.50     Years: 33.00     Pack years: 16.50     Types: Cigarettes   • Smokeless tobacco: Never Used   Substance and Sexual Activity   • Alcohol use: Yes   • Drug use: No   • Sexual activity: Defer         Current Outpatient Medications   Medication Sig Dispense Refill   • ammonium lactate (LAC-HYDRIN) 12 % lotion APPLY  LOTION TOPICALLY TO AFFECTED AREA AS DIRECTED TO  FEET  AS  NEEDED 400 g 0   • [START ON 5/24/2106] aspirin 325 MG tablet Take 325 mg by mouth daily.     • baclofen (LIORESAL) 10 MG tablet TAKE 1 TABLET BY MOUTH THREE TIMES DAILY 90 tablet 2   • clopidogrel (PLAVIX) 75 MG tablet Take 1 tablet by mouth Daily. 90 tablet 1   • Cyanocobalamin (B-12) 5000 MCG sublingual tablet Place 1  each under the tongue Daily. 30 tablet 6   • DULoxetine (CYMBALTA) 60 MG capsule Take 1 capsule by mouth once daily 30 capsule 5   • erythromycin (ROMYCIN) 5 MG/GM ophthalmic ointment INSTILL  OINTMENT INTO EACH EYE AS DIRECTED AT NIGHT 4 g 0   • gabapentin (NEURONTIN) 600 MG tablet Take 1 tablet by mouth 3 (Three) Times a Day. 270 tablet 1   • glucose blood test strip 1 each by Other route 3 (Three) Times a Day. Use as instructed 100 each 5   • glucose monitor monitoring kit 3 (Three) Times a Day. 1 each 0   • Insulin Glargine (Lantus SoloStar) 100 UNIT/ML injection pen INJECT 20 UNITS SUBCUTANEOUSLY ONCE DAILY INCREASE  BY  2  UNITS  EVERY  3  DAYS  UNTIL  MORNING  BLOOD  SUGAR  IS  100-150 15 mL 2   • JARDIANCE 25 MG tablet Take 1 tablet by mouth once daily 30 tablet 5   • Lancets 30G misc 1 each 3 (Three) Times a Day. 100 each 5   • lansoprazole (PREVACID) 30 MG capsule Take 1 capsule by mouth once daily 30 capsule 5   • losartan (COZAAR) 50 MG tablet Take 1 tablet by mouth once daily 30 tablet 5   • metFORMIN (GLUCOPHAGE) 500 MG tablet Take 1 tablet by mouth 2 (Two) Times a Day. 60 tablet 5   • metoprolol tartrate (LOPRESSOR) 25 MG tablet Take 1 tablet by mouth Every 12 (Twelve) Hours. 180 tablet 1   • mupirocin (BACTROBAN) 2 % ointment Apply  topically to the appropriate area as directed 3 (Three) Times a Day. 30 g 2   • Omega-3 Fatty Acids (FISH OIL) 1000 MG capsule capsule Take 2,000 mg by mouth Daily With Breakfast.     • pravastatin (PRAVACHOL) 40 MG tablet Take 1 tablet by mouth Every Night. 90 tablet 1   • sodium-potassium-magnesium sulfates (SUPREP) 17.5-3.13-1.6 GM/177ML solution oral solution Take 1 bottle by mouth Every 12 (Twelve) Hours. 1 bottle 0   • spironolactone (ALDACTONE) 25 MG tablet Take 1 tablet by mouth Daily. 90 tablet 1   • umeclidinium-vilanterol (Anoro Ellipta) 62.5-25 MCG/INH aerosol powder  inhaler Inhale 1 puff Daily. 1 each 1   • Ventolin  (90 Base) MCG/ACT inhaler INHALE 2  "PUFFS BY MOUTH EVERY 4 HOURS AS NEEDED FOR WHEEZING FOR SHORTNESS OF BREATH 18 g 5     No current facility-administered medications for this visit.        Review of Systems   Constitutional: Positive for fatigue.   HENT: Negative.    Eyes: Negative.    Respiratory: Positive for shortness of breath.    Cardiovascular: Negative.    Gastrointestinal: Negative.    Endocrine: Negative.    Genitourinary: Positive for enuresis.   Musculoskeletal: Positive for gait problem.        Foot pain  Joint pain  Ankle pain   Neurological: Positive for dizziness and numbness.   Psychiatric/Behavioral: Negative.          OBJECTIVE    Pulse 92   Ht 188 cm (74\")   Wt 134 kg (296 lb)   SpO2 99%   BMI 38.00 kg/m²       Physical Exam  Vitals signs reviewed.   Constitutional:       General: He is not in acute distress.     Appearance: He is well-developed.   HENT:      Head: Normocephalic and atraumatic.      Nose: Nose normal.   Eyes:      Conjunctiva/sclera: Conjunctivae normal.      Pupils: Pupils are equal, round, and reactive to light.   Cardiovascular:      Pulses:           Dorsalis pedis pulses are 2+ on the right side and 2+ on the left side.        Posterior tibial pulses are 2+ on the right side and 2+ on the left side.   Pulmonary:      Effort: Pulmonary effort is normal. No respiratory distress.      Breath sounds: No wheezing.   Musculoskeletal:         General: No deformity.      Right foot: Decreased range of motion. No deformity or prominent metatarsal heads.      Left foot: Decreased range of motion. No deformity or prominent metatarsal heads.   Feet:      Right foot:      Protective Sensation: 5 sites tested. 0 sites sensed.      Skin integrity: Callus present. No ulcer.      Toenail Condition: Right toenails are abnormally thick and long. Fungal disease present.     Left foot:      Protective Sensation: 5 sites tested. 0 sites sensed.      Skin integrity: Callus present. No ulcer.      Toenail Condition: Left " toenails are abnormally thick and long. Fungal disease present.  Skin:     General: Skin is warm and dry.      Capillary Refill: Capillary refill takes less than 2 seconds.   Neurological:      Mental Status: He is alert and oriented to person, place, and time.   Psychiatric:         Behavior: Behavior normal.         Thought Content: Thought content normal.         Procedures    Diabetic Foot Exam Performed and Monofilament Test Performed      ASSESSMENT AND PLAN    Diagnoses and all orders for this visit:    1. Encounter for diabetic foot exam (CMS/[facility]) (Primary)    2. Onychomycosis    3. Chronic toe pain, bilateral    4. Callus of foot    5. Type 2 diabetes mellitus with peripheral neuropathy (CMS/[facility])        - A diabetic foot screening exam was performed and the patient was educated on the foot complications related to diabetes,  preventative foot care and what signs and symptoms to watch for.  Instructed to contact our office if any foot problems develop before next visit.  - Nails 1-5 bilateral were debrided in length and thickness with nail nipper and electric  to decrease fungal load and risk of infection.  - Trimmed hyperkeratotic lesions noted in objective with a #15 blade without incident.   - All the patients questions were answered.  - RTC 3 months or sooner if needed.              This document has been electronically signed by Garrett Mcmillan DPM on November 24, 2020 08:39 CST     11/24/2020  08:39 CST

## 2020-11-30 RX ORDER — DULOXETIN HYDROCHLORIDE 60 MG/1
CAPSULE, DELAYED RELEASE ORAL
Qty: 30 CAPSULE | Refills: 5 | Status: SHIPPED | OUTPATIENT
Start: 2020-11-30 | End: 2021-01-05 | Stop reason: SDUPTHER

## 2020-12-06 ENCOUNTER — LAB (OUTPATIENT)
Dept: LAB | Facility: HOSPITAL | Age: 65
End: 2020-12-06

## 2020-12-06 DIAGNOSIS — Z01.818 PREOP TESTING: Primary | ICD-10-CM

## 2020-12-06 LAB — SARS-COV-2 N GENE RESP QL NAA+PROBE: NOT DETECTED

## 2020-12-06 PROCEDURE — C9803 HOPD COVID-19 SPEC COLLECT: HCPCS

## 2020-12-06 PROCEDURE — 87635 SARS-COV-2 COVID-19 AMP PRB: CPT

## 2020-12-09 ENCOUNTER — HOSPITAL ENCOUNTER (OUTPATIENT)
Facility: HOSPITAL | Age: 65
Setting detail: HOSPITAL OUTPATIENT SURGERY
Discharge: HOME | End: 2020-12-09
Attending: INTERNAL MEDICINE | Admitting: INTERNAL MEDICINE

## 2020-12-09 ENCOUNTER — ANESTHESIA (OUTPATIENT)
Dept: GASTROENTEROLOGY | Facility: HOSPITAL | Age: 65
End: 2020-12-09

## 2020-12-09 ENCOUNTER — ANESTHESIA EVENT (OUTPATIENT)
Dept: GASTROENTEROLOGY | Facility: HOSPITAL | Age: 65
End: 2020-12-09

## 2020-12-09 VITALS
HEART RATE: 79 BPM | SYSTOLIC BLOOD PRESSURE: 120 MMHG | BODY MASS INDEX: 36.96 KG/M2 | OXYGEN SATURATION: 95 % | HEIGHT: 74 IN | TEMPERATURE: 98.7 F | RESPIRATION RATE: 18 BRPM | DIASTOLIC BLOOD PRESSURE: 64 MMHG | WEIGHT: 288 LBS

## 2020-12-09 DIAGNOSIS — Z12.11 ENCOUNTER FOR SCREENING FOR MALIGNANT NEOPLASM OF COLON: ICD-10-CM

## 2020-12-09 PROCEDURE — 25010000002 PROPOFOL 10 MG/ML EMULSION: Performed by: NURSE ANESTHETIST, CERTIFIED REGISTERED

## 2020-12-09 PROCEDURE — 45378 DIAGNOSTIC COLONOSCOPY: CPT | Performed by: INTERNAL MEDICINE

## 2020-12-09 RX ORDER — DEXTROSE AND SODIUM CHLORIDE 5; .45 G/100ML; G/100ML
30 INJECTION, SOLUTION INTRAVENOUS CONTINUOUS PRN
Status: DISCONTINUED | OUTPATIENT
Start: 2020-12-09 | End: 2020-12-09 | Stop reason: HOSPADM

## 2020-12-09 RX ORDER — PROPOFOL 10 MG/ML
VIAL (ML) INTRAVENOUS AS NEEDED
Status: DISCONTINUED | OUTPATIENT
Start: 2020-12-09 | End: 2020-12-09 | Stop reason: SURG

## 2020-12-09 RX ADMIN — PROPOFOL 30 MG: 10 INJECTION, EMULSION INTRAVENOUS at 11:25

## 2020-12-09 RX ADMIN — PROPOFOL 20 MG: 10 INJECTION, EMULSION INTRAVENOUS at 11:24

## 2020-12-09 RX ADMIN — PROPOFOL 30 MG: 10 INJECTION, EMULSION INTRAVENOUS at 11:26

## 2020-12-09 RX ADMIN — PROPOFOL 30 MG: 10 INJECTION, EMULSION INTRAVENOUS at 11:29

## 2020-12-09 RX ADMIN — PROPOFOL 30 MG: 10 INJECTION, EMULSION INTRAVENOUS at 11:27

## 2020-12-09 RX ADMIN — DEXTROSE AND SODIUM CHLORIDE 30 ML/HR: 5; 450 INJECTION, SOLUTION INTRAVENOUS at 11:13

## 2020-12-09 RX ADMIN — PROPOFOL 20 MG: 10 INJECTION, EMULSION INTRAVENOUS at 11:32

## 2020-12-09 RX ADMIN — PROPOFOL 90 MG: 10 INJECTION, EMULSION INTRAVENOUS at 11:23

## 2020-12-09 RX ADMIN — PROPOFOL 20 MG: 10 INJECTION, EMULSION INTRAVENOUS at 11:30

## 2020-12-09 NOTE — ANESTHESIA PREPROCEDURE EVALUATION
Anesthesia Evaluation     Patient summary reviewed   NPO Solid Status: > 8 hours  NPO Liquid Status: > 4 hours           Airway   Mallampati: III  TM distance: >3 FB  Neck ROM: full  Possible difficult intubation  Dental    (+) poor dentition        Pulmonary     breath sounds clear to auscultation  (+) COPD, sleep apnea,   Cardiovascular - normal exam    Rate: normal    (+) hypertension, CAD, hyperlipidemia,  carotid artery disease      Neuro/Psych  (+) CVA, syncope, tremors,     GI/Hepatic/Renal/Endo    (+) obesity,  GERD,  diabetes mellitus,     Musculoskeletal     Abdominal   (+) obese,     Abdomen: soft.  Bowel sounds: normal.   Substance History      OB/GYN          Other   arthritis,                    Anesthesia Plan    ASA 3     MAC     intravenous induction     Anesthetic plan, all risks, benefits, and alternatives have been provided, discussed and informed consent has been obtained with: patient.

## 2020-12-09 NOTE — ANESTHESIA POSTPROCEDURE EVALUATION
Patient: Arias Willson    Procedure Summary     Date: 12/09/20 Room / Location: [facility] ENDOSCOPY 1 / [facility] ENDOSCOPY    Anesthesia Start: 1120 Anesthesia Stop: 1138    Procedure: COLONOSCOPY (N/A ) Diagnosis:       Encounter for screening for malignant neoplasm of colon      (Encounter for screening for malignant neoplasm of colon [Z12.11])    Surgeon: Arias Larsen MD Provider: Satya Martinez CRNA    Anesthesia Type: MAC ASA Status: 3          Anesthesia Type: MAC    Vitals  No vitals data found for the desired time range.          Post Anesthesia Care and Evaluation    Patient location during evaluation: bedside  Patient participation: complete - patient participated  Level of consciousness: awake and alert  Pain score: 0  Pain management: adequate  Airway patency: patent  Anesthetic complications: No anesthetic complications  PONV Status: none  Cardiovascular status: acceptable  Respiratory status: acceptable and spontaneous ventilation  Hydration status: acceptable

## 2021-01-04 ENCOUNTER — TELEPHONE (OUTPATIENT)
Dept: FAMILY MEDICINE CLINIC | Facility: CLINIC | Age: 66
End: 2021-01-04

## 2021-01-05 ENCOUNTER — OFFICE VISIT (OUTPATIENT)
Dept: FAMILY MEDICINE CLINIC | Facility: CLINIC | Age: 66
End: 2021-01-05

## 2021-01-05 VITALS — HEART RATE: 88 BPM | OXYGEN SATURATION: 95 % | SYSTOLIC BLOOD PRESSURE: 116 MMHG | DIASTOLIC BLOOD PRESSURE: 66 MMHG

## 2021-01-05 DIAGNOSIS — E11.43 TYPE 2 DIABETES MELLITUS WITH DIABETIC AUTONOMIC NEUROPATHY, WITH LONG-TERM CURRENT USE OF INSULIN (HCC): Chronic | ICD-10-CM

## 2021-01-05 DIAGNOSIS — Z86.73 H/O: CVA (CEREBROVASCULAR ACCIDENT): ICD-10-CM

## 2021-01-05 DIAGNOSIS — E66.01 MORBID OBESITY (HCC): Chronic | ICD-10-CM

## 2021-01-05 DIAGNOSIS — Z72.0 NICOTINE ABUSE: ICD-10-CM

## 2021-01-05 DIAGNOSIS — J44.9 STAGE 2 MODERATE COPD BY GOLD CLASSIFICATION (HCC): Chronic | ICD-10-CM

## 2021-01-05 DIAGNOSIS — Z79.4 TYPE 2 DIABETES MELLITUS WITH DIABETIC AUTONOMIC NEUROPATHY, WITH LONG-TERM CURRENT USE OF INSULIN (HCC): Chronic | ICD-10-CM

## 2021-01-05 DIAGNOSIS — I10 ESSENTIAL HYPERTENSION: Chronic | ICD-10-CM

## 2021-01-05 DIAGNOSIS — K21.9 GASTROESOPHAGEAL REFLUX DISEASE WITHOUT ESOPHAGITIS: ICD-10-CM

## 2021-01-05 DIAGNOSIS — G47.33 OSA TREATED WITH BIPAP: Chronic | ICD-10-CM

## 2021-01-05 DIAGNOSIS — Z79.899 HIGH RISK MEDICATION USE: ICD-10-CM

## 2021-01-05 DIAGNOSIS — H02.401 PTOSIS OF RIGHT EYELID: ICD-10-CM

## 2021-01-05 DIAGNOSIS — M79.2 NEUROPATHIC PAIN: ICD-10-CM

## 2021-01-05 DIAGNOSIS — E78.00 PURE HYPERCHOLESTEROLEMIA: ICD-10-CM

## 2021-01-05 PROCEDURE — 99442 PR PHYS/QHP TELEPHONE EVALUATION 11-20 MIN: CPT | Performed by: FAMILY MEDICINE

## 2021-01-05 RX ORDER — INSULIN GLARGINE 100 [IU]/ML
INJECTION, SOLUTION SUBCUTANEOUS
Qty: 45 ML | Refills: 2 | Status: SHIPPED | OUTPATIENT
Start: 2021-01-05 | End: 2021-04-11 | Stop reason: SDUPTHER

## 2021-01-05 RX ORDER — GABAPENTIN 600 MG/1
600 TABLET ORAL 3 TIMES DAILY
Qty: 270 TABLET | Refills: 1 | Status: SHIPPED | OUTPATIENT
Start: 2021-01-05 | End: 2021-06-17 | Stop reason: SDUPTHER

## 2021-01-05 RX ORDER — EMPAGLIFLOZIN 25 MG/1
1 TABLET, FILM COATED ORAL DAILY
Qty: 90 TABLET | Refills: 3 | Status: SHIPPED | OUTPATIENT
Start: 2021-01-05 | End: 2021-06-14 | Stop reason: SDUPTHER

## 2021-01-05 RX ORDER — DULOXETIN HYDROCHLORIDE 60 MG/1
60 CAPSULE, DELAYED RELEASE ORAL DAILY
Qty: 90 CAPSULE | Refills: 3 | Status: SHIPPED | OUTPATIENT
Start: 2021-01-05 | End: 2021-12-21 | Stop reason: SDUPTHER

## 2021-01-05 RX ORDER — CLOPIDOGREL BISULFATE 75 MG/1
75 TABLET ORAL DAILY
Qty: 90 TABLET | Refills: 3 | Status: SHIPPED | OUTPATIENT
Start: 2021-01-05 | End: 2021-12-21 | Stop reason: SDUPTHER

## 2021-01-05 RX ORDER — SPIRONOLACTONE 25 MG/1
25 TABLET ORAL DAILY
Qty: 90 TABLET | Refills: 3 | Status: SHIPPED | OUTPATIENT
Start: 2021-01-05 | End: 2021-11-16 | Stop reason: SDUPTHER

## 2021-01-05 RX ORDER — ASPIRIN 325 MG
325 TABLET ORAL DAILY
Qty: 90 TABLET | Refills: 3 | Status: SHIPPED | OUTPATIENT
Start: 2106-05-24

## 2021-01-05 RX ORDER — CHLORAL HYDRATE 500 MG
2000 CAPSULE ORAL
Qty: 90 CAPSULE | Refills: 3 | Status: SHIPPED | OUTPATIENT
Start: 2021-01-05 | End: 2021-07-14

## 2021-01-05 RX ORDER — BACLOFEN 10 MG/1
10 TABLET ORAL 3 TIMES DAILY
Qty: 270 TABLET | Refills: 3 | Status: SHIPPED | OUTPATIENT
Start: 2021-01-05 | End: 2021-12-21 | Stop reason: SDUPTHER

## 2021-01-05 RX ORDER — LOSARTAN POTASSIUM 50 MG/1
50 TABLET ORAL DAILY
Qty: 90 TABLET | Refills: 3 | Status: SHIPPED | OUTPATIENT
Start: 2021-01-05 | End: 2021-11-16 | Stop reason: SDUPTHER

## 2021-01-05 RX ORDER — PRAVASTATIN SODIUM 40 MG
40 TABLET ORAL NIGHTLY
Qty: 90 TABLET | Refills: 3 | Status: SHIPPED | OUTPATIENT
Start: 2021-01-05 | End: 2021-12-21 | Stop reason: SDUPTHER

## 2021-01-05 NOTE — PROGRESS NOTES
Subjective   Arias Willson is a 65 y.o. obese white male with HTN, Had TIA 2- , began with double vision, R facial numbness, L arm numbness, L leg weakness. 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 Pt was referred to Neurosurgery in [place], told has aneurysm, but surgery not recommended for this area. 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 Had resolution of most motor weakness. Has residual difficulty with balance. Cognitive difficulties, Depression, DM, High cholesterol, Chronic pain, Hospital admit Jan 2018 with Resp failure, Hypoxia, NEREYDA on Bi-pap. Pt  calls to discuss health problems, Tx and F/U plans.     You have chosen to receive care through a telephone visit. Do you consent to use a telephone visit for your medical care today? Yes     'Trying to watch diet, but have failed some over holidays. B/P has been good. Have been able to get meds'    COPD  He complains of shortness of breath. There is no cough or wheezing. This is a chronic problem. The current episode started more than 1 year ago. The problem occurs daily. The problem has been gradually improving. Associated symptoms include headaches. Pertinent negatives include no appetite change, chest pain, ear pain, fever or sore throat.   Hypertension  This is a chronic problem. The current episode started more than 1 year ago. The problem has been waxing and waning since onset. The problem is controlled. Associated symptoms include headaches, neck pain and shortness of breath. Pertinent negatives include no chest pain or palpitations. Agents associated with hypertension include NSAIDs. Risk factors for coronary artery disease include obesity, male gender, smoking/tobacco exposure and stress (H/O CVA). Past treatments include ACE inhibitors,  angiotensin blockers, beta blockers, diuretics and lifestyle changes. Current antihypertension treatment includes angiotensin blockers and diuretics. The current treatment provides significant improvement. Compliance problems include medication cost.  Hypertensive end-organ damage includes CVA.   Diabetes  He presents for his follow-up diabetic visit. He has type 2 diabetes mellitus. No MedicAlert identification noted. His disease course has been fluctuating. Hypoglycemia symptoms include dizziness, headaches and nervousness/anxiousness. Pertinent negatives for hypoglycemia include no confusion or seizures. Associated symptoms include fatigue, visual change and weakness. Pertinent negatives for diabetes include no chest pain. There are no hypoglycemic complications. Symptoms are worsening. Diabetic complications include a CVA and peripheral neuropathy. Risk factors for coronary artery disease include diabetes mellitus, male sex, tobacco exposure, stress, sedentary lifestyle, obesity, hypertension and dyslipidemia. Current diabetic treatment includes oral agent (monotherapy). He is compliant with treatment some of the time. His weight is increasing rapidly. He is following a generally unhealthy diet. When asked about meal planning, he reported none. He rarely participates in exercise. Home blood sugar record trend: Unknown. His overall blood glucose range is 180-200 mg/dl. An ACE inhibitor/angiotensin II receptor blocker is being taken. He does not see a podiatrist.Eye exam is current.   Insomnia  This is a chronic problem. The current episode started more than 1 year ago. The problem occurs constantly. The problem has been waxing and waning. Associated symptoms include fatigue, headaches, neck pain, numbness, a visual change and weakness. Pertinent negatives include no abdominal pain, arthralgias, chest pain, chills, congestion, coughing, fever, nausea, rash or sore throat. The symptoms are aggravated by stress.  Treatments tried: OTC meds CPAP. The treatment provided significant relief.   Stroke  This is a chronic problem. The current episode started more than 1 year ago. The problem occurs daily. The problem has been gradually improving. Associated symptoms include fatigue, headaches, neck pain, numbness, a visual change and weakness. Pertinent negatives include no abdominal pain, arthralgias, chest pain, chills, congestion, coughing, fever, nausea, rash or sore throat. The symptoms are aggravated by exertion and walking. He has tried acetaminophen and NSAIDs for the symptoms. The treatment provided no relief.   Pain  This is a chronic problem. The current episode started more than 1 year ago. The problem occurs daily. Associated symptoms include fatigue, headaches, neck pain, numbness, a visual change and weakness. Pertinent negatives include no abdominal pain, arthralgias, chest pain, chills, congestion, coughing, fever, nausea, rash or sore throat. The symptoms are aggravated by walking and stress. He has tried NSAIDs and acetaminophen (Neurontin) for the symptoms. The treatment provided moderate relief.   Neurologic Problem  The patient's primary symptoms include an altered mental status, clumsiness, focal sensory loss, focal weakness, a loss of balance, a visual change and weakness. This is a chronic problem. The current episode started more than 1 year ago. The neurological problem developed suddenly. The problem has been waxing and waning since onset. There was left-sided, right-sided, facial, upper extremity and lower extremity (Strokes of Basal ganglia. Horners syndrome,  sensory, motor deficits.) focality noted. Associated symptoms include back pain, dizziness, fatigue, headaches, neck pain and shortness of breath. Pertinent negatives include no abdominal pain, chest pain, confusion, fever, nausea or palpitations. (Requires Walker to ambulate.) Past treatments include walking and medication (Physical therapy).  The treatment provided mild relief. His past medical history is significant for a CVA.        The following portions of the patient's history were reviewed and updated as appropriate: allergies, current medications, past family history, past medical history, past social history, past surgical history and problem list.    Review of Systems   Constitutional: Positive for fatigue. Negative for activity change, appetite change, chills and fever.   HENT: Negative for congestion, ear pain and sore throat.    Eyes: Negative for pain and visual disturbance.   Respiratory: Positive for shortness of breath. Negative for cough, chest tightness and wheezing.    Cardiovascular: Negative for chest pain and palpitations.   Gastrointestinal: Negative for abdominal pain and nausea.   Endocrine: Negative for cold intolerance and heat intolerance.   Genitourinary: Negative for difficulty urinating and dysuria.   Musculoskeletal: Positive for back pain, gait problem, neck pain and neck stiffness. Negative for arthralgias.   Skin: Negative for color change and rash.   Allergic/Immunologic: Negative for environmental allergies.   Neurological: Positive for dizziness, focal weakness, weakness, numbness, headaches and loss of balance. Negative for seizures.   Hematological: Negative for adenopathy. Does not bruise/bleed easily.   Psychiatric/Behavioral: Positive for decreased concentration and sleep disturbance. Negative for agitation, confusion and suicidal ideas. The patient is nervous/anxious and has insomnia.         Depressed mood  Irritability improving.       Objective   Physical Exam  Vitals signs and nursing note reviewed.   Constitutional:       General: He is not in acute distress.  Pulmonary:      Effort: Pulmonary effort is normal.   Neurological:      Mental Status: He is oriented to person, place, and time.   Psychiatric:         Mood and Affect: Mood normal.         Behavior: Behavior normal.         Thought Content:  Thought content normal.         Judgment: Judgment normal.         Assessment/Plan   Diagnoses and all orders for this visit:    1. Neuropathic pain  -     gabapentin (NEURONTIN) 600 MG tablet; Take 1 tablet by mouth 3 (Three) Times a Day.  Dispense: 270 tablet; Refill: 1  -     DULoxetine (CYMBALTA) 60 MG capsule; Take 1 capsule by mouth Daily.  Dispense: 90 capsule; Refill: 3    2. Stage 2 moderate COPD by GOLD classification (CMS/[facility])    3. Pure hypercholesterolemia  -     pravastatin (PRAVACHOL) 40 MG tablet; Take 1 tablet by mouth Every Night.  Dispense: 90 tablet; Refill: 3  -     Omega-3 Fatty Acids (fish oil) 1000 MG capsule capsule; Take 2 capsules by mouth Daily With Breakfast.  Dispense: 90 capsule; Refill: 3    4. Ptosis of right eyelid    5. NEREYDA treated with BiPAP    6. Nicotine abuse    7. Morbid obesity (CMS/[facility])    8. Essential hypertension  -     metoprolol tartrate (LOPRESSOR) 25 MG tablet; Take 1 tablet by mouth Every 12 (Twelve) Hours.  Dispense: 180 tablet; Refill: 3  -     losartan (COZAAR) 50 MG tablet; Take 1 tablet by mouth Daily.  Dispense: 90 tablet; Refill: 3  -     spironolactone (ALDACTONE) 25 MG tablet; Take 1 tablet by mouth Daily.  Dispense: 90 tablet; Refill: 3    9. High risk medication use    10. H/O: CVA (cerebrovascular accident)  -     clopidogrel (PLAVIX) 75 MG tablet; Take 1 tablet by mouth Daily.  Dispense: 90 tablet; Refill: 3    11. Gastroesophageal reflux disease without esophagitis    12. Type 2 diabetes mellitus with diabetic autonomic neuropathy, with long-term current use of insulin (CMS/[facility])  -     Empagliflozin (Jardiance) 25 MG tablet; Take 25 mg by mouth Daily.  Dispense: 90 tablet; Refill: 3  -     metFORMIN (GLUCOPHAGE) 500 MG tablet; Take 1 tablet by mouth 2 (Two) Times a Day.  Dispense: 180 tablet; Refill: 3  -     Insulin Glargine (Lantus SoloStar) 100 UNIT/ML injection pen; INJECT 20 UNITS SUBCUTANEOUSLY ONCE DAILY INCREASE  BY  2  UNITS  EVERY  3  DAYS   UNTIL  MORNING  BLOOD  SUGAR  IS  100-150  Dispense: 45 mL; Refill: 2    Other orders  -     baclofen (LIORESAL) 10 MG tablet; Take 1 tablet by mouth 3 (Three) Times a Day.  Dispense: 270 tablet; Refill: 3  -     aspirin 325 MG tablet; Take 1 tablet by mouth Daily.  Dispense: 90 tablet; Refill: 3    Discussed health problems, medications indications treatment plan rationale.  Discussed follow-up with specialist.  Discussed compliance with treatment plans.  Discussed diet and exercise for weight loss.  Discussed follow-up plan here.  This visit has been rescheduled as a phone visit to comply with patient safety concerns in accordance with CDC recommendations. Total time of discussion was 19 minutes.          This document has been electronically signed by Harpreet Bass MD

## 2021-01-07 DIAGNOSIS — J44.9 STAGE 2 MODERATE COPD BY GOLD CLASSIFICATION (HCC): Chronic | ICD-10-CM

## 2021-01-07 DIAGNOSIS — J44.9 COPD, GROUP B, BY GOLD 2017 CLASSIFICATION (HCC): ICD-10-CM

## 2021-01-07 RX ORDER — ALBUTEROL SULFATE 90 UG/1
2 AEROSOL, METERED RESPIRATORY (INHALATION) EVERY 4 HOURS PRN
Qty: 18 G | Refills: 5 | Status: SHIPPED | OUTPATIENT
Start: 2021-01-07 | End: 2021-07-14 | Stop reason: SDUPTHER

## 2021-01-07 NOTE — TELEPHONE ENCOUNTER
Caller: Arias Willson    Relationship: Self    Best call back number: [phone]     Medication needed:   Requested Prescriptions     Pending Prescriptions Disp Refills   • umeclidinium-vilanterol (Anoro Ellipta) 62.5-25 MCG/INH aerosol powder  inhaler 1 each 1     Sig: Inhale 1 puff Daily.   • Ventolin  (90 Base) MCG/ACT inhaler 18 g 5       When do you need the refill by: 1/8/21    Does the patient have less than a 3 day supply:  [] Yes  [x] No    What is the patient's preferred pharmacy: [facility] - [facility] [address] - [phone]  - [phone] FX         PATIENT USED TO SEE JENNIFFER JACKMAN BUT JENNIFFER IS NO LONGER W/ [employer] - THEY SWTICHED HIS PROVIDER TO SEMAJ FLORES - HE WILL NOT REFILL PATIENTS INHALER UNTIL THEIR VISIT IN MARCH - PATIENT IS REQUESTING THAT A 3 MONTHS SUPPLY BE SENT TO [facility]

## 2021-01-14 ENCOUNTER — TELEPHONE (OUTPATIENT)
Dept: FAMILY MEDICINE CLINIC | Facility: CLINIC | Age: 66
End: 2021-01-14

## 2021-01-14 RX ORDER — AMMONIUM LACTATE 12 G/100G
LOTION TOPICAL DAILY
Qty: 500 G | Refills: 2 | Status: SHIPPED | OUTPATIENT
Start: 2021-01-14 | End: 2021-07-14 | Stop reason: SDUPTHER

## 2021-01-14 NOTE — TELEPHONE ENCOUNTER
Patient called back stating he would like the ammonium lactate (LAC-HYDRIN) 12 % lotion [[id number]] (Order [id number])  Sent to the mail in order pharmacy. Patient also gave a number for [employer] [facility] in pharmacy to get a [phone]

## 2021-01-14 NOTE — TELEPHONE ENCOUNTER
Returned call to pt to let him know anoro has already been sent to pharmacy. If needs PA pharmacy will need to send something to notify office.  Will send in other script for cream, does he want it to go to mail order as well or his local pharmacy?  HUB to read

## 2021-01-14 NOTE — TELEPHONE ENCOUNTER
PATIENT CALLED IN WITH TWO CONCERNS     PATIENT STATES HIS REGULAR PULMONOLOGIST LEFT AND NEEDED US TO CALL IN HIS ANORO WHICH IS NOW REQUIRING A PRIOR AUTHORIZATION HE IS WANTING TO KNOW IF THAT HAS BEEN SENT IN   YET    PATIENTS SECOND CONCERN IS THAT HE NEEDS THE   ammonium lactate (LAC-HYDRIN) 12 % lotion  SENT TO   [facility] - [facility] [address] - [phone]  - [phone]   [phone]   PATIENT STATES IT WAS NOT SENT WITH THE OTHERS     GOOD CALL BACK WITH ANY QUESTIONS OR CONCERNS IS   [phone]

## 2021-01-14 NOTE — TELEPHONE ENCOUNTER
RF request for lotion sent to PCP.    PT  HAS ALREADY BEEN ADVISED PHARMACY NEEDS TO CONTACT OFFICE IN MEDICATION NEEDS A PA.

## 2021-01-15 DIAGNOSIS — J44.9 COPD, GROUP B, BY GOLD 2017 CLASSIFICATION (HCC): ICD-10-CM

## 2021-01-15 NOTE — TELEPHONE ENCOUNTER
Patient called in very upset that nothing had been done about his inhaler.Could you please call and explain it from the clinical side.

## 2021-01-18 ENCOUNTER — OFFICE VISIT (OUTPATIENT)
Dept: SLEEP MEDICINE | Facility: HOSPITAL | Age: 66
End: 2021-01-18

## 2021-01-18 ENCOUNTER — PRIOR AUTHORIZATION (OUTPATIENT)
Dept: FAMILY MEDICINE CLINIC | Facility: CLINIC | Age: 66
End: 2021-01-18

## 2021-01-18 DIAGNOSIS — G47.33 OBSTRUCTIVE SLEEP APNEA, ADULT: Primary | ICD-10-CM

## 2021-01-18 PROCEDURE — 99442 PR PHYS/QHP TELEPHONE EVALUATION 11-20 MIN: CPT | Performed by: NURSE PRACTITIONER

## 2021-01-18 NOTE — PROGRESS NOTES
Sleep Clinic Follow Up - Telephone Visit      You have chosen to receive care through a telephone visit. Do you consent to use a telephone visit for your medical care today? Yes    Date: 2021  Primary Care Provider: Harpreet Bass MD    Last office visit: 10/24/2019 (I reviewed this note)    CC: Follow up: NEREYDA on BiPAP      Interim History:  Since the last visit:    1) severe NEREYDA -  Arias Willson has remained compliant with BiPAP. He denies mask and machine issues, dry mouth, headaches, or pressures intolerance. He denies abnormal dreams, sleep paralysis, nasal congestion, URI sx.  He gets his supplies online.     Sleep Testin. PSG on 10/03/2018, AHI of 85.7, PLMI of 67.1   2. Currently on 22/12 cm H2O     PAP Data:    Time frame: 2020-2021   Compliance: 100 %  Average use on days used: 9hrs 41 min  Percent of days with usage greater than or equal to 4 hours: 100%  PAP range : 22/12 cm H2O  Leak: 10 minutes  Average AHI: 2.3 events/hr  Mask type: Full face mask  DME: [facility]    Bed time: 3423-5877  Sleep latency: 0-15 minutes  Number of times awakens during the night: 2-3  Wake time: 0500  Estimated total sleep time at night: 6-7 hours  Caffeine intake: 2 cups of coffee, 0 cups of tea, and 0 sodas per day  Alcohol intake: 0 drinks per week  Nap time: daily, 1-2 hours    Sleepiness with Driving: denies       2) Patient denies RLS symptoms.     PMHx, FH, SH reviewed and pertinent changes are:  unchanged from last office visit on 10/24/2019      REVIEW OF SYSTEMS:   Negative for chest pain,  fever, chills, cough, N/V/D, abdominal pain.    Smoking:< 1ppd    He has been smoking cigarettes. He has a 17 pack-year smoking history.  I have educated him on the risk of diseases from using tobacco products such as cancer, COPD and heart disease.     I advised him to quit and he is not willing to quit.    I spent 2 minutes counseling the patient.           Exam:  Unable to perform physical  exam due to conducting telephone visit    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      • Benign essential hypertension    • Blood in feces    • Callus    • Carotid artery stenosis    • Cerebrovascular accident (CMS/HCC)      L sided sensory deficit      • Conjunctivitis    • Constipation     OCCASIONAL   • Contusion, eye, left    • Corneal ulcer     PROBABLE   • Diabetes mellitus (CMS/HCC)    • Eczema    • Emphysema lung (CMS/HCC)    • Encounter for screening for malignant neoplasm of colon    • Essential hypertension    • Foot ulcer (CMS/HCC)    • GERD (gastroesophageal reflux disease)    • Hemorrhoids    • History of echocardiogram 04/05/2013    Echocadiogram W/ color flow 86745 (Normal LV systolic function with EF of 60-65%. Grade I diastolic dysfunction of the LV myocardium. No evidence of LV apical thrombus. No evidence of pericardial effusion.)   • Gus's syndrome     RIGHT EYE   • Hyperkeratosis    • Hyperlipidemia    • Mucopurulent conjunctivitis     ACUTE   • Myopia    • Neuropathic pain    • Obesity    • Onychomycosis    • Tobacco dependence syndrome        Current Outpatient Medications:   •  ammonium lactate (LAC-HYDRIN) 12 % lotion, Apply  topically to the appropriate area as directed Daily., Disp: 500 g, Rfl: 2  •  [START ON 5/24/2106] aspirin 325 MG tablet, Take 1 tablet by mouth Daily., Disp: 90 tablet, Rfl: 3  •  baclofen (LIORESAL) 10 MG tablet, Take 1 tablet by mouth 3 (Three) Times a Day., Disp: 270 tablet, Rfl: 3  •  clopidogrel (PLAVIX) 75 MG tablet, Take 1 tablet by mouth Daily., Disp: 90 tablet, Rfl: 3  •  Cyanocobalamin (B-12) 5000 MCG sublingual tablet, Place 1 each under the tongue Daily., Disp: 30 tablet, Rfl: 6  •  DULoxetine (CYMBALTA) 60 MG capsule, Take 1 capsule by mouth Daily., Disp: 90 capsule, Rfl: 3  •   Empagliflozin (Jardiance) 25 MG tablet, Take 25 mg by mouth Daily., Disp: 90 tablet, Rfl: 3  •  erythromycin (ROMYCIN) 5 MG/GM ophthalmic ointment, INSTILL  OINTMENT INTO EACH EYE AS DIRECTED AT NIGHT, Disp: 4 g, Rfl: 0  •  gabapentin (NEURONTIN) 600 MG tablet, Take 1 tablet by mouth 3 (Three) Times a Day As Needed (Take 600mg TID prn) for up to 90 days., Disp: 270 tablet, Rfl: 0  •  gabapentin (NEURONTIN) 600 MG tablet, Take 1 tablet by mouth 3 (Three) Times a Day., Disp: 270 tablet, Rfl: 1  •  glucose blood test strip, 1 each by Other route 3 (Three) Times a Day. Use as instructed, Disp: 100 each, Rfl: 5  •  glucose monitor monitoring kit, 3 (Three) Times a Day., Disp: 1 each, Rfl: 0  •  Insulin Glargine (Lantus SoloStar) 100 UNIT/ML injection pen, INJECT 20 UNITS SUBCUTANEOUSLY ONCE DAILY INCREASE  BY  2  UNITS  EVERY  3  DAYS  UNTIL  MORNING  BLOOD  SUGAR  IS  100-150, Disp: 45 mL, Rfl: 2  •  Lancets 30G misc, 1 each 3 (Three) Times a Day., Disp: 100 each, Rfl: 5  •  lansoprazole (PREVACID) 30 MG capsule, Take 1 capsule by mouth once daily, Disp: 30 capsule, Rfl: 5  •  losartan (COZAAR) 50 MG tablet, Take 1 tablet by mouth Daily., Disp: 90 tablet, Rfl: 3  •  metFORMIN (GLUCOPHAGE) 500 MG tablet, Take 1 tablet by mouth 2 (Two) Times a Day., Disp: 180 tablet, Rfl: 3  •  metoprolol tartrate (LOPRESSOR) 25 MG tablet, Take 1 tablet by mouth Every 12 (Twelve) Hours., Disp: 180 tablet, Rfl: 3  •  mupirocin (BACTROBAN) 2 % ointment, Apply  topically to the appropriate area as directed 3 (Three) Times a Day., Disp: 30 g, Rfl: 2  •  Omega-3 Fatty Acids (fish oil) 1000 MG capsule capsule, Take 2 capsules by mouth Daily With Breakfast., Disp: 90 capsule, Rfl: 3  •  pravastatin (PRAVACHOL) 40 MG tablet, Take 1 tablet by mouth Every Night., Disp: 90 tablet, Rfl: 3  •  sodium-potassium-magnesium sulfates (SUPREP) 17.5-3.13-1.6 GM/177ML solution oral solution, Take 1 bottle by mouth Every 12 (Twelve) Hours., Disp: 1 bottle,  Rfl: 0  •  spironolactone (ALDACTONE) 25 MG tablet, Take 1 tablet by mouth Daily., Disp: 90 tablet, Rfl: 3  •  umeclidinium-vilanterol (Anoro Ellipta) 62.5-25 MCG/INH aerosol powder  inhaler, Inhale 1 puff Daily., Disp: 3 each, Rfl: 1  •  Ventolin  (90 Base) MCG/ACT inhaler, Inhale 2 puffs Every 4 (Four) Hours As Needed for Wheezing., Disp: 18 g, Rfl: 5      Assessment and Plan:    1. Obstructive sleep apnea - Established, stable (1)  1. Compliant with PAP therapy  2. Continue PAP as prescribed  3. Script for PAP supplies  4. Return to clinic in 12 months with compliance report unless change in symptoms in interim period    2.   Nicotine dependence with complication - Established    1. Smoking and tobacco use cessation counseling visit was 2 minutes.     This visit has been rescheduled as a phone visit to comply with patient safety concerns in accordance with CDC recommendations. Total time of discussion was 12 minutes.    7 of 12 minutes spent telephone counseling patient extensively regarding:   PAP therapy, PAP compliance, PAP maintenance and Tobacco cessation      RTC in 12 months. Patient agrees to return sooner if changes in symptoms.            This document has been electronically signed by TANESHA Sanders on January 18, 2021 15:12 CST              CC: Harpreet Bass MD          No ref. provider found

## 2021-01-22 NOTE — TELEPHONE ENCOUNTER
PATIENT CALLED STATING THE INHALER AND HE WANTS THAT AS A 90 DAY SUPPLY SO HE DOES NOT GET CHARGEDTHAT [facility] HE NEEDS TO BE SENT TO   [facility] - [place], [place] - [address] - [phone]  - [phone] FX IN STEAD OF [facility].    GOOD CHIDI BACK  [phone]

## 2021-01-25 ENCOUNTER — TELEPHONE (OUTPATIENT)
Dept: FAMILY MEDICINE CLINIC | Facility: CLINIC | Age: 66
End: 2021-01-25

## 2021-01-25 NOTE — TELEPHONE ENCOUNTER
PATIENT IS REQUESTING A NEW SCRIPT, SYMBICORG (90 DAY SCRIPT) THAT HE WOULD BE ABLE TO AFFORD.    PATIENTS CALL BACK [phone]     [facility] - [place], [place] - [address] - [phone]  - [phone]   [phone]

## 2021-01-26 RX ORDER — BUDESONIDE AND FORMOTEROL FUMARATE DIHYDRATE 160; 4.5 UG/1; UG/1
2 AEROSOL RESPIRATORY (INHALATION)
Qty: 3 EACH | Refills: 1 | Status: SHIPPED | OUTPATIENT
Start: 2021-01-26 | End: 2021-07-14 | Stop reason: SDUPTHER

## 2021-01-26 NOTE — TELEPHONE ENCOUNTER
Spoke with pt to clarify what he was wanting in regards to his medication. States the glycopyrrolate-formoterol is unaffordable for him. He did some research on his own, found that symbicort was similar and at an affordable price for him. Would like to know if could have RX sent to [facility] pharmacy for a 90 day supply please.

## 2021-01-28 ENCOUNTER — TELEPHONE (OUTPATIENT)
Dept: FAMILY MEDICINE CLINIC | Facility: CLINIC | Age: 66
End: 2021-01-28

## 2021-01-28 RX ORDER — LANSOPRAZOLE 30 MG/1
30 CAPSULE, DELAYED RELEASE ORAL DAILY
Qty: 90 CAPSULE | Refills: 1 | Status: SHIPPED | OUTPATIENT
Start: 2021-01-28 | End: 2021-05-06 | Stop reason: SDUPTHER

## 2021-01-28 NOTE — TELEPHONE ENCOUNTER
PATIENT HAS CALLED, HE IS NEEDING HIS REFILL FOR lansoprazole (PREVACID) 30 MG capsule FAXED TO [facility] [phone]        PATIENT'S CALL BACK- [phone]

## 2021-01-28 NOTE — TELEPHONE ENCOUNTER
PATIENT CALLED IN TO CLARIFY THAT HE WANTS HIS PRESCRIPTION TO [facility]. PLEASE CALLBACK WITH ANY QUESTIONS OR CONCERNS.    [facility] FAX # [phone]    PATIENT CALLBACK # [phone]

## 2021-01-28 NOTE — TELEPHONE ENCOUNTER
Called to see where he needed this medications sent to since he usually uses [facility]/hub to read

## 2021-01-28 NOTE — TELEPHONE ENCOUNTER
Pt found this pharmacy online and would like this medication sent to them as it is more affordable for him.

## 2021-02-24 ENCOUNTER — OFFICE VISIT (OUTPATIENT)
Dept: PODIATRY | Facility: CLINIC | Age: 66
End: 2021-02-24

## 2021-02-24 VITALS — BODY MASS INDEX: 36.96 KG/M2 | OXYGEN SATURATION: 93 % | HEIGHT: 74 IN | WEIGHT: 288 LBS | HEART RATE: 127 BPM

## 2021-02-24 DIAGNOSIS — B35.1 ONYCHOMYCOSIS: Primary | ICD-10-CM

## 2021-02-24 DIAGNOSIS — M79.674 CHRONIC TOE PAIN, BILATERAL: ICD-10-CM

## 2021-02-24 DIAGNOSIS — M79.675 CHRONIC TOE PAIN, BILATERAL: ICD-10-CM

## 2021-02-24 DIAGNOSIS — L84 CALLUS OF FOOT: ICD-10-CM

## 2021-02-24 DIAGNOSIS — G89.29 CHRONIC TOE PAIN, BILATERAL: ICD-10-CM

## 2021-02-24 DIAGNOSIS — E11.42 TYPE 2 DIABETES MELLITUS WITH PERIPHERAL NEUROPATHY (HCC): ICD-10-CM

## 2021-02-24 PROCEDURE — 11721 DEBRIDE NAIL 6 OR MORE: CPT | Performed by: PODIATRIST

## 2021-02-24 PROCEDURE — 11056 PARNG/CUTG B9 HYPRKR LES 2-4: CPT | Performed by: PODIATRIST

## 2021-02-24 NOTE — PROGRESS NOTES
Arias Willson  1955  65 y.o. male   MOIRA Bass - 01/05/2021  BS - 127 per pt    Patient presents today for diabetic foot care.    02/24/2021     Chief Complaint   Patient presents with   • Left Foot - Follow-up, Diaebtic foot care   • Right Foot - Follow-up, Diabetic foot care       History of Present Illness    Arias Willson is a 65 y.o.male who presents to clinic today for diabetic foot care.       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      • Benign essential hypertension    • Blood in feces    • Callus    • Carotid artery stenosis    • Cerebrovascular accident (CMS/HCC)      L sided sensory deficit      • Conjunctivitis    • Constipation     OCCASIONAL   • Contusion, eye, left    • Corneal ulcer     PROBABLE   • Diabetes mellitus (CMS/HCC)    • Eczema    • Emphysema lung (CMS/HCC)    • Encounter for screening for malignant neoplasm of colon    • Essential hypertension    • Foot ulcer (CMS/HCC)    • GERD (gastroesophageal reflux disease)    • Hemorrhoids    • History of echocardiogram 04/05/2013    Echocadiogram W/ color flow 58326 (Normal LV systolic function with EF of 60-65%. Grade I diastolic dysfunction of the LV myocardium. No evidence of LV apical thrombus. No evidence of pericardial effusion.)   • Gus's syndrome     RIGHT EYE   • Hyperkeratosis    • Hyperlipidemia    • Mucopurulent conjunctivitis     ACUTE   • Myopia    • Neuropathic pain    • Obesity    • Onychomycosis    • Tobacco dependence syndrome          Past Surgical History:   Procedure Laterality Date   • COLONOSCOPY  07/30/2015    Colonoscopy, diagnostic (screening) 27426 (Screening for malignant neoplasm of colon)    • COLONOSCOPY  09/09/2015    Colonoscopy, diagnostic (screening) 66928 (Diverticulosis found in the sigmoid colon.Hemorrhoids found in the  anus.)   • COLONOSCOPY  05/01/2009    Colonoscopy, diagnostic (screening) 89585 (Small internal and external hemorrhoids were present, Colonoscopy otherwise normal.)   • COLONOSCOPY N/A 12/9/2020    Procedure: COLONOSCOPY;  Surgeon: Arias Larsen MD;  Location: [facility] ENDOSCOPY;  Service: Gastroenterology;  Laterality: N/A;   • LEG SURGERY           Family History   Problem Relation Age of Onset   • Glaucoma Other    • Heart disease Other    • Stroke Other    • Macular degeneration Other    • Diabetes Other    • Glaucoma Father    • Macular degeneration Father    • Cataracts Father    • Cancer Father    • Macular degeneration Sister    • Heart disease Mother    • Cancer Paternal Grandmother    • Heart disease Paternal Grandfather        No Known Allergies    Social History     Socioeconomic History   • Marital status: Single     Spouse name: Not on file   • Number of children: Not on file   • Years of education: Not on file   • Highest education level: Not on file   Tobacco Use   • Smoking status: Current Every Day Smoker     Packs/day: 0.50     Years: 33.00     Pack years: 16.50     Types: Cigarettes   • Smokeless tobacco: Never Used   Substance and Sexual Activity   • Alcohol use: Yes   • Drug use: No   • Sexual activity: Defer         Current Outpatient Medications   Medication Sig Dispense Refill   • ammonium lactate (LAC-HYDRIN) 12 % lotion Apply  topically to the appropriate area as directed Daily. 500 g 2   • [START ON 5/24/2106] aspirin 325 MG tablet Take 1 tablet by mouth Daily. 90 tablet 3   • baclofen (LIORESAL) 10 MG tablet Take 1 tablet by mouth 3 (Three) Times a Day. 270 tablet 3   • budesonide-formoterol (SYMBICORT) 160-4.5 MCG/ACT inhaler Inhale 2 puffs 2 (Two) Times a Day for 90 days. 3 each 1   • clopidogrel (PLAVIX) 75 MG tablet Take 1 tablet by mouth Daily. 90 tablet 3   • Cyanocobalamin (B-12) 5000 MCG sublingual tablet Place 1 each under the tongue Daily. 30 tablet 6   • DULoxetine  (CYMBALTA) 60 MG capsule Take 1 capsule by mouth Daily. 90 capsule 3   • Empagliflozin (Jardiance) 25 MG tablet Take 25 mg by mouth Daily. 90 tablet 3   • erythromycin (ROMYCIN) 5 MG/GM ophthalmic ointment INSTILL  OINTMENT INTO EACH EYE AS DIRECTED AT NIGHT 4 g 0   • gabapentin (NEURONTIN) 600 MG tablet Take 1 tablet by mouth 3 (Three) Times a Day As Needed (Take 600mg TID prn) for up to 90 days. 270 tablet 0   • gabapentin (NEURONTIN) 600 MG tablet Take 1 tablet by mouth 3 (Three) Times a Day. 270 tablet 1   • glucose blood test strip 1 each by Other route 3 (Three) Times a Day. Use as instructed 100 each 5   • glucose monitor monitoring kit 3 (Three) Times a Day. 1 each 0   • Glycopyrrolate-Formoterol 9-4.8 MCG/ACT aerosol Inhale 2 sprays 2 (Two) Times a Day. 3 inhaler 1   • Insulin Glargine (Lantus SoloStar) 100 UNIT/ML injection pen INJECT 20 UNITS SUBCUTANEOUSLY ONCE DAILY INCREASE  BY  2  UNITS  EVERY  3  DAYS  UNTIL  MORNING  BLOOD  SUGAR  IS  100-150 45 mL 2   • Lancets 30G misc 1 each 3 (Three) Times a Day. 100 each 5   • lansoprazole (PREVACID) 30 MG capsule Take 1 capsule by mouth Daily. 90 capsule 1   • losartan (COZAAR) 50 MG tablet Take 1 tablet by mouth Daily. 90 tablet 3   • metFORMIN (GLUCOPHAGE) 500 MG tablet Take 1 tablet by mouth 2 (Two) Times a Day. 180 tablet 3   • metoprolol tartrate (LOPRESSOR) 25 MG tablet Take 1 tablet by mouth Every 12 (Twelve) Hours. 180 tablet 3   • mupirocin (BACTROBAN) 2 % ointment Apply  topically to the appropriate area as directed 3 (Three) Times a Day. 30 g 2   • Omega-3 Fatty Acids (fish oil) 1000 MG capsule capsule Take 2 capsules by mouth Daily With Breakfast. 90 capsule 3   • pravastatin (PRAVACHOL) 40 MG tablet Take 1 tablet by mouth Every Night. 90 tablet 3   • sodium-potassium-magnesium sulfates (SUPREP) 17.5-3.13-1.6 GM/177ML solution oral solution Take 1 bottle by mouth Every 12 (Twelve) Hours. 1 bottle 0   • spironolactone (ALDACTONE) 25 MG tablet Take 1  "tablet by mouth Daily. 90 tablet 3   • Ventolin  (90 Base) MCG/ACT inhaler Inhale 2 puffs Every 4 (Four) Hours As Needed for Wheezing. 18 g 5     No current facility-administered medications for this visit.        Review of Systems   Constitutional: Negative.    Musculoskeletal:        Toe pain    Psychiatric/Behavioral: Negative.          OBJECTIVE    Pulse (!) 127   Ht 188 cm (74\")   Wt 131 kg (288 lb)   SpO2 93%   BMI 36.98 kg/m²       Physical Exam  Vitals signs reviewed.   Constitutional:       General: He is not in acute distress.     Appearance: He is well-developed.   HENT:      Head: Normocephalic and atraumatic.   Cardiovascular:      Pulses:           Dorsalis pedis pulses are 2+ on the right side and 2+ on the left side.        Posterior tibial pulses are 2+ on the right side and 2+ on the left side.   Pulmonary:      Effort: Pulmonary effort is normal. No respiratory distress.      Breath sounds: No wheezing.   Musculoskeletal:         General: No deformity.      Right foot: Decreased range of motion. No deformity or prominent metatarsal heads.      Left foot: Decreased range of motion. No deformity or prominent metatarsal heads.   Feet:      Right foot:      Protective Sensation: 5 sites tested. 0 sites sensed.      Skin integrity: Callus present. No ulcer.      Toenail Condition: Right toenails are abnormally thick and long. Fungal disease present.     Left foot:      Protective Sensation: 5 sites tested. 0 sites sensed.      Skin integrity: Callus present. No ulcer.      Toenail Condition: Left toenails are abnormally thick and long. Fungal disease present.  Skin:     General: Skin is warm and dry.      Capillary Refill: Capillary refill takes less than 2 seconds.   Neurological:      Mental Status: He is alert and oriented to person, place, and time.   Psychiatric:         Behavior: Behavior normal.         Thought Content: Thought content normal.         Procedures        ASSESSMENT AND " PLAN    Diagnoses and all orders for this visit:    1. Onychomycosis (Primary)    2. Chronic toe pain, bilateral    3. Callus of foot    4. Type 2 diabetes mellitus with peripheral neuropathy (CMS/HCC)        - Nails 1-5 bilateral were debrided in length and thickness with nail nipper and electric  to decrease fungal load and risk of infection.   - Trimmed hyperkeratotic lesions noted in objective with a #15 blade without incident.   - All the patients questions were answered.  - RTC 3 months or sooner if needed.              This document has been electronically signed by Garrett Mcmillan DPM on February 24, 2021 08:34 CST     2/24/2021  08:34 CST

## 2021-04-07 ENCOUNTER — TELEPHONE (OUTPATIENT)
Dept: FAMILY MEDICINE CLINIC | Facility: CLINIC | Age: 66
End: 2021-04-07

## 2021-04-08 ENCOUNTER — OFFICE VISIT (OUTPATIENT)
Dept: FAMILY MEDICINE CLINIC | Facility: CLINIC | Age: 66
End: 2021-04-08

## 2021-04-08 VITALS — HEART RATE: 89 BPM | OXYGEN SATURATION: 98 % | DIASTOLIC BLOOD PRESSURE: 67 MMHG | SYSTOLIC BLOOD PRESSURE: 135 MMHG

## 2021-04-08 DIAGNOSIS — R26.9 GAIT DISTURBANCE, POST-STROKE: ICD-10-CM

## 2021-04-08 DIAGNOSIS — G89.29 CHRONIC PAIN OF LEFT KNEE: ICD-10-CM

## 2021-04-08 DIAGNOSIS — Z86.73 H/O: CVA (CEREBROVASCULAR ACCIDENT): ICD-10-CM

## 2021-04-08 DIAGNOSIS — I10 ESSENTIAL HYPERTENSION: ICD-10-CM

## 2021-04-08 DIAGNOSIS — M25.561 RIGHT KNEE PAIN, UNSPECIFIED CHRONICITY: ICD-10-CM

## 2021-04-08 DIAGNOSIS — K21.9 GASTROESOPHAGEAL REFLUX DISEASE WITHOUT ESOPHAGITIS: ICD-10-CM

## 2021-04-08 DIAGNOSIS — M25.562 CHRONIC PAIN OF LEFT KNEE: ICD-10-CM

## 2021-04-08 DIAGNOSIS — Z79.4 TYPE 2 DIABETES MELLITUS WITH DIABETIC AUTONOMIC NEUROPATHY, WITH LONG-TERM CURRENT USE OF INSULIN (HCC): ICD-10-CM

## 2021-04-08 DIAGNOSIS — I69.319 COGNITIVE DEFICIT, POST-STROKE: ICD-10-CM

## 2021-04-08 DIAGNOSIS — I69.398 GAIT DISTURBANCE, POST-STROKE: ICD-10-CM

## 2021-04-08 DIAGNOSIS — E11.43 TYPE 2 DIABETES MELLITUS WITH DIABETIC AUTONOMIC NEUROPATHY, WITH LONG-TERM CURRENT USE OF INSULIN (HCC): ICD-10-CM

## 2021-04-08 PROCEDURE — 99212 OFFICE O/P EST SF 10 MIN: CPT | Performed by: FAMILY MEDICINE

## 2021-04-11 RX ORDER — INSULIN GLARGINE 100 [IU]/ML
INJECTION, SOLUTION SUBCUTANEOUS
Qty: 45 ML | Refills: 2 | Status: SHIPPED | OUTPATIENT
Start: 2021-04-11 | End: 2021-07-14 | Stop reason: SDUPTHER

## 2021-04-11 NOTE — PROGRESS NOTES
Chief Complaint  Diabetes, COPD, Hypertension, Insomnia, Heartburn, Pain, and Joint Pain    Subjective     {Problem List  Visit Diagnosis   Encounters  Notes  Medications  Labs  Result Review Imaging  Media :23}   You have chosen to receive care through a telephone visit. Do you consent to use a telephone visit for your medical care today? Yes    Arias Willson presents to [facility]  Diabetes  He presents for his follow-up diabetic visit. He has type 2 diabetes mellitus. No MedicAlert identification noted. His disease course has been fluctuating. Hypoglycemia symptoms include dizziness, headaches and nervousness/anxiousness. Pertinent negatives for hypoglycemia include no confusion or seizures. Associated symptoms include fatigue, visual change and weakness. Pertinent negatives for diabetes include no chest pain. There are no hypoglycemic complications. Symptoms are worsening. Diabetic complications include a CVA and peripheral neuropathy. Risk factors for coronary artery disease include diabetes mellitus, male sex, tobacco exposure, stress, sedentary lifestyle, obesity, hypertension and dyslipidemia. Current diabetic treatment includes oral agent (monotherapy). He is compliant with treatment some of the time. His weight is increasing rapidly. He is following a generally unhealthy diet. When asked about meal planning, he reported none. He rarely participates in exercise. Home blood sugar record trend: Unknown. His overall blood glucose range is 180-200 mg/dl. An ACE inhibitor/angiotensin II receptor blocker is being taken. He does not see a podiatrist.Eye exam is current.   COPD  He complains of shortness of breath. There is no cough or wheezing. This is a chronic problem. The current episode started more than 1 year ago. The problem occurs daily. The problem has been gradually improving. Associated symptoms include headaches. Pertinent negatives include no appetite change,  chest pain, ear pain, fever or sore throat.   Hypertension  This is a chronic problem. The current episode started more than 1 year ago. The problem has been waxing and waning since onset. The problem is controlled. Associated symptoms include headaches, neck pain and shortness of breath. Pertinent negatives include no chest pain or palpitations. Agents associated with hypertension include NSAIDs. Risk factors for coronary artery disease include obesity, male gender, smoking/tobacco exposure and stress (H/O CVA). Past treatments include ACE inhibitors, angiotensin blockers, beta blockers, diuretics and lifestyle changes. Current antihypertension treatment includes angiotensin blockers and diuretics. The current treatment provides significant improvement. Compliance problems include medication cost.  Hypertensive end-organ damage includes CVA.   Insomnia  This is a chronic problem. The current episode started more than 1 year ago. The problem occurs constantly. The problem has been waxing and waning. Associated symptoms include arthralgias, fatigue, headaches, neck pain, numbness, a visual change and weakness. Pertinent negatives include no abdominal pain, chest pain, chills, congestion, coughing, fever, nausea, rash or sore throat. The symptoms are aggravated by stress. Treatments tried: OTC meds CPAP. The treatment provided significant relief.   Heartburn  He reports no abdominal pain, no chest pain, no coughing, no nausea, no sore throat or no wheezing. Associated symptoms include fatigue. He has tried a PPI for the symptoms. The treatment provided moderate relief.   Pain  This is a chronic problem. The current episode started more than 1 year ago. The problem occurs daily. Associated symptoms include arthralgias, fatigue, headaches, neck pain, numbness, a visual change and weakness. Pertinent negatives include no abdominal pain, chest pain, chills, congestion, coughing, fever, nausea, rash or sore throat. The  symptoms are aggravated by walking and stress. He has tried NSAIDs and acetaminophen (Neurontin) for the symptoms. The treatment provided moderate relief.   Joint Pain  This is a chronic problem. The current episode started more than 1 year ago. The problem occurs daily. The problem has been waxing and waning. Associated symptoms include arthralgias, fatigue, headaches, neck pain, numbness, a visual change and weakness. Pertinent negatives include no abdominal pain, chest pain, chills, congestion, coughing, fever, nausea, rash or sore throat. The symptoms are aggravated by walking. He has tried NSAIDs for the symptoms. The treatment provided moderate relief.   Stroke  This is a chronic problem. The current episode started more than 1 year ago. The problem occurs daily. The problem has been gradually improving. Associated symptoms include arthralgias, fatigue, headaches, neck pain, numbness, a visual change and weakness. Pertinent negatives include no abdominal pain, chest pain, chills, congestion, coughing, fever, nausea, rash or sore throat. The symptoms are aggravated by exertion and walking. He has tried acetaminophen and NSAIDs for the symptoms. The treatment provided no relief.   Neurologic Problem  The patient's primary symptoms include an altered mental status, clumsiness, focal sensory loss, focal weakness, a loss of balance, a visual change and weakness. This is a chronic problem. The current episode started more than 1 year ago. The neurological problem developed suddenly. The problem has been waxing and waning since onset. There was left-sided, right-sided, facial, upper extremity and lower extremity (Strokes of Basal ganglia. Horners syndrome,  sensory, motor deficits.) focality noted. Associated symptoms include back pain, dizziness, fatigue, headaches, neck pain and shortness of breath. Pertinent negatives include no abdominal pain, chest pain, confusion, fever, nausea or palpitations. (Requires Walker  to ambulate.) Past treatments include walking and medication (Physical therapy). The treatment provided mild relief. His past medical history is significant for a CVA.       Objective   Vital Signs:   /67 (BP Location: Left arm, Patient Position: Sitting, Cuff Size: Adult)   Pulse 89   SpO2 98%     Physical Exam  Vitals and nursing note reviewed.   Constitutional:       General: He is not in acute distress.  Pulmonary:      Effort: Pulmonary effort is normal.   Neurological:      Mental Status: He is oriented to person, place, and time.   Psychiatric:         Mood and Affect: Mood normal.         Behavior: Behavior normal.         Thought Content: Thought content normal.         Judgment: Judgment normal.        Result Review :     A1C Last 3 Results    HGBA1C Last 3 Results 6/18/20 9/29/20   Hemoglobin A1C 8.50 (A) 8.41 (A)   (A) Abnormal value                      Assessment and Plan    Diagnoses and all orders for this visit:    1. Type 2 diabetes mellitus with diabetic autonomic neuropathy, with long-term current use of insulin (CMS/[facility])  -     CBC & Differential; Future  -     Comprehensive metabolic panel; Future  -     TSH; Future  -     Urinalysis With Culture If Indicated -; Future  -     Lipid Panel; Future  -     Hemoglobin A1c; Future  -     Insulin Glargine (Lantus SoloStar) 100 UNIT/ML injection pen; INJECT 34 UNITS SUBCUTANEOUSLY ONCE DAILY INCREASE  BY  2  UNITS  EVERY  3  DAYS  UNTIL  MORNING  BLOOD  SUGAR  IS  100-150  Dispense: 45 mL; Refill: 2    2. Essential hypertension  -     CBC & Differential; Future  -     Comprehensive metabolic panel; Future  -     TSH; Future  -     Urinalysis With Culture If Indicated -; Future  -     Lipid Panel; Future    3. Gastroesophageal reflux disease without esophagitis    4. H/O: CVA (cerebrovascular accident)    5. Gait disturbance, post-stroke    6. Cognitive deficit, post-stroke    7. Chronic pain of left knee    8. Right knee pain, unspecified  chronicity    Discussed health problems, meds, indications, Std of care for DM. Checking routine labs.    This visit has been rescheduled as a phone visit to comply with patient safety concerns in accordance with CDC recommendations. Total time of discussion was 15 minutes.      Follow Up   Return in about 3 months (around 7/8/2021).  Patient was given instructions and counseling regarding his condition or for health maintenance advice. Please see specific information pulled into the AVS if appropriate.         This document has been electronically signed by Harpreet Bass MD

## 2021-04-28 ENCOUNTER — LAB (OUTPATIENT)
Dept: LAB | Facility: HOSPITAL | Age: 66
End: 2021-04-28

## 2021-04-28 ENCOUNTER — IMMUNIZATION (OUTPATIENT)
Dept: VACCINE CLINIC | Facility: HOSPITAL | Age: 66
End: 2021-04-28

## 2021-04-28 DIAGNOSIS — E11.43 TYPE 2 DIABETES MELLITUS WITH DIABETIC AUTONOMIC NEUROPATHY, WITH LONG-TERM CURRENT USE OF INSULIN (HCC): ICD-10-CM

## 2021-04-28 DIAGNOSIS — I10 ESSENTIAL HYPERTENSION: ICD-10-CM

## 2021-04-28 DIAGNOSIS — Z79.4 TYPE 2 DIABETES MELLITUS WITH DIABETIC AUTONOMIC NEUROPATHY, WITH LONG-TERM CURRENT USE OF INSULIN (HCC): ICD-10-CM

## 2021-04-28 LAB
ALBUMIN SERPL-MCNC: 4.1 G/DL (ref 3.5–5.2)
ALBUMIN/GLOB SERPL: 1.5 G/DL
ALP SERPL-CCNC: 99 U/L (ref 39–117)
ALT SERPL W P-5'-P-CCNC: 17 U/L (ref 1–41)
ANION GAP SERPL CALCULATED.3IONS-SCNC: 16.5 MMOL/L (ref 5–15)
ARTICHOKE IGE QN: 39 MG/DL (ref 0–100)
AST SERPL-CCNC: 17 U/L (ref 1–40)
BASOPHILS # BLD AUTO: 0.07 10*3/MM3 (ref 0–0.2)
BASOPHILS NFR BLD AUTO: 0.9 % (ref 0–1.5)
BILIRUB SERPL-MCNC: 0.3 MG/DL (ref 0–1.2)
BUN SERPL-MCNC: 16 MG/DL (ref 8–23)
BUN/CREAT SERPL: 14.4 (ref 7–25)
CALCIUM SPEC-SCNC: 8.7 MG/DL (ref 8.6–10.5)
CHLORIDE SERPL-SCNC: 95 MMOL/L (ref 98–107)
CHOLEST SERPL-MCNC: 306 MG/DL (ref 0–200)
CO2 SERPL-SCNC: 23.5 MMOL/L (ref 22–29)
CREAT SERPL-MCNC: 1.11 MG/DL (ref 0.76–1.27)
DEPRECATED RDW RBC AUTO: 45 FL (ref 37–54)
EOSINOPHIL # BLD AUTO: 0.12 10*3/MM3 (ref 0–0.4)
EOSINOPHIL NFR BLD AUTO: 1.5 % (ref 0.3–6.2)
ERYTHROCYTE [DISTWIDTH] IN BLOOD BY AUTOMATED COUNT: 17.2 % (ref 12.3–15.4)
GFR SERPL CREATININE-BSD FRML MDRD: 67 ML/MIN/1.73
GLOBULIN UR ELPH-MCNC: 2.7 GM/DL
GLUCOSE SERPL-MCNC: 303 MG/DL (ref 65–99)
HBA1C MFR BLD: 8.55 % (ref 4.8–5.6)
HCT VFR BLD AUTO: 38.4 % (ref 37.5–51)
HDLC SERPL-MCNC: ABNORMAL MG/DL
HGB BLD-MCNC: 11.6 G/DL (ref 13–17.7)
IMM GRANULOCYTES # BLD AUTO: 0.07 10*3/MM3 (ref 0–0.05)
IMM GRANULOCYTES NFR BLD AUTO: 0.9 % (ref 0–0.5)
LDLC SERPL CALC-MCNC: ABNORMAL MG/DL
LDLC/HDLC SERPL: ABNORMAL {RATIO}
LYMPHOCYTES # BLD AUTO: 1.95 10*3/MM3 (ref 0.7–3.1)
LYMPHOCYTES NFR BLD AUTO: 23.7 % (ref 19.6–45.3)
MCH RBC QN AUTO: 22.7 PG (ref 26.6–33)
MCHC RBC AUTO-ENTMCNC: 30.2 G/DL (ref 31.5–35.7)
MCV RBC AUTO: 75 FL (ref 79–97)
MONOCYTES # BLD AUTO: 0.54 10*3/MM3 (ref 0.1–0.9)
MONOCYTES NFR BLD AUTO: 6.6 % (ref 5–12)
NEUTROPHILS NFR BLD AUTO: 5.47 10*3/MM3 (ref 1.7–7)
NEUTROPHILS NFR BLD AUTO: 66.4 % (ref 42.7–76)
NRBC BLD AUTO-RTO: 0.2 /100 WBC (ref 0–0.2)
PLATELET # BLD AUTO: 364 10*3/MM3 (ref 140–450)
PMV BLD AUTO: 10.3 FL (ref 6–12)
POTASSIUM SERPL-SCNC: 4.5 MMOL/L (ref 3.5–5.2)
PROT SERPL-MCNC: 6.8 G/DL (ref 6–8.5)
RBC # BLD AUTO: 5.12 10*6/MM3 (ref 4.14–5.8)
SODIUM SERPL-SCNC: 135 MMOL/L (ref 136–145)
TRIGL SERPL-MCNC: 1920 MG/DL (ref 0–150)
TSH SERPL DL<=0.05 MIU/L-ACNC: 1.14 UIU/ML (ref 0.27–4.2)
VLDLC SERPL-MCNC: ABNORMAL MG/DL
WBC # BLD AUTO: 8.22 10*3/MM3 (ref 3.4–10.8)

## 2021-04-28 PROCEDURE — 0001A: CPT | Performed by: THORACIC SURGERY (CARDIOTHORACIC VASCULAR SURGERY)

## 2021-04-28 PROCEDURE — 80061 LIPID PANEL: CPT

## 2021-04-28 PROCEDURE — 83036 HEMOGLOBIN GLYCOSYLATED A1C: CPT

## 2021-04-28 PROCEDURE — 91300 HC SARSCOV02 VAC 30MCG/0.3ML IM: CPT | Performed by: THORACIC SURGERY (CARDIOTHORACIC VASCULAR SURGERY)

## 2021-04-28 PROCEDURE — 84443 ASSAY THYROID STIM HORMONE: CPT

## 2021-04-28 PROCEDURE — 81003 URINALYSIS AUTO W/O SCOPE: CPT

## 2021-04-28 PROCEDURE — 80053 COMPREHEN METABOLIC PANEL: CPT

## 2021-04-28 PROCEDURE — 83721 ASSAY OF BLOOD LIPOPROTEIN: CPT

## 2021-04-28 PROCEDURE — 85025 COMPLETE CBC W/AUTO DIFF WBC: CPT

## 2021-04-28 PROCEDURE — 36415 COLL VENOUS BLD VENIPUNCTURE: CPT

## 2021-04-29 ENCOUNTER — TELEPHONE (OUTPATIENT)
Dept: FAMILY MEDICINE CLINIC | Facility: CLINIC | Age: 66
End: 2021-04-29

## 2021-04-29 LAB
BILIRUB UR QL STRIP: NEGATIVE
CLARITY UR: CLEAR
COLOR UR: YELLOW
GLUCOSE UR STRIP-MCNC: ABNORMAL MG/DL
HGB UR QL STRIP.AUTO: NEGATIVE
KETONES UR QL STRIP: NEGATIVE
LEUKOCYTE ESTERASE UR QL STRIP.AUTO: NEGATIVE
NITRITE UR QL STRIP: NEGATIVE
PH UR STRIP.AUTO: 6 [PH] (ref 5–8)
PROT UR QL STRIP: NEGATIVE
SP GR UR STRIP: >=1.03 (ref 1–1.03)
UROBILINOGEN UR QL STRIP: ABNORMAL

## 2021-04-29 NOTE — TELEPHONE ENCOUNTER
----- Message from Harpreet Bass MD sent at 4/29/2021 11:09 AM CDT -----  Blood sugars remain uncontrolled, Lipids have gone up, ? If still taking meds.    Prevention Guidelines, Women Ages 25 to 44  Screening tests and vaccines are an important part of managing your health. Health counseling is essential, too. Below are guidelines for these, for women ages 25 to 44.  Talk with your healthcare provider to ma Tuberculosis Women at increased risk for infection should talk with their healthcare provider Ask your healthcare provider   Vision All women in this age group At least 1 complete exam in your 25s, and 2 in your 35s   Vaccine2 Who needs it How often   Chic Tetanus/diphtheria/pertussis (Td/Tdap) booster All women in this age group Td every 10 years, or a one-time dose of Tdap instead of a Td booster after age 25, then Td every 10 years   Counseling Who needs it How often   BRCA gene mutation testing for breas · Your healthcare provider may prescribe certain medicines to help prevent migraines. These medicines may need to be taken daily. Or they may only need to be taken at times when you’re likely to have a migraine.   · Common medicines used to help prevent w[place]

## 2021-04-29 NOTE — TELEPHONE ENCOUNTER
Spoke with pt to give lab results. Pt stated he has been taking his medication. When he went to get labs done he went around noon and had just eaten a big meal. Stated he was not aware he was in need of fasting labs.     Pt needs appt scheduled, does he need seen sooner than 3 months this time?

## 2021-04-30 NOTE — TELEPHONE ENCOUNTER
Non-fasting does cause fasting sugar to be off , and triglycerides to be off, but Blood sugars are still uncontrolled and non-fasting labs are undesirable. Think the Big meals are reason, Diabetics need to eat 4 small meals he would need to keep each 500- 600 chapo. Will go over at his next visit.

## 2021-05-06 RX ORDER — LANSOPRAZOLE 30 MG/1
30 CAPSULE, DELAYED RELEASE ORAL DAILY
Qty: 90 CAPSULE | Refills: 1 | Status: SHIPPED | OUTPATIENT
Start: 2021-05-06 | End: 2021-07-14 | Stop reason: SDUPTHER

## 2021-05-19 ENCOUNTER — IMMUNIZATION (OUTPATIENT)
Dept: VACCINE CLINIC | Facility: HOSPITAL | Age: 66
End: 2021-05-19

## 2021-05-19 PROCEDURE — 91300 HC SARSCOV02 VAC 30MCG/0.3ML IM: CPT | Performed by: THORACIC SURGERY (CARDIOTHORACIC VASCULAR SURGERY)

## 2021-05-19 PROCEDURE — 0002A: CPT | Performed by: THORACIC SURGERY (CARDIOTHORACIC VASCULAR SURGERY)

## 2021-05-26 ENCOUNTER — OFFICE VISIT (OUTPATIENT)
Dept: PODIATRY | Facility: CLINIC | Age: 66
End: 2021-05-26

## 2021-05-26 VITALS
WEIGHT: 288 LBS | BODY MASS INDEX: 36.96 KG/M2 | HEIGHT: 74 IN | OXYGEN SATURATION: 98 % | DIASTOLIC BLOOD PRESSURE: 78 MMHG | HEART RATE: 103 BPM | SYSTOLIC BLOOD PRESSURE: 120 MMHG

## 2021-05-26 DIAGNOSIS — M79.674 CHRONIC TOE PAIN, BILATERAL: ICD-10-CM

## 2021-05-26 DIAGNOSIS — G89.29 CHRONIC TOE PAIN, BILATERAL: ICD-10-CM

## 2021-05-26 DIAGNOSIS — E11.9 ENCOUNTER FOR DIABETIC FOOT EXAM (HCC): Primary | ICD-10-CM

## 2021-05-26 DIAGNOSIS — L84 CALLUS OF FOOT: ICD-10-CM

## 2021-05-26 DIAGNOSIS — M79.675 CHRONIC TOE PAIN, BILATERAL: ICD-10-CM

## 2021-05-26 DIAGNOSIS — B35.1 ONYCHOMYCOSIS: ICD-10-CM

## 2021-05-26 DIAGNOSIS — E11.42 TYPE 2 DIABETES MELLITUS WITH PERIPHERAL NEUROPATHY (HCC): ICD-10-CM

## 2021-05-26 PROCEDURE — 99213 OFFICE O/P EST LOW 20 MIN: CPT | Performed by: PODIATRIST

## 2021-05-26 PROCEDURE — 11056 PARNG/CUTG B9 HYPRKR LES 2-4: CPT | Performed by: PODIATRIST

## 2021-05-26 PROCEDURE — 11721 DEBRIDE NAIL 6 OR MORE: CPT | Performed by: PODIATRIST

## 2021-05-26 NOTE — PROGRESS NOTES
Arias Willson  1955  65 y.o. male   MOIRA Bass - 4/8/21  BS - 162 per pt    05/26/2021     Chief Complaint   Patient presents with   • Left Foot - diabetic foot care   • Right Foot - diabetic foot care       History of Present Illness    Arias Willson is a 65 y.o.male who presents to clinic today for diabetic foot care.  He denies any changes in his medical history since his last office visit.      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      • Benign essential hypertension    • Blood in feces    • Callus    • Carotid artery stenosis    • Cerebrovascular accident (CMS/HCC)      L sided sensory deficit      • Conjunctivitis    • Constipation     OCCASIONAL   • Contusion, eye, left    • Corneal ulcer     PROBABLE   • Diabetes mellitus (CMS/HCC)    • Eczema    • Emphysema lung (CMS/HCC)    • Encounter for screening for malignant neoplasm of colon    • Essential hypertension    • Foot ulcer (CMS/HCC)    • GERD (gastroesophageal reflux disease)    • Hemorrhoids    • History of echocardiogram 04/05/2013    Echocadiogram W/ color flow 04994 (Normal LV systolic function with EF of 60-65%. Grade I diastolic dysfunction of the LV myocardium. No evidence of LV apical thrombus. No evidence of pericardial effusion.)   • Gus's syndrome     RIGHT EYE   • Hyperkeratosis    • Hyperlipidemia    • Mucopurulent conjunctivitis     ACUTE   • Myopia    • Neuropathic pain    • Obesity    • Onychomycosis    • Tobacco dependence syndrome          Past Surgical History:   Procedure Laterality Date   • COLONOSCOPY  07/30/2015    Colonoscopy, diagnostic (screening) 05825 (Screening for malignant neoplasm of colon)    • COLONOSCOPY  09/09/2015    Colonoscopy, diagnostic (screening) 47420 (Diverticulosis found in the sigmoid colon.Hemorrhoids found in the  anus.)   • COLONOSCOPY  05/01/2009    Colonoscopy, diagnostic (screening) 87456 (Small internal and external hemorrhoids were present, Colonoscopy otherwise normal.)   • COLONOSCOPY N/A 12/9/2020    Procedure: COLONOSCOPY;  Surgeon: Arias Larsen MD;  Location: [facility] ENDOSCOPY;  Service: Gastroenterology;  Laterality: N/A;   • LEG SURGERY           Family History   Problem Relation Age of Onset   • Glaucoma Other    • Heart disease Other    • Stroke Other    • Macular degeneration Other    • Diabetes Other    • Glaucoma Father    • Macular degeneration Father    • Cataracts Father    • Cancer Father    • Macular degeneration Sister    • Heart disease Mother    • Cancer Paternal Grandmother    • Heart disease Paternal Grandfather        No Known Allergies    Social History     Socioeconomic History   • Marital status: Single     Spouse name: Not on file   • Number of children: Not on file   • Years of education: Not on file   • Highest education level: Not on file   Tobacco Use   • Smoking status: Current Every Day Smoker     Packs/day: 0.50     Years: 33.00     Pack years: 16.50     Types: Cigarettes   • Smokeless tobacco: Never Used   Vaping Use   • Vaping Use: Never used   Substance and Sexual Activity   • Alcohol use: Yes   • Drug use: No   • Sexual activity: Defer         Current Outpatient Medications   Medication Sig Dispense Refill   • ammonium lactate (LAC-HYDRIN) 12 % lotion Apply  topically to the appropriate area as directed Daily. 500 g 2   • [START ON 5/24/2106] aspirin 325 MG tablet Take 1 tablet by mouth Daily. 90 tablet 3   • baclofen (LIORESAL) 10 MG tablet Take 1 tablet by mouth 3 (Three) Times a Day. 270 tablet 3   • clopidogrel (PLAVIX) 75 MG tablet Take 1 tablet by mouth Daily. 90 tablet 3   • Cyanocobalamin (B-12) 5000 MCG sublingual tablet Place 1 each under the tongue Daily. 30 tablet 6   • DULoxetine (CYMBALTA) 60 MG capsule Take 1 capsule by mouth Daily. 90 capsule 3   • Empagliflozin  (Jardiance) 25 MG tablet Take 25 mg by mouth Daily. 90 tablet 3   • erythromycin (ROMYCIN) 5 MG/GM ophthalmic ointment INSTILL  OINTMENT INTO EACH EYE AS DIRECTED AT NIGHT 4 g 0   • gabapentin (NEURONTIN) 600 MG tablet Take 1 tablet by mouth 3 (Three) Times a Day. 270 tablet 1   • glucose blood test strip 1 each by Other route 3 (Three) Times a Day. Use as instructed 100 each 5   • glucose monitor monitoring kit 3 (Three) Times a Day. 1 each 0   • Glycopyrrolate-Formoterol 9-4.8 MCG/ACT aerosol Inhale 2 sprays 2 (Two) Times a Day. 3 inhaler 1   • Insulin Glargine (Lantus SoloStar) 100 UNIT/ML injection pen INJECT 34 UNITS SUBCUTANEOUSLY ONCE DAILY INCREASE  BY  2  UNITS  EVERY  3  DAYS  UNTIL  MORNING  BLOOD  SUGAR  IS  100-150 45 mL 2   • Lancets 30G misc 1 each 3 (Three) Times a Day. 100 each 5   • lansoprazole (PREVACID) 30 MG capsule Take 1 capsule by mouth Daily. 90 capsule 1   • losartan (COZAAR) 50 MG tablet Take 1 tablet by mouth Daily. 90 tablet 3   • metFORMIN (GLUCOPHAGE) 500 MG tablet Take 1 tablet by mouth 2 (Two) Times a Day. 180 tablet 3   • metoprolol tartrate (LOPRESSOR) 25 MG tablet Take 1 tablet by mouth Every 12 (Twelve) Hours. 180 tablet 3   • mupirocin (BACTROBAN) 2 % ointment Apply  topically to the appropriate area as directed 3 (Three) Times a Day. 30 g 2   • Omega-3 Fatty Acids (fish oil) 1000 MG capsule capsule Take 2 capsules by mouth Daily With Breakfast. 90 capsule 3   • pravastatin (PRAVACHOL) 40 MG tablet Take 1 tablet by mouth Every Night. 90 tablet 3   • spironolactone (ALDACTONE) 25 MG tablet Take 1 tablet by mouth Daily. 90 tablet 3   • Ventolin  (90 Base) MCG/ACT inhaler Inhale 2 puffs Every 4 (Four) Hours As Needed for Wheezing. 18 g 5   • budesonide-formoterol (SYMBICORT) 160-4.5 MCG/ACT inhaler Inhale 2 puffs 2 (Two) Times a Day for 90 days. 3 each 1     No current facility-administered medications for this visit.       Review of Systems   Constitutional: Negative.   "  Respiratory: Negative.    Musculoskeletal:        Toe pain    Psychiatric/Behavioral: Negative.          OBJECTIVE    /78   Pulse 103   Ht 188 cm (74\")   Wt 131 kg (288 lb)   SpO2 98%   BMI 36.98 kg/m²       Physical Exam  Vitals reviewed.   Constitutional:       General: He is not in acute distress.     Appearance: He is well-developed.   HENT:      Head: Normocephalic and atraumatic.   Cardiovascular:      Pulses:           Dorsalis pedis pulses are 2+ on the right side and 2+ on the left side.        Posterior tibial pulses are 2+ on the right side and 2+ on the left side.   Pulmonary:      Effort: Pulmonary effort is normal. No respiratory distress.      Breath sounds: No wheezing.   Musculoskeletal:         General: No deformity.      Right foot: Decreased range of motion. No deformity or prominent metatarsal heads.      Left foot: Decreased range of motion. No deformity or prominent metatarsal heads.   Feet:      Right foot:      Protective Sensation: 5 sites tested. 0 sites sensed.      Skin integrity: Callus present. No ulcer.      Toenail Condition: Right toenails are abnormally thick and long. Fungal disease present.     Left foot:      Protective Sensation: 5 sites tested. 0 sites sensed.      Skin integrity: Callus present. No ulcer.      Toenail Condition: Left toenails are abnormally thick and long. Fungal disease present.  Skin:     General: Skin is warm and dry.      Capillary Refill: Capillary refill takes less than 2 seconds.   Neurological:      Mental Status: He is alert and oriented to person, place, and time.   Psychiatric:         Behavior: Behavior normal.         Thought Content: Thought content normal.         Procedures    Diabetic Foot Exam Performed and Monofilament Test Performed     ASSESSMENT AND PLAN    Diagnoses and all orders for this visit:    1. Encounter for diabetic foot exam (CMS/[facility]) (Primary)    2. Type 2 diabetes mellitus with peripheral neuropathy " (CMS/[facility])    3. Onychomycosis    4. Chronic toe pain, bilateral    5. Callus of foot        - A diabetic foot screening exam was performed and the patient was educated on the foot complications related to diabetes,  preventative foot care and what signs and symptoms to watch for.  Instructed to contact our office if any foot problems develop before next visit.   - Nails 1-5 bilateral were debrided in length and thickness with nail nipper and electric  to decrease fungal load and risk of infection.   - Trimmed hyperkeratotic lesions noted in objective with a #15 blade without incident.   - All the patients questions were answered.  - RTC 3 months or sooner if needed.              This document has been electronically signed by Garrett Mcmillan DPM on May 26, 2021 19:23 CDT     5/26/2021  19:23 CDT

## 2021-06-07 ENCOUNTER — TELEPHONE (OUTPATIENT)
Dept: FAMILY MEDICINE CLINIC | Facility: CLINIC | Age: 66
End: 2021-06-07

## 2021-06-14 DIAGNOSIS — E11.43 TYPE 2 DIABETES MELLITUS WITH DIABETIC AUTONOMIC NEUROPATHY, WITH LONG-TERM CURRENT USE OF INSULIN (HCC): Chronic | ICD-10-CM

## 2021-06-14 DIAGNOSIS — Z79.4 TYPE 2 DIABETES MELLITUS WITH DIABETIC AUTONOMIC NEUROPATHY, WITH LONG-TERM CURRENT USE OF INSULIN (HCC): Chronic | ICD-10-CM

## 2021-06-14 RX ORDER — EMPAGLIFLOZIN 25 MG/1
1 TABLET, FILM COATED ORAL DAILY
Qty: 90 TABLET | Refills: 0 | Status: SHIPPED | OUTPATIENT
Start: 2021-06-14 | End: 2021-07-14 | Stop reason: SDUPTHER

## 2021-06-17 DIAGNOSIS — M79.2 NEUROPATHIC PAIN: ICD-10-CM

## 2021-06-17 RX ORDER — GABAPENTIN 600 MG/1
600 TABLET ORAL 3 TIMES DAILY
Qty: 270 TABLET | Refills: 1 | Status: SHIPPED | OUTPATIENT
Start: 2021-06-17 | End: 2021-07-14 | Stop reason: SDUPTHER

## 2021-06-25 ENCOUNTER — OFFICE VISIT (OUTPATIENT)
Dept: PULMONOLOGY | Facility: CLINIC | Age: 66
End: 2021-06-25

## 2021-06-25 VITALS
DIASTOLIC BLOOD PRESSURE: 74 MMHG | SYSTOLIC BLOOD PRESSURE: 128 MMHG | HEART RATE: 97 BPM | RESPIRATION RATE: 26 BRPM | BODY MASS INDEX: 38.09 KG/M2 | OXYGEN SATURATION: 96 % | HEIGHT: 74 IN | WEIGHT: 296.8 LBS

## 2021-06-25 DIAGNOSIS — Z86.73 H/O: CVA (CEREBROVASCULAR ACCIDENT): ICD-10-CM

## 2021-06-25 DIAGNOSIS — G47.36 NOCTURNAL HYPOXEMIA DUE TO OBESITY: ICD-10-CM

## 2021-06-25 DIAGNOSIS — G47.33 OSA TREATED WITH BIPAP: Chronic | ICD-10-CM

## 2021-06-25 DIAGNOSIS — J44.9 COPD, GROUP B, BY GOLD 2017 CLASSIFICATION (HCC): Primary | ICD-10-CM

## 2021-06-25 DIAGNOSIS — E66.9 NOCTURNAL HYPOXEMIA DUE TO OBESITY: ICD-10-CM

## 2021-06-25 DIAGNOSIS — E66.01 MORBID OBESITY (HCC): Chronic | ICD-10-CM

## 2021-06-25 PROCEDURE — 99213 OFFICE O/P EST LOW 20 MIN: CPT | Performed by: INTERNAL MEDICINE

## 2021-06-25 RX ORDER — INSULIN GLARGINE 100 [IU]/ML
INJECTION, SOLUTION SUBCUTANEOUS
COMMUNITY
Start: 2021-04-12 | End: 2021-06-25 | Stop reason: SDUPTHER

## 2021-06-25 NOTE — PROGRESS NOTES
Pulmonary Office Follow-up    Subjective     Arias Willson is seen today at the office for   Chief Complaint   Patient presents with   • Follow-up         HPI  Arias Willson is a 65 y.o. male with COPD  Patient was switched to Symbicort from Anoro several months ago for cost reasons. Using albuterol probably once a day  Does not have a nebulizer machine  Bipap at night    Patient has not had any hospitalizations or ED visits in the last year    Overall he feels like his COPD is well controlled. A lot of his dyspnea is from weight gain    Tobacco use history:  Type: cigarettes  Amount: 1/2 ppd  Duration: 40 years  Cessation:no   Willing to quit: No      Patient Active Problem List   Diagnosis   • Ptosis of eyelid   • Astigmatism   • Myopia   • Gus's syndrome   • Neuropathic pain   • GERD (gastroesophageal reflux disease)   • Aneurysm of carotid artery (CMS/[facility])   • H/O: CVA (cerebrovascular accident)   • Sensory deficit present   • Gait disturbance, post-stroke   • Cognitive deficit, post-stroke   • Hypertension   • Diabetes mellitus (CMS/[facility])   • Morbid obesity (CMS/[facility])   • Coronary artery disease   • NEREYDA treated with BiPAP   • Macrocytosis without anemia   • Need for immunization against influenza   • Need for vaccination   • Elevated brain natriuretic peptide (BNP) level   • Tremor of both hands   • Seizures (CMS/[facility])   • Fall   • Left knee pain   • Class 2 severe obesity due to excess calories with serious comorbidity and body mass index (BMI) of 35.0 to 35.9 in adult (CMS/[facility])   • High risk medication use   • Hypoxia   • Generalized edema   • Other insomnia   • Peripheral edema   • Nicotine abuse   • Nocturnal hypoxemia due to obesity   • COPD, group B, by GOLD 2017 classification (CMS/[facility])   • Syncope   • Physical deconditioning   • Pure hypercholesterolemia   • Cigarette nicotine dependence, uncomplicated   • Primary osteoarthritis of left knee   • Effusion, left knee   • Right knee pain   •  Stage 2 moderate COPD by GOLD classification (CMS/[facility])   • Carbuncle   • Hyperkeratosis   • Encounter for screening for malignant neoplasm of colon         Medications, Allergies, Social, and Family Histories reviewed as per EMR.    Objective     Vitals:    06/25/21 1421   BP: 128/74   Pulse: 97   Resp: 26   SpO2: 96%         06/25/21  1421   Weight: 135 kg (296 lb 12.8 oz)     [unfilled]  Physical Exam  Vitals reviewed.   Constitutional:       Appearance: Normal appearance.      Comments: In a wheelchair   HENT:      Head: Normocephalic and atraumatic.      Right Ear: Tympanic membrane normal.      Left Ear: Tympanic membrane normal.      Nose: Nose normal.      Mouth/Throat:      Mouth: Mucous membranes are moist.      Pharynx: Oropharynx is clear.   Eyes:      Conjunctiva/sclera: Conjunctivae normal.      Pupils: Pupils are equal, round, and reactive to light.   Cardiovascular:      Rate and Rhythm: Normal rate and regular rhythm.      Pulses: Normal pulses.      Heart sounds: Normal heart sounds.   Pulmonary:      Effort: Pulmonary effort is normal.      Breath sounds: Normal breath sounds.   Abdominal:      General: Abdomen is flat. Bowel sounds are normal.      Palpations: Abdomen is soft.   Musculoskeletal:         General: Normal range of motion.      Cervical back: Normal range of motion and neck supple.   Skin:     General: Skin is warm and dry.   Neurological:      General: No focal deficit present.      Mental Status: He is alert and oriented to person, place, and time.   Psychiatric:         Mood and Affect: Mood normal.         Behavior: Behavior normal.         CT 11/2021  IMPRESSION:  Impression:  1.  No lung nodule identified.  2.  Mild to moderate centrilobular emphysematous changes.  3.  LAD and left circumflex coronary artery atherosclerotic  vascular calcifications.       Assessment/Plan     Diagnoses and all orders for this visit:    1. COPD, group B, by GOLD 2017 classification (CMS/[facility])  (Primary)  -     Full Pulmonary Function Test With Bronchodilator; Future    2. Morbid obesity (CMS/HCC)    3. H/O: CVA (cerebrovascular accident)    4. NEREYDA treated with BiPAP    5. Nocturnal hypoxemia due to obesity         Discussion/ Recommendations:   Patients last PFTs were in 2018 and showed a moderately severe obstruction with signficant BDR. He seems to be doing ok on the Symbicort. He's going to look up the cost of Spiriva on his insurance and if that's affordable we may add that so he gets the LAMA as well. He's using his bipap faithfully. No signs or symptoms of exacerbations. Will follow up in 3 months and repeat PFTs since it's been so long and he's had ongoing smoking. CT last year was clear. I reivewed all available labs, rads and studies that are applicable to this visit          Return in about 3 months (around 9/25/2021).          This document has been electronically signed by Valarie Cano DO on June 25, 2021 14:53 CDT

## 2021-07-14 ENCOUNTER — OFFICE VISIT (OUTPATIENT)
Dept: FAMILY MEDICINE CLINIC | Facility: CLINIC | Age: 66
End: 2021-07-14

## 2021-07-14 VITALS
WEIGHT: 291.3 LBS | HEIGHT: 74 IN | BODY MASS INDEX: 37.38 KG/M2 | TEMPERATURE: 97.5 F | OXYGEN SATURATION: 94 % | DIASTOLIC BLOOD PRESSURE: 80 MMHG | SYSTOLIC BLOOD PRESSURE: 136 MMHG | HEART RATE: 88 BPM

## 2021-07-14 DIAGNOSIS — I72.0 ANEURYSM OF CAROTID ARTERY (HCC): ICD-10-CM

## 2021-07-14 DIAGNOSIS — G90.2 HORNER'S SYNDROME: ICD-10-CM

## 2021-07-14 DIAGNOSIS — E78.00 PURE HYPERCHOLESTEROLEMIA: ICD-10-CM

## 2021-07-14 DIAGNOSIS — G47.33 OSA TREATED WITH BIPAP: Chronic | ICD-10-CM

## 2021-07-14 DIAGNOSIS — Z79.899 HIGH RISK MEDICATION USE: ICD-10-CM

## 2021-07-14 DIAGNOSIS — E11.43 TYPE 2 DIABETES MELLITUS WITH DIABETIC AUTONOMIC NEUROPATHY, WITH LONG-TERM CURRENT USE OF INSULIN (HCC): ICD-10-CM

## 2021-07-14 DIAGNOSIS — Z86.73 H/O: CVA (CEREBROVASCULAR ACCIDENT): ICD-10-CM

## 2021-07-14 DIAGNOSIS — I10 ESSENTIAL HYPERTENSION: Chronic | ICD-10-CM

## 2021-07-14 DIAGNOSIS — M79.2 NEUROPATHIC PAIN: ICD-10-CM

## 2021-07-14 DIAGNOSIS — E66.01 MORBID OBESITY (HCC): Chronic | ICD-10-CM

## 2021-07-14 DIAGNOSIS — K21.9 GASTROESOPHAGEAL REFLUX DISEASE WITHOUT ESOPHAGITIS: ICD-10-CM

## 2021-07-14 DIAGNOSIS — G89.29 CHRONIC PAIN OF LEFT KNEE: ICD-10-CM

## 2021-07-14 DIAGNOSIS — L85.9 HYPERKERATOSIS: ICD-10-CM

## 2021-07-14 DIAGNOSIS — J44.9 COPD, GROUP B, BY GOLD 2017 CLASSIFICATION (HCC): Chronic | ICD-10-CM

## 2021-07-14 DIAGNOSIS — J44.9 STAGE 2 MODERATE COPD BY GOLD CLASSIFICATION (HCC): Chronic | ICD-10-CM

## 2021-07-14 DIAGNOSIS — Z79.4 TYPE 2 DIABETES MELLITUS WITH DIABETIC AUTONOMIC NEUROPATHY, WITH LONG-TERM CURRENT USE OF INSULIN (HCC): ICD-10-CM

## 2021-07-14 DIAGNOSIS — R44.9 SENSORY DEFICIT PRESENT: ICD-10-CM

## 2021-07-14 DIAGNOSIS — M25.562 CHRONIC PAIN OF LEFT KNEE: ICD-10-CM

## 2021-07-14 DIAGNOSIS — E66.01 CLASS 2 SEVERE OBESITY DUE TO EXCESS CALORIES WITH SERIOUS COMORBIDITY AND BODY MASS INDEX (BMI) OF 35.0 TO 35.9 IN ADULT (HCC): ICD-10-CM

## 2021-07-14 LAB
EXPIRATION DATE: ABNORMAL
HBA1C MFR BLD: 7.6 %
Lab: ABNORMAL

## 2021-07-14 PROCEDURE — 3051F HG A1C>EQUAL 7.0%<8.0%: CPT | Performed by: FAMILY MEDICINE

## 2021-07-14 PROCEDURE — 83036 HEMOGLOBIN GLYCOSYLATED A1C: CPT | Performed by: FAMILY MEDICINE

## 2021-07-14 PROCEDURE — 99214 OFFICE O/P EST MOD 30 MIN: CPT | Performed by: FAMILY MEDICINE

## 2021-07-14 RX ORDER — INSULIN GLARGINE 100 [IU]/ML
INJECTION, SOLUTION SUBCUTANEOUS
Qty: 45 ML | Refills: 2 | Status: SHIPPED | OUTPATIENT
Start: 2021-07-14 | End: 2021-11-15 | Stop reason: SDUPTHER

## 2021-07-14 RX ORDER — GABAPENTIN 600 MG/1
600 TABLET ORAL 3 TIMES DAILY
Qty: 270 TABLET | Refills: 1 | Status: SHIPPED | OUTPATIENT
Start: 2021-07-14 | End: 2021-11-15 | Stop reason: SDUPTHER

## 2021-07-14 RX ORDER — LANSOPRAZOLE 30 MG/1
30 CAPSULE, DELAYED RELEASE ORAL DAILY
Qty: 90 CAPSULE | Refills: 1 | Status: SHIPPED | OUTPATIENT
Start: 2021-07-14 | End: 2021-11-15 | Stop reason: SDUPTHER

## 2021-07-14 RX ORDER — ALBUTEROL SULFATE 90 UG/1
2 AEROSOL, METERED RESPIRATORY (INHALATION) EVERY 4 HOURS PRN
Qty: 18 G | Refills: 3 | Status: SHIPPED | OUTPATIENT
Start: 2021-07-14 | End: 2021-11-15 | Stop reason: SDUPTHER

## 2021-07-14 RX ORDER — BUDESONIDE AND FORMOTEROL FUMARATE DIHYDRATE 160; 4.5 UG/1; UG/1
2 AEROSOL RESPIRATORY (INHALATION)
Qty: 3 EACH | Refills: 1 | Status: SHIPPED | OUTPATIENT
Start: 2021-07-14 | End: 2021-11-15 | Stop reason: SDUPTHER

## 2021-07-14 RX ORDER — AMMONIUM LACTATE 12 G/100G
LOTION TOPICAL DAILY
Qty: 500 G | Refills: 2 | Status: SHIPPED | OUTPATIENT
Start: 2021-07-14 | End: 2021-11-15 | Stop reason: SDUPTHER

## 2021-07-14 NOTE — PROGRESS NOTES
Chief Complaint  Diabetes, Pain, and Hypertension    Subjective          Review of Systems   Constitutional: Positive for fatigue and irritability. Negative for activity change, appetite change, chills and fever. Weight loss: neuro.   HENT: Negative for congestion, ear pain and sore throat.    Eyes: Negative for pain and visual disturbance.   Respiratory: Positive for shortness of breath. Negative for cough, chest tightness and wheezing.    Cardiovascular: Negative for chest pain and palpitations.   Gastrointestinal: Negative for abdominal pain and nausea.   Endocrine: Negative for cold intolerance and heat intolerance.   Genitourinary: Negative for difficulty urinating and dysuria.   Musculoskeletal: Positive for back pain, gait problem, neck pain and neck stiffness. Negative for arthralgias.   Skin: Negative for color change and rash.   Allergic/Immunologic: Negative for environmental allergies.   Neurological: Positive for dizziness, focal weakness, weakness, numbness, headaches and loss of balance. Negative for seizures.   Hematological: Negative for adenopathy. Does not bruise/bleed easily.   Psychiatric/Behavioral: Positive for decreased concentration and sleep disturbance. Negative for agitation, confusion and suicidal ideas. The patient is nervous/anxious and has insomnia.         Depressed mood  Irritability improving.       Arias Willson presents to [facility]  Diabetes  He presents for his follow-up diabetic visit. He has type 2 diabetes mellitus. No MedicAlert identification noted. His disease course has been fluctuating. Hypoglycemia symptoms include dizziness, headaches and nervousness/anxiousness. Pertinent negatives for hypoglycemia include no confusion or seizures. Associated symptoms include fatigue, visual change and weakness. Pertinent negatives for diabetes include no chest pain. Weight loss: neuro. There are no hypoglycemic complications. Symptoms are  worsening. Diabetic complications include a CVA and peripheral neuropathy. Risk factors for coronary artery disease include diabetes mellitus, male sex, tobacco exposure, stress, sedentary lifestyle, obesity, hypertension and dyslipidemia. Current diabetic treatment includes oral agent (monotherapy). He is compliant with treatment some of the time. His weight is increasing rapidly. He is following a generally unhealthy diet. When asked about meal planning, he reported none. He rarely participates in exercise. Home blood sugar record trend: Unknown. An ACE inhibitor/angiotensin II receptor blocker is being taken. He does not see a podiatrist.Eye exam is current.   Pain  This is a chronic problem. The current episode started more than 1 year ago. The problem occurs daily. Associated symptoms include fatigue, headaches, neck pain, numbness, a visual change and weakness. Pertinent negatives include no abdominal pain, arthralgias, chest pain, chills, congestion, coughing, fever, nausea, rash or sore throat. The symptoms are aggravated by walking and stress. He has tried NSAIDs and acetaminophen for the symptoms. The treatment provided no relief.   Hypertension  This is a chronic problem. The current episode started more than 1 year ago. The problem has been waxing and waning since onset. The problem is controlled. Associated symptoms include headaches, neck pain and shortness of breath. Pertinent negatives include no chest pain or palpitations. Agents associated with hypertension include NSAIDs. Risk factors for coronary artery disease include obesity, male gender, smoking/tobacco exposure and stress (H/O CVA). Past treatments include ACE inhibitors, angiotensin blockers, beta blockers, diuretics and lifestyle changes. Current antihypertension treatment includes angiotensin blockers and diuretics. The current treatment provides significant improvement. Compliance problems include medication cost.  Hypertensive end-organ  damage includes CVA.   Insomnia  This is a chronic problem. The current episode started more than 1 year ago. The problem occurs constantly. The problem has been waxing and waning. Associated symptoms include fatigue, headaches, neck pain, numbness, a visual change and weakness. Pertinent negatives include no abdominal pain, arthralgias, chest pain, chills, congestion, coughing, fever, nausea, rash or sore throat. The symptoms are aggravated by stress. Treatments tried: OTC meds CPAP. The treatment provided significant relief.   Breathing Problem  He complains of difficulty breathing and shortness of breath. There is no cough or wheezing. Primary symptoms comments: Using 02 since hospitalization Jan 2018, H/O low pulse-ox, seizures like spells. Improved tx NEREYDA on Bipap. Associated symptoms include headaches. Pertinent negatives include no appetite change, chest pain, ear pain, fever or sore throat. Weight loss: neuro. His symptoms are aggravated by exercise. His symptoms are alleviated by nothing. He reports moderate (O2) improvement on treatment.   Stroke  This is a chronic problem. The current episode started more than 1 year ago. The problem occurs daily. The problem has been gradually improving. Associated symptoms include fatigue, headaches, neck pain, numbness, a visual change and weakness. Pertinent negatives include no abdominal pain, arthralgias, chest pain, chills, congestion, coughing, fever, nausea, rash or sore throat. The symptoms are aggravated by exertion and walking. He has tried acetaminophen and NSAIDs for the symptoms. The treatment provided no relief.   Neurologic Problem  The patient's primary symptoms include an altered mental status, clumsiness, focal sensory loss, focal weakness, a loss of balance, a visual change and weakness. Primary symptoms comment: Delayed cognitive processing. Seizures. This is a chronic problem. The current episode started more than 1 year ago. The neurological  "problem developed suddenly. The problem has been waxing and waning since onset. There was left-sided, right-sided, facial, upper extremity and lower extremity (Strokes of Basal ganglia. Horners syndrome,  sensory, motor deficits.) focality noted. Associated symptoms include back pain, dizziness, fatigue, headaches, neck pain and shortness of breath. Pertinent negatives include no abdominal pain, chest pain, confusion, fever, nausea or palpitations. (Requires Walker to ambulate.) Past treatments include walking and medication (Physical therapy). The treatment provided mild relief. His past medical history is significant for a CVA.   Depression  Visit Type: follow-up  Patient presents with the following symptoms: decreased concentration, depressed mood, excessive worry, feelings of hopelessness, feelings of worthlessness, insomnia, irritability, memory impairment, nervousness/anxiety, restlessness and shortness of breath.  Patient is not experiencing: confusion, palpitations, suicidal ideas and suicidal planning.  Weight loss: neuro.  Frequency of symptoms: occasionally   Severity: moderate   Sleep quality: fair  Nighttime awakenings: several        Objective   Vital Signs:   /80 (BP Location: Left arm, Patient Position: Sitting, Cuff Size: Adult)   Pulse 88   Temp 97.5 °F (36.4 °C) (Temporal)   Ht 188 cm (74\")   Wt 132 kg (291 lb 4.8 oz)   SpO2 94%   BMI 37.40 kg/m²     Physical Exam  Vitals and nursing note reviewed.   Constitutional:       General: He is not in acute distress.     Appearance: He is obese.   HENT:      Head: Normocephalic.      Right Ear: Tympanic membrane and ear canal normal.      Left Ear: Tympanic membrane and ear canal normal.      Nose: Nose normal.      Mouth/Throat:      Mouth: Mucous membranes are moist.      Pharynx: No oropharyngeal exudate or posterior oropharyngeal erythema.   Eyes:      General:         Right eye: No discharge.         Left eye: No discharge.      " Extraocular Movements: Extraocular movements intact.      Conjunctiva/sclera: Conjunctivae normal.      Comments: Gus's   Neck:      Vascular: No carotid bruit.   Cardiovascular:      Rate and Rhythm: Normal rate and regular rhythm.   Pulmonary:      Effort: Pulmonary effort is normal.      Breath sounds: Wheezing and rhonchi present.   Abdominal:      Palpations: Abdomen is soft.      Tenderness: There is no right CVA tenderness or left CVA tenderness.   Musculoskeletal:      Cervical back: Rigidity and tenderness present.      Right lower leg: Edema present.      Left lower leg: Edema present.      Comments: WB 4 with ROM low back   Lymphadenopathy:      Cervical: No cervical adenopathy.   Skin:     Capillary Refill: Capillary refill takes 2 to 3 seconds.      Findings: No erythema or lesion.      Comments: Hyperkeratosis   Neurological:      Mental Status: He is oriented to person, place, and time.      Cranial Nerves: Cranial nerve deficit present.      Sensory: Sensory deficit present.      Motor: Weakness present.      Coordination: Coordination normal.      Gait: Gait abnormal.   Psychiatric:         Mood and Affect: Mood normal.         Behavior: Behavior normal.         Thought Content: Thought content normal.         Judgment: Judgment normal.        Result Review :                 Assessment and Plan    Diagnoses and all orders for this visit:    1. Type 2 diabetes mellitus with diabetic autonomic neuropathy, with long-term current use of insulin (CMS/[facility])  -     POC Glycated Hemoglobin, Total  -     empagliflozin (Jardiance) 25 MG tablet tablet; Take 1 tablet by mouth Daily.  Dispense: 90 tablet; Refill: 0  -     Insulin Glargine (Lantus SoloStar) 100 UNIT/ML injection pen; INJECT 34 UNITS SUBCUTANEOUSLY ONCE DAILY INCREASE  BY  2  UNITS  EVERY  3  DAYS  UNTIL  MORNING  BLOOD  SUGAR  IS  100-150  Dispense: 45 mL; Refill: 2    2. Neuropathic pain  -     gabapentin (NEURONTIN) 600 MG tablet; Take 1  tablet by mouth 3 (Three) Times a Day.  Dispense: 270 tablet; Refill: 1    3. Stage 2 moderate COPD by GOLD classification (CMS/[facility])  -     Ventolin  (90 Base) MCG/ACT inhaler; Inhale 2 puffs Every 4 (Four) Hours As Needed for Wheezing.  Dispense: 18 g; Refill: 3    4. Essential hypertension    5. Morbid obesity (CMS/[facility])    6. NEREYDA treated with BiPAP    7. COPD, group B, by GOLD 2017 classification (CMS/[facility])  -     budesonide-formoterol (SYMBICORT) 160-4.5 MCG/ACT inhaler; Inhale 2 puffs 2 (Two) Times a Day for 90 days.  Dispense: 3 each; Refill: 1  -     Glycopyrrolate-Formoterol 9-4.8 MCG/ACT aerosol; Inhale 2 sprays 2 (Two) Times a Day.  Dispense: 3 each; Refill: 0    8. Gus's syndrome    9. Gastroesophageal reflux disease without esophagitis  -     lansoprazole (PREVACID) 30 MG capsule; Take 1 capsule by mouth Daily.  Dispense: 90 capsule; Refill: 1    10. Aneurysm of carotid artery (CMS/[facility])    11. H/O: CVA (cerebrovascular accident)    12. Sensory deficit present    13. Chronic pain of left knee    14. Class 2 severe obesity due to excess calories with serious comorbidity and body mass index (BMI) of 35.0 to 35.9 in adult (CMS/[facility])    15. High risk medication use    16. Pure hypercholesterolemia    17. Hyperkeratosis  -     ammonium lactate (LAC-HYDRIN) 12 % lotion; Apply  topically to the appropriate area as directed Daily.  Dispense: 500 g; Refill: 2        Follow Up   Return in about 3 months (around 10/14/2021).  Patient was given instructions and counseling regarding his condition or for health maintenance advice. Please see specific information pulled into the AVS if appropriate.           This document has been electronically signed by Harpreet Bass MD on July 14, 2021

## 2021-07-21 ENCOUNTER — TELEPHONE (OUTPATIENT)
Dept: FAMILY MEDICINE CLINIC | Facility: CLINIC | Age: 66
End: 2021-07-21

## 2021-07-21 NOTE — TELEPHONE ENCOUNTER
Received fax from [facility] pharmacy to clarify RX for pt.  RX for symbicort and bevespi were sent in for pt at last visit. Pharmacy would like to know which pt is needing  Spoke with pt, he needs symbicort as insurance covers that one. Returned fax to [place].

## 2021-08-30 ENCOUNTER — OFFICE VISIT (OUTPATIENT)
Dept: PODIATRY | Facility: CLINIC | Age: 66
End: 2021-08-30

## 2021-08-30 VITALS
SYSTOLIC BLOOD PRESSURE: 118 MMHG | DIASTOLIC BLOOD PRESSURE: 73 MMHG | RESPIRATION RATE: 22 BRPM | WEIGHT: 291.4 LBS | HEIGHT: 74 IN | OXYGEN SATURATION: 96 % | HEART RATE: 66 BPM | BODY MASS INDEX: 37.4 KG/M2

## 2021-08-30 DIAGNOSIS — M79.675 CHRONIC TOE PAIN, BILATERAL: ICD-10-CM

## 2021-08-30 DIAGNOSIS — L84 CALLUS OF FOOT: ICD-10-CM

## 2021-08-30 DIAGNOSIS — E11.42 TYPE 2 DIABETES MELLITUS WITH PERIPHERAL NEUROPATHY (HCC): Primary | ICD-10-CM

## 2021-08-30 DIAGNOSIS — B35.1 ONYCHOMYCOSIS: ICD-10-CM

## 2021-08-30 DIAGNOSIS — M79.674 CHRONIC TOE PAIN, BILATERAL: ICD-10-CM

## 2021-08-30 DIAGNOSIS — G89.29 CHRONIC TOE PAIN, BILATERAL: ICD-10-CM

## 2021-08-30 PROCEDURE — 11056 PARNG/CUTG B9 HYPRKR LES 2-4: CPT | Performed by: PODIATRIST

## 2021-08-30 PROCEDURE — 11721 DEBRIDE NAIL 6 OR MORE: CPT | Performed by: PODIATRIST

## 2021-08-30 NOTE — PROGRESS NOTES
Arias Willson  1955  65 y.o. male     Pt presents today for diabetic foot care  MOIRA Bass - 7/14//21  BS - 162 per pt    08/30/2021     Chief Complaint   Patient presents with   • Right Foot - diabetic foot care   • Left Foot - diabetic foot care       History of Present Illness    Arias Willson is a 65 y.o.male who presents to clinic today for diabetic foot care.        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      • Benign essential hypertension    • Blood in feces    • Callus    • Carotid artery stenosis    • Cerebrovascular accident (CMS/HCC)      L sided sensory deficit      • Conjunctivitis    • Constipation     OCCASIONAL   • Contusion, eye, left    • Corneal ulcer     PROBABLE   • Diabetes mellitus (CMS/HCC)    • Eczema    • Emphysema lung (CMS/HCC)    • Encounter for screening for malignant neoplasm of colon    • Essential hypertension    • Foot ulcer (CMS/HCC)    • GERD (gastroesophageal reflux disease)    • Hemorrhoids    • History of echocardiogram 04/05/2013    Echocadiogram W/ color flow 95329 (Normal LV systolic function with EF of 60-65%. Grade I diastolic dysfunction of the LV myocardium. No evidence of LV apical thrombus. No evidence of pericardial effusion.)   • Gus's syndrome     RIGHT EYE   • Hyperkeratosis    • Hyperlipidemia    • Mucopurulent conjunctivitis     ACUTE   • Myopia    • Neuropathic pain    • Obesity    • Onychomycosis    • Tobacco dependence syndrome          Past Surgical History:   Procedure Laterality Date   • COLONOSCOPY  07/30/2015    Colonoscopy, diagnostic (screening) 84669 (Screening for malignant neoplasm of colon)    • COLONOSCOPY  09/09/2015    Colonoscopy, diagnostic (screening) 89213 (Diverticulosis found in the sigmoid colon.Hemorrhoids found in the anus.)   • COLONOSCOPY   05/01/2009    Colonoscopy, diagnostic (screening) 09664 (Small internal and external hemorrhoids were present, Colonoscopy otherwise normal.)   • COLONOSCOPY N/A 12/9/2020    Procedure: COLONOSCOPY;  Surgeon: Arias Larsen MD;  Location: [facility] ENDOSCOPY;  Service: Gastroenterology;  Laterality: N/A;   • LEG SURGERY           Family History   Problem Relation Age of Onset   • Glaucoma Other    • Heart disease Other    • Stroke Other    • Macular degeneration Other    • Diabetes Other    • Glaucoma Father    • Macular degeneration Father    • Cataracts Father    • Cancer Father    • Macular degeneration Sister    • Heart disease Mother    • Cancer Paternal Grandmother    • Heart disease Paternal Grandfather        No Known Allergies    Social History     Socioeconomic History   • Marital status: Single     Spouse name: Not on file   • Number of children: Not on file   • Years of education: Not on file   • Highest education level: Not on file   Tobacco Use   • Smoking status: Current Every Day Smoker     Packs/day: 0.25     Years: 33.00     Pack years: 8.25     Types: Cigarettes   • Smokeless tobacco: Never Used   Vaping Use   • Vaping Use: Never used   Substance and Sexual Activity   • Alcohol use: Yes   • Drug use: No   • Sexual activity: Defer         Current Outpatient Medications   Medication Sig Dispense Refill   • ammonium lactate (LAC-HYDRIN) 12 % lotion Apply  topically to the appropriate area as directed Daily. 500 g 2   • [START ON 5/24/2106] aspirin 325 MG tablet Take 1 tablet by mouth Daily. 90 tablet 3   • baclofen (LIORESAL) 10 MG tablet Take 1 tablet by mouth 3 (Three) Times a Day. 270 tablet 3   • budesonide-formoterol (SYMBICORT) 160-4.5 MCG/ACT inhaler Inhale 2 puffs 2 (Two) Times a Day for 90 days. 3 each 1   • clopidogrel (PLAVIX) 75 MG tablet Take 1 tablet by mouth Daily. 90 tablet 3   • Cyanocobalamin (B-12) 5000 MCG sublingual tablet Place 1 each under the tongue Daily. 30 tablet 6   •  "DULoxetine (CYMBALTA) 60 MG capsule Take 1 capsule by mouth Daily. 90 capsule 3   • empagliflozin (Jardiance) 25 MG tablet tablet Take 1 tablet by mouth Daily. 90 tablet 0   • gabapentin (NEURONTIN) 600 MG tablet Take 1 tablet by mouth 3 (Three) Times a Day. 270 tablet 1   • glucose blood test strip 1 each by Other route 3 (Three) Times a Day. Use as instructed 100 each 5   • glucose monitor monitoring kit 3 (Three) Times a Day. 1 each 0   • Glycopyrrolate-Formoterol 9-4.8 MCG/ACT aerosol Inhale 2 sprays 2 (Two) Times a Day. 3 each 0   • Insulin Glargine (Lantus SoloStar) 100 UNIT/ML injection pen INJECT 34 UNITS SUBCUTANEOUSLY ONCE DAILY INCREASE  BY  2  UNITS  EVERY  3  DAYS  UNTIL  MORNING  BLOOD  SUGAR  IS  100-150 45 mL 2   • Lancets 30G misc 1 each 3 (Three) Times a Day. 100 each 5   • lansoprazole (PREVACID) 30 MG capsule Take 1 capsule by mouth Daily. 90 capsule 1   • losartan (COZAAR) 50 MG tablet Take 1 tablet by mouth Daily. 90 tablet 3   • metFORMIN (GLUCOPHAGE) 500 MG tablet Take 1 tablet by mouth 2 (Two) Times a Day. 180 tablet 3   • metoprolol tartrate (LOPRESSOR) 25 MG tablet Take 1 tablet by mouth Every 12 (Twelve) Hours. 180 tablet 3   • pravastatin (PRAVACHOL) 40 MG tablet Take 1 tablet by mouth Every Night. 90 tablet 3   • spironolactone (ALDACTONE) 25 MG tablet Take 1 tablet by mouth Daily. 90 tablet 3   • Ventolin  (90 Base) MCG/ACT inhaler Inhale 2 puffs Every 4 (Four) Hours As Needed for Wheezing. 18 g 3   • mupirocin (BACTROBAN) 2 % ointment Apply  topically to the appropriate area as directed 3 (Three) Times a Day. 30 g 2     No current facility-administered medications for this visit.       Review of Systems   Constitutional: Negative.    Respiratory: Negative.    Musculoskeletal:        Toe pain    Psychiatric/Behavioral: Negative.          OBJECTIVE    /73   Pulse 66   Resp 22   Ht 188 cm (74\")   Wt 132 kg (291 lb 6.4 oz)   SpO2 96%   BMI 37.41 kg/m²       Physical " Exam  Vitals reviewed.   Constitutional:       General: He is not in acute distress.     Appearance: He is well-developed.   HENT:      Head: Normocephalic and atraumatic.   Cardiovascular:      Pulses:           Dorsalis pedis pulses are 2+ on the right side and 2+ on the left side.        Posterior tibial pulses are 2+ on the right side and 2+ on the left side.   Pulmonary:      Effort: Pulmonary effort is normal. No respiratory distress.      Breath sounds: No wheezing.   Musculoskeletal:         General: No deformity.      Right foot: Decreased range of motion. No deformity or prominent metatarsal heads.      Left foot: Decreased range of motion. No deformity or prominent metatarsal heads.   Feet:      Right foot:      Protective Sensation: 5 sites tested. 0 sites sensed.      Skin integrity: Callus present. No ulcer.      Toenail Condition: Right toenails are abnormally thick and long. Fungal disease present.     Left foot:      Protective Sensation: 5 sites tested. 0 sites sensed.      Skin integrity: Callus present. No ulcer.      Toenail Condition: Left toenails are abnormally thick and long. Fungal disease present.  Skin:     General: Skin is warm and dry.      Capillary Refill: Capillary refill takes less than 2 seconds.   Neurological:      Mental Status: He is alert and oriented to person, place, and time.   Psychiatric:         Behavior: Behavior normal.         Thought Content: Thought content normal.         Procedures      ASSESSMENT AND PLAN    Diagnoses and all orders for this visit:    1. Type 2 diabetes mellitus with peripheral neuropathy (CMS/HCC) (Primary)    2. Onychomycosis    3. Chronic toe pain, bilateral    4. Callus of foot          - Nails 1-5 bilateral were debrided in length and thickness with nail nipper and electric  to decrease fungal load and risk of infection.   - Trimmed hyperkeratotic lesions noted in objective with a #15 blade without incident.   - All the patients  questions were answered.  - RTC 3 months or sooner if needed.              This document has been electronically signed by Garrett Mcmillan DPM on August 30, 2021 08:38 CDT     8/30/2021  08:38 CDT

## 2021-11-12 ENCOUNTER — TELEPHONE (OUTPATIENT)
Dept: FAMILY MEDICINE CLINIC | Facility: CLINIC | Age: 66
End: 2021-11-12

## 2021-11-15 ENCOUNTER — OFFICE VISIT (OUTPATIENT)
Dept: FAMILY MEDICINE CLINIC | Facility: CLINIC | Age: 66
End: 2021-11-15

## 2021-11-15 VITALS
TEMPERATURE: 96.9 F | BODY MASS INDEX: 36.61 KG/M2 | WEIGHT: 285.3 LBS | DIASTOLIC BLOOD PRESSURE: 68 MMHG | HEIGHT: 74 IN | OXYGEN SATURATION: 95 % | HEART RATE: 110 BPM | SYSTOLIC BLOOD PRESSURE: 128 MMHG

## 2021-11-15 DIAGNOSIS — J44.9 COPD, GROUP B, BY GOLD 2017 CLASSIFICATION (HCC): Chronic | ICD-10-CM

## 2021-11-15 DIAGNOSIS — I10 ESSENTIAL HYPERTENSION: Chronic | ICD-10-CM

## 2021-11-15 DIAGNOSIS — Z23 NEED FOR IMMUNIZATION AGAINST INFLUENZA: ICD-10-CM

## 2021-11-15 DIAGNOSIS — Z79.4 TYPE 2 DIABETES MELLITUS WITH DIABETIC AUTONOMIC NEUROPATHY, WITH LONG-TERM CURRENT USE OF INSULIN (HCC): ICD-10-CM

## 2021-11-15 DIAGNOSIS — E11.69 TYPE 2 DIABETES MELLITUS WITH OTHER SPECIFIED COMPLICATION, WITHOUT LONG-TERM CURRENT USE OF INSULIN (HCC): Chronic | ICD-10-CM

## 2021-11-15 DIAGNOSIS — K21.9 GASTROESOPHAGEAL REFLUX DISEASE WITHOUT ESOPHAGITIS: ICD-10-CM

## 2021-11-15 DIAGNOSIS — Z86.73 H/O: CVA (CEREBROVASCULAR ACCIDENT): ICD-10-CM

## 2021-11-15 DIAGNOSIS — E78.00 PURE HYPERCHOLESTEROLEMIA: ICD-10-CM

## 2021-11-15 DIAGNOSIS — E11.43 TYPE 2 DIABETES MELLITUS WITH DIABETIC AUTONOMIC NEUROPATHY, WITH LONG-TERM CURRENT USE OF INSULIN (HCC): ICD-10-CM

## 2021-11-15 DIAGNOSIS — M79.2 NEUROPATHIC PAIN: ICD-10-CM

## 2021-11-15 DIAGNOSIS — L85.9 HYPERKERATOSIS: ICD-10-CM

## 2021-11-15 DIAGNOSIS — J44.9 STAGE 2 MODERATE COPD BY GOLD CLASSIFICATION (HCC): Chronic | ICD-10-CM

## 2021-11-15 LAB
EXPIRATION DATE: ABNORMAL
HBA1C MFR BLD: 7.1 %
Lab: ABNORMAL

## 2021-11-15 PROCEDURE — 90686 IIV4 VACC NO PRSV 0.5 ML IM: CPT | Performed by: FAMILY MEDICINE

## 2021-11-15 PROCEDURE — 3051F HG A1C>EQUAL 7.0%<8.0%: CPT | Performed by: FAMILY MEDICINE

## 2021-11-15 PROCEDURE — 99214 OFFICE O/P EST MOD 30 MIN: CPT | Performed by: FAMILY MEDICINE

## 2021-11-15 PROCEDURE — 83036 HEMOGLOBIN GLYCOSYLATED A1C: CPT | Performed by: FAMILY MEDICINE

## 2021-11-15 PROCEDURE — G0008 ADMIN INFLUENZA VIRUS VAC: HCPCS | Performed by: FAMILY MEDICINE

## 2021-11-15 RX ORDER — BUDESONIDE AND FORMOTEROL FUMARATE DIHYDRATE 160; 4.5 UG/1; UG/1
2 AEROSOL RESPIRATORY (INHALATION)
Qty: 3 EACH | Refills: 1 | Status: SHIPPED | OUTPATIENT
Start: 2021-11-15 | End: 2022-07-14 | Stop reason: SDUPTHER

## 2021-11-15 RX ORDER — ALBUTEROL SULFATE 90 UG/1
2 AEROSOL, METERED RESPIRATORY (INHALATION) EVERY 4 HOURS PRN
Qty: 18 G | Refills: 3 | Status: SHIPPED | OUTPATIENT
Start: 2021-11-15 | End: 2022-07-14 | Stop reason: SDUPTHER

## 2021-11-15 RX ORDER — LANSOPRAZOLE 30 MG/1
30 CAPSULE, DELAYED RELEASE ORAL DAILY
Qty: 90 CAPSULE | Refills: 1 | Status: SHIPPED | OUTPATIENT
Start: 2021-11-15 | End: 2022-07-14 | Stop reason: SDUPTHER

## 2021-11-15 RX ORDER — AMMONIUM LACTATE 12 G/100G
LOTION TOPICAL DAILY
Qty: 500 G | Refills: 2 | Status: SHIPPED | OUTPATIENT
Start: 2021-11-15 | End: 2022-07-14 | Stop reason: SDUPTHER

## 2021-11-15 RX ORDER — GABAPENTIN 600 MG/1
600 TABLET ORAL 3 TIMES DAILY
Qty: 270 TABLET | Refills: 1 | Status: SHIPPED | OUTPATIENT
Start: 2021-11-15 | End: 2022-03-14 | Stop reason: SDUPTHER

## 2021-11-15 RX ORDER — INSULIN GLARGINE 100 [IU]/ML
INJECTION, SOLUTION SUBCUTANEOUS
Qty: 45 ML | Refills: 2 | Status: SHIPPED | OUTPATIENT
Start: 2021-11-15 | End: 2022-07-14 | Stop reason: SDUPTHER

## 2021-11-15 NOTE — PROGRESS NOTES
Chief Complaint  Diabetes, Hypertension, and Hyperlipidemia    Subjective          Review of Systems   Constitutional: Positive for fatigue and irritability. Negative for appetite change, chills and fever. Weight loss: neuro.   HENT: Negative for congestion, ear pain and sore throat.    Respiratory: Positive for shortness of breath. Negative for cough and wheezing.    Cardiovascular: Negative for chest pain and palpitations.   Gastrointestinal: Negative for abdominal pain and nausea.   Musculoskeletal: Positive for back pain and neck pain. Negative for arthralgias.   Skin: Negative for rash.   Neurological: Positive for dizziness, focal weakness, weakness, numbness, headaches and loss of balance. Negative for seizures.   Psychiatric/Behavioral: Positive for decreased concentration. Negative for confusion and suicidal ideas. The patient is nervous/anxious and has insomnia.        Arias Willson presents to [facility] [place]  Diabetes  He presents for his follow-up diabetic visit. He has type 2 diabetes mellitus. No MedicAlert identification noted. His disease course has been fluctuating. Hypoglycemia symptoms include dizziness, headaches and nervousness/anxiousness. Pertinent negatives for hypoglycemia include no confusion or seizures. Associated symptoms include fatigue, visual change and weakness. Pertinent negatives for diabetes include no chest pain. Weight loss: neuro. There are no hypoglycemic complications. Symptoms are worsening. Diabetic complications include a CVA and peripheral neuropathy. Risk factors for coronary artery disease include diabetes mellitus, male sex, tobacco exposure, stress, sedentary lifestyle, obesity, hypertension and dyslipidemia. Current diabetic treatment includes oral agent (monotherapy). He is compliant with treatment some of the time. His weight is increasing rapidly. He is following a generally unhealthy diet. When asked about  meal planning, he reported none. He rarely participates in exercise. Home blood sugar record trend: Unknown. An ACE inhibitor/angiotensin II receptor blocker is being taken. He does not see a podiatrist.Eye exam is current.   Pain  This is a chronic problem. The current episode started more than 1 year ago. The problem occurs daily. Associated symptoms include fatigue, headaches, neck pain, numbness, a visual change and weakness. Pertinent negatives include no abdominal pain, arthralgias, chest pain, chills, congestion, coughing, fever, nausea, rash or sore throat. The symptoms are aggravated by walking and stress. He has tried NSAIDs and acetaminophen for the symptoms. The treatment provided no relief.   Hypertension  This is a chronic problem. The current episode started more than 1 year ago. The problem has been waxing and waning since onset. The problem is controlled. Associated symptoms include headaches, neck pain and shortness of breath. Pertinent negatives include no chest pain or palpitations. Agents associated with hypertension include NSAIDs. Risk factors for coronary artery disease include obesity, male gender, smoking/tobacco exposure and stress (H/O CVA). Past treatments include ACE inhibitors, angiotensin blockers, beta blockers, diuretics and lifestyle changes. Current antihypertension treatment includes angiotensin blockers and diuretics. The current treatment provides significant improvement. Compliance problems include medication cost.  Hypertensive end-organ damage includes CVA.   Insomnia  This is a chronic problem. The current episode started more than 1 year ago. The problem occurs constantly. The problem has been waxing and waning. Associated symptoms include fatigue, headaches, neck pain, numbness, a visual change and weakness. Pertinent negatives include no abdominal pain, arthralgias, chest pain, chills, congestion, coughing, fever, nausea, rash or sore throat. The symptoms are aggravated  by stress. Treatments tried: OTC meds CPAP. The treatment provided significant relief.   Breathing Problem  He complains of difficulty breathing and shortness of breath. There is no cough or wheezing. Primary symptoms comments: Using 02 since hospitalization Jan 2018, H/O low pulse-ox, seizures like spells. Improved tx NEREYDA on Bipap. Associated symptoms include headaches. Pertinent negatives include no appetite change, chest pain, ear pain, fever or sore throat. Weight loss: neuro. His symptoms are aggravated by exercise. His symptoms are alleviated by nothing. He reports moderate (O2) improvement on treatment.   Stroke  This is a chronic problem. The current episode started more than 1 year ago. The problem occurs daily. The problem has been gradually improving. Associated symptoms include fatigue, headaches, neck pain, numbness, a visual change and weakness. Pertinent negatives include no abdominal pain, arthralgias, chest pain, chills, congestion, coughing, fever, nausea, rash or sore throat. The symptoms are aggravated by exertion and walking. He has tried acetaminophen and NSAIDs for the symptoms. The treatment provided no relief.   Neurologic Problem  The patient's primary symptoms include an altered mental status, clumsiness, focal sensory loss, focal weakness, a loss of balance, a visual change and weakness. Primary symptoms comment: Delayed cognitive processing. Seizures. This is a chronic problem. The current episode started more than 1 year ago. The neurological problem developed suddenly. The problem has been waxing and waning since onset. There was left-sided, right-sided, facial, upper extremity and lower extremity (Strokes of Basal ganglia. Horners syndrome,  sensory, motor deficits.) focality noted. Associated symptoms include back pain, dizziness, fatigue, headaches, neck pain and shortness of breath. Pertinent negatives include no abdominal pain, chest pain, confusion, fever, nausea or palpitations.  "(Requires Walker to ambulate.) Past treatments include walking and medication (Physical therapy). The treatment provided mild relief. His past medical history is significant for a CVA.   Depression  Visit Type: follow-up  Patient presents with the following symptoms: decreased concentration, depressed mood, excessive worry, feelings of hopelessness, feelings of worthlessness, insomnia, irritability, memory impairment, nervousness/anxiety, restlessness and shortness of breath.  Patient is not experiencing: confusion, palpitations, suicidal ideas and suicidal planning.  Weight loss: neuro.  Frequency of symptoms: occasionally   Severity: moderate   Sleep quality: fair  Nighttime awakenings: several        Objective   Vital Signs:   /68 (BP Location: Right arm, Patient Position: Sitting, Cuff Size: Adult)   Pulse 110   Temp 96.9 °F (36.1 °C) (Temporal)   Ht 188 cm (74\")   Wt 129 kg (285 lb 4.8 oz)   SpO2 95%   BMI 36.63 kg/m²     Physical Exam  Vitals and nursing note reviewed.   Constitutional:       General: He is not in acute distress.     Appearance: He is obese.   HENT:      Head: Normocephalic.      Right Ear: Tympanic membrane and ear canal normal.      Left Ear: Tympanic membrane and ear canal normal.      Nose: Nose normal.      Mouth/Throat:      Mouth: Mucous membranes are moist.      Pharynx: No oropharyngeal exudate or posterior oropharyngeal erythema.   Eyes:      General:         Right eye: No discharge.         Left eye: No discharge.      Extraocular Movements: Extraocular movements intact.      Conjunctiva/sclera: Conjunctivae normal.      Comments: Gus's   Neck:      Vascular: No carotid bruit.   Cardiovascular:      Rate and Rhythm: Normal rate and regular rhythm.   Pulmonary:      Effort: Pulmonary effort is normal.      Breath sounds: Wheezing and rhonchi present.   Abdominal:      Palpations: Abdomen is soft.      Tenderness: There is no right CVA tenderness or left CVA tenderness. "   Musculoskeletal:      Cervical back: Rigidity and tenderness present.      Right lower leg: Edema present.      Left lower leg: Edema present.      Comments: WB 4 with ROM low back   Lymphadenopathy:      Cervical: No cervical adenopathy.   Skin:     Capillary Refill: Capillary refill takes 2 to 3 seconds.      Findings: No erythema or lesion.      Comments: Hyperkeratosis   Neurological:      Mental Status: He is oriented to person, place, and time.      Cranial Nerves: Cranial nerve deficit present.      Sensory: Sensory deficit present.      Motor: Weakness present.      Coordination: Coordination normal.      Gait: Gait abnormal.   Psychiatric:         Mood and Affect: Mood normal.         Behavior: Behavior normal.         Thought Content: Thought content normal.         Judgment: Judgment normal.        Result Review :     A1C Last 3 Results    HGBA1C Last 3 Results 4/28/21 7/14/21 11/15/21   Hemoglobin A1C 8.55 (A) 7.6 7.1   (A) Abnormal value                      Assessment and Plan    Diagnoses and all orders for this visit:    1. Type 2 diabetes mellitus with diabetic autonomic neuropathy, with long-term current use of insulin ([facility])  -     POC Glycated Hemoglobin, Total  -     empagliflozin (Jardiance) 25 MG tablet tablet; Take 1 tablet by mouth Daily.  Dispense: 90 tablet; Refill: 1  -     Insulin Glargine (Lantus SoloStar) 100 UNIT/ML injection pen; INJECT 34 UNITS SUBCUTANEOUSLY ONCE DAILY INCREASE  BY  2  UNITS  EVERY  3  DAYS  UNTIL  MORNING  BLOOD  SUGAR  IS  100-150  Dispense: 45 mL; Refill: 2    2. Neuropathic pain  -     gabapentin (NEURONTIN) 600 MG tablet; Take 1 tablet by mouth 3 (Three) Times a Day.  Dispense: 270 tablet; Refill: 1    3. Need for immunization against influenza  -     FluLaval/Fluarix/Fluzone >6 Months ([id number])    4. Hyperkeratosis  -     ammonium lactate (LAC-HYDRIN) 12 % lotion; Apply  topically to the appropriate area as directed Daily.  Dispense: 500 g; Refill: 2    5.  COPD, group B, by GOLD 2017 classification ([facility])  -     budesonide-formoterol (SYMBICORT) 160-4.5 MCG/ACT inhaler; Inhale 2 puffs 2 (Two) Times a Day for 90 days.  Dispense: 3 each; Refill: 1    6. H/O: CVA (cerebrovascular accident)    7. Type 2 diabetes mellitus with other specified complication, without long-term current use of insulin ([facility])  -     glucose blood test strip; 1 each by Other route 3 (Three) Times a Day. Use as instructed  Dispense: 300 each; Refill: 1  -     Lancets 30G misc; 1 each 3 (Three) Times a Day.  Dispense: 300 each; Refill: 1    8. Gastroesophageal reflux disease without esophagitis  -     lansoprazole (PREVACID) 30 MG capsule; Take 1 capsule by mouth Daily.  Dispense: 90 capsule; Refill: 1    9. Essential hypertension  -     metoprolol tartrate (LOPRESSOR) 25 MG tablet; Take 1 tablet by mouth Every 12 (Twelve) Hours.  Dispense: 180 tablet; Refill: 3  -     spironolactone (ALDACTONE) 25 MG tablet; Take 1 tablet by mouth Daily.  Dispense: 90 tablet; Refill: 3  -     losartan (COZAAR) 50 MG tablet; Take 1 tablet by mouth Daily.  Dispense: 90 tablet; Refill: 3    10. Pure hypercholesterolemia    11. Stage 2 moderate COPD by GOLD classification ([facility])  -     Ventolin  (90 Base) MCG/ACT inhaler; Inhale 2 puffs Every 4 (Four) Hours As Needed for Wheezing.  Dispense: 18 g; Refill: 3        Follow Up   Return in about 4 months (around 3/15/2022).  Patient was given instructions and counseling regarding his condition or for health maintenance advice. Please see specific information pulled into the AVS if appropriate.         This document has been electronically signed by Harpreet Bass MD

## 2021-11-15 NOTE — PROGRESS NOTES
Injection  Injection performed in left deltoid by Araseli Cano MA. Patient tolerated the procedure well without complications.  11/15/21   Araseli Cano MA

## 2021-11-16 PROBLEM — I10 ESSENTIAL HYPERTENSION: Chronic | Status: ACTIVE | Noted: 2021-11-16

## 2021-11-16 RX ORDER — SPIRONOLACTONE 25 MG/1
25 TABLET ORAL DAILY
Qty: 90 TABLET | Refills: 3 | Status: SHIPPED | OUTPATIENT
Start: 2021-11-16 | End: 2022-12-29 | Stop reason: SDUPTHER

## 2021-11-16 RX ORDER — LOSARTAN POTASSIUM 50 MG/1
50 TABLET ORAL DAILY
Qty: 90 TABLET | Refills: 3 | Status: SHIPPED | OUTPATIENT
Start: 2021-11-16 | End: 2022-12-29 | Stop reason: SDUPTHER

## 2021-11-17 ENCOUNTER — PROCEDURE VISIT (OUTPATIENT)
Dept: PULMONOLOGY | Facility: CLINIC | Age: 66
End: 2021-11-17

## 2021-11-17 ENCOUNTER — OFFICE VISIT (OUTPATIENT)
Dept: PULMONOLOGY | Facility: CLINIC | Age: 66
End: 2021-11-17

## 2021-11-17 VITALS
OXYGEN SATURATION: 95 % | HEART RATE: 104 BPM | BODY MASS INDEX: 36.57 KG/M2 | DIASTOLIC BLOOD PRESSURE: 82 MMHG | TEMPERATURE: 96 F | HEIGHT: 74 IN | SYSTOLIC BLOOD PRESSURE: 148 MMHG | WEIGHT: 285 LBS

## 2021-11-17 DIAGNOSIS — F17.210 CIGARETTE NICOTINE DEPENDENCE, UNCOMPLICATED: ICD-10-CM

## 2021-11-17 DIAGNOSIS — J44.9 STAGE 2 MODERATE COPD BY GOLD CLASSIFICATION (HCC): Primary | Chronic | ICD-10-CM

## 2021-11-17 DIAGNOSIS — J44.9 COPD, GROUP B, BY GOLD 2017 CLASSIFICATION (HCC): ICD-10-CM

## 2021-11-17 PROCEDURE — 94060 EVALUATION OF WHEEZING: CPT | Performed by: INTERNAL MEDICINE

## 2021-11-17 PROCEDURE — 94727 GAS DIL/WSHOT DETER LNG VOL: CPT | Performed by: INTERNAL MEDICINE

## 2021-11-17 PROCEDURE — 94729 DIFFUSING CAPACITY: CPT | Performed by: INTERNAL MEDICINE

## 2021-11-17 PROCEDURE — 99214 OFFICE O/P EST MOD 30 MIN: CPT | Performed by: INTERNAL MEDICINE

## 2021-11-17 NOTE — PROGRESS NOTES
Pulmonary Office Follow-up    Subjective     Arias Willson is seen today at the office for   Chief Complaint   Patient presents with   • COPD         HPI  Arias Willson is a 65 y.o. male with COPD    11/17/21  Patient here for follow up. He has been doing well with his Symbicort. Wasn't able to get Spiriva due to costs. No recent exacerbations      6/25/21  Patient was switched to Symbicort from Anoro several months ago for cost reasons. Using albuterol probably once a day  Does not have a nebulizer machine  Bipap at night  Patient has not had any hospitalizations or ED visits in the last year  Overall he feels like his COPD is well controlled. A lot of his dyspnea is from weight gain    Tobacco use history:  Type: cigarettes  Amount: 1/2 ppd  Duration: 40 years  Cessation:no   Willing to quit: No      Patient Active Problem List   Diagnosis   • Ptosis of eyelid   • Astigmatism   • Myopia   • Gus's syndrome   • Neuropathic pain   • GERD (gastroesophageal reflux disease)   • Aneurysm of carotid artery ([facility])   • H/O: CVA (cerebrovascular accident)   • Sensory deficit present   • Gait disturbance, post-stroke   • Cognitive deficit, post-stroke   • Hypertension   • Diabetes mellitus ([facility])   • Morbid obesity ([facility])   • Coronary artery disease   • NEREYDA treated with BiPAP   • Macrocytosis without anemia   • Need for immunization against influenza   • Need for vaccination   • Elevated brain natriuretic peptide (BNP) level   • Tremor of both hands   • Seizures ([facility])   • Fall   • Left knee pain   • Class 2 severe obesity due to excess calories with serious comorbidity and body mass index (BMI) of 35.0 to 35.9 in adult ([facility])   • High risk medication use   • Hypoxia   • Generalized edema   • Other insomnia   • Peripheral edema   • Nicotine abuse   • Nocturnal hypoxemia due to obesity   • COPD, group B, by GOLD 2017 classification ([facility])   • Syncope   • Physical deconditioning   • Pure hypercholesterolemia   •  Cigarette nicotine dependence, uncomplicated   • Primary osteoarthritis of left knee   • Effusion, left knee   • Right knee pain   • Stage 2 moderate COPD by GOLD classification (HCC)   • Carbuncle   • Hyperkeratosis   • Encounter for screening for malignant neoplasm of colon   • Essential hypertension         Medications, Allergies, Social, and Family Histories reviewed as per EMR.    Objective     Vitals:    11/17/21 1425   BP: 148/82   Pulse: 104   Temp: 96 °F (35.6 °C)   SpO2: 95%         11/17/21  1425   Weight: 129 kg (285 lb)     [unfilled]  Physical Exam  Vitals reviewed.   Constitutional:       Appearance: Normal appearance.      Comments: In a wheelchair   HENT:      Head: Normocephalic and atraumatic.      Right Ear: Tympanic membrane normal.      Left Ear: Tympanic membrane normal.      Nose: Nose normal.      Mouth/Throat:      Mouth: Mucous membranes are moist.      Pharynx: Oropharynx is clear.   Eyes:      Conjunctiva/sclera: Conjunctivae normal.      Pupils: Pupils are equal, round, and reactive to light.   Cardiovascular:      Rate and Rhythm: Normal rate and regular rhythm.      Pulses: Normal pulses.      Heart sounds: Normal heart sounds.   Pulmonary:      Effort: Pulmonary effort is normal.      Breath sounds: Normal breath sounds.   Abdominal:      General: Abdomen is flat. Bowel sounds are normal.      Palpations: Abdomen is soft.   Musculoskeletal:         General: Normal range of motion.      Cervical back: Normal range of motion and neck supple.   Skin:     General: Skin is warm and dry.   Neurological:      General: No focal deficit present.      Mental Status: He is alert and oriented to person, place, and time.   Psychiatric:         Mood and Affect: Mood normal.         Behavior: Behavior normal.       PFTs  11/17/21  FVC 4.08L, 78%  FEV1 2.27L, 57%  Ratio 56%  TLC 7.63L, 97%  %  DLCO 52%    CT 11/2020  IMPRESSION:  Impression:  1.  No lung nodule identified.  2.  Mild to  moderate centrilobular emphysematous changes.  3.  LAD and left circumflex coronary artery atherosclerotic  vascular calcifications.       Assessment/Plan     Diagnoses and all orders for this visit:    1. Stage 2 moderate COPD by GOLD classification (HCC) (Primary)    2. Cigarette nicotine dependence, uncomplicated         Discussion/ Recommendations:   Patient with some mild improvement on spirometry, no real significant change on other parts of PFT (maybe some more air trapping). He clinically is doing well with Symbicort. He continues to smoke and is not that interested in quitting.    Continue Symbicort bid  Continue albuterol prn  Due for LDCT, will order at follow up or PCM can order        Return in about 6 months (around 5/17/2022).          This document has been electronically signed by Valarie Cano DO on November 17, 2021 15:04 CST

## 2021-11-17 NOTE — PROGRESS NOTES
Full PFT with bronchodilator performed.     Good patient effort and cooperation.     4 puffs Albuterol given, tolerated well.     10 second exhalation on spirometry.     Ordered by Dr. Cano, read by Dr. Cano.

## 2021-11-30 ENCOUNTER — OFFICE VISIT (OUTPATIENT)
Dept: PODIATRY | Facility: CLINIC | Age: 66
End: 2021-11-30

## 2021-11-30 VITALS — HEART RATE: 77 BPM | OXYGEN SATURATION: 100 % | BODY MASS INDEX: 36.57 KG/M2 | WEIGHT: 285 LBS | HEIGHT: 74 IN

## 2021-11-30 DIAGNOSIS — M79.675 CHRONIC TOE PAIN, BILATERAL: ICD-10-CM

## 2021-11-30 DIAGNOSIS — G89.29 CHRONIC TOE PAIN, BILATERAL: ICD-10-CM

## 2021-11-30 DIAGNOSIS — M79.674 CHRONIC TOE PAIN, BILATERAL: ICD-10-CM

## 2021-11-30 DIAGNOSIS — B35.1 ONYCHOMYCOSIS: ICD-10-CM

## 2021-11-30 DIAGNOSIS — E11.42 TYPE 2 DIABETES MELLITUS WITH PERIPHERAL NEUROPATHY (HCC): Primary | ICD-10-CM

## 2021-11-30 DIAGNOSIS — L84 CALLUS OF FOOT: ICD-10-CM

## 2021-11-30 PROCEDURE — 11721 DEBRIDE NAIL 6 OR MORE: CPT | Performed by: PODIATRIST

## 2021-11-30 PROCEDURE — 11056 PARNG/CUTG B9 HYPRKR LES 2-4: CPT | Performed by: PODIATRIST

## 2021-11-30 NOTE — PROGRESS NOTES
Arias Willson  1955  66 y.o. male   PCP MOIRA Bass MD - 11/15/2021  BS - 162 per pt    Diabetic foot care    11/30/2021     Chief Complaint   Patient presents with   • Right Foot - Follow-up, Diabetic foot care   • Left Foot - Follow-up, Diabetic foot care       History of Present Illness    Arias Willson is a 66 y.o.male who presents to clinic today for diabetic foot care.        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      • Benign essential hypertension    • Blood in feces    • Callus    • Carotid artery stenosis    • Cerebrovascular accident (HCC)      L sided sensory deficit      • Conjunctivitis    • Constipation     OCCASIONAL   • Contusion, eye, left    • Corneal ulcer     PROBABLE   • Diabetes mellitus (HCC)    • Eczema    • Emphysema lung (HCC)    • Encounter for screening for malignant neoplasm of colon    • Essential hypertension    • Foot ulcer (HCC)    • GERD (gastroesophageal reflux disease)    • Hemorrhoids    • History of echocardiogram 04/05/2013    Echocadiogram W/ color flow 95057 (Normal LV systolic function with EF of 60-65%. Grade I diastolic dysfunction of the LV myocardium. No evidence of LV apical thrombus. No evidence of pericardial effusion.)   • Gus's syndrome     RIGHT EYE   • Hyperkeratosis    • Hyperlipidemia    • Mucopurulent conjunctivitis     ACUTE   • Myopia    • Neuropathic pain    • Obesity    • Onychomycosis    • Tobacco dependence syndrome          Past Surgical History:   Procedure Laterality Date   • COLONOSCOPY  07/30/2015    Colonoscopy, diagnostic (screening) 13013 (Screening for malignant neoplasm of colon)    • COLONOSCOPY  09/09/2015    Colonoscopy, diagnostic (screening) 12898 (Diverticulosis found in the sigmoid colon.Hemorrhoids found in the anus.)   • COLONOSCOPY  05/01/2009     Colonoscopy, diagnostic (screening) 70417 (Small internal and external hemorrhoids were present, Colonoscopy otherwise normal.)   • COLONOSCOPY N/A 12/9/2020    Procedure: COLONOSCOPY;  Surgeon: Arias Larsen MD;  Location: [facility] ENDOSCOPY;  Service: Gastroenterology;  Laterality: N/A;   • LEG SURGERY           Family History   Problem Relation Age of Onset   • Glaucoma Other    • Heart disease Other    • Stroke Other    • Macular degeneration Other    • Diabetes Other    • Glaucoma Father    • Macular degeneration Father    • Cataracts Father    • Cancer Father    • Macular degeneration Sister    • Heart disease Mother    • Cancer Paternal Grandmother    • Heart disease Paternal Grandfather        No Known Allergies    Social History     Socioeconomic History   • Marital status: Single   Tobacco Use   • Smoking status: Current Every Day Smoker     Packs/day: 0.25     Years: 33.00     Pack years: 8.25     Types: Cigarettes   • Smokeless tobacco: Never Used   Vaping Use   • Vaping Use: Never used   Substance and Sexual Activity   • Alcohol use: Yes   • Drug use: No   • Sexual activity: Defer         Current Outpatient Medications   Medication Sig Dispense Refill   • ammonium lactate (LAC-HYDRIN) 12 % lotion Apply  topically to the appropriate area as directed Daily. 500 g 2   • [START ON 5/24/2106] aspirin 325 MG tablet Take 1 tablet by mouth Daily. 90 tablet 3   • baclofen (LIORESAL) 10 MG tablet Take 1 tablet by mouth 3 (Three) Times a Day. 270 tablet 3   • budesonide-formoterol (SYMBICORT) 160-4.5 MCG/ACT inhaler Inhale 2 puffs 2 (Two) Times a Day for 90 days. 3 each 1   • clopidogrel (PLAVIX) 75 MG tablet Take 1 tablet by mouth Daily. 90 tablet 3   • Cyanocobalamin (B-12) 5000 MCG sublingual tablet Place 1 each under the tongue Daily. 30 tablet 6   • DULoxetine (CYMBALTA) 60 MG capsule Take 1 capsule by mouth Daily. 90 capsule 3   • empagliflozin (Jardiance) 25 MG tablet tablet Take 1 tablet by mouth Daily.  "90 tablet 1   • gabapentin (NEURONTIN) 600 MG tablet Take 1 tablet by mouth 3 (Three) Times a Day. 270 tablet 1   • glucose blood test strip 1 each by Other route 3 (Three) Times a Day. Use as instructed 300 each 1   • glucose monitor monitoring kit 3 (Three) Times a Day. 1 each 0   • Insulin Glargine (Lantus SoloStar) 100 UNIT/ML injection pen INJECT 34 UNITS SUBCUTANEOUSLY ONCE DAILY INCREASE  BY  2  UNITS  EVERY  3  DAYS  UNTIL  MORNING  BLOOD  SUGAR  IS  100-150 45 mL 2   • Lancets 30G misc 1 each 3 (Three) Times a Day. 300 each 1   • lansoprazole (PREVACID) 30 MG capsule Take 1 capsule by mouth Daily. 90 capsule 1   • losartan (COZAAR) 50 MG tablet Take 1 tablet by mouth Daily. 90 tablet 3   • metFORMIN (GLUCOPHAGE) 500 MG tablet Take 1 tablet by mouth 2 (Two) Times a Day. 180 tablet 3   • metoprolol tartrate (LOPRESSOR) 25 MG tablet Take 1 tablet by mouth Every 12 (Twelve) Hours. 180 tablet 3   • mupirocin (BACTROBAN) 2 % ointment Apply  topically to the appropriate area as directed 3 (Three) Times a Day. 30 g 2   • pravastatin (PRAVACHOL) 40 MG tablet Take 1 tablet by mouth Every Night. 90 tablet 3   • spironolactone (ALDACTONE) 25 MG tablet Take 1 tablet by mouth Daily. 90 tablet 3   • Ventolin  (90 Base) MCG/ACT inhaler Inhale 2 puffs Every 4 (Four) Hours As Needed for Wheezing. 18 g 3     No current facility-administered medications for this visit.       Review of Systems   Constitutional: Negative.    Respiratory: Negative.    Musculoskeletal:        Toe pain    Psychiatric/Behavioral: Negative.          OBJECTIVE    Pulse 77   Ht 188 cm (74\")   Wt 129 kg (285 lb)   SpO2 100%   BMI 36.59 kg/m²       Physical Exam  Vitals reviewed.   Constitutional:       General: He is not in acute distress.     Appearance: He is well-developed.   HENT:      Head: Normocephalic and atraumatic.   Cardiovascular:      Pulses:           Dorsalis pedis pulses are 2+ on the right side and 2+ on the left side.        " Posterior tibial pulses are 2+ on the right side and 2+ on the left side.   Pulmonary:      Effort: Pulmonary effort is normal. No respiratory distress.      Breath sounds: No wheezing.   Musculoskeletal:         General: No deformity.      Right foot: Decreased range of motion. No deformity or prominent metatarsal heads.      Left foot: Decreased range of motion. No deformity or prominent metatarsal heads.   Feet:      Right foot:      Protective Sensation: 5 sites tested. 0 sites sensed.      Skin integrity: Callus present. No ulcer.      Toenail Condition: Right toenails are abnormally thick and long. Fungal disease present.     Left foot:      Protective Sensation: 5 sites tested. 0 sites sensed.      Skin integrity: Callus present. No ulcer.      Toenail Condition: Left toenails are abnormally thick and long. Fungal disease present.  Skin:     General: Skin is warm and dry.      Capillary Refill: Capillary refill takes less than 2 seconds.   Neurological:      Mental Status: He is alert and oriented to person, place, and time.   Psychiatric:         Behavior: Behavior normal.         Thought Content: Thought content normal.         Procedures      ASSESSMENT AND PLAN    Diagnoses and all orders for this visit:    1. Type 2 diabetes mellitus with peripheral neuropathy (HCC) (Primary)    2. Onychomycosis    3. Chronic toe pain, bilateral    4. Callus of foot          - Nails 1-5 bilateral were debrided in length and thickness with nail nipper and electric  to decrease fungal load and risk of infection.   - Trimmed hyperkeratotic lesions noted in objective with a #15 blade without incident.   - All the patients questions were answered.  - RTC 3 months or sooner if needed.              This document has been electronically signed by Garrett Mcmillan DPM on November 30, 2021 08:26 CST     11/30/2021  08:26 CST

## 2021-12-21 DIAGNOSIS — E78.00 PURE HYPERCHOLESTEROLEMIA: ICD-10-CM

## 2021-12-21 DIAGNOSIS — M79.2 NEUROPATHIC PAIN: ICD-10-CM

## 2021-12-21 DIAGNOSIS — Z86.73 H/O: CVA (CEREBROVASCULAR ACCIDENT): ICD-10-CM

## 2021-12-21 RX ORDER — BACLOFEN 10 MG/1
10 TABLET ORAL 3 TIMES DAILY
Qty: 270 TABLET | Refills: 0 | Status: SHIPPED | OUTPATIENT
Start: 2021-12-21 | End: 2022-03-24

## 2021-12-21 RX ORDER — CLOPIDOGREL BISULFATE 75 MG/1
75 TABLET ORAL DAILY
Qty: 90 TABLET | Refills: 0 | Status: SHIPPED | OUTPATIENT
Start: 2021-12-21 | End: 2022-03-24

## 2021-12-21 RX ORDER — DULOXETIN HYDROCHLORIDE 60 MG/1
60 CAPSULE, DELAYED RELEASE ORAL DAILY
Qty: 90 CAPSULE | Refills: 0 | Status: SHIPPED | OUTPATIENT
Start: 2021-12-21 | End: 2022-03-24

## 2021-12-21 RX ORDER — PRAVASTATIN SODIUM 40 MG
40 TABLET ORAL NIGHTLY
Qty: 90 TABLET | Refills: 0 | Status: SHIPPED | OUTPATIENT
Start: 2021-12-21 | End: 2022-03-24

## 2021-12-21 NOTE — TELEPHONE ENCOUNTER
Incoming Refill Request      Medication requested (name and dose):baclofen (LIORESAL) 10 MG tablet, pravastatin (PRAVACHOL) 40 MG tablet, clopidogrel (PLAVIX) 75 MG tablet, DULoxetine (CYMBALTA) 60 MG capsule    Pharmacy where request should be sent: [facility]    Additional details provided by patient:  NO    Best call back number: [phone]    Does the patient have less than a 3 day supply:  [x] Yes  [] No    Rona Joel  12/21/21, 15:57 [facility]        [id number]}

## 2022-01-23 DIAGNOSIS — E11.43 TYPE 2 DIABETES MELLITUS WITH DIABETIC AUTONOMIC NEUROPATHY, WITH LONG-TERM CURRENT USE OF INSULIN: Chronic | ICD-10-CM

## 2022-01-23 DIAGNOSIS — Z79.4 TYPE 2 DIABETES MELLITUS WITH DIABETIC AUTONOMIC NEUROPATHY, WITH LONG-TERM CURRENT USE OF INSULIN: Chronic | ICD-10-CM

## 2022-01-24 NOTE — TELEPHONE ENCOUNTER
Rx Refill Note  Requested Prescriptions     Pending Prescriptions Disp Refills   • metFORMIN (GLUCOPHAGE) 500 MG tablet [Pharmacy Med Name: METFORMIN HYDROCHLORIDE 500 MG Tablet] 180 tablet 0     Sig: TAKE 1 TABLET TWICE DAILY      Last office visit with prescribing clinician: 11/15/2021      Next office visit with prescribing clinician: 3/14/2022   {TIP  Encounters:     Antihyperglycemics Protocol Failed 01/23/2022 06:53 AM   Protocol Details  Lipid panel in past year          LIPID DRAWN ON 4/28/21.    {TIP  Please add Last Relevant Lab Date if appropriate: 4/28/21  {TIP  Is Refill Pharmacy correct? YES  Gaurang Saldana, ALICIA  01/24/22, 15:25 CST

## 2022-02-01 ENCOUNTER — OFFICE VISIT (OUTPATIENT)
Dept: SLEEP MEDICINE | Facility: HOSPITAL | Age: 67
End: 2022-02-01

## 2022-02-01 VITALS
HEART RATE: 102 BPM | SYSTOLIC BLOOD PRESSURE: 143 MMHG | DIASTOLIC BLOOD PRESSURE: 84 MMHG | BODY MASS INDEX: 36.57 KG/M2 | OXYGEN SATURATION: 95 % | HEIGHT: 74 IN | WEIGHT: 285 LBS

## 2022-02-01 DIAGNOSIS — J44.9 OSA AND COPD OVERLAP SYNDROME: ICD-10-CM

## 2022-02-01 DIAGNOSIS — E66.01 MORBID OBESITY: ICD-10-CM

## 2022-02-01 DIAGNOSIS — J34.2 NASAL SEPTAL DEVIATION: ICD-10-CM

## 2022-02-01 DIAGNOSIS — G47.33 OSA AND COPD OVERLAP SYNDROME: ICD-10-CM

## 2022-02-01 DIAGNOSIS — G47.33 OBSTRUCTIVE SLEEP APNEA, ADULT: Primary | ICD-10-CM

## 2022-02-01 PROCEDURE — 99212 OFFICE O/P EST SF 10 MIN: CPT | Performed by: NURSE PRACTITIONER

## 2022-02-01 NOTE — PATIENT INSTRUCTIONS
Thinking about quitting smoking?  You can always call 1-800-QUIT-NOW    Additional Resources to Help You Quit Smoking:  Smokefree.gov  A website dedicated to helping you quit smoking with tailored resources for women, veterans, teens, Emir[place] speakers, and people over 60 years old.    YouCanQuit2  https://www.Skadoitq2.org/  A quit-smoking support website for  personnel and their families, sponsored by the Department of Defense.          Quitting smoking can be hard, but it is possible. In fact, every time you put out a cigarette is a new chance to try quitting again, according to the U.S. Food and Drug Administration’s newest tobacco education campaign, “Every Try Counts.”     If you want to quit--almost 70 percent of adult smokers say they do--you may want to use a “smoking cessation” product proven to help. Data has shown that using FDA-approved cessation medicine can double your chance of quitting successfully.    Some products contain nicotine as an active ingredient and others do not. These products include over-the-counter (OTC) options like skin patches, lozenges, and gum, as well as prescription medicines.    Smoking cessation products are intended to help you quit smoking. They are regulated through the FDA’s Center for Drug Evaluation and Research, which ensures that the products are safe and effective and that their benefits outweigh any known associated risks.    The Benefits of Quitting Smoking  No matter how much you smoke--or for how long--quitting will benefit you.    Not only will you lower your risk of getting various cancers, including lung cancer, you’ll also reduce your chances of having heart disease, a stroke, emphysema, and other serious diseases. Quitting also will lower the risk of heart disease and lung cancer in nonsmokers who otherwise would be exposed to your secondhand smoke.    Although there are benefits to quitting at any age, it is important to quit as soon as possible so your  body can begin to heal from the damage caused by smoking. For instance, 12 hours after you quit smoking the carbon monoxide level in your blood drops to normal. Carbon monoxide is harmful because it displaces oxygen in the blood and deprives your heart, brain, and other vital organs of oxygen.    What To Know About Smoking Cessation Products  Understanding how smoking cessation products work--and what side effects they may cause--can help you determine which product may be best for you.    If you’re considering one of these products, reading labels and talking to your pharmacist and other health care providers are good first steps to take.    You also can check the FDA’s website for more information on each product at Drugs@FDA, where you can search for each product by name.    And remember to weigh each product’s benefits and risks, among other considerations.    About Nicotine Replacement Therapy (NRT)  Nicotine is the substance primarily responsible for causing addiction to tobacco products. Tobacco users who are addicted to nicotine are used to having nicotine in their bodies.    As you try to quit smoking, you may have symptoms of nicotine withdrawal. When you quit, this withdrawal may cause symptoms like cravings, or urges, to smoke; depression; trouble sleeping; irritability; anxiety; and increased appetite.    Nicotine withdrawal can discourage some smokers from continuing with a quit attempt. But the FDA has approved several smoking cessation products designed to help users gradually withdraw from smoking (that is, “wean” themselves from smoking) by using specific amounts of nicotine that decrease over time. This type of product is called a “nicotine replacement therapy” or NRT. It supplies nicotine in controlled amounts while sparing you from other chemicals found in tobacco products.    NRTs are available over the counter and by prescription. You should generally use them only for a short time to help you  manage nicotine cravings and withdrawal. However, the FDA recognizes that some people may need to use these products longer to stay smoke-free. Talk to your health care provider to determine the best course of treatment for you.    Over-the-counter NRTs are approved for sale to people age 18 and older. They are available under various brand names and sometimes as generic products. They include:    Skin patches (also called “transdermal nicotine patches”). These patches are placed on the skin, similar to how you would apply an adhesive bandage.  Chewing gum (also called “nicotine gum”). This gum must be chewed according to the labeled instructions to be effective.  Lozenges (also called “nicotine lozenges”). You use these products by dissolving them in your mouth.  For over-the-counter products, it’s important to follow the instructions on the Drug Facts Label (DFL) and to read the enclosed User’s Guide for complete directions and other important information. Ask your health care provider if you have questions.    Currently, prescription nicotine replacement therapy is available only under the brand name Nicotrol, and is available both as a nasal spray and an oral inhaler. The products are FDA-approved only for use by adults.    If you are under age 18 and want to quit smoking, talk to a health care professional about whether you should use nicotine replacement therapies.    Important Advice for People Considering Nicotine Replacement Therapy  Women who are pregnant or breastfeeding should talk to their health care providers and use nicotine replacement products only if the health care providers approve.    Also talk to your health care provider before using these products if you have:    diabetes, heart disease, asthma, or stomach ulcers;  had a recent heart attack;  high blood pressure that is not controlled with medicine;  a history of irregular heartbeat;  or been prescribed medication to help you quit  smoking.  If you take prescription medication for depression or asthma, tell your health care provider if you are quitting smoking because he or she may need to change your prescription dose.    Stop using a nicotine replacement product and call your health care professional if you have any of the following symptoms:  nausea;  dizziness;  weakness;  vomiting;  fast or irregular heartbeat;  mouth problems with the lozenge or gum;  or redness or swelling of the skin around the patch that does not go away.  About Prescription Cessation Medicines Without Nicotine  The FDA has approved two smoking cessation products that do not contain nicotine. They are Chantix (varenicline tartrate) and Zyban (buproprion hydrochloride). Both are available in tablet form and by prescription only.    Chantix acts at sites in the brain affected by nicotine by reducing the rewarding effects of nicotine. The precise way that Zyban helps with smoking cessation is unknown.    As with other prescription products, the FDA has evaluated these medicines and found that the benefits outweigh the risks. For users taking these products, risks include changes in behavior, depressed mood, hostility, aggression, and suicidal thoughts or actions.    The most common side effects of Chantix include nausea; constipation; gas; vomiting; and trouble sleeping or vivid, unusual, or strange dreams. Chantix also may change how you react to alcohol, so talk to your health care provider about your drinking habits (if you drink alcohol) and whether these habits need to change. Chantix is not recommended for people under the age of 18.    The most commonly observed side effects consistently associated with the use of Zyban are dry mouth and insomnia.    Because Zyban contains the same active ingredient as the antidepressant Wellbutrin (bupropion), the FDA encourages people who use Zyban--and those who are considering it--to talk to their health care providers about  the risks of treatment with antidepressant medicines. Zyban has not been studied in children under the age of 18 and is not approved for use in children and teenagers.    Note: If your health care provider prescribes Chantix or Zyban, please read the product’s patient medication guide in its entirety. These guides offer important information on side effects, risks, warnings, product ingredients, and what you should talk about with your health care provider before taking the products.      In addition to the above resources, talk to your doctor about strategies for quitting that may be right for you.

## 2022-02-01 NOTE — PROGRESS NOTES
Sleep Clinic Follow Up    Date: 2022  Primary Care Provider: Harpreet Bass MD    Last office visit: 2021 (telephone visit) (I reviewed this note)    CC: Follow up: NEREYDA on BiPAP      Interim History:  Since the last visit:    1) severe NEREYDA -  Arias Willson has remained compliant with BiPAP. He denies mask and machine issues, dry mouth, headaches, or pressures intolerance. He denies abnormal dreams, sleep paralysis, nasal congestion, URI sx. Patient has not registered his current machine. Patient has not been using O2 bled into his machine - he states that his DME supplier picked up his concentrator a while ago.     2) Patient denies RLS symptoms.     Sleep Testin. PSG on 10/03/2018, AHI of 85.7   2. CPAP titration on same day, recommended 24/14 cm H2O with 2L O2  3. Currently on 22/12 cm H2O (not with O2)    PAP Data:    Time frame: 2021-2022   Compliance: 99.7%  Average use on days used: 10 hrs 57 min  Percent of days with usage greater than or equal to 4 hours: 99.4%  PAP range: 22/12 cm H2O  Average 90% pressure: 22/12 cmH2O  Leak: 2 hours 24 minutes  Average AHI: 1.7 events/hr  Mask type: Full face mask  DME: [employer]    Bed time: varies  Sleep latency: 10 minutes  Number of times awakens during the night: 4  Wake time: 0300  Estimated total sleep time at night: 8 hours  Caffeine intake: 2-3 cups of coffee, 0 cups of tea, and 0 sodas per day  Alcohol intake: 0 drinks per week  Nap time: daily   Sleepiness with Driving: none     [place] - 5    PMHx, FH, SH reviewed and pertinent changes are: Reportedly unchanged from last office visit with us.      REVIEW OF SYSTEMS:   Negative for chest pain, SOA, fever, chills, cough, N/V/D, abdominal pain.    Smoking:< 1ppd    Arias Willson  reports that he has been smoking cigarettes. He has a 8.25 pack-year smoking history. He has never used smokeless tobacco.. I have educated him on the risk of diseases from using  tobacco products such as cancer, COPD and heart disease.     I advised him to quit and he is not willing to quit.    I spent 3.5 minutes counseling the patient.    Exam:  Vitals:    02/01/22 1538   BP: 143/84   Pulse: 102   SpO2: 95%           02/01/22  1538   Weight: 129 kg (285 lb)     Body mass index is 36.58 kg/m². Patient's Body mass index is 36.58 kg/m². indicating that he is morbidly obese (BMI > 40 or > 35 with obesity - related health condition). Obesity-related health conditions include the following: obstructive sleep apnea, hypertension, coronary heart disease, diabetes mellitus, dyslipidemias and GERD. Obesity is unchanged. BMI is is above average; BMI management plan is completed. I recommend portion control and increasing exercise.      Gen:                No distress, conversant, pleasant, appears stated age, alert, oriented  Eyes:               Anicteric sclera, moist conjunctiva, no lid lag                           PERRL, EOMI   Heent:             Severe septal deviation and partial right nares collapse    NC/AT                          Oropharynx clear                          Normal hearing  Lungs:             Normal effort, non-labored breathing                          Clear to auscultation bilaterally          CV:                  Normal S1/S2, no murmur                          No lower extremity edema  ABD:               Soft, rounded, non-distended                          Normal bowel sounds                    Psych:             Appropriate affect  Neuro:             CN 2-12 appear intact    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      • Benign essential hypertension    • Blood in feces    • Callus    • Carotid artery stenosis    • Cerebrovascular accident (HCC)      L sided sensory deficit      • Conjunctivitis    •  Constipation     OCCASIONAL   • Contusion, eye, left    • Corneal ulcer     PROBABLE   • Diabetes mellitus (HCC)    • Eczema    • Emphysema lung (HCC)    • Encounter for screening for malignant neoplasm of colon    • Essential hypertension    • Foot ulcer (HCC)    • GERD (gastroesophageal reflux disease)    • Hemorrhoids    • History of echocardiogram 04/05/2013    Echocadiogram W/ color flow 54885 (Normal LV systolic function with EF of 60-65%. Grade I diastolic dysfunction of the LV myocardium. No evidence of LV apical thrombus. No evidence of pericardial effusion.)   • Gus's syndrome     RIGHT EYE   • Hyperkeratosis    • Hyperlipidemia    • Mucopurulent conjunctivitis     ACUTE   • Myopia    • Neuropathic pain    • Obesity    • Onychomycosis    • Tobacco dependence syndrome        Current Outpatient Medications:   •  ammonium lactate (LAC-HYDRIN) 12 % lotion, Apply  topically to the appropriate area as directed Daily., Disp: 500 g, Rfl: 2  •  [START ON 5/24/2106] aspirin 325 MG tablet, Take 1 tablet by mouth Daily., Disp: 90 tablet, Rfl: 3  •  baclofen (LIORESAL) 10 MG tablet, Take 1 tablet by mouth 3 (Three) Times a Day., Disp: 270 tablet, Rfl: 0  •  budesonide-formoterol (SYMBICORT) 160-4.5 MCG/ACT inhaler, Inhale 2 puffs 2 (Two) Times a Day for 90 days., Disp: 3 each, Rfl: 1  •  clopidogrel (PLAVIX) 75 MG tablet, Take 1 tablet by mouth Daily., Disp: 90 tablet, Rfl: 0  •  Cyanocobalamin (B-12) 5000 MCG sublingual tablet, Place 1 each under the tongue Daily., Disp: 30 tablet, Rfl: 6  •  DULoxetine (CYMBALTA) 60 MG capsule, Take 1 capsule by mouth Daily., Disp: 90 capsule, Rfl: 0  •  empagliflozin (Jardiance) 25 MG tablet tablet, Take 1 tablet by mouth Daily., Disp: 90 tablet, Rfl: 1  •  gabapentin (NEURONTIN) 600 MG tablet, Take 1 tablet by mouth 3 (Three) Times a Day., Disp: 270 tablet, Rfl: 1  •  glucose blood test strip, 1 each by Other route 3 (Three) Times a Day. Use as instructed, Disp: 300 each, Rfl:  1  •  glucose monitor monitoring kit, 3 (Three) Times a Day., Disp: 1 each, Rfl: 0  •  Insulin Glargine (Lantus SoloStar) 100 UNIT/ML injection pen, INJECT 34 UNITS SUBCUTANEOUSLY ONCE DAILY INCREASE  BY  2  UNITS  EVERY  3  DAYS  UNTIL  MORNING  BLOOD  SUGAR  IS  100-150, Disp: 45 mL, Rfl: 2  •  Lancets 30G misc, 1 each 3 (Three) Times a Day., Disp: 300 each, Rfl: 1  •  lansoprazole (PREVACID) 30 MG capsule, Take 1 capsule by mouth Daily., Disp: 90 capsule, Rfl: 1  •  losartan (COZAAR) 50 MG tablet, Take 1 tablet by mouth Daily., Disp: 90 tablet, Rfl: 3  •  metFORMIN (GLUCOPHAGE) 500 MG tablet, TAKE 1 TABLET TWICE DAILY, Disp: 180 tablet, Rfl: 0  •  metoprolol tartrate (LOPRESSOR) 25 MG tablet, Take 1 tablet by mouth Every 12 (Twelve) Hours., Disp: 180 tablet, Rfl: 3  •  mupirocin (BACTROBAN) 2 % ointment, Apply  topically to the appropriate area as directed 3 (Three) Times a Day., Disp: 30 g, Rfl: 2  •  pravastatin (PRAVACHOL) 40 MG tablet, Take 1 tablet by mouth Every Night., Disp: 90 tablet, Rfl: 0  •  spironolactone (ALDACTONE) 25 MG tablet, Take 1 tablet by mouth Daily., Disp: 90 tablet, Rfl: 3  •  Ventolin  (90 Base) MCG/ACT inhaler, Inhale 2 puffs Every 4 (Four) Hours As Needed for Wheezing., Disp: 18 g, Rfl: 3    WBC   Date Value Ref Range Status   04/28/2021 8.22 3.40 - 10.80 10*3/mm3 Final     RBC   Date Value Ref Range Status   04/28/2021 5.12 4.14 - 5.80 10*6/mm3 Final     Hemoglobin   Date Value Ref Range Status   04/28/2021 11.6 (L) 13.0 - 17.7 g/dL Final     Hematocrit   Date Value Ref Range Status   04/28/2021 38.4 37.5 - 51.0 % Final     MCV   Date Value Ref Range Status   04/28/2021 75.0 (L) 79.0 - 97.0 fL Final     MCH   Date Value Ref Range Status   04/28/2021 22.7 (L) 26.6 - 33.0 pg Final     MCHC   Date Value Ref Range Status   04/28/2021 30.2 (L) 31.5 - 35.7 g/dL Final     RDW   Date Value Ref Range Status   04/28/2021 17.2 (H) 12.3 - 15.4 % Final     RDW-SD   Date Value Ref Range  Status   04/28/2021 45.0 37.0 - 54.0 fl Final     MPV   Date Value Ref Range Status   04/28/2021 10.3 6.0 - 12.0 fL Final     Platelets   Date Value Ref Range Status   04/28/2021 364 140 - 450 10*3/mm3 Final     Neutrophil %   Date Value Ref Range Status   04/28/2021 66.4 42.7 - 76.0 % Final     Lymphocyte %   Date Value Ref Range Status   04/28/2021 23.7 19.6 - 45.3 % Final     Monocyte %   Date Value Ref Range Status   04/28/2021 6.6 5.0 - 12.0 % Final     Eosinophil %   Date Value Ref Range Status   04/28/2021 1.5 0.3 - 6.2 % Final     Basophil %   Date Value Ref Range Status   04/28/2021 0.9 0.0 - 1.5 % Final     Immature Grans %   Date Value Ref Range Status   04/28/2021 0.9 (H) 0.0 - 0.5 % Final     Neutrophils, Absolute   Date Value Ref Range Status   04/28/2021 5.47 1.70 - 7.00 10*3/mm3 Final     Lymphocytes, Absolute   Date Value Ref Range Status   04/28/2021 1.95 0.70 - 3.10 10*3/mm3 Final     Monocytes, Absolute   Date Value Ref Range Status   04/28/2021 0.54 0.10 - 0.90 10*3/mm3 Final     Eosinophils, Absolute   Date Value Ref Range Status   04/28/2021 0.12 0.00 - 0.40 10*3/mm3 Final     Basophils, Absolute   Date Value Ref Range Status   04/28/2021 0.07 0.00 - 0.20 10*3/mm3 Final     Immature Grans, Absolute   Date Value Ref Range Status   04/28/2021 0.07 (H) 0.00 - 0.05 10*3/mm3 Final     nRBC   Date Value Ref Range Status   04/28/2021 0.2 0.0 - 0.2 /100 WBC Final       Lab Results   Component Value Date    GLUCOSE 303 (H) 04/28/2021    BUN 16 04/28/2021    CREATININE 1.11 04/28/2021    EGFRIFNONA 67 04/28/2021    BCR 14.4 04/28/2021    K 4.5 04/28/2021    CO2 23.5 04/28/2021    CALCIUM 8.7 04/28/2021    ALBUMIN 4.10 04/28/2021    AST 17 04/28/2021    ALT 17 04/28/2021       Assessment and Plan:    1. Obstructive sleep apnea - Established, stable (1)  1. Compliant with PAP therapy  2. Advised patient of new safety recall of [employer] CPAP/BiPAP/AVAPS machines. We discussed the risk versus  benefit of continuing usage of PAP therapy. The company has recommended that patients stop usage of their current machines. Instructed patient to call [employer] ([phone]) to check if the machine is under warranty for repair or replacement. [place] machine with serial number on website: www.[employer]/src-updates. Follow up with DME company regarding any further questions.. Advised patient to avoid unapproved ozone-type CPAP . Advised to call us if any further questions or problems as well. Patient is going to continue using his current machine until a replacement unit is received.  3. Script for PAP supplies  4. Return to clinic in 1 year with compliance report unless change in symptoms in interim period  2. Nasal septal deviation - Established, stable (1)   1. Recommend ENT referral - patient declined at this time      I spent 20 minutes caring for Arias on this date of service. This time includes time spent by me in the following activities: preparing for the visit, reviewing tests, obtaining and/or reviewing a separately obtained history, performing a medically appropriate examination and/or evaluation , counseling and educating the patient/family/caregiver, documenting information in the medical record and care coordination; discussing PAP therapy, PAP compliance and PAP maintenance    RTC in 12 months. Patient agrees to return sooner if changes in symptoms.        This document has been electronically signed by TANESHA Her on February 1, 2022 15:42 CST          CC: Harpreet Bass MD          No ref. provider found

## 2022-03-02 ENCOUNTER — OFFICE VISIT (OUTPATIENT)
Dept: PODIATRY | Facility: CLINIC | Age: 67
End: 2022-03-02

## 2022-03-02 VITALS — OXYGEN SATURATION: 94 % | WEIGHT: 285 LBS | HEIGHT: 74 IN | BODY MASS INDEX: 36.57 KG/M2 | HEART RATE: 74 BPM

## 2022-03-02 DIAGNOSIS — L97.521 DIABETIC ULCER OF TOE OF LEFT FOOT ASSOCIATED WITH TYPE 2 DIABETES MELLITUS, LIMITED TO BREAKDOWN OF SKIN: ICD-10-CM

## 2022-03-02 DIAGNOSIS — R09.89 DECREASED PEDAL PULSES: Primary | ICD-10-CM

## 2022-03-02 DIAGNOSIS — E11.621 DIABETIC ULCER OF TOE OF LEFT FOOT ASSOCIATED WITH TYPE 2 DIABETES MELLITUS, LIMITED TO BREAKDOWN OF SKIN: ICD-10-CM

## 2022-03-02 PROCEDURE — 99212 OFFICE O/P EST SF 10 MIN: CPT | Performed by: PODIATRIST

## 2022-03-02 NOTE — PROGRESS NOTES
Arias Willson  1955  66 y.o. male   PCP MOIRA Bass MD - 11/15/2021  BS - 162 per pt    Diabetic foot care/ left foot skin ulcer     03/02/2022     Chief Complaint   Patient presents with   • Left Foot - diabetic foot care, Skin Ulcer   • Right Foot - diabetic foot care       History of Present Illness    Arias Willson is a 66 y.o.male who presents to clinic today for diabetic foot care.  He complains of new wounds to his left foot.      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      • Benign essential hypertension    • Blood in feces    • Callus    • Carotid artery stenosis    • Cerebrovascular accident (HCC)      L sided sensory deficit      • Conjunctivitis    • Constipation     OCCASIONAL   • Contusion, eye, left    • Corneal ulcer     PROBABLE   • Diabetes mellitus (HCC)    • Eczema    • Emphysema lung (HCC)    • Encounter for screening for malignant neoplasm of colon    • Essential hypertension    • Foot ulcer (HCC)    • GERD (gastroesophageal reflux disease)    • Hemorrhoids    • History of echocardiogram 04/05/2013    Echocadiogram W/ color flow 89376 (Normal LV systolic function with EF of 60-65%. Grade I diastolic dysfunction of the LV myocardium. No evidence of LV apical thrombus. No evidence of pericardial effusion.)   • Gus's syndrome     RIGHT EYE   • Hyperkeratosis    • Hyperlipidemia    • Mucopurulent conjunctivitis     ACUTE   • Myopia    • Neuropathic pain    • Obesity    • Onychomycosis    • Tobacco dependence syndrome          Past Surgical History:   Procedure Laterality Date   • COLONOSCOPY  07/30/2015    Colonoscopy, diagnostic (screening) 43445 (Screening for malignant neoplasm of colon)    • COLONOSCOPY  09/09/2015    Colonoscopy, diagnostic (screening) 93733 (Diverticulosis found in the sigmoid  colon.Hemorrhoids found in the anus.)   • COLONOSCOPY  05/01/2009    Colonoscopy, diagnostic (screening) 54333 (Small internal and external hemorrhoids were present, Colonoscopy otherwise normal.)   • COLONOSCOPY N/A 12/9/2020    Procedure: COLONOSCOPY;  Surgeon: Arias Larsen MD;  Location: [facility] ENDOSCOPY;  Service: Gastroenterology;  Laterality: N/A;   • LEG SURGERY           Family History   Problem Relation Age of Onset   • Glaucoma Other    • Heart disease Other    • Stroke Other    • Macular degeneration Other    • Diabetes Other    • Glaucoma Father    • Macular degeneration Father    • Cataracts Father    • Cancer Father    • Macular degeneration Sister    • Heart disease Mother    • Cancer Paternal Grandmother    • Heart disease Paternal Grandfather        No Known Allergies    Social History     Socioeconomic History   • Marital status: Single   Tobacco Use   • Smoking status: Current Every Day Smoker     Packs/day: 0.25     Years: 33.00     Pack years: 8.25     Types: Cigarettes   • Smokeless tobacco: Never Used   Vaping Use   • Vaping Use: Never used   Substance and Sexual Activity   • Alcohol use: Yes   • Drug use: No   • Sexual activity: Defer         Current Outpatient Medications   Medication Sig Dispense Refill   • ammonium lactate (LAC-HYDRIN) 12 % lotion Apply  topically to the appropriate area as directed Daily. 500 g 2   • [START ON 5/24/2106] aspirin 325 MG tablet Take 1 tablet by mouth Daily. 90 tablet 3   • baclofen (LIORESAL) 10 MG tablet Take 1 tablet by mouth 3 (Three) Times a Day. 270 tablet 0   • clopidogrel (PLAVIX) 75 MG tablet Take 1 tablet by mouth Daily. 90 tablet 0   • Cyanocobalamin (B-12) 5000 MCG sublingual tablet Place 1 each under the tongue Daily. 30 tablet 6   • DULoxetine (CYMBALTA) 60 MG capsule Take 1 capsule by mouth Daily. 90 capsule 0   • empagliflozin (Jardiance) 25 MG tablet tablet Take 1 tablet by mouth Daily. 90 tablet 1   • gabapentin (NEURONTIN) 600 MG  "tablet Take 1 tablet by mouth 3 (Three) Times a Day. 270 tablet 1   • glucose blood test strip 1 each by Other route 3 (Three) Times a Day. Use as instructed 300 each 1   • glucose monitor monitoring kit 3 (Three) Times a Day. 1 each 0   • Insulin Glargine (Lantus SoloStar) 100 UNIT/ML injection pen INJECT 34 UNITS SUBCUTANEOUSLY ONCE DAILY INCREASE  BY  2  UNITS  EVERY  3  DAYS  UNTIL  MORNING  BLOOD  SUGAR  IS  100-150 45 mL 2   • Lancets 30G misc 1 each 3 (Three) Times a Day. 300 each 1   • lansoprazole (PREVACID) 30 MG capsule Take 1 capsule by mouth Daily. 90 capsule 1   • losartan (COZAAR) 50 MG tablet Take 1 tablet by mouth Daily. 90 tablet 3   • metFORMIN (GLUCOPHAGE) 500 MG tablet TAKE 1 TABLET TWICE DAILY 180 tablet 0   • metoprolol tartrate (LOPRESSOR) 25 MG tablet Take 1 tablet by mouth Every 12 (Twelve) Hours. 180 tablet 3   • mupirocin (BACTROBAN) 2 % ointment Apply  topically to the appropriate area as directed 3 (Three) Times a Day. 30 g 2   • pravastatin (PRAVACHOL) 40 MG tablet Take 1 tablet by mouth Every Night. 90 tablet 0   • spironolactone (ALDACTONE) 25 MG tablet Take 1 tablet by mouth Daily. 90 tablet 3   • Ventolin  (90 Base) MCG/ACT inhaler Inhale 2 puffs Every 4 (Four) Hours As Needed for Wheezing. 18 g 3   • budesonide-formoterol (SYMBICORT) 160-4.5 MCG/ACT inhaler Inhale 2 puffs 2 (Two) Times a Day for 90 days. 3 each 1     No current facility-administered medications for this visit.       Review of Systems   Constitutional: Negative.    Respiratory: Negative.    Musculoskeletal:        Toe pain    Psychiatric/Behavioral: Negative.          OBJECTIVE    Pulse 74   Ht 188 cm (74.02\")   Wt 129 kg (285 lb)   SpO2 94%   BMI 36.57 kg/m²       Physical Exam  Vitals reviewed.   Constitutional:       General: He is not in acute distress.     Appearance: He is well-developed.   HENT:      Head: Normocephalic and atraumatic.   Cardiovascular:      Pulses:           Dorsalis pedis pulses " are 2+ on the right side and 2+ on the left side.        Posterior tibial pulses are 2+ on the right side and 2+ on the left side.   Pulmonary:      Effort: Pulmonary effort is normal. No respiratory distress.      Breath sounds: No wheezing.   Musculoskeletal:         General: No deformity.      Right foot: Decreased range of motion. No deformity or prominent metatarsal heads.      Left foot: Decreased range of motion. No deformity or prominent metatarsal heads.        Feet:    Feet:      Right foot:      Protective Sensation: 5 sites tested. 0 sites sensed.      Skin integrity: Callus present. No ulcer.      Toenail Condition: Right toenails are abnormally thick and long. Fungal disease present.     Left foot:      Protective Sensation: 5 sites tested. 0 sites sensed.      Skin integrity: Callus present. No ulcer.      Toenail Condition: Left toenails are abnormally thick and long. Fungal disease present.  Skin:     General: Skin is warm and dry.      Capillary Refill: Capillary refill takes less than 2 seconds.   Neurological:      Mental Status: He is alert and oriented to person, place, and time.   Psychiatric:         Behavior: Behavior normal.         Thought Content: Thought content normal.         Procedures      ASSESSMENT AND PLAN    Diagnoses and all orders for this visit:    1. Decreased pedal pulses (Primary)  -     Doppler Ankle Brachial Index Single Level CAR; Future    2. Diabetic ulcer of toe of left foot associated with type 2 diabetes mellitus, limited to breakdown of skin (HCC)        -New wound to left foot.  Instructed on site care.  Ordered noninvasive vascular studies.  - Nails 1-5 bilateral were debrided in length and thickness with nail nipper and electric  to decrease fungal load and risk of infection.   - All the patients questions were answered.  - RTC 1 week             This document has been electronically signed by Garrett Mcmillan DPM on March 4, 2022 14:21 CST      3/4/2022  14:21 CST

## 2022-03-09 ENCOUNTER — OFFICE VISIT (OUTPATIENT)
Dept: PODIATRY | Facility: CLINIC | Age: 67
End: 2022-03-09

## 2022-03-09 ENCOUNTER — TELEPHONE (OUTPATIENT)
Dept: VASCULAR SURGERY | Facility: CLINIC | Age: 67
End: 2022-03-09

## 2022-03-09 VITALS — HEART RATE: 68 BPM | OXYGEN SATURATION: 94 % | BODY MASS INDEX: 36.57 KG/M2 | HEIGHT: 74 IN | WEIGHT: 285 LBS

## 2022-03-09 DIAGNOSIS — R09.89 DECREASED PEDAL PULSES: Primary | ICD-10-CM

## 2022-03-09 DIAGNOSIS — L97.521 DIABETIC ULCER OF TOE OF LEFT FOOT ASSOCIATED WITH TYPE 2 DIABETES MELLITUS, LIMITED TO BREAKDOWN OF SKIN: ICD-10-CM

## 2022-03-09 DIAGNOSIS — E11.621 DIABETIC ULCER OF TOE OF LEFT FOOT ASSOCIATED WITH TYPE 2 DIABETES MELLITUS, LIMITED TO BREAKDOWN OF SKIN: ICD-10-CM

## 2022-03-09 DIAGNOSIS — I73.9 PERIPHERAL VASCULAR DISEASE: ICD-10-CM

## 2022-03-09 PROCEDURE — 99213 OFFICE O/P EST LOW 20 MIN: CPT | Performed by: PODIATRIST

## 2022-03-09 NOTE — PROGRESS NOTES
Arias Willson  1955  66 y.o. male   PCP MOIRA Bass MD - 11/15/2021  BS - 160 per pt    Diabetic foot care/ left foot skin ulcer     03/09/2022     Chief Complaint   Patient presents with   • Left Foot - Follow-up   • Right Foot - Follow-up       History of Present Illness    Arias Willson is a 66 y.o.male who presents to clinic today for follow-up.  He has had his vascular studies.      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      • Benign essential hypertension    • Blood in feces    • Callus    • Carotid artery stenosis    • Cerebrovascular accident (HCC)      L sided sensory deficit      • Conjunctivitis    • Constipation     OCCASIONAL   • Contusion, eye, left    • Corneal ulcer     PROBABLE   • Diabetes mellitus (HCC)    • Eczema    • Emphysema lung (HCC)    • Encounter for screening for malignant neoplasm of colon    • Essential hypertension    • Foot ulcer (HCC)    • GERD (gastroesophageal reflux disease)    • Hemorrhoids    • History of echocardiogram 04/05/2013    Echocadiogram W/ color flow 38126 (Normal LV systolic function with EF of 60-65%. Grade I diastolic dysfunction of the LV myocardium. No evidence of LV apical thrombus. No evidence of pericardial effusion.)   • Gus's syndrome     RIGHT EYE   • Hyperkeratosis    • Hyperlipidemia    • Mucopurulent conjunctivitis     ACUTE   • Myopia    • Neuropathic pain    • Obesity    • Onychomycosis    • Tobacco dependence syndrome          Past Surgical History:   Procedure Laterality Date   • COLONOSCOPY  07/30/2015    Colonoscopy, diagnostic (screening) 39293 (Screening for malignant neoplasm of colon)    • COLONOSCOPY  09/09/2015    Colonoscopy, diagnostic (screening) 98418 (Diverticulosis found in the sigmoid colon.Hemorrhoids found in the anus.)   • COLONOSCOPY  05/01/2009     Colonoscopy, diagnostic (screening) 28803 (Small internal and external hemorrhoids were present, Colonoscopy otherwise normal.)   • COLONOSCOPY N/A 12/9/2020    Procedure: COLONOSCOPY;  Surgeon: Arias Larsen MD;  Location: [facility] ENDOSCOPY;  Service: Gastroenterology;  Laterality: N/A;   • LEG SURGERY           Family History   Problem Relation Age of Onset   • Glaucoma Other    • Heart disease Other    • Stroke Other    • Macular degeneration Other    • Diabetes Other    • Glaucoma Father    • Macular degeneration Father    • Cataracts Father    • Cancer Father    • Macular degeneration Sister    • Heart disease Mother    • Cancer Paternal Grandmother    • Heart disease Paternal Grandfather        No Known Allergies    Social History     Socioeconomic History   • Marital status: Single   Tobacco Use   • Smoking status: Current Every Day Smoker     Packs/day: 0.50     Years: 33.00     Pack years: 16.50     Types: Cigarettes   • Smokeless tobacco: Never Used   Vaping Use   • Vaping Use: Never used   Substance and Sexual Activity   • Alcohol use: Not Currently   • Drug use: No   • Sexual activity: Defer         Current Outpatient Medications   Medication Sig Dispense Refill   • ammonium lactate (LAC-HYDRIN) 12 % lotion Apply  topically to the appropriate area as directed Daily. 500 g 2   • [START ON 5/24/2106] aspirin 325 MG tablet Take 1 tablet by mouth Daily. 90 tablet 3   • baclofen (LIORESAL) 10 MG tablet Take 1 tablet by mouth 3 (Three) Times a Day. 270 tablet 0   • clopidogrel (PLAVIX) 75 MG tablet Take 1 tablet by mouth Daily. 90 tablet 0   • Cyanocobalamin (B-12) 5000 MCG sublingual tablet Place 1 each under the tongue Daily. 30 tablet 6   • DULoxetine (CYMBALTA) 60 MG capsule Take 1 capsule by mouth Daily. 90 capsule 0   • empagliflozin (Jardiance) 25 MG tablet tablet Take 1 tablet by mouth Daily. 90 tablet 1   • gabapentin (NEURONTIN) 600 MG tablet Take 1 tablet by mouth 3 (Three) Times a Day. 270  "tablet 1   • glucose blood test strip 1 each by Other route 3 (Three) Times a Day. Use as instructed 300 each 1   • glucose monitor monitoring kit 3 (Three) Times a Day. 1 each 0   • Insulin Glargine (Lantus SoloStar) 100 UNIT/ML injection pen INJECT 34 UNITS SUBCUTANEOUSLY ONCE DAILY INCREASE  BY  2  UNITS  EVERY  3  DAYS  UNTIL  MORNING  BLOOD  SUGAR  IS  100-150 45 mL 2   • Lancets 30G misc 1 each 3 (Three) Times a Day. 300 each 1   • lansoprazole (PREVACID) 30 MG capsule Take 1 capsule by mouth Daily. 90 capsule 1   • losartan (COZAAR) 50 MG tablet Take 1 tablet by mouth Daily. 90 tablet 3   • metFORMIN (GLUCOPHAGE) 500 MG tablet TAKE 1 TABLET TWICE DAILY 180 tablet 0   • metoprolol tartrate (LOPRESSOR) 25 MG tablet Take 1 tablet by mouth Every 12 (Twelve) Hours. 180 tablet 3   • pravastatin (PRAVACHOL) 40 MG tablet Take 1 tablet by mouth Every Night. 90 tablet 0   • spironolactone (ALDACTONE) 25 MG tablet Take 1 tablet by mouth Daily. 90 tablet 3   • Ventolin  (90 Base) MCG/ACT inhaler Inhale 2 puffs Every 4 (Four) Hours As Needed for Wheezing. 18 g 3   • budesonide-formoterol (SYMBICORT) 160-4.5 MCG/ACT inhaler Inhale 2 puffs 2 (Two) Times a Day for 90 days. 3 each 1     No current facility-administered medications for this visit.       Review of Systems   Constitutional: Negative.    Respiratory: Negative.    Musculoskeletal:        Toe pain    Psychiatric/Behavioral: Negative.          OBJECTIVE    Pulse 68   Ht 188 cm (74.02\")   Wt 129 kg (285 lb)   SpO2 94%   BMI 36.57 kg/m²       Physical Exam  Vitals reviewed.   Constitutional:       General: He is not in acute distress.     Appearance: He is well-developed.   HENT:      Head: Normocephalic and atraumatic.   Cardiovascular:      Pulses:           Dorsalis pedis pulses are 2+ on the right side and 2+ on the left side.        Posterior tibial pulses are 2+ on the right side and 2+ on the left side.   Pulmonary:      Effort: Pulmonary effort is " normal. No respiratory distress.      Breath sounds: No wheezing.   Musculoskeletal:         General: No deformity.      Right foot: Decreased range of motion. No deformity or prominent metatarsal heads.      Left foot: Decreased range of motion. No deformity or prominent metatarsal heads.        Feet:    Feet:      Right foot:      Protective Sensation: 5 sites tested. 0 sites sensed.      Skin integrity: Callus present. No ulcer.      Toenail Condition: Right toenails are abnormally thick and long. Fungal disease present.     Left foot:      Protective Sensation: 5 sites tested. 0 sites sensed.      Skin integrity: Callus present. No ulcer.      Toenail Condition: Left toenails are abnormally thick and long. Fungal disease present.  Skin:     General: Skin is warm and dry.      Capillary Refill: Capillary refill takes less than 2 seconds.   Neurological:      Mental Status: He is alert and oriented to person, place, and time.   Psychiatric:         Behavior: Behavior normal.         Thought Content: Thought content normal.         Procedures      ASSESSMENT AND PLAN    Diagnoses and all orders for this visit:    1. Decreased pedal pulses (Primary)  -     Ambulatory Referral to Vascular Surgery    2. Peripheral vascular disease (HCC)    3. Diabetic ulcer of toe of left foot associated with type 2 diabetes mellitus, limited to breakdown of skin (HCC)        -Vascular studies reviewed.  Significantly reduced perfusion to the left lower extremity.  Referral to vascular surgery for further evaluation and treatment.  Continue wound care as instructed.  Recheck following vascular surgery evaluation.             This document has been electronically signed by Garrett Mcmillan DPM on March 13, 2022 11:58 CDT     3/13/2022  11:58 CDT

## 2022-03-10 ENCOUNTER — OFFICE VISIT (OUTPATIENT)
Dept: VASCULAR SURGERY | Facility: CLINIC | Age: 67
End: 2022-03-10

## 2022-03-10 ENCOUNTER — PATIENT ROUNDING (BHMG ONLY) (OUTPATIENT)
Dept: VASCULAR SURGERY | Facility: CLINIC | Age: 67
End: 2022-03-10

## 2022-03-10 VITALS
RESPIRATION RATE: 18 BRPM | DIASTOLIC BLOOD PRESSURE: 96 MMHG | BODY MASS INDEX: 36.32 KG/M2 | SYSTOLIC BLOOD PRESSURE: 170 MMHG | HEIGHT: 74 IN | WEIGHT: 283 LBS

## 2022-03-10 DIAGNOSIS — Z79.02 ENCOUNTER FOR MONITORING ANTIPLATELET THERAPY: ICD-10-CM

## 2022-03-10 DIAGNOSIS — Z01.818 PREOP TESTING: ICD-10-CM

## 2022-03-10 DIAGNOSIS — E78.00 PURE HYPERCHOLESTEROLEMIA: ICD-10-CM

## 2022-03-10 DIAGNOSIS — I73.9 PAD (PERIPHERAL ARTERY DISEASE): Primary | ICD-10-CM

## 2022-03-10 DIAGNOSIS — Z51.81 ENCOUNTER FOR MONITORING ANTIPLATELET THERAPY: ICD-10-CM

## 2022-03-10 DIAGNOSIS — I65.23 BILATERAL CAROTID ARTERY STENOSIS: ICD-10-CM

## 2022-03-10 DIAGNOSIS — I10 ESSENTIAL HYPERTENSION: Chronic | ICD-10-CM

## 2022-03-10 PROCEDURE — 99214 OFFICE O/P EST MOD 30 MIN: CPT | Performed by: SURGERY

## 2022-03-10 NOTE — PROGRESS NOTES
March 10, 2022    [place], may I speak with Arias Willson?    My name is Nuria Sanches      I am  with [facility]  [address], [address]  [place] [address] [phone].    Before we get started may I verify your date of birth? 1955    I am calling to officially welcome you to our practice and ask about your recent visit. Is this a good time to talk? yes    Tell me about your visit with us. What things went well?  Pt stated that one a scale of 1 to 10 out office is a 10.       We're always looking for ways to make our patients' experiences even better. Do you have recommendations on ways we may improve?  no    Overall were you satisfied with your first visit to our practice? Yes, it was great.       I appreciate you taking the time to speak with me today. Is there anything else I can do for you? no      Thank you, and have a great day.

## 2022-03-10 NOTE — PROGRESS NOTES
03/10/2022      Garrett Mcmillan DPM  [address] [place] [address]    Arias Willson  1955    Chief Complaint   Patient presents with   • Establish Care     Referral by Dr Garrett Mcmillan for Decreased Pedal Pulses - IMTIAZ 03/03/22. Pt states that he has calf pain and a small amount of swelling. Pt has no feeling in his feet.    Pt denies any stroke-like symptoms.   • Smoker     Pt verified Current Every Day Smoker.         Dear Garrett Mcmillan DPM    HPI  I had the pleasure of seeing your patient Arias Willson in the office today.  Thank you kindly for this consultation.  As you recall, Arias Willson is a 66 y.o.  male who you are currently following for wounds to his left toes.  He is maintained on full dose aspirin, Plavix, and Pravachol.        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      • Benign essential hypertension    • Blood in feces    • Callus    • Carotid artery stenosis    • Cerebrovascular accident (HCC)      L sided sensory deficit      • Conjunctivitis    • Constipation     OCCASIONAL   • Contusion, eye, left    • Corneal ulcer     PROBABLE   • Diabetes mellitus (HCC)    • Eczema    • Emphysema lung (HCC)    • Encounter for screening for malignant neoplasm of colon    • Essential hypertension    • Foot ulcer (HCC)    • GERD (gastroesophageal reflux disease)    • Hemorrhoids    • History of echocardiogram 04/05/2013    Echocadiogram W/ color flow 82951 (Normal LV systolic function with EF of 60-65%. Grade I diastolic dysfunction of the LV myocardium. No evidence of LV apical thrombus. No evidence of pericardial effusion.)   • Gus's syndrome     RIGHT EYE   • Hyperkeratosis    • Hyperlipidemia    • Mucopurulent conjunctivitis     ACUTE   • Myopia    • Neuropathic pain    • Obesity     • Onychomycosis    • Tobacco dependence syndrome        Past Surgical History:   Procedure Laterality Date   • COLONOSCOPY  07/30/2015    Colonoscopy, diagnostic (screening) 55122 (Screening for malignant neoplasm of colon)    • COLONOSCOPY  09/09/2015    Colonoscopy, diagnostic (screening) 87114 (Diverticulosis found in the sigmoid colon.Hemorrhoids found in the anus.)   • COLONOSCOPY  05/01/2009    Colonoscopy, diagnostic (screening) 83769 (Small internal and external hemorrhoids were present, Colonoscopy otherwise normal.)   • COLONOSCOPY N/A 12/9/2020    Procedure: COLONOSCOPY;  Surgeon: Arias Larsen MD;  Location: [facility] ENDOSCOPY;  Service: Gastroenterology;  Laterality: N/A;   • LEG SURGERY         Family History   Problem Relation Age of Onset   • Glaucoma Other    • Heart disease Other    • Stroke Other    • Macular degeneration Other    • Diabetes Other    • Glaucoma Father    • Macular degeneration Father    • Cataracts Father    • Cancer Father    • Macular degeneration Sister    • Heart disease Mother    • Cancer Paternal Grandmother    • Heart disease Paternal Grandfather        Social History     Socioeconomic History   • Marital status: Single   Tobacco Use   • Smoking status: Current Every Day Smoker     Packs/day: 0.50     Years: 33.00     Pack years: 16.50     Types: Cigarettes   • Smokeless tobacco: Never Used   Vaping Use   • Vaping Use: Never used   Substance and Sexual Activity   • Alcohol use: Not Currently   • Drug use: No   • Sexual activity: Defer       No Known Allergies      Current Outpatient Medications:   •  ammonium lactate (LAC-HYDRIN) 12 % lotion, Apply  topically to the appropriate area as directed Daily., Disp: 500 g, Rfl: 2  •  [START ON 5/24/2106] aspirin 325 MG tablet, Take 1 tablet by mouth Daily., Disp: 90 tablet, Rfl: 3  •  baclofen (LIORESAL) 10 MG tablet, Take 1 tablet by mouth 3 (Three) Times a Day., Disp: 270 tablet, Rfl: 0  •  budesonide-formoterol (SYMBICORT)  160-4.5 MCG/ACT inhaler, Inhale 2 puffs 2 (Two) Times a Day for 90 days., Disp: 3 each, Rfl: 1  •  clopidogrel (PLAVIX) 75 MG tablet, Take 1 tablet by mouth Daily., Disp: 90 tablet, Rfl: 0  •  Cyanocobalamin (B-12) 5000 MCG sublingual tablet, Place 1 each under the tongue Daily., Disp: 30 tablet, Rfl: 6  •  DULoxetine (CYMBALTA) 60 MG capsule, Take 1 capsule by mouth Daily., Disp: 90 capsule, Rfl: 0  •  empagliflozin (Jardiance) 25 MG tablet tablet, Take 1 tablet by mouth Daily., Disp: 90 tablet, Rfl: 1  •  gabapentin (NEURONTIN) 600 MG tablet, Take 1 tablet by mouth 3 (Three) Times a Day., Disp: 270 tablet, Rfl: 1  •  glucose blood test strip, 1 each by Other route 3 (Three) Times a Day. Use as instructed, Disp: 300 each, Rfl: 1  •  glucose monitor monitoring kit, 3 (Three) Times a Day., Disp: 1 each, Rfl: 0  •  Insulin Glargine (Lantus SoloStar) 100 UNIT/ML injection pen, INJECT 34 UNITS SUBCUTANEOUSLY ONCE DAILY INCREASE  BY  2  UNITS  EVERY  3  DAYS  UNTIL  MORNING  BLOOD  SUGAR  IS  100-150, Disp: 45 mL, Rfl: 2  •  Lancets 30G misc, 1 each 3 (Three) Times a Day., Disp: 300 each, Rfl: 1  •  lansoprazole (PREVACID) 30 MG capsule, Take 1 capsule by mouth Daily., Disp: 90 capsule, Rfl: 1  •  losartan (COZAAR) 50 MG tablet, Take 1 tablet by mouth Daily., Disp: 90 tablet, Rfl: 3  •  metFORMIN (GLUCOPHAGE) 500 MG tablet, TAKE 1 TABLET TWICE DAILY, Disp: 180 tablet, Rfl: 0  •  metoprolol tartrate (LOPRESSOR) 25 MG tablet, Take 1 tablet by mouth Every 12 (Twelve) Hours., Disp: 180 tablet, Rfl: 3  •  pravastatin (PRAVACHOL) 40 MG tablet, Take 1 tablet by mouth Every Night., Disp: 90 tablet, Rfl: 0  •  spironolactone (ALDACTONE) 25 MG tablet, Take 1 tablet by mouth Daily., Disp: 90 tablet, Rfl: 3  •  Ventolin  (90 Base) MCG/ACT inhaler, Inhale 2 puffs Every 4 (Four) Hours As Needed for Wheezing., Disp: 18 g, Rfl: 3    Review of Systems   Constitutional: Negative.    HENT: Negative.    Eyes: Negative.   "  Respiratory: Negative.    Cardiovascular: Negative.    Gastrointestinal: Negative.    Endocrine: Negative.    Genitourinary: Negative.    Musculoskeletal: Positive for arthralgias and gait problem.   Skin: Negative.    Allergic/Immunologic: Negative.    Neurological: Positive for numbness.   Hematological: Negative.    Psychiatric/Behavioral: Negative.    All other systems reviewed and are negative.    /96 (BP Location: Left arm, Patient Position: Sitting)   Resp 18   Ht 188 cm (74\")   Wt 128 kg (283 lb)   BMI 36.34 kg/m²     Physical Exam  Vitals and nursing note reviewed.   Constitutional:       Appearance: Normal appearance. He is well-developed.   HENT:      Head: Normocephalic and atraumatic.   Eyes:      General: No scleral icterus.     Pupils: Pupils are equal, round, and reactive to light.   Neck:      Thyroid: No thyromegaly.      Vascular: No carotid bruit or JVD.   Cardiovascular:      Rate and Rhythm: Normal rate and regular rhythm.      Pulses:           Carotid pulses are 2+ on the right side and 2+ on the left side.       Femoral pulses are 2+ on the right side and 2+ on the left side.       Popliteal pulses are 2+ on the right side and 2+ on the left side.        Dorsalis pedis pulses are detected w/ Doppler on the right side and detected w/ Doppler on the left side.        Posterior tibial pulses are detected w/ Doppler on the right side and detected w/ Doppler on the left side.      Heart sounds: Normal heart sounds.      Comments: Wound to left 2nd and 5th toes  Pulmonary:      Effort: Pulmonary effort is normal.      Breath sounds: Normal breath sounds.   Abdominal:      General: Bowel sounds are normal. There is no distension or abdominal bruit.      Palpations: Abdomen is soft. There is no mass.      Tenderness: There is no abdominal tenderness.   Musculoskeletal:         General: Swelling present. Normal range of motion.      Cervical back: Neck supple.   Lymphadenopathy:      " Cervical: No cervical adenopathy.   Skin:     General: Skin is warm and dry.   Neurological:      General: No focal deficit present.      Mental Status: He is alert and oriented to person, place, and time.      Cranial Nerves: No cranial nerve deficit.      Sensory: No sensory deficit.   Psychiatric:         Mood and Affect: Mood normal.         Behavior: Behavior normal.         Thought Content: Thought content normal.         Judgment: Judgment normal.         Diagnostic Data:  Lower Arterial Doppler Pressures     Right Pressure (mmHg) Left Pressure (mmHg)   Brachial 166 mmHg        158 mmHg           mmHg        66 mmHg           mmHg        39 mmHg               Lower Arterial Doppler Ratios     Right Ratio Left Ratio   IMTIAZ 1.16        0.4                   Patient Active Problem List   Diagnosis   • Ptosis of eyelid   • Astigmatism   • Myopia   • Gus's syndrome   • Neuropathic pain   • GERD (gastroesophageal reflux disease)   • Aneurysm of carotid artery ([facility])   • H/O: CVA (cerebrovascular accident)   • Sensory deficit present   • Gait disturbance, post-stroke   • Cognitive deficit, post-stroke   • Hypertension   • Diabetes mellitus ([facility])   • Morbid obesity ([facility])   • Coronary artery disease   • NEREYDA treated with BiPAP   • Macrocytosis without anemia   • Need for immunization against influenza   • Need for vaccination   • Elevated brain natriuretic peptide (BNP) level   • Tremor of both hands   • Seizures ([facility])   • Fall   • Left knee pain   • Class 2 severe obesity due to excess calories with serious comorbidity and body mass index (BMI) of 35.0 to 35.9 in adult ([facility])   • High risk medication use   • Hypoxia   • Generalized edema   • Other insomnia   • Peripheral edema   • Nicotine abuse   • Nocturnal hypoxemia due to obesity   • COPD, group B, by GOLD 2017 classification ([facility])   • Syncope   • Physical deconditioning   • Pure hypercholesterolemia   • Cigarette nicotine dependence, uncomplicated   •  Primary osteoarthritis of left knee   • Effusion, left knee   • Right knee pain   • Stage 2 moderate COPD by GOLD classification ([facility])   • Carbuncle   • Hyperkeratosis   • Encounter for screening for malignant neoplasm of colon   • Essential hypertension   • PAD (peripheral artery disease) (HCC)        Diagnosis Plan   1. PAD (peripheral artery disease) (HCC)  COVID PRE-OP / PRE-PROCEDURE SCREENING ORDER (NO ISOLATION) - Swab, Nasopharynx    CBC and Differential    Basic metabolic panel    APTT    Protime-INR    ECG 12 Lead    XR chest 2 vw    Case Request    Case Request   2. Preop testing  COVID PRE-OP / PRE-PROCEDURE SCREENING ORDER (NO ISOLATION) - Swab, Nasopharynx    CBC and Differential    Basic metabolic panel    APTT    Protime-INR    ECG 12 Lead    XR chest 2 vw    Case Request    Case Request   3. Encounter for monitoring antiplatelet therapy  APTT   4. Essential hypertension     5. Pure hypercholesterolemia         Plan: After thoroughly evaluating Arias Willson, I believe the best course of action is to proceed with a left lower extremity angiogram.  Risks of angiogram were discussed.  These include, but are not limited to, bleeding, infection, vessel damage, nerve damage, embolus, and loss of limb.  The patient understands these risks and wishes to proceed with procedure. I will also have his get a carotid duplex for screening with preop labwork.  I did review his testing showing severe arterial disease to his left lower extremity. I did discuss vascular risk factors as they pertain to the progression of vascular disease including controlling his hypertension, hyperlipidemia, diabetes, and smoking cessation.  His hypertension, cholesterol, and diabetes are currently stable. Unfortunately, he does continue to smoke on a daily basis despite continued risks.  He reports he is cutting back.  The patient is to continue taking their medications as previously discussed.   This was all discussed in full  with complete understanding.  Thank you for allowing me to participate in the care of your patient.  Please do not hesitate to call with any questions or concerns.  We will keep you aware of any further encounters with Arias Willson.        Sincerely yours,         DO Kali Jesus Billy Kevin, MD

## 2022-03-11 ENCOUNTER — TELEPHONE (OUTPATIENT)
Dept: VASCULAR SURGERY | Facility: CLINIC | Age: 67
End: 2022-03-11

## 2022-03-14 ENCOUNTER — OFFICE VISIT (OUTPATIENT)
Dept: FAMILY MEDICINE CLINIC | Facility: CLINIC | Age: 67
End: 2022-03-14

## 2022-03-14 ENCOUNTER — TELEPHONE (OUTPATIENT)
Dept: VASCULAR SURGERY | Facility: CLINIC | Age: 67
End: 2022-03-14

## 2022-03-14 VITALS
SYSTOLIC BLOOD PRESSURE: 140 MMHG | HEIGHT: 74 IN | BODY MASS INDEX: 36.06 KG/M2 | WEIGHT: 281 LBS | DIASTOLIC BLOOD PRESSURE: 70 MMHG | TEMPERATURE: 96.4 F

## 2022-03-14 DIAGNOSIS — Z79.4 TYPE 2 DIABETES MELLITUS WITH DIABETIC AUTONOMIC NEUROPATHY, WITH LONG-TERM CURRENT USE OF INSULIN: ICD-10-CM

## 2022-03-14 DIAGNOSIS — I73.9 PAD (PERIPHERAL ARTERY DISEASE): ICD-10-CM

## 2022-03-14 DIAGNOSIS — I10 PRIMARY HYPERTENSION: ICD-10-CM

## 2022-03-14 DIAGNOSIS — E11.43 TYPE 2 DIABETES MELLITUS WITH DIABETIC AUTONOMIC NEUROPATHY, WITH LONG-TERM CURRENT USE OF INSULIN: ICD-10-CM

## 2022-03-14 DIAGNOSIS — I73.00 RAYNAUD'S DISEASE WITHOUT GANGRENE: ICD-10-CM

## 2022-03-14 DIAGNOSIS — M79.2 NEUROPATHIC PAIN: ICD-10-CM

## 2022-03-14 DIAGNOSIS — G90.2 HORNER'S SYNDROME: ICD-10-CM

## 2022-03-14 DIAGNOSIS — Z72.0 NICOTINE ABUSE: ICD-10-CM

## 2022-03-14 DIAGNOSIS — H02.401 PTOSIS OF RIGHT EYELID: ICD-10-CM

## 2022-03-14 LAB
EXPIRATION DATE: ABNORMAL
HBA1C MFR BLD: 6.7 %
Lab: ABNORMAL

## 2022-03-14 PROCEDURE — 83036 HEMOGLOBIN GLYCOSYLATED A1C: CPT | Performed by: FAMILY MEDICINE

## 2022-03-14 PROCEDURE — 99214 OFFICE O/P EST MOD 30 MIN: CPT | Performed by: FAMILY MEDICINE

## 2022-03-14 RX ORDER — AMLODIPINE BESYLATE 5 MG/1
5 TABLET ORAL DAILY
Qty: 90 TABLET | Refills: 2 | Status: SHIPPED | OUTPATIENT
Start: 2022-03-14 | End: 2022-12-06

## 2022-03-14 RX ORDER — GABAPENTIN 600 MG/1
600 TABLET ORAL 3 TIMES DAILY
Qty: 270 TABLET | Refills: 1 | Status: SHIPPED | OUTPATIENT
Start: 2022-03-14 | End: 2022-07-14 | Stop reason: SDUPTHER

## 2022-03-14 NOTE — TELEPHONE ENCOUNTER
SPOKE WITH PATIENT REGARDING SURGERY. PT WAS INFORMED OF PRE WORK ON 03/18 AT 1445. PT WAS ALSO INFORMED OF SURGERY ON 03/21 AT 0600. PT WAS INFORMED OF COVID TEST AND VISITATION. PT STATED UNDERSTANDING OF INSTRUCTIONS AND ALL INFORMATION.

## 2022-03-14 NOTE — PROGRESS NOTES
Chief Complaint  Diabetes, Hypertension, Heartburn, Pain, and Peripheral Vascular Disease  ' Have plan for angiogram to check circulation to l leg for next Monday'.    Subjective     {Problem List  Visit Diagnosis   Encounters  Notes  Medications  Labs  Result Review Imaging  Media :23}     Review of Systems   Constitutional: Positive for fatigue and irritability. Negative for activity change, appetite change, chills and fever. Weight loss: neuro.   HENT: Negative for congestion, ear pain and sore throat.    Eyes: Negative for pain and visual disturbance.   Respiratory: Positive for shortness of breath. Negative for cough, chest tightness and wheezing.    Cardiovascular: Negative for chest pain and palpitations.   Gastrointestinal: Negative for abdominal pain and nausea.   Endocrine: Negative for cold intolerance and heat intolerance.   Genitourinary: Negative for difficulty urinating and dysuria.   Musculoskeletal: Positive for back pain, gait problem, neck pain and neck stiffness. Negative for arthralgias.   Skin: Positive for pallor and wound. Negative for color change and rash.        L toes black areas.   Allergic/Immunologic: Negative for environmental allergies.   Neurological: Positive for dizziness, focal weakness, weakness, numbness, headaches and loss of balance. Negative for seizures.   Hematological: Negative for adenopathy. Does not bruise/bleed easily.   Psychiatric/Behavioral: Positive for decreased concentration and sleep disturbance. Negative for agitation, confusion and suicidal ideas. The patient is nervous/anxious and has insomnia.         Depressed mood  Irritability improving.       Arias Willson presents to [facility] [place]  PVD, cyanosis of al toes, Raynaud's of fingers, splinter hemorrhage fingernails. Seeing vascular surgery at [place].     Diabetes  He presents for his follow-up diabetic visit. He has type 2 diabetes mellitus.  No MedicAlert identification noted. His disease course has been fluctuating. Hypoglycemia symptoms include dizziness, headaches, nervousness/anxiousness and pallor. Pertinent negatives for hypoglycemia include no confusion or seizures. Associated symptoms include fatigue, visual change and weakness. Pertinent negatives for diabetes include no chest pain. Weight loss: neuro. There are no hypoglycemic complications. Symptoms are worsening. Diabetic complications include a CVA and peripheral neuropathy. Risk factors for coronary artery disease include diabetes mellitus, male sex, tobacco exposure, stress, sedentary lifestyle, obesity, hypertension and dyslipidemia. Current diabetic treatment includes oral agent (monotherapy). He is compliant with treatment some of the time. His weight is increasing rapidly. He is following a generally unhealthy diet. When asked about meal planning, he reported none. He rarely participates in exercise. Home blood sugar record trend: Unknown. An ACE inhibitor/angiotensin II receptor blocker is being taken. He does not see a podiatrist.Eye exam is current.   Pain  This is a chronic problem. The current episode started more than 1 year ago. The problem occurs daily. Associated symptoms include fatigue, headaches, neck pain, numbness, a visual change and weakness. Pertinent negatives include no abdominal pain, arthralgias, chest pain, chills, congestion, coughing, fever, nausea, rash or sore throat. The symptoms are aggravated by walking and stress. He has tried NSAIDs and acetaminophen for the symptoms. The treatment provided no relief.   Hypertension  This is a chronic problem. The current episode started more than 1 year ago. The problem has been waxing and waning since onset. The problem is controlled. Associated symptoms include headaches, neck pain and shortness of breath. Pertinent negatives include no chest pain or palpitations. Agents associated with hypertension include NSAIDs.  Risk factors for coronary artery disease include obesity, male gender, smoking/tobacco exposure and stress (H/O CVA). Past treatments include ACE inhibitors, angiotensin blockers, beta blockers, diuretics and lifestyle changes. Current antihypertension treatment includes angiotensin blockers and diuretics. The current treatment provides significant improvement. Compliance problems include medication cost.  Hypertensive end-organ damage includes CVA.   Insomnia  This is a chronic problem. The current episode started more than 1 year ago. The problem occurs constantly. The problem has been waxing and waning. Associated symptoms include fatigue, headaches, neck pain, numbness, a visual change and weakness. Pertinent negatives include no abdominal pain, arthralgias, chest pain, chills, congestion, coughing, fever, nausea, rash or sore throat. The symptoms are aggravated by stress. Treatments tried: OTC meds CPAP. The treatment provided significant relief.   Breathing Problem  He complains of difficulty breathing and shortness of breath. There is no cough or wheezing. Primary symptoms comments: Using 02 since hospitalization Jan 2018, H/O low pulse-ox, seizures like spells. Improved tx NEREYDA on Bipap. Associated symptoms include headaches. Pertinent negatives include no appetite change, chest pain, ear pain, fever or sore throat. Weight loss: neuro. His symptoms are aggravated by exercise. His symptoms are alleviated by nothing. He reports moderate (O2) improvement on treatment.   Stroke  This is a chronic problem. The current episode started more than 1 year ago. The problem occurs daily. The problem has been gradually improving. Associated symptoms include fatigue, headaches, neck pain, numbness, a visual change and weakness. Pertinent negatives include no abdominal pain, arthralgias, chest pain, chills, congestion, coughing, fever, nausea, rash or sore throat. The symptoms are aggravated by exertion and walking. He has  "tried acetaminophen and NSAIDs for the symptoms. The treatment provided no relief.   Neurologic Problem  The patient's primary symptoms include an altered mental status, clumsiness, focal sensory loss, focal weakness, a loss of balance, a visual change and weakness. Primary symptoms comment: Delayed cognitive processing. Seizures. This is a chronic problem. The current episode started more than 1 year ago. The neurological problem developed suddenly. The problem has been waxing and waning since onset. There was left-sided, right-sided, facial, upper extremity and lower extremity (Strokes of Basal ganglia. Horners syndrome,  sensory, motor deficits.) focality noted. Associated symptoms include back pain, dizziness, fatigue, headaches, neck pain and shortness of breath. Pertinent negatives include no abdominal pain, chest pain, confusion, fever, nausea or palpitations. (Requires Walker to ambulate.) Past treatments include walking and medication (Physical therapy). The treatment provided mild relief. His past medical history is significant for a CVA.   Depression  Visit Type: follow-up  Patient presents with the following symptoms: decreased concentration, depressed mood, excessive worry, feelings of hopelessness, feelings of worthlessness, insomnia, irritability, memory impairment, nervousness/anxiety, restlessness and shortness of breath.  Patient is not experiencing: confusion, palpitations, suicidal ideas and suicidal planning.  Weight loss: neuro.  Frequency of symptoms: occasionally   Severity: moderate   Sleep quality: fair  Nighttime awakenings: several        Objective   Vital Signs:   /70 (BP Location: Left arm, Patient Position: Sitting, Cuff Size: Adult)   Temp 96.4 °F (35.8 °C) (Temporal)   Ht 188 cm (74\")   Wt 127 kg (281 lb)   BMI 36.08 kg/m²     Physical Exam  Vitals and nursing note reviewed.   Constitutional:       General: He is not in acute distress.     Appearance: He is obese.   HENT: "      Head: Normocephalic.      Right Ear: Tympanic membrane and ear canal normal.      Left Ear: Tympanic membrane and ear canal normal.      Nose: Nose normal.      Mouth/Throat:      Mouth: Mucous membranes are moist.      Pharynx: No oropharyngeal exudate or posterior oropharyngeal erythema.   Eyes:      General:         Right eye: No discharge.         Left eye: No discharge.      Extraocular Movements: Extraocular movements intact.      Conjunctiva/sclera: Conjunctivae normal.      Comments: Gus's   Neck:      Vascular: No carotid bruit.   Cardiovascular:      Rate and Rhythm: Normal rate and regular rhythm.   Pulmonary:      Effort: Pulmonary effort is normal.      Breath sounds: Wheezing and rhonchi present.   Abdominal:      Palpations: Abdomen is soft.      Tenderness: There is no right CVA tenderness or left CVA tenderness.   Musculoskeletal:      Cervical back: Rigidity and tenderness present.      Right lower leg: Edema present.      Left lower leg: Edema present.      Comments: WB 4 with ROM low back   Lymphadenopathy:      Cervical: No cervical adenopathy.   Skin:     Capillary Refill: Capillary refill takes 2 to 3 seconds.      Findings: No erythema or lesion.      Comments: Hyperkeratosis  Raynaud's of Fingers , splinter hemorrhage fingernails   Cyanosis L toes.     Neurological:      Mental Status: He is oriented to person, place, and time.      Cranial Nerves: Cranial nerve deficit present.      Sensory: Sensory deficit present.      Motor: Weakness present.      Coordination: Coordination normal.      Gait: Gait abnormal.      Comments: CVA with residual Gus's syndrome    Psychiatric:         Mood and Affect: Mood normal.         Behavior: Behavior normal.         Thought Content: Thought content normal.         Judgment: Judgment normal.        Result Review :                 Assessment and Plan    Diagnoses and all orders for this visit:    1. Type 2 diabetes mellitus with diabetic  autonomic neuropathy, with long-term current use of insulin ([facility])  Comments:  Improved.  Orders:  -     POC Glycated Hemoglobin, Total    2. Neuropathic pain  -     gabapentin (NEURONTIN) 600 MG tablet; Take 1 tablet by mouth 3 (Three) Times a Day.  Dispense: 270 tablet; Refill: 1    3. Raynaud's disease without gangrene  Comments:  Will stop BB, start CCB  Orders:  -     amLODIPine (NORVASC) 5 MG tablet; Take 1 tablet by mouth Daily.  Dispense: 90 tablet; Refill: 2    4. Gus's syndrome    5. Ptosis of right eyelid    6. Nicotine abuse  Comments:  Pt trying to quit.     7. PAD (peripheral artery disease) ([facility])  Comments:  Followed by Vascular.  Orders:  -     amLODIPine (NORVASC) 5 MG tablet; Take 1 tablet by mouth Daily.  Dispense: 90 tablet; Refill: 2    8. Primary hypertension  -     amLODIPine (NORVASC) 5 MG tablet; Take 1 tablet by mouth Daily.  Dispense: 90 tablet; Refill: 2        Follow Up {Instructions Charge Capture  Follow-up Communications :23}  Return in about 4 months (around 7/14/2022).  Patient was given instructions and counseling regarding his condition or for health maintenance advice. Please see specific information pulled into the AVS if appropriate.         This document has been electronically signed by Harpreet Bass MD on March 14, 2022

## 2022-03-18 ENCOUNTER — TELEPHONE (OUTPATIENT)
Dept: VASCULAR SURGERY | Facility: CLINIC | Age: 67
End: 2022-03-18

## 2022-03-18 ENCOUNTER — PRE-ADMISSION TESTING (OUTPATIENT)
Dept: PREADMISSION TESTING | Facility: HOSPITAL | Age: 67
End: 2022-03-18

## 2022-03-18 ENCOUNTER — HOSPITAL ENCOUNTER (OUTPATIENT)
Dept: GENERAL RADIOLOGY | Facility: HOSPITAL | Age: 67
Discharge: HOME | End: 2022-03-18

## 2022-03-18 ENCOUNTER — HOSPITAL ENCOUNTER (OUTPATIENT)
Dept: ULTRASOUND IMAGING | Facility: HOSPITAL | Age: 67
Discharge: HOME | End: 2022-03-18

## 2022-03-18 ENCOUNTER — LAB (OUTPATIENT)
Dept: LAB | Facility: HOSPITAL | Age: 67
End: 2022-03-18

## 2022-03-18 VITALS
DIASTOLIC BLOOD PRESSURE: 83 MMHG | RESPIRATION RATE: 18 BRPM | HEIGHT: 72 IN | SYSTOLIC BLOOD PRESSURE: 161 MMHG | BODY MASS INDEX: 38.46 KG/M2 | HEART RATE: 114 BPM | WEIGHT: 283.95 LBS | OXYGEN SATURATION: 98 %

## 2022-03-18 DIAGNOSIS — I73.9 PAD (PERIPHERAL ARTERY DISEASE): ICD-10-CM

## 2022-03-18 DIAGNOSIS — I65.23 BILATERAL CAROTID ARTERY STENOSIS: ICD-10-CM

## 2022-03-18 DIAGNOSIS — Z79.02 ENCOUNTER FOR MONITORING ANTIPLATELET THERAPY: ICD-10-CM

## 2022-03-18 DIAGNOSIS — Z51.81 ENCOUNTER FOR MONITORING ANTIPLATELET THERAPY: ICD-10-CM

## 2022-03-18 DIAGNOSIS — Z01.818 PREOP TESTING: ICD-10-CM

## 2022-03-18 LAB
ANION GAP SERPL CALCULATED.3IONS-SCNC: 14 MMOL/L (ref 5–15)
ANISOCYTOSIS BLD QL: ABNORMAL
APTT PPP: 25 SECONDS (ref 24.1–35)
BASOPHILS # BLD MANUAL: 0.08 10*3/MM3 (ref 0–0.2)
BASOPHILS NFR BLD MANUAL: 1.1 % (ref 0–1.5)
BUN SERPL-MCNC: 14 MG/DL (ref 8–23)
BUN/CREAT SERPL: 17.5 (ref 7–25)
CALCIUM SPEC-SCNC: 9.2 MG/DL (ref 8.6–10.5)
CHLORIDE SERPL-SCNC: 99 MMOL/L (ref 98–107)
CO2 SERPL-SCNC: 25 MMOL/L (ref 22–29)
CREAT SERPL-MCNC: 0.8 MG/DL (ref 0.76–1.27)
DEPRECATED RDW RBC AUTO: 52.4 FL (ref 37–54)
EGFRCR SERPLBLD CKD-EPI 2021: 98.8 ML/MIN/1.73
EOSINOPHIL # BLD MANUAL: 0.17 10*3/MM3 (ref 0–0.4)
EOSINOPHIL NFR BLD MANUAL: 2.2 % (ref 0.3–6.2)
ERYTHROCYTE [DISTWIDTH] IN BLOOD BY AUTOMATED COUNT: 20.9 % (ref 12.3–15.4)
GIANT PLATELETS: ABNORMAL
GLUCOSE SERPL-MCNC: 147 MG/DL (ref 65–99)
HCT VFR BLD AUTO: 33 % (ref 37.5–51)
HGB BLD-MCNC: 8.5 G/DL (ref 13–17.7)
HYPOCHROMIA BLD QL: ABNORMAL
INR PPP: 0.93 (ref 0.91–1.09)
LYMPHOCYTES # BLD MANUAL: 1.05 10*3/MM3 (ref 0.7–3.1)
LYMPHOCYTES NFR BLD MANUAL: 2.2 % (ref 5–12)
MACROCYTES BLD QL SMEAR: ABNORMAL
MCH RBC QN AUTO: 18.8 PG (ref 26.6–33)
MCHC RBC AUTO-ENTMCNC: 25.8 G/DL (ref 31.5–35.7)
MCV RBC AUTO: 73.2 FL (ref 79–97)
MICROCYTES BLD QL: ABNORMAL
MONOCYTES # BLD: 0.17 10*3/MM3 (ref 0.1–0.9)
NEUTROPHILS # BLD AUTO: 6.07 10*3/MM3 (ref 1.7–7)
NEUTROPHILS NFR BLD MANUAL: 79.6 % (ref 42.7–76)
NEUTS BAND NFR BLD MANUAL: 1.1 % (ref 0–5)
NRBC SPEC MANUAL: 2.2 /100 WBC (ref 0–0.2)
OVALOCYTES BLD QL SMEAR: ABNORMAL
PLATELET # BLD AUTO: 338 10*3/MM3 (ref 140–450)
PMV BLD AUTO: 10.3 FL (ref 6–12)
POIKILOCYTOSIS BLD QL SMEAR: ABNORMAL
POLYCHROMASIA BLD QL SMEAR: ABNORMAL
POTASSIUM SERPL-SCNC: 4 MMOL/L (ref 3.5–5.2)
PROTHROMBIN TIME: 12.1 SECONDS (ref 11.9–14.6)
RBC # BLD AUTO: 4.51 10*6/MM3 (ref 4.14–5.8)
SARS-COV-2 ORF1AB RESP QL NAA+PROBE: NOT DETECTED
SODIUM SERPL-SCNC: 138 MMOL/L (ref 136–145)
VARIANT LYMPHS NFR BLD MANUAL: 14 % (ref 19.6–45.3)
WBC MORPH BLD: NORMAL
WBC NRBC COR # BLD: 7.53 10*3/MM3 (ref 3.4–10.8)

## 2022-03-18 PROCEDURE — 71046 X-RAY EXAM CHEST 2 VIEWS: CPT

## 2022-03-18 PROCEDURE — 85025 COMPLETE CBC W/AUTO DIFF WBC: CPT

## 2022-03-18 PROCEDURE — 93010 ELECTROCARDIOGRAM REPORT: CPT | Performed by: INTERNAL MEDICINE

## 2022-03-18 PROCEDURE — 93880 EXTRACRANIAL BILAT STUDY: CPT | Performed by: SURGERY

## 2022-03-18 PROCEDURE — C9803 HOPD COVID-19 SPEC COLLECT: HCPCS

## 2022-03-18 PROCEDURE — 80048 BASIC METABOLIC PNL TOTAL CA: CPT

## 2022-03-18 PROCEDURE — U0004 COV-19 TEST NON-CDC HGH THRU: HCPCS

## 2022-03-18 PROCEDURE — 36415 COLL VENOUS BLD VENIPUNCTURE: CPT

## 2022-03-18 PROCEDURE — 93880 EXTRACRANIAL BILAT STUDY: CPT

## 2022-03-18 PROCEDURE — 85007 BL SMEAR W/DIFF WBC COUNT: CPT

## 2022-03-18 PROCEDURE — 93005 ELECTROCARDIOGRAM TRACING: CPT

## 2022-03-18 PROCEDURE — 85730 THROMBOPLASTIN TIME PARTIAL: CPT

## 2022-03-18 PROCEDURE — 85610 PROTHROMBIN TIME: CPT

## 2022-03-18 NOTE — DISCHARGE INSTRUCTIONS
Before you come to the hospital      Do not eat, drink, smoke, or chew gum after midnight the night before surgery. This includes no mints.    Arrival time: AS DIRECTED BY OFFICE     Only one family member or friend will be allowed per patient      YOU MAY TAKE THE FOLLOWING MEDICATION(S) THE MORNING OF SURGERY WITH A SIP OF WATER: GABAPENTIN    DO NOT TAKE LOSARTAN 24 HOURS PRIOR TO SURGERY         ALL OTHER HOME MEDICATION CHECK WITH YOUR PHYSICIAN                            (especially if you are taking diabetes medicines or blood thinners)     Do not take any Erectile Dysfunction medications (EX: CIALIS, VIAGRA) 24 hours prior to surgery      If you were given and instructed to use a germ- killing soap, use as directed the night before surgery and the morning of surgery before coming to the hospital.                 MANAGING PAIN AFTER SURGERY    We know you are probably wondering what your pain will be like after surgery.  Following surgery it is unrealistic to expect you will not have pain.   Pain is how our bodies let us know that something is wrong or cautions us to be careful.  That said, our goal is to make your pain tolerable.    Methods we may use to treat your pain include (oral or IV medications, PCAs, epidurals, nerve blocks, etc.)   While some procedures require IV pain medications for a short time after surgery, transitioning to pain medications by mouth allows for better management of pain.   Your nurse will encourage you to take oral pain medications whenever possible.  IV medications work almost immediately, but only last a short while.  Taking medications by mouth allows for a more constant level of medication in your blood stream for a longer period of time.      Once your pain is out of control it is harder to get back under control.  It is important you are aware when your next dose of pain medication is due.  If you are admitted, your nurse may write the time of your next dose on the white  board in your room to help you remember.      We are interested in your pain and encourage you to inform us about aggravating factors during your visit.   Many times a simple repositioning every few hours can make a big difference.    If your physician says it is okay, do not let your pain prevent you from getting out of bed. Be sure to call your nurse for assistance prior to getting up so you do not fall.      Before surgery, please decide your tolerable pain goal.  These faces help describe the pain ratings we use on a 0-10 scale.   Be prepared to tell us your goal and whether or not you take pain or anxiety medications at home.

## 2022-03-21 ENCOUNTER — APPOINTMENT (OUTPATIENT)
Dept: INTERVENTIONAL RADIOLOGY/VASCULAR | Facility: HOSPITAL | Age: 67
End: 2022-03-21

## 2022-03-21 ENCOUNTER — ANESTHESIA (OUTPATIENT)
Dept: PERIOP | Facility: HOSPITAL | Age: 67
End: 2022-03-21

## 2022-03-21 ENCOUNTER — ANESTHESIA EVENT (OUTPATIENT)
Dept: PERIOP | Facility: HOSPITAL | Age: 67
End: 2022-03-21

## 2022-03-21 ENCOUNTER — HOSPITAL ENCOUNTER (OUTPATIENT)
Facility: HOSPITAL | Age: 67
Setting detail: HOSPITAL OUTPATIENT SURGERY
Discharge: HOME | End: 2022-03-21
Attending: SURGERY | Admitting: SURGERY

## 2022-03-21 VITALS
TEMPERATURE: 98.9 F | HEART RATE: 88 BPM | RESPIRATION RATE: 16 BRPM | OXYGEN SATURATION: 96 % | DIASTOLIC BLOOD PRESSURE: 66 MMHG | SYSTOLIC BLOOD PRESSURE: 142 MMHG

## 2022-03-21 DIAGNOSIS — I73.9 PAD (PERIPHERAL ARTERY DISEASE): ICD-10-CM

## 2022-03-21 DIAGNOSIS — Z01.818 PREOP TESTING: ICD-10-CM

## 2022-03-21 LAB
ABO GROUP BLD: NORMAL
BLD GP AB SCN SERPL QL: NEGATIVE
GLUCOSE BLDC GLUCOMTR-MCNC: 150 MG/DL (ref 70–130)
GLUCOSE BLDC GLUCOMTR-MCNC: 159 MG/DL (ref 70–130)
QT INTERVAL: 350 MS
QTC INTERVAL: 451 MS
RH BLD: POSITIVE
T&S EXPIRATION DATE: NORMAL

## 2022-03-21 PROCEDURE — 25010000002 IOPAMIDOL 61 % SOLUTION: Performed by: SURGERY

## 2022-03-21 PROCEDURE — 76000 FLUOROSCOPY <1 HR PHYS/QHP: CPT

## 2022-03-21 PROCEDURE — 25010000002 HEPARIN (PORCINE) PER 1000 UNITS: Performed by: NURSE ANESTHETIST, CERTIFIED REGISTERED

## 2022-03-21 PROCEDURE — C1725 CATH, TRANSLUMIN NON-LASER: HCPCS | Performed by: SURGERY

## 2022-03-21 PROCEDURE — 75625 CONTRAST EXAM ABDOMINL AORTA: CPT

## 2022-03-21 PROCEDURE — 25010000002 PROPOFOL 1000 MG/100ML EMULSION: Performed by: NURSE ANESTHETIST, CERTIFIED REGISTERED

## 2022-03-21 PROCEDURE — C1894 INTRO/SHEATH, NON-LASER: HCPCS | Performed by: SURGERY

## 2022-03-21 PROCEDURE — 25010000002 CEFAZOLIN PER 500 MG: Performed by: SURGERY

## 2022-03-21 PROCEDURE — C1876 STENT, NON-COA/NON-COV W/DEL: HCPCS | Performed by: SURGERY

## 2022-03-21 PROCEDURE — 86901 BLOOD TYPING SEROLOGIC RH(D): CPT | Performed by: SURGERY

## 2022-03-21 PROCEDURE — C1884 EMBOLIZATION PROTECT SYST: HCPCS | Performed by: SURGERY

## 2022-03-21 PROCEDURE — C1887 CATHETER, GUIDING: HCPCS | Performed by: SURGERY

## 2022-03-21 PROCEDURE — 75710 ARTERY X-RAYS ARM/LEG: CPT

## 2022-03-21 PROCEDURE — 25010000002 FENTANYL CITRATE (PF) 100 MCG/2ML SOLUTION: Performed by: NURSE ANESTHETIST, CERTIFIED REGISTERED

## 2022-03-21 PROCEDURE — C2623 CATH, TRANSLUMIN, DRUG-COAT: HCPCS | Performed by: SURGERY

## 2022-03-21 PROCEDURE — 82962 GLUCOSE BLOOD TEST: CPT

## 2022-03-21 PROCEDURE — C1769 GUIDE WIRE: HCPCS | Performed by: SURGERY

## 2022-03-21 PROCEDURE — 37226 PR REVSC OPN/PRQ FEM/POP W/STNT/ANGIOP SM VSL: CPT | Performed by: SURGERY

## 2022-03-21 PROCEDURE — 75625 CONTRAST EXAM ABDOMINL AORTA: CPT | Performed by: SURGERY

## 2022-03-21 PROCEDURE — 25010000002 HEPARIN (PORCINE) PER 1000 UNITS: Performed by: SURGERY

## 2022-03-21 PROCEDURE — 86900 BLOOD TYPING SEROLOGIC ABO: CPT | Performed by: SURGERY

## 2022-03-21 PROCEDURE — C1760 CLOSURE DEV, VASC: HCPCS | Performed by: SURGERY

## 2022-03-21 PROCEDURE — 86850 RBC ANTIBODY SCREEN: CPT | Performed by: SURGERY

## 2022-03-21 PROCEDURE — 75710 ARTERY X-RAYS ARM/LEG: CPT | Performed by: SURGERY

## 2022-03-21 DEVICE — STNT PERIPH EVERFLEX ENTRUST 5F 6X40MM 120CM: Type: IMPLANTABLE DEVICE | Site: GROIN | Status: FUNCTIONAL

## 2022-03-21 RX ORDER — PROPOFOL 10 MG/ML
INJECTION, EMULSION INTRAVENOUS AS NEEDED
Status: DISCONTINUED | OUTPATIENT
Start: 2022-03-21 | End: 2022-03-21 | Stop reason: SURG

## 2022-03-21 RX ORDER — DROPERIDOL 2.5 MG/ML
0.62 INJECTION, SOLUTION INTRAMUSCULAR; INTRAVENOUS ONCE AS NEEDED
Status: DISCONTINUED | OUTPATIENT
Start: 2022-03-21 | End: 2022-03-21 | Stop reason: HOSPADM

## 2022-03-21 RX ORDER — HEPARIN SODIUM 1000 [USP'U]/ML
INJECTION, SOLUTION INTRAVENOUS; SUBCUTANEOUS AS NEEDED
Status: DISCONTINUED | OUTPATIENT
Start: 2022-03-21 | End: 2022-03-21 | Stop reason: SURG

## 2022-03-21 RX ORDER — SODIUM CHLORIDE 0.9 % (FLUSH) 0.9 %
3-10 SYRINGE (ML) INJECTION AS NEEDED
Status: DISCONTINUED | OUTPATIENT
Start: 2022-03-21 | End: 2022-03-21 | Stop reason: HOSPADM

## 2022-03-21 RX ORDER — BUPIVACAINE HYDROCHLORIDE 5 MG/ML
INJECTION, SOLUTION EPIDURAL; INTRACAUDAL AS NEEDED
Status: DISCONTINUED | OUTPATIENT
Start: 2022-03-21 | End: 2022-03-21 | Stop reason: HOSPADM

## 2022-03-21 RX ORDER — LIDOCAINE HYDROCHLORIDE 20 MG/ML
INJECTION, SOLUTION EPIDURAL; INFILTRATION; INTRACAUDAL; PERINEURAL AS NEEDED
Status: DISCONTINUED | OUTPATIENT
Start: 2022-03-21 | End: 2022-03-21 | Stop reason: SURG

## 2022-03-21 RX ORDER — SODIUM CHLORIDE, SODIUM LACTATE, POTASSIUM CHLORIDE, CALCIUM CHLORIDE 600; 310; 30; 20 MG/100ML; MG/100ML; MG/100ML; MG/100ML
1000 INJECTION, SOLUTION INTRAVENOUS CONTINUOUS
Status: DISCONTINUED | OUTPATIENT
Start: 2022-03-21 | End: 2022-03-21 | Stop reason: HOSPADM

## 2022-03-21 RX ORDER — SODIUM CHLORIDE 0.9 % (FLUSH) 0.9 %
10 SYRINGE (ML) INJECTION AS NEEDED
Status: DISCONTINUED | OUTPATIENT
Start: 2022-03-21 | End: 2022-03-21 | Stop reason: HOSPADM

## 2022-03-21 RX ORDER — FENTANYL CITRATE 50 UG/ML
25 INJECTION, SOLUTION INTRAMUSCULAR; INTRAVENOUS
Status: DISCONTINUED | OUTPATIENT
Start: 2022-03-21 | End: 2022-03-21 | Stop reason: HOSPADM

## 2022-03-21 RX ORDER — PHENYLEPHRINE HCL IN 0.9% NACL 1 MG/10 ML
SYRINGE (ML) INTRAVENOUS AS NEEDED
Status: DISCONTINUED | OUTPATIENT
Start: 2022-03-21 | End: 2022-03-21 | Stop reason: SURG

## 2022-03-21 RX ORDER — NALOXONE HCL 0.4 MG/ML
0.4 VIAL (ML) INJECTION AS NEEDED
Status: DISCONTINUED | OUTPATIENT
Start: 2022-03-21 | End: 2022-03-21 | Stop reason: HOSPADM

## 2022-03-21 RX ORDER — KETAMINE HCL IN NACL, ISO-OSM 100MG/10ML
SYRINGE (ML) INJECTION AS NEEDED
Status: DISCONTINUED | OUTPATIENT
Start: 2022-03-21 | End: 2022-03-21 | Stop reason: SURG

## 2022-03-21 RX ORDER — FENTANYL CITRATE 50 UG/ML
INJECTION, SOLUTION INTRAMUSCULAR; INTRAVENOUS AS NEEDED
Status: DISCONTINUED | OUTPATIENT
Start: 2022-03-21 | End: 2022-03-21 | Stop reason: SURG

## 2022-03-21 RX ORDER — OXYCODONE AND ACETAMINOPHEN 10; 325 MG/1; MG/1
1 TABLET ORAL ONCE AS NEEDED
Status: DISCONTINUED | OUTPATIENT
Start: 2022-03-21 | End: 2022-03-21 | Stop reason: HOSPADM

## 2022-03-21 RX ORDER — ONDANSETRON 2 MG/ML
4 INJECTION INTRAMUSCULAR; INTRAVENOUS ONCE AS NEEDED
Status: DISCONTINUED | OUTPATIENT
Start: 2022-03-21 | End: 2022-03-21 | Stop reason: HOSPADM

## 2022-03-21 RX ORDER — MAGNESIUM HYDROXIDE 1200 MG/15ML
LIQUID ORAL AS NEEDED
Status: DISCONTINUED | OUTPATIENT
Start: 2022-03-21 | End: 2022-03-21 | Stop reason: HOSPADM

## 2022-03-21 RX ORDER — HYDROCODONE BITARTRATE AND ACETAMINOPHEN 5; 325 MG/1; MG/1
1 TABLET ORAL ONCE AS NEEDED
Status: DISCONTINUED | OUTPATIENT
Start: 2022-03-21 | End: 2022-03-21 | Stop reason: HOSPADM

## 2022-03-21 RX ORDER — LIDOCAINE HYDROCHLORIDE 10 MG/ML
0.5 INJECTION, SOLUTION EPIDURAL; INFILTRATION; INTRACAUDAL; PERINEURAL ONCE AS NEEDED
Status: DISCONTINUED | OUTPATIENT
Start: 2022-03-21 | End: 2022-03-21 | Stop reason: HOSPADM

## 2022-03-21 RX ORDER — SODIUM CHLORIDE 0.9 % (FLUSH) 0.9 %
3 SYRINGE (ML) INJECTION EVERY 12 HOURS SCHEDULED
Status: DISCONTINUED | OUTPATIENT
Start: 2022-03-21 | End: 2022-03-21 | Stop reason: HOSPADM

## 2022-03-21 RX ORDER — LABETALOL HYDROCHLORIDE 5 MG/ML
5 INJECTION, SOLUTION INTRAVENOUS
Status: DISCONTINUED | OUTPATIENT
Start: 2022-03-21 | End: 2022-03-21 | Stop reason: HOSPADM

## 2022-03-21 RX ORDER — SODIUM CHLORIDE 0.9 % (FLUSH) 0.9 %
3 SYRINGE (ML) INJECTION AS NEEDED
Status: DISCONTINUED | OUTPATIENT
Start: 2022-03-21 | End: 2022-03-21 | Stop reason: HOSPADM

## 2022-03-21 RX ORDER — SODIUM CHLORIDE, SODIUM LACTATE, POTASSIUM CHLORIDE, CALCIUM CHLORIDE 600; 310; 30; 20 MG/100ML; MG/100ML; MG/100ML; MG/100ML
100 INJECTION, SOLUTION INTRAVENOUS CONTINUOUS
Status: DISCONTINUED | OUTPATIENT
Start: 2022-03-21 | End: 2022-03-21 | Stop reason: HOSPADM

## 2022-03-21 RX ORDER — BUPIVACAINE HCL/0.9 % NACL/PF 0.1 %
2 PLASTIC BAG, INJECTION (ML) EPIDURAL ONCE
Status: COMPLETED | OUTPATIENT
Start: 2022-03-21 | End: 2022-03-21

## 2022-03-21 RX ORDER — FLUMAZENIL 0.1 MG/ML
0.2 INJECTION INTRAVENOUS AS NEEDED
Status: DISCONTINUED | OUTPATIENT
Start: 2022-03-21 | End: 2022-03-21 | Stop reason: HOSPADM

## 2022-03-21 RX ORDER — OXYCODONE AND ACETAMINOPHEN 7.5; 325 MG/1; MG/1
2 TABLET ORAL EVERY 4 HOURS PRN
Status: DISCONTINUED | OUTPATIENT
Start: 2022-03-21 | End: 2022-03-21 | Stop reason: HOSPADM

## 2022-03-21 RX ORDER — MIDAZOLAM HYDROCHLORIDE 1 MG/ML
1 INJECTION INTRAMUSCULAR; INTRAVENOUS
Status: DISCONTINUED | OUTPATIENT
Start: 2022-03-21 | End: 2022-03-21 | Stop reason: HOSPADM

## 2022-03-21 RX ADMIN — Medication 2 G: at 09:10

## 2022-03-21 RX ADMIN — HEPARIN SODIUM 1000 UNITS: 1000 INJECTION, SOLUTION INTRAVENOUS; SUBCUTANEOUS at 09:40

## 2022-03-21 RX ADMIN — HEPARIN SODIUM 3000 UNITS: 1000 INJECTION, SOLUTION INTRAVENOUS; SUBCUTANEOUS at 09:46

## 2022-03-21 RX ADMIN — LIDOCAINE HYDROCHLORIDE 100 MG: 20 INJECTION, SOLUTION EPIDURAL; INFILTRATION; INTRACAUDAL; PERINEURAL at 09:12

## 2022-03-21 RX ADMIN — Medication 25 MG: at 09:31

## 2022-03-21 RX ADMIN — Medication 25 MG: at 09:43

## 2022-03-21 RX ADMIN — SODIUM CHLORIDE, POTASSIUM CHLORIDE, SODIUM LACTATE AND CALCIUM CHLORIDE 1000 ML: 600; 310; 30; 20 INJECTION, SOLUTION INTRAVENOUS at 08:27

## 2022-03-21 RX ADMIN — Medication 25 MG: at 09:53

## 2022-03-21 RX ADMIN — PROPOFOL 125 MCG/KG/MIN: 10 INJECTION, EMULSION INTRAVENOUS at 09:12

## 2022-03-21 RX ADMIN — Medication 8 MCG: at 09:22

## 2022-03-21 RX ADMIN — PROPOFOL 50 MG: 10 INJECTION, EMULSION INTRAVENOUS at 09:11

## 2022-03-21 RX ADMIN — Medication 100 MCG: at 10:01

## 2022-03-21 RX ADMIN — FENTANYL CITRATE 100 MCG: 50 INJECTION, SOLUTION INTRAMUSCULAR; INTRAVENOUS at 09:13

## 2022-03-21 RX ADMIN — Medication 12 MCG: at 09:50

## 2022-03-21 RX ADMIN — Medication 25 MG: at 09:12

## 2022-03-21 NOTE — ANESTHESIA PREPROCEDURE EVALUATION
Anesthesia Evaluation     Patient summary reviewed   no history of anesthetic complications:  NPO Solid Status: > 6 hours  NPO Liquid Status: > 6 hours           Airway   Mallampati: II  Dental          Pulmonary    (+) a smoker Current, COPD, sleep apnea,   Cardiovascular   Exercise tolerance: poor (<4 METS)    (+) hypertension, CAD, PVD, hyperlipidemia,  carotid artery disease      Neuro/Psych  (+) seizures, CVA, tremors,    GI/Hepatic/Renal/Endo    (+) obesity,  GERD,  diabetes mellitus,     Musculoskeletal     Abdominal   (+) obese,    Substance History      OB/GYN          Other   arthritis,                      Anesthesia Plan    ASA 3     MAC     intravenous induction     Anesthetic plan, all risks, benefits, and alternatives have been provided, discussed and informed consent has been obtained with: patient.        CODE STATUS:

## 2022-03-21 NOTE — H&P
3/20/2022         Garrett Mcmillan DPM  [address]  [address] [place] [address]     Arias Willson  1955          Chief Complaint   Patient presents with   • Establish Care       Referral by Dr Garrett Mcmillan for Decreased Pedal Pulses - IMTIZA 03/03/22. Pt states that he has calf pain and a small amount of swelling. Pt has no feeling in his feet.    Pt denies any stroke-like symptoms.   • Smoker       Pt verified Current Every Day Smoker.            Dear Garrett Mcmillan DPM     HPI  I had the pleasure of seeing your patient Arias Willson in the office today.  Thank you kindly for this consultation.  As you recall, Arias Willson is a 66 y.o.  male who you are currently following for wounds to his left toes.  He is maintained on full dose aspirin, Plavix, and Pravachol.          Medical History        Past Medical History:   Diagnosis Date   • Abnormal glucose       PRE DM   • Acute conjunctivitis       RESOLVED   • Aneurysm of carotid artery (HCC)       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      • Benign essential hypertension     • Blood in feces     • Callus     • Carotid artery stenosis     • Cerebrovascular accident (HCC)        L sided sensory deficit      • Conjunctivitis     • Constipation       OCCASIONAL   • Contusion, eye, left     • Corneal ulcer       PROBABLE   • Diabetes mellitus (HCC)     • Eczema     • Emphysema lung (HCC)     • Encounter for screening for malignant neoplasm of colon     • Essential hypertension     • Foot ulcer (HCC)     • GERD (gastroesophageal reflux disease)     • Hemorrhoids     • History of echocardiogram 04/05/2013     Echocadiogram W/ color flow 93567 (Normal LV systolic function with EF of 60-65%. Grade I diastolic dysfunction of the LV myocardium. No evidence of LV apical thrombus. No evidence of pericardial effusion.)   • Gus's syndrome       RIGHT EYE   • Hyperkeratosis     • Hyperlipidemia     • Mucopurulent  conjunctivitis       ACUTE   • Myopia     • Neuropathic pain     • Obesity     • Onychomycosis     • Tobacco dependence syndrome              Surgical History         Past Surgical History:   Procedure Laterality Date   • COLONOSCOPY   07/30/2015     Colonoscopy, diagnostic (screening) 94935 (Screening for malignant neoplasm of colon)    • COLONOSCOPY   09/09/2015     Colonoscopy, diagnostic (screening) 39856 (Diverticulosis found in the sigmoid colon.Hemorrhoids found in the anus.)   • COLONOSCOPY   05/01/2009     Colonoscopy, diagnostic (screening) 58119 (Small internal and external hemorrhoids were present, Colonoscopy otherwise normal.)   • COLONOSCOPY N/A 12/9/2020     Procedure: COLONOSCOPY;  Surgeon: Arias Larsen MD;  Location: [facility] ENDOSCOPY;  Service: Gastroenterology;  Laterality: N/A;   • LEG SURGERY                      Family History   Problem Relation Age of Onset   • Glaucoma Other     • Heart disease Other     • Stroke Other     • Macular degeneration Other     • Diabetes Other     • Glaucoma Father     • Macular degeneration Father     • Cataracts Father     • Cancer Father     • Macular degeneration Sister     • Heart disease Mother     • Cancer Paternal Grandmother     • Heart disease Paternal Grandfather           Social History   Social History            Socioeconomic History   • Marital status: Single   Tobacco Use   • Smoking status: Current Every Day Smoker       Packs/day: 0.50       Years: 33.00       Pack years: 16.50       Types: Cigarettes   • Smokeless tobacco: Never Used   Vaping Use   • Vaping Use: Never used   Substance and Sexual Activity   • Alcohol use: Not Currently   • Drug use: No   • Sexual activity: Defer            No Known Allergies        Current Outpatient Medications:   •  ammonium lactate (LAC-HYDRIN) 12 % lotion, Apply  topically to the appropriate area as directed Daily., Disp: 500 g, Rfl: 2  •  [START ON 5/24/2106] aspirin 325 MG tablet, Take 1 tablet by mouth  Daily., Disp: 90 tablet, Rfl: 3  •  baclofen (LIORESAL) 10 MG tablet, Take 1 tablet by mouth 3 (Three) Times a Day., Disp: 270 tablet, Rfl: 0  •  budesonide-formoterol (SYMBICORT) 160-4.5 MCG/ACT inhaler, Inhale 2 puffs 2 (Two) Times a Day for 90 days., Disp: 3 each, Rfl: 1  •  clopidogrel (PLAVIX) 75 MG tablet, Take 1 tablet by mouth Daily., Disp: 90 tablet, Rfl: 0  •  Cyanocobalamin (B-12) 5000 MCG sublingual tablet, Place 1 each under the tongue Daily., Disp: 30 tablet, Rfl: 6  •  DULoxetine (CYMBALTA) 60 MG capsule, Take 1 capsule by mouth Daily., Disp: 90 capsule, Rfl: 0  •  empagliflozin (Jardiance) 25 MG tablet tablet, Take 1 tablet by mouth Daily., Disp: 90 tablet, Rfl: 1  •  gabapentin (NEURONTIN) 600 MG tablet, Take 1 tablet by mouth 3 (Three) Times a Day., Disp: 270 tablet, Rfl: 1  •  glucose blood test strip, 1 each by Other route 3 (Three) Times a Day. Use as instructed, Disp: 300 each, Rfl: 1  •  glucose monitor monitoring kit, 3 (Three) Times a Day., Disp: 1 each, Rfl: 0  •  Insulin Glargine (Lantus SoloStar) 100 UNIT/ML injection pen, INJECT 34 UNITS SUBCUTANEOUSLY ONCE DAILY INCREASE  BY  2  UNITS  EVERY  3  DAYS  UNTIL  MORNING  BLOOD  SUGAR  IS  100-150, Disp: 45 mL, Rfl: 2  •  Lancets 30G misc, 1 each 3 (Three) Times a Day., Disp: 300 each, Rfl: 1  •  lansoprazole (PREVACID) 30 MG capsule, Take 1 capsule by mouth Daily., Disp: 90 capsule, Rfl: 1  •  losartan (COZAAR) 50 MG tablet, Take 1 tablet by mouth Daily., Disp: 90 tablet, Rfl: 3  •  metFORMIN (GLUCOPHAGE) 500 MG tablet, TAKE 1 TABLET TWICE DAILY, Disp: 180 tablet, Rfl: 0  •  metoprolol tartrate (LOPRESSOR) 25 MG tablet, Take 1 tablet by mouth Every 12 (Twelve) Hours., Disp: 180 tablet, Rfl: 3  •  pravastatin (PRAVACHOL) 40 MG tablet, Take 1 tablet by mouth Every Night., Disp: 90 tablet, Rfl: 0  •  spironolactone (ALDACTONE) 25 MG tablet, Take 1 tablet by mouth Daily., Disp: 90 tablet, Rfl: 3  •  Ventolin  (90 Base) MCG/ACT inhaler,  "Inhale 2 puffs Every 4 (Four) Hours As Needed for Wheezing., Disp: 18 g, Rfl: 3     Review of Systems   Constitutional: Negative.    HENT: Negative.    Eyes: Negative.    Respiratory: Negative.    Cardiovascular: Negative.    Gastrointestinal: Negative.    Endocrine: Negative.    Genitourinary: Negative.    Musculoskeletal: Positive for arthralgias and gait problem.   Skin: Negative.    Allergic/Immunologic: Negative.    Neurological: Positive for numbness.   Hematological: Negative.    Psychiatric/Behavioral: Negative.    All other systems reviewed and are negative.     /96 (BP Location: Left arm, Patient Position: Sitting)   Resp 18   Ht 188 cm (74\")   Wt 128 kg (283 lb)   BMI 36.34 kg/m²      Physical Exam  Vitals and nursing note reviewed.   Constitutional:       Appearance: Normal appearance. He is well-developed.   HENT:      Head: Normocephalic and atraumatic.   Eyes:      General: No scleral icterus.     Pupils: Pupils are equal, round, and reactive to light.   Neck:      Thyroid: No thyromegaly.      Vascular: No carotid bruit or JVD.   Cardiovascular:      Rate and Rhythm: Normal rate and regular rhythm.      Pulses:           Carotid pulses are 2+ on the right side and 2+ on the left side.       Femoral pulses are 2+ on the right side and 2+ on the left side.       Popliteal pulses are 2+ on the right side and 2+ on the left side.        Dorsalis pedis pulses are detected w/ Doppler on the right side and detected w/ Doppler on the left side.        Posterior tibial pulses are detected w/ Doppler on the right side and detected w/ Doppler on the left side.      Heart sounds: Normal heart sounds.      Comments: Wound to left 2nd and 5th toes  Pulmonary:      Effort: Pulmonary effort is normal.      Breath sounds: Normal breath sounds.   Abdominal:      General: Bowel sounds are normal. There is no distension or abdominal bruit.      Palpations: Abdomen is soft. There is no mass.      Tenderness: " There is no abdominal tenderness.   Musculoskeletal:         General: Swelling present. Normal range of motion.      Cervical back: Neck supple.   Lymphadenopathy:      Cervical: No cervical adenopathy.   Skin:     General: Skin is warm and dry.   Neurological:      General: No focal deficit present.      Mental Status: He is alert and oriented to person, place, and time.      Cranial Nerves: No cranial nerve deficit.      Sensory: No sensory deficit.   Psychiatric:         Mood and Affect: Mood normal.         Behavior: Behavior normal.         Thought Content: Thought content normal.         Judgment: Judgment normal.            Diagnostic Data:  Lower Arterial Doppler Pressures       Right Pressure (mmHg) Left Pressure (mmHg)   Brachial 166 mmHg        158 mmHg           mmHg        66 mmHg           mmHg        39 mmHg                Lower Arterial Doppler Ratios       Right Ratio Left Ratio   IMTIAZ 1.16        0.4                          Patient Active Problem List   Diagnosis   • Ptosis of eyelid   • Astigmatism   • Myopia   • Gus's syndrome   • Neuropathic pain   • GERD (gastroesophageal reflux disease)   • Aneurysm of carotid artery ([facility])   • H/O: CVA (cerebrovascular accident)   • Sensory deficit present   • Gait disturbance, post-stroke   • Cognitive deficit, post-stroke   • Hypertension   • Diabetes mellitus ([facility])   • Morbid obesity ([facility])   • Coronary artery disease   • NEREYDA treated with BiPAP   • Macrocytosis without anemia   • Need for immunization against influenza   • Need for vaccination   • Elevated brain natriuretic peptide (BNP) level   • Tremor of both hands   • Seizures ([facility])   • Fall   • Left knee pain   • Class 2 severe obesity due to excess calories with serious comorbidity and body mass index (BMI) of 35.0 to 35.9 in adult ([facility])   • High risk medication use   • Hypoxia   • Generalized edema   • Other insomnia   • Peripheral edema   • Nicotine abuse   • Nocturnal hypoxemia due to  obesity   • COPD, group B, by GOLD 2017 classification ([facility])   • Syncope   • Physical deconditioning   • Pure hypercholesterolemia   • Cigarette nicotine dependence, uncomplicated   • Primary osteoarthritis of left knee   • Effusion, left knee   • Right knee pain   • Stage 2 moderate COPD by GOLD classification ([facility])   • Carbuncle   • Hyperkeratosis   • Encounter for screening for malignant neoplasm of colon   • Essential hypertension   • PAD (peripheral artery disease) ([facility])           Diagnosis Plan   1. PAD (peripheral artery disease) ([facility])  COVID PRE-OP / PRE-PROCEDURE SCREENING ORDER (NO ISOLATION) - Swab, Nasopharynx     CBC and Differential     Basic metabolic panel     APTT     Protime-INR     ECG 12 Lead     XR chest 2 vw     Case Request     Case Request   2. Preop testing  COVID PRE-OP / PRE-PROCEDURE SCREENING ORDER (NO ISOLATION) - Swab, Nasopharynx     CBC and Differential     Basic metabolic panel     APTT     Protime-INR     ECG 12 Lead     XR chest 2 vw     Case Request     Case Request   3. Encounter for monitoring antiplatelet therapy  APTT   4. Essential hypertension      5. Pure hypercholesterolemia            Plan: After thoroughly evaluating Arias Willson, I believe the best course of action is to proceed with a left lower extremity angiogram.  Risks of angiogram were discussed.  These include, but are not limited to, bleeding, infection, vessel damage, nerve damage, embolus, and loss of limb.  The patient understands these risks and wishes to proceed with procedure. I will also have his get a carotid duplex for screening with preop labwork.  I did review his testing showing severe arterial disease to his left lower extremity. I did discuss vascular risk factors as they pertain to the progression of vascular disease including controlling his hypertension, hyperlipidemia, diabetes, and smoking cessation.  His hypertension, cholesterol, and diabetes are currently stable. Unfortunately, he  does continue to smoke on a daily basis despite continued risks.  He reports he is cutting back.  The patient is to continue taking their medications as previously discussed.   This was all discussed in full with complete understanding.  Thank you for allowing me to participate in the care of your patient.  Please do not hesitate to call with any questions or concerns.  We will keep you aware of any further encounters with Arias Willson.         Sincerely yours,           Darryl Llanes, DO

## 2022-03-21 NOTE — ANESTHESIA POSTPROCEDURE EVALUATION
Patient: Arias Willson    Procedure Summary     Date: 03/21/22 Room / Location:  PAD OR  /  [facility] HYBRID OR 12    Anesthesia Start: 0910 Anesthesia Stop: 1028    Procedure: LEFT LOWER EXTREMITY ANGIOGRAM (Left Groin) Diagnosis:       PAD (peripheral artery disease) (HCC)      Preop testing      (PAD (peripheral artery disease) (HCC) [I73.9])      (Preop testing [Z01.818])    Surgeons: Darryl Llanes DO Provider: Arias Birch CRNA    Anesthesia Type: MAC ASA Status: 3          Anesthesia Type: MAC    Vitals  Vitals Value Taken Time   /73 03/21/22 1040   Temp 98.9 °F (37.2 °C) 03/21/22 1040   Pulse 86 03/21/22 1040   Resp 15 03/21/22 1040   SpO2 93 % 03/21/22 1040           Post Anesthesia Care and Evaluation    Patient location during evaluation: PACU  Patient participation: complete - patient participated  Level of consciousness: awake and alert  Pain management: adequate  Airway patency: patent  Anesthetic complications: No anesthetic complications  PONV Status: none  Cardiovascular status: acceptable and hemodynamically stable  Respiratory status: acceptable  Hydration status: acceptable    Comments: Blood pressure 142/66, pulse 88, temperature 98.9 °F (37.2 °C), temperature source Temporal, resp. rate 16, SpO2 96 %.    Patient discharged from PACU based upon Bear score. Please see RN notes for further details

## 2022-03-21 NOTE — OP NOTE
Arias Willson  3/21/2022     PREOPERATIVE DIAGNOSIS: PAD (peripheral artery disease) ([facility]) [I73.9]  Preop testing [Z01.818]     POSTOPERATIVE DIAGNOSIS: Post-Op Diagnosis Codes:     * PAD (peripheral artery disease) ([facility]) [I73.9]     * Preop testing [Z01.818]     PROCEDURE PERFORMED:   1.  Introduction of catheter/sheath into the aorta  2.  Aortoiliac angiogram with left lower extremity runoff  3.  Contralateral cannulation of the left common iliac artery  4.  Crossing of a distal SFA chronic total occlusion  5.  Placement of a 6 mm spider distal embolic protection device in the popliteal artery  6.  Balloon angioplasty of the distal SFA chronic total occlusion with a 5 x 40 mm EverCross balloon and a 6 x 40 mm Medtronic drug-coated balloon  7.  Stenting of the distal SFA chronic total occlusion with a 6 x 40 mm ever flex stent  8.  Completion left lower extremity angiogram with radiographic supervision and interpretation  9.  Retrieval of the spider distal embolic protection device  10.  Minx closure of the right common femoral artery     SURGEON: Darryl Llanes DO      ANESTHESIA: MAC    PREPARATION: Routine.    STAFF: Circulator: Alina Hadley RN; Nikki Yoo RN  Scrub Person: Corbin García; Caroline Agustin    Estimated Blood Loss: minimal    SPECIMENS: None    COMPLICATIONS: None    INDICATIONS: Arias Willson is a 66 y.o. male who you are currently following for wounds to his left toes.  He is maintained on full dose aspirin, Plavix, and Pravachol.  He has evidence of severe left lower extremity PAD.  He is here today for revascularization.  The indications, risks, and possible complications of the procedure were explained to the patient, who voiced understanding and wished to proceed with surgery.     PROCEDURE IN DETAIL: The patient was taken to the operating room and placed on the operating table in a supine position. After MAC anesthesia was obtained, the bilateral groins was prepped  and draped in a sterile manner.  5 cc of 0.5% Marcaine plain was used infiltrate the right groin for local anesthesia.  Using a micropuncture technique the right common femoral artery was cannulated and a micro sheath was placed.  The advantage Glidewire was advanced into the aorta and a short 6 Lao sheath was placed.  The patient was given 1000 units of intravenous heparin.  The Omni Flush catheter was advanced to the aorta and an aortoiliac angiogram was performed.  Findings are as follows:  1.  Patent renal arteries bilaterally without stenosis  2.  Patent aorta without stenosis  3.  Patent iliac systems bilaterally the stenosis    Contralateral cannulation was established to the left common iliac artery.  The catheter was then brought down to the level of the femoral head.  Angiogram with runoff was performed.  Findings are as follows:  1.  Patent common femoral, profunda femoris, and proximal/mid SFA without stenosis.  Atherosclerotic disease is present in the proximal and mid SFA  2.  Short segment distal SFA occlusion at the adductor hiatus  3.  Patent popliteal artery stenosis  4.  Patent three-vessel runoff to the foot without stenosis    At this point, the decision was made to treat the distal SFA .  A 6 Lao by 65 cm destination sheath was placed down into the mid SFA.  The patient was given 3000 units of intravenous heparin.  With the help of the Valencia cross catheter the  was easily traversed.  An angiogram was performed distally to ensure that I was in true lumen.  A 6 mm spider distal embolic addiction device was placed in the popliteal artery.  Lesion was then predilated with a 5 x 40 mm EverCross balloon and then a 6 x 40 mm Medtronic drug-coated balloon.  An angiogram was performed which showed rapid flow through this area without any stenosis, dissection, or occlusion.  Because of the heavy plaque burden present the decision was made to place a 6 x 40 mm ever flex stent.  It was  postdilated with the same 5 x 40 mm EverCross balloon.  The spider was then successfully retrieved.  Completion angiogram was performed which showed rapid flow down through the stent without any residual stenosis, dissection, or occlusion.  There is still maintained three-vessel runoff to the foot.  At this point, I felt no further intervention was warranted.  The sheath and wire were removed.  A minx was used to seal off the right common femoral artery.  Direct pressure was held for an additional 10 to 15 minutes to help ensure hemostasis.  Sterile dressings were applied. The patient tolerated the procedure well. Sponge and needle counts were correct. The patient was then awakened in the operating room and taken to the recovery room in good condition.    Darryl Llanes, DO  Date: 3/21/2022 Time: 10:11 CDT     CC:Garrett Mcmillan DPM

## 2022-03-23 DIAGNOSIS — Z79.4 TYPE 2 DIABETES MELLITUS WITH DIABETIC AUTONOMIC NEUROPATHY, WITH LONG-TERM CURRENT USE OF INSULIN: Chronic | ICD-10-CM

## 2022-03-23 DIAGNOSIS — E11.43 TYPE 2 DIABETES MELLITUS WITH DIABETIC AUTONOMIC NEUROPATHY, WITH LONG-TERM CURRENT USE OF INSULIN: Chronic | ICD-10-CM

## 2022-03-23 DIAGNOSIS — Z86.73 H/O: CVA (CEREBROVASCULAR ACCIDENT): ICD-10-CM

## 2022-03-23 DIAGNOSIS — E78.00 PURE HYPERCHOLESTEROLEMIA: ICD-10-CM

## 2022-03-23 DIAGNOSIS — M79.2 NEUROPATHIC PAIN: ICD-10-CM

## 2022-03-24 RX ORDER — PRAVASTATIN SODIUM 40 MG
40 TABLET ORAL NIGHTLY
Qty: 90 TABLET | Refills: 0 | Status: SHIPPED | OUTPATIENT
Start: 2022-03-24 | End: 2022-05-03

## 2022-03-24 RX ORDER — CLOPIDOGREL BISULFATE 75 MG/1
TABLET ORAL
Qty: 90 TABLET | Refills: 0 | Status: SHIPPED | OUTPATIENT
Start: 2022-03-24 | End: 2022-05-03

## 2022-03-24 RX ORDER — BACLOFEN 10 MG/1
TABLET ORAL
Qty: 270 TABLET | Refills: 0 | Status: SHIPPED | OUTPATIENT
Start: 2022-03-24 | End: 2022-05-03

## 2022-03-24 RX ORDER — DULOXETIN HYDROCHLORIDE 60 MG/1
CAPSULE, DELAYED RELEASE ORAL
Qty: 90 CAPSULE | Refills: 0 | Status: SHIPPED | OUTPATIENT
Start: 2022-03-24 | End: 2022-05-03

## 2022-03-24 NOTE — TELEPHONE ENCOUNTER
Rx Refill Note  Requested Prescriptions     Pending Prescriptions Disp Refills   • pravastatin (PRAVACHOL) 40 MG tablet [Pharmacy Med Name: PRAVASTATIN SODIUM 40 MG Tablet] 90 tablet 0     Sig: TAKE 1 TABLET BY MOUTH EVERY NIGHT.   • baclofen (LIORESAL) 10 MG tablet [Pharmacy Med Name: BACLOFEN 10 MG Tablet] 270 tablet 0     Sig: TAKE 1 TABLET THREE TIMES DAILY   • DULoxetine (CYMBALTA) 60 MG capsule [Pharmacy Med Name: DULOXETINE HYDROCHLORIDE 60 MG Capsule Delayed Release Particles] 90 capsule 0     Sig: TAKE 1 CAPSULE EVERY DAY   • clopidogrel (PLAVIX) 75 MG tablet [Pharmacy Med Name: CLOPIDOGREL 75 MG Tablet] 90 tablet 0     Sig: TAKE 1 TABLET EVERY DAY   • metFORMIN (GLUCOPHAGE) 500 MG tablet [Pharmacy Med Name: METFORMIN HYDROCHLORIDE 500 MG Tablet] 180 tablet 0     Sig: TAKE 1 TABLET TWICE DAILY      Last office visit with prescribing clinician: 3/14/2022      Next office visit with prescribing clinician: 7/14/2022   {TIP  Encounters:    HMG-CoA Reductase Inhibitors (Statins) Protocol Failed 03/23/2022 05:10 PM   Protocol Details  Lipid panel in past 12 months      SNRI Protocol Failed 03/23/2022 05:10 PM   Protocol Details  PHQ-2 or PHQ-9 within last 12 Mo           Plavix Protocol Failed 03/23/2022 05:10 PM    No conflicting PPI on medication list        Antihyperglycemics Protocol Failed 03/23/2022 05:10 PM   Protocol Details  Lipid panel in past year       {TIP  Please add Last Relevant Lab Date if appropriate: Lipid was done on 4/28/21.        {TIP  Is Refill Pharmacy correct? yes  Gaurang Saldana LPN  03/24/22, 08:55 CDT

## 2022-03-28 ENCOUNTER — OFFICE VISIT (OUTPATIENT)
Dept: PODIATRY | Facility: CLINIC | Age: 67
End: 2022-03-28

## 2022-03-28 VITALS — OXYGEN SATURATION: 98 % | WEIGHT: 283 LBS | HEART RATE: 95 BPM | HEIGHT: 72 IN | BODY MASS INDEX: 38.33 KG/M2

## 2022-03-28 DIAGNOSIS — I73.9 PERIPHERAL VASCULAR DISEASE: ICD-10-CM

## 2022-03-28 DIAGNOSIS — E11.621 DIABETIC ULCER OF TOE OF LEFT FOOT ASSOCIATED WITH TYPE 2 DIABETES MELLITUS, LIMITED TO BREAKDOWN OF SKIN: Primary | ICD-10-CM

## 2022-03-28 DIAGNOSIS — L97.521 DIABETIC ULCER OF TOE OF LEFT FOOT ASSOCIATED WITH TYPE 2 DIABETES MELLITUS, LIMITED TO BREAKDOWN OF SKIN: Primary | ICD-10-CM

## 2022-03-28 PROCEDURE — 11042 DBRDMT SUBQ TIS 1ST 20SQCM/<: CPT | Performed by: PODIATRIST

## 2022-03-28 NOTE — PROGRESS NOTES
Arias Willson  1955  66 y.o. male   PCP MOIRA Bass MD - 11/15/2021  BS - 142 per pt       03/28/2022     Chief Complaint   Patient presents with   • Left Foot - Follow-up, Results, Wound Check       History of Present Illness    Arias Willson is a 66 y.o.male who presents to clinic today for follow-up.   Patient had angiogram with balloon angioplasty by Dr. Llanes on March 21.  Patient states the pain in his left foot has significantly improved since revascularization.      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      • Benign essential hypertension    • Blood in feces    • Callus    • Carotid artery stenosis    • Cerebrovascular accident (HCC)      L sided sensory deficit      • Conjunctivitis    • Constipation     OCCASIONAL   • Contusion, eye, left    • Corneal ulcer     PROBABLE   • Diabetes mellitus (HCC)    • Eczema    • Elevated cholesterol    • Emphysema lung (HCC)    • Encounter for screening for malignant neoplasm of colon    • Essential hypertension    • Foot ulcer (HCC)    • GERD (gastroesophageal reflux disease)    • Hemorrhoids    • History of echocardiogram 04/05/2013    Echocadiogram W/ color flow 75977 (Normal LV systolic function with EF of 60-65%. Grade I diastolic dysfunction of the LV myocardium. No evidence of LV apical thrombus. No evidence of pericardial effusion.)   • History of transfusion    • Gus's syndrome     RIGHT EYE   • Hyperkeratosis    • Hyperlipidemia    • Mucopurulent conjunctivitis     ACUTE   • Myopia    • Neuropathic pain    • Obesity    • Onychomycosis    • Sleep apnea    • Tobacco dependence syndrome          Past Surgical History:   Procedure Laterality Date   • AORTAGRAM Left 3/21/2022    Procedure: LEFT LOWER EXTREMITY ANGIOGRAM;  Surgeon: Darryl Llanes DO;  Location:  [facility];  Service: Vascular;  Laterality: Left;   • COLONOSCOPY  07/30/2015    Colonoscopy, diagnostic (screening) 02512 (Screening for malignant neoplasm of colon)    • COLONOSCOPY  09/09/2015    Colonoscopy, diagnostic (screening) 60031 (Diverticulosis found in the sigmoid colon.Hemorrhoids found in the anus.)   • COLONOSCOPY  05/01/2009    Colonoscopy, diagnostic (screening) 57618 (Small internal and external hemorrhoids were present, Colonoscopy otherwise normal.)   • COLONOSCOPY N/A 12/09/2020    Procedure: COLONOSCOPY;  Surgeon: Arias Larsen MD;  Location: [facility] ENDOSCOPY;  Service: Gastroenterology;  Laterality: N/A;   • LEG SURGERY     • NOSE SURGERY           Family History   Problem Relation Age of Onset   • Glaucoma Other    • Heart disease Other    • Stroke Other    • Macular degeneration Other    • Diabetes Other    • Glaucoma Father    • Macular degeneration Father    • Cataracts Father    • Cancer Father    • Macular degeneration Sister    • Heart disease Mother    • Cancer Paternal Grandmother    • Heart disease Paternal Grandfather        No Known Allergies    Social History     Socioeconomic History   • Marital status: Single   Tobacco Use   • Smoking status: Current Every Day Smoker     Packs/day: 0.50     Years: 33.00     Pack years: 16.50     Types: Cigarettes   • Smokeless tobacco: Never Used   Vaping Use   • Vaping Use: Never used   Substance and Sexual Activity   • Alcohol use: Not Currently   • Drug use: No   • Sexual activity: Defer         Current Outpatient Medications   Medication Sig Dispense Refill   • amLODIPine (NORVASC) 5 MG tablet Take 1 tablet by mouth Daily. 90 tablet 2   • ammonium lactate (LAC-HYDRIN) 12 % lotion Apply  topically to the appropriate area as directed Daily. 500 g 2   • [START ON 5/24/2106] aspirin 325 MG tablet Take 1 tablet by mouth Daily. 90 tablet 3   • baclofen (LIORESAL) 10 MG tablet TAKE 1 TABLET THREE TIMES DAILY 270 tablet 0   • clopidogrel  "(PLAVIX) 75 MG tablet TAKE 1 TABLET EVERY DAY 90 tablet 0   • Cyanocobalamin (B-12) 5000 MCG sublingual tablet Place 1 each under the tongue Daily. (Patient taking differently: Place 1 each under the tongue 3 (Three) Times a Week. M, W, F) 30 tablet 6   • DULoxetine (CYMBALTA) 60 MG capsule TAKE 1 CAPSULE EVERY DAY 90 capsule 0   • empagliflozin (Jardiance) 25 MG tablet tablet Take 1 tablet by mouth Daily. 90 tablet 1   • gabapentin (NEURONTIN) 600 MG tablet Take 1 tablet by mouth 3 (Three) Times a Day. 270 tablet 1   • glucose blood test strip 1 each by Other route 3 (Three) Times a Day. Use as instructed 300 each 1   • glucose monitor monitoring kit 3 (Three) Times a Day. 1 each 0   • Insulin Glargine (Lantus SoloStar) 100 UNIT/ML injection pen INJECT 34 UNITS SUBCUTANEOUSLY ONCE DAILY INCREASE  BY  2  UNITS  EVERY  3  DAYS  UNTIL  MORNING  BLOOD  SUGAR  IS  100-150 45 mL 2   • Lancets 30G misc 1 each 3 (Three) Times a Day. 300 each 1   • lansoprazole (PREVACID) 30 MG capsule Take 1 capsule by mouth Daily. 90 capsule 1   • losartan (COZAAR) 50 MG tablet Take 1 tablet by mouth Daily. 90 tablet 3   • metFORMIN (GLUCOPHAGE) 500 MG tablet TAKE 1 TABLET TWICE DAILY 180 tablet 0   • pravastatin (PRAVACHOL) 40 MG tablet TAKE 1 TABLET BY MOUTH EVERY NIGHT. 90 tablet 0   • spironolactone (ALDACTONE) 25 MG tablet Take 1 tablet by mouth Daily. 90 tablet 3   • Ventolin  (90 Base) MCG/ACT inhaler Inhale 2 puffs Every 4 (Four) Hours As Needed for Wheezing. 18 g 3   • budesonide-formoterol (SYMBICORT) 160-4.5 MCG/ACT inhaler Inhale 2 puffs 2 (Two) Times a Day for 90 days. 3 each 1     No current facility-administered medications for this visit.       Review of Systems   Constitutional: Negative.    Respiratory: Negative.    Musculoskeletal:        Toe pain    Psychiatric/Behavioral: Negative.          OBJECTIVE    Pulse 95   Ht 182 cm (71.65\")   Wt 128 kg (283 lb)   SpO2 98%   BMI 38.76 kg/m²       Physical " Exam  Vitals reviewed.   Constitutional:       General: He is not in acute distress.     Appearance: He is well-developed.   HENT:      Head: Normocephalic and atraumatic.   Cardiovascular:      Pulses:           Dorsalis pedis pulses are 2+ on the right side and 2+ on the left side.        Posterior tibial pulses are 2+ on the right side and 2+ on the left side.   Pulmonary:      Effort: Pulmonary effort is normal. No respiratory distress.      Breath sounds: No wheezing.   Musculoskeletal:         General: No deformity.      Right foot: Decreased range of motion. No deformity or prominent metatarsal heads.      Left foot: Decreased range of motion. No deformity or prominent metatarsal heads.        Feet:    Feet:      Right foot:      Protective Sensation: 5 sites tested. 0 sites sensed.      Skin integrity: Callus present. No ulcer.      Toenail Condition: Right toenails are abnormally thick and long. Fungal disease present.     Left foot:      Protective Sensation: 5 sites tested. 0 sites sensed.      Skin integrity: Callus present. No ulcer.      Toenail Condition: Left toenails are abnormally thick and long. Fungal disease present.  Skin:     General: Skin is warm and dry.      Capillary Refill: Capillary refill takes less than 2 seconds.   Neurological:      Mental Status: He is alert and oriented to person, place, and time.   Psychiatric:         Behavior: Behavior normal.         Thought Content: Thought content normal.         Procedures      ASSESSMENT AND PLAN    Diagnoses and all orders for this visit:    1. Diabetic ulcer of toe of left foot associated with type 2 diabetes mellitus, limited to breakdown of skin (HCC) (Primary)    2. Peripheral vascular disease (HCC)        - Performed sharp excisional debridement of all devitalized tissue to the level of subcutaneous tissue with a #15 blade to healthy bleeding borders.  Post debridement the wound measures 0.4 x 0.3 x 0.2 cm.  Continue dressing change  as instructed.  Recheck 1 week             This document has been electronically signed by Garrett Mcmillan DPM on March 28, 2022 16:44 CDT     3/28/2022  16:44 CDT

## 2022-03-31 ENCOUNTER — TELEPHONE (OUTPATIENT)
Dept: VASCULAR SURGERY | Facility: CLINIC | Age: 67
End: 2022-03-31

## 2022-03-31 NOTE — TELEPHONE ENCOUNTER
Left message reminding Mr Willson of his appointment for Friday, April 1st, 2022 at 1130 am with Mary ALMENDAREZ. Also advised Mr Willson if he had any questions, concerns or needs to reschedule to please call the office at [phone].

## 2022-04-01 ENCOUNTER — OFFICE VISIT (OUTPATIENT)
Dept: VASCULAR SURGERY | Facility: CLINIC | Age: 67
End: 2022-04-01

## 2022-04-01 VITALS
RESPIRATION RATE: 18 BRPM | BODY MASS INDEX: 38.19 KG/M2 | DIASTOLIC BLOOD PRESSURE: 76 MMHG | SYSTOLIC BLOOD PRESSURE: 140 MMHG | HEIGHT: 72 IN | WEIGHT: 282 LBS

## 2022-04-01 DIAGNOSIS — I73.9 PAD (PERIPHERAL ARTERY DISEASE): Primary | ICD-10-CM

## 2022-04-01 DIAGNOSIS — E78.00 PURE HYPERCHOLESTEROLEMIA: ICD-10-CM

## 2022-04-01 DIAGNOSIS — I65.23 BILATERAL CAROTID ARTERY STENOSIS: ICD-10-CM

## 2022-04-01 DIAGNOSIS — I10 ESSENTIAL HYPERTENSION: ICD-10-CM

## 2022-04-01 PROCEDURE — 99214 OFFICE O/P EST MOD 30 MIN: CPT | Performed by: NURSE PRACTITIONER

## 2022-04-04 ENCOUNTER — OFFICE VISIT (OUTPATIENT)
Dept: PODIATRY | Facility: CLINIC | Age: 67
End: 2022-04-04

## 2022-04-04 VITALS — HEIGHT: 72 IN | WEIGHT: 282 LBS | HEART RATE: 68 BPM | BODY MASS INDEX: 38.19 KG/M2

## 2022-04-04 DIAGNOSIS — E11.621 DIABETIC ULCER OF TOE OF LEFT FOOT ASSOCIATED WITH TYPE 2 DIABETES MELLITUS, LIMITED TO BREAKDOWN OF SKIN: Primary | ICD-10-CM

## 2022-04-04 DIAGNOSIS — I73.9 PERIPHERAL VASCULAR DISEASE: ICD-10-CM

## 2022-04-04 DIAGNOSIS — L97.521 DIABETIC ULCER OF TOE OF LEFT FOOT ASSOCIATED WITH TYPE 2 DIABETES MELLITUS, LIMITED TO BREAKDOWN OF SKIN: Primary | ICD-10-CM

## 2022-04-04 PROCEDURE — 99212 OFFICE O/P EST SF 10 MIN: CPT | Performed by: PODIATRIST

## 2022-04-04 NOTE — PROGRESS NOTES
Arias Willson  1955  66 y.o. male   PCP MOIRA Bass MD - 3/14/22  A1C: 6.7      04/04/2022     Chief Complaint   Patient presents with   • Left Foot - Wound Check, Skin Ulcer       History of Present Illness    Arias Willson is a 66 y.o.male who presents to clinic today for follow-up. Wounds are improving.       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      • Benign essential hypertension    • Blood in feces    • Callus    • Carotid artery stenosis    • Cerebrovascular accident (HCC)      L sided sensory deficit      • Conjunctivitis    • Constipation     OCCASIONAL   • Contusion, eye, left    • Corneal ulcer     PROBABLE   • Diabetes mellitus (HCC)    • Eczema    • Elevated cholesterol    • Emphysema lung (HCC)    • Encounter for screening for malignant neoplasm of colon    • Essential hypertension    • Foot ulcer (HCC)    • GERD (gastroesophageal reflux disease)    • Hemorrhoids    • History of echocardiogram 04/05/2013    Echocadiogram W/ color flow 55787 (Normal LV systolic function with EF of 60-65%. Grade I diastolic dysfunction of the LV myocardium. No evidence of LV apical thrombus. No evidence of pericardial effusion.)   • History of transfusion    • Gus's syndrome     RIGHT EYE   • Hyperkeratosis    • Hyperlipidemia    • Mucopurulent conjunctivitis     ACUTE   • Myopia    • Neuropathic pain    • Obesity    • Onychomycosis    • Sleep apnea    • Tobacco dependence syndrome          Past Surgical History:   Procedure Laterality Date   • AORTAGRAM Left 3/21/2022    Procedure: LEFT LOWER EXTREMITY ANGIOGRAM;  Surgeon: Darryl Llanes DO;  Location: [place] [id number];  Service: Vascular;  Laterality: Left;   • COLONOSCOPY  07/30/2015    Colonoscopy, diagnostic (screening) 68524 (Screening for malignant neoplasm of colon)     • COLONOSCOPY  09/09/2015    Colonoscopy, diagnostic (screening) 30456 (Diverticulosis found in the sigmoid colon.Hemorrhoids found in the anus.)   • COLONOSCOPY  05/01/2009    Colonoscopy, diagnostic (screening) 57762 (Small internal and external hemorrhoids were present, Colonoscopy otherwise normal.)   • COLONOSCOPY N/A 12/09/2020    Procedure: COLONOSCOPY;  Surgeon: Arias Larsen MD;  Location: [facility] ENDOSCOPY;  Service: Gastroenterology;  Laterality: N/A;   • LEG SURGERY     • NOSE SURGERY           Family History   Problem Relation Age of Onset   • Glaucoma Other    • Heart disease Other    • Stroke Other    • Macular degeneration Other    • Diabetes Other    • Glaucoma Father    • Macular degeneration Father    • Cataracts Father    • Cancer Father    • Macular degeneration Sister    • Heart disease Mother    • Cancer Paternal Grandmother    • Heart disease Paternal Grandfather        No Known Allergies    Social History     Socioeconomic History   • Marital status: Single   Tobacco Use   • Smoking status: Current Every Day Smoker     Packs/day: 0.50     Years: 33.00     Pack years: 16.50     Types: Cigarettes   • Smokeless tobacco: Never Used   Vaping Use   • Vaping Use: Never used   Substance and Sexual Activity   • Alcohol use: Not Currently   • Drug use: No   • Sexual activity: Defer         Current Outpatient Medications   Medication Sig Dispense Refill   • amLODIPine (NORVASC) 5 MG tablet Take 1 tablet by mouth Daily. 90 tablet 2   • ammonium lactate (LAC-HYDRIN) 12 % lotion Apply  topically to the appropriate area as directed Daily. 500 g 2   • [START ON 5/24/2106] aspirin 325 MG tablet Take 1 tablet by mouth Daily. 90 tablet 3   • baclofen (LIORESAL) 10 MG tablet TAKE 1 TABLET THREE TIMES DAILY 270 tablet 0   • clopidogrel (PLAVIX) 75 MG tablet TAKE 1 TABLET EVERY DAY 90 tablet 0   • Cyanocobalamin (B-12) 5000 MCG sublingual tablet Place 1 each under the tongue Daily. (Patient taking  "differently: Place 1 each under the tongue 3 (Three) Times a Week. M, W, F) 30 tablet 6   • DULoxetine (CYMBALTA) 60 MG capsule TAKE 1 CAPSULE EVERY DAY 90 capsule 0   • empagliflozin (Jardiance) 25 MG tablet tablet Take 1 tablet by mouth Daily. 90 tablet 1   • gabapentin (NEURONTIN) 600 MG tablet Take 1 tablet by mouth 3 (Three) Times a Day. 270 tablet 1   • glucose blood test strip 1 each by Other route 3 (Three) Times a Day. Use as instructed 300 each 1   • glucose monitor monitoring kit 3 (Three) Times a Day. 1 each 0   • Insulin Glargine (Lantus SoloStar) 100 UNIT/ML injection pen INJECT 34 UNITS SUBCUTANEOUSLY ONCE DAILY INCREASE  BY  2  UNITS  EVERY  3  DAYS  UNTIL  MORNING  BLOOD  SUGAR  IS  100-150 45 mL 2   • Lancets 30G misc 1 each 3 (Three) Times a Day. 300 each 1   • lansoprazole (PREVACID) 30 MG capsule Take 1 capsule by mouth Daily. 90 capsule 1   • losartan (COZAAR) 50 MG tablet Take 1 tablet by mouth Daily. 90 tablet 3   • metFORMIN (GLUCOPHAGE) 500 MG tablet TAKE 1 TABLET TWICE DAILY 180 tablet 0   • pravastatin (PRAVACHOL) 40 MG tablet TAKE 1 TABLET BY MOUTH EVERY NIGHT. 90 tablet 0   • spironolactone (ALDACTONE) 25 MG tablet Take 1 tablet by mouth Daily. 90 tablet 3   • Ventolin  (90 Base) MCG/ACT inhaler Inhale 2 puffs Every 4 (Four) Hours As Needed for Wheezing. 18 g 3   • budesonide-formoterol (SYMBICORT) 160-4.5 MCG/ACT inhaler Inhale 2 puffs 2 (Two) Times a Day for 90 days. 3 each 1     No current facility-administered medications for this visit.       Review of Systems   Constitutional: Negative.    Respiratory: Negative.    Psychiatric/Behavioral: Negative.          OBJECTIVE    Pulse 68   Ht 182.9 cm (72\")   Wt 128 kg (282 lb)   BMI 38.25 kg/m²       Physical Exam  Vitals reviewed.   Constitutional:       General: He is not in acute distress.     Appearance: He is well-developed.   HENT:      Head: Normocephalic and atraumatic.   Cardiovascular:      Pulses:           Dorsalis " pedis pulses are 2+ on the right side and 2+ on the left side.        Posterior tibial pulses are 2+ on the right side and 2+ on the left side.   Pulmonary:      Effort: Pulmonary effort is normal. No respiratory distress.      Breath sounds: No wheezing.   Musculoskeletal:         General: No deformity.      Right foot: Decreased range of motion. No deformity or prominent metatarsal heads.      Left foot: Decreased range of motion. No deformity or prominent metatarsal heads.        Feet:    Feet:      Right foot:      Protective Sensation: 5 sites tested. 0 sites sensed.      Skin integrity: Callus present. No ulcer.      Toenail Condition: Right toenails are abnormally thick. Fungal disease present.     Left foot:      Protective Sensation: 5 sites tested. 0 sites sensed.      Skin integrity: Callus present. No ulcer.      Toenail Condition: Left toenails are abnormally thick. Fungal disease present.  Skin:     General: Skin is warm and dry.      Capillary Refill: Capillary refill takes less than 2 seconds.   Neurological:      Mental Status: He is alert and oriented to person, place, and time.   Psychiatric:         Behavior: Behavior normal.         Thought Content: Thought content normal.         Procedures      ASSESSMENT AND PLAN    Diagnoses and all orders for this visit:    1. Diabetic ulcer of toe of left foot associated with type 2 diabetes mellitus, limited to breakdown of skin (HCC) (Primary)    2. Peripheral vascular disease (HCC)        - wounds are calloused over. Start daily moisturizer. [place] 2-3 weeks.              This document has been electronically signed by Garrett Mcmillan DPM on April 4, 2022 20:14 CDT     4/4/2022  20:14 CDT

## 2022-04-05 DIAGNOSIS — E11.43 TYPE 2 DIABETES MELLITUS WITH DIABETIC AUTONOMIC NEUROPATHY, WITH LONG-TERM CURRENT USE OF INSULIN: Chronic | ICD-10-CM

## 2022-04-05 DIAGNOSIS — Z79.4 TYPE 2 DIABETES MELLITUS WITH DIABETIC AUTONOMIC NEUROPATHY, WITH LONG-TERM CURRENT USE OF INSULIN: Chronic | ICD-10-CM

## 2022-04-05 NOTE — TELEPHONE ENCOUNTER
Rx Refill Note  Requested Prescriptions     Pending Prescriptions Disp Refills   • metFORMIN (GLUCOPHAGE) 500 MG tablet [Pharmacy Med Name: METFORMIN HYDROCHLORIDE 500 MG Tablet] 180 tablet 0     Sig: TAKE 1 TABLET TWICE DAILY      Last office visit with prescribing clinician: 3/14/2022      Next office visit with prescribing clinician: 7/14/2022   {TIP  Encounters:     Antihyperglycemics Protocol Failed 04/05/2022 06:50 AM   Protocol Details  Lipid panel in past year            {TIP  Please add Last Relevant Lab Date if appropriate: LIPID DONE ON 4/28/2021       {TIP  Is Refill Pharmacy correct? YES  Gaurang Saldana LPN  04/05/22, 10:21 CDT

## 2022-05-03 DIAGNOSIS — E78.00 PURE HYPERCHOLESTEROLEMIA: ICD-10-CM

## 2022-05-03 DIAGNOSIS — M79.2 NEUROPATHIC PAIN: ICD-10-CM

## 2022-05-03 DIAGNOSIS — Z86.73 H/O: CVA (CEREBROVASCULAR ACCIDENT): ICD-10-CM

## 2022-05-03 RX ORDER — DULOXETIN HYDROCHLORIDE 60 MG/1
CAPSULE, DELAYED RELEASE ORAL
Qty: 90 CAPSULE | Refills: 0 | Status: SHIPPED | OUTPATIENT
Start: 2022-05-03 | End: 2022-07-14 | Stop reason: SDUPTHER

## 2022-05-03 RX ORDER — CLOPIDOGREL BISULFATE 75 MG/1
TABLET ORAL
Qty: 90 TABLET | Refills: 0 | Status: SHIPPED | OUTPATIENT
Start: 2022-05-03 | End: 2022-07-14 | Stop reason: SDUPTHER

## 2022-05-03 RX ORDER — PRAVASTATIN SODIUM 40 MG
40 TABLET ORAL NIGHTLY
Qty: 90 TABLET | Refills: 0 | Status: SHIPPED | OUTPATIENT
Start: 2022-05-03 | End: 2022-07-14 | Stop reason: SDUPTHER

## 2022-05-03 RX ORDER — BACLOFEN 10 MG/1
TABLET ORAL
Qty: 270 TABLET | Refills: 0 | Status: SHIPPED | OUTPATIENT
Start: 2022-05-03 | End: 2022-07-14 | Stop reason: SDUPTHER

## 2022-05-03 NOTE — TELEPHONE ENCOUNTER
Rx Refill Note  Requested Prescriptions     Pending Prescriptions Disp Refills   • baclofen (LIORESAL) 10 MG tablet [Pharmacy Med Name: BACLOFEN 10 MG Tablet] 270 tablet 0     Sig: TAKE 1 TABLET THREE TIMES DAILY   • DULoxetine (CYMBALTA) 60 MG capsule [Pharmacy Med Name: DULOXETINE HYDROCHLORIDE 60 MG Capsule Delayed Release Particles] 90 capsule 0     Sig: TAKE 1 CAPSULE EVERY DAY   • pravastatin (PRAVACHOL) 40 MG tablet [Pharmacy Med Name: PRAVASTATIN SODIUM 40 MG Tablet] 90 tablet 0     Sig: TAKE 1 TABLET BY MOUTH EVERY NIGHT.   • clopidogrel (PLAVIX) 75 MG tablet [Pharmacy Med Name: CLOPIDOGREL 75 MG Tablet] 90 tablet 0     Sig: TAKE 1 TABLET EVERY DAY      Last office visit with prescribing clinician: 3/14/2022      Next office visit with prescribing clinician: 7/14/2022   {TIP  Encounters:       SNRI Protocol Failed 05/03/2022 08:37 AM   Protocol Details  PHQ-2 or PHQ-9 within last 12 Mo        HMG-CoA Reductase Inhibitors (Statins) Protocol Failed 05/03/2022 08:37 AM   Protocol Details  Lipid panel in past 12 months    ALK Phos in past 12 months    ALT in past 12 months    AST in past 12 months      Plavix Protocol Failed 05/03/2022 08:37 AM    No conflicting PPI on medication list          {TIP  Please add Last Relevant Lab Date if appropriate:4/28/21  {TIP  Is Refill Pharmacy correct? yes  Gaurang Saldana LPN  05/03/22, 09:46 CDT

## 2022-05-09 ENCOUNTER — OFFICE VISIT (OUTPATIENT)
Dept: PODIATRY | Facility: CLINIC | Age: 67
End: 2022-05-09

## 2022-05-09 VITALS — WEIGHT: 282 LBS | BODY MASS INDEX: 38.19 KG/M2 | HEIGHT: 72 IN | HEART RATE: 115 BPM | OXYGEN SATURATION: 93 %

## 2022-05-09 DIAGNOSIS — M79.674 CHRONIC TOE PAIN, BILATERAL: ICD-10-CM

## 2022-05-09 DIAGNOSIS — M79.675 CHRONIC TOE PAIN, BILATERAL: ICD-10-CM

## 2022-05-09 DIAGNOSIS — L84 CALLUS OF FOOT: ICD-10-CM

## 2022-05-09 DIAGNOSIS — B35.1 ONYCHOMYCOSIS: Primary | ICD-10-CM

## 2022-05-09 DIAGNOSIS — G89.29 CHRONIC TOE PAIN, BILATERAL: ICD-10-CM

## 2022-05-09 DIAGNOSIS — E11.42 TYPE 2 DIABETES MELLITUS WITH PERIPHERAL NEUROPATHY: ICD-10-CM

## 2022-05-09 PROCEDURE — 11721 DEBRIDE NAIL 6 OR MORE: CPT | Performed by: PODIATRIST

## 2022-05-09 PROCEDURE — 11056 PARNG/CUTG B9 HYPRKR LES 2-4: CPT | Performed by: PODIATRIST

## 2022-05-09 NOTE — PROGRESS NOTES
Arias Willson  1955  66 y.o. male   PCP MOIRA Bass MD - 3/14/22  A1C: 8.55      05/09/2022    Chief Complaint   Patient presents with   • Left Foot - Follow-up       History of Present Illness    Arias Willson is a 66 y.o.male who presents to clinic today for follow-up of his foot wounds and a request for routine diabetic foot care.      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      • Benign essential hypertension    • Blood in feces    • Callus    • Carotid artery stenosis    • Cerebrovascular accident (HCC)      L sided sensory deficit      • Conjunctivitis    • Constipation     OCCASIONAL   • Contusion, eye, left    • Corneal ulcer     PROBABLE   • Diabetes mellitus (HCC)    • Eczema    • Elevated cholesterol    • Emphysema lung (HCC)    • Encounter for screening for malignant neoplasm of colon    • Essential hypertension    • Foot ulcer (HCC)    • GERD (gastroesophageal reflux disease)    • Hemorrhoids    • History of echocardiogram 04/05/2013    Echocadiogram W/ color flow 73988 (Normal LV systolic function with EF of 60-65%. Grade I diastolic dysfunction of the LV myocardium. No evidence of LV apical thrombus. No evidence of pericardial effusion.)   • History of transfusion    • Gus's syndrome     RIGHT EYE   • Hyperkeratosis    • Hyperlipidemia    • Mucopurulent conjunctivitis     ACUTE   • Myopia    • Neuropathic pain    • Obesity    • Onychomycosis    • Sleep apnea    • Tobacco dependence syndrome          Past Surgical History:   Procedure Laterality Date   • AORTAGRAM Left 3/21/2022    Procedure: LEFT LOWER EXTREMITY ANGIOGRAM;  Surgeon: Darryl Llanes DO;  Location: [place] [address];  Service: Vascular;  Laterality: Left;   • COLONOSCOPY  07/30/2015    Colonoscopy, diagnostic (screening) 12224 (Screening for  malignant neoplasm of colon)    • COLONOSCOPY  09/09/2015    Colonoscopy, diagnostic (screening) 54543 (Diverticulosis found in the sigmoid colon.Hemorrhoids found in the anus.)   • COLONOSCOPY  05/01/2009    Colonoscopy, diagnostic (screening) 19923 (Small internal and external hemorrhoids were present, Colonoscopy otherwise normal.)   • COLONOSCOPY N/A 12/09/2020    Procedure: COLONOSCOPY;  Surgeon: Arias Larsen MD;  Location: [facility] END[facility]COPY;  Service: Gastroenterology;  Laterality: N/A;   • LEG SURGERY     • NOSE SURGERY           Family History   Problem Relation Age of Onset   • Glaucoma Other    • Heart disease Other    • Stroke Other    • Macular degeneration Other    • Diabetes Other    • Glaucoma Father    • Macular degeneration Father    • Cataracts Father    • Cancer Father    • Macular degeneration Sister    • Heart disease Mother    • Cancer Paternal Grandmother    • Heart disease Paternal Grandfather        No Known Allergies    Social History     Socioeconomic History   • Marital status: Single   Tobacco Use   • Smoking status: Current Every Day Smoker     Packs/day: 0.50     Years: 33.00     Pack years: 16.50     Types: Cigarettes   • Smokeless tobacco: Never Used   Vaping Use   • Vaping Use: Never used   Substance and Sexual Activity   • Alcohol use: Not Currently   • Drug use: No   • Sexual activity: Defer         Current Outpatient Medications   Medication Sig Dispense Refill   • amLODIPine (NORVASC) 5 MG tablet Take 1 tablet by mouth Daily. 90 tablet 2   • ammonium lactate (LAC-HYDRIN) 12 % lotion Apply  topically to the appropriate area as directed Daily. 500 g 2   • [START ON 5/24/2106] aspirin 325 MG tablet Take 1 tablet by mouth Daily. 90 tablet 3   • baclofen (LIORESAL) 10 MG tablet TAKE 1 TABLET THREE TIMES DAILY 270 tablet 0   • clopidogrel (PLAVIX) 75 MG tablet TAKE 1 TABLET EVERY DAY 90 tablet 0   • Cyanocobalamin (B-12) 5000 MCG sublingual tablet Place 1 each under the tongue  Daily. (Patient taking differently: Place 1 each under the tongue 3 (Three) Times a Week. M, W, F) 30 tablet 6   • DULoxetine (CYMBALTA) 60 MG capsule TAKE 1 CAPSULE EVERY DAY 90 capsule 0   • empagliflozin (Jardiance) 25 MG tablet tablet Take 1 tablet by mouth Daily. 90 tablet 1   • gabapentin (NEURONTIN) 600 MG tablet Take 1 tablet by mouth 3 (Three) Times a Day. 270 tablet 1   • glucose blood test strip 1 each by Other route 3 (Three) Times a Day. Use as instructed 300 each 1   • glucose monitor monitoring kit 3 (Three) Times a Day. 1 each 0   • Insulin Glargine (Lantus SoloStar) 100 UNIT/ML injection pen INJECT 34 UNITS SUBCUTANEOUSLY ONCE DAILY INCREASE  BY  2  UNITS  EVERY  3  DAYS  UNTIL  MORNING  BLOOD  SUGAR  IS  100-150 45 mL 2   • Lancets 30G misc 1 each 3 (Three) Times a Day. 300 each 1   • lansoprazole (PREVACID) 30 MG capsule Take 1 capsule by mouth Daily. 90 capsule 1   • losartan (COZAAR) 50 MG tablet Take 1 tablet by mouth Daily. 90 tablet 3   • metFORMIN (GLUCOPHAGE) 500 MG tablet TAKE 1 TABLET TWICE DAILY 180 tablet 0   • pravastatin (PRAVACHOL) 40 MG tablet TAKE 1 TABLET BY MOUTH EVERY NIGHT. 90 tablet 0   • spironolactone (ALDACTONE) 25 MG tablet Take 1 tablet by mouth Daily. 90 tablet 3   • Ventolin  (90 Base) MCG/ACT inhaler Inhale 2 puffs Every 4 (Four) Hours As Needed for Wheezing. 18 g 3   • budesonide-formoterol (SYMBICORT) 160-4.5 MCG/ACT inhaler Inhale 2 puffs 2 (Two) Times a Day for 90 days. 3 each 1     No current facility-administered medications for this visit.       Review of Systems   Constitutional: Negative.    HENT: Negative.    Eyes: Negative.    Respiratory: Negative.    Cardiovascular: Negative.    Gastrointestinal: Negative.    Endocrine: Negative.    Genitourinary: Negative.    Musculoskeletal: Positive for back pain, gait problem, joint swelling and neck pain.        Foot pain   Skin: Negative.    Allergic/Immunologic: Negative.    Neurological: Positive for  "dizziness.   Hematological: Negative.    Psychiatric/Behavioral: Negative.          OBJECTIVE    Pulse 115   Ht 182.9 cm (72\")   Wt 128 kg (282 lb)   SpO2 93%   BMI 38.25 kg/m²       Physical Exam  Vitals reviewed.   Constitutional:       General: He is not in acute distress.     Appearance: He is well-developed.   HENT:      Head: Normocephalic and atraumatic.   Cardiovascular:      Pulses:           Dorsalis pedis pulses are 2+ on the right side and 2+ on the left side.        Posterior tibial pulses are 2+ on the right side and 2+ on the left side.   Pulmonary:      Effort: Pulmonary effort is normal. No respiratory distress.      Breath sounds: No wheezing.   Musculoskeletal:         General: No deformity.      Right foot: Decreased range of motion. No deformity or prominent metatarsal heads.      Left foot: Decreased range of motion. No deformity or prominent metatarsal heads.        Feet:    Feet:      Right foot:      Protective Sensation: 5 sites tested. 0 sites sensed.      Skin integrity: Callus present. No ulcer.      Toenail Condition: Right toenails are abnormally thick and long. Fungal disease present.     Left foot:      Protective Sensation: 5 sites tested. 0 sites sensed.      Skin integrity: Callus present. No ulcer.      Toenail Condition: Left toenails are abnormally thick and long. Fungal disease present.  Skin:     General: Skin is warm and dry.      Capillary Refill: Capillary refill takes less than 2 seconds.   Neurological:      Mental Status: He is alert and oriented to person, place, and time.   Psychiatric:         Behavior: Behavior normal.         Thought Content: Thought content normal.         Procedures      ASSESSMENT AND PLAN    Diagnoses and all orders for this visit:    1. Onychomycosis (Primary)    2. Chronic toe pain, bilateral    3. Callus of foot    4. Type 2 diabetes mellitus with peripheral neuropathy (HCC)        -No open wounds at this time  -Nails 1-5 bilateral were " debrided in length and thickness with nail nipper and electric  to decrease fungal load and risk of infection.  An ABN was signed prior to treatment.  -Trimmed hyperkeratotic lesions noted in objective with a #15 blade without incident.   -All questions answered  -Recheck 3 months              This document has been electronically signed by Garrett Mcmillan DPM on May 10, 2022 09:54 CDT     5/10/2022  09:54 CDT

## 2022-07-01 DIAGNOSIS — Z79.4 TYPE 2 DIABETES MELLITUS WITH DIABETIC AUTONOMIC NEUROPATHY, WITH LONG-TERM CURRENT USE OF INSULIN: ICD-10-CM

## 2022-07-01 DIAGNOSIS — E11.43 TYPE 2 DIABETES MELLITUS WITH DIABETIC AUTONOMIC NEUROPATHY, WITH LONG-TERM CURRENT USE OF INSULIN: ICD-10-CM

## 2022-07-01 RX ORDER — EMPAGLIFLOZIN 25 MG/1
TABLET, FILM COATED ORAL
Qty: 90 TABLET | Refills: 0 | Status: SHIPPED | OUTPATIENT
Start: 2022-07-01 | End: 2022-07-14 | Stop reason: SDUPTHER

## 2022-07-14 ENCOUNTER — OFFICE VISIT (OUTPATIENT)
Dept: FAMILY MEDICINE CLINIC | Facility: CLINIC | Age: 67
End: 2022-07-14

## 2022-07-14 VITALS
BODY MASS INDEX: 37.94 KG/M2 | SYSTOLIC BLOOD PRESSURE: 132 MMHG | HEART RATE: 110 BPM | TEMPERATURE: 97.5 F | HEIGHT: 72 IN | OXYGEN SATURATION: 92 % | DIASTOLIC BLOOD PRESSURE: 68 MMHG | WEIGHT: 280.1 LBS

## 2022-07-14 DIAGNOSIS — E11.69 TYPE 2 DIABETES MELLITUS WITH OTHER SPECIFIED COMPLICATION, WITHOUT LONG-TERM CURRENT USE OF INSULIN: Chronic | ICD-10-CM

## 2022-07-14 DIAGNOSIS — Z79.899 HIGH RISK MEDICATION USE: Primary | ICD-10-CM

## 2022-07-14 DIAGNOSIS — Z86.73 H/O: CVA (CEREBROVASCULAR ACCIDENT): ICD-10-CM

## 2022-07-14 DIAGNOSIS — Z79.4 TYPE 2 DIABETES MELLITUS WITH DIABETIC AUTONOMIC NEUROPATHY, WITH LONG-TERM CURRENT USE OF INSULIN: ICD-10-CM

## 2022-07-14 DIAGNOSIS — M79.2 NEUROPATHIC PAIN: ICD-10-CM

## 2022-07-14 DIAGNOSIS — E78.00 PURE HYPERCHOLESTEROLEMIA: ICD-10-CM

## 2022-07-14 DIAGNOSIS — E11.43 TYPE 2 DIABETES MELLITUS WITH DIABETIC AUTONOMIC NEUROPATHY, WITH LONG-TERM CURRENT USE OF INSULIN: ICD-10-CM

## 2022-07-14 DIAGNOSIS — J44.9 COPD, GROUP B, BY GOLD 2017 CLASSIFICATION: Chronic | ICD-10-CM

## 2022-07-14 DIAGNOSIS — K21.9 GASTROESOPHAGEAL REFLUX DISEASE WITHOUT ESOPHAGITIS: ICD-10-CM

## 2022-07-14 DIAGNOSIS — L85.9 HYPERKERATOSIS: ICD-10-CM

## 2022-07-14 DIAGNOSIS — J44.9 STAGE 2 MODERATE COPD BY GOLD CLASSIFICATION: Chronic | ICD-10-CM

## 2022-07-14 PROCEDURE — 99214 OFFICE O/P EST MOD 30 MIN: CPT | Performed by: FAMILY MEDICINE

## 2022-07-14 RX ORDER — DULOXETIN HYDROCHLORIDE 60 MG/1
60 CAPSULE, DELAYED RELEASE ORAL DAILY
Qty: 90 CAPSULE | Refills: 1 | Status: SHIPPED | OUTPATIENT
Start: 2022-07-14 | End: 2022-12-29 | Stop reason: SDUPTHER

## 2022-07-14 RX ORDER — PRAVASTATIN SODIUM 40 MG
40 TABLET ORAL NIGHTLY
Qty: 90 TABLET | Refills: 1 | Status: SHIPPED | OUTPATIENT
Start: 2022-07-14 | End: 2022-12-29 | Stop reason: SDUPTHER

## 2022-07-14 RX ORDER — INSULIN GLARGINE 100 [IU]/ML
INJECTION, SOLUTION SUBCUTANEOUS
Qty: 45 ML | Refills: 2 | Status: SHIPPED | OUTPATIENT
Start: 2022-07-14 | End: 2023-03-29 | Stop reason: SDUPTHER

## 2022-07-14 RX ORDER — AMMONIUM LACTATE 12 G/100G
LOTION TOPICAL DAILY
Qty: 500 G | Refills: 2 | Status: SHIPPED | OUTPATIENT
Start: 2022-07-14 | End: 2022-12-29 | Stop reason: SDUPTHER

## 2022-07-14 RX ORDER — BACLOFEN 10 MG/1
10 TABLET ORAL 3 TIMES DAILY
Qty: 270 TABLET | Refills: 1 | Status: SHIPPED | OUTPATIENT
Start: 2022-07-14 | End: 2022-12-29 | Stop reason: SDUPTHER

## 2022-07-14 RX ORDER — GABAPENTIN 600 MG/1
600 TABLET ORAL 3 TIMES DAILY
Qty: 270 TABLET | Refills: 1 | Status: SHIPPED | OUTPATIENT
Start: 2022-07-14 | End: 2022-07-14 | Stop reason: SDUPTHER

## 2022-07-14 RX ORDER — BUDESONIDE AND FORMOTEROL FUMARATE DIHYDRATE 160; 4.5 UG/1; UG/1
AEROSOL RESPIRATORY (INHALATION)
Qty: 3 EACH | Refills: 1 | Status: SHIPPED | OUTPATIENT
Start: 2022-07-14 | End: 2023-03-29 | Stop reason: SDUPTHER

## 2022-07-14 RX ORDER — GABAPENTIN 600 MG/1
600 TABLET ORAL 3 TIMES DAILY
Qty: 270 TABLET | Refills: 1 | Status: SHIPPED | OUTPATIENT
Start: 2022-07-14 | End: 2023-03-29 | Stop reason: SDUPTHER

## 2022-07-14 RX ORDER — ALBUTEROL SULFATE 90 UG/1
2 AEROSOL, METERED RESPIRATORY (INHALATION) EVERY 4 HOURS PRN
Qty: 18 G | Refills: 3 | Status: SHIPPED | OUTPATIENT
Start: 2022-07-14

## 2022-07-14 RX ORDER — CLOPIDOGREL BISULFATE 75 MG/1
75 TABLET ORAL DAILY
Qty: 90 TABLET | Refills: 1 | Status: SHIPPED | OUTPATIENT
Start: 2022-07-14 | End: 2022-12-29 | Stop reason: SDUPTHER

## 2022-07-14 RX ORDER — LANSOPRAZOLE 30 MG/1
30 CAPSULE, DELAYED RELEASE ORAL DAILY
Qty: 90 CAPSULE | Refills: 1 | Status: SHIPPED | OUTPATIENT
Start: 2022-07-14 | End: 2022-12-29 | Stop reason: SDUPTHER

## 2022-07-14 NOTE — PROGRESS NOTES
Chief Complaint  Diabetes, Pain, Heartburn, and Hypertension    Subjective          Review of Systems   Constitutional: Positive for irritability. Negative for activity change, appetite change, chills, fatigue and fever. Weight loss: neuro.   HENT: Negative for congestion, ear pain and sore throat.    Eyes: Negative for pain and visual disturbance.   Respiratory: Negative for cough, chest tightness, shortness of breath and wheezing.    Cardiovascular: Negative for chest pain and palpitations.   Gastrointestinal: Negative for abdominal pain and nausea.   Endocrine: Negative for cold intolerance and heat intolerance.   Genitourinary: Negative for difficulty urinating and dysuria.   Musculoskeletal: Positive for back pain, gait problem, neck pain and neck stiffness. Negative for arthralgias.   Skin: Positive for pallor and wound. Negative for color change and rash.        L toes black areas resolved S/P Vascular stent   Allergic/Immunologic: Negative for environmental allergies.   Neurological: Positive for dizziness, focal weakness, weakness, numbness, headaches and loss of balance. Negative for seizures.   Hematological: Negative for adenopathy. Does not bruise/bleed easily.   Psychiatric/Behavioral: Positive for decreased concentration and sleep disturbance. Negative for agitation, confusion and suicidal ideas. The patient is nervous/anxious and has insomnia.         Depressed mood  Irritability improving.       Arias Willson presents to [facility] [place]  Cyanosis of L toes resolved. Seeing Vascular surgery at [facility]. S/P vascular stent    Diabetes  He presents for his follow-up diabetic visit. He has type 2 diabetes mellitus. No MedicAlert identification noted. His disease course has been fluctuating. Hypoglycemia symptoms include dizziness, headaches, nervousness/anxiousness and pallor. Pertinent negatives for hypoglycemia include no confusion or seizures.  Associated symptoms include visual change and weakness. Pertinent negatives for diabetes include no chest pain and no fatigue. Weight loss: neuro. There are no hypoglycemic complications. Symptoms are worsening. Diabetic complications include a CVA and peripheral neuropathy. Risk factors for coronary artery disease include diabetes mellitus, male sex, tobacco exposure, stress, sedentary lifestyle, obesity, hypertension and dyslipidemia. Current diabetic treatment includes oral agent (monotherapy). He is compliant with treatment some of the time. His weight is increasing rapidly. He is following a generally unhealthy diet. When asked about meal planning, he reported none. He rarely participates in exercise. Home blood sugar record trend: Unknown. An ACE inhibitor/angiotensin II receptor blocker is being taken. He does not see a podiatrist.Eye exam is current.   Pain  This is a chronic problem. The current episode started more than 1 year ago. The problem occurs daily. Associated symptoms include headaches, neck pain, numbness, a visual change and weakness. Pertinent negatives include no abdominal pain, arthralgias, chest pain, chills, congestion, coughing, fatigue, fever, nausea, rash or sore throat. The symptoms are aggravated by walking and stress. He has tried NSAIDs and acetaminophen for the symptoms. The treatment provided no relief.   Hypertension  This is a chronic problem. The current episode started more than 1 year ago. The problem has been waxing and waning since onset. The problem is controlled. Associated symptoms include headaches and neck pain. Pertinent negatives include no chest pain, palpitations or shortness of breath. Agents associated with hypertension include NSAIDs. Risk factors for coronary artery disease include obesity, male gender, smoking/tobacco exposure and stress (H/O CVA). Past treatments include ACE inhibitors, angiotensin blockers, beta blockers, diuretics and lifestyle changes.  Current antihypertension treatment includes angiotensin blockers and diuretics. The current treatment provides significant improvement. Compliance problems include medication cost.  Hypertensive end-organ damage includes CVA.   Insomnia  This is a chronic problem. The current episode started more than 1 year ago. The problem occurs constantly. The problem has been waxing and waning. Associated symptoms include headaches, neck pain, numbness, a visual change and weakness. Pertinent negatives include no abdominal pain, arthralgias, chest pain, chills, congestion, coughing, fatigue, fever, nausea, rash or sore throat. The symptoms are aggravated by stress. Treatments tried: OTC meds CPAP. The treatment provided significant relief.   Breathing Problem  He complains of difficulty breathing. There is no cough, shortness of breath or wheezing. Primary symptoms comments: Using 02 since hospitalization Jan 2018, H/O low pulse-ox, seizures like spells. Improved tx NEREYDA on Bipap. Associated symptoms include headaches. Pertinent negatives include no appetite change, chest pain, ear pain, fever or sore throat. Weight loss: neuro. His symptoms are aggravated by exercise. His symptoms are alleviated by nothing. He reports moderate (O2) improvement on treatment.   Stroke  This is a chronic problem. The current episode started more than 1 year ago. The problem occurs daily. The problem has been gradually improving. Associated symptoms include headaches, neck pain, numbness, a visual change and weakness. Pertinent negatives include no abdominal pain, arthralgias, chest pain, chills, congestion, coughing, fatigue, fever, nausea, rash or sore throat. The symptoms are aggravated by exertion and walking. He has tried acetaminophen and NSAIDs for the symptoms. The treatment provided no relief.   Neurologic Problem  The patient's primary symptoms include an altered mental status, clumsiness, focal sensory loss, focal weakness, a loss of  "balance, a visual change and weakness. Primary symptoms comment: Delayed cognitive processing. Seizures. This is a chronic problem. The current episode started more than 1 year ago. The neurological problem developed suddenly. The problem has been waxing and waning since onset. There was left-sided, right-sided, facial, upper extremity and lower extremity (Strokes of Basal ganglia. Horners syndrome,  sensory, motor deficits.) focality noted. Associated symptoms include back pain, dizziness, headaches and neck pain. Pertinent negatives include no abdominal pain, chest pain, confusion, fatigue, fever, nausea, palpitations or shortness of breath. (Requires Walker to ambulate.) Past treatments include walking and medication (Physical therapy). The treatment provided mild relief. His past medical history is significant for a CVA.   Depression  Visit Type: follow-up  Patient presents with the following symptoms: decreased concentration, depressed mood, excessive worry, feelings of hopelessness, feelings of worthlessness, insomnia, irritability, memory impairment, nervousness/anxiety and restlessness.  Patient is not experiencing: confusion, palpitations, shortness of breath, suicidal ideas and suicidal planning.  Weight loss: neuro.  Frequency of symptoms: occasionally   Severity: moderate   Sleep quality: fair  Nighttime awakenings: several        Objective   Vital Signs:   /68 (BP Location: Left arm, Patient Position: Sitting, Cuff Size: Adult)   Pulse 110   Temp 97.5 °F (36.4 °C) (Temporal)   Ht 182.9 cm (72\")   Wt 127 kg (280 lb 1.6 oz)   SpO2 92%   BMI 37.99 kg/m²     Physical Exam  Vitals and nursing note reviewed.   Constitutional:       General: He is not in acute distress.     Appearance: He is obese.   HENT:      Head: Normocephalic.      Right Ear: Tympanic membrane and ear canal normal.      Left Ear: Tympanic membrane and ear canal normal.      Nose: Nose normal.      Mouth/Throat:      Mouth: " Mucous membranes are moist.      Pharynx: No oropharyngeal exudate or posterior oropharyngeal erythema.   Eyes:      General:         Right eye: No discharge.         Left eye: No discharge.      Extraocular Movements: Extraocular movements intact.      Conjunctiva/sclera: Conjunctivae normal.      Comments: Gus's   Neck:      Vascular: No carotid bruit.   Cardiovascular:      Rate and Rhythm: Normal rate and regular rhythm.   Pulmonary:      Effort: Pulmonary effort is normal.      Breath sounds: Wheezing and rhonchi present.   Abdominal:      Palpations: Abdomen is soft.      Tenderness: There is no right CVA tenderness or left CVA tenderness.   Musculoskeletal:      Cervical back: Rigidity and tenderness present.      Right lower leg: Edema present.      Left lower leg: Edema present.      Comments: WB 4 with ROM low back   Lymphadenopathy:      Cervical: No cervical adenopathy.   Skin:     Capillary Refill: Capillary refill takes 2 to 3 seconds.      Findings: No erythema or lesion.      Comments: Hyperkeratosis  Raynaud's of Fingers , splinter hemorrhage fingernails improved on Norvasc   Cyanosis L toes resolved S/P vascular procedure.   Neurological:      Mental Status: He is oriented to person, place, and time.      Cranial Nerves: Cranial nerve deficit present.      Sensory: Sensory deficit present.      Motor: Weakness present.      Coordination: Coordination normal.      Gait: Gait abnormal.      Comments: CVA with residual Gus's syndrome    Psychiatric:         Mood and Affect: Mood normal.         Behavior: Behavior normal.         Thought Content: Thought content normal.         Judgment: Judgment normal.        Result Review :                 Assessment and Plan    Diagnoses and all orders for this visit:    1. High risk medication use (Primary)  -     Urine Drug Screen - Urine, Clean Catch    2. Neuropathic pain  -     Discontinue: gabapentin (NEURONTIN) 600 MG tablet; Take 1 tablet by mouth 3  (Three) Times a Day.  Dispense: 270 tablet; Refill: 1  -     gabapentin (NEURONTIN) 600 MG tablet; Take 1 tablet by mouth 3 (Three) Times a Day.  Dispense: 270 tablet; Refill: 1  -     DULoxetine (CYMBALTA) 60 MG capsule; Take 1 capsule by mouth Daily.  Dispense: 90 capsule; Refill: 1    3. Hyperkeratosis  -     ammonium lactate (LAC-HYDRIN) 12 % lotion; Apply  topically to the appropriate area as directed Daily.  Dispense: 500 g; Refill: 2    4. COPD, group B, by GOLD 2017 classification (formerly [facility])  -     budesonide-formoterol (SYMBICORT) 160-4.5 MCG/ACT inhaler; Inhale 2 puffs two times daily  Dispense: 3 each; Refill: 1    5. H/O: CVA (cerebrovascular accident)  -     clopidogrel (PLAVIX) 75 MG tablet; Take 1 tablet by mouth Daily.  Dispense: 90 tablet; Refill: 1    6. Type 2 diabetes mellitus with diabetic autonomic neuropathy, with long-term current use of insulin (formerly [facility])  -     empagliflozin (Jardiance) 25 MG tablet tablet; Take 1 tablet by mouth Daily.  Dispense: 90 tablet; Refill: 1  -     Insulin Glargine (Lantus SoloStar) 100 UNIT/ML injection pen; INJECT 34 UNITS SUBCUTANEOUSLY ONCE DAILY INCREASE  BY  2  UNITS  EVERY  3  DAYS  UNTIL  MORNING  BLOOD  SUGAR  IS  100-150  Dispense: 45 mL; Refill: 2  -     metFORMIN (GLUCOPHAGE) 500 MG tablet; Take 1 tablet by mouth 2 (Two) Times a Day.  Dispense: 180 tablet; Refill: 1    7. Type 2 diabetes mellitus with other specified complication, without long-term current use of insulin (formerly [facility])  -     glucose blood test strip; 1 each by Other route 3 (Three) Times a Day. Use as instructed  Dispense: 300 each; Refill: 1  -     Lancets 30G misc; 1 each 3 (Three) Times a Day.  Dispense: 300 each; Refill: 1    8. Gastroesophageal reflux disease without esophagitis  -     lansoprazole (PREVACID) 30 MG capsule; Take 1 capsule by mouth Daily.  Dispense: 90 capsule; Refill: 1    9. Pure hypercholesterolemia  -     pravastatin (PRAVACHOL) 40 MG tablet; Take 1 tablet by mouth Every Night.   Dispense: 90 tablet; Refill: 1    10. Stage 2 moderate COPD by GOLD classification ([facility])  -     Ventolin  (90 Base) MCG/ACT inhaler; Inhale 2 puffs Every 4 (Four) Hours As Needed for Wheezing.  Dispense: 18 g; Refill: 3    Other orders  -     baclofen (LIORESAL) 10 MG tablet; Take 1 tablet by mouth 3 (Three) Times a Day.  Dispense: 270 tablet; Refill: 1        Follow Up   Return in about 4 months (around 11/14/2022).  Patient was given instructions and counseling regarding his condition or for health maintenance advice. Please see specific information pulled into the AVS if appropriate.         This document has been electronically signed by Harpreet Bass MD on July 14, 2022 21:20 CDT

## 2022-08-22 ENCOUNTER — OFFICE VISIT (OUTPATIENT)
Dept: PODIATRY | Facility: CLINIC | Age: 67
End: 2022-08-22

## 2022-08-22 VITALS — HEART RATE: 83 BPM | HEIGHT: 72 IN | OXYGEN SATURATION: 98 % | BODY MASS INDEX: 37.93 KG/M2 | WEIGHT: 280 LBS

## 2022-08-22 DIAGNOSIS — G89.29 CHRONIC TOE PAIN, BILATERAL: ICD-10-CM

## 2022-08-22 DIAGNOSIS — M79.675 CHRONIC TOE PAIN, BILATERAL: ICD-10-CM

## 2022-08-22 DIAGNOSIS — M79.674 CHRONIC TOE PAIN, BILATERAL: ICD-10-CM

## 2022-08-22 DIAGNOSIS — E11.42 TYPE 2 DIABETES MELLITUS WITH PERIPHERAL NEUROPATHY: ICD-10-CM

## 2022-08-22 DIAGNOSIS — B35.1 ONYCHOMYCOSIS: Primary | ICD-10-CM

## 2022-08-22 PROCEDURE — 11721 DEBRIDE NAIL 6 OR MORE: CPT | Performed by: PODIATRIST

## 2022-08-22 NOTE — PROGRESS NOTES
Arias Willson  1955  66 y.o. male   PCP MOIRA Bass MD - 3/14/22  A1C: 8.55      08/22/2022      Chief Complaint   Patient presents with   • Left Foot - Follow-up     Diabetic foot care   • Right Foot - Follow-up     Diabetic foot care       History of Present Illness    Arias Willson is a 66 y.o.male who presents to clinic today for routine diabetic foot care.      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      • Benign essential hypertension    • Blood in feces    • Callus    • Carotid artery stenosis    • Cerebrovascular accident (HCC)      L sided sensory deficit      • Conjunctivitis    • Constipation     OCCASIONAL   • Contusion, eye, left    • Corneal ulcer     PROBABLE   • Diabetes mellitus (HCC)    • Eczema    • Elevated cholesterol    • Emphysema lung (HCC)    • Encounter for screening for malignant neoplasm of colon    • Essential hypertension    • Foot ulcer (HCC)    • GERD (gastroesophageal reflux disease)    • Hemorrhoids    • History of echocardiogram 04/05/2013    Echocadiogram W/ color flow 55122 (Normal LV systolic function with EF of 60-65%. Grade I diastolic dysfunction of the LV myocardium. No evidence of LV apical thrombus. No evidence of pericardial effusion.)   • History of transfusion    • Gus's syndrome     RIGHT EYE   • Hyperkeratosis    • Hyperlipidemia    • Mucopurulent conjunctivitis     ACUTE   • Myopia    • Neuropathic pain    • Obesity    • Onychomycosis    • Sleep apnea    • Tobacco dependence syndrome          Past Surgical History:   Procedure Laterality Date   • AORTAGRAM Left 3/21/2022    Procedure: LEFT LOWER EXTREMITY ANGIOGRAM;  Surgeon: Darryl Llanes DO;  Location: [place] [address];  Service: Vascular;  Laterality: Left;   • COLONOSCOPY  07/30/2015    Colonoscopy, diagnostic  (screening) 52185 (Screening for malignant neoplasm of colon)    • COLONOSCOPY  09/09/2015    Colonoscopy, diagnostic (screening) 73911 (Diverticulosis found in the sigmoid colon.Hemorrhoids found in the anus.)   • COLONOSCOPY  05/01/2009    Colonoscopy, diagnostic (screening) 12034 (Small internal and external hemorrhoids were present, Colonoscopy otherwise normal.)   • COLONOSCOPY N/A 12/09/2020    Procedure: COLONOSCOPY;  Surgeon: Arias Larsen MD;  Location: [facility] END[facility];  Service: Gastroenterology;  Laterality: N/A;   • LEG SURGERY     • NOSE SURGERY           Family History   Problem Relation Age of Onset   • Glaucoma Other    • Heart disease Other    • Stroke Other    • Macular degeneration Other    • Diabetes Other    • Glaucoma Father    • Macular degeneration Father    • Cataracts Father    • Cancer Father    • Macular degeneration Sister    • Heart disease Mother    • Cancer Paternal Grandmother    • Heart disease Paternal Grandfather        No Known Allergies    Social History     Socioeconomic History   • Marital status: Single   Tobacco Use   • Smoking status: Current Every Day Smoker     Packs/day: 0.50     Years: 33.00     Pack years: 16.50     Types: Cigarettes   • Smokeless tobacco: Never Used   Vaping Use   • Vaping Use: Never used   Substance and Sexual Activity   • Alcohol use: Not Currently   • Drug use: No   • Sexual activity: Defer         Current Outpatient Medications   Medication Sig Dispense Refill   • amLODIPine (NORVASC) 5 MG tablet Take 1 tablet by mouth Daily. 90 tablet 2   • ammonium lactate (LAC-HYDRIN) 12 % lotion Apply  topically to the appropriate area as directed Daily. 500 g 2   • [START ON 5/24/2106] aspirin 325 MG tablet Take 1 tablet by mouth Daily. 90 tablet 3   • baclofen (LIORESAL) 10 MG tablet Take 1 tablet by mouth 3 (Three) Times a Day. 270 tablet 1   • budesonide-formoterol (SYMBICORT) 160-4.5 MCG/ACT inhaler Inhale 2 puffs two times daily 3 each 1   •  clopidogrel (PLAVIX) 75 MG tablet Take 1 tablet by mouth Daily. 90 tablet 1   • Cyanocobalamin (B-12) 5000 MCG sublingual tablet Place 1 each under the tongue Daily. (Patient taking differently: Place 1 each under the tongue 3 (Three) Times a Week. M, W, F) 30 tablet 6   • DULoxetine (CYMBALTA) 60 MG capsule Take 1 capsule by mouth Daily. 90 capsule 1   • empagliflozin (Jardiance) 25 MG tablet tablet Take 1 tablet by mouth Daily. 90 tablet 1   • gabapentin (NEURONTIN) 600 MG tablet Take 1 tablet by mouth 3 (Three) Times a Day. 270 tablet 1   • glucose blood test strip 1 each by Other route 3 (Three) Times a Day. Use as instructed 300 each 1   • glucose monitor monitoring kit 3 (Three) Times a Day. 1 each 0   • Insulin Glargine (Lantus SoloStar) 100 UNIT/ML injection pen INJECT 34 UNITS SUBCUTANEOUSLY ONCE DAILY INCREASE  BY  2  UNITS  EVERY  3  DAYS  UNTIL  MORNING  BLOOD  SUGAR  IS  100-150 45 mL 2   • Lancets 30G misc 1 each 3 (Three) Times a Day. 300 each 1   • lansoprazole (PREVACID) 30 MG capsule Take 1 capsule by mouth Daily. 90 capsule 1   • losartan (COZAAR) 50 MG tablet Take 1 tablet by mouth Daily. 90 tablet 3   • metFORMIN (GLUCOPHAGE) 500 MG tablet Take 1 tablet by mouth 2 (Two) Times a Day. 180 tablet 1   • pravastatin (PRAVACHOL) 40 MG tablet Take 1 tablet by mouth Every Night. 90 tablet 1   • spironolactone (ALDACTONE) 25 MG tablet Take 1 tablet by mouth Daily. 90 tablet 3   • Ventolin  (90 Base) MCG/ACT inhaler Inhale 2 puffs Every 4 (Four) Hours As Needed for Wheezing. 18 g 3     No current facility-administered medications for this visit.       Review of Systems   Constitutional: Negative.    HENT: Negative.    Eyes: Negative.    Respiratory: Negative.    Cardiovascular: Negative.    Gastrointestinal: Negative.    Endocrine: Negative.    Genitourinary: Negative.    Musculoskeletal: Positive for back pain, gait problem, joint swelling and neck pain.        Foot pain   Skin: Negative.   "  Allergic/Immunologic: Negative.    Neurological: Positive for dizziness.   Hematological: Negative.    Psychiatric/Behavioral: Negative.          OBJECTIVE    Pulse 83   Ht 182.9 cm (72\")   Wt 127 kg (280 lb)   SpO2 98%   BMI 37.97 kg/m²       Physical Exam  Vitals reviewed.   Constitutional:       General: He is not in acute distress.     Appearance: He is well-developed.   HENT:      Head: Normocephalic and atraumatic.   Cardiovascular:      Pulses:           Dorsalis pedis pulses are 2+ on the right side and 2+ on the left side.        Posterior tibial pulses are 2+ on the right side and 2+ on the left side.   Pulmonary:      Effort: Pulmonary effort is normal. No respiratory distress.      Breath sounds: No wheezing.   Musculoskeletal:         General: No deformity.      Right foot: Decreased range of motion. No deformity or prominent metatarsal heads.      Left foot: Decreased range of motion. No deformity or prominent metatarsal heads.        Feet:    Feet:      Right foot:      Protective Sensation: 5 sites tested. 0 sites sensed.      Skin integrity: Callus present. No ulcer.      Toenail Condition: Right toenails are abnormally thick and long. Fungal disease present.     Left foot:      Protective Sensation: 5 sites tested. 0 sites sensed.      Skin integrity: Callus present. No ulcer.      Toenail Condition: Left toenails are abnormally thick and long. Fungal disease present.  Skin:     General: Skin is warm and dry.      Capillary Refill: Capillary refill takes less than 2 seconds.   Neurological:      Mental Status: He is alert and oriented to person, place, and time.   Psychiatric:         Behavior: Behavior normal.         Thought Content: Thought content normal.         Procedures      ASSESSMENT AND PLAN    Diagnoses and all orders for this visit:    1. Onychomycosis (Primary)    2. Chronic toe pain, bilateral    3. Type 2 diabetes mellitus with peripheral neuropathy (HCC)        -Nails 1-5 " bilateral were debrided in length and thickness with nail nipper and electric  to decrease fungal load and risk of infection.  An ABN was signed prior to treatment.  -All questions answered  -Recheck 3 months              This document has been electronically signed by Garrett Mcmillan DPM on August 22, 2022 09:51 CDT     8/22/2022  09:51 CDT

## 2022-10-10 ENCOUNTER — APPOINTMENT (OUTPATIENT)
Dept: ULTRASOUND IMAGING | Facility: HOSPITAL | Age: 67
End: 2022-10-10

## 2022-12-06 ENCOUNTER — TELEPHONE (OUTPATIENT)
Dept: VASCULAR SURGERY | Facility: CLINIC | Age: 67
End: 2022-12-06

## 2022-12-06 DIAGNOSIS — I73.00 RAYNAUD'S DISEASE WITHOUT GANGRENE: ICD-10-CM

## 2022-12-06 DIAGNOSIS — I10 PRIMARY HYPERTENSION: ICD-10-CM

## 2022-12-06 DIAGNOSIS — I73.9 PAD (PERIPHERAL ARTERY DISEASE): ICD-10-CM

## 2022-12-06 RX ORDER — AMLODIPINE BESYLATE 5 MG/1
TABLET ORAL
Qty: 90 TABLET | Refills: 0 | Status: SHIPPED | OUTPATIENT
Start: 2022-12-06 | End: 2022-12-29 | Stop reason: SDUPTHER

## 2022-12-07 ENCOUNTER — APPOINTMENT (OUTPATIENT)
Dept: ULTRASOUND IMAGING | Facility: HOSPITAL | Age: 67
End: 2022-12-07

## 2022-12-26 DIAGNOSIS — Z86.73 H/O: CVA (CEREBROVASCULAR ACCIDENT): ICD-10-CM

## 2022-12-26 DIAGNOSIS — E78.00 PURE HYPERCHOLESTEROLEMIA: ICD-10-CM

## 2022-12-26 DIAGNOSIS — M79.2 NEUROPATHIC PAIN: ICD-10-CM

## 2022-12-29 ENCOUNTER — OFFICE VISIT (OUTPATIENT)
Dept: FAMILY MEDICINE CLINIC | Facility: CLINIC | Age: 67
End: 2022-12-29

## 2022-12-29 VITALS
BODY MASS INDEX: 37.57 KG/M2 | HEIGHT: 72 IN | SYSTOLIC BLOOD PRESSURE: 126 MMHG | HEART RATE: 84 BPM | TEMPERATURE: 97.1 F | WEIGHT: 277.4 LBS | DIASTOLIC BLOOD PRESSURE: 62 MMHG

## 2022-12-29 DIAGNOSIS — K21.9 GASTROESOPHAGEAL REFLUX DISEASE WITHOUT ESOPHAGITIS: ICD-10-CM

## 2022-12-29 DIAGNOSIS — E78.00 PURE HYPERCHOLESTEROLEMIA: Chronic | ICD-10-CM

## 2022-12-29 DIAGNOSIS — R06.02 SHORTNESS OF BREATH: Chronic | ICD-10-CM

## 2022-12-29 DIAGNOSIS — M62.838 MUSCLE SPASM: Chronic | ICD-10-CM

## 2022-12-29 DIAGNOSIS — M79.2 NEUROPATHIC PAIN: Chronic | ICD-10-CM

## 2022-12-29 DIAGNOSIS — I73.9 PAD (PERIPHERAL ARTERY DISEASE): Chronic | ICD-10-CM

## 2022-12-29 DIAGNOSIS — L85.9 HYPERKERATOSIS: ICD-10-CM

## 2022-12-29 DIAGNOSIS — Z23 NEED FOR IMMUNIZATION AGAINST INFLUENZA: Chronic | ICD-10-CM

## 2022-12-29 DIAGNOSIS — I10 ESSENTIAL HYPERTENSION: Chronic | ICD-10-CM

## 2022-12-29 DIAGNOSIS — Z86.73 H/O: CVA (CEREBROVASCULAR ACCIDENT): Chronic | ICD-10-CM

## 2022-12-29 DIAGNOSIS — R60.1 GENERALIZED EDEMA: Chronic | ICD-10-CM

## 2022-12-29 DIAGNOSIS — I73.00 RAYNAUD'S DISEASE WITHOUT GANGRENE: ICD-10-CM

## 2022-12-29 DIAGNOSIS — Z79.4 TYPE 2 DIABETES MELLITUS WITH OTHER SPECIFIED COMPLICATION, WITH LONG-TERM CURRENT USE OF INSULIN: Chronic | ICD-10-CM

## 2022-12-29 DIAGNOSIS — R60.1 ANASARCA: ICD-10-CM

## 2022-12-29 DIAGNOSIS — I10 PRIMARY HYPERTENSION: Chronic | ICD-10-CM

## 2022-12-29 DIAGNOSIS — E11.69 TYPE 2 DIABETES MELLITUS WITH OTHER SPECIFIED COMPLICATION, WITH LONG-TERM CURRENT USE OF INSULIN: Chronic | ICD-10-CM

## 2022-12-29 PROCEDURE — 90662 IIV NO PRSV INCREASED AG IM: CPT | Performed by: FAMILY MEDICINE

## 2022-12-29 PROCEDURE — G0008 ADMIN INFLUENZA VIRUS VAC: HCPCS | Performed by: FAMILY MEDICINE

## 2022-12-29 PROCEDURE — 93010 ELECTROCARDIOGRAM REPORT: CPT | Performed by: INTERNAL MEDICINE

## 2022-12-29 PROCEDURE — 99214 OFFICE O/P EST MOD 30 MIN: CPT | Performed by: FAMILY MEDICINE

## 2022-12-29 PROCEDURE — 93005 ELECTROCARDIOGRAM TRACING: CPT | Performed by: FAMILY MEDICINE

## 2022-12-29 RX ORDER — SPIRONOLACTONE 25 MG/1
25 TABLET ORAL DAILY
Qty: 90 TABLET | Refills: 1 | Status: SHIPPED | OUTPATIENT
Start: 2022-12-29 | End: 2023-03-29 | Stop reason: SDUPTHER

## 2022-12-29 RX ORDER — LOSARTAN POTASSIUM 50 MG/1
50 TABLET ORAL DAILY
Qty: 90 TABLET | Refills: 1 | Status: SHIPPED | OUTPATIENT
Start: 2022-12-29 | End: 2023-03-29 | Stop reason: SDUPTHER

## 2022-12-29 RX ORDER — CLOPIDOGREL BISULFATE 75 MG/1
75 TABLET ORAL DAILY
Qty: 90 TABLET | Refills: 1 | Status: SHIPPED | OUTPATIENT
Start: 2022-12-29 | End: 2023-03-29 | Stop reason: ALTCHOICE

## 2022-12-29 RX ORDER — AMLODIPINE BESYLATE 5 MG/1
5 TABLET ORAL DAILY
Qty: 90 TABLET | Refills: 0 | Status: SHIPPED | OUTPATIENT
Start: 2022-12-29 | End: 2023-03-29 | Stop reason: ALTCHOICE

## 2022-12-29 RX ORDER — DULOXETIN HYDROCHLORIDE 60 MG/1
60 CAPSULE, DELAYED RELEASE ORAL DAILY
Qty: 90 CAPSULE | Refills: 1 | Status: SHIPPED | OUTPATIENT
Start: 2022-12-29 | End: 2023-03-29 | Stop reason: SDUPTHER

## 2022-12-29 RX ORDER — AMMONIUM LACTATE 12 G/100G
LOTION TOPICAL DAILY
Qty: 500 G | Refills: 2 | Status: SHIPPED | OUTPATIENT
Start: 2022-12-29 | End: 2023-03-29 | Stop reason: SDUPTHER

## 2022-12-29 RX ORDER — PRAVASTATIN SODIUM 40 MG
40 TABLET ORAL NIGHTLY
Qty: 90 TABLET | Refills: 1 | Status: SHIPPED | OUTPATIENT
Start: 2022-12-29 | End: 2023-03-29 | Stop reason: SDUPTHER

## 2022-12-29 RX ORDER — CLOPIDOGREL BISULFATE 75 MG/1
TABLET ORAL
Qty: 90 TABLET | Refills: 1 | OUTPATIENT
Start: 2022-12-29

## 2022-12-29 RX ORDER — LANSOPRAZOLE 30 MG/1
30 CAPSULE, DELAYED RELEASE ORAL DAILY
Qty: 90 CAPSULE | Refills: 1 | Status: SHIPPED | OUTPATIENT
Start: 2022-12-29 | End: 2023-03-29 | Stop reason: ALTCHOICE

## 2022-12-29 RX ORDER — BACLOFEN 10 MG/1
10 TABLET ORAL 3 TIMES DAILY
Qty: 270 TABLET | Refills: 1 | Status: SHIPPED | OUTPATIENT
Start: 2022-12-29 | End: 2023-03-29 | Stop reason: SDUPTHER

## 2022-12-29 RX ORDER — PRAVASTATIN SODIUM 40 MG
TABLET ORAL
Qty: 90 TABLET | Refills: 1 | OUTPATIENT
Start: 2022-12-29

## 2022-12-29 RX ORDER — DULOXETIN HYDROCHLORIDE 60 MG/1
CAPSULE, DELAYED RELEASE ORAL
Qty: 90 CAPSULE | Refills: 1 | OUTPATIENT
Start: 2022-12-29

## 2022-12-29 NOTE — PROGRESS NOTES
Injection  Injection performed in left deltoid by Araseli Cano MA. Patient tolerated the procedure well without complications.  12/29/22   Araseli Cano MA

## 2022-12-29 NOTE — PROGRESS NOTES
Chief Complaint  Diabetes, Hypertension, Osteoarthritis, Knee Pain, Heartburn, Shortness of Breath, and Elevated pulse rate    Subjective          Review of Systems   Constitutional: Positive for irritability. Negative for activity change, appetite change, chills, fatigue and fever. Weight loss: neuro.   HENT: Negative for congestion, ear pain and sore throat.    Eyes: Negative for pain and visual disturbance.   Respiratory: Negative for cough, chest tightness, shortness of breath and wheezing.    Cardiovascular: Negative for chest pain and palpitations.   Gastrointestinal: Negative for abdominal pain and nausea.   Endocrine: Negative for cold intolerance and heat intolerance.   Genitourinary: Negative for difficulty urinating and dysuria.   Musculoskeletal: Positive for back pain, gait problem, neck pain, neck stiffness and stiffness. Negative for arthralgias.   Skin: Positive for pallor. Negative for color change, rash and wound.   Allergic/Immunologic: Negative for environmental allergies.   Neurological: Positive for dizziness, focal weakness, weakness, numbness, headaches and loss of balance. Negative for seizures.   Hematological: Negative for adenopathy. Does not bruise/bleed easily.   Psychiatric/Behavioral: Positive for decreased concentration and sleep disturbance. Negative for agitation, confusion and suicidal ideas. The patient is nervous/anxious and has insomnia.         Depressed mood  Irritability improving.       Arias Willson presents to [facility] [place]  Diabetes  He presents for his follow-up diabetic visit. He has type 2 diabetes mellitus. No MedicAlert identification noted. His disease course has been fluctuating. Hypoglycemia symptoms include dizziness, headaches, nervousness/anxiousness and pallor. Pertinent negatives for hypoglycemia include no confusion or seizures. Associated symptoms include visual change and weakness. Pertinent  negatives for diabetes include no chest pain and no fatigue. Weight loss: neuro. There are no hypoglycemic complications. Symptoms are worsening. Diabetic complications include a CVA and peripheral neuropathy. Risk factors for coronary artery disease include diabetes mellitus, male sex, tobacco exposure, stress, sedentary lifestyle, obesity, hypertension and dyslipidemia. Current diabetic treatment includes oral agent (monotherapy). He is compliant with treatment some of the time. His weight is increasing rapidly. He is following a generally unhealthy diet. When asked about meal planning, he reported none. He rarely participates in exercise. Home blood sugar record trend: Unknown. An ACE inhibitor/angiotensin II receptor blocker is being taken. He does not see a podiatrist.Eye exam is current.   Hypertension  This is a chronic problem. The current episode started more than 1 year ago. The problem has been waxing and waning since onset. The problem is controlled. Associated symptoms include headaches and neck pain. Pertinent negatives include no chest pain, palpitations or shortness of breath. Agents associated with hypertension include NSAIDs. Risk factors for coronary artery disease include obesity, male gender, smoking/tobacco exposure and stress (H/O CVA). Past treatments include ACE inhibitors, angiotensin blockers, beta blockers, diuretics and lifestyle changes. Current antihypertension treatment includes angiotensin blockers and diuretics. The current treatment provides significant improvement. Compliance problems include medication cost.  Hypertensive end-organ damage includes CVA.   Osteoarthritis  Presents for follow-up visit. He complains of stiffness and joint warmth. Affected location: Multiple joints. His pain is at a severity of 5/10. Pertinent negatives include no dysuria, fatigue, fever or rash. Weight loss: neuro. His past medical history is significant for osteoarthritis.   Knee Pain   Incident  onset: Chronic. The pain is at a severity of 5/10. The pain is moderate. The pain has been intermittent since onset. Associated symptoms include numbness.   Heartburn  He reports no abdominal pain, no chest pain, no coughing, no nausea, no sore throat or no wheezing. Pertinent negatives include no fatigue. Weight loss: neuro.   Shortness of Breath  This is a chronic problem. The problem has been rapidly worsening. Associated symptoms include headaches and neck pain. Pertinent negatives include no abdominal pain, chest pain, ear pain, fever, rash, sore throat or wheezing. The symptoms are aggravated by any activity. He has tried rest and beta agonist inhalers for the symptoms. The treatment provided no relief. His past medical history is significant for COPD.   Pain  This is a chronic problem. The current episode started more than 1 year ago. The problem occurs daily. Associated symptoms include headaches, neck pain, numbness, a visual change and weakness. Pertinent negatives include no abdominal pain, arthralgias, chest pain, chills, congestion, coughing, fatigue, fever, nausea, rash or sore throat. The symptoms are aggravated by walking and stress. He has tried NSAIDs and acetaminophen for the symptoms. The treatment provided no relief.   Insomnia  This is a chronic problem. The current episode started more than 1 year ago. The problem occurs constantly. The problem has been waxing and waning. Associated symptoms include headaches, neck pain, numbness, a visual change and weakness. Pertinent negatives include no abdominal pain, arthralgias, chest pain, chills, congestion, coughing, fatigue, fever, nausea, rash or sore throat. The symptoms are aggravated by stress. Treatments tried: OTC meds CPAP. The treatment provided significant relief.   Breathing Problem  He complains of difficulty breathing. There is no cough, shortness of breath or wheezing. Primary symptoms comments: Edema , SOA. The current episode started  more than 1 month ago. The problem has been gradually worsening. Associated symptoms include headaches. Pertinent negatives include no appetite change, chest pain, ear pain, fever or sore throat. Weight loss: neuro. His symptoms are aggravated by exercise. His symptoms are alleviated by nothing. He reports moderate (O2) improvement on treatment. His past medical history is significant for COPD.   Stroke  This is a chronic problem. The current episode started more than 1 year ago. The problem occurs daily. The problem has been gradually improving. Associated symptoms include headaches, neck pain, numbness, a visual change and weakness. Pertinent negatives include no abdominal pain, arthralgias, chest pain, chills, congestion, coughing, fatigue, fever, nausea, rash or sore throat. The symptoms are aggravated by exertion and walking. He has tried acetaminophen and NSAIDs for the symptoms. The treatment provided no relief.   Neurologic Problem  The patient's primary symptoms include an altered mental status, clumsiness, focal sensory loss, focal weakness, a loss of balance, a visual change and weakness. This is a chronic problem. The current episode started more than 1 year ago. The neurological problem developed suddenly. The problem has been waxing and waning since onset. There was left-sided, right-sided, facial, upper extremity and lower extremity (Strokes of Basal ganglia. Horners syndrome,  sensory, motor deficits.) focality noted. Associated symptoms include back pain, dizziness, headaches and neck pain. Pertinent negatives include no abdominal pain, chest pain, confusion, fatigue, fever, nausea, palpitations or shortness of breath. (Requires Walker to ambulate.) Past treatments include walking and medication (Physical therapy). The treatment provided mild relief. His past medical history is significant for a CVA.   Depression  Visit Type: follow-up  Patient presents with the following symptoms: decreased  "concentration, depressed mood, excessive worry, feelings of hopelessness, feelings of worthlessness, insomnia, irritability, memory impairment, nervousness/anxiety and restlessness.  Patient is not experiencing: confusion, palpitations, shortness of breath, suicidal ideas and suicidal planning.  Weight loss: neuro.  Frequency of symptoms: occasionally   Severity: moderate   Sleep quality: fair  Nighttime awakenings: several        Objective   Vital Signs:   /62 (BP Location: Left arm, Patient Position: Sitting, Cuff Size: Adult)   Pulse 84   Temp 97.1 °F (36.2 °C) (Temporal)   Ht 182.9 cm (72\")   Wt 126 kg (277 lb 6.4 oz)   BMI 37.62 kg/m²     Physical Exam  Vitals and nursing note reviewed.   Constitutional:       General: He is not in acute distress.     Appearance: He is obese.   HENT:      Head: Normocephalic.      Right Ear: Tympanic membrane and ear canal normal.      Left Ear: Tympanic membrane and ear canal normal.      Nose: Nose normal.      Mouth/Throat:      Mouth: Mucous membranes are moist.      Pharynx: No oropharyngeal exudate or posterior oropharyngeal erythema.   Eyes:      General:         Right eye: No discharge.         Left eye: No discharge.      Extraocular Movements: Extraocular movements intact.      Conjunctiva/sclera: Conjunctivae normal.      Comments: Gus's   Neck:      Vascular: No carotid bruit.   Cardiovascular:      Rate and Rhythm: Normal rate and regular rhythm.      Heart sounds: Normal heart sounds. No murmur heard.  Pulmonary:      Breath sounds: Wheezing and rhonchi present.      Comments: SOA with conversation   Abdominal:      Palpations: Abdomen is soft.      Tenderness: There is no right CVA tenderness or left CVA tenderness.   Musculoskeletal:      Cervical back: Rigidity and tenderness present.      Right lower leg: Edema present.      Left lower leg: Edema present.      Comments: WB 4 with ROM low back  3+ pitting edema [place] LE    Lymphadenopathy:      " Cervical: No cervical adenopathy.   Skin:     Capillary Refill: Capillary refill takes 2 to 3 seconds.      Findings: No erythema or lesion.      Comments: Hyperkeratosis  Raynaud's of Fingers , splinter hemorrhage fingernails improved on Norvasc   Cyanosis L toes resolved S/P vascular procedure.   Neurological:      Mental Status: He is oriented to person, place, and time.      Cranial Nerves: Cranial nerve deficit present.      Sensory: Sensory deficit present.      Motor: Weakness present.      Coordination: Coordination normal.      Gait: Gait abnormal.      Comments: CVA with residual Gus's syndrome    Psychiatric:         Mood and Affect: Mood normal.         Behavior: Behavior normal.         Thought Content: Thought content normal.         Judgment: Judgment normal.        Result Review :                 Assessment and Plan    Diagnoses and all orders for this visit:    1. Type 2 diabetes mellitus with other specified complication, with long-term current use of insulin (HCC)  Comments:  Complications: HTN, Hyperlipidemia, Hyperglycemia,   Orders:  -     Cancel: POC Glycated Hemoglobin, Total  -     Hemoglobin A1c; Future  -     metFORMIN (GLUCOPHAGE) 500 MG tablet; Take 1 tablet by mouth 2 (Two) Times a Day.  Dispense: 180 tablet; Refill: 1    2. Anasarca  -     CBC & Differential; Future  -     Comprehensive metabolic panel; Future  -     TSH; Future  -     BNP  -     Urinalysis With Culture If Indicated -; Future  -     C-reactive protein; Future    3. Generalized edema  -     BNP    4. Shortness of breath  -     BNP  -     Ambulatory Referral to Cardiology  -     XR Chest PA & Lateral (In Office)  -     ECG 12 Lead    5. Need for immunization against influenza  -     Fluzone High-Dose 65+yrs ([id number])    6. H/O: CVA (cerebrovascular accident)  -     clopidogrel (PLAVIX) 75 MG tablet; Take 1 tablet by mouth Daily.  Dispense: 90 tablet; Refill: 1    7. Neuropathic pain  -     DULoxetine (CYMBALTA) 60  MG capsule; Take 1 capsule by mouth Daily.  Dispense: 90 capsule; Refill: 1    8. Pure hypercholesterolemia  -     pravastatin (PRAVACHOL) 40 MG tablet; Take 1 tablet by mouth Every Night.  Dispense: 90 tablet; Refill: 1    9. Raynaud's disease without gangrene  Comments:  Will stop BB, start CCB  Orders:  -     amLODIPine (NORVASC) 5 MG tablet; Take 1 tablet by mouth Daily.  Dispense: 90 tablet; Refill: 0    10. PAD (peripheral artery disease) ([facility])  Comments:  Followed by Vascular.  Orders:  -     amLODIPine (NORVASC) 5 MG tablet; Take 1 tablet by mouth Daily.  Dispense: 90 tablet; Refill: 0    11. Primary hypertension  -     amLODIPine (NORVASC) 5 MG tablet; Take 1 tablet by mouth Daily.  Dispense: 90 tablet; Refill: 0    12. Hyperkeratosis  -     ammonium lactate (LAC-HYDRIN) 12 % lotion; Apply  topically to the appropriate area as directed Daily.  Dispense: 500 g; Refill: 2    13. Gastroesophageal reflux disease without esophagitis  -     lansoprazole (PREVACID) 30 MG capsule; Take 1 capsule by mouth Daily.  Dispense: 90 capsule; Refill: 1    14. Essential hypertension  -     losartan (COZAAR) 50 MG tablet; Take 1 tablet by mouth Daily.  Dispense: 90 tablet; Refill: 1  -     spironolactone (ALDACTONE) 25 MG tablet; Take 1 tablet by mouth Daily.  Dispense: 90 tablet; Refill: 1    15. Muscle spasm  -     baclofen (LIORESAL) 10 MG tablet; Take 1 tablet by mouth 3 (Three) Times a Day.  Dispense: 270 tablet; Refill: 1    Discussed exam, health problems, meds, indications, EKG done reviewed with Patient, discussed referral to Cardiology. Discussed checking labs. Patient will F/U with Pulmonology. Discussed checking labs.     Follow Up   Return in about 3 months (around 3/29/2023).  Patient was given instructions and counseling regarding his condition or for health maintenance advice. Please see specific information pulled into the AVS if appropriate.         This document has been electronically signed by Harpreet Amezquita  MD Kali on December 29, 2022 18:33 CST

## 2022-12-30 ENCOUNTER — HOSPITAL ENCOUNTER (EMERGENCY)
Facility: HOSPITAL | Age: 67
Discharge: ANOTHER HOSPITAL | End: 2022-12-31
Attending: STUDENT IN AN ORGANIZED HEALTH CARE EDUCATION/TRAINING PROGRAM | Admitting: STUDENT IN AN ORGANIZED HEALTH CARE EDUCATION/TRAINING PROGRAM
Payer: MEDICARE

## 2022-12-30 ENCOUNTER — LAB (OUTPATIENT)
Dept: LAB | Facility: HOSPITAL | Age: 67
End: 2022-12-30
Payer: MEDICARE

## 2022-12-30 ENCOUNTER — HOSPITAL ENCOUNTER (OUTPATIENT)
Dept: GENERAL RADIOLOGY | Facility: HOSPITAL | Age: 67
Discharge: HOME | End: 2022-12-30
Admitting: FAMILY MEDICINE

## 2022-12-30 DIAGNOSIS — R60.1 ANASARCA: ICD-10-CM

## 2022-12-30 DIAGNOSIS — E11.69 TYPE 2 DIABETES MELLITUS WITH OTHER SPECIFIED COMPLICATION, WITH LONG-TERM CURRENT USE OF INSULIN: Chronic | ICD-10-CM

## 2022-12-30 DIAGNOSIS — Z79.4 TYPE 2 DIABETES MELLITUS WITH OTHER SPECIFIED COMPLICATION, WITH LONG-TERM CURRENT USE OF INSULIN: Chronic | ICD-10-CM

## 2022-12-30 DIAGNOSIS — D64.9 ANEMIA, UNSPECIFIED TYPE: Primary | ICD-10-CM

## 2022-12-30 LAB
ABO GROUP BLD: NORMAL
ABO GROUP BLD: NORMAL
ALBUMIN SERPL-MCNC: 3.6 G/DL (ref 3.5–5.2)
ALBUMIN SERPL-MCNC: 3.9 G/DL (ref 3.5–5.2)
ALBUMIN/GLOB SERPL: 1.4 G/DL
ALBUMIN/GLOB SERPL: 1.6 G/DL
ALP SERPL-CCNC: 87 U/L (ref 39–117)
ALP SERPL-CCNC: 97 U/L (ref 39–117)
ALT SERPL W P-5'-P-CCNC: 28 U/L (ref 1–41)
ALT SERPL W P-5'-P-CCNC: 33 U/L (ref 1–41)
ANION GAP SERPL CALCULATED.3IONS-SCNC: 14 MMOL/L (ref 5–15)
ANION GAP SERPL CALCULATED.3IONS-SCNC: 17.2 MMOL/L (ref 5–15)
AST SERPL-CCNC: 26 U/L (ref 1–40)
AST SERPL-CCNC: 32 U/L (ref 1–40)
BASOPHILS # BLD MANUAL: 0.17 10*3/MM3 (ref 0–0.2)
BASOPHILS NFR BLD MANUAL: 2 % (ref 0–1.5)
BILIRUB SERPL-MCNC: 0.6 MG/DL (ref 0–1.2)
BILIRUB SERPL-MCNC: 0.7 MG/DL (ref 0–1.2)
BILIRUB UR QL STRIP: NEGATIVE
BLD GP AB SCN SERPL QL: NEGATIVE
BUN SERPL-MCNC: 14 MG/DL (ref 8–23)
BUN SERPL-MCNC: 19 MG/DL (ref 8–23)
BUN/CREAT SERPL: 14.4 (ref 7–25)
BUN/CREAT SERPL: 18.6 (ref 7–25)
CALCIUM SPEC-SCNC: 8.5 MG/DL (ref 8.6–10.5)
CALCIUM SPEC-SCNC: 8.5 MG/DL (ref 8.6–10.5)
CHLORIDE SERPL-SCNC: 101 MMOL/L (ref 98–107)
CHLORIDE SERPL-SCNC: 102 MMOL/L (ref 98–107)
CLARITY UR: CLEAR
CO2 SERPL-SCNC: 19.8 MMOL/L (ref 22–29)
CO2 SERPL-SCNC: 21 MMOL/L (ref 22–29)
COLOR UR: YELLOW
CREAT SERPL-MCNC: 0.97 MG/DL (ref 0.76–1.27)
CREAT SERPL-MCNC: 1.02 MG/DL (ref 0.76–1.27)
CRP SERPL-MCNC: 0.99 MG/DL (ref 0–0.5)
DEPRECATED RDW RBC AUTO: 49.8 FL (ref 37–54)
DEPRECATED RDW RBC AUTO: 51.5 FL (ref 37–54)
EGFRCR SERPLBLD CKD-EPI 2021: 81.6 ML/MIN/1.73
EGFRCR SERPLBLD CKD-EPI 2021: 86.6 ML/MIN/1.73
EOSINOPHIL # BLD MANUAL: 0.17 10*3/MM3 (ref 0–0.4)
EOSINOPHIL NFR BLD MANUAL: 2 % (ref 0.3–6.2)
ERYTHROCYTE [DISTWIDTH] IN BLOOD BY AUTOMATED COUNT: 21.5 % (ref 12.3–15.4)
ERYTHROCYTE [DISTWIDTH] IN BLOOD BY AUTOMATED COUNT: 22.6 % (ref 12.3–15.4)
GLOBULIN UR ELPH-MCNC: 2.4 GM/DL
GLOBULIN UR ELPH-MCNC: 2.6 GM/DL
GLUCOSE SERPL-MCNC: 119 MG/DL (ref 65–99)
GLUCOSE SERPL-MCNC: 146 MG/DL (ref 65–99)
GLUCOSE UR STRIP-MCNC: ABNORMAL MG/DL
HBA1C MFR BLD: 5.9 % (ref 4.8–5.6)
HCT VFR BLD AUTO: 16 % (ref 37.5–51)
HCT VFR BLD AUTO: 17.2 % (ref 37.5–51)
HGB BLD-MCNC: 3.9 G/DL (ref 13–17.7)
HGB BLD-MCNC: 4.3 G/DL (ref 13–17.7)
HGB UR QL STRIP.AUTO: NEGATIVE
HOLD SPECIMEN: NORMAL
KETONES UR QL STRIP: NEGATIVE
LEUKOCYTE ESTERASE UR QL STRIP.AUTO: NEGATIVE
LYMPHOCYTES # BLD MANUAL: 0.84 10*3/MM3 (ref 0.7–3.1)
LYMPHOCYTES NFR BLD MANUAL: 7 % (ref 5–12)
Lab: NORMAL
MAGNESIUM SERPL-MCNC: 2.1 MG/DL (ref 1.6–2.4)
MCH RBC QN AUTO: 15.9 PG (ref 26.6–33)
MCH RBC QN AUTO: 16.5 PG (ref 26.6–33)
MCHC RBC AUTO-ENTMCNC: 24.4 G/DL (ref 31.5–35.7)
MCHC RBC AUTO-ENTMCNC: 25 G/DL (ref 31.5–35.7)
MCV RBC AUTO: 65.3 FL (ref 79–97)
MCV RBC AUTO: 66.2 FL (ref 79–97)
MONOCYTES # BLD: 0.59 10*3/MM3 (ref 0.1–0.9)
NEUTROPHILS # BLD AUTO: 6.62 10*3/MM3 (ref 1.7–7)
NEUTROPHILS NFR BLD MANUAL: 79 % (ref 42.7–76)
NITRITE UR QL STRIP: NEGATIVE
NT-PROBNP SERPL-MCNC: 1313 PG/ML (ref 0–900)
PH UR STRIP.AUTO: 6 [PH] (ref 5–8)
PLAT MORPH BLD: NORMAL
PLATELET # BLD AUTO: 267 10*3/MM3 (ref 140–450)
PLATELET # BLD AUTO: 328 10*3/MM3 (ref 140–450)
PMV BLD AUTO: 10.7 FL (ref 6–12)
POTASSIUM SERPL-SCNC: 3.8 MMOL/L (ref 3.5–5.2)
POTASSIUM SERPL-SCNC: 4 MMOL/L (ref 3.5–5.2)
PROT SERPL-MCNC: 6.2 G/DL (ref 6–8.5)
PROT SERPL-MCNC: 6.3 G/DL (ref 6–8.5)
PROT UR QL STRIP: ABNORMAL
RBC # BLD AUTO: 2.45 10*6/MM3 (ref 4.14–5.8)
RBC # BLD AUTO: 2.6 10*6/MM3 (ref 4.14–5.8)
RBC MORPH BLD: NORMAL
RH BLD: POSITIVE
RH BLD: POSITIVE
SODIUM SERPL-SCNC: 137 MMOL/L (ref 136–145)
SODIUM SERPL-SCNC: 138 MMOL/L (ref 136–145)
SP GR UR STRIP: 1.02 (ref 1–1.03)
T&S EXPIRATION DATE: NORMAL
TSH SERPL DL<=0.05 MIU/L-ACNC: 2.35 UIU/ML (ref 0.27–4.2)
UROBILINOGEN UR QL STRIP: ABNORMAL
VARIANT LYMPHS NFR BLD MANUAL: 10 % (ref 19.6–45.3)
WBC MORPH BLD: NORMAL
WBC NRBC COR # BLD: 7.01 10*3/MM3 (ref 3.4–10.8)
WBC NRBC COR # BLD: 8.38 10*3/MM3 (ref 3.4–10.8)
WHOLE BLOOD HOLD COAG: NORMAL

## 2022-12-30 PROCEDURE — 86900 BLOOD TYPING SEROLOGIC ABO: CPT | Performed by: STUDENT IN AN ORGANIZED HEALTH CARE EDUCATION/TRAINING PROGRAM

## 2022-12-30 PROCEDURE — 83036 HEMOGLOBIN GLYCOSYLATED A1C: CPT

## 2022-12-30 PROCEDURE — 86923 COMPATIBILITY TEST ELECTRIC: CPT

## 2022-12-30 PROCEDURE — 85025 COMPLETE CBC W/AUTO DIFF WBC: CPT | Performed by: STUDENT IN AN ORGANIZED HEALTH CARE EDUCATION/TRAINING PROGRAM

## 2022-12-30 PROCEDURE — 86850 RBC ANTIBODY SCREEN: CPT | Performed by: STUDENT IN AN ORGANIZED HEALTH CARE EDUCATION/TRAINING PROGRAM

## 2022-12-30 PROCEDURE — 86900 BLOOD TYPING SEROLOGIC ABO: CPT

## 2022-12-30 PROCEDURE — 36430 TRANSFUSION BLD/BLD COMPNT: CPT

## 2022-12-30 PROCEDURE — 87636 SARSCOV2 & INF A&B AMP PRB: CPT | Performed by: STUDENT IN AN ORGANIZED HEALTH CARE EDUCATION/TRAINING PROGRAM

## 2022-12-30 PROCEDURE — 86901 BLOOD TYPING SEROLOGIC RH(D): CPT | Performed by: STUDENT IN AN ORGANIZED HEALTH CARE EDUCATION/TRAINING PROGRAM

## 2022-12-30 PROCEDURE — 99284 EMERGENCY DEPT VISIT MOD MDM: CPT

## 2022-12-30 PROCEDURE — 86901 BLOOD TYPING SEROLOGIC RH(D): CPT

## 2022-12-30 PROCEDURE — 81003 URINALYSIS AUTO W/O SCOPE: CPT

## 2022-12-30 PROCEDURE — 83880 ASSAY OF NATRIURETIC PEPTIDE: CPT | Performed by: FAMILY MEDICINE

## 2022-12-30 PROCEDURE — 83735 ASSAY OF MAGNESIUM: CPT | Performed by: STUDENT IN AN ORGANIZED HEALTH CARE EDUCATION/TRAINING PROGRAM

## 2022-12-30 PROCEDURE — P9016 RBC LEUKOCYTES REDUCED: HCPCS

## 2022-12-30 PROCEDURE — 80053 COMPREHEN METABOLIC PANEL: CPT | Performed by: STUDENT IN AN ORGANIZED HEALTH CARE EDUCATION/TRAINING PROGRAM

## 2022-12-30 PROCEDURE — 84443 ASSAY THYROID STIM HORMONE: CPT

## 2022-12-30 PROCEDURE — 85025 COMPLETE CBC W/AUTO DIFF WBC: CPT

## 2022-12-30 PROCEDURE — 71046 X-RAY EXAM CHEST 2 VIEWS: CPT

## 2022-12-30 PROCEDURE — 80053 COMPREHEN METABOLIC PANEL: CPT

## 2022-12-30 PROCEDURE — 96374 THER/PROPH/DIAG INJ IV PUSH: CPT

## 2022-12-30 PROCEDURE — 36415 COLL VENOUS BLD VENIPUNCTURE: CPT

## 2022-12-30 PROCEDURE — 85007 BL SMEAR W/DIFF WBC COUNT: CPT

## 2022-12-30 PROCEDURE — 86140 C-REACTIVE PROTEIN: CPT

## 2022-12-30 RX ORDER — PANTOPRAZOLE SODIUM 40 MG/10ML
80 INJECTION, POWDER, LYOPHILIZED, FOR SOLUTION INTRAVENOUS ONCE
Status: COMPLETED | OUTPATIENT
Start: 2022-12-30 | End: 2022-12-30

## 2022-12-30 RX ADMIN — PANTOPRAZOLE SODIUM 80 MG: 40 INJECTION, POWDER, FOR SOLUTION INTRAVENOUS at 23:43

## 2022-12-31 VITALS
HEIGHT: 72 IN | OXYGEN SATURATION: 98 % | WEIGHT: 275 LBS | RESPIRATION RATE: 18 BRPM | BODY MASS INDEX: 37.25 KG/M2 | SYSTOLIC BLOOD PRESSURE: 127 MMHG | HEART RATE: 88 BPM | DIASTOLIC BLOOD PRESSURE: 87 MMHG | TEMPERATURE: 98.2 F

## 2022-12-31 LAB
FLUAV RNA RESP QL NAA+PROBE: NOT DETECTED
FLUBV RNA RESP QL NAA+PROBE: NOT DETECTED
SARS-COV-2 RNA RESP QL NAA+PROBE: NOT DETECTED

## 2022-12-31 NOTE — ED PROVIDER NOTES
Subjective   History of Present Illness  67-year-old male comes to the ER chief complaint of low blood count.  He also endorses lightheadedness and and some shortness of breath.  Symptoms have been going on for 3 to 4 months.  Today he went to see his PCP who did blood work and noted his hemoglobin was 4.  He was referred to the ER.  Patient has needed a blood transfusion when he was 19 after an accident.  Patient denies black stool, hematemesis, hematochezia.  He denies other symptoms.  He has a history of prior strokes.  He is on Plavix But denies being on a blood thinner.    History provided by:  Patient, spouse and medical records   used: No        Review of Systems   Constitutional: Negative for chills and fever.   HENT: Negative for drooling.    Eyes: Negative for redness.   Respiratory: Negative for cough, chest tightness, shortness of breath and wheezing.    Cardiovascular: Negative for chest pain and palpitations.   Gastrointestinal: Negative for abdominal pain, anal bleeding, blood in stool, constipation, diarrhea, nausea and vomiting.   Genitourinary: Negative for dysuria and flank pain.   Skin: Negative for color change.   Neurological: Positive for light-headedness. Negative for seizures, weakness and numbness.   Psychiatric/Behavioral: Negative for confusion.       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      • Benign essential hypertension    • Blood in feces    • Callus    • Carotid artery stenosis    • Cerebrovascular accident (HCC)      L sided sensory deficit      • Conjunctivitis    • Constipation     OCCASIONAL   • Contusion, eye, left    • Corneal ulcer     PROBABLE   • Diabetes mellitus (HCC)    • Eczema    • Elevated cholesterol    • Emphysema lung (HCC)    • Encounter for screening for malignant neoplasm  of colon    • Essential hypertension    • Foot ulcer (HCC)    • GERD (gastroesophageal reflux disease)    • Hemorrhoids    • History of echocardiogram 04/05/2013    Echocadiogram W/ color flow 39541 (Normal LV systolic function with EF of 60-65%. Grade I diastolic dysfunction of the LV myocardium. No evidence of LV apical thrombus. No evidence of pericardial effusion.)   • History of transfusion    • Gus's syndrome     RIGHT EYE   • Hyperkeratosis    • Hyperlipidemia    • Mucopurulent conjunctivitis     ACUTE   • Myopia    • Neuropathic pain    • Obesity    • Onychomycosis    • Sleep apnea    • Tobacco dependence syndrome        No Known Allergies    Past Surgical History:   Procedure Laterality Date   • AORTAGRAM Left 3/21/2022    Procedure: LEFT LOWER EXTREMITY ANGIOGRAM;  Surgeon: Darryl Llanes DO;  Location: [place] [address];  Service: Vascular;  Laterality: Left;   • COLONOSCOPY  07/30/2015    Colonoscopy, diagnostic (screening) 77312 (Screening for malignant neoplasm of colon)    • COLONOSCOPY  09/09/2015    Colonoscopy, diagnostic (screening) 71799 (Diverticulosis found in the sigmoid colon.Hemorrhoids found in the anus.)   • COLONOSCOPY  05/01/2009    Colonoscopy, diagnostic (screening) 91234 (Small internal and external hemorrhoids were present, Colonoscopy otherwise normal.)   • COLONOSCOPY N/A 12/09/2020    Procedure: COLONOSCOPY;  Surgeon: Arias Larsen MD;  Location: [facility] ENDOSCOPY;  Service: Gastroenterology;  Laterality: N/A;   • LEG SURGERY     • NOSE SURGERY         Family History   Problem Relation Age of Onset   • Glaucoma Other    • Heart disease Other    • Stroke Other    • Macular degeneration Other    • Diabetes Other    • Glaucoma Father    • Macular degeneration Father    • Cataracts Father    • Cancer Father    • Macular degeneration Sister    • Heart disease Mother    • Cancer Paternal Grandmother    • Heart disease Paternal Grandfather        Social History      Socioeconomic History   • Marital status: Single   Tobacco Use   • Smoking status: Every Day     Packs/day: 0.50     Years: 33.00     Pack years: 16.50     Types: Cigarettes   • Smokeless tobacco: Never   • Tobacco comments:     3 cigarettes per day-12/29/22   Vaping Use   • Vaping Use: Never used   Substance and Sexual Activity   • Alcohol use: Not Currently   • Drug use: No   • Sexual activity: Defer           Objective    Vitals:    12/30/22 2240 12/30/22 2255 12/30/22 2303 12/30/22 2346   BP: 133/60 124/58 138/63 154/67   BP Location: Left arm Left arm  Left arm   Patient Position: Lying Lying  Sitting   Pulse: 89 92 94 103   Resp: 22 20 20   Temp: 98.2 °F (36.8 °C) 98.1 °F (36.7 °C) 98.2 °F (36.8 °C)    TempSrc: Oral Oral Oral    SpO2: 92% 94% 97% 93%   Weight:       Height:           Physical Exam  Vitals and nursing note reviewed.   Constitutional:       General: He is not in acute distress.     Appearance: He is well-developed. He is obese. He is not ill-appearing or diaphoretic.   HENT:      Head: Normocephalic.      Nose: No congestion.      Mouth/Throat:      Mouth: Mucous membranes are moist.   Eyes:      Conjunctiva/sclera: Conjunctivae normal.   Pulmonary:      Effort: Pulmonary effort is normal. No accessory muscle usage or respiratory distress.   Chest:      Chest wall: No tenderness.   Abdominal:      Palpations: Abdomen is soft.      Tenderness: There is no abdominal tenderness. There is no guarding or rebound.   Musculoskeletal:         General: Normal range of motion.   Skin:     General: Skin is dry.      Capillary Refill: Capillary refill takes less than 2 seconds.   Neurological:      Mental Status: He is oriented to person, place, and time.   Psychiatric:         Behavior: Behavior normal.         Procedures           ED Course  ED Course as of 12/31/22 0040   Fri Dec 30, 2022   2251 Patient is waiting for a bed to become available at [facility]. []   8648 Labs obtained significant for  "hemoglobin of 3.9.  Patient's last hemoglobin was 8.5 9 months ago.  A year ago it was 11.6.  Given the patient's symptoms have been progressing very slowly over the last 4+ months, his blood loss is likely chronic in nature.  Fecal occult negative.  Patient is stable.  Discussed with the on-call hospitalist who states we cannot admit the patient and \"just give him blood\".  He states the patient likely has a GI bleed and needs to be transferred.  No beds are available at surrounding facilities.  He is waitlisted for a bed at [facility]. []   5858 Patient accepted at [facility]. Awaiting bed assignment. []      ED Course User Index  [] Jeremy Martinez MD      Results for orders placed or performed during the hospital encounter of 12/30/22   COVID-19 and FLU A/B PCR - Swab, Nasopharynx    Specimen: Nasopharynx; Swab   Result Value Ref Range    COVID19 Not Detected Not Detected - Ref. Range    Influenza A PCR Not Detected Not Detected    Influenza B PCR Not Detected Not Detected   Comprehensive Metabolic Panel    Specimen: Blood   Result Value Ref Range    Glucose 119 (H) 65 - 99 mg/dL    BUN 14 8 - 23 mg/dL    Creatinine 0.97 0.76 - 1.27 mg/dL    Sodium 137 136 - 145 mmol/L    Potassium 3.8 3.5 - 5.2 mmol/L    Chloride 102 98 - 107 mmol/L    CO2 21.0 (L) 22.0 - 29.0 mmol/L    Calcium 8.5 (L) 8.6 - 10.5 mg/dL    Total Protein 6.2 6.0 - 8.5 g/dL    Albumin 3.6 3.5 - 5.2 g/dL    ALT (SGPT) 33 1 - 41 U/L    AST (SGOT) 32 1 - 40 U/L    Alkaline Phosphatase 97 39 - 117 U/L    Total Bilirubin 0.6 0.0 - 1.2 mg/dL    Globulin 2.6 gm/dL    A/G Ratio 1.4 g/dL    BUN/Creatinine Ratio 14.4 7.0 - 25.0    Anion Gap 14.0 5.0 - 15.0 mmol/L    eGFR 86.6 >60.0 mL/min/1.73   Magnesium    Specimen: Blood   Result Value Ref Range    Magnesium 2.1 1.6 - 2.4 mg/dL   CBC Auto Differential    Specimen: Blood   Result Value Ref Range    WBC 7.01 3.40 - 10.80 10*3/mm3    RBC 2.45 (L) 4.14 - 5.80 10*6/mm3    Hemoglobin 3.9 (C) 13.0 - 17.7 " g/dL    Hematocrit 16.0 (C) 37.5 - 51.0 %    MCV 65.3 (L) 79.0 - 97.0 fL    MCH 15.9 (L) 26.6 - 33.0 pg    MCHC 24.4 (L) 31.5 - 35.7 g/dL    RDW 22.6 (H) 12.3 - 15.4 %    RDW-SD 51.5 37.0 - 54.0 fl    MPV 10.7 6.0 - 12.0 fL    Platelets 267 140 - 450 10*3/mm3   Type & Screen    Specimen: Blood   Result Value Ref Range    ABO Type A     RH type Positive     Antibody Screen Negative     T&S Expiration Date 1/2/2023 11:59:59 PM    Prepare RBC, 1 Units   Result Value Ref Range    Product Code L8365A90     Unit Number [id number]     UNIT  ABO A     UNIT  RH POS     Crossmatch Interpretation Compatible     Dispense Status IS     Blood Expiration Date [id number]     Blood Type Barcode     PREVIOUS HISTORY    Specimen: Blood   Result Value Ref Range    Previous History No record on File    ABO RH Specimen Verification    Specimen: Blood   Result Value Ref Range    ABO Type A     RH type Positive    Gold Top - [facility]   Result Value Ref Range    Extra Tube Hold for add-ons.    Light Blue Top   Result Value Ref Range    Extra Tube Hold for add-ons.                Medical Decision Making  Anemia, unspecified type: acute illness or injury  Amount and/or Complexity of Data Reviewed  Labs: ordered.      Risk  Prescription drug management.          Final diagnoses:   Anemia, unspecified type       ED Disposition  ED Disposition     ED Disposition   Transfer to Another Facility     Condition   --    Comment   --             No follow-up provider specified.       Medication List      Changed    B-12 5000 MCG sublingual tablet  Place 1 each under the tongue Daily.  What changed:   · when to take this  · additional instructions             Jeremy Martinez MD  12/31/22 0040

## 2023-01-01 LAB
BH BB BLOOD EXPIRATION DATE: NORMAL
BH BB BLOOD TYPE BARCODE: NORMAL
BH BB DISPENSE STATUS: NORMAL
BH BB PRODUCT CODE: NORMAL
BH BB UNIT NUMBER: NORMAL
CROSSMATCH INTERPRETATION: NORMAL
UNIT  ABO: NORMAL
UNIT  RH: NORMAL

## 2023-01-05 ENCOUNTER — TELEPHONE (OUTPATIENT)
Dept: VASCULAR SURGERY | Facility: CLINIC | Age: 68
End: 2023-01-05
Payer: MEDICARE

## 2023-01-05 NOTE — TELEPHONE ENCOUNTER
Attempted to reschedule cancelled appt from 01/06/23 with Mary.  No answer, lm for return call.  If pt calls back please transfer to scheduling for testing to be rescheduled and then will reschedule office visit after.

## 2023-01-06 ENCOUNTER — APPOINTMENT (OUTPATIENT)
Dept: ULTRASOUND IMAGING | Facility: HOSPITAL | Age: 68
End: 2023-01-06
Payer: MEDICARE

## 2023-01-06 LAB
QT INTERVAL: 346 MS
QTC INTERVAL: 418 MS

## 2023-01-11 ENCOUNTER — TELEPHONE (OUTPATIENT)
Dept: FAMILY MEDICINE CLINIC | Facility: CLINIC | Age: 68
End: 2023-01-11
Payer: MEDICARE

## 2023-01-11 RX ORDER — AZITHROMYCIN 250 MG/1
TABLET, FILM COATED ORAL
Qty: 6 TABLET | Refills: 0 | Status: SHIPPED | OUTPATIENT
Start: 2023-01-11 | End: 2023-03-29

## 2023-01-11 NOTE — TELEPHONE ENCOUNTER
Patient called and stated that he has a cough and is coughing up stuff.  Patient was also seen at [facility] in [facility] [place]; patient would like a return call

## 2023-03-01 ENCOUNTER — TELEPHONE (OUTPATIENT)
Dept: PODIATRY | Facility: CLINIC | Age: 68
End: 2023-03-01
Payer: MEDICARE

## 2023-03-01 NOTE — TELEPHONE ENCOUNTER
Called and left Pt a voicemail asking if they wanted to reschedule their missed appt with Dr. Llanes.

## 2023-03-29 ENCOUNTER — OFFICE VISIT (OUTPATIENT)
Dept: FAMILY MEDICINE CLINIC | Facility: CLINIC | Age: 68
End: 2023-03-29
Payer: MEDICARE

## 2023-03-29 VITALS
HEIGHT: 72 IN | HEART RATE: 103 BPM | SYSTOLIC BLOOD PRESSURE: 130 MMHG | TEMPERATURE: 97.3 F | BODY MASS INDEX: 35.96 KG/M2 | DIASTOLIC BLOOD PRESSURE: 64 MMHG | WEIGHT: 265.5 LBS | OXYGEN SATURATION: 92 %

## 2023-03-29 DIAGNOSIS — L85.9 HYPERKERATOSIS: ICD-10-CM

## 2023-03-29 DIAGNOSIS — Z79.4 TYPE 2 DIABETES MELLITUS WITH DIABETIC AUTONOMIC NEUROPATHY, WITH LONG-TERM CURRENT USE OF INSULIN: ICD-10-CM

## 2023-03-29 DIAGNOSIS — E11.43 TYPE 2 DIABETES MELLITUS WITH DIABETIC AUTONOMIC NEUROPATHY, WITH LONG-TERM CURRENT USE OF INSULIN: ICD-10-CM

## 2023-03-29 DIAGNOSIS — E78.00 PURE HYPERCHOLESTEROLEMIA: Chronic | ICD-10-CM

## 2023-03-29 DIAGNOSIS — J44.9 COPD, GROUP B, BY GOLD 2017 CLASSIFICATION: Chronic | ICD-10-CM

## 2023-03-29 DIAGNOSIS — Z79.4 TYPE 2 DIABETES MELLITUS WITH OTHER SPECIFIED COMPLICATION, WITH LONG-TERM CURRENT USE OF INSULIN: Chronic | ICD-10-CM

## 2023-03-29 DIAGNOSIS — E11.69 TYPE 2 DIABETES MELLITUS WITH OTHER SPECIFIED COMPLICATION, WITHOUT LONG-TERM CURRENT USE OF INSULIN: Chronic | ICD-10-CM

## 2023-03-29 DIAGNOSIS — E11.69 TYPE 2 DIABETES MELLITUS WITH OTHER SPECIFIED COMPLICATION, WITH LONG-TERM CURRENT USE OF INSULIN: Chronic | ICD-10-CM

## 2023-03-29 DIAGNOSIS — M79.2 NEUROPATHIC PAIN: ICD-10-CM

## 2023-03-29 DIAGNOSIS — D64.9 ANEMIA, UNSPECIFIED TYPE: Primary | ICD-10-CM

## 2023-03-29 DIAGNOSIS — I10 ESSENTIAL HYPERTENSION: Chronic | ICD-10-CM

## 2023-03-29 DIAGNOSIS — M62.838 MUSCLE SPASM: Chronic | ICD-10-CM

## 2023-03-29 RX ORDER — BUDESONIDE AND FORMOTEROL FUMARATE DIHYDRATE 160; 4.5 UG/1; UG/1
AEROSOL RESPIRATORY (INHALATION)
Qty: 30.6 G | Refills: 1 | Status: SHIPPED | OUTPATIENT
Start: 2023-03-29

## 2023-03-29 RX ORDER — FERROUS SULFATE 325(65) MG
1 TABLET ORAL 2 TIMES DAILY WITH MEALS
COMMUNITY
Start: 2023-02-25

## 2023-03-29 RX ORDER — BACLOFEN 10 MG/1
10 TABLET ORAL 3 TIMES DAILY
Qty: 270 TABLET | Refills: 1 | Status: SHIPPED | OUTPATIENT
Start: 2023-03-29

## 2023-03-29 RX ORDER — DULOXETIN HYDROCHLORIDE 60 MG/1
60 CAPSULE, DELAYED RELEASE ORAL DAILY
Qty: 90 CAPSULE | Refills: 1 | Status: SHIPPED | OUTPATIENT
Start: 2023-03-29

## 2023-03-29 RX ORDER — LOSARTAN POTASSIUM 50 MG/1
50 TABLET ORAL DAILY
Qty: 90 TABLET | Refills: 1 | Status: SHIPPED | OUTPATIENT
Start: 2023-03-29

## 2023-03-29 RX ORDER — GABAPENTIN 600 MG/1
600 TABLET ORAL 3 TIMES DAILY
Qty: 270 TABLET | Refills: 1 | Status: SHIPPED | OUTPATIENT
Start: 2023-03-29

## 2023-03-29 RX ORDER — INSULIN GLARGINE 100 [IU]/ML
INJECTION, SOLUTION SUBCUTANEOUS
Qty: 45 ML | Refills: 2 | Status: SHIPPED | OUTPATIENT
Start: 2023-03-29

## 2023-03-29 RX ORDER — AMMONIUM LACTATE 12 G/100G
LOTION TOPICAL DAILY
Qty: 500 G | Refills: 2 | Status: SHIPPED | OUTPATIENT
Start: 2023-03-29

## 2023-03-29 RX ORDER — SPIRONOLACTONE 25 MG/1
25 TABLET ORAL DAILY
Qty: 90 TABLET | Refills: 1 | Status: SHIPPED | OUTPATIENT
Start: 2023-03-29

## 2023-03-29 RX ORDER — PRAVASTATIN SODIUM 40 MG
40 TABLET ORAL NIGHTLY
Qty: 90 TABLET | Refills: 1 | Status: SHIPPED | OUTPATIENT
Start: 2023-03-29

## 2023-03-29 RX ORDER — DOCUSATE SODIUM 100 MG/1
2 CAPSULE, LIQUID FILLED ORAL DAILY
COMMUNITY
Start: 2023-01-03

## 2023-03-29 RX ORDER — PANTOPRAZOLE SODIUM 40 MG/1
1 TABLET, DELAYED RELEASE ORAL DAILY
COMMUNITY
Start: 2023-03-01

## 2023-03-29 NOTE — PROGRESS NOTES
Chief Complaint  Diabetes, Hypertension, Heartburn, Knee Pain, and Anemia (No definite cause was found on admission to [facility]. )    Subjective          Review of Systems   Constitutional: Positive for irritability. Negative for activity change, appetite change, chills, fatigue and fever. Weight loss: neuro.   HENT: Negative for congestion, ear pain and sore throat.    Eyes: Negative for pain and visual disturbance.   Respiratory: Negative for cough, chest tightness, shortness of breath and wheezing.    Cardiovascular: Negative for chest pain and palpitations.   Gastrointestinal: Negative for abdominal pain and nausea.   Endocrine: Negative for cold intolerance and heat intolerance.   Genitourinary: Negative for difficulty urinating and dysuria.   Musculoskeletal: Positive for back pain, gait problem, neck pain and neck stiffness. Negative for arthralgias.   Skin: Positive for pallor. Negative for color change, rash and wound.   Allergic/Immunologic: Negative for environmental allergies.   Neurological: Positive for dizziness, focal weakness, weakness, numbness, headaches and loss of balance. Negative for seizures.   Hematological: Negative for adenopathy. Does not bruise/bleed easily.   Psychiatric/Behavioral: Positive for decreased concentration and sleep disturbance. Negative for agitation, confusion and suicidal ideas. The patient is nervous/anxious and has insomnia.         Depressed mood  Irritability improving.       Arias Willson presents to [facility] [place]  Diabetes  He presents for his follow-up diabetic visit. He has type 2 diabetes mellitus. No MedicAlert identification noted. Disease course: Unknown. Hypoglycemia symptoms include dizziness, headaches, nervousness/anxiousness and pallor. Pertinent negatives for hypoglycemia include no confusion or seizures. Associated symptoms include visual change and weakness. Pertinent negatives for diabetes  include no chest pain and no fatigue. Weight loss: neuro. There are no hypoglycemic complications. Symptoms are worsening. Diabetic complications include a CVA, peripheral neuropathy and PVD. Risk factors for coronary artery disease include diabetes mellitus, dyslipidemia, obesity, male sex, stress and tobacco exposure. Current diabetic treatment includes insulin injections and oral agent (dual therapy). He is compliant with treatment most of the time. His weight is increasing rapidly. He is following a generally unhealthy diet. When asked about meal planning, he reported none. He participates in exercise intermittently. Home blood sugar record trend: Unknown. His overall blood glucose range is 180-200 mg/dl. An ACE inhibitor/angiotensin II receptor blocker is being taken. He does not see a podiatrist.Eye exam is current.   Hypertension  This is a chronic problem. The current episode started more than 1 year ago. The problem has been waxing and waning since onset. The problem is controlled. Associated symptoms include headaches and neck pain. Pertinent negatives include no chest pain, palpitations or shortness of breath. Agents associated with hypertension include NSAIDs. Risk factors for coronary artery disease include obesity, male gender, smoking/tobacco exposure and stress (H/O CVA). Past treatments include ACE inhibitors, angiotensin blockers, beta blockers, diuretics and lifestyle changes. Current antihypertension treatment includes angiotensin blockers and diuretics. The current treatment provides significant improvement. Compliance problems include medication cost.  Hypertensive end-organ damage includes CVA and PVD.   Heartburn  He reports no abdominal pain, no chest pain, no coughing, no nausea, no sore throat or no wheezing. Delayed cognitive processing. Seizures . This is a recurrent problem. The symptoms are aggravated by certain foods. Pertinent negatives include no fatigue. Weight loss: neuro. He has  tried a PPI and a histamine-2 antagonist for the symptoms. The treatment provided moderate relief.   Knee Pain   Incident onset: Chronic. The pain is present in the right knee and left knee. The pain is at a severity of 6/10. The pain is moderate. Associated symptoms include numbness. Treatments tried: Turmeric. The treatment provided mild relief.   Pain  This is a chronic problem. The current episode started more than 1 year ago. The problem occurs daily. Associated symptoms include headaches, neck pain, numbness, a visual change and weakness. Pertinent negatives include no abdominal pain, arthralgias, chest pain, chills, congestion, coughing, fatigue, fever, nausea, rash or sore throat. The symptoms are aggravated by walking and stress. He has tried NSAIDs and acetaminophen for the symptoms. The treatment provided no relief.   Insomnia  This is a chronic problem. The current episode started more than 1 year ago. The problem occurs constantly. The problem has been waxing and waning. Associated symptoms include headaches, neck pain, numbness, a visual change and weakness. Pertinent negatives include no abdominal pain, arthralgias, chest pain, chills, congestion, coughing, fatigue, fever, nausea, rash or sore throat. The symptoms are aggravated by stress. Treatments tried: OTC meds CPAP. The treatment provided significant relief.   Breathing Problem  He complains of difficulty breathing. There is no cough, shortness of breath or wheezing. Primary symptoms comments: Using 02 since hospitalization Jan 2018, H/O low pulse-ox, seizures like spells. Improved tx NEREYDA on Bipap. Associated symptoms include headaches. Pertinent negatives include no appetite change, chest pain, ear pain, fever or sore throat. Weight loss: neuro. His symptoms are aggravated by exercise. His symptoms are alleviated by nothing. He reports moderate (O2) improvement on treatment.   Stroke  This is a chronic problem. The current episode started more  than 1 year ago. The problem occurs daily. The problem has been gradually improving. Associated symptoms include headaches, neck pain, numbness, a visual change and weakness. Pertinent negatives include no abdominal pain, arthralgias, chest pain, chills, congestion, coughing, fatigue, fever, nausea, rash or sore throat. The symptoms are aggravated by exertion and walking. He has tried acetaminophen and NSAIDs (Avoids NSAIDs due to H/O CVA) for the symptoms. The treatment provided no relief.   Neurologic Problem  The patient's primary symptoms include an altered mental status, clumsiness, focal sensory loss, focal weakness, a loss of balance, a visual change and weakness. Primary symptoms comment: Delayed cognitive processing. Seizures . This is a chronic problem. The current episode started more than 1 year ago. The neurological problem developed suddenly. The problem has been waxing and waning since onset. There was left-sided, right-sided, facial, upper extremity and lower extremity (Strokes of Basal ganglia. Horners syndrome,  sensory, motor deficits.) focality noted. Associated symptoms include back pain, dizziness, headaches and neck pain. Pertinent negatives include no abdominal pain, chest pain, confusion, fatigue, fever, nausea, palpitations or shortness of breath. (Requires Walker to ambulate.) Past treatments include walking and medication (Physical therapy). The treatment provided mild relief. His past medical history is significant for a CVA.   Depression  Visit Type: follow-up  Patient presents with the following symptoms: decreased concentration, depressed mood, excessive worry, feelings of hopelessness, feelings of worthlessness, insomnia, irritability, memory impairment, nervousness/anxiety and restlessness.  Patient is not experiencing: confusion, palpitations, shortness of breath, suicidal ideas and suicidal planning.  Weight loss: neuro.  Frequency of symptoms: occasionally   Severity: moderate    "Sleep quality: fair  Nighttime awakenings: several    Hyperlipidemia  The current episode started more than 1 year ago. Recent lipid tests were reviewed and are variable. Associated symptoms include a focal sensory loss and a focal weakness. Pertinent negatives include no chest pain or shortness of breath. Current antihyperlipidemic treatment includes statins. The current treatment provides moderate improvement of lipids.       Objective   Vital Signs:   /64 (BP Location: Left arm, Patient Position: Sitting, Cuff Size: Adult)   Pulse 103   Temp 97.3 °F (36.3 °C) (Temporal)   Ht 182.9 cm (72\")   Wt 120 kg (265 lb 8 oz)   SpO2 92%   BMI 36.01 kg/m²     Physical Exam  Vitals and nursing note reviewed.   Constitutional:       General: He is not in acute distress.     Appearance: He is obese.   HENT:      Head: Normocephalic.      Right Ear: Tympanic membrane and ear canal normal.      Left Ear: Tympanic membrane and ear canal normal.      Nose: Nose normal.      Mouth/Throat:      Mouth: Mucous membranes are moist.      Pharynx: No oropharyngeal exudate or posterior oropharyngeal erythema.   Eyes:      General:         Right eye: No discharge.         Left eye: No discharge.      Extraocular Movements: Extraocular movements intact.      Conjunctiva/sclera: Conjunctivae normal.      Comments: Gus's   Neck:      Vascular: No carotid bruit.   Cardiovascular:      Rate and Rhythm: Normal rate and regular rhythm.      Heart sounds: Normal heart sounds. No murmur heard.  Pulmonary:      Breath sounds: Rhonchi present. No wheezing.      Comments: SOA with conversation has resolved  Abdominal:      Palpations: Abdomen is soft.      Tenderness: There is no right CVA tenderness or left CVA tenderness.   Musculoskeletal:      Cervical back: Rigidity and tenderness present.      Right lower leg: Edema present.      Left lower leg: Edema present.      Comments: WB 4 with ROM low back  1+ pitting edema [place] LE  "   Lymphadenopathy:      Cervical: No cervical adenopathy.   Skin:     Capillary Refill: Capillary refill takes 2 to 3 seconds.      Findings: No erythema or lesion.      Comments: Hyperkeratosis  Raynaud's of Fingers worse off Norvasc , splinter hemorrhage fingernails    Cyanosis L toes resolved S/P vascular procedure.   Neurological:      Mental Status: He is oriented to person, place, and time.      Cranial Nerves: Cranial nerve deficit present.      Sensory: Sensory deficit present.      Motor: Weakness present.      Coordination: Coordination normal.      Gait: Gait abnormal.      Comments: CVA with residual Gus's syndrome    Psychiatric:         Mood and Affect: Mood normal.         Behavior: Behavior normal.         Thought Content: Thought content normal.         Judgment: Judgment normal.        Result Review :            Reviewed records from admission at [facility]      Assessment and Plan    Diagnoses and all orders for this visit:    1. Anemia, unspecified type (Primary)  -     CBC & Differential; Future  -     Comprehensive metabolic panel; Future  -     Iron Profile; Future  -     Hemoglobin A1c; Future    2. Type 2 diabetes mellitus with other specified complication, with long-term current use of insulin (HCC)  -     Hemoglobin A1c; Future  -     metFORMIN (GLUCOPHAGE) 500 MG tablet; Take 1 tablet by mouth 2 (Two) Times a Day.  Dispense: 180 tablet; Refill: 1    3. Neuropathic pain  -     gabapentin (NEURONTIN) 600 MG tablet; Take 1 tablet by mouth 3 (Three) Times a Day.  Dispense: 270 tablet; Refill: 1  -     DULoxetine (CYMBALTA) 60 MG capsule; Take 1 capsule by mouth Daily.  Dispense: 90 capsule; Refill: 1    4. Hyperkeratosis  -     ammonium lactate (LAC-HYDRIN) 12 % lotion; Apply  topically to the appropriate area as directed Daily.  Dispense: 500 g; Refill: 2    5. Muscle spasm  -     baclofen (LIORESAL) 10 MG tablet; Take 1 tablet by mouth 3 (Three) Times a Day.  Dispense: 270 tablet; Refill:  1    6. COPD, group B, by GOLD 2017 classification ([facility])  -     budesonide-formoterol (SYMBICORT) 160-4.5 MCG/ACT inhaler; Inhale 2 puffs two times daily  Dispense: 30.6 g; Refill: 1    7. Type 2 diabetes mellitus with diabetic autonomic neuropathy, with long-term current use of insulin ([facility])  -     empagliflozin (Jardiance) 25 MG tablet tablet; Take 1 tablet by mouth Daily.  Dispense: 90 tablet; Refill: 1  -     Insulin Glargine (Lantus SoloStar) 100 UNIT/ML injection pen; INJECT 34 UNITS SUBCUTANEOUSLY ONCE DAILY INCREASE  BY  2  UNITS  EVERY  3  DAYS  UNTIL  MORNING  BLOOD  SUGAR  IS  100-150  Dispense: 45 mL; Refill: 2    8. Type 2 diabetes mellitus with other specified complication, without long-term current use of insulin ([facility])  -     glucose blood test strip; 1 each by Other route 3 (Three) Times a Day. Use as instructed  Dispense: 300 each; Refill: 1  -     Lancets 30G misc; 1 each 3 (Three) Times a Day.  Dispense: 300 each; Refill: 1    9. Essential hypertension  -     losartan (COZAAR) 50 MG tablet; Take 1 tablet by mouth Daily.  Dispense: 90 tablet; Refill: 1  -     spironolactone (ALDACTONE) 25 MG tablet; Take 1 tablet by mouth Daily.  Dispense: 90 tablet; Refill: 1    10. Type 2 diabetes mellitus with other specified complication, with long-term current use of insulin ([facility])  Comments:  Complications: HTN, Hyperlipidemia, Hyperglycemia,   Orders:  -     Hemoglobin A1c; Future  -     metFORMIN (GLUCOPHAGE) 500 MG tablet; Take 1 tablet by mouth 2 (Two) Times a Day.  Dispense: 180 tablet; Refill: 1    11. Pure hypercholesterolemia  -     pravastatin (PRAVACHOL) 40 MG tablet; Take 1 tablet by mouth Every Night.  Dispense: 90 tablet; Refill: 1        Follow Up   Return in about 4 months (around 7/29/2023).  Patient was given instructions and counseling regarding his condition or for health maintenance advice. Please see specific information pulled into the AVS if appropriate.         This document has been  electronically signed by Harpreet Bass MD on March 29, 2023 16:09 CDT

## 2023-04-10 ENCOUNTER — TELEPHONE (OUTPATIENT)
Dept: FAMILY MEDICINE CLINIC | Facility: CLINIC | Age: 68
End: 2023-04-10
Payer: MEDICARE

## 2023-04-10 RX ORDER — FERROUS SULFATE 325(65) MG
1 TABLET ORAL
Qty: 90 TABLET | Refills: 1 | Status: SHIPPED | OUTPATIENT
Start: 2023-04-10

## 2023-04-10 RX ORDER — PANTOPRAZOLE SODIUM 40 MG/1
40 TABLET, DELAYED RELEASE ORAL DAILY
Qty: 90 TABLET | Refills: 1 | Status: SHIPPED | OUTPATIENT
Start: 2023-04-10

## 2023-05-20 DIAGNOSIS — M79.2 NEUROPATHIC PAIN: ICD-10-CM

## 2023-05-20 DIAGNOSIS — E78.00 PURE HYPERCHOLESTEROLEMIA: Chronic | ICD-10-CM

## 2023-05-20 DIAGNOSIS — I10 ESSENTIAL HYPERTENSION: Chronic | ICD-10-CM

## 2023-05-22 RX ORDER — PRAVASTATIN SODIUM 40 MG
TABLET ORAL
Qty: 90 TABLET | Refills: 1 | OUTPATIENT
Start: 2023-05-22

## 2023-05-22 RX ORDER — CLOPIDOGREL BISULFATE 75 MG/1
TABLET ORAL
Qty: 90 TABLET | OUTPATIENT
Start: 2023-05-22

## 2023-05-22 RX ORDER — LOSARTAN POTASSIUM 50 MG/1
TABLET ORAL
Qty: 90 TABLET | Refills: 1 | OUTPATIENT
Start: 2023-05-22

## 2023-05-22 RX ORDER — SPIRONOLACTONE 25 MG/1
TABLET ORAL
Qty: 90 TABLET | Refills: 1 | OUTPATIENT
Start: 2023-05-22

## 2023-05-22 RX ORDER — DULOXETIN HYDROCHLORIDE 60 MG/1
CAPSULE, DELAYED RELEASE ORAL
Qty: 90 CAPSULE | Refills: 1 | OUTPATIENT
Start: 2023-05-22

## 2023-07-28 ENCOUNTER — LAB (OUTPATIENT)
Dept: LAB | Facility: HOSPITAL | Age: 68
End: 2023-07-28
Payer: MEDICARE

## 2023-07-28 DIAGNOSIS — D64.9 ANEMIA, UNSPECIFIED TYPE: ICD-10-CM

## 2023-07-28 DIAGNOSIS — Z79.4 TYPE 2 DIABETES MELLITUS WITH OTHER SPECIFIED COMPLICATION, WITH LONG-TERM CURRENT USE OF INSULIN: Chronic | ICD-10-CM

## 2023-07-28 DIAGNOSIS — E11.69 TYPE 2 DIABETES MELLITUS WITH OTHER SPECIFIED COMPLICATION, WITH LONG-TERM CURRENT USE OF INSULIN: Chronic | ICD-10-CM

## 2023-07-28 LAB
ALBUMIN SERPL-MCNC: 4 G/DL (ref 3.5–5.2)
ALBUMIN/GLOB SERPL: 1.7 G/DL
ALP SERPL-CCNC: 86 U/L (ref 39–117)
ALT SERPL W P-5'-P-CCNC: 17 U/L (ref 1–41)
ANION GAP SERPL CALCULATED.3IONS-SCNC: 12.7 MMOL/L (ref 5–15)
AST SERPL-CCNC: 18 U/L (ref 1–40)
BASOPHILS # BLD AUTO: 0.05 10*3/MM3 (ref 0–0.2)
BASOPHILS NFR BLD AUTO: 0.8 % (ref 0–1.5)
BILIRUB SERPL-MCNC: 0.5 MG/DL (ref 0–1.2)
BUN SERPL-MCNC: 17 MG/DL (ref 8–23)
BUN/CREAT SERPL: 19.3 (ref 7–25)
CALCIUM SPEC-SCNC: 9 MG/DL (ref 8.6–10.5)
CHLORIDE SERPL-SCNC: 98 MMOL/L (ref 98–107)
CO2 SERPL-SCNC: 24.3 MMOL/L (ref 22–29)
CREAT SERPL-MCNC: 0.88 MG/DL (ref 0.76–1.27)
DEPRECATED RDW RBC AUTO: 48.6 FL (ref 37–54)
EGFRCR SERPLBLD CKD-EPI 2021: 95.4 ML/MIN/1.73
EOSINOPHIL # BLD AUTO: 0.1 10*3/MM3 (ref 0–0.4)
EOSINOPHIL NFR BLD AUTO: 1.7 % (ref 0.3–6.2)
ERYTHROCYTE [DISTWIDTH] IN BLOOD BY AUTOMATED COUNT: 15.1 % (ref 12.3–15.4)
GLOBULIN UR ELPH-MCNC: 2.4 GM/DL
GLUCOSE SERPL-MCNC: 151 MG/DL (ref 65–99)
HBA1C MFR BLD: 7.4 % (ref 4.8–5.6)
HCT VFR BLD AUTO: 50 % (ref 37.5–51)
HGB BLD-MCNC: 17.6 G/DL (ref 13–17.7)
IMM GRANULOCYTES # BLD AUTO: 0.05 10*3/MM3 (ref 0–0.05)
IMM GRANULOCYTES NFR BLD AUTO: 0.8 % (ref 0–0.5)
IRON 24H UR-MRATE: 165 MCG/DL (ref 59–158)
IRON SATN MFR SERPL: 34 % (ref 20–50)
LYMPHOCYTES # BLD AUTO: 1.85 10*3/MM3 (ref 0.7–3.1)
LYMPHOCYTES NFR BLD AUTO: 30.7 % (ref 19.6–45.3)
MCH RBC QN AUTO: 32.1 PG (ref 26.6–33)
MCHC RBC AUTO-ENTMCNC: 35.2 G/DL (ref 31.5–35.7)
MCV RBC AUTO: 91.1 FL (ref 79–97)
MONOCYTES # BLD AUTO: 0.45 10*3/MM3 (ref 0.1–0.9)
MONOCYTES NFR BLD AUTO: 7.5 % (ref 5–12)
NEUTROPHILS NFR BLD AUTO: 3.52 10*3/MM3 (ref 1.7–7)
NEUTROPHILS NFR BLD AUTO: 58.5 % (ref 42.7–76)
NRBC BLD AUTO-RTO: 0.2 /100 WBC (ref 0–0.2)
PLATELET # BLD AUTO: 224 10*3/MM3 (ref 140–450)
PMV BLD AUTO: 10.5 FL (ref 6–12)
POTASSIUM SERPL-SCNC: 4.8 MMOL/L (ref 3.5–5.2)
PROT SERPL-MCNC: 6.4 G/DL (ref 6–8.5)
RBC # BLD AUTO: 5.49 10*6/MM3 (ref 4.14–5.8)
SODIUM SERPL-SCNC: 135 MMOL/L (ref 136–145)
TIBC SERPL-MCNC: 487 MCG/DL (ref 298–536)
TRANSFERRIN SERPL-MCNC: 327 MG/DL (ref 200–360)
WBC NRBC COR # BLD: 6.02 10*3/MM3 (ref 3.4–10.8)

## 2023-07-28 PROCEDURE — 83036 HEMOGLOBIN GLYCOSYLATED A1C: CPT

## 2023-07-28 PROCEDURE — 36415 COLL VENOUS BLD VENIPUNCTURE: CPT

## 2023-07-28 PROCEDURE — 85025 COMPLETE CBC W/AUTO DIFF WBC: CPT

## 2023-07-28 PROCEDURE — 80053 COMPREHEN METABOLIC PANEL: CPT

## 2023-07-28 PROCEDURE — 83540 ASSAY OF IRON: CPT

## 2023-07-28 PROCEDURE — 84466 ASSAY OF TRANSFERRIN: CPT

## 2023-08-01 ENCOUNTER — OFFICE VISIT (OUTPATIENT)
Dept: FAMILY MEDICINE CLINIC | Facility: CLINIC | Age: 68
End: 2023-08-01
Payer: MEDICARE

## 2023-08-01 VITALS
SYSTOLIC BLOOD PRESSURE: 130 MMHG | WEIGHT: 280.2 LBS | DIASTOLIC BLOOD PRESSURE: 66 MMHG | HEIGHT: 72 IN | BODY MASS INDEX: 37.95 KG/M2 | OXYGEN SATURATION: 90 % | TEMPERATURE: 96.1 F | HEART RATE: 86 BPM

## 2023-08-01 DIAGNOSIS — I10 ESSENTIAL HYPERTENSION: Chronic | ICD-10-CM

## 2023-08-01 DIAGNOSIS — Z79.4 TYPE 2 DIABETES MELLITUS WITH OTHER SPECIFIED COMPLICATION, WITH LONG-TERM CURRENT USE OF INSULIN: Chronic | ICD-10-CM

## 2023-08-01 DIAGNOSIS — L85.9 HYPERKERATOSIS: ICD-10-CM

## 2023-08-01 DIAGNOSIS — F51.5 NIGHTMARE: Chronic | ICD-10-CM

## 2023-08-01 DIAGNOSIS — K21.9 GASTROESOPHAGEAL REFLUX DISEASE WITHOUT ESOPHAGITIS: Chronic | ICD-10-CM

## 2023-08-01 DIAGNOSIS — M62.838 MUSCLE SPASM: Chronic | ICD-10-CM

## 2023-08-01 DIAGNOSIS — E11.69 TYPE 2 DIABETES MELLITUS WITH OTHER SPECIFIED COMPLICATION, WITH LONG-TERM CURRENT USE OF INSULIN: Chronic | ICD-10-CM

## 2023-08-01 DIAGNOSIS — M79.2 NEUROPATHIC PAIN: ICD-10-CM

## 2023-08-01 DIAGNOSIS — E78.00 PURE HYPERCHOLESTEROLEMIA: Chronic | ICD-10-CM

## 2023-08-01 RX ORDER — PRAZOSIN HYDROCHLORIDE 2 MG/1
2 CAPSULE ORAL NIGHTLY
Qty: 60 CAPSULE | Refills: 2 | Status: SHIPPED | OUTPATIENT
Start: 2023-08-01 | End: 2023-08-04 | Stop reason: SDUPTHER

## 2023-08-01 RX ORDER — AMMONIUM LACTATE 12 G/100G
LOTION TOPICAL DAILY
Qty: 500 G | Refills: 2 | Status: SHIPPED | OUTPATIENT
Start: 2023-08-01

## 2023-08-01 RX ORDER — BACLOFEN 10 MG/1
10 TABLET ORAL 3 TIMES DAILY
Qty: 270 TABLET | Refills: 1 | Status: SHIPPED | OUTPATIENT
Start: 2023-08-01

## 2023-08-01 RX ORDER — GABAPENTIN 600 MG/1
600 TABLET ORAL 3 TIMES DAILY
Qty: 270 TABLET | Refills: 1 | Status: SHIPPED | OUTPATIENT
Start: 2023-08-01

## 2023-08-01 RX ORDER — PANTOPRAZOLE SODIUM 40 MG/1
40 TABLET, DELAYED RELEASE ORAL DAILY
Qty: 90 TABLET | Refills: 1 | Status: SHIPPED | OUTPATIENT
Start: 2023-08-01

## 2023-08-01 RX ORDER — DULOXETIN HYDROCHLORIDE 60 MG/1
60 CAPSULE, DELAYED RELEASE ORAL DAILY
Qty: 90 CAPSULE | Refills: 1 | Status: SHIPPED | OUTPATIENT
Start: 2023-08-01

## 2023-08-01 RX ORDER — LOSARTAN POTASSIUM 50 MG/1
50 TABLET ORAL DAILY
Qty: 90 TABLET | Refills: 1 | Status: SHIPPED | OUTPATIENT
Start: 2023-08-01

## 2023-08-01 RX ORDER — PRAVASTATIN SODIUM 40 MG
40 TABLET ORAL NIGHTLY
Qty: 90 TABLET | Refills: 1 | Status: SHIPPED | OUTPATIENT
Start: 2023-08-01

## 2023-08-01 RX ORDER — SPIRONOLACTONE 25 MG/1
25 TABLET ORAL DAILY
Qty: 90 TABLET | Refills: 1 | Status: SHIPPED | OUTPATIENT
Start: 2023-08-01

## 2023-08-03 ENCOUNTER — TELEPHONE (OUTPATIENT)
Dept: FAMILY MEDICINE CLINIC | Facility: CLINIC | Age: 68
End: 2023-08-03
Payer: MEDICARE

## 2023-08-03 NOTE — TELEPHONE ENCOUNTER
"Received fax from [facility] needing clarification on RX for prazosin 2mg, has two different directions.       \"Take 1 capsule by mouth Every Night. Start with 1 capsule at bedtime to prevent Nightmares, may increase to 2 caps after 1 week.\" On RX. Please confirm directions and send new RX.     "

## 2023-08-04 DIAGNOSIS — F51.5 NIGHTMARE: Chronic | ICD-10-CM

## 2023-08-04 RX ORDER — PRAZOSIN HYDROCHLORIDE 2 MG/1
2 CAPSULE ORAL NIGHTLY
Qty: 180 CAPSULE | Refills: 1 | Status: SHIPPED | OUTPATIENT
Start: 2023-08-04

## 2023-09-05 DIAGNOSIS — E11.43 TYPE 2 DIABETES MELLITUS WITH DIABETIC AUTONOMIC NEUROPATHY, WITH LONG-TERM CURRENT USE OF INSULIN: ICD-10-CM

## 2023-09-05 DIAGNOSIS — Z79.4 TYPE 2 DIABETES MELLITUS WITH DIABETIC AUTONOMIC NEUROPATHY, WITH LONG-TERM CURRENT USE OF INSULIN: ICD-10-CM

## 2023-09-05 RX ORDER — INSULIN GLARGINE 100 [IU]/ML
INJECTION, SOLUTION SUBCUTANEOUS
Qty: 45 ML | Refills: 2 | Status: SHIPPED | OUTPATIENT
Start: 2023-09-05

## 2023-09-05 NOTE — TELEPHONE ENCOUNTER
Rx Refill Note  Requested Prescriptions     Pending Prescriptions Disp Refills    Insulin Glargine (Lantus SoloStar) 100 UNIT/ML injection pen 45 mL 2     Sig: INJECT 34 UNITS SUBCUTANEOUSLY ONCE DAILY INCREASE  BY  2  UNITS  EVERY  3  DAYS  UNTIL  MORNING  BLOOD  SUGAR  IS  100-150      Last office visit with prescribing clinician: 8/1/2023   Last telemedicine visit with prescribing clinician: Visit date not found   Next office visit with prescribing clinician: 11/1/2023                         Would you like a call back once the refill request has been completed: [] Yes [] No    If the office needs to give you a call back, can they leave a voicemail: [] Yes [] No    Araseli Cano MA  09/05/23, 09:32 CDT

## 2025-01-17 ENCOUNTER — OFFICE VISIT (OUTPATIENT)
Dept: VASCULAR SURGERY | Facility: CLINIC | Age: 70
End: 2025-01-17
Payer: MEDICARE

## 2025-01-17 VITALS
DIASTOLIC BLOOD PRESSURE: 72 MMHG | BODY MASS INDEX: 35.42 KG/M2 | HEIGHT: 74 IN | HEART RATE: 64 BPM | SYSTOLIC BLOOD PRESSURE: 138 MMHG | WEIGHT: 276 LBS

## 2025-01-17 DIAGNOSIS — I10 PRIMARY HYPERTENSION: Chronic | ICD-10-CM

## 2025-01-17 DIAGNOSIS — E66.01 CLASS 2 SEVERE OBESITY DUE TO EXCESS CALORIES WITH SERIOUS COMORBIDITY AND BODY MASS INDEX (BMI) OF 35.0 TO 35.9 IN ADULT: ICD-10-CM

## 2025-01-17 DIAGNOSIS — Z79.4 TYPE 2 DIABETES MELLITUS WITH OTHER SPECIFIED COMPLICATION, WITH LONG-TERM CURRENT USE OF INSULIN: Chronic | ICD-10-CM

## 2025-01-17 DIAGNOSIS — E11.69 TYPE 2 DIABETES MELLITUS WITH OTHER SPECIFIED COMPLICATION, WITH LONG-TERM CURRENT USE OF INSULIN: Chronic | ICD-10-CM

## 2025-01-17 DIAGNOSIS — Z72.0 NICOTINE ABUSE: ICD-10-CM

## 2025-01-17 DIAGNOSIS — Z79.02 ENCOUNTER FOR MONITORING ANTIPLATELET THERAPY: ICD-10-CM

## 2025-01-17 DIAGNOSIS — E11.621 DIABETIC ULCER OF RIGHT GREAT TOE: ICD-10-CM

## 2025-01-17 DIAGNOSIS — L97.519 DIABETIC ULCER OF RIGHT GREAT TOE: ICD-10-CM

## 2025-01-17 DIAGNOSIS — I73.9 PAD (PERIPHERAL ARTERY DISEASE): Primary | ICD-10-CM

## 2025-01-17 DIAGNOSIS — Z01.818 PREOP TESTING: ICD-10-CM

## 2025-01-17 DIAGNOSIS — E78.00 PURE HYPERCHOLESTEROLEMIA: ICD-10-CM

## 2025-01-17 DIAGNOSIS — Z51.81 ENCOUNTER FOR MONITORING ANTIPLATELET THERAPY: ICD-10-CM

## 2025-01-17 DIAGNOSIS — E66.812 CLASS 2 SEVERE OBESITY DUE TO EXCESS CALORIES WITH SERIOUS COMORBIDITY AND BODY MASS INDEX (BMI) OF 35.0 TO 35.9 IN ADULT: ICD-10-CM

## 2025-01-17 PROBLEM — I50.33 ACUTE ON CHRONIC DIASTOLIC CHF (CONGESTIVE HEART FAILURE): Status: ACTIVE | Noted: 2023-01-02

## 2025-01-17 PROBLEM — D64.9 SEVERE ANEMIA: Status: ACTIVE | Noted: 2023-01-04

## 2025-01-17 PROBLEM — Z23 NEED FOR INFLUENZA VACCINATION: Status: RESOLVED | Noted: 2023-11-01 | Resolved: 2025-01-17

## 2025-01-17 PROBLEM — D50.9 IDA (IRON DEFICIENCY ANEMIA): Status: ACTIVE | Noted: 2023-01-04

## 2025-01-17 PROBLEM — I25.10 ATHEROSCLEROSIS OF CORONARY ARTERY: Status: ACTIVE | Noted: 2018-01-18

## 2025-01-17 PROBLEM — J44.9 STAGE 2 MODERATE COPD BY GOLD CLASSIFICATION: Status: ACTIVE | Noted: 2018-07-16

## 2025-01-17 PROBLEM — I47.29 NSVT (NONSUSTAINED VENTRICULAR TACHYCARDIA): Status: ACTIVE | Noted: 2023-01-04

## 2025-01-17 RX ORDER — IBUPROFEN 800 MG/1
800 TABLET, FILM COATED ORAL EVERY 8 HOURS PRN
COMMUNITY
Start: 2024-11-14

## 2025-01-17 RX ORDER — ERYTHROMYCIN 5 MG/G
1 OINTMENT OPHTHALMIC 2 TIMES DAILY
COMMUNITY
Start: 2025-01-03 | End: 2025-04-04

## 2025-01-17 RX ORDER — TRAMADOL HYDROCHLORIDE 50 MG/1
50 TABLET ORAL EVERY 6 HOURS PRN
COMMUNITY
Start: 2024-11-14

## 2025-01-17 RX ORDER — PRAVASTATIN SODIUM 40 MG
40 TABLET ORAL NIGHTLY
COMMUNITY
Start: 2025-01-03 | End: 2026-01-04

## 2025-01-17 RX ORDER — METOPROLOL TARTRATE 25 MG/1
25 TABLET, FILM COATED ORAL 2 TIMES DAILY
COMMUNITY
Start: 2025-01-03 | End: 2025-07-03

## 2025-01-17 RX ORDER — ALBUTEROL SULFATE 90 UG/1
2 INHALANT RESPIRATORY (INHALATION) EVERY 4 HOURS PRN
COMMUNITY
Start: 2025-01-03 | End: 2025-07-03

## 2025-01-17 RX ORDER — BUDESONIDE AND FORMOTEROL FUMARATE DIHYDRATE 160; 4.5 UG/1; UG/1
2 AEROSOL RESPIRATORY (INHALATION)
COMMUNITY
Start: 2025-01-03 | End: 2025-07-03

## 2025-01-17 NOTE — PROGRESS NOTES
01/17/2025      Yuliana Keith, APRN  [address] [place] [address]    Arias Willson  1955    Chief Complaint   Patient presents with    Follow-up     Referred from Yuliana Keith for abnormal IMTIAZ done 1/10/25. Patient complains of pain in right leg and foot for past 3 weeks.        Dear Yuliana Keith, *:      HPI  I had the pleasure of seeing your patient Arias Willson in the office today.  Thank you kindly for this consultation.  As you recall, Arias Willson is a 69 y.o.  male who you are currently following for wound care.  He is here today with complaints of pain in his left ankle and foot for the past 3 weeks.  He has a wound to his left great toe which has just recently opened in the last week.  He is maintained on aspirin and Pravachol.  He did have noninvasive testing performed, which I did review in office.    Past Medical History:   Diagnosis Date    Abnormal glucose     PRE DM    Acute conjunctivitis     RESOLVED    Aneurysm of carotid artery     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       Benign essential hypertension     Blood in feces     Callus     Carotid artery stenosis     Cerebrovascular accident      L sided sensory deficit       Conjunctivitis     Constipation     OCCASIONAL    Contusion, eye, left     Corneal ulcer     PROBABLE    Diabetes mellitus     Eczema     Elevated cholesterol     Emphysema lung     Encounter for screening for malignant neoplasm of colon     Essential hypertension     Foot ulcer     GERD (gastroesophageal reflux disease)     Hemorrhoids     History of echocardiogram 04/05/2013    Echocadiogram W/ color flow 08738 (Normal LV systolic function with EF of 60-65%. Grade I diastolic dysfunction of the LV myocardium. No evidence of LV apical thrombus. No evidence of pericardial effusion.)    History of transfusion     Gus's syndrome     RIGHT EYE    Hyperkeratosis     Hyperlipidemia     Mucopurulent  conjunctivitis     ACUTE    Myopia     Need for influenza vaccination 11/01/2023    Neuropathic pain     Obesity     Onychomycosis     Sleep apnea     Tobacco dependence syndrome        Past Surgical History:   Procedure Laterality Date    AORTAGRAM Left 3/21/2022    Procedure: LEFT LOWER EXTREMITY ANGIOGRAM;  Surgeon: Darryl Llanes DO;  Location: [facility] OR 12;  Service: Vascular;  Laterality: Left;    COLONOSCOPY  07/30/2015    Colonoscopy, diagnostic (screening) 34438 (Screening for malignant neoplasm of colon)     COLONOSCOPY  09/09/2015    Colonoscopy, diagnostic (screening) 84221 (Diverticulosis found in the sigmoid colon.Hemorrhoids found in the anus.)    COLONOSCOPY  05/01/2009    Colonoscopy, diagnostic (screening) 53008 (Small internal and external hemorrhoids were present, Colonoscopy otherwise normal.)    COLONOSCOPY N/A 12/09/2020    Procedure: COLONOSCOPY;  Surgeon: Arias Larsen MD;  Location: [facility] ENDOSCOPY;  Service: Gastroenterology;  Laterality: N/A;    LEG SURGERY      NOSE SURGERY         Family History   Problem Relation Age of Onset    Glaucoma Other     Heart disease Other     Stroke Other     Macular degeneration Other     Diabetes Other     Glaucoma Father     Macular degeneration Father     Cataracts Father     Cancer Father     Macular degeneration Sister     Heart disease Mother     Cancer Paternal Grandmother     Heart disease Paternal Grandfather        Social History     Socioeconomic History    Marital status: Single   Tobacco Use    Smoking status: Every Day     Current packs/day: 0.50     Average packs/day: 0.5 packs/day for 33.0 years (16.5 ttl pk-yrs)     Types: Cigarettes    Smokeless tobacco: Never    Tobacco comments:     3 cigarettes per day-12/29/22   Vaping Use    Vaping status: Never Used   Substance and Sexual Activity    Alcohol use: Not Currently    Drug use: No    Sexual activity: Defer       No Known Allergies    Current Outpatient Medications    Medication Instructions    albuterol sulfate  (90 Base) MCG/ACT inhaler 2 puffs, Every 4 Hours PRN    ammonium lactate (LAC-HYDRIN) 12 % lotion Topical, Daily    [START ON 5/24/2106] aspirin 325 mg, Oral, Daily    baclofen (LIORESAL) 10 mg, Oral, 3 Times Daily    budesonide-formoterol (SYMBICORT) 160-4.5 MCG/ACT inhaler 2 puffs, 2 Times Daily - RT    Cyanocobalamin (B-12) 5000 MCG sublingual tablet 1 each, Sublingual, Daily    DULoxetine (CYMBALTA) 60 mg, Oral, Daily    empagliflozin (JARDIANCE) 25 mg, Oral, Daily    erythromycin (ROMYCIN) 5 MG/GM ophthalmic ointment 1 cm, 2 Times Daily    FeroSul 325 mg, Oral, Daily With Breakfast    gabapentin (NEURONTIN) 600 mg, Oral, 3 Times Daily    glucose blood test strip 1 each, Other, 3 Times Daily, Use as instructed    glucose monitor monitoring kit Not Applicable, 3 Times Daily    ibuprofen (ADVIL,MOTRIN) 800 mg, Every 8 Hours PRN    Insulin Glargine (Lantus SoloStar) 100 UNIT/ML injection pen INJECT 34 UNITS SUBCUTANEOUSLY ONCE DAILY INCREASE  BY  2  UNITS  EVERY  3  DAYS  UNTIL  MORNING  BLOOD  SUGAR  IS  100-150    Lancets 30G misc 1 each, Not Applicable, 3 Times Daily    losartan (COZAAR) 50 mg, Oral, Daily    metFORMIN (GLUCOPHAGE) 500 mg, Oral, 2 Times Daily    metoprolol tartrate (LOPRESSOR) 25 mg, 2 Times Daily    pantoprazole (PROTONIX) 40 mg, Oral, Daily    pravastatin (PRAVACHOL) 40 mg, Nightly    spironolactone (ALDACTONE) 25 mg, Oral, Daily    traMADol (ULTRAM) 50 mg, Every 6 Hours PRN           Review of Systems   Constitutional: Negative.  Negative for diaphoresis and fever.   HENT: Negative.     Eyes: Negative.    Respiratory: Negative.  Negative for shortness of breath and wheezing.    Cardiovascular: Negative.  Negative for chest pain and leg swelling.        Foot pain at rest   Gastrointestinal: Negative.  Negative for abdominal pain.   Endocrine: Negative.    Genitourinary: Negative.    Musculoskeletal: Negative.    Skin:  Positive for color  "change and wound.   Allergic/Immunologic: Negative.    Neurological: Negative.  Negative for dizziness and weakness.   Hematological: Negative.    Psychiatric/Behavioral: Negative.         /72   Pulse 64   Ht 188 cm (74\")   Wt 125 kg (276 lb)   BMI 35.44 kg/m²       Physical Exam  Vitals and nursing note reviewed.   Constitutional:       General: He is not in acute distress.     Appearance: Normal appearance. He is not diaphoretic.   HENT:      Head: Normocephalic. No right periorbital erythema or left periorbital erythema.      Nose: Nose normal.   Eyes:      General: No scleral icterus.     Pupils: Pupils are equal.   Cardiovascular:      Rate and Rhythm: Normal rate and regular rhythm.      Pulses: Normal pulses.           Femoral pulses are 2+ on the right side and 2+ on the left side.       Dorsalis pedis pulses are detected w/ Doppler on the right side and 0 on the left side.        Posterior tibial pulses are detected w/ Doppler on the right side and detected w/ Doppler on the left side.      Heart sounds: Normal heart sounds. No murmur heard.     Comments: Bilateral peroneal signals dopplered.  Left PT and peroneal are faint  Pulmonary:      Effort: Pulmonary effort is normal. No respiratory distress.      Breath sounds: Normal breath sounds.   Abdominal:      General: Bowel sounds are normal. There is no distension.      Palpations: Abdomen is soft.      Tenderness: There is no abdominal tenderness. There is no guarding.   Musculoskeletal:         General: No swelling or tenderness. Normal range of motion.      Cervical back: Normal range of motion and neck supple.      Right lower leg: No edema.      Left lower leg: No edema.   Feet:      Right foot:      Skin integrity: Skin integrity normal.      Left foot:      Skin integrity: Skin integrity normal.   Skin:     General: Skin is warm and dry.      Findings: No erythema or rash.   Neurological:      General: No focal deficit present.      Mental " Status: He is alert and oriented to person, place, and time. Mental status is at baseline.      Cranial Nerves: No cranial nerve deficit.      Gait: Gait normal.   Psychiatric:         Attention and Perception: Attention normal.         Mood and Affect: Mood normal.         Behavior: Behavior normal.         Thought Content: Thought content normal.         Judgment: Judgment normal.       DIAGNOSTIC DATA    US VASCULAR DOPPLER ANKLE BRACHIAL INDEX SINGLE LEVEL    Result Date: 1/14/2025  Narrative:   Right Conclusion: The right IMTIAZ is mildly reduced. Waveforms are consistent with tib-peroneal disease.   Left Conclusion: The left IMTIAZ is severely reduced. Waveforms are consistent with femoral disease and tib-peroneal disease. Study Impression Right Conclusion: The right IMTIAZ is mildly reduced. Waveforms are consistent with tib-peroneal disease.  Right Common Femoral: Multiphasic, high resistive arterial flow noted.  Right Popliteal: Multiphasic, high resistive arterial flow noted.   Right Posterior Tibial: Multiphasic, high resistive arterial flow noted.  Right Dorsalis Pedis: Monophasic, intermediate resistive arterial flow noted. Left Conclusion: The left IMTIAZ is severely reduced. Waveforms are consistent with femoral disease and tib-peroneal disease.  Left Common Femoral: Multiphasic, high resistive arterial flow noted.  Left Popliteal: Monophasic, intermediate resistive arterial flow noted.  Left Posterior Tibial: Monophasic, low resistive arterial flow noted.  Left Dorsalis Pedis: Monophasic, low resistive arterial flow noted.     Patient Active Problem List   Diagnosis    Ptosis of eyelid    Astigmatism    Myopia    Gus's syndrome    Neuropathic pain    GERD (gastroesophageal reflux disease)    Aneurysm of carotid artery    H/O: CVA (cerebrovascular accident)    Sensory deficit present    Gait disturbance, post-stroke    Cognitive deficit, post-stroke    Hypertension    Diabetes mellitus    Morbid obesity     Atherosclerosis of coronary artery    NEREYDA (obstructive sleep apnea)    Macrocytosis without anemia    Need for immunization against influenza    Need for vaccination    Elevated brain natriuretic peptide (BNP) level    Tremor of both hands    Seizures    Fall    Left knee pain    Class 2 severe obesity due to excess calories with serious comorbidity and body mass index (BMI) of 35.0 to 35.9 in adult    High risk medication use    Hypoxia    Anasarca    Other insomnia    Peripheral edema    Nicotine abuse    Nocturnal hypoxemia due to obesity    COPD, group B, by GOLD 2017 classification    Syncope    Physical deconditioning    Pure hypercholesterolemia    Cigarette nicotine dependence, uncomplicated    Primary osteoarthritis of left knee    Effusion, left knee    Right knee pain    Stage 2 moderate COPD by GOLD classification    Carbuncle    Hyperkeratosis    Encounter for screening for malignant neoplasm of colon    Essential hypertension    PAD (peripheral artery disease)    Shortness of breath    Muscle spasm    Nightmare    Acute on chronic diastolic CHF (congestive heart failure)    Diabetic ulcer of right great toe    RHINA (iron deficiency anemia)    NSVT (nonsustained ventricular tachycardia)    Severe anemia         ICD-10-CM ICD-9-CM   1. PAD (peripheral artery disease)  I73.9 443.9   2. Primary hypertension  I10 401.9   3. Pure hypercholesterolemia  E78.00 272.0   4. Type 2 diabetes mellitus with other specified complication, with long-term current use of insulin  E11.69 250.80    Z79.4 V58.67   5. Diabetic ulcer of right great toe  E11.621 250.80    L97.519 707.15   6. Nicotine abuse  Z72.0 305.1   7. Class 2 severe obesity due to excess calories with serious comorbidity and body mass index (BMI) of 35.0 to 35.9 in adult  E66.812 278.01    E66.01 V85.35    Z68.35            Plan: After thoroughly evaluating Arias Willson, I believe the best course of action is to proceed with left lower extremity  angiogram.  Risks of angiogram were discussed.  These include, but are not limited to, bleeding, infection, vessel damage, nerve damage, embolus, and loss of limb.  The patient understands these risks and wishes to proceed with procedure.  I did review ABIs and he has severe arterial insufficiency of the left lower extremity and mild arterial insufficiency to his right lower extremity.  He should continue his aspirin 325 mg daily and Pravachol 40 mg nightly in addition to his other medications.  He will take his aspirin and Pravachol uninterrupted up until the day prior to surgery.  I did discuss vascular risk factors as they pertain to the progression of vascular disease including controlling hypertension, hypercholesteremia, diabetes, and smoking cessation.  His blood pressure stable today in office.  His lipid panel from October 2024 shows an elevated total cholesterol at 255, triglycerides elevated at 1166, a decreased HDL at 29 and other values unable to be measured.  Body mass index is 35.44 kg/m². .  Class 2 Severe Obesity (BMI >=35 and <=39.9). Obesity-related health conditions include the following: obstructive sleep apnea, hypertension, coronary heart disease, diabetes mellitus, dyslipidemias, GERD, peripheral vascular disease, and peripheral vascular disease. Obesity is unchanged. BMI is is above average; BMI management plan is completed. We discussed low calorie, low carb based diet program, portion control, and increasing exercise.    This was all discussed in full with complete understanding.    Thank you for allowing me to participate in the care of your patient.  Please do not hesitate with any questions or concerns.  I will keep you aware of any further encounters with Arias Willson.        Sincerely yours,         TANESHA Griffith

## 2025-01-27 ENCOUNTER — TELEPHONE (OUTPATIENT)
Dept: VASCULAR SURGERY | Facility: CLINIC | Age: 70
End: 2025-01-27
Payer: MEDICARE

## 2025-01-27 NOTE — TELEPHONE ENCOUNTER
I spoke with patient concerning his surgery /      PRE-WORK WEDNESDAY @ 8;30.    ARRIVAL FOR SURGERY WILL BE 5:00 AM ON FRIDAY .    PATIENT EXPRESSED UNDERSTANDING OF ALL SURGICAL INSTRUCTIONS.

## 2025-01-27 NOTE — TELEPHONE ENCOUNTER
Caller: Arias Willson    Relationship: Self    Best call back number: [phone]     What is the best time to reach you: ANY     Who are you requesting to speak with (clinical staff, provider,  specific staff member): AZRA     What was the call regarding: PATIENT WOULD LIKE AN UPDATE ON SURGERY SCHEDULING.     Is it okay if the provider responds through MyChart: PATIENT WOULD LIKE A CALL BACK TO DISCUSS WETHER HE NEEDS TO BE CONTACTING INSURANCE COMPANY.

## 2025-01-28 NOTE — H&P
1/28/2025          Yuliana Keith, APRN  [address] [place] [address]     Arias Willson  1955          Chief Complaint   Patient presents with    Follow-up       Referred from Yuliana Keith for abnormal IMTIAZ done 1/10/25. Patient complains of pain in right leg and foot for past 3 weeks.          Dear Yuliana Keith, *:        HPI  I had the pleasure of seeing your patient Arias Willson in the office today.  Thank you kindly for this consultation.  As you recall, Arias Willson is a 69 y.o.  male who you are currently following for wound care.  He is here today with complaints of pain in his left ankle and foot for the past 3 weeks.  He has a wound to his left great toe which has just recently opened in the last week.  He is maintained on aspirin and Pravachol.  He did have noninvasive testing performed, which I did review in office.     Past Medical History:   Diagnosis Date    Abnormal glucose     PRE DM    Acute conjunctivitis     RESOLVED    Aneurysm of carotid artery     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       Benign essential hypertension     Blood in feces     Callus     Carotid artery stenosis     Cerebrovascular accident      L sided sensory deficit       Conjunctivitis     Constipation     OCCASIONAL    Contusion, eye, left     Corneal ulcer     PROBABLE    Diabetes mellitus     Eczema     Elevated cholesterol     Emphysema lung     Encounter for screening for malignant neoplasm of colon     Essential hypertension     Foot ulcer     GERD (gastroesophageal reflux disease)     Hemorrhoids     History of echocardiogram 04/05/2013    Echocadiogram W/ color flow 61524 (Normal LV systolic function with EF of 60-65%. Grade I diastolic dysfunction of the LV myocardium. No evidence of LV apical thrombus. No evidence of pericardial effusion.)    History of transfusion     Gus's syndrome     RIGHT EYE    Hyperkeratosis     Hyperlipidemia      Mucopurulent conjunctivitis     ACUTE    Myopia     Need for influenza vaccination 11/01/2023    Neuropathic pain     Obesity     Onychomycosis     Sleep apnea     Tobacco dependence syndrome      Past Surgical History:   Procedure Laterality Date    AORTAGRAM Left 3/21/2022    Procedure: LEFT LOWER EXTREMITY ANGIOGRAM;  Surgeon: Darryl Llanes DO;  Location: [facility] OR 12;  Service: Vascular;  Laterality: Left;    COLONOSCOPY  07/30/2015    Colonoscopy, diagnostic (screening) 55807 (Screening for malignant neoplasm of colon)     COLONOSCOPY  09/09/2015    Colonoscopy, diagnostic (screening) 55954 (Diverticulosis found in the sigmoid colon.Hemorrhoids found in the anus.)    COLONOSCOPY  05/01/2009    Colonoscopy, diagnostic (screening) 83940 (Small internal and external hemorrhoids were present, Colonoscopy otherwise normal.)    COLONOSCOPY N/A 12/09/2020    Procedure: COLONOSCOPY;  Surgeon: Arias Larsen MD;  Location: [facility] ENDOSCOPY;  Service: Gastroenterology;  Laterality: N/A;    LEG SURGERY      NOSE SURGERY       Social History     Socioeconomic History    Marital status: Single   Tobacco Use    Smoking status: Every Day     Current packs/day: 0.50     Average packs/day: 0.5 packs/day for 33.0 years (16.5 ttl pk-yrs)     Types: Cigarettes    Smokeless tobacco: Never    Tobacco comments:     3 cigarettes per day-12/29/22   Vaping Use    Vaping status: Never Used   Substance and Sexual Activity    Alcohol use: Not Currently    Drug use: No    Sexual activity: Defer     Family History   Problem Relation Age of Onset    Glaucoma Other     Heart disease Other     Stroke Other     Macular degeneration Other     Diabetes Other     Glaucoma Father     Macular degeneration Father     Cataracts Father     Cancer Father     Macular degeneration Sister     Heart disease Mother     Cancer Paternal Grandmother     Heart disease Paternal Grandfather      No Known Allergies  Current Outpatient Medications    Medication Instructions    albuterol sulfate  (90 Base) MCG/ACT inhaler 2 puffs, Every 4 Hours PRN    ammonium lactate (LAC-HYDRIN) 12 % lotion Topical, Daily    [START ON 5/24/2106] aspirin 325 mg, Oral, Daily    baclofen (LIORESAL) 10 mg, Oral, 3 Times Daily    budesonide-formoterol (SYMBICORT) 160-4.5 MCG/ACT inhaler 2 puffs, 2 Times Daily - RT    Cyanocobalamin (B-12) 5000 MCG sublingual tablet 1 each, Sublingual, Daily    DULoxetine (CYMBALTA) 60 mg, Oral, Daily    empagliflozin (JARDIANCE) 25 mg, Oral, Daily    erythromycin (ROMYCIN) 5 MG/GM ophthalmic ointment 1 cm, 2 Times Daily    FeroSul 325 mg, Oral, Daily With Breakfast    gabapentin (NEURONTIN) 600 mg, Oral, 3 Times Daily    glucose blood test strip 1 each, Other, 3 Times Daily, Use as instructed    glucose monitor monitoring kit Not Applicable, 3 Times Daily    ibuprofen (ADVIL,MOTRIN) 800 mg, Every 8 Hours PRN    Insulin Glargine (Lantus SoloStar) 100 UNIT/ML injection pen INJECT 34 UNITS SUBCUTANEOUSLY ONCE DAILY INCREASE  BY  2  UNITS  EVERY  3  DAYS  UNTIL  MORNING  BLOOD  SUGAR  IS  100-150    Lancets 30G misc 1 each, Not Applicable, 3 Times Daily    losartan (COZAAR) 50 mg, Oral, Daily    metFORMIN (GLUCOPHAGE) 500 mg, Oral, 2 Times Daily    metoprolol tartrate (LOPRESSOR) 25 mg, 2 Times Daily    pantoprazole (PROTONIX) 40 mg, Oral, Daily    pravastatin (PRAVACHOL) 40 mg, Nightly    spironolactone (ALDACTONE) 25 mg, Oral, Daily    traMADol (ULTRAM) 50 mg, Every 6 Hours PRN             Review of Systems   Constitutional: Negative.  Negative for diaphoresis and fever.   HENT: Negative.     Eyes: Negative.    Respiratory: Negative.  Negative for shortness of breath and wheezing.    Cardiovascular: Negative.  Negative for chest pain and leg swelling.        Foot pain at rest   Gastrointestinal: Negative.  Negative for abdominal pain.   Endocrine: Negative.    Genitourinary: Negative.    Musculoskeletal: Negative.    Skin:  Positive for color  "change and wound.   Allergic/Immunologic: Negative.    Neurological: Negative.  Negative for dizziness and weakness.   Hematological: Negative.    Psychiatric/Behavioral: Negative.           /72   Pulse 64   Ht 188 cm (74\")   Wt 125 kg (276 lb)   BMI 35.44 kg/m²         Physical Exam  Vitals and nursing note reviewed.   Constitutional:       General: He is not in acute distress.     Appearance: Normal appearance. He is not diaphoretic.   HENT:      Head: Normocephalic. No right periorbital erythema or left periorbital erythema.      Nose: Nose normal.   Eyes:      General: No scleral icterus.     Pupils: Pupils are equal.   Cardiovascular:      Rate and Rhythm: Normal rate and regular rhythm.      Pulses: Normal pulses.           Femoral pulses are 2+ on the right side and 2+ on the left side.       Dorsalis pedis pulses are detected w/ Doppler on the right side and 0 on the left side.        Posterior tibial pulses are detected w/ Doppler on the right side and detected w/ Doppler on the left side.      Heart sounds: Normal heart sounds. No murmur heard.     Comments: Bilateral peroneal signals dopplered.  Left PT and peroneal are faint  Pulmonary:      Effort: Pulmonary effort is normal. No respiratory distress.      Breath sounds: Normal breath sounds.   Abdominal:      General: Bowel sounds are normal. There is no distension.      Palpations: Abdomen is soft.      Tenderness: There is no abdominal tenderness. There is no guarding.   Musculoskeletal:         General: No swelling or tenderness. Normal range of motion.      Cervical back: Normal range of motion and neck supple.      Right lower leg: No edema.      Left lower leg: No edema.   Feet:      Right foot:      Skin integrity: Skin integrity normal.      Left foot:      Skin integrity: Skin integrity normal.   Skin:     General: Skin is warm and dry.      Findings: No erythema or rash.   Neurological:      General: No focal deficit present.      " Mental Status: He is alert and oriented to person, place, and time. Mental status is at baseline.      Cranial Nerves: No cranial nerve deficit.      Gait: Gait normal.   Psychiatric:         Attention and Perception: Attention normal.         Mood and Affect: Mood normal.         Behavior: Behavior normal.         Thought Content: Thought content normal.         Judgment: Judgment normal.         DIAGNOSTIC DATA    US VASCULAR DOPPLER ANKLE BRACHIAL INDEX SINGLE LEVEL     Result Date: 1/14/2025  Narrative:   Right Conclusion: The right IMTIAZ is mildly reduced. Waveforms are consistent with tib-peroneal disease.   Left Conclusion: The left IMTIAZ is severely reduced. Waveforms are consistent with femoral disease and tib-peroneal disease. Study Impression Right Conclusion: The right IMTIAZ is mildly reduced. Waveforms are consistent with tib-peroneal disease.  Right Common Femoral: Multiphasic, high resistive arterial flow noted.  Right Popliteal: Multiphasic, high resistive arterial flow noted.   Right Posterior Tibial: Multiphasic, high resistive arterial flow noted.  Right Dorsalis Pedis: Monophasic, intermediate resistive arterial flow noted. Left Conclusion: The left IMTIAZ is severely reduced. Waveforms are consistent with femoral disease and tib-peroneal disease.  Left Common Femoral: Multiphasic, high resistive arterial flow noted.  Left Popliteal: Monophasic, intermediate resistive arterial flow noted.  Left Posterior Tibial: Monophasic, low resistive arterial flow noted.  Left Dorsalis Pedis: Monophasic, low resistive arterial flow noted.      Problem List       Patient Active Problem List   Diagnosis    Ptosis of eyelid    Astigmatism    Myopia    Gus's syndrome    Neuropathic pain    GERD (gastroesophageal reflux disease)    Aneurysm of carotid artery    H/O: CVA (cerebrovascular accident)    Sensory deficit present    Gait disturbance, post-stroke    Cognitive deficit, post-stroke    Hypertension    Diabetes  mellitus    Morbid obesity    Atherosclerosis of coronary artery    NEREYDA (obstructive sleep apnea)    Macrocytosis without anemia    Need for immunization against influenza    Need for vaccination    Elevated brain natriuretic peptide (BNP) level    Tremor of both hands    Seizures    Fall    Left knee pain    Class 2 severe obesity due to excess calories with serious comorbidity and body mass index (BMI) of 35.0 to 35.9 in adult    High risk medication use    Hypoxia    Anasarca    Other insomnia    Peripheral edema    Nicotine abuse    Nocturnal hypoxemia due to obesity    COPD, group B, by GOLD 2017 classification    Syncope    Physical deconditioning    Pure hypercholesterolemia    Cigarette nicotine dependence, uncomplicated    Primary osteoarthritis of left knee    Effusion, left knee    Right knee pain    Stage 2 moderate COPD by GOLD classification    Carbuncle    Hyperkeratosis    Encounter for screening for malignant neoplasm of colon    Essential hypertension    PAD (peripheral artery disease)    Shortness of breath    Muscle spasm    Nightmare    Acute on chronic diastolic CHF (congestive heart failure)    Diabetic ulcer of right great toe    RHINA (iron deficiency anemia)    NSVT (nonsustained ventricular tachycardia)    Severe anemia            Visit Diagnosis       ICD-10-CM ICD-9-CM   1. PAD (peripheral artery disease)  I73.9 443.9   2. Primary hypertension  I10 401.9   3. Pure hypercholesterolemia  E78.00 272.0   4. Type 2 diabetes mellitus with other specified complication, with long-term current use of insulin  E11.69 250.80     Z79.4 V58.67   5. Diabetic ulcer of right great toe  E11.621 250.80     L97.519 707.15   6. Nicotine abuse  Z72.0 305.1   7. Class 2 severe obesity due to excess calories with serious comorbidity and body mass index (BMI) of 35.0 to 35.9 in adult  E66.812 278.01     E66.01 V85.35     Z68.35                    Plan: After thoroughly evaluating Arias Willson, I believe the  best course of action is to proceed with left lower extremity angiogram.  Risks of angiogram were discussed.  These include, but are not limited to, bleeding, infection, vessel damage, nerve damage, embolus, and loss of limb.  The patient understands these risks and wishes to proceed with procedure.  I did review ABIs and he has severe arterial insufficiency of the left lower extremity and mild arterial insufficiency to his right lower extremity.  He should continue his aspirin 325 mg daily and Pravachol 40 mg nightly in addition to his other medications.  He will take his aspirin and Pravachol uninterrupted up until the day prior to surgery.  I did discuss vascular risk factors as they pertain to the progression of vascular disease including controlling hypertension, hypercholesteremia, diabetes, and smoking cessation.  His blood pressure stable today in office.  His lipid panel from October 2024 shows an elevated total cholesterol at 255, triglycerides elevated at 1166, a decreased HDL at 29 and other values unable to be measured.  Body mass index is 35.44 kg/m². .  Class 2 Severe Obesity (BMI >=35 and <=39.9). Obesity-related health conditions include the following: obstructive sleep apnea, hypertension, coronary heart disease, diabetes mellitus, dyslipidemias, GERD, peripheral vascular disease, and peripheral vascular disease. Obesity is unchanged. BMI is is above average; BMI management plan is completed. We discussed low calorie, low carb based diet program, portion control, and increasing exercise.     This was all discussed in full with complete understanding.     Thank you for allowing me to participate in the care of your patient.  Please do not hesitate with any questions or concerns.  I will keep you aware of any further encounters with Arias Willson.           Sincerely yours,           Darryl Llanes, DO

## 2025-01-29 ENCOUNTER — PRE-ADMISSION TESTING (OUTPATIENT)
Dept: PREADMISSION TESTING | Facility: HOSPITAL | Age: 70
End: 2025-01-29
Payer: MEDICARE

## 2025-01-29 VITALS
RESPIRATION RATE: 22 BRPM | HEART RATE: 75 BPM | DIASTOLIC BLOOD PRESSURE: 78 MMHG | SYSTOLIC BLOOD PRESSURE: 147 MMHG | BODY MASS INDEX: 38.28 KG/M2 | OXYGEN SATURATION: 96 % | HEIGHT: 72 IN | WEIGHT: 282.63 LBS

## 2025-01-29 DIAGNOSIS — Z01.818 PREOP TESTING: ICD-10-CM

## 2025-01-29 DIAGNOSIS — I73.9 PAD (PERIPHERAL ARTERY DISEASE): ICD-10-CM

## 2025-01-29 LAB
ANION GAP SERPL CALCULATED.3IONS-SCNC: 13 MMOL/L (ref 5–15)
APTT PPP: 27.6 SECONDS (ref 24.5–36)
BASOPHILS # BLD AUTO: 0.03 10*3/MM3 (ref 0–0.2)
BASOPHILS NFR BLD AUTO: 0.4 % (ref 0–1.5)
BUN SERPL-MCNC: 17 MG/DL (ref 8–23)
BUN/CREAT SERPL: 21.8 (ref 7–25)
CALCIUM SPEC-SCNC: 9.4 MG/DL (ref 8.6–10.5)
CHLORIDE SERPL-SCNC: 96 MMOL/L (ref 98–107)
CO2 SERPL-SCNC: 27 MMOL/L (ref 22–29)
CREAT SERPL-MCNC: 0.78 MG/DL (ref 0.76–1.27)
DEPRECATED RDW RBC AUTO: 48.6 FL (ref 37–54)
EGFRCR SERPLBLD CKD-EPI 2021: 97.7 ML/MIN/1.73
EOSINOPHIL # BLD AUTO: 0.07 10*3/MM3 (ref 0–0.4)
EOSINOPHIL NFR BLD AUTO: 1 % (ref 0.3–6.2)
ERYTHROCYTE [DISTWIDTH] IN BLOOD BY AUTOMATED COUNT: 14.1 % (ref 12.3–15.4)
GLUCOSE SERPL-MCNC: 159 MG/DL (ref 65–99)
HCT VFR BLD AUTO: 49.3 % (ref 37.5–51)
HGB BLD-MCNC: 15.5 G/DL (ref 13–17.7)
IMM GRANULOCYTES # BLD AUTO: 0.03 10*3/MM3 (ref 0–0.05)
IMM GRANULOCYTES NFR BLD AUTO: 0.4 % (ref 0–0.5)
INR PPP: 0.9 (ref 0.91–1.09)
LYMPHOCYTES # BLD AUTO: 1.01 10*3/MM3 (ref 0.7–3.1)
LYMPHOCYTES NFR BLD AUTO: 15 % (ref 19.6–45.3)
MCH RBC QN AUTO: 30.1 PG (ref 26.6–33)
MCHC RBC AUTO-ENTMCNC: 31.4 G/DL (ref 31.5–35.7)
MCV RBC AUTO: 95.7 FL (ref 79–97)
MONOCYTES # BLD AUTO: 0.51 10*3/MM3 (ref 0.1–0.9)
MONOCYTES NFR BLD AUTO: 7.6 % (ref 5–12)
NEUTROPHILS NFR BLD AUTO: 5.08 10*3/MM3 (ref 1.7–7)
NEUTROPHILS NFR BLD AUTO: 75.6 % (ref 42.7–76)
NRBC BLD AUTO-RTO: 0 /100 WBC (ref 0–0.2)
PLATELET # BLD AUTO: 207 10*3/MM3 (ref 140–450)
PMV BLD AUTO: 10 FL (ref 6–12)
POTASSIUM SERPL-SCNC: 4.5 MMOL/L (ref 3.5–5.2)
PROTHROMBIN TIME: 12.6 SECONDS (ref 11.8–14.8)
RBC # BLD AUTO: 5.15 10*6/MM3 (ref 4.14–5.8)
SODIUM SERPL-SCNC: 136 MMOL/L (ref 136–145)
WBC NRBC COR # BLD AUTO: 6.73 10*3/MM3 (ref 3.4–10.8)

## 2025-01-29 PROCEDURE — 85730 THROMBOPLASTIN TIME PARTIAL: CPT | Performed by: NURSE PRACTITIONER

## 2025-01-29 PROCEDURE — 93005 ELECTROCARDIOGRAM TRACING: CPT

## 2025-01-29 PROCEDURE — 80048 BASIC METABOLIC PNL TOTAL CA: CPT | Performed by: NURSE PRACTITIONER

## 2025-01-29 PROCEDURE — 85610 PROTHROMBIN TIME: CPT | Performed by: NURSE PRACTITIONER

## 2025-01-29 PROCEDURE — 85025 COMPLETE CBC W/AUTO DIFF WBC: CPT | Performed by: NURSE PRACTITIONER

## 2025-01-29 NOTE — DISCHARGE INSTRUCTIONS
Preparing for Surgery  Follow these instructions before the procedure:  Several days or weeks before your procedure        Ask your health care provider about:  Changing or stopping your regular medicines. This is especially important if you are taking diabetes medicines or blood thinners.  Taking medicines such as aspirin and ibuprofen. These medicines can thin your blood. Do not take these medicines unless your health care provider tells you to take them.  Taking over-the-counter medicines, vitamins, herbs, and supplements.    Contact your surgeon if you:  Develop a fever of more than 100.4°F (38°C) or other feelings of illness during the 48 hours before your surgery.  Have symptoms that get worse.  Have questions or concerns about your surgery.  If you are going home the same day of your surgery you will need to arrange for a responsible adult, age 18 years old or older, to drive you home from the hospital and stay with you for 24 hours. Verification of the  will be made prior to any procedure requiring sedation. You may not go home in a taxi or any form of public transportation by yourself.     Day before your procedure  Medication(s) you need to stop the day before your surgery: losartan    24 hours before your procedure DO NOT drink alcoholic beverages or smoke.  24 hours before your procedure STOP taking Erectile Dysfunction medication (i.e.,Cialis, Viagra)   You may be asked to shower with a germ-killing soap.  Day of your procedure   You may take the following medication(s) the morning of surgery with a sip of water: metoprolol, pantoprazole      8 hours before your scheduled arrival time, STOP all food, any dairy products, and full liquids. This includes hard candy, chewing gum or mints. This is extremely important to prevent serious complications.     Up to 2 hours before your scheduled arrival time, you may have clear liquids no cream, powder, or pulp of any kind. Safe options are water, black  coffee, plain tea, soda, Gatorade/Powerade, clear broth, apple juice.    2 hours before your scheduled arrival time, STOP drinking clear liquids.    You may need to take another shower with a germ-killing soap before you leave home in the morning. Do not use perfumes, colognes, or body lotions.  Wear comfortable loose-fitting clothing.  Remove all jewelry including body piercing and rings, dark colored nail polish, and make up prior to arrival at the hospital. Leave all valuables at home.   Bring your hearing aids if you rely on them.  Do not wear contact lenses. If you wear eyeglasses remember to bring a case to store them in while you are in surgery.  Do not use denture adhesives since you will be asked to remove them during your surgery.    You do not need to bring your home medications into the hospital.   Bring your sleep apnea device with you on the day of your surgery (if this applies to you).  If you have an Inspire implant for sleep apnea, please bring the remote with you on the day of surgery.  If you wear portable oxygen, bring it with you.   If you are staying overnight, you may bring a bag of items you may need such as slippers, robe and a change of clothes for your discharge. You may want to leave these items in the car until you are ready for them since your family will take your belongings when you leave the pre-operative area.  Arrive at the hospital as scheduled by the office. You will be asked to arrive 2 hours prior to your surgery time in order to prepare for your procedure.  When you arrive at the hospital  Go to the registration desk located at the main entrance of the hospital.  After registration is completed, you will be given a beeper and a sticker sheet. Take the stickers to Outpatient Surgery and place in the tray at the end of the desk to notify the staff that you have arrived and registered.   Return to the lobby to wait. You are not always called back according to the time of arrival  but rather the time your doctor will be ready.  When your beeper lights up and vibrates proceed through the double doors, under the stairs, and a member of the Outpatient Surgery staff will escort you to your preoperative room.   How to Use Chlorhexidine Before Surgery  Chlorhexidine gluconate (CHG) is a germ-killing (antiseptic) solution that is used to clean the skin. It can get rid of the bacteria that normally live on the skin and can keep them away for about 24 hours. To clean your skin with CHG, you may be given:  A CHG solution to use in the shower or as part of a sponge bath.  A prepackaged cloth that contains CHG.  Cleaning your skin with CHG may help lower the risk for infection:  While you are staying in the intensive care unit of the hospital.  If you have a vascular access, such as a central line, to provide short-term or long-term access to your veins.  If you have a catheter to drain urine from your bladder.  If you are on a ventilator. A ventilator is a machine that helps you breathe by moving air in and out of your lungs.  After surgery.  What are the risks?  Risks of using CHG include:  A skin reaction.  Hearing loss, if CHG gets in your ears and you have a perforated eardrum.  Eye injury, if CHG gets in your eyes and is not rinsed out.  The CHG product catching fire.  Make sure that you avoid smoking and flames after applying CHG to your skin.  Do not use CHG:  If you have a chlorhexidine allergy or have previously reacted to chlorhexidine.  On babies younger than 2 months of age.  How to use CHG solution  Use CHG only as told by your health care provider, and follow the instructions on the label.  Use the full amount of CHG as directed. Usually, this is one bottle.  During a shower    Follow these steps when using CHG solution during a shower (unless your health care provider gives you different instructions):  Start the shower.  Use your normal soap and shampoo to wash your face and hair.  Turn  off the shower or move out of the shower stream.  Pour the CHG onto a clean washcloth. Do not use any type of brush or rough-edged sponge.  Starting at your neck, lather your body down to your toes. Make sure you follow these instructions:  If you will be having surgery, pay special attention to the part of your body where you will be having surgery. Scrub this area for at least 1 minute.  Do not use CHG on your head or face. If the solution gets into your ears or eyes, rinse them well with water.  Avoid your genital area.  Avoid any areas of skin that have broken skin, cuts, or scrapes.  Scrub your back and under your arms. Make sure to wash skin folds.  Let the lather sit on your skin for 1-2 minutes or as long as told by your health care provider.  Thoroughly rinse your entire body in the shower. Make sure that all body creases and crevices are rinsed well.  Dry off with a clean towel. Do not put any substances on your body afterward--such as powder, lotion, or perfume--unless you are told to do so by your health care provider. Only use lotions that are recommended by the .  Put on clean clothes or pajamas.  If it is the night before your surgery, sleep in clean sheets.     During a sponge bath  Follow these steps when using CHG solution during a sponge bath (unless your health care provider gives you different instructions):  Use your normal soap and shampoo to wash your face and hair.  Pour the CHG onto a clean washcloth.  Starting at your neck, lather your body down to your toes. Make sure you follow these instructions:  If you will be having surgery, pay special attention to the part of your body where you will be having surgery. Scrub this area for at least 1 minute.  Do not use CHG on your head or face. If the solution gets into your ears or eyes, rinse them well with water.  Avoid your genital area.  Avoid any areas of skin that have broken skin, cuts, or scrapes.  Scrub your back and under  your arms. Make sure to wash skin folds.  Let the lather sit on your skin for 1-2 minutes or as long as told by your health care provider.  Using a different clean, wet washcloth, thoroughly rinse your entire body. Make sure that all body creases and crevices are rinsed well.  Dry off with a clean towel. Do not put any substances on your body afterward--such as powder, lotion, or perfume--unless you are told to do so by your health care provider. Only use lotions that are recommended by the .  Put on clean clothes or pajamas.  If it is the night before your surgery, sleep in clean sheets.  How to use CHG prepackaged cloths  Only use CHG cloths as told by your health care provider, and follow the instructions on the label.  Use the CHG cloth on clean, dry skin.  Do not use the CHG cloth on your head or face unless your health care provider tells you to.  When washing with the CHG cloth:  Avoid your genital area.  Avoid any areas of skin that have broken skin, cuts, or scrapes.  Before surgery    Follow these steps when using a CHG cloth to clean before surgery (unless your health care provider gives you different instructions):  Using the CHG cloth, vigorously scrub the part of your body where you will be having surgery. Scrub using a back-and-forth motion for 3 minutes. The area on your body should be completely wet with CHG when you are done scrubbing.  Do not rinse. Discard the cloth and let the area air-dry. Do not put any substances on the area afterward, such as powder, lotion, or perfume.  Put on clean clothes or pajamas.  If it is the night before your surgery, sleep in clean sheets.     For general bathing  Follow these steps when using CHG cloths for general bathing (unless your health care provider gives you different instructions).  Use a separate CHG cloth for each area of your body. Make sure you wash between any folds of skin and between your fingers and toes. Wash your body in the  following order, switching to a new cloth after each step:  The front of your neck, shoulders, and chest.  Both of your arms, under your arms, and your hands.  Your stomach and groin area, avoiding the genitals.  Your right leg and foot.  Your left leg and foot.  The back of your neck, your back, and your buttocks.  Do not rinse. Discard the cloth and let the area air-dry. Do not put any substances on your body afterward--such as powder, lotion, or perfume--unless you are told to do so by your health care provider. Only use lotions that are recommended by the .  Put on clean clothes or pajamas.  Contact a health care provider if:  Your skin gets irritated after scrubbing.  You have questions about using your solution or cloth.  You swallow any chlorhexidine. Call your local poison control center (1-478.634.7613 in the [place]  Get help right away if:  Your eyes itch badly, or they become very red or swollen.  Your skin itches badly and is red or swollen.  Your hearing changes.  You have trouble seeing.  You have swelling or tingling in your mouth or throat.  You have trouble breathing.  These symptoms may represent a serious problem that is an emergency. Do not wait to see if the symptoms will go away. Get medical help right away. Call your local emergency services (294 in the [place] Do not drive yourself to the hospital.  Summary  Chlorhexidine gluconate (CHG) is a germ-killing (antiseptic) solution that is used to clean the skin. Cleaning your skin with CHG may help to lower your risk for infection.  You may be given CHG to use for bathing. It may be in a bottle or in a prepackaged cloth to use on your skin. Carefully follow your health care provider's instructions and the instructions on the product label.  Do not use CHG if you have a chlorhexidine allergy.  Contact your health care provider if your skin gets irritated after scrubbing.  This information is not intended to replace advice given to you  by your health care provider. Make sure you discuss any questions you have with your health care provider.  Document Revised: 04/17/2023 Document Reviewed: 02/28/2022  Elsevier Patient Education © 2023 [employer]

## 2025-01-30 ENCOUNTER — TELEPHONE (OUTPATIENT)
Dept: VASCULAR SURGERY | Facility: CLINIC | Age: 70
End: 2025-01-30
Payer: MEDICARE

## 2025-01-30 LAB
QT INTERVAL: 392 MS
QTC INTERVAL: 416 MS

## 2025-01-30 NOTE — TELEPHONE ENCOUNTER
Caller: Arias Willson    Relationship: Self    Best call back number: [phone]    What is the best time to reach you: ANY    Who are you requesting to speak with (clinical staff, provider,  specific staff member): AZRA    Do you know the name of the person who called: PT    What was the call regarding: PT CALLED TO SEE WHAT TIME HE NEEDS TO REPORT TOMORROW FOR HIS PROCEDURE. PLEASE GIVE HIM A CALL BACK ASAP. THANK YOU    Is it okay if the provider responds through MyChart: NO

## 2025-01-30 NOTE — TELEPHONE ENCOUNTER
Patient confirmed surgery date and time of 01/31/2025 with an arrival time of 6:00 am. Patient is to be NPO after midnight patient is to take medications as instructed. Patient verbalized understanding.

## 2025-01-31 ENCOUNTER — ANESTHESIA EVENT (OUTPATIENT)
Dept: PERIOP | Facility: HOSPITAL | Age: 70
End: 2025-01-31
Payer: MEDICARE

## 2025-01-31 ENCOUNTER — HOSPITAL ENCOUNTER (OUTPATIENT)
Facility: HOSPITAL | Age: 70
Setting detail: HOSPITAL OUTPATIENT SURGERY
Discharge: HOME | End: 2025-01-31
Attending: SURGERY | Admitting: SURGERY
Payer: MEDICARE

## 2025-01-31 ENCOUNTER — APPOINTMENT (OUTPATIENT)
Dept: INTERVENTIONAL RADIOLOGY/VASCULAR | Facility: HOSPITAL | Age: 70
End: 2025-01-31
Payer: MEDICARE

## 2025-01-31 ENCOUNTER — ANESTHESIA (OUTPATIENT)
Dept: PERIOP | Facility: HOSPITAL | Age: 70
End: 2025-01-31
Payer: MEDICARE

## 2025-01-31 VITALS
RESPIRATION RATE: 20 BRPM | HEART RATE: 68 BPM | OXYGEN SATURATION: 94 % | DIASTOLIC BLOOD PRESSURE: 84 MMHG | SYSTOLIC BLOOD PRESSURE: 148 MMHG | TEMPERATURE: 97.5 F

## 2025-01-31 DIAGNOSIS — Z01.818 PREOP TESTING: ICD-10-CM

## 2025-01-31 DIAGNOSIS — Z79.02 ENCOUNTER FOR MONITORING ANTIPLATELET THERAPY: ICD-10-CM

## 2025-01-31 DIAGNOSIS — Z51.81 ENCOUNTER FOR MONITORING ANTIPLATELET THERAPY: ICD-10-CM

## 2025-01-31 DIAGNOSIS — I73.9 PAD (PERIPHERAL ARTERY DISEASE): ICD-10-CM

## 2025-01-31 LAB
ABO GROUP BLD: NORMAL
BLD GP AB SCN SERPL QL: NEGATIVE
GLUCOSE BLDC GLUCOMTR-MCNC: 151 MG/DL (ref 70–130)
GLUCOSE BLDC GLUCOMTR-MCNC: 164 MG/DL (ref 70–130)
RH BLD: POSITIVE
T&S EXPIRATION DATE: NORMAL

## 2025-01-31 PROCEDURE — 25010000002 CEFAZOLIN PER 500 MG: Performed by: NURSE PRACTITIONER

## 2025-01-31 PROCEDURE — C1714 CATH, TRANS ATHERECTOMY, DIR: HCPCS | Performed by: SURGERY

## 2025-01-31 PROCEDURE — 25010000002 PROPOFOL 1000 MG/100ML EMULSION

## 2025-01-31 PROCEDURE — 25010000002 FENTANYL CITRATE (PF) 100 MCG/2ML SOLUTION

## 2025-01-31 PROCEDURE — C1894 INTRO/SHEATH, NON-LASER: HCPCS | Performed by: SURGERY

## 2025-01-31 PROCEDURE — C1887 CATHETER, GUIDING: HCPCS | Performed by: SURGERY

## 2025-01-31 PROCEDURE — 82948 REAGENT STRIP/BLOOD GLUCOSE: CPT

## 2025-01-31 PROCEDURE — 75625 CONTRAST EXAM ABDOMINL AORTA: CPT

## 2025-01-31 PROCEDURE — C1884 EMBOLIZATION PROTECT SYST: HCPCS | Performed by: SURGERY

## 2025-01-31 PROCEDURE — 25010000002 BUPIVACAINE (PF) 0.5 % SOLUTION: Performed by: SURGERY

## 2025-01-31 PROCEDURE — 25010000002 DEXAMETHASONE PER 1 MG

## 2025-01-31 PROCEDURE — C1769 GUIDE WIRE: HCPCS | Performed by: SURGERY

## 2025-01-31 PROCEDURE — 25510000001 IOPAMIDOL 61 % SOLUTION: Performed by: SURGERY

## 2025-01-31 PROCEDURE — 75625 CONTRAST EXAM ABDOMINL AORTA: CPT | Performed by: SURGERY

## 2025-01-31 PROCEDURE — 25010000002 HEPARIN (PORCINE) PER 1000 UNITS: Performed by: SURGERY

## 2025-01-31 PROCEDURE — 76937 US GUIDE VASCULAR ACCESS: CPT | Performed by: SURGERY

## 2025-01-31 PROCEDURE — 86900 BLOOD TYPING SEROLOGIC ABO: CPT | Performed by: NURSE PRACTITIONER

## 2025-01-31 PROCEDURE — 25010000002 GLYCOPYRROLATE 0.4 MG/2ML SOLUTION

## 2025-01-31 PROCEDURE — 76000 FLUOROSCOPY <1 HR PHYS/QHP: CPT

## 2025-01-31 PROCEDURE — 25810000003 LACTATED RINGERS PER 1000 ML: Performed by: SURGERY

## 2025-01-31 PROCEDURE — 86901 BLOOD TYPING SEROLOGIC RH(D): CPT | Performed by: NURSE PRACTITIONER

## 2025-01-31 PROCEDURE — 25010000002 ONDANSETRON PER 1 MG

## 2025-01-31 PROCEDURE — 25010000002 HEPARIN (PORCINE) PER 1000 UNITS

## 2025-01-31 PROCEDURE — 37225 PR REVSC OPN/PRQ FEM/POP W/ATHRC/ANGIOP SM VSL: CPT | Performed by: SURGERY

## 2025-01-31 PROCEDURE — 86850 RBC ANTIBODY SCREEN: CPT | Performed by: NURSE PRACTITIONER

## 2025-01-31 PROCEDURE — 75710 ARTERY X-RAYS ARM/LEG: CPT | Performed by: SURGERY

## 2025-01-31 PROCEDURE — 75710 ARTERY X-RAYS ARM/LEG: CPT

## 2025-01-31 PROCEDURE — C1760 CLOSURE DEV, VASC: HCPCS | Performed by: SURGERY

## 2025-01-31 PROCEDURE — C2623 CATH, TRANSLUMIN, DRUG-COAT: HCPCS | Performed by: SURGERY

## 2025-01-31 PROCEDURE — 25010000002 LIDOCAINE PF 2% 2 % SOLUTION

## 2025-01-31 RX ORDER — NALOXONE HCL 0.4 MG/ML
0.4 VIAL (ML) INJECTION AS NEEDED
Status: DISCONTINUED | OUTPATIENT
Start: 2025-01-31 | End: 2025-01-31 | Stop reason: HOSPADM

## 2025-01-31 RX ORDER — CLOPIDOGREL BISULFATE 75 MG/1
150 TABLET ORAL ONCE
Status: COMPLETED | OUTPATIENT
Start: 2025-01-31 | End: 2025-01-31

## 2025-01-31 RX ORDER — DEXAMETHASONE SODIUM PHOSPHATE 4 MG/ML
INJECTION, SOLUTION INTRA-ARTICULAR; INTRALESIONAL; INTRAMUSCULAR; INTRAVENOUS; SOFT TISSUE AS NEEDED
Status: DISCONTINUED | OUTPATIENT
Start: 2025-01-31 | End: 2025-01-31 | Stop reason: SURG

## 2025-01-31 RX ORDER — ONDANSETRON 2 MG/ML
4 INJECTION INTRAMUSCULAR; INTRAVENOUS ONCE AS NEEDED
Status: DISCONTINUED | OUTPATIENT
Start: 2025-01-31 | End: 2025-01-31 | Stop reason: HOSPADM

## 2025-01-31 RX ORDER — LIDOCAINE HYDROCHLORIDE 10 MG/ML
0.5 INJECTION, SOLUTION EPIDURAL; INFILTRATION; INTRACAUDAL; PERINEURAL ONCE AS NEEDED
Status: DISCONTINUED | OUTPATIENT
Start: 2025-01-31 | End: 2025-01-31 | Stop reason: HOSPADM

## 2025-01-31 RX ORDER — ONDANSETRON 2 MG/ML
INJECTION INTRAMUSCULAR; INTRAVENOUS AS NEEDED
Status: DISCONTINUED | OUTPATIENT
Start: 2025-01-31 | End: 2025-01-31 | Stop reason: SURG

## 2025-01-31 RX ORDER — HYDROCODONE BITARTRATE AND ACETAMINOPHEN 5; 325 MG/1; MG/1
1 TABLET ORAL EVERY 4 HOURS PRN
Status: DISCONTINUED | OUTPATIENT
Start: 2025-01-31 | End: 2025-01-31 | Stop reason: HOSPADM

## 2025-01-31 RX ORDER — SODIUM CHLORIDE, SODIUM LACTATE, POTASSIUM CHLORIDE, CALCIUM CHLORIDE 600; 310; 30; 20 MG/100ML; MG/100ML; MG/100ML; MG/100ML
1000 INJECTION, SOLUTION INTRAVENOUS CONTINUOUS
Status: DISCONTINUED | OUTPATIENT
Start: 2025-01-31 | End: 2025-01-31 | Stop reason: HOSPADM

## 2025-01-31 RX ORDER — FENTANYL CITRATE 50 UG/ML
INJECTION, SOLUTION INTRAMUSCULAR; INTRAVENOUS AS NEEDED
Status: DISCONTINUED | OUTPATIENT
Start: 2025-01-31 | End: 2025-01-31 | Stop reason: SURG

## 2025-01-31 RX ORDER — HEPARIN SODIUM 1000 [USP'U]/ML
INJECTION, SOLUTION INTRAVENOUS; SUBCUTANEOUS AS NEEDED
Status: DISCONTINUED | OUTPATIENT
Start: 2025-01-31 | End: 2025-01-31 | Stop reason: SURG

## 2025-01-31 RX ORDER — FENTANYL CITRATE 50 UG/ML
50 INJECTION, SOLUTION INTRAMUSCULAR; INTRAVENOUS
Status: DISCONTINUED | OUTPATIENT
Start: 2025-01-31 | End: 2025-01-31 | Stop reason: HOSPADM

## 2025-01-31 RX ORDER — HYDROCODONE BITARTRATE AND ACETAMINOPHEN 10; 325 MG/1; MG/1
1 TABLET ORAL EVERY 4 HOURS PRN
Status: DISCONTINUED | OUTPATIENT
Start: 2025-01-31 | End: 2025-01-31 | Stop reason: HOSPADM

## 2025-01-31 RX ORDER — GLYCOPYRROLATE 0.2 MG/ML
INJECTION INTRAMUSCULAR; INTRAVENOUS AS NEEDED
Status: DISCONTINUED | OUTPATIENT
Start: 2025-01-31 | End: 2025-01-31 | Stop reason: SURG

## 2025-01-31 RX ORDER — IOPAMIDOL 612 MG/ML
INJECTION, SOLUTION INTRAVASCULAR AS NEEDED
Status: DISCONTINUED | OUTPATIENT
Start: 2025-01-31 | End: 2025-01-31 | Stop reason: HOSPADM

## 2025-01-31 RX ORDER — CLOPIDOGREL BISULFATE 75 MG/1
75 TABLET ORAL DAILY
Qty: 90 TABLET | Refills: 4 | Status: SHIPPED | OUTPATIENT
Start: 2025-01-31 | End: 2026-04-26

## 2025-01-31 RX ORDER — BUPIVACAINE HCL/0.9 % NACL/PF 0.125 %
PLASTIC BAG, INJECTION (ML) EPIDURAL AS NEEDED
Status: DISCONTINUED | OUTPATIENT
Start: 2025-01-31 | End: 2025-01-31 | Stop reason: SURG

## 2025-01-31 RX ORDER — PROPOFOL 10 MG/ML
INJECTION, EMULSION INTRAVENOUS CONTINUOUS PRN
Status: DISCONTINUED | OUTPATIENT
Start: 2025-01-31 | End: 2025-01-31 | Stop reason: SURG

## 2025-01-31 RX ORDER — LABETALOL HYDROCHLORIDE 5 MG/ML
5 INJECTION, SOLUTION INTRAVENOUS
Status: DISCONTINUED | OUTPATIENT
Start: 2025-01-31 | End: 2025-01-31 | Stop reason: HOSPADM

## 2025-01-31 RX ORDER — HYDROCODONE BITARTRATE AND ACETAMINOPHEN 5; 325 MG/1; MG/1
1 TABLET ORAL ONCE AS NEEDED
Status: DISCONTINUED | OUTPATIENT
Start: 2025-01-31 | End: 2025-01-31 | Stop reason: HOSPADM

## 2025-01-31 RX ORDER — IBUPROFEN 600 MG/1
600 TABLET, FILM COATED ORAL EVERY 6 HOURS PRN
Status: DISCONTINUED | OUTPATIENT
Start: 2025-01-31 | End: 2025-01-31 | Stop reason: HOSPADM

## 2025-01-31 RX ORDER — BUPIVACAINE HYDROCHLORIDE 5 MG/ML
INJECTION, SOLUTION EPIDURAL; INTRACAUDAL AS NEEDED
Status: DISCONTINUED | OUTPATIENT
Start: 2025-01-31 | End: 2025-01-31 | Stop reason: HOSPADM

## 2025-01-31 RX ORDER — SODIUM CHLORIDE 0.9 % (FLUSH) 0.9 %
3 SYRINGE (ML) INJECTION AS NEEDED
Status: DISCONTINUED | OUTPATIENT
Start: 2025-01-31 | End: 2025-01-31 | Stop reason: HOSPADM

## 2025-01-31 RX ORDER — FLUMAZENIL 0.1 MG/ML
0.2 INJECTION INTRAVENOUS AS NEEDED
Status: DISCONTINUED | OUTPATIENT
Start: 2025-01-31 | End: 2025-01-31 | Stop reason: HOSPADM

## 2025-01-31 RX ORDER — LIDOCAINE HYDROCHLORIDE 20 MG/ML
INJECTION, SOLUTION EPIDURAL; INFILTRATION; INTRACAUDAL; PERINEURAL AS NEEDED
Status: DISCONTINUED | OUTPATIENT
Start: 2025-01-31 | End: 2025-01-31 | Stop reason: SURG

## 2025-01-31 RX ADMIN — LIDOCAINE HYDROCHLORIDE 100 MG: 20 INJECTION, SOLUTION EPIDURAL; INFILTRATION; INTRACAUDAL; PERINEURAL at 08:51

## 2025-01-31 RX ADMIN — PROPOFOL 150 MCG/KG/MIN: 10 INJECTION, EMULSION INTRAVENOUS at 08:51

## 2025-01-31 RX ADMIN — FENTANYL CITRATE 100 MCG: 50 INJECTION, SOLUTION INTRAMUSCULAR; INTRAVENOUS at 08:48

## 2025-01-31 RX ADMIN — HEPARIN SODIUM 2000 UNITS: 1000 INJECTION, SOLUTION INTRAVENOUS; SUBCUTANEOUS at 09:07

## 2025-01-31 RX ADMIN — Medication 100 MCG: at 09:16

## 2025-01-31 RX ADMIN — HEPARIN SODIUM 8000 UNITS: 1000 INJECTION, SOLUTION INTRAVENOUS; SUBCUTANEOUS at 09:15

## 2025-01-31 RX ADMIN — CLOPIDOGREL BISULFATE 150 MG: 75 TABLET ORAL at 10:42

## 2025-01-31 RX ADMIN — DEXAMETHASONE SODIUM PHOSPHATE 4 MG: 4 INJECTION, SOLUTION INTRA-ARTICULAR; INTRALESIONAL; INTRAMUSCULAR; INTRAVENOUS; SOFT TISSUE at 08:51

## 2025-01-31 RX ADMIN — SODIUM CHLORIDE, POTASSIUM CHLORIDE, SODIUM LACTATE AND CALCIUM CHLORIDE 1000 ML: 600; 310; 30; 20 INJECTION, SOLUTION INTRAVENOUS at 07:31

## 2025-01-31 RX ADMIN — Medication 100 MCG: at 09:54

## 2025-01-31 RX ADMIN — GLYCOPYRROLATE 0.2 MG: 0.2 INJECTION INTRAMUSCULAR; INTRAVENOUS at 08:52

## 2025-01-31 RX ADMIN — CEFAZOLIN 2000 MG: 2 INJECTION, POWDER, FOR SOLUTION INTRAMUSCULAR; INTRAVENOUS at 08:57

## 2025-01-31 RX ADMIN — ONDANSETRON HYDROCHLORIDE 4 MG: 2 INJECTION, SOLUTION INTRAMUSCULAR; INTRAVENOUS at 09:15

## 2025-01-31 NOTE — OP NOTE
Arias Willson  1/31/2025     PREOPERATIVE DIAGNOSIS: PAD (peripheral artery disease) [I73.9]  Preop testing [Z01.818]     POSTOPERATIVE DIAGNOSIS: Post-Op Diagnosis Codes:     * PAD (peripheral artery disease) [I73.9]     * Preop testing [Z01.818]     PROCEDURE PERFORMED:   1.  Ultrasound-guided cannulation of the right common femoral artery  2.  Introduction of catheter/sheath into the aorta  3.  Aortoiliac angiogram with left lower extremity runoff  4.  Contralateral cannulation of the left common iliac artery  5.  Crossing of the long SFA/proximal popliteal artery chronic total occlusion  6.  Placement of a 6 mm spider distal embolic protection device in the below-knee popliteal artery  7.  Hawkone directional atherectomy of the entire SFA and proximal popliteal artery chronic total occlusion  8.  Balloon angioplasty of the entire SFA and proximal popliteal artery chronic total occlusion using a 6 x 250 mm Medtronic drug-coated balloon  9.  Retrieval of the spider distal embolic protection device  10.  Completion left lower extremity angiogram with radiographic supervision and interpretation  11.  Mynx closure of the right common femoral artery     SURGEON: Darryl Llanes DO      ANESTHESIA: MAC    PREPARATION: Routine.    STAFF: Circulator: Alina Hadley RN  Scrub Person: Byron Albert  Assistant: aCroline Agustin  Vascular Radiology Technician: Kelly England    Estimated Blood Loss: minimal    SPECIMENS: None    COMPLICATIONS: None    INDICATIONS: Arias Willson is a 69 y.o. male who you are currently following for wound care. He is here today with complaints of pain in his left ankle and foot for the past 3 weeks. He has a wound to his left great toe which has just recently opened in the last week. He is maintained on aspirin and Pravachol. He did have noninvasive testing performed, which I did review in office. The indications, risks, and possible complications of the procedure were explained  to the patient, who voiced understanding and wished to proceed with surgery.     PROCEDURE IN DETAIL: The patient was taken to the operating room and placed on the operating table in a supine position. After MAC anesthesia was obtained, the bilateral groins was prepped and draped in a sterile manner.  Under ultrasound guidance, and using a micropuncture technique, the right common femoral artery was cannulated and a microsheath was placed.  Advantage Glidewire was advanced into the aorta and a short 6 Algerian sheath was placed.  The patient was given 1000 units of intravenous heparin.  The Omni Flush catheter was advanced into the aorta and an aortoiliac angiogram was performed.  Findings are as follows:  1.  Patent renal arteries bilaterally without stenosis  2.  Patent aorta without stenosis  3.  Patent iliac systems bilaterally without stenosis    Contralateral cannulation was established of the left common iliac artery.  The catheter was then brought down to the level of the femoral head.  An angiogram with runoff was performed.  Findings are as follows:  1.  Patent common femoral and profunda femoris arteries without stenosis  2.  Flush occlusion of the entire SFA and proximal popliteal artery down to the knee cap  3.  Patent below-knee popliteal artery without stenosis  4.  Patent three-vessel runoff to the foot without stenosis    At this point, the decision was made to revascularize the SFA and popliteal artery .  A 7 Algerian by 45 cm destination sheath was placed down into the common femoral artery.  The patient was given 9000 units of intravenous heparin.  With the help of the Valencia cross catheter, the entire  was traversed.  An angiogram was performed distally to ensure that I was in true lumen.  A 6 mm spider distal embolic protection device was placed in the below-knee popliteal artery.  Hawkone directional atherectomy was then made of the entire SFA and popliteal artery .  Multiple passes were  made achieving significant luminal gain.  Next, the SFA and popliteal artery were balloon angioplastied with a 6 x 250 mm Medtronic drug-coated balloon.  Completion angiogram was performed which showed rapid flow down through the SFA and popliteal artery  without any residual stenosis, dissection, or occlusion.  There was still maintained three-vessel runoff to the foot.  The spider was successfully retrieved.  At this point, we felt the result was excellent no further intervention was warranted.  The sheath and wire were removed and a minx device was used to seal off the right common femoral artery.  Direct pressure was held for an additional 10 to 15 minutes to help ensure hemostasis.  Sterile dressings were applied. The patient tolerated the procedure well. Sponge and needle counts were correct. The patient was then awakened in the operating room and taken to the recovery room in good condition.    Darryl Llanes,   Date: 1/31/2025 Time: 10:06 CST    CC:Yuliana Keith APRN

## 2025-01-31 NOTE — ANESTHESIA POSTPROCEDURE EVALUATION
Patient: Arias Willson    Procedure Summary       Date: 01/31/25 Room / Location:  PAD OR  /  [facility] OR    Anesthesia Start: 0847 Anesthesia Stop: 1025    Procedure: LEFT LOWER EXTREMITY ANGIOGRAM, HAWK ATHRECTOMY, BALLOON ANGIOPLASTY, MYNX CLOSURE (Left: Groin) Diagnosis:       PAD (peripheral artery disease)      Preop testing      (PAD (peripheral artery disease) [I73.9])      (Preop testing [Z01.818])    Surgeons: Darryl Llanes DO Provider: Ace Tolentino CRNA    Anesthesia Type: MAC ASA Status: 3            Anesthesia Type: MAC    Vitals  Vitals Value Taken Time   /67 01/31/25 1040   Temp 97.5 °F (36.4 °C) 01/31/25 1045   Pulse 64 01/31/25 1045   Resp 16 01/31/25 1045   SpO2 94 % 01/31/25 1045           Post Anesthesia Care and Evaluation    Patient location during evaluation: PHASE II  Patient participation: complete - patient participated  Level of consciousness: awake and awake and alert  Pain score: 0  Pain management: adequate    Airway patency: patent  Anesthetic complications: No anesthetic complications  PONV Status: none  Cardiovascular status: acceptable  Respiratory status: acceptable  Hydration status: acceptable    Comments: Patient discharged according to acceptable Bear score per RN assessment. See nursing records for further information.     Blood pressure 126/73, pulse 64, temperature 97.5 °F (36.4 °C), temperature source Temporal, resp. rate 16, SpO2 94%.

## 2025-01-31 NOTE — ANESTHESIA PREPROCEDURE EVALUATION
Anesthesia Evaluation     Patient summary reviewed   no history of anesthetic complications:   NPO Solid Status: > 6 hours  NPO Liquid Status: > 6 hours           Airway   Mallampati: II  Dental          Pulmonary    (+) a smoker Current, COPD,sleep apnea  Cardiovascular   Exercise tolerance: poor (<4 METS)    Patient on routine beta blocker    (+) hypertension, CAD, dysrhythmias Tachycardia, PVD, hyperlipidemia,  carotid artery disease      Neuro/Psych  (+) seizures, CVA, syncope, tremors  GI/Hepatic/Renal/Endo    (+) obesity, GERD, diabetes mellitus    Musculoskeletal     Abdominal   (+) obese   Substance History      OB/GYN          Other   arthritis,                       Anesthesia Plan    ASA 3     MAC     intravenous induction     Anesthetic plan, risks, benefits, and alternatives have been provided, discussed and informed consent has been obtained with: patient.        CODE STATUS:

## 2025-02-13 ENCOUNTER — TELEPHONE (OUTPATIENT)
Dept: VASCULAR SURGERY | Facility: CLINIC | Age: 70
End: 2025-02-13
Payer: MEDICARE

## 2025-02-14 ENCOUNTER — OFFICE VISIT (OUTPATIENT)
Dept: VASCULAR SURGERY | Facility: CLINIC | Age: 70
End: 2025-02-14
Payer: MEDICARE

## 2025-02-14 VITALS
SYSTOLIC BLOOD PRESSURE: 124 MMHG | DIASTOLIC BLOOD PRESSURE: 68 MMHG | BODY MASS INDEX: 36.57 KG/M2 | HEIGHT: 72 IN | HEART RATE: 94 BPM | OXYGEN SATURATION: 84 % | WEIGHT: 270 LBS

## 2025-02-14 DIAGNOSIS — L97.519 DIABETIC ULCER OF RIGHT GREAT TOE: ICD-10-CM

## 2025-02-14 DIAGNOSIS — Z72.0 NICOTINE ABUSE: ICD-10-CM

## 2025-02-14 DIAGNOSIS — E66.812 CLASS 2 SEVERE OBESITY DUE TO EXCESS CALORIES WITH SERIOUS COMORBIDITY AND BODY MASS INDEX (BMI) OF 36.0 TO 36.9 IN ADULT: ICD-10-CM

## 2025-02-14 DIAGNOSIS — I65.23 BILATERAL CAROTID ARTERY STENOSIS: ICD-10-CM

## 2025-02-14 DIAGNOSIS — E78.00 PURE HYPERCHOLESTEROLEMIA: ICD-10-CM

## 2025-02-14 DIAGNOSIS — I10 PRIMARY HYPERTENSION: ICD-10-CM

## 2025-02-14 DIAGNOSIS — E11.621 DIABETIC ULCER OF RIGHT GREAT TOE: ICD-10-CM

## 2025-02-14 DIAGNOSIS — Z79.4 TYPE 2 DIABETES MELLITUS WITH OTHER SPECIFIED COMPLICATION, WITH LONG-TERM CURRENT USE OF INSULIN: ICD-10-CM

## 2025-02-14 DIAGNOSIS — E66.01 CLASS 2 SEVERE OBESITY DUE TO EXCESS CALORIES WITH SERIOUS COMORBIDITY AND BODY MASS INDEX (BMI) OF 36.0 TO 36.9 IN ADULT: ICD-10-CM

## 2025-02-14 DIAGNOSIS — E11.69 TYPE 2 DIABETES MELLITUS WITH OTHER SPECIFIED COMPLICATION, WITH LONG-TERM CURRENT USE OF INSULIN: ICD-10-CM

## 2025-02-14 DIAGNOSIS — I73.9 PAD (PERIPHERAL ARTERY DISEASE): Primary | ICD-10-CM

## 2025-02-14 RX ORDER — GUAIFENESIN 600 MG/1
1200 TABLET, EXTENDED RELEASE ORAL 2 TIMES DAILY
COMMUNITY
Start: 2025-02-05 | End: 2025-02-16

## 2025-02-14 NOTE — PROGRESS NOTES
"2/14/2025        Yuliana Keith, TANESHA  [address] [place] [address]    Arias Willson  1955    Chief Complaint   Patient presents with    Post-op     2 week post op. Left Angio done 1/31/25. Patient denies any post operative issues. Patient also mentions hands being cold all the time. O2 is reading 84%.       Dear Yuliana Keith, *:      HPI  I had the pleasure of seeing your patient Arias Willson in the office today.  As you recall, Arias Willson is a 69 y.o.  male who you are currently following for wound care.  He returns for 2-week postop after undergoing left lower extremity angiogram on 1/31/2025. Overall he is doing well.  He previously had complaints of pain in his left ankle and foot for 3 weeks.  He had a wound to his left great toe which just recently opened.  He is maintained on aspirin and Pravachol.     Review of Systems   Constitutional: Negative.  Negative for diaphoresis and fever.   HENT: Negative.     Eyes: Negative.    Respiratory: Negative.  Negative for shortness of breath and wheezing.    Cardiovascular: Negative.  Negative for chest pain and leg swelling.   Gastrointestinal: Negative.  Negative for abdominal pain.   Endocrine: Negative.    Genitourinary: Negative.    Musculoskeletal: Negative.    Skin:  Positive for wound.   Allergic/Immunologic: Negative.    Neurological: Negative.  Negative for dizziness and weakness.   Hematological: Negative.    Psychiatric/Behavioral: Negative.         /68   Pulse 94   Ht 182.9 cm (72\")   Wt 122 kg (270 lb)   SpO2 (!) 84%   BMI 36.62 kg/m²       Physical Exam  Vitals and nursing note reviewed.   Constitutional:       General: He is not in acute distress.     Appearance: Normal appearance. He is not diaphoretic.   HENT:      Head: Normocephalic. No right periorbital erythema or left periorbital erythema.      Nose: Nose normal.   Eyes:      General: No scleral icterus.     Pupils: Pupils are " equal.   Cardiovascular:      Rate and Rhythm: Normal rate and regular rhythm.      Pulses: Normal pulses.           Radial pulses are 2+ on the right side and 2+ on the left side.        Dorsalis pedis pulses are 2+ on the left side.        Posterior tibial pulses are 2+ on the left side.      Heart sounds: Normal heart sounds. No murmur heard.     Comments: Brachial pulses even to bilateral upper extremity  Groin incision healed.  Pulmonary:      Effort: Pulmonary effort is normal. No respiratory distress.      Breath sounds: Normal breath sounds.   Abdominal:      General: Bowel sounds are normal. There is no distension.      Palpations: Abdomen is soft.      Tenderness: There is no abdominal tenderness. There is no guarding.   Musculoskeletal:         General: No swelling or tenderness. Normal range of motion.      Cervical back: Normal range of motion and neck supple.      Right lower leg: No edema.      Left lower leg: No edema.   Feet:      Right foot:      Skin integrity: Skin integrity normal.      Left foot:      Skin integrity: Skin integrity normal.   Skin:     General: Skin is warm and dry.      Findings: No erythema or rash.   Neurological:      General: No focal deficit present.      Mental Status: He is alert and oriented to person, place, and time. Mental status is at baseline.      Cranial Nerves: No cranial nerve deficit.      Gait: Gait normal.   Psychiatric:         Attention and Perception: Attention normal.         Mood and Affect: Mood normal.         Behavior: Behavior normal.         Thought Content: Thought content normal.         Judgment: Judgment normal.       DIAGNOSTIC DATA      Patient Active Problem List   Diagnosis    Ptosis of eyelid    Astigmatism    Myopia    Gus's syndrome    Neuropathic pain    GERD (gastroesophageal reflux disease)    Aneurysm of carotid artery    H/O: CVA (cerebrovascular accident)    Sensory deficit present    Gait disturbance, post-stroke    Cognitive  deficit, post-stroke    Hypertension    Diabetes mellitus    Morbid obesity    Atherosclerosis of coronary artery    NEREYDA (obstructive sleep apnea)    Macrocytosis without anemia    Need for immunization against influenza    Need for vaccination    Elevated brain natriuretic peptide (BNP) level    Tremor of both hands    Seizures    Fall    Left knee pain    Class 2 severe obesity due to excess calories with serious comorbidity and body mass index (BMI) of 35.0 to 35.9 in adult    High risk medication use    Hypoxia    Anasarca    Other insomnia    Peripheral edema    Nicotine abuse    Nocturnal hypoxemia due to obesity    COPD, group B, by GOLD 2017 classification    Syncope    Physical deconditioning    Pure hypercholesterolemia    Cigarette nicotine dependence, uncomplicated    Primary osteoarthritis of left knee    Effusion, left knee    Right knee pain    Stage 2 moderate COPD by GOLD classification    Carbuncle    Hyperkeratosis    Encounter for screening for malignant neoplasm of colon    Essential hypertension    PAD (peripheral artery disease)    Shortness of breath    Muscle spasm    Nightmare    Acute on chronic diastolic CHF (congestive heart failure)    Diabetic ulcer of right great toe    RHINA (iron deficiency anemia)    NSVT (nonsustained ventricular tachycardia)    Severe anemia    Preop testing         ICD-10-CM ICD-9-CM   1. PAD (peripheral artery disease)  I73.9 443.9   2. Primary hypertension  I10 401.9   3. Pure hypercholesterolemia  E78.00 272.0   4. Type 2 diabetes mellitus with other specified complication, with long-term current use of insulin  E11.69 250.80    Z79.4 V58.67   5. Diabetic ulcer of right great toe  E11.621 250.80    L97.519 707.15   6. Nicotine abuse  Z72.0 305.1   7. Bilateral carotid artery stenosis  I65.23 433.10     433.30   8. Class 2 severe obesity due to excess calories with serious comorbidity and body mass index (BMI) of 36.0 to 36.9 in adult  E66.812 278.01    E66.01  V85.36    Z68.36              Plan: After thoroughly evaluating Arias Willson, I believe the best course of action is to remain conservative from vascular surgery standpoint.  Overall he is doing well, his left foot is warm is and distal pulses palpable, groin incision is healed.  We will see him back in 6 month with noninvasive testing to include IMTIAZ and carotid duplex for further surveillance.  He should continue his aspirin 325 mg daily and Pravachol 40 mg nightly in addition to his other medications.  I did discuss vascular risk factors as they pertain to the progression of vascular disease including controlling his hypertension, hypercholesteremia, diabetes, and smoking cessation.  His blood pressure stable today in office.  His lipid panel from October 2024 shows an elevated total cholesterol of 255, triglycerides elevated at 1166, a decreased HDL at 29 and other values were unable to be measured.  His hemoglobin A1c from 1 month ago was uncontrolled at 7.8%.  Unfortunately, he is a daily smoker and has no desire to quit smoking at this time.  He should really consider getting with a pulmonologist that he has previously seen in his area.  His fingers are cold and we are likely not getting an accurate read on pulse ox.  Upper extremity pulses are equal bilaterally.   Body mass index is 36.62 kg/m².  Class 2 Severe Obesity (BMI >=35 and <=39.9).     This was all discussed in full with complete understanding.    Thank you for allowing me to participate in the care of your patient.  Please do not hesitate with any questions or concerns.  I will keep you aware of any further encounters with Arias Willson.        Sincerely yours,         TANESHA Griffith

## 2025-08-28 ENCOUNTER — TELEPHONE (OUTPATIENT)
Dept: VASCULAR SURGERY | Facility: CLINIC | Age: 70
End: 2025-08-28
Payer: MEDICARE

## (undated) DEVICE — PINNACLE R/O II INTRODUCER SHEATH WITH RADIOPAQUE MARKER: Brand: PINNACLE

## (undated) DEVICE — A2000 MULTI-USE SYRINGE KIT, P/N 701277-003KIT CONTENTS: 100ML CONTRAST RESERVOIR AND TUBING WITH CONTRAST SPIKE AND CLAMP: Brand: A2000 MULTI-USE SYRINGE KIT

## (undated) DEVICE — DESTINATION RENAL GUIDING SHEATH: Brand: DESTINATION

## (undated) DEVICE — RADIFOCUS GLIDEWIRE ADVANTAGE GUIDEWIRE: Brand: GLIDEWIRE ADVANTAGE

## (undated) DEVICE — CANN SMPL SOFTECH BIFLO ETCO2 A/M 7FT

## (undated) DEVICE — DRSNG SURESITE WNDW 4X4.5

## (undated) DEVICE — SYR LUERLOK 20CC BX/50

## (undated) DEVICE — BALN EVERCROSS OTW .035 5F 5X40 135

## (undated) DEVICE — GLV SURG BIOGEL LTX PF 7 1/2

## (undated) DEVICE — INTENDED FOR TISSUE SEPARATION, AND OTHER PROCEDURES THAT REQUIRE A SHARP SURGICAL BLADE TO PUNCTURE OR CUT.: Brand: BARD-PARKER ®  SAFETY SCALPED

## (undated) DEVICE — DEV CLS VASC MYNXCONTROL 6FTO7F

## (undated) DEVICE — CATH FLSH OMNI SFT 5F 90CM

## (undated) DEVICE — DEV EPS SPIDERFX 6MM OTW320/RX190CM

## (undated) DEVICE — NAVICROSS SUPPORT CATHETER: Brand: NAVICROSS

## (undated) DEVICE — Device

## (undated) DEVICE — MODEL AT P65, P/N 701554-001KIT CONTENTS: HAND CONTROLLER, 3-WAY HIGH-PRESSURE STOPCOCK WITH ROTATING END AND PREMIUM HIGH-PRESSURE TUBING: Brand: ANGIOTOUCH® KIT

## (undated) DEVICE — MODEL BT2000 P/N 700287-012KIT CONTENTS: MANIFOLD WITH SALINE AND CONTRAST PORTS, SALINE TUBING WITH SPIKE AND HAND SYRINGE, TRANSDUCER: Brand: BT2000 AUTOMATED MANIFOLD KIT

## (undated) DEVICE — DESTINATION PERIPHERAL GUIDING SHEATH: Brand: DESTINATION

## (undated) DEVICE — SYR LUERLOK 30CC

## (undated) DEVICE — PROXIMATE RH ROTATING HEAD SKIN STAPLERS (35 WIDE) CONTAINS 35 STAINLESS STEEL STAPLES: Brand: PROXIMATE

## (undated) DEVICE — ATHERECTOMY H1-LX HAWKONE 7F EXT TIP US: Brand: HAWKONE™

## (undated) DEVICE — PK TURNOVER RM ADV

## (undated) DEVICE — SYR LL TP 10ML STRL

## (undated) DEVICE — PAD ENDOVASCULAR: Brand: MEDLINE INDUSTRIES, INC.

## (undated) DEVICE — INFLATION DEVICE: Brand: ENCORE™ 26

## (undated) DEVICE — ST MIC/INTRO ACC SHRP/NDL TUNG/TP NITNL 5F 45CM 7CM